# Patient Record
Sex: FEMALE | Race: WHITE | NOT HISPANIC OR LATINO | Employment: OTHER | URBAN - METROPOLITAN AREA
[De-identification: names, ages, dates, MRNs, and addresses within clinical notes are randomized per-mention and may not be internally consistent; named-entity substitution may affect disease eponyms.]

---

## 2017-05-02 ENCOUNTER — ALLSCRIPTS OFFICE VISIT (OUTPATIENT)
Dept: OTHER | Facility: OTHER | Age: 62
End: 2017-05-02

## 2017-05-08 ENCOUNTER — LAB REQUISITION (OUTPATIENT)
Dept: LAB | Facility: HOSPITAL | Age: 62
End: 2017-05-08
Payer: COMMERCIAL

## 2017-05-08 DIAGNOSIS — R53.83 OTHER FATIGUE: ICD-10-CM

## 2017-05-08 PROCEDURE — 88305 TISSUE EXAM BY PATHOLOGIST: CPT | Performed by: PHYSICIAN ASSISTANT

## 2017-06-11 ENCOUNTER — APPOINTMENT (OUTPATIENT)
Dept: LAB | Age: 62
End: 2017-06-11
Payer: COMMERCIAL

## 2017-06-11 ENCOUNTER — TRANSCRIBE ORDERS (OUTPATIENT)
Dept: ADMINISTRATIVE | Age: 62
End: 2017-06-11

## 2017-06-11 DIAGNOSIS — Z00.8 HEALTH EXAMINATION IN POPULATION SURVEY: ICD-10-CM

## 2017-06-11 DIAGNOSIS — Z00.8 HEALTH EXAMINATION IN POPULATION SURVEY: Primary | ICD-10-CM

## 2017-06-11 LAB
CHOLEST SERPL-MCNC: 228 MG/DL (ref 50–200)
EST. AVERAGE GLUCOSE BLD GHB EST-MCNC: 120 MG/DL
HBA1C MFR BLD: 5.8 % (ref 4.2–6.3)
HDLC SERPL-MCNC: 47 MG/DL (ref 40–60)
LDLC SERPL CALC-MCNC: 122 MG/DL (ref 0–100)
TRIGL SERPL-MCNC: 294 MG/DL

## 2017-06-11 PROCEDURE — 83036 HEMOGLOBIN GLYCOSYLATED A1C: CPT

## 2017-06-11 PROCEDURE — 80061 LIPID PANEL: CPT

## 2017-06-11 PROCEDURE — 36415 COLL VENOUS BLD VENIPUNCTURE: CPT

## 2017-06-28 ENCOUNTER — ALLSCRIPTS OFFICE VISIT (OUTPATIENT)
Dept: OTHER | Facility: OTHER | Age: 62
End: 2017-06-28

## 2017-06-28 DIAGNOSIS — R00.2 PALPITATIONS: ICD-10-CM

## 2017-06-28 DIAGNOSIS — E55.9 VITAMIN D DEFICIENCY: ICD-10-CM

## 2017-06-29 ENCOUNTER — TRANSCRIBE ORDERS (OUTPATIENT)
Dept: ADMINISTRATIVE | Facility: HOSPITAL | Age: 62
End: 2017-06-29

## 2017-06-29 DIAGNOSIS — R00.2 PALPITATIONS: Primary | ICD-10-CM

## 2017-07-05 ENCOUNTER — APPOINTMENT (OUTPATIENT)
Dept: LAB | Facility: CLINIC | Age: 62
End: 2017-07-05
Payer: COMMERCIAL

## 2017-07-05 ENCOUNTER — TRANSCRIBE ORDERS (OUTPATIENT)
Dept: LAB | Facility: CLINIC | Age: 62
End: 2017-07-05

## 2017-07-05 DIAGNOSIS — R00.2 PALPITATIONS: ICD-10-CM

## 2017-07-05 DIAGNOSIS — E55.9 VITAMIN D DEFICIENCY: ICD-10-CM

## 2017-07-05 LAB
25(OH)D3 SERPL-MCNC: 35.6 NG/ML (ref 30–100)
ALBUMIN SERPL BCP-MCNC: 4 G/DL (ref 3.5–5)
ALP SERPL-CCNC: 48 U/L (ref 46–116)
ALT SERPL W P-5'-P-CCNC: 26 U/L (ref 12–78)
ANION GAP SERPL CALCULATED.3IONS-SCNC: 6 MMOL/L (ref 4–13)
AST SERPL W P-5'-P-CCNC: 23 U/L (ref 5–45)
BASOPHILS # BLD AUTO: 0.02 THOUSANDS/ΜL (ref 0–0.1)
BASOPHILS NFR BLD AUTO: 0 % (ref 0–1)
BILIRUB SERPL-MCNC: 0.69 MG/DL (ref 0.2–1)
BUN SERPL-MCNC: 14 MG/DL (ref 5–25)
CALCIUM SERPL-MCNC: 9.4 MG/DL (ref 8.3–10.1)
CHLORIDE SERPL-SCNC: 104 MMOL/L (ref 100–108)
CO2 SERPL-SCNC: 27 MMOL/L (ref 21–32)
CREAT SERPL-MCNC: 0.93 MG/DL (ref 0.6–1.3)
EOSINOPHIL # BLD AUTO: 0.27 THOUSAND/ΜL (ref 0–0.61)
EOSINOPHIL NFR BLD AUTO: 4 % (ref 0–6)
ERYTHROCYTE [DISTWIDTH] IN BLOOD BY AUTOMATED COUNT: 13.6 % (ref 11.6–15.1)
GFR SERPL CREATININE-BSD FRML MDRD: >60 ML/MIN/1.73SQ M
GLUCOSE P FAST SERPL-MCNC: 114 MG/DL (ref 65–99)
HCT VFR BLD AUTO: 40.6 % (ref 34.8–46.1)
HGB BLD-MCNC: 13.7 G/DL (ref 11.5–15.4)
LYMPHOCYTES # BLD AUTO: 1.68 THOUSANDS/ΜL (ref 0.6–4.47)
LYMPHOCYTES NFR BLD AUTO: 26 % (ref 14–44)
MCH RBC QN AUTO: 30.4 PG (ref 26.8–34.3)
MCHC RBC AUTO-ENTMCNC: 33.7 G/DL (ref 31.4–37.4)
MCV RBC AUTO: 90 FL (ref 82–98)
MONOCYTES # BLD AUTO: 0.38 THOUSAND/ΜL (ref 0.17–1.22)
MONOCYTES NFR BLD AUTO: 6 % (ref 4–12)
NEUTROPHILS # BLD AUTO: 4.13 THOUSANDS/ΜL (ref 1.85–7.62)
NEUTS SEG NFR BLD AUTO: 64 % (ref 43–75)
NRBC BLD AUTO-RTO: 0 /100 WBCS
PLATELET # BLD AUTO: 134 THOUSANDS/UL (ref 149–390)
PMV BLD AUTO: 11.7 FL (ref 8.9–12.7)
POTASSIUM SERPL-SCNC: 4.4 MMOL/L (ref 3.5–5.3)
PROT SERPL-MCNC: 7.5 G/DL (ref 6.4–8.2)
RBC # BLD AUTO: 4.51 MILLION/UL (ref 3.81–5.12)
SODIUM SERPL-SCNC: 137 MMOL/L (ref 136–145)
T4 FREE SERPL-MCNC: 1.05 NG/DL (ref 0.76–1.46)
TSH SERPL DL<=0.05 MIU/L-ACNC: 1.6 UIU/ML (ref 0.36–3.74)
WBC # BLD AUTO: 6.5 THOUSAND/UL (ref 4.31–10.16)

## 2017-07-05 PROCEDURE — 84439 ASSAY OF FREE THYROXINE: CPT

## 2017-07-05 PROCEDURE — 85025 COMPLETE CBC W/AUTO DIFF WBC: CPT

## 2017-07-05 PROCEDURE — 84443 ASSAY THYROID STIM HORMONE: CPT

## 2017-07-05 PROCEDURE — 36415 COLL VENOUS BLD VENIPUNCTURE: CPT

## 2017-07-05 PROCEDURE — 82306 VITAMIN D 25 HYDROXY: CPT

## 2017-07-05 PROCEDURE — 80053 COMPREHEN METABOLIC PANEL: CPT

## 2017-07-28 ENCOUNTER — HOSPITAL ENCOUNTER (OUTPATIENT)
Dept: NON INVASIVE DIAGNOSTICS | Facility: CLINIC | Age: 62
Discharge: HOME | End: 2017-07-28
Payer: COMMERCIAL

## 2017-07-28 DIAGNOSIS — R00.2 PALPITATIONS: ICD-10-CM

## 2017-07-28 LAB
CHEST PAIN STATEMENT: NORMAL
MAX DIASTOLIC BP: 74 MMHG
MAX HEART RATE: 176 BPM
MAX PREDICTED HEART RATE: 161 BPM
MAX. SYSTOLIC BP: 160 MMHG
PROTOCOL NAME: NORMAL
REASON FOR TERMINATION: NORMAL
TARGET HR FORMULA: NORMAL
TEST INDICATION: NORMAL
TIME IN EXERCISE PHASE: 557 S

## 2017-07-28 PROCEDURE — 93017 CV STRESS TEST TRACING ONLY: CPT

## 2017-07-28 PROCEDURE — 93306 TTE W/DOPPLER COMPLETE: CPT

## 2017-08-01 ENCOUNTER — ALLSCRIPTS OFFICE VISIT (OUTPATIENT)
Dept: OTHER | Facility: OTHER | Age: 62
End: 2017-08-01

## 2017-08-01 ENCOUNTER — LAB REQUISITION (OUTPATIENT)
Dept: LAB | Facility: HOSPITAL | Age: 62
End: 2017-08-01
Payer: COMMERCIAL

## 2017-08-01 DIAGNOSIS — N94.9 UNSPECIFIED CONDITION ASSOCIATED WITH FEMALE GENITAL ORGANS AND MENSTRUAL CYCLE: ICD-10-CM

## 2017-08-01 LAB
BACTERIA UR QL AUTO: NEGATIVE
BILIRUB UR QL STRIP: NORMAL
CLARITY UR: NORMAL
CLUE CELL (HISTORICAL): NORMAL
COLOR UR: YELLOW
GLUCOSE (HISTORICAL): NORMAL
HGB UR QL STRIP.AUTO: NORMAL
HYPHAL YEAST (HISTORICAL): POSITIVE
KETONES UR STRIP-MCNC: NORMAL MG/DL
KOH PREP (HISTORICAL): NORMAL
LEUKOCYTE ESTERASE UR QL STRIP: NORMAL
NITRITE UR QL STRIP: NORMAL
PH UR STRIP.AUTO: 4.5 [PH]
PH UR STRIP.AUTO: 5 [PH]
PROT UR STRIP-MCNC: NORMAL MG/DL
SP GR UR STRIP.AUTO: 1.03
TRICHOMONAS (HISTORICAL): NEGATIVE
UROBILINOGEN UR QL STRIP.AUTO: 0.2
YEAST (HISTORICAL): NEGATIVE

## 2017-08-01 PROCEDURE — 87510 GARDNER VAG DNA DIR PROBE: CPT | Performed by: PHYSICIAN ASSISTANT

## 2017-08-01 PROCEDURE — 87660 TRICHOMONAS VAGIN DIR PROBE: CPT | Performed by: PHYSICIAN ASSISTANT

## 2017-08-01 PROCEDURE — 87086 URINE CULTURE/COLONY COUNT: CPT | Performed by: PHYSICIAN ASSISTANT

## 2017-08-01 PROCEDURE — 87480 CANDIDA DNA DIR PROBE: CPT | Performed by: PHYSICIAN ASSISTANT

## 2017-08-02 LAB
BACTERIA UR CULT: NORMAL
CANDIDA RRNA VAG QL PROBE: POSITIVE
G VAGINALIS RRNA GENITAL QL PROBE: NEGATIVE
T VAGINALIS RRNA GENITAL QL PROBE: NEGATIVE

## 2017-08-03 ENCOUNTER — GENERIC CONVERSION - ENCOUNTER (OUTPATIENT)
Dept: OTHER | Facility: OTHER | Age: 62
End: 2017-08-03

## 2017-08-09 ENCOUNTER — GENERIC CONVERSION - ENCOUNTER (OUTPATIENT)
Dept: OTHER | Facility: OTHER | Age: 62
End: 2017-08-09

## 2017-09-13 ENCOUNTER — HOSPITAL ENCOUNTER (OUTPATIENT)
Dept: NON INVASIVE DIAGNOSTICS | Facility: CLINIC | Age: 62
Discharge: HOME | End: 2017-09-13
Payer: COMMERCIAL

## 2017-09-13 DIAGNOSIS — R00.2 PALPITATIONS: ICD-10-CM

## 2017-09-13 PROCEDURE — 93225 XTRNL ECG REC<48 HRS REC: CPT

## 2017-09-13 PROCEDURE — 93226 XTRNL ECG REC<48 HR SCAN A/R: CPT

## 2017-09-21 ENCOUNTER — GENERIC CONVERSION - ENCOUNTER (OUTPATIENT)
Dept: OTHER | Facility: OTHER | Age: 62
End: 2017-09-21

## 2017-10-03 ENCOUNTER — ALLSCRIPTS OFFICE VISIT (OUTPATIENT)
Dept: OTHER | Facility: OTHER | Age: 62
End: 2017-10-03

## 2017-10-03 DIAGNOSIS — K90.0 CELIAC DISEASE: ICD-10-CM

## 2017-10-03 DIAGNOSIS — K21.9 GASTRO-ESOPHAGEAL REFLUX DISEASE WITHOUT ESOPHAGITIS: ICD-10-CM

## 2017-10-05 ENCOUNTER — GENERIC CONVERSION - ENCOUNTER (OUTPATIENT)
Dept: OTHER | Facility: OTHER | Age: 62
End: 2017-10-05

## 2017-10-11 ENCOUNTER — APPOINTMENT (OUTPATIENT)
Dept: LAB | Facility: CLINIC | Age: 62
End: 2017-10-11
Payer: COMMERCIAL

## 2017-10-11 ENCOUNTER — TRANSCRIBE ORDERS (OUTPATIENT)
Dept: LAB | Facility: CLINIC | Age: 62
End: 2017-10-11

## 2017-10-11 DIAGNOSIS — K21.9 GASTRO-ESOPHAGEAL REFLUX DISEASE WITHOUT ESOPHAGITIS: ICD-10-CM

## 2017-10-11 DIAGNOSIS — K90.0 CELIAC DISEASE: ICD-10-CM

## 2017-10-11 PROCEDURE — 82784 ASSAY IGA/IGD/IGG/IGM EACH: CPT

## 2017-10-11 PROCEDURE — 36415 COLL VENOUS BLD VENIPUNCTURE: CPT

## 2017-10-11 PROCEDURE — 83516 IMMUNOASSAY NONANTIBODY: CPT

## 2017-10-11 PROCEDURE — 86255 FLUORESCENT ANTIBODY SCREEN: CPT

## 2017-10-12 LAB
ENDOMYSIUM IGA SER QL: NEGATIVE
GLIADIN PEPTIDE IGA SER-ACNC: 4 UNITS (ref 0–19)
GLIADIN PEPTIDE IGG SER-ACNC: 3 UNITS (ref 0–19)
IGA SERPL-MCNC: 131 MG/DL (ref 87–352)
TTG IGA SER-ACNC: <2 U/ML (ref 0–3)
TTG IGG SER-ACNC: <2 U/ML (ref 0–5)

## 2017-10-13 ENCOUNTER — GENERIC CONVERSION - ENCOUNTER (OUTPATIENT)
Dept: OTHER | Facility: OTHER | Age: 62
End: 2017-10-13

## 2017-10-27 NOTE — PROGRESS NOTES
Assessment  1  Epigastric discomfort (789 06) (R10 13)   2  Chronic GERD (530 81) (K21 9)   3  Celiac disease (579 0) (K90 0)    Plan  Celiac disease, Chronic GERD    · (1) CELIAC DISEASE AB PROFILE; Status:Active; Requested XGT:[id number];    Perform:[facility]; VSA:[id number]; Ordered; For:Celiac disease, Chronic GERD; Ordered By:Oscar Blunt;  Chronic GERD, Epigastric discomfort    · Pantoprazole Sodium 40 MG Oral Tablet Delayed Release (Protonix); TAKE 1  TABLET DAILY   Rx By: Aníbal Chambers; Dispense: 90 Days ; #:90 Tablet Delayed Release; Refill: 1;For: Chronic GERD, Epigastric discomfort; ALETA = N; Verified Transmission to [address]; Last Updated By: System, [employer]; 10/3/2017 2:48:30 PM   · Follow-up visit in 3 months Evaluation and Treatment  Follow-up  Status: Hold For -  Scheduling  Requested for: 99DFO1069   Ordered; For: Chronic GERD, Epigastric discomfort; Ordered By: Aníbal Chambers Performed:  Due: 76DUN7867   · Do not take anti-inflammatory medicines other than aspirin ; Status:Complete;   Done:  19TUW8380   Ordered; For:Chronic GERD, Epigastric discomfort; Ordered By:Oscar Blunt;    Discussion/Summary  Discussion Summary:     Postprandial burning epigastric discomfort, and this patient with history of NSAID use, she had EGD last year showing some mild gastritis  She has not been compliant with PPI in general over the last year, and has been off it entirely for the last 1-2 months  Suspect patient has some recurrent gastritis or even underlying peptic ulcer disease  History of celiac disease, patient says she was diagnosed in 2010  EGD last year showed no evidence of sprue in the duodenum   She denies any disturbance with her bowel habits, and maintains she has been faithful to gluten-free diet, but rule out any increased activity with her celiac disease contributing to her symptoms    I advised patient to restart Protonix, take this faithfully once every morning for the next 2-3 months  I also advised patient to avoid NSAIDs as much as she can, try to use Tylenol for joint pains if this helps  Will check a celiac antibody panel  Follow-up office visit in 3-4 months  If patient has not had significant symptomatic improvement with PPI therapy, can consider repeating EGD for further evaluation  I advised patient to call in the meantime if she has worsening symptoms or other issues  Counseling Documentation With Imm: The patient was counseled regarding diagnostic results,-- instructions for management,-- risk factor reductions,-- risks and benefits of treatment options,-- importance of compliance with treatment  Medication SE Review and Pt Understands Tx: Possible side effects of new medications were reviewed with the patient/guardian today  Chief Complaint  Chief Complaint Free Text Note Form: up; epigastric pain, GERD, history of celiac disease      History of Present Illness  HPI: female with history of osteopenia presents for follow-up regarding some ongoing issues with epigastric discomfort and reflux  She was last seen in the office by Dr Doretha Acevedo in April 2016; at that time a recent EGD showed a normal-appearing duodenum, some mild gastritis  She had had a normal colonoscopy one to 2 years before that  Patient was advised to minimize NSAID use, continue with PPI and transition to H2 blocker if possible, and follow-up in one year  this time, the patient says that she did stop pantoprazole on her own about a month or 2 ago because she was worried about side effects, she says that she only took it intermittently before that anyway  She does take [employer] and other NSAIDs occasionally for joint pains  At this time, she says that she gets a burning sensation in the epigastric area after she eats or drinks virtually anything  She also has some acid reflux symptoms which are worse at night before she goes to bed  She denies any recent unintended weight loss   Denies any vomiting episodes, denies any change in her bowel habits, denies any blood or mucus in her stools  No melena  She says her last colonoscopy was in April 2014 with Dr Boris Veloz, this saw the removal of one benign polyp and she was recommended for repeat colonoscopy 5 years from that time  History Reviewed: The history was obtained today from the patient and I agree with the documented history  Review of Systems  Complete-Female GI Adult:   Constitutional: No fever, no chills, feels well, no tiredness, no recent weight gain or weight loss  Eyes: No complaints of eye pain, no red eyes, no eyesight problems, no discharge, no dry eyes, no itching of eyes  ENT: no complaints of earache, no loss of hearing, no nose bleeds, no nasal discharge, no sore throat, no hoarseness  Cardiovascular: No complaints of slow heart rate, no fast heart rate, no chest pain, no palpitations, no leg claudication, no lower extremity edema  Respiratory: No complaints of shortness of breath, no wheezing, no cough, no SOB on exertion, no orthopnea, no PND  Gastrointestinal: as noted in HPI  Genitourinary: No complaints of dysuria, no incontinence, no pelvic pain, no dysmenorrhea, no vaginal discharge or bleeding  Musculoskeletal: No complaints of arthralgias, no myalgias, no joint swelling or stiffness, no limb pain or swelling  Integumentary: No complaints of skin rash or lesions, no itching, no skin wounds, no breast pain or lump  Neurological: No complaints of headache, no confusion, no convulsions, no numbness, no dizziness or fainting, no tingling, no limb weakness, no difficulty walking  Psychiatric: Not suicidal, no sleep disturbance, no anxiety or depression, no change in personality, no emotional problems  Endocrine: No complaints of proptosis, no hot flashes, no muscle weakness, no deepening of the voice, no feelings of weakness     Hematologic/Lymphatic: No complaints of swollen glands, no swollen glands in the neck, does not bleed easily, does not bruise easily  Active Problems  1  Back pain (724 5) (M54 9)   2  Heart palpitations (785 1) (R00 2)   3  Insomnia (780 52) (G47 00)   4  Osteopenia (733 90) (M85 80)   5  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)   6  Vitamin D deficiency (268 9) (E55 9)    Past Medical History  1  History of Acute bronchospasm (519 11) (J98 01)   2  History of Acute maxillary sinusitis (461 0) (J01 00)   3  History of Acute pain (338 19) (R52)   4  History of Acute UTI (599 0) (N39 0)   5  History of Allergic conjunctivitis (372 14) (H10 10)   6  History of Arthralgia Of The Left Ulna/Radius/Wrist (719 43)   7  History of Conjunctivitis (372 30) (H10 9)   8  History of Contact dermatitis (692 9) (L25 9)   9  History of Contact dermatitis due to poison ivy (692 6) (L23 7)   10  History of Costochondritis (733 6) (M94 0)   11  History of Encounter for gynecological examination without abnormal finding (V72 31)    (Z01 419)   12  History of Encounter for routine gynecological examination (V72 31) (Z01 419)   13  History of Encounter for screening colonoscopy (V76 51) (Z12 11)   14  History of acne (V13 3) (Z87 2)   15  History of acne (V13 3) (Z87 2)   16  History of acute bronchitis (V12 69) (Z87 09)   17  History of acute sinusitis (V12 69) (Z87 09)   18  History of back pain (V13 59) (Z87 39)   19  History of candidiasis (V12 09) (Z86 19)   20  History of colonic polyps (V12 72) (Z86 010)   21  Denied: History of depression   22  History of gastritis (V12 79) (Z87 19)   23  History of inflammation of sacroiliac joint (V13 4) (Z87 39)   24  History of nausea (V12 79) (Z87 898)   25  History of onychomycosis (V12 09) (Z86 19)   26  History of palpitations (V12 59) (Z87 898)   27  History of restless legs syndrome (V12 49) (Z86 69)   28  History of screening mammography (V15 89) (Z92 89)   29  History of screening mammography (V15 89) (Z92 89)   30   Denied: History of substance abuse   31  History of urinary frequency (V13 09) (Z87 898)   32  History of vaginitis (V13 29) (Z87 42)   33  History of Hot flashes (627 2) (N95 1)   34  History of Insomnia (780 52) (G47 00)   35  History of Laceration (879 8)   36  History of Laceration of upper extremity, right, sequela (906 1) (S41 111S)   37  History of Lateral epicondylitis (tennis elbow) (726 32) (M77 10)   38  History of Malignant lymphoma (202 80) (C85 90)   39  History of Muscle ache (729 1) (M79 1)   40  Need for Tdap vaccination (V06 1) (Z23)   41  History of Postmenopausal disorder (627 9) (N95 9)   42  History of Rash (782 1) (R21)   43  History of Right ankle pain (719 47) (M25 571)   44  History of Screening for HPV (human papillomavirus) (V73 81) (Z11 51)   45  History of Superinfection (136 9) (B99 9)   46  History of 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)   47  History of Urinary Tract Infection (V13 02)   48  History of Vaginal burning (625 8) (N94 9)  Active Problems And Past Medical History Reviewed: The active problems and past medical history were reviewed and updated today  Surgical History  1  History of Nasal Septal Deviation Repair   2  History of Oophorectomy   3  History of Total Abdominal Hysterectomy With Removal Of Both Ovaries  Surgical History Reviewed: The surgical history was reviewed and updated today  Family History  Mother    1  Denied: Family history of Colon cancer   2  Denied: Family history of Crohn's disease without complication, unspecified   gastrointestinal tract location   3  Denied: Family history of depression   4  Denied: Family history of liver disease   5  Denied: Family history of substance abuse   6  Family history of Mother  At Age 77  Father    9  Denied: Family history of Colon cancer   8  Denied: Family history of Crohn's disease without complication, unspecified   gastrointestinal tract location   9  Family history of Diabetes Mellitus (V18 0)   10  Family history of Dyslipidemia   11  Family history of colonic polyps (V18 51) (Z83 71)   12  Denied: Family history of depression   15  Denied: Family history of liver disease   15  Denied: Family history of substance abuse   15  Family history of Hypertension (V17 49)   16  Family history of Laryngeal Cancer (V16 2)  Sister    16  Family history of Thyroid Cancer  Family History Reviewed: The family history was reviewed and updated today  Social History   · Being A Social Drinker   · Caffeine Use   · Exercise: Walking   · Former smoker (B05 38) (K20 606)   ·   Social History Reviewed: The social history was reviewed and updated today  The social history was reviewed and is unchanged  Current Meds   1  Acidophilus Oral Tablet; Therapy: (Recorded:09Apr2015) to Recorded   2  Claritin CAPS; Therapy: (Recorded:09Apr2015) to Recorded   3  Clotrimazole-Betamethasone 1-0 05 % External Cream; APPLY  AND RUB  IN A THIN   FILM TO AFFECTED AREAS TWICE DAILY  (AM AND PM); Therapy: 15Jkl5461 to (Last Rx:01Aug2017)  Requested for: 61Ujk4615 Ordered   4  Erythromycin 2 % External Gel; APPLY  AND RUB  IN A THIN FILM TO AFFECTED AREAS   TWICE DAILY  (AM AND PM); Therapy: 62ELZ8524 to (Last Rx:17Nov2016)  Requested for: 28VPD5028 Ordered   5  Estradiol 0 5 MG Oral Tablet; TAKE 1 TABLET DAILY AS DIRECTED; Therapy: 09Apr2015 to [phone])  Requested for: 28MJS3587; Last   Rx:72Gcf4588 Ordered   6  Fluconazole 150 MG Oral Tablet; TAKE 1 TABLET NOW AND AGAIN IN 3 DAYS; Therapy: 83Iwl1453 to (Last Rx:41Vat8477)  Requested for: 49Byl9953 Ordered   7  Ketorolac Tromethamine 10 MG Oral Tablet; take 1 tablet by mouth every 8 hours as   needed for pain x 5 days; Therapy: 12FCO1921 to (Evaluate:47Zen9064)  Requested for: 10LIT8156; Last   Rx:52Nii4716 Ordered   8  Meloxicam 15 MG Oral Tablet; TAKE 1 TABLET DAILY AS NEEDED;    Therapy: 49OIX8192 to (Evaluate:23Jun2018)  Requested for: 59YIW8326; Last Rx: 84OQK8993 Ordered   9  Metoprolol Succinate ER 50 MG Oral Tablet Extended Release 24 Hour; Take 1 tablet   daily; Therapy: 34PSI9130 to (Last Rx:03Oct2017)  Requested for: 62Iqj2920 Ordered   10  Ondansetron 4 MG Oral Tablet Disintegrating; one tab q 8 hrs prn n/v;    Therapy: 79VTE3137 to (Last Rx:24Jzs6405)  Requested for: 79FJU1445 Ordered   11  Protonix 40 MG Oral Tablet Delayed Release; Therapy: 87WPA2095 to Recorded   12  Sudafed TABS; TAKE 1 TABLET EVERY 6 HOURS AS NEEDED; Therapy: (53 730 54 84) to Recorded   13  Tylenol TABS; Therapy: (53 730 54 84) to Recorded   14  Vitamin B12 100 MCG Oral Tablet; Therapy: (53 730 54 84) to Recorded   15  Vitamin C 100 MG Oral Tablet; Therapy: (53 730 54 84) to Recorded   16  Vitamin D3 1000 UNIT Oral Capsule; Therapy: (53 730 54 84) to Recorded   17  Xopenex HFA 45 MCG/ACT Inhalation Aerosol; INHALE 2 PUFFS EVERY 6 HOURS AS    NEEDED; Therapy: 09JDL1745 to (Last Rx:17Nov2016)  Requested for: 03CWK5622 Ordered   18  Zolpidem Tartrate 5 MG Oral Tablet; TAKE 1 TABLET AT BEDTIME AS NEEDED; Last    Rx:17Nov2016 Ordered   19  Zolpidem Tartrate 5 MG Oral Tablet; TAKE ONE TABLET BY MOUTH AT BEDTIME; Therapy: 35OQD2635 to (Evaluate:21Nov2017); Last Rx:32Isr6928 Ordered  Medication List Reviewed: The medication list was reviewed and updated today  Allergies  1  Morphine Derivatives   2  Sulfa Drugs    Vitals  Vital Signs    Recorded: 51KZS0262 02:10PM   Temperature 96 7 F   Heart Rate 80   Systolic 062   Diastolic 74   Weight 558 lb    BMI Calculated 27 62   BSA Calculated 1 83   O2 Saturation 98     Physical Exam    Constitutional   General appearance: No acute distress, well appearing and well nourished  Eyes   Conjunctiva and lids: No swelling, erythema or discharge  Pupils and irises: Equal, round and reactive to light      Ears, Nose, Mouth, and Throat   External inspection of ears and nose: Normal  Oropharynx: Normal with no erythema, edema, exudate or lesions  Pulmonary   Respiratory effort: No increased work of breathing or signs of respiratory distress  Auscultation of lungs: Clear to auscultation  Cardiovascular   Palpation of heart: Normal PMI, no thrills  Auscultation of heart: Normal rate and rhythm, normal S1 and S2, without murmurs  Examination of extremities for edema and/or varicosities: Normal     Carotid pulses: Normal     Abdomen   Abdomen: Non-tender, no masses  Liver and spleen: No hepatomegaly or splenomegaly  Lymphatic   Palpation of lymph nodes in neck: No lymphadenopathy  Musculoskeletal   Gait and station: Normal     Digits and nails: Normal without clubbing or cyanosis  Inspection/palpation of joints, bones, and muscles: Normal     Skin   Skin and subcutaneous tissue: Normal without rashes or lesions      Psychiatric   Orientation to person, place, and time: Normal     Mood and affect: Normal          Future Appointments    Date/Time Provider Specialty Site   12/29/2017 02:30 PM [facility] [place] [address]   Electronically signed by : Benedetto Halsted, [address]; Oct  3 2017  5:12PM EST                       (Author)    Electronically signed by : RUBINA Carrion ; Oct  3 2017  5:46PM EST                       (Author)

## 2017-11-01 ENCOUNTER — GENERIC CONVERSION - ENCOUNTER (OUTPATIENT)
Dept: OTHER | Facility: OTHER | Age: 62
End: 2017-11-01

## 2017-11-30 ENCOUNTER — ALLSCRIPTS OFFICE VISIT (OUTPATIENT)
Dept: OTHER | Facility: OTHER | Age: 62
End: 2017-11-30

## 2017-12-05 NOTE — CONSULTS
Assessment    1  Premature ventricular contraction (427 69) (I49 3)    Plan  Heart palpitations    · EKG/ECG- POC; Status:Complete;   Done: 12PGR8902   Perform: In Office; [phone]; Last Updated Guillermo Almazna; 11/30/2017 11:40:24 AM;Ordered;  For:Heart palpitations; Ordered By:Celena Putnam; Heart palpitations, Premature ventricular contraction    · Follow-up visit in 1 month Evaluation and Treatment  Follow-up  Status: Hold For -  Scheduling  Requested for: 22SBP3766   Ordered; For: Heart palpitations, Premature ventricular contraction; Ordered By: Modesto Craven Performed:  Due: 09HBG9538  Premature ventricular contraction    · Flecainide Acetate 50 MG Oral Tablet; TAKE 1 TABLET TWICE DAILY   Rx By: Modesto Craven; Dispense: 30 Days ; #:60 Tablet; Refill: 11; For: Premature ventricular contraction; ALETA = N; Sent To: [facility]    Discussion/Summary    63year old woman with no cardiac history presents for evaluation of palpitations  Several months ago developed palpitations - started happening after eating carbohydrates  Occurs every day - not irregular, but pounding  Some improvement with Metoprolol Succinate  Holter monitor demonstrated frequent PVCs, but no sustained dysrhythmias  Echo and stress test were normal     Impression:  1  Premature ventricular contractions - may be etiology of symptoms  Will start Flecainide 50mg 2x/day along with metoprolol  No evidence of structural heart disease or myocardial ischemia  Recommendations:  1  Start Flecainide 50mg 2x/day  2  Continue metoprolol  3  Follow up in one month  Chief Complaint  Pt came in for a consult, pt has strong palpitations, minor light headiness  History of Present Illness  Cardiology HPI Free Text Note Form [facility]: Ms Kel Savage is a 63year old woman with no cardiac history presents for evaluation of palpitations  Several months ago developed palpitations - started happening after eating carbohydrates   Occurs every day - not irregular, but pounding  Some improvement with Metoprolol Succinate  Holter monitor demonstrated frequent PVCs, but no sustained dysrhythmias  Echo and stress test were normal       Review of Systems      Cardiac: palpitations present   Skin: No complaints of nonhealing sores or skin rash  Genitourinary: No complaints of recurrent urinary tract infections, frequent urination at night, difficult urination, blood in urine, kidney stones, loss of bladder control, kidney problems, denies any birth control or hormone replacement, is not post menopausal, not currently pregnant  Psychological: No complaints of feeling depressed, anxiety, panic attacks, or difficulty concentrating  General: No complaints of trouble sleeping, lack of energy, fatigue, appetite changes, weight changes, fever, frequent infections, or night sweats  Respiratory: No complaints of shortness of breath, cough with sputum, or wheezing  HEENT: No complaints of serious problems, hearing problems, nose problems, throat problems, or snoring  Gastrointestinal: No complaints of liver problems, nausea, vomiting, heartburn, constipation, bloody stools, diarrhea, problems swallowing, adbominal pain, or rectal bleeding  Hematologic: No complaints of bleeding disorders, anemia, blood clots, or excessive brusing  Neurological: No complaints of numbness, tingling, dizziness, weakness, seizures, headaches, syncope or fainting, AM fatigue, daytime sleepiness, no witnessed apnea episodes  Musculoskeletal: No complaints of arthritis, back pain, or painfull swelling  Active Problems    1  Back pain (724 5) (M54 9)   2  Celiac disease (579 0) (K90 0)   3  Chronic GERD (530 81) (K21 9)   4  Epigastric discomfort (789 06) (R10 13)   5  Heart palpitations (785 1) (R00 2)   6  Insomnia (780 52) (G47 00)   7  Osteopenia (733 90) (M85 80)   8  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)   9   Vitamin D deficiency (268 9) (E55 9)    Past Medical History    · History of Acute bronchospasm (519 11) (J98 01)   · History of Acute maxillary sinusitis (461 0) (J01 00)   · History of Acute pain (338 19) (R52)   · History of Acute UTI (599 0) (N39 0)   · History of Allergic conjunctivitis (372 14) (H10 10)   · History of Arthralgia Of The Left Ulna/Radius/Wrist (719 43)   · History of Conjunctivitis (372 30) (H10 9)   · History of Contact dermatitis (692 9) (L25 9)   · History of Contact dermatitis due to poison ivy (692 6) (L23 7)   · History of Costochondritis (733 6) (M94 0)   · History of Encounter for gynecological examination without abnormal finding (V72 31)  (Z01 419)   · History of Encounter for routine gynecological examination (V72 31) (Z01 419)   · History of Encounter for screening colonoscopy (V76 51) (Z12 11)   · History of acne (V13 3) (Z87 2)   · History of acne (V13 3) (Z87 2)   · History of acute bronchitis (V12 69) (Z87 09)   · History of acute sinusitis (V12 69) (Z87 09)   · History of back pain (V13 59) (Z87 39)   · History of candidiasis (V12 09) (Z86 19)   · History of colonic polyps (V12 72) (Z86 010)   · Denied: History of depression   · History of gastritis (V12 79) (Z87 19)   · History of inflammation of sacroiliac joint (V13 4) (Z87 39)   · History of nausea (V12 79) (F44 388)   · History of onychomycosis (V12 09) (Z86 19)   · History of palpitations (V12 59) (P14 045)   · History of restless legs syndrome (V12 49) (Z86 69)   · History of screening mammography (V15 89) (Z92 89)   · History of screening mammography (V15 89) (Z92 89)   · Denied: History of substance abuse   · History of urinary frequency (V13 09) (L67 864)   · History of vaginitis (V13 29) (Z87 42)   · History of Hot flashes (627 2) (N95 1)   · History of Insomnia (780 52) (G47 00)   · History of Laceration (879 8)   · History of Laceration of upper extremity, right, sequela (906 1) (S41 111S)   · History of Lateral epicondylitis (tennis elbow) (726 32) (M77 10)   · History of Malignant lymphoma (202 80) (C85 90)   · History of Muscle ache (729 1) (M79 1)   · Need for Tdap vaccination (V06 1) (Z23)   · History of Postmenopausal disorder (627 9) (N95 9)   · History of Rash (782 1) (R21)   · History of Right ankle pain (719 47) (M25 571)   · History of Screening for HPV (human papillomavirus) (V73 81) (Z11 51)   · History of Superinfection (136 9) (B99 9)   · History of 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)   · History of Urinary Tract Infection (V13 02)   · History of Vaginal burning (625 8) (N94 9)    The active problems and past medical history were reviewed and updated today  Surgical History    · History of Nasal Septal Deviation Repair   · History of Oophorectomy   · History of Total Abdominal Hysterectomy With Removal Of Both Ovaries    The surgical history was reviewed and updated today  Family History  Mother    · Denied: Family history of Colon cancer   · Denied: Family history of Crohn's disease without complication, unspecified  gastrointestinal tract location   · Denied: Family history of depression   · Denied: Family history of liver disease   · Denied: Family history of substance abuse   · Family history of Mother  At Age 77  Father    · Denied: Family history of Colon cancer   · Denied: Family history of Crohn's disease without complication, unspecified  gastrointestinal tract location   · Family history of Diabetes Mellitus (V18 0)   · Family history of Dyslipidemia   · Family history of colonic polyps (V18 51) (Z83 71)   · Denied: Family history of depression   · Denied: Family history of liver disease   · Denied: Family history of substance abuse   · Family history of Hypertension (V17 49)   · Family history of Laryngeal Cancer (V16 2)  Sister    · Family history of Thyroid Cancer  Family History Reviewed: The family history was reviewed and updated today         Social History    · Being A Social Drinker   · Caffeine Use   · Exercise: Walking   · Former smoker (H36 25) (B13 007)   · quite 2004, 1/2ppd x 20 yrs   ·   The social history was reviewed and updated today  The social history was reviewed and is unchanged  Current Meds   1  Acidophilus Oral Tablet; Therapy: (Recorded:09Apr2015) to Recorded   2  Claritin CAPS; Therapy: (Recorded:09Apr2015) to Recorded   3  Erythromycin 2 % External Gel; APPLY  AND RUB  IN A THIN FILM TO AFFECTED AREAS   TWICE DAILY  (AM AND PM); Therapy: 86SPK6750 to (Last Rx:17Nov2016)  Requested for: 19VCN3048 Ordered   4  Estradiol 0 5 MG Oral Tablet; TAKE 1 TABLET DAILY AS DIRECTED; Therapy: 09Apr2015 to [phone])  Requested for: 30ANI1166; Last   Rx:84Pex5859 Ordered   5  Fluconazole 150 MG Oral Tablet; TAKE 1 TABLET NOW AND AGAIN IN 3 DAYS; Therapy: 66Dmf1190 to (Last Rx:05Oct2017)  Requested for: 05Oct2017 Ordered   6  Ketorolac Tromethamine 10 MG Oral Tablet; take 1 tablet by mouth every 8 hours as   needed for pain x 5 days; Therapy: 27QZE9510 to (Evaluate:24Sep2017)  Requested for: 00PGF2534; Last   Rx:16Ane9644 Ordered   7  Meloxicam 15 MG Oral Tablet; TAKE 1 TABLET DAILY AS NEEDED; Therapy: 78LGA6674 to (Evaluate:23Jun2018)  Requested for: 93SRS4782; Last   Rx:28Jun2017 Ordered   8  Metoprolol Succinate ER 50 MG Oral Tablet Extended Release 24 Hour; Take 1 tablet   daily; Therapy: 81PJV5741 to (Last Rx:11Oct2017)  Requested for: 11Oct2017 Ordered   9  Ondansetron 4 MG Oral Tablet Disintegrating; one tab q 8 hrs prn n/v;   Therapy: 90YXF1403 to (Last Rx:13Tuz3132)  Requested for: 09AHK6736 Ordered   10  Pantoprazole Sodium 40 MG Oral Tablet Delayed Release; TAKE 1 TABLET DAILY; Therapy: 13YWF9961 to (Evaluate:01Apr2018)  Requested for: 31UBO8503; Last    Rx:03Oct2017 Ordered   11  Sudafed TABS; TAKE 1 TABLET EVERY 6 HOURS AS NEEDED; Therapy: ([phone]) to Recorded   12  Tylenol TABS; Therapy: ([phone]) to Recorded   13   Vitamin B12 100 MCG Oral Tablet; Therapy: [phone]) to Recorded   14  Vitamin C 100 MG Oral Tablet; Therapy: [phone]) to Recorded   15  Vitamin D3 1000 UNIT Oral Capsule; Therapy: (([phone]) to Recorded   16  Xopenex HFA 45 MCG/ACT Inhalation Aerosol; INHALE 2 PUFFS EVERY 6 HOURS AS    NEEDED; Therapy: 33LVQ2466 to (Last Rx:17Nov2016)  Requested for: 90BDU9769 Ordered   17  Zolpidem Tartrate 5 MG Oral Tablet; TAKE 1 TABLET AT BEDTIME AS NEEDED; Last    Rx:17Nov2016 Ordered   18  Zolpidem Tartrate 5 MG Oral Tablet; TAKE ONE TABLET BY MOUTH AT BEDTIME; Therapy: 90XHJ2648 to (Evaluate:21Nov2017); Last Rx:22Oim5895 Ordered    The medication list was reviewed and updated today  Allergies    1  Morphine Derivatives   2  Sulfa Drugs    Vitals  Signs    Heart Rate: 71  Systolic: 683, LUE, Sitting  Diastolic: 80, LUE, Sitting  Height: 5 ft 5 in  Weight: 169 lb   BMI Calculated: 28 12  BSA Calculated: 1 84    Physical Exam    Constitutional   General appearance: No acute distress, well appearing and well nourished  Eyes   Conjunctiva and Sclera examination: Conjunctiva pink, sclera anicteric  Ears, Nose, Mouth, and Throat - External inspection of ears and nose: Normal without deformities or discharge  Neck   Neck and thyroid: Normal, supple, trachea midline, no thyromegaly  Pulmonary   Respiratory effort: No increased work of breathing or signs of respiratory distress  Auscultation of lungs: Clear to auscultation, no rales, no rhonchi, no wheezing, good air movement  Cardiovascular   Auscultation of heart: Normal rate and rhythm, normal S1 and S2, no murmurs  Carotid pulses: Normal, 2+ bilaterally  Examination of extremities for edema and/or varicosities: Normal     Chest - Chest: Normal    Abdomen   Abdomen: Non-tender and no distention  Musculoskeletal Gait and station: Normal gait  Skin - Skin and subcutaneous tissue: Normal without rashes or lesions   Skin is warm and well perfused, normal turgor  Neurologic - Speech: Normal     Psychiatric - Orientation to person, place, and time: Normal  Mood and affect: Normal       Results/Data  A 12 lead ECG was performed and was normal    Rhythm and rate:  ventricular rate is 71 beats per minute  normal sinus rhythm  Future Appointments    Date/Time Provider Specialty Site   12/29/2017 02:30 PM Yuriy Co[facility] Adult [facility] [address] [facility]     End of Encounter Meds    1  Ketorolac Tromethamine 10 MG Oral Tablet; take 1 tablet by mouth every 8 hours as   needed for pain x 5 days; Therapy: 63RNC5792 to (Evaluate:24Sep2017)  Requested for: 39RLO9789; Last   Rx:21Sep2017 Ordered    2  Ondansetron 4 MG Oral Tablet Disintegrating; one tab q 8 hrs prn n/v;   Therapy: 19RGQ2376 to (Last Rx:21Sep2017)  Requested for: 19UFS8794 Ordered    3  Pantoprazole Sodium 40 MG Oral Tablet Delayed Release (Protonix); TAKE 1 TABLET   DAILY; Therapy: 13ZXX4021 to (Evaluate:01Apr2018)  Requested for: 37VAB3942; Last   Rx:03Oct2017 Ordered    4  Zolpidem Tartrate 5 MG Oral Tablet; TAKE ONE TABLET BY MOUTH AT BEDTIME; Therapy: 41MHO6371 to (Evaluate:21Nov2017); Last Rx:22Sep2017 Ordered    5  Erythromycin 2 % External Gel; APPLY  AND RUB  IN A THIN FILM TO AFFECTED AREAS   TWICE DAILY  (AM AND PM); Therapy: 41JLY8642 to (Last Rx:17Nov2016)  Requested for: 56FLP4786 Ordered    6  Xopenex HFA 45 MCG/ACT Inhalation Aerosol; INHALE 2 PUFFS EVERY 6 HOURS AS   NEEDED; Therapy: 93WPY2606 to (Last Rx:17Nov2016)  Requested for: 92TKT2813 Ordered    7  Fluconazole 150 MG Oral Tablet; TAKE 1 TABLET NOW AND AGAIN IN 3 DAYS; Therapy: 00Jnm1615 to (Last Rx:05Oct2017)  Requested for: 05Oct2017 Ordered    8  Metoprolol Succinate ER 50 MG Oral Tablet Extended Release 24 Hour; Take 1 tablet   daily; Therapy: 34ZCV6475 to (Last Rx:11Oct2017)  Requested for: 11Oct2017 Ordered    9   Zolpidem Tartrate 5 MG Oral Tablet; TAKE 1 TABLET AT BEDTIME AS NEEDED; Last   Rx:17Nov2016 Ordered    10  Meloxicam 15 MG Oral Tablet (Mobic); TAKE 1 TABLET DAILY AS NEEDED; Therapy: 20FDZ7442 to (Evaluate:23Jun2018)  Requested for: 39QWH0433; Last    Rx:28Jun2017 Ordered    11  Estradiol 0 5 MG Oral Tablet; TAKE 1 TABLET DAILY AS DIRECTED; Therapy: 09Apr2015 to [phone])  Requested for: 61PZY5951; Last    Rx:42Jeq7202 Ordered    12  Flecainide Acetate 50 MG Oral Tablet; TAKE 1 TABLET TWICE DAILY; Therapy: 69IGM5300 to (Reema Saldivar)  Requested for: 93QEX5073; Last    Rx:30Nov2017; Status: ACTIVE - Transmit to [facility] Verification Ordered    13  Acidophilus Oral Tablet; Therapy: ([phone]) to Recorded   14  Claritin CAPS; Therapy: ([phone]) to Recorded   15  Sudafed TABS; TAKE 1 TABLET EVERY 6 HOURS AS NEEDED; Therapy: ([phone]) to Recorded   16  Tylenol TABS; Therapy: ([phone]) to Recorded   17  Vitamin B12 100 MCG Oral Tablet; Therapy: ([phone]) to Recorded   18  Vitamin C 100 MG Oral Tablet; Therapy: ([phone]) to Recorded   19  Vitamin D3 1000 UNIT Oral Capsule;     Therapy: ([phone]) to Recorded    Signatures   Electronically signed by : RUBINA Muro ; Nov 30 2017 12:01PM EST                       (Author)

## 2017-12-07 ENCOUNTER — HOSPITAL ENCOUNTER (OUTPATIENT)
Dept: RADIOLOGY | Facility: MEDICAL CENTER | Age: 62
Discharge: HOME | End: 2017-12-07
Payer: COMMERCIAL

## 2017-12-07 DIAGNOSIS — Z12.31 ENCOUNTER FOR SCREENING MAMMOGRAM FOR MALIGNANT NEOPLASM OF BREAST: ICD-10-CM

## 2017-12-07 DIAGNOSIS — R05.9 COUGH: ICD-10-CM

## 2017-12-07 PROCEDURE — G0202 SCR MAMMO BI INCL CAD: HCPCS

## 2017-12-21 ENCOUNTER — GENERIC CONVERSION - ENCOUNTER (OUTPATIENT)
Dept: OTHER | Facility: OTHER | Age: 62
End: 2017-12-21

## 2017-12-29 ENCOUNTER — TRANSCRIBE ORDERS (OUTPATIENT)
Dept: RADIOLOGY | Facility: CLINIC | Age: 62
End: 2017-12-29

## 2017-12-29 ENCOUNTER — APPOINTMENT (OUTPATIENT)
Dept: RADIOLOGY | Facility: CLINIC | Age: 62
End: 2017-12-29
Payer: COMMERCIAL

## 2017-12-29 DIAGNOSIS — R05.9 COUGH: ICD-10-CM

## 2017-12-29 PROCEDURE — 71020 HB X-RAY EXAM CHEST 2 VIEWS: CPT

## 2018-01-05 ENCOUNTER — ALLSCRIPTS OFFICE VISIT (OUTPATIENT)
Dept: OTHER | Facility: OTHER | Age: 63
End: 2018-01-05

## 2018-01-05 ENCOUNTER — GENERIC CONVERSION - ENCOUNTER (OUTPATIENT)
Dept: OTHER | Facility: OTHER | Age: 63
End: 2018-01-05

## 2018-01-06 NOTE — PROGRESS NOTES
Assessment   Assessed   1  Premature ventricular contraction (427 69) (I49 3)    Plan   Acute URI, Back pain, Celiac disease, Chronic GERD, Cough, Health Maintenance,    Epigastric discomfort, Heart palpitations, Insomnia, Premature ventricular contraction    · Follow-up visit in 3 months Evaluation and Treatment  Follow-up  Status: Hold For -    Scheduling  Requested for: 01EIP3585  Ordered; For: Acute URI, Back pain, Celiac disease, Chronic GERD, Cough, Health     Maintenance, Epigastric discomfort, Heart palpitations, Insomnia, Premature ventricular     contraction;  Ordered By: Fern Mcneil  Performed:       Due: 64OUU6610  PMH: History of palpitations    · Changed: From  Metoprolol Succinate ER 50 MG Oral Tablet Extended Release 24 Hour    Take 1 tablet daily To Metoprolol Succinate ER 25 MG Oral Tablet Extended Release 24    Hour Take 1 tablet daily  Rx By: Fern Mcneil; Dispense: 90 Days ; #:90 Tablet Extended Release 24 Hour;     Refill: 3;For: PMH: History of palpitations; ALETA = N; Record  Premature ventricular contraction    · Renew: Flecainide Acetate 50 MG Oral Tablet; TAKE 1 TABLET TWICE DAILY  Rx By: Fern Mcneil; Dispense: 90 Days ; #:180 Tablet; Refill: 3;For: Premature     ventricular contraction; ALETA = N; Sent To: Paul Andersen    Discussion/Summary   Cardiology Discussion Summary Free Text Note Form [facility]:    63year old woman with premature contractions who returns for follow up  Palpitations have improved significantly, decreased intensity  Worse in afternoon  Does have blurry vision for one hour after metoprolol  Premature ventricular contractions - Improving, but not completely remitted  Dyslipidemia    Increase flecainide to 100mg 2x/day  Decrease metoprolol succinate to 25mg daily  Start Fish Oil 2000mg daily  Follow up in 3 months  Chief Complaint   Chief Complaint Free Text Note Form: Patient is here for 1 month follow  Patient has no cardiac complaints      Chief Complaint Chronic Condition [facility]: Patient is here today for follow up of chronic conditions described in HPI  History of Present Illness   Cardiology HPI Free Text Note Form [facility]: Ms Kavin Jackson is a 63year old woman with premature contractions who returns for follow up  Palpitations have improved significantly, decreased intensity  Worse in afternoon  Does have blurry vision for one hour after metoprolol  Review of Systems   Cardiology Female ROS:         Cardiac: palpitations present   Skin: No complaints of nonhealing sores or skin rash  Genitourinary: No complaints of recurrent urinary tract infections, frequent urination at night, difficult urination, blood in urine, kidney stones, loss of bladder control, kidney problems, denies any birth control or hormone replacement, is not post menopausal, not currently pregnant  Psychological: No complaints of feeling depressed, anxiety, panic attacks, or difficulty concentrating  General: No complaints of trouble sleeping, lack of energy, fatigue, appetite changes, weight changes, fever, frequent infections, or night sweats  Respiratory: No complaints of shortness of breath, cough with sputum, or wheezing  HEENT: No complaints of serious problems, hearing problems, nose problems, throat problems, or snoring  Gastrointestinal: No complaints of liver problems, nausea, vomiting, heartburn, constipation, bloody stools, diarrhea, problems swallowing, adbominal pain, or rectal bleeding  Hematologic: No complaints of bleeding disorders, anemia, blood clots, or excessive brusing  Neurological: No complaints of numbness, tingling, dizziness, weakness, seizures, headaches, syncope or fainting, AM fatigue, daytime sleepiness, no witnessed apnea episodes  Musculoskeletal: No complaints of arthritis, back pain, or painfull swelling  Active Problems   Problems   1  Acute URI (465 9) (J06 9)  2   Back pain (724 5) (M54 9)  3  Celiac disease (579 0) (K90 0)  4  Chronic GERD (530 81) (K21 9)  5  Cough (786 2) (R05)  6  Epigastric discomfort (789 06) (R10 13)  7  Heart palpitations (785 1) (R00 2)  8  Insomnia (780 52) (G47 00)  9  Osteopenia (733 90) (M85 80)  10  Premature ventricular contraction (427 69) (I49 3)  11  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)  12  Seasonal allergies (477 9) (J30 2)  13  Vitamin D deficiency (268 9) (E55 9)    Past Medical History   Problems   1  History of Acute bronchospasm (519 11) (J98 01)  2  History of Acute maxillary sinusitis (461 0) (J01 00)  3  History of Acute pain (338 19) (R52)  4  History of Acute UTI (599 0) (N39 0)  5  History of Allergic conjunctivitis (372 14) (H10 10)  6  History of Arthralgia Of The Left Ulna/Radius/Wrist (719 43)  7  History of Conjunctivitis (372 30) (H10 9)  8  History of Contact dermatitis (692 9) (L25 9)  9  History of Contact dermatitis due to poison ivy (692 6) (L23 7)  10  History of Costochondritis (733 6) (M94 0)  11  History of Encounter for gynecological examination without abnormal finding (V72 31)      (Z01 419)  12  History of Encounter for routine gynecological examination (V72 31) (Z01 419)  13  History of Encounter for screening colonoscopy (V76 51) (Z12 11)  14  History of acne (V13 3) (Z87 2)  15  History of acne (V13 3) (Z87 2)  16  History of acute bronchitis (V12 69) (Z87 09)  17  History of acute sinusitis (V12 69) (Z87 09)  18  History of back pain (V13 59) (Z87 39)  19  History of candidiasis (V12 09) (Z86 19)  20  History of colonic polyps (V12 72) (Z86 010)  21  Denied: History of depression  22  History of gastritis (V12 79) (Z87 19)  23  History of inflammation of sacroiliac joint (V13 4) (Z87 39)  24  History of nausea (V12 79) (Z87 898)  25  History of onychomycosis (V12 09) (Z86 19)  26  History of palpitations (V12 59) (Z87 898)  27  History of restless legs syndrome (V12 49) (Z86 69)  28   History of screening mammography (V15 89) (Z92 89)  29  History of screening mammography (V15 89) (Z92 89)  30  Denied: History of substance abuse  31  History of urinary frequency (V13 09) (Z87 898)  32  History of vaginitis (V13 29) (Z87 42)  33  History of Hot flashes (627 2) (N95 1)  34  History of Insomnia (780 52) (G47 00)  35  History of Laceration (879 8)  36  History of Laceration of upper extremity, right, sequela (906 1) (S41 111S)  37  History of Lateral epicondylitis (tennis elbow) (726 32) (M77 10)  38  History of Malignant lymphoma (202 80) (C85 90)  39  History of Muscle ache (729 1) (M79 1)  40  Need for Tdap vaccination (V06 1) (Z23)  41  History of Postmenopausal disorder (627 9) (N95 9)  42  History of Rash (782 1) (R21)  43  History of Right ankle pain (719 47) (M25 571)  44  History of Screening for HPV (human papillomavirus) (V73 81) (Z11 51)  45  History of Superinfection (136 9) (B99 9)  46  History of 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)  47  History of Urinary Tract Infection (V13 02)  48  History of Vaginal burning (625 8) (N94 9)  Active Problems And Past Medical History Reviewed: The active problems and past medical history were reviewed and updated today  Surgical History   Problems   1  History of Nasal Septal Deviation Repair  2  History of Oophorectomy  3  History of Total Abdominal Hysterectomy With Removal Of Both Ovaries  Surgical History Reviewed: The surgical history was reviewed and updated today  Family History   Mother   1  Denied: Family history of Colon cancer  2  Denied: Family history of Crohn's disease without complication, unspecified     gastrointestinal tract location  3  Denied: Family history of depression  4  Denied: Family history of liver disease  5  Denied: Family history of substance abuse  6  Family history of Mother  At Age 77  Father   9  Denied: Family history of Colon cancer  8   Denied: Family history of Crohn's disease without complication, unspecified     gastrointestinal tract location  9  Family history of Diabetes Mellitus (V18 0)  10  Family history of Dyslipidemia  11  Family history of colonic polyps (V18 51) (Z83 34)  12  Denied: Family history of depression  15  Denied: Family history of liver disease  15  Denied: Family history of substance abuse  15  Family history of Hypertension (V17 49)  16  Family history of Laryngeal Cancer (V16 2)  Sister   16  Family history of Thyroid Cancer  Family History Reviewed: The family history was reviewed and updated today  Social History   Problems    · Being A Social Drinker   · Caffeine Use   · Exercise: Walking   · Former smoker (J15 27) (R28 798)   ·   Social History Reviewed: The social history was reviewed and updated today  The social history was reviewed and is unchanged  Current Meds   1  Acidophilus Oral Tablet; Therapy: (Recorded:09Apr2015) to Recorded  2  Claritin CAPS; Therapy: (Recorded:09Apr2015) to Recorded  3  Erythromycin 2 % External Gel; APPLY  AND RUB  IN A THIN FILM TO AFFECTED AREAS     TWICE DAILY  (AM AND PM); Therapy: 82ZSC3913 to (Last Rx:17Nov2016)  Requested for: 88TCH5505 Ordered  4  Estradiol 0 5 MG Oral Tablet; TAKE 1 TABLET DAILY AS DIRECTED; Therapy: 09Apr2015 to 452 8137)  Requested for: 89NJF8284; Last     Rx:35Sfd8655 Ordered  5  Flecainide Acetate 50 MG Oral Tablet; TAKE 1 TABLET TWICE DAILY; Therapy: 12BWO1146 to (Becki Matson)  Requested for: 66TZU7279; Last     Rx:30Nov2017 Ordered  6  Fluticasone Propionate 50 MCG/ACT Nasal Suspension; USE 2 SPRAYS IN EACH     NOSTRIL TWICE DAILY; Therapy: 96Fmz8305 to (Evaluate:02Jan2018)  Requested for: 05Nzk4877; Last     Rx:84Ypu8895 Ordered  7  Ketorolac Tromethamine 10 MG Oral Tablet; take 1 tablet by mouth every 8 hours as     needed for pain x 5 days; Therapy: 86JVF3731 to (Evaluate:24Sep2017)  Requested for: 80XZD4418; Last     Rx:21Sep2017 Ordered  8  Meloxicam 15 MG Oral Tablet; TAKE 1 TABLET DAILY AS NEEDED; Therapy: 27DRB1199 to (Evaluate:23Jun2018)  Requested for: 75AFU4172; Last     Rx:28Jun2017 Ordered  9  Metoprolol Succinate ER 50 MG Oral Tablet Extended Release 24 Hour; Take 1 tablet     daily; Therapy: 20BEH8901 to (Last Rx:11Oct2017)  Requested for: 54Evo9249 Ordered  10  Ondansetron 4 MG Oral Tablet Disintegrating; one tab q 8 hrs prn n/v;      Therapy: 15TJZ2187 to (Last Rx:36Lvp8194)  Requested for: 77FIW8837 Ordered  11  Pantoprazole Sodium 40 MG Oral Tablet Delayed Release; TAKE 1 TABLET DAILY; Therapy: 93KLI2351 to (Evaluate:01Apr2018)  Requested for: 99RXR7511; Last      Rx:03Oct2017 Ordered  12  Sudafed TABS; TAKE 1 TABLET EVERY 6 HOURS AS NEEDED; Therapy: (Constancia Nova) to Recorded  13  Tylenol TABS; Therapy: (Constancia Nova) to Recorded  14  Vitamin B12 100 MCG Oral Tablet; Therapy: (Constancia Nova) to Recorded  15  Vitamin C 100 MG Oral Tablet; Therapy: (Constancia Nova) to Recorded  16  Vitamin D3 1000 UNIT Oral Capsule; Therapy: (Constancia Nova) to Recorded  17  Xopenex HFA 45 MCG/ACT Inhalation Aerosol; INHALE 2 PUFFS EVERY 6 HOURS AS      NEEDED; Therapy: 94Brl3698 to (Renew:00Fjl5218)  Requested for: 75Bul4420; Last      Rx:15Mzz4772 Ordered  18  Zolpidem Tartrate 5 MG Oral Tablet; TAKE 1 TABLET AT BEDTIME AS NEEDED; Last      Rx:17Nov2016 Ordered  Medication List Reviewed: The medication list was reviewed and updated today  Allergies   Medication   1  Morphine Derivatives  2  Sulfa Drugs    Vitals   Vital Signs    Recorded: 60ECX5544 10:11AM   Heart Rate 78, L Radial   Systolic 902, LUE, Sitting   Diastolic 76, LUE, Sitting   Height 5 ft 5 in   Weight 168 lb    BMI Calculated 27 96   BSA Calculated 1 84     Physical Exam        Constitutional      General appearance: No acute distress, well appearing and well nourished        Eyes      Conjunctiva and Sclera examination: Conjunctiva pink, sclera anicteric  Ears, Nose, Mouth, and Throat - External inspection of ears and nose: Normal without deformities or discharge  Neck      Neck and thyroid: Normal, supple, trachea midline, no thyromegaly  Pulmonary      Respiratory effort: No increased work of breathing or signs of respiratory distress  Auscultation of lungs: Clear to auscultation, no rales, no rhonchi, no wheezing, good air movement  Cardiovascular      Auscultation of heart: Normal rate and rhythm, normal S1 and S2, no murmurs  Carotid pulses: Normal, 2+ bilaterally  Examination of extremities for edema and/or varicosities: Normal        Chest - Chest: Normal       Abdomen      Abdomen: Non-tender and no distention  Musculoskeletal Gait and station: Normal gait  Skin - Skin and subcutaneous tissue: Normal without rashes or lesions  Skin is warm and well perfused, normal turgor  Neurologic - Speech: Normal        Psychiatric - Orientation to person, place, and time: Normal -- Mood and affect: Normal       Results/Data   ECG Report: A 12 lead ECG was performed and was normal       Rhythm and rate:  ventricular rate is 73 beats per minute  -- normal sinus rhythm        Future Appointments      Date/Time Provider Specialty Site   01/30/2018 03:00 PM Mahin Torres [facility] Gastroenterology Adult [address] [facility]     Signatures    Electronically signed by : RUBINA Fuller ; Jan 5 2018 10:42AM EST                       (Author)     Electronically signed by : Viraj Mauricio DO; Jan 6 2018  9:34AM EST                       (Author)

## 2018-01-10 NOTE — MISCELLANEOUS
Message   Recorded as Task   Date: 10/05/2017 03:08 PM, Created By: Tommy Reyes   Task Name: Med Renewal Request   Assigned To: Yola Hernandez   Regarding Patient: Tessie Wheeler, Status: Active   Mickey Cordoba - 05 Oct 2017 3:08 PM     TASK CREATED  Caller: Self; [phone] [facility] Phone)  pt would like a prescription for diflucan please advise rx Lars Hopping pt @ [phone] - 05 Oct 2017 3:15 PM     TASK EDITED  pt just had this on aug 1, said she has itchiness and mild discharge again, ok to send in rx again? 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)   9  Vitamin D deficiency (268 9) (E55 9)    Current Meds   1  Acidophilus Oral Tablet; Therapy: (Recorded:09Apr2015) to Recorded   2  Claritin CAPS; Therapy: (Recorded:09Apr2015) to Recorded   3  Clotrimazole-Betamethasone 1-0 05 % External Cream; APPLY  AND RUB  IN A THIN   FILM TO AFFECTED AREAS TWICE DAILY  (AM AND PM); Therapy: 23Wtc0697 to (Last Rx:44Cuq5537)  Requested for: 12Ufp2970 Ordered   4  Erythromycin 2 % External Gel; APPLY  AND RUB  IN A THIN FILM TO AFFECTED   AREAS TWICE DAILY  (AM AND PM); Therapy: 38EKN3393 to (Last Rx:17Nov2016)  Requested for: 14EEN3634 Ordered   5  Estradiol 0 5 MG Oral Tablet; TAKE 1 TABLET DAILY AS DIRECTED; Therapy: 09Apr2015 to )  Requested for: 90JLD3602; Last   Rx:62Zpl0813 Ordered   6  Fluconazole 150 MG Oral Tablet; TAKE 1 TABLET NOW AND AGAIN IN 3 DAYS;    Therapy: 96Sli1075 to (Last Rx:47Pci5905)  Requested for: 89Fdg8971 Ordered   7  Ketorolac Tromethamine 10 MG Oral Tablet; take 1 tablet by mouth every 8 hours as   needed for pain x 5 days; Therapy: 02GJQ4635 to (Evaluate:24Sep2017)  Requested for: 22UOO4113; Last   Rx:21Sep2017 Ordered   8  Meloxicam 15 MG Oral Tablet (Mobic); TAKE 1 TABLET DAILY AS NEEDED; Therapy: 58PDW4945 to (Evaluate:23Jun2018)  Requested for: 48CMZ0734; Last   Rx:28Jun2017 Ordered   9  Metoprolol Succinate ER 50 MG Oral Tablet Extended Release 24 Hour; Take 1 tablet   daily; Therapy: 01IAA8243 to (Last Rx:03Oct2017)  Requested for: 03Oct2017 Ordered   10  Ondansetron 4 MG Oral Tablet Disintegrating; one tab q 8 hrs prn n/v;    Therapy: 28FMD3282 to (Last Rx:21Sep2017)  Requested for: 77PXS1495 Ordered   11  Pantoprazole Sodium 40 MG Oral Tablet Delayed Release (Protonix); TAKE 1 TABLET    DAILY; Therapy: 47MYP0971 to (Evaluate:01Apr2018)  Requested for: 65SWM9095; Last    Rx:03Oct2017 Ordered   12  Sudafed TABS; TAKE 1 TABLET EVERY 6 HOURS AS NEEDED; Therapy: (862 616 930) to Recorded   13  Tylenol TABS; Therapy: (362 616 930) to Recorded   14  Vitamin B12 100 MCG Oral Tablet; Therapy: (382 616 930) to Recorded   15  Vitamin C 100 MG Oral Tablet; Therapy: (852 616 930) to Recorded   16  Vitamin D3 1000 UNIT Oral Capsule; Therapy: (350 616 930) to Recorded   17  Xopenex HFA 45 MCG/ACT Inhalation Aerosol; INHALE 2 PUFFS EVERY 6 HOURS AS    NEEDED; Therapy: 70VQX8592 to (Last Rx:17Nov2016)  Requested for: 91ARG6604 Ordered   18  Zolpidem Tartrate 5 MG Oral Tablet; TAKE 1 TABLET AT BEDTIME AS NEEDED; Last    Rx:17Nov2016 Ordered   19  Zolpidem Tartrate 5 MG Oral Tablet; TAKE ONE TABLET BY MOUTH AT BEDTIME; Therapy: 40QAZ1960 to (Evaluate:21Nov2017); Last Rx:66Hqk7578 Ordered    Allergies    1  Morphine Derivatives   2   Sulfa Drugs    Plan  PMH: History of candidiasis    · Fluconazole 150 MG Oral Tablet; TAKE 1 TABLET NOW AND AGAIN IN 3 DAYS    Signatures   Electronically signed by : Margaret Merino, ; Oct  5 2017  3:38PM EST                       (Author)

## 2018-01-10 NOTE — MISCELLANEOUS
Message   Recorded as Task   Date: 10/03/2016 01:29 PM, Created By: Marco Willoughby   Task Name: Med Renewal Request   Assigned To: Yola Hernandez   Regarding Patient: Alexis Mcadams, Status: Active   CommentDaisy Pablo - 03 Oct 2016 1:29 PM     TASK CREATED  Caller: Self; [phone] (Home); [phone] x,,,,, (Work)  refill sent to [facility]        Active Problems    1  Asthma (493 90) (J45 909)   2  Candidiasis (112 9) (B37 9)   3  Celiac sprue (579 0) (K90 0)   4  Encounter for gynecological examination without abnormal finding (V72 31) (Z01 419)   5  Encounter for screening mammogram for malignant neoplasm of breast (V76 12)   (Z12 31)   6  Gastritis (535 50) (K29 70)   7  Laceration of upper extremity, right, sequela (906 1) (S41 111S)   8  Onychomycosis (110 1) (B35 1)   9  Postmenopausal disorder (627 9) (N95 9)   10  Restless legs syndrome (333 94) (G25 81)   11  Screening for HPV (human papillomavirus) (V73 81) (Z11 51)   12  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)   13  Urine frequency (788 41) (R35 0)   14  Visit for screening mammogram (V76 12) (Z12 31)    Current Meds   1  Acidophilus Oral Tablet; Therapy: (Recorded:09Apr2015) to Recorded   2  Claritin CAPS; Therapy: (Recorded:09Apr2015) to Recorded   3  Erythromycin 2 % External Gel; APPLY  AND RUB  IN A THIN FILM TO AFFECTED   AREAS TWICE DAILY  (AM AND PM); Therapy: 17QNW7443 to (Last Rx:10Cgx3678)  Requested for: 81LKQ0721 Ordered   4  Estradiol 0 5 MG Oral Tablet; Take 1 tablet daily as directed; Therapy: 21BQE7466 to (Evaluate:93Itc7600)  Requested for: 53KVS5877; Last   Rx:21Oct2015 Ordered   5  Meloxicam 15 MG Oral Tablet (Mobic); TAKE 1 TABLET DAILY AS NEEDED; Therapy: 25OMG4419 to (Evaluate:15Oct2016)  Requested for: 21Oct2015; Last   Rx:21Oct2015 Ordered   6  Mupirocin 2 % External Ointment; APPLY AND GENTLY MASSAGE INTO AFFECTED   AREA(S) 3 TIMES DAILY;    Therapy: 46OLV9547 to (Evaluate:30Mar2016)  Requested for: 91ACC0460; Last   Rx:80Jmq8309 Ordered   7  Protonix 40 MG Oral Tablet Delayed Release (Pantoprazole Sodium); Therapy: 98GVU1145 to Recorded   8  Sudafed TABS; TAKE 1 TABLET EVERY 6 HOURS AS NEEDED; Therapy: (51 196 19 99) to Recorded   9  Tylenol TABS; Therapy: (51 196 19 99) to Recorded   10  Vitamin B12 100 MCG Oral Tablet; Therapy: (51 196 19 99) to Recorded   11  Vitamin C 100 MG Oral Tablet; Therapy: (51 196 19 99) to Recorded   12  Vitamin D3 1000 UNIT Oral Capsule; Therapy: (51 196 19 99) to Recorded   13  Xopenex HFA 45 MCG/ACT Inhalation Aerosol; INHALE 2 PUFFS EVERY 6 HOURS AS    NEEDED; Therapy: 25YEE3453 to (Last Rx:66Pth1211)  Requested for: 08Sep2015 Ordered   14  Zolpidem Tartrate 5 MG Oral Tablet; TAKE 1 TABLET AT BEDTIME AS NEEDED; Last    Rx:18Mar2016 Ordered    Allergies    1  Morphine Derivatives   2  Sulfa Drugs    Plan  PMH: Hot flashes    · Estradiol 0 5 MG Oral Tablet;  Take 1 tablet daily as directed    Signatures   Electronically signed by : Betzy Charles, ; Oct  3 2016  1:29PM EST                       (Author)

## 2018-01-11 NOTE — RESULT NOTES
Discussion/Summary   Please inform patient that her celiac disease antibody panel was normal; there is no evidence of active celiac disease at this time  Please follow up as planned  Verified Results  (1) CELIAC DISEASE AB PROFILE 54JFH5340 10:26JORGE Benavides Order Number: [id number]     Test Name Result Flag Reference   tTG IGG <2 U/mL  0 - 5   Negative        0 - 5                                Weak Positive   6 - 9                                Positive           >9   tTG IGA <2 U/mL  0 - 3   Negative        0 -  3                                Weak Positive   4 - 10                                Positive           >10   Tissue Transglutaminase (tTG) has been identified   as the endomysial antigen  Studies have demonstr-   ated that endomysial IgA antibodies have over 99%   specificity for gluten sensitive enteropathy     GLIADA 4 units  0 - 19   Negative                   0 - 19                     Weak Positive             20 - 30                     Moderate to Strong Positive   >30   GLIADG 3 units  0 - 19   Negative                   0 - 19                     Weak Positive             20 - 30                     Moderate to Strong Positive   >30   ENDOMYSIAL AB IGA Negative  Negative   Performed at:  [facility]  [id number]  : Marvin Dhillon MD, Phone:  [phone]    mg/dL  81 - 309

## 2018-01-12 VITALS
DIASTOLIC BLOOD PRESSURE: 74 MMHG | BODY MASS INDEX: 27.62 KG/M2 | TEMPERATURE: 96.7 F | OXYGEN SATURATION: 98 % | SYSTOLIC BLOOD PRESSURE: 112 MMHG | WEIGHT: 166 LBS | HEART RATE: 80 BPM

## 2018-01-12 NOTE — RESULT NOTES
Message      Both gastric and duodenal biopsies Follow-up office visit with Dr Rosa Busch in 3-4 weeks     Verified Results  (1) TISSUE EXAM 20Jan2016 04:10PM Scarlett Ch     Test Name Result Flag Reference   LAB AP CASE REPORT (Report)     Surgical Pathology Report             Case: [id number]                   Authorizing Provider: Mame Okeefe MD     Collected:      01/18/2016 [address]        Ordering Location:   [facility]    Received:      01/18/2016 Dileep Rgarez [facility]                               Pathologist:      Gavin Raygoza MD                              Specimens:  A) - Stomach, gastric body cold biopsy r/o h pylori                          B) - Duodenum, cold biopsy duodenitis   LAB AP FINAL DIAGNOSIS (Report)     A  Stomach, body, biopsy:        - Antral mucosa with chronic inactive gastritis  - No Helicobacter pylori organisms are identified on the   routine stain, performed with an appropriate positive control         - No intestinal metaplasia, dysplasia or malignancy is   identified  B  Small bowel, duodenum, biopsy:        - Small bowel mucosa with no significant pathologic alteration         - No villous blunting, intraepithelial lymphocytosis or crypt   hyperplasia is identified to suggest malabsorptive enteropathy         - No acute or peptic duodenitis is identified         - No dysplasia or malignancy is identified  LAB AP NOTE      (part A) An immunohistochemical stain to rule out Helicobacter pylori   organisms will be performed and the results will be reported in an   addendum  LAB AP SURGICAL ADDITIONAL INFORMATION (Report)     These tests were developed and their performance characteristics   determined by [address] [place] [address] [facility]ty Laboratory or [place]  They may not be cleared or approved by the [place]  Food and   Drug Administration  The FDA has determined that such clearance or   approval is not necessary   These tests are used for clinical purposes  They should not be regarded as investigational or for research  This   laboratory has been approved by [facility], designated as a high-complexity   laboratory and is qualified to perform these tests  LAB AP GROSS DESCRIPTION (Report)     A  The specimen is received in formalin, labeled with the patient's name   and hospital number, and is designated Gastric body biopsy rule out H    Pylori  The specimen consists of multiple tan-pink soft tissue fragments   measuring in aggregate 0 5 x 0 5 x 0 1 cm  Entirely submitted  One   cassette  B  The specimen is received in formalin, labeled with the patient's name   and hospital number, and is designated Biopsy duodenitis  The specimen   consists of 2 tan pink soft tissue fragments measuring 0 2 and 0 5 cm  Entirely submitted  One cassette  Note: The estimated total formalin fixation time based upon information   provided by the submitting clinician and the standard processing schedule   is 6 0 hours      Isabel 27       Signatures   Electronically signed by : RUBINA Gaytan ; Jan 25 2016  2:11PM EST                       (Author)

## 2018-01-13 VITALS
HEIGHT: 65 IN | WEIGHT: 165.19 LBS | BODY MASS INDEX: 27.52 KG/M2 | SYSTOLIC BLOOD PRESSURE: 122 MMHG | DIASTOLIC BLOOD PRESSURE: 76 MMHG

## 2018-01-13 VITALS
WEIGHT: 169 LBS | SYSTOLIC BLOOD PRESSURE: 126 MMHG | HEART RATE: 71 BPM | BODY MASS INDEX: 28.16 KG/M2 | HEIGHT: 65 IN | DIASTOLIC BLOOD PRESSURE: 80 MMHG

## 2018-01-14 VITALS
TEMPERATURE: 97.9 F | DIASTOLIC BLOOD PRESSURE: 84 MMHG | SYSTOLIC BLOOD PRESSURE: 122 MMHG | HEART RATE: 83 BPM | WEIGHT: 165.19 LBS | OXYGEN SATURATION: 99 % | RESPIRATION RATE: 16 BRPM | BODY MASS INDEX: 27.52 KG/M2 | HEIGHT: 65 IN

## 2018-01-14 VITALS — WEIGHT: 165 LBS | DIASTOLIC BLOOD PRESSURE: 86 MMHG | BODY MASS INDEX: 27.46 KG/M2 | SYSTOLIC BLOOD PRESSURE: 128 MMHG

## 2018-01-15 NOTE — MISCELLANEOUS
Message  told her echo/stress are okay   await hm  still with palp - try b-blocker for s/s  if s/s persist ? card consult      Plan  Palpitations    · Metoprolol Succinate ER 25 MG Oral Tablet Extended Release 24 Hour; TAKE 1  TABLET ONCE DAILY    Signatures   Electronically signed by : Marcelo Sarkar DO; Aug  9 2017  7:56AM EST                       (Author)

## 2018-01-16 NOTE — MISCELLANEOUS
Message   Recorded as Task   Date: 08/03/2017 12:17 PM, Created By: Darby Gould   Task Name: Follow Up   Assigned To: Marlys Lr   Regarding Patient: Daisy Tilley, Status: In Progress   Comment:    Anna Marie Villagomez - 03 Aug 2017 12:17 PM     TASK CREATED  This is Gabby's pt  Please let her know urine culture is neg  Vag cultures showed only yeast which was treated at her OV  If she is not feeling better she should let us know   Sonia Beltrán - 03 Aug 2017 12:53 PM     TASK IN PROGRESS   Sonia Beltrán - 03 Aug 2017 12:55 PM     TASK EDITED  LMOM to cb for results       ext: Chaitanya Ramirez - 03 Aug 2017 1:22 PM     TASK EDITED  will call again if not feeling better in a wk        Active Problems    1  Acne (706 1) (L70 9)   2  Allergic conjunctivitis (372 14) (H10 10)   3  Asthma (493 90) (J45 909)   4  Back pain (724 5) (M54 9)   5  Candidiasis (112 9) (B37 9)   6  Celiac sprue (579 0) (K90 0)   7  Encounter for gynecological examination without abnormal finding (V72 31) (Z01 419)   8  Encounter for screening colonoscopy (V76 51) (Z12 11)   9  Encounter for screening mammogram for malignant neoplasm of breast (V76 12)   (Z12 31)   10  Gastritis (535 50) (K29 70)   11  Laceration of upper extremity, right, sequela (906 1) (S41 111S)   12  Onychomycosis (110 1) (B35 1)   13  Osteopenia (733 90) (M85 80)   14  Palpitations (785 1) (R00 2)   15  Postmenopausal disorder (627 9) (N95 9)   16  Restless legs syndrome (333 94) (G25 81)   17  Sacroiliac inflammation (720 2) (M46 1)   18  Screening for HPV (human papillomavirus) (V73 81) (Z11 51)   19  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)   20  Urine frequency (788 41) (R35 0)   21  Vaginal burning (625 8) (N94 9)   22  Visit for screening mammogram (V76 12) (Z12 31)   23  Vitamin D deficiency (268 9) (E55 9)    Current Meds   1  Acidophilus Oral Tablet; Therapy: (Recorded:09Apr2015) to Recorded   2  Claritin CAPS; Therapy: (Recorded:09Apr2015) to Recorded   3  Clotrimazole-Betamethasone 1-0 05 % External Cream; APPLY  AND RUB  IN A THIN   FILM TO AFFECTED AREAS TWICE DAILY  (AM AND PM); Therapy: 70Jji4150 to (Last Rx:01Aug2017)  Requested for: 37Gvz0738 Ordered   4  Erythromycin 2 % External Gel; APPLY  AND RUB  IN A THIN FILM TO AFFECTED   AREAS TWICE DAILY  (AM AND PM); Therapy: 73YEC0097 to (Last Rx:17Nov2016)  Requested for: 11FOT5311 Ordered   5  Estradiol 0 5 MG Oral Tablet; TAKE 1 TABLET DAILY AS DIRECTED; Therapy: 56Kad2895 to 452 8137)  Requested for: 42HHF1852; Last   Rx:68Oft7567 Ordered   6  Fluconazole 150 MG Oral Tablet; TAKE 1 TABLET NOW AND AGAIN IN 3 DAYS; Therapy: 01Aug2017 to (Last Rx:01Aug2017)  Requested for: 01Aug2017 Ordered   7  Meloxicam 15 MG Oral Tablet (Mobic); TAKE 1 TABLET DAILY AS NEEDED; Therapy: 94PMA0964 to (Evaluate:23Jun2018)  Requested for: 76PGX2543; Last   Rx:28Jun2017 Ordered   8  Ondansetron 4 MG Oral Tablet Disintegrating; one tab q 8 hrs prn n/v;   Therapy: 58USA3002 to (Last Rx:13Oct2016)  Requested for: 84ZKF2647 Ordered   9  Protonix 40 MG Oral Tablet Delayed Release (Pantoprazole Sodium); Therapy: 39GKY8931 to Recorded   10  Sudafed TABS; TAKE 1 TABLET EVERY 6 HOURS AS NEEDED; Therapy: (109 616 930) to Recorded   11  Tylenol TABS; Therapy: (342 616 930) to Recorded   12  Vitamin B12 100 MCG Oral Tablet; Therapy: (131 616 930) to Recorded   13  Vitamin C 100 MG Oral Tablet; Therapy: (090 616 930) to Recorded   14  Vitamin D3 1000 UNIT Oral Capsule; Therapy: (894 616 930) to Recorded   15  Xopenex HFA 45 MCG/ACT Inhalation Aerosol; INHALE 2 PUFFS EVERY 6 HOURS AS    NEEDED; Therapy: 51VZY0393 to (Last Rx:17Nov2016)  Requested for: 98YMU2790 Ordered   16  Zolpidem Tartrate 5 MG Oral Tablet; TAKE 1 TABLET AT BEDTIME AS NEEDED; Last    Rx:17Nov2016 Ordered    Allergies    1  Morphine Derivatives   2   Sulfa Drugs    Signatures   Electronically signed by : Emir Gresham, ; Aug  3 2017  1:22PM EST                       (Author)

## 2018-01-17 NOTE — CONSULTS
I had the pleasure of evaluating your patient, Heidi Gupta  My full evaluation follows:      Chief Complaint  Pt came in for a consult, pt has strong palpitations, minor light headiness  History of Present Illness  Cardiology HPI Free Text Note Form [facility]: Ms Nury Aviles is a 63year old woman with no cardiac history presents for evaluation of palpitations  Several months ago developed palpitations - started happening after eating carbohydrates  Occurs every day - not irregular, but pounding  Some improvement with Metoprolol Succinate  Holter monitor demonstrated frequent PVCs, but no sustained dysrhythmias  Echo and stress test were normal       Review of Systems      Cardiac: palpitations present   Skin: No complaints of nonhealing sores or skin rash  Genitourinary: No complaints of recurrent urinary tract infections, frequent urination at night, difficult urination, blood in urine, kidney stones, loss of bladder control, kidney problems, denies any birth control or hormone replacement, is not post menopausal, not currently pregnant  Psychological: No complaints of feeling depressed, anxiety, panic attacks, or difficulty concentrating  General: No complaints of trouble sleeping, lack of energy, fatigue, appetite changes, weight changes, fever, frequent infections, or night sweats  Respiratory: No complaints of shortness of breath, cough with sputum, or wheezing  HEENT: No complaints of serious problems, hearing problems, nose problems, throat problems, or snoring  Gastrointestinal: No complaints of liver problems, nausea, vomiting, heartburn, constipation, bloody stools, diarrhea, problems swallowing, adbominal pain, or rectal bleeding  Hematologic: No complaints of bleeding disorders, anemia, blood clots, or excessive brusing     Neurological: No complaints of numbness, tingling, dizziness, weakness, seizures, headaches, syncope or fainting, AM fatigue, daytime sleepiness, no witnessed apnea episodes  Musculoskeletal: No complaints of arthritis, back pain, or painfull swelling  Active Problems    1  Back pain (724 5) (M54 9)   2  Celiac disease (579 0) (K90 0)   3  Chronic GERD (530 81) (K21 9)   4  Epigastric discomfort (789 06) (R10 13)   5  Heart palpitations (785 1) (R00 2)   6  Insomnia (780 52) (G47 00)   7  Osteopenia (733 90) (M85 80)   8  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)   9   Vitamin D deficiency (268 9) (E55 9)    Past Medical History    · History of Acute bronchospasm (519 11) (J98 01)   · History of Acute maxillary sinusitis (461 0) (J01 00)   · History of Acute pain (338 19) (R52)   · History of Acute UTI (599 0) (N39 0)   · History of Allergic conjunctivitis (372 14) (H10 10)   · History of Arthralgia Of The Left Ulna/Radius/Wrist (719 43)   · History of Conjunctivitis (372 30) (H10 9)   · History of Contact dermatitis (692 9) (L25 9)   · History of Contact dermatitis due to poison ivy (692 6) (L23 7)   · History of Costochondritis (733 6) (M94 0)   · History of Encounter for gynecological examination without abnormal finding (V72 31)  (Z01 419)   · History of Encounter for routine gynecological examination (V72 31) (Z01 419)   · History of Encounter for screening colonoscopy (V76 51) (Z12 11)   · History of acne (V13 3) (Z87 2)   · History of acne (V13 3) (Z87 2)   · History of acute bronchitis (V12 69) (Z87 09)   · History of acute sinusitis (V12 69) (Z87 09)   · History of back pain (V13 59) (Z87 39)   · History of candidiasis (V12 09) (Z86 19)   · History of colonic polyps (V12 72) (Z86 010)   · Denied: History of depression   · History of gastritis (V12 79) (Z87 19)   · History of inflammation of sacroiliac joint (V13 4) (Z87 39)   · History of nausea (V12 79) (M15 287)   · History of onychomycosis (V12 09) (Z86 19)   · History of palpitations (V12 59) (B48 380)   · History of restless legs syndrome (V12 49) (Z86 69)   · History of screening mammography (V15 89) (Z92 89)   · History of screening mammography (V15 89) (Z92 89)   · Denied: History of substance abuse   · History of urinary frequency (V13 09) (I27 458)   · History of vaginitis (V13 29) (Z87 42)   · History of Hot flashes (627 2) (N95 1)   · History of Insomnia (780 52) (G47 00)   · History of Laceration (879 8)   · History of Laceration of upper extremity, right, sequela (906 1) (S41 111S)   · History of Lateral epicondylitis (tennis elbow) (726 32) (M77 10)   · History of Malignant lymphoma (202 80) (C85 90)   · History of Muscle ache (729 1) (M79 1)   · Need for Tdap vaccination (V06 1) (Z23)   · History of Postmenopausal disorder (627 9) (N95 9)   · History of Rash (782 1) (R21)   · History of Right ankle pain (719 47) (M25 571)   · History of Screening for HPV (human papillomavirus) (V73 81) (Z11 51)   · History of Superinfection (136 9) (B99 9)   · History of 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)   · History of Urinary Tract Infection (V13 02)   · History of Vaginal burning (625 8) (N94 9)    The active problems and past medical history were reviewed and updated today  Surgical History    · History of Nasal Septal Deviation Repair   · History of Oophorectomy   · History of Total Abdominal Hysterectomy With Removal Of Both Ovaries    The surgical history was reviewed and updated today         Family History    · Denied: Family history of Colon cancer   · Denied: Family history of Crohn's disease without complication, unspecified  gastrointestinal tract location   · Denied: Family history of depression   · Denied: Family history of liver disease   · Denied: Family history of substance abuse   · Family history of Mother  At Age 77    · Denied: Family history of Colon cancer   · Denied: Family history of Crohn's disease without complication, unspecified  gastrointestinal tract location   · Family history of Diabetes Mellitus (V18 0)   · Family history of Dyslipidemia   · Family history of colonic polyps (V18 51) (Z83 71)   · Denied: Family history of depression   · Denied: Family history of liver disease   · Denied: Family history of substance abuse   · Family history of Hypertension (V17 49)   · Family history of Laryngeal Cancer (V16 2)    · Family history of Thyroid Cancer    The family history was reviewed and updated today  Social History    · Being A Social Drinker   · Caffeine Use   · Exercise: Walking   · Former smoker (N11 43) (N83 050)   · quite 2004, 1/2ppd x 20 yrs   ·   The social history was reviewed and updated today  The social history was reviewed and is unchanged  Current Meds   1  Acidophilus Oral Tablet; Therapy: (Recorded:09Apr2015) to Recorded   2  Claritin CAPS; Therapy: (Recorded:09Apr2015) to Recorded   3  Erythromycin 2 % External Gel; APPLY  AND RUB  IN A THIN FILM TO AFFECTED AREAS   TWICE DAILY  (AM AND PM); Therapy: 93HEI3267 to (Last Rx:17Nov2016)  Requested for: 41HAJ4204 Ordered   4  Estradiol 0 5 MG Oral Tablet; TAKE 1 TABLET DAILY AS DIRECTED; Therapy: 09Apr2015 to [phone]  Requested for: 25TVM3926; Last   Rx:56Rgn3008 Ordered   5  Fluconazole 150 MG Oral Tablet; TAKE 1 TABLET NOW AND AGAIN IN 3 DAYS; Therapy: 94Rea4246 to (Last Rx:05Oct2017)  Requested for: 05Oct2017 Ordered   6  Ketorolac Tromethamine 10 MG Oral Tablet; take 1 tablet by mouth every 8 hours as   needed for pain x 5 days; Therapy: 76YJC3426 to (Evaluate:24Sep2017)  Requested for: 16YIH2075; Last   Rx:21Sep2017 Ordered   7  Meloxicam 15 MG Oral Tablet; TAKE 1 TABLET DAILY AS NEEDED; Therapy: 77ATV1554 to (Evaluate:23Jun2018)  Requested for: 15UYS1085; Last   Rx:28Jun2017 Ordered   8  Metoprolol Succinate ER 50 MG Oral Tablet Extended Release 24 Hour; Take 1 tablet   daily; Therapy: 65ARO8923 to (Last Rx:11Oct2017)  Requested for: 11Oct2017 Ordered   9   Ondansetron 4 MG Oral Tablet Disintegrating; one tab q 8 hrs prn n/v;   Therapy: 47NIP3412 to (Last [facility])  Requested for: 21Sep2017 Ordered   10  Pantoprazole Sodium 40 MG Oral Tablet Delayed Release; TAKE 1 TABLET DAILY; Therapy: 58RXU9792 to (Evaluate:01Apr2018)  Requested for: 81GIH2327; Last    Rx:03Oct2017 Ordered   11  Sudafed TABS; TAKE 1 TABLET EVERY 6 HOURS AS NEEDED; Therapy: (972 616 930) to Recorded   12  Tylenol TABS; Therapy: (722 616 930) to Recorded   13  Vitamin B12 100 MCG Oral Tablet; Therapy: (959 616 930) to Recorded   14  Vitamin C 100 MG Oral Tablet; Therapy: (167 616 930) to Recorded   15  Vitamin D3 1000 UNIT Oral Capsule; Therapy: (529 616 930) to Recorded   16  Xopenex HFA 45 MCG/ACT Inhalation Aerosol; INHALE 2 PUFFS EVERY 6 HOURS AS    NEEDED; Therapy: 53MSZ4655 to (Last Rx:17Nov2016)  Requested for: 95TTC6023 Ordered   17  Zolpidem Tartrate 5 MG Oral Tablet; TAKE 1 TABLET AT BEDTIME AS NEEDED; Last    Rx:17Nov2016 Ordered   18  Zolpidem Tartrate 5 MG Oral Tablet; TAKE ONE TABLET BY MOUTH AT BEDTIME; Therapy: 63MCF4789 to (Evaluate:21Nov2017); Last Rx:68Zxv5125 Ordered    The medication list was reviewed and updated today  Allergies    1  Morphine Derivatives   2  Sulfa Drugs    Vitals  Signs    Heart Rate: 71Systolic: 274, LUE, SittingDiastolic: 80, LUE, SittingHeight: 5 ft 5 inWeight: 169 lb BMI Calculated: 28 12BSA Calculated: 1 84    Physical Exam    Constitutional   General appearance: No acute distress, well appearing and well nourished  Eyes   Conjunctiva and Sclera examination: Conjunctiva pink, sclera anicteric  Ears, Nose, Mouth, and Throat - External inspection of ears and nose: Normal without deformities or discharge  Neck   Neck and thyroid: Normal, supple, trachea midline, no thyromegaly  Pulmonary   Respiratory effort: No increased work of breathing or signs of respiratory distress      Auscultation of lungs: Clear to auscultation, no rales, no rhonchi, no wheezing, good air movement  Cardiovascular   Auscultation of heart: Normal rate and rhythm, normal S1 and S2, no murmurs  Carotid pulses: Normal, 2+ bilaterally  Examination of extremities for edema and/or varicosities: Normal     Chest - Chest: Normal    Abdomen   Abdomen: Non-tender and no distention  Musculoskeletal Gait and station: Normal gait  Skin - Skin and subcutaneous tissue: Normal without rashes or lesions  Skin is warm and well perfused, normal turgor  Neurologic - Speech: Normal     Psychiatric - Orientation to person, place, and time: Normal  Mood and affect: Normal       Results/Data  A 12 lead ECG was performed and was normal    Rhythm and rate:  ventricular rate is 71 beats per minute  normal sinus rhythm  Assessment    1  Premature ventricular contraction (427 69) (I49 3)    Plan     Heart palpitations    · EKG/ECG- POC; Status:Complete;   Done: 77TQT2857   Perform: In Office; [phone]; Last Updated Vasu Paula; 11/30/2017 11:40:24 AM;Ordered;  For:Heart palpitations; Ordered By:Harshil Putnam; Heart palpitations, Premature ventricular contraction    · Follow-up visit in 1 month Evaluation and Treatment  Follow-up  Status: Hold For -  Scheduling  Requested for: 71YUO5786   Ordered; For: Heart palpitations, Premature ventricular contraction; Ordered By: Guera Moeller Performed:  Due: 36EKT0511     Premature ventricular contraction    · Flecainide Acetate 50 MG Oral Tablet; TAKE 1 TABLET TWICE DAILY   Rx By: Guera Moeller; Dispense: 30 Days ; #:60 Tablet; Refill: 11; For: Premature ventricular contraction; ALETA = N; Sent To: [facility]    Discussion/Summary    63year old woman with no cardiac history presents for evaluation of palpitations  Several months ago developed palpitations - started happening after eating carbohydrates  Occurs every day - not irregular, but pounding  Some improvement with Metoprolol Succinate   Holter monitor demonstrated frequent PVCs, but no sustained dysrhythmias  Echo and stress test were normal     Impression:  1  Premature ventricular contractions - may be etiology of symptoms  Will start Flecainide 50mg 2x/day along with metoprolol  No evidence of structural heart disease or myocardial ischemia  Recommendations:  1  Start Flecainide 50mg 2x/day  2  Continue metoprolol  3  Follow up in one month  Thank you very much for allowing me to participate in the care of this patient  If you have any questions, please do not hesitate to contact me        Signatures   Electronically signed by : RUBINA Norton ; Nov 30 2017 12:01PM EST                       (Author)

## 2018-01-17 NOTE — CONSULTS
Therapy  Rehabilitation Services Referral:   Patient Status: routine  Diagnosis: right ankle pain  Rehabilitation Services: evaluate and treat patient as needed  Modalities: therapeutic ultrasound, electrical stimulation, iontophoresis, TENS and per therapist discretion  Exercise/Treatment: AROM/PROM, stretching, ankle/foot and strengthening/PRE  Frequency: 1-3 times per week, for 6-8 weeks  Please send progress report  I hereby certify that the services indicated above are medically necessary  Future Appointments    Signatures   Electronically signed by : Eladio Fischer [facility]; Jan 13 2016  1:21PM EST                       (Author)    Electronically signed by :  Eladio Fischer, [facility]; Jan 13 2016  1:28PM EST                       (Author)

## 2018-01-18 ENCOUNTER — HOSPITAL ENCOUNTER (EMERGENCY)
Facility: HOSPITAL | Age: 63
Discharge: HOME | End: 2018-01-18
Attending: EMERGENCY MEDICINE | Admitting: EMERGENCY MEDICINE
Payer: COMMERCIAL

## 2018-01-18 VITALS
RESPIRATION RATE: 17 BRPM | TEMPERATURE: 97.9 F | DIASTOLIC BLOOD PRESSURE: 74 MMHG | HEART RATE: 73 BPM | OXYGEN SATURATION: 98 % | WEIGHT: 169.53 LBS | BODY MASS INDEX: 28.21 KG/M2 | SYSTOLIC BLOOD PRESSURE: 116 MMHG

## 2018-01-18 DIAGNOSIS — J02.9 ACUTE VIRAL PHARYNGITIS: Primary | ICD-10-CM

## 2018-01-18 PROCEDURE — 99283 EMERGENCY DEPT VISIT LOW MDM: CPT

## 2018-01-18 NOTE — ED PROVIDER NOTES
History  Chief Complaint   Patient presents with    URI     post nasal drip & cough X 1 week; sore throat today  Patient reports having nasal congestion, postnasal drip and wet cough for the past 1-2 weeks for which she has been using Flonase and her albuterol inhaler  This morning she awoke and felt like the mucus was closing of her throat and she states she panicked  She did use her inhaler with some relief and feels better now here in the emergency department  She reports a h/o palpitations for which she was started on flecainide and therefore does not want to use decongestants  She has applied eucalyptus oil to her forehead but is not using any other means for alleviating mucus production  She has sinus pressure throbbing frontal headache  She reports intermittent sore throat, anterior neck pain and dysphagia with the cough  No recent travel or known sick contacts  Denies LH/dizziness, CP, SOB, n/v/d  Denies other complaints  History provided by:  Patient and medical records  URI   Presenting symptoms: congestion, cough, facial pain, rhinorrhea and sore throat    Presenting symptoms: no ear pain, no fatigue and no fever    Severity:  Moderate  Onset quality:  Gradual  Duration: 1-2 weeks  Timing:  Constant  Progression:  Waxing and waning  Chronicity:  New  Relieved by:  Nothing  Worsened by:  Eating  Ineffective treatments:  Drinking and inhaler  Associated symptoms: headaches, neck pain, sinus pain, swollen glands and wheezing    Associated symptoms: no arthralgias, no myalgias and no sneezing    Risk factors: being elderly and chronic respiratory disease    Risk factors: no chronic cardiac disease, no chronic kidney disease, no diabetes mellitus, no immunosuppression, no recent illness, no recent travel and no sick contacts        Prior to Admission Medications   Prescriptions Last Dose Informant Patient Reported? Taking?    Cholecalciferol (VITAMIN D-3 PO)   Yes No   Sig: Take by mouth  Lactobacillus (ACIDOPHILUS PO)   Yes No   Sig: Take by mouth  acetaminophen (TYLENOL) 500 mg tablet   Yes No   Sig: Take 500 mg by mouth every 6 (six) hours as needed for mild pain    estradiol (ESTRACE) 0 5 MG tablet   Yes No   Sig: Take 0 5 mg by mouth daily  famotidine (PEPCID) 20 mg tablet   Yes No   Sig: Take 20 mg by mouth 2 (two) times a day  ibuprofen (MOTRIN) 200 mg tablet   Yes No   Sig: Take 200 mg by mouth every 8 (eight) hours as needed for mild pain  mometasone (NASONEX) 50 mcg/act nasal spray   Yes No   Si sprays into each nostril daily  omeprazole (PriLOSEC) 40 MG capsule   Yes No   Sig: Take 40 mg by mouth daily  Facility-Administered Medications: None       Past Medical History:   Diagnosis Date    Celiac disease     Lymphoma, 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, remission       Past Surgical History:   Procedure Laterality Date    HYSTERECTOMY      LYMPH NODE DISSECTION Right     arm    [place] ESOPHAGOGASTRODUODENOSCOPY TRANSORAL DIAGNOSTIC N/A 2016    Procedure: ESOPHAGOGASTRODUODENOSCOPY (EGD); Surgeon: Parvin Peralta MD;  [facility]; Service: Gastroenterology    TOTAL ABDOMINAL HYSTERECTOMY W/ BILATERAL SALPINGOOPHORECTOMY         History reviewed  No pertinent family history  I have reviewed and agree with the history as documented  Social History   Substance Use Topics    Smoking status: Never Smoker    Smokeless tobacco: Never Used    Alcohol use No        Review of Systems   Constitutional: Negative for chills, diaphoresis, fatigue and fever  HENT: Positive for congestion, postnasal drip, rhinorrhea, sinus pain, sinus pressure and sore throat  Negative for ear pain, sneezing and trouble swallowing  Respiratory: Positive for cough, choking and wheezing  Negative for chest tightness and shortness of breath  Cardiovascular: Negative for chest pain, palpitations and leg swelling     Gastrointestinal: Negative for abdominal distention, abdominal pain, constipation, diarrhea, nausea and vomiting  Genitourinary: Negative for difficulty urinating, dysuria, flank pain, frequency, hematuria and urgency  Musculoskeletal: Positive for neck pain  Negative for arthralgias, back pain, myalgias and neck stiffness  Skin: Negative for pallor and rash  Neurological: Positive for headaches  Negative for dizziness, weakness and light-headedness  Hematological: Negative for adenopathy  Psychiatric/Behavioral: Negative for confusion  The patient is nervous/anxious  All other systems reviewed and are negative  Physical Exam  ED Triage Vitals [01/18/18 0657]   Temperature Pulse Respirations Blood Pressure SpO2   97 9 °F (36 6 °C) 66 17 126/66 97 %      Temp Source Heart Rate Source Patient Position - Orthostatic VS BP Location FiO2 (%)   Temporal Monitor -- Left arm --      Pain Score       6           Orthostatic Vital Signs  Vitals:    01/18/18 0657 01/18/18 0715   BP: 126/66 116/74   Pulse: 66 73       Physical Exam   Constitutional: She is oriented to person, place, and time  She appears well-developed and well-nourished  No distress  HENT:   Head: Normocephalic  Mouth/Throat: Oropharynx is clear and moist  No oropharyngeal exudate  Moderate nasal mucous irritation and dryness without congestion or edema  No appreciable postnasal drip  Eyes: Conjunctivae and EOM are normal  Pupils are equal, round, and reactive to light  Neck: Normal range of motion  Neck supple  Cardiovascular: Normal rate and regular rhythm  No murmur heard  Pulmonary/Chest: Effort normal and breath sounds normal  No respiratory distress  She has no wheezes  Abdominal: Soft  Bowel sounds are normal  She exhibits no distension  There is no tenderness  Musculoskeletal: Normal range of motion  She exhibits no edema or tenderness  Lymphadenopathy:     She has cervical adenopathy (Mildly tender, bilateral anterior cervical chains)     Neurological: She is alert and oriented to person, place, and time  She has normal reflexes  No cranial nerve deficit  She exhibits normal muscle tone  Skin: Skin is warm and dry  Capillary refill takes less than 2 seconds  No rash noted  She is not diaphoretic  No erythema  No pallor  Psychiatric: She has a normal mood and affect  Her behavior is normal  Thought content normal    Vitals reviewed  ED Medications  Medications - No data to display    Diagnostic Studies  Results Reviewed     None                 No orders to display              Procedures  Procedures       Phone Contacts  ED Phone Contact    ED Course  ED Course                                MDM  Number of Diagnoses or Management Options  Acute viral pharyngitis:   Diagnosis management comments: DDx:  Sore throat/congestion/cough - viral URI, doubt flu and does not meet [facility] criteria for strep throat  A/P:  Will give cough medicine, recommend continued supportive treatment at home and follow up with PCP  Amount and/or Complexity of Data Reviewed  Review and summarize past medical records: yes      CritCare Time    Disposition  Final diagnoses:   Acute viral pharyngitis     Time reflects when diagnosis was documented in both MDM as applicable and the Disposition within this note     Time User Action Codes Description Comment    1/18/2018  7:21 AM Lexus Patterson [J02 8,  B97 89] Acute viral pharyngitis       ED Disposition     ED Disposition Condition Comment    Discharge  Lisajayy Butch discharge to home/self care      Condition at discharge: Stable        Follow-up Information     Follow up With Specialties Details Why Contact Cristin Escalante DO Internal Medicine, Family Medicine Schedule an appointment as soon as possible for a visit If symptoms worsen [address] [facility] [place] [phone]          Discharge Medication List as of 1/18/2018  7:22 AM      START taking these medications    Details   guaiFENesin (ROBITUSSIN) 100 MG/5ML oral liquid Take 5-10 mL by mouth every 4 (four) hours as needed for cough, Starting u 1/18/2018, Print         CONTINUE these medications which have NOT CHANGED    Details   acetaminophen (TYLENOL) 500 mg tablet Take 500 mg by mouth every 6 (six) hours as needed for mild pain , Until Discontinued, Historical Med      Cholecalciferol (VITAMIN D-3 PO) Take by mouth , Until Discontinued, Historical Med      estradiol (ESTRACE) 0 5 MG tablet Take 0 5 mg by mouth daily  , Until Discontinued, Historical Med      famotidine (PEPCID) 20 mg tablet Take 20 mg by mouth 2 (two) times a day , Until Discontinued, Historical Med      ibuprofen (MOTRIN) 200 mg tablet Take 200 mg by mouth every 8 (eight) hours as needed for mild pain , Until Discontinued, Historical Med      Lactobacillus (ACIDOPHILUS PO) Take by mouth , Until Discontinued, Historical Med      mometasone (NASONEX) 50 mcg/act nasal spray 2 sprays into each nostril daily  , Until Discontinued, Historical Med      omeprazole (PriLOSEC) 40 MG capsule Take 40 mg by mouth daily  , Until Discontinued, Historical Med           No discharge procedures on file      ED Provider  Electronically Signed by           Phu Higgins DO  01/18/18 5346

## 2018-01-18 NOTE — DISCHARGE INSTRUCTIONS

## 2018-01-22 VITALS
HEART RATE: 78 BPM | HEIGHT: 65 IN | BODY MASS INDEX: 27.91 KG/M2 | SYSTOLIC BLOOD PRESSURE: 112 MMHG | WEIGHT: 167.5 LBS | DIASTOLIC BLOOD PRESSURE: 80 MMHG | TEMPERATURE: 97.5 F | RESPIRATION RATE: 16 BRPM | OXYGEN SATURATION: 98 %

## 2018-01-23 VITALS
WEIGHT: 168 LBS | DIASTOLIC BLOOD PRESSURE: 76 MMHG | HEART RATE: 78 BPM | BODY MASS INDEX: 27.99 KG/M2 | HEIGHT: 65 IN | SYSTOLIC BLOOD PRESSURE: 104 MMHG

## 2018-01-23 NOTE — CONSULTS
I had the pleasure of evaluating your patient, Ernestinejc [place]  My full evaluation follows:      Chief Complaint  Patient is here for 1 month follow  Patient has no cardiac complaints  Patient is here today for follow up of chronic conditions described in HPI  History of Present Illness  Ms Jose Manuel Floyd is a 63year old woman with premature contractions who returns for follow up  Palpitations have improved significantly, decreased intensity  Worse in afternoon  Does have blurry vision for one hour after metoprolol  Review of Systems      Cardiac: palpitations present   Skin: No complaints of nonhealing sores or skin rash  Genitourinary: No complaints of recurrent urinary tract infections, frequent urination at night, difficult urination, blood in urine, kidney stones, loss of bladder control, kidney problems, denies any birth control or hormone replacement, is not post menopausal, not currently pregnant  Psychological: No complaints of feeling depressed, anxiety, panic attacks, or difficulty concentrating  General: No complaints of trouble sleeping, lack of energy, fatigue, appetite changes, weight changes, fever, frequent infections, or night sweats  Respiratory: No complaints of shortness of breath, cough with sputum, or wheezing  HEENT: No complaints of serious problems, hearing problems, nose problems, throat problems, or snoring  Gastrointestinal: No complaints of liver problems, nausea, vomiting, heartburn, constipation, bloody stools, diarrhea, problems swallowing, adbominal pain, or rectal bleeding  Hematologic: No complaints of bleeding disorders, anemia, blood clots, or excessive brusing  Neurological: No complaints of numbness, tingling, dizziness, weakness, seizures, headaches, syncope or fainting, AM fatigue, daytime sleepiness, no witnessed apnea episodes  Musculoskeletal: No complaints of arthritis, back pain, or painfull swelling  Active Problems    1   Acute URI (465 9) (J06 9)   2  Back pain (724 5) (M54 9)   3  Celiac disease (579 0) (K90 0)   4  Chronic GERD (530 81) (K21 9)   5  Cough (786 2) (R05)   6  Epigastric discomfort (789 06) (R10 13)   7  Heart palpitations (785 1) (R00 2)   8  Insomnia (780 52) (G47 00)   9  Osteopenia (733 90) (M85 80)   10  Premature ventricular contraction (427 69) (I49 3)   11  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)   12  Seasonal allergies (477 9) (J30 2)   13   Vitamin D deficiency (268 9) (E55 9)    Past Medical History    · History of Acute bronchospasm (519 11) (J98 01)   · History of Acute maxillary sinusitis (461 0) (J01 00)   · History of Acute pain (338 19) (R52)   · History of Acute UTI (599 0) (N39 0)   · History of Allergic conjunctivitis (372 14) (H10 10)   · History of Arthralgia Of The Left Ulna/Radius/Wrist (719 43)   · History of Conjunctivitis (372 30) (H10 9)   · History of Contact dermatitis (692 9) (L25 9)   · History of Contact dermatitis due to poison ivy (692 6) (L23 7)   · History of Costochondritis (733 6) (M94 0)   · History of Encounter for gynecological examination without abnormal finding (V72 31)  (Z01 419)   · History of Encounter for routine gynecological examination (V72 31) (Z01 419)   · History of Encounter for screening colonoscopy (V76 51) (Z12 11)   · History of acne (V13 3) (Z87 2)   · History of acne (V13 3) (Z87 2)   · History of acute bronchitis (V12 69) (Z87 09)   · History of acute sinusitis (V12 69) (Z87 09)   · History of back pain (V13 59) (Z87 39)   · History of candidiasis (V12 09) (Z86 19)   · History of colonic polyps (V12 72) (Z86 010)   · Denied: History of depression   · History of gastritis (V12 79) (Z87 19)   · History of inflammation of sacroiliac joint (V13 4) (Z87 39)   · History of nausea (V12 79) (H20 337)   · History of onychomycosis (V12 09) (Z86 19)   · History of palpitations (V12 59) (U52 101)   · History of restless legs syndrome (V12 49) (V87 31)   · History of screening mammography (V15 89) (Z92 89)   · History of screening mammography (V15 89) (Z92 89)   · Denied: History of substance abuse   · History of urinary frequency (V13 09) (P75 917)   · History of vaginitis (V13 29) (Z87 42)   · History of Hot flashes (627 2) (N95 1)   · History of Insomnia (780 52) (G47 00)   · History of Laceration (879 8)   · History of Laceration of upper extremity, right, sequela (906 1) (S41 111S)   · History of Lateral epicondylitis (tennis elbow) (726 32) (M77 10)   · History of Malignant lymphoma (202 80) (C85 90)   · History of Muscle ache (729 1) (M79 1)   · Need for Tdap vaccination (V06 1) (Z23)   · History of Postmenopausal disorder (627 9) (N95 9)   · History of Rash (782 1) (R21)   · History of Right ankle pain (719 47) (M25 571)   · History of Screening for HPV (human papillomavirus) (V73 81) (Z11 51)   · History of Superinfection (136 9) (B99 9)   · History of 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)   · History of Urinary Tract Infection (V13 02)   · History of Vaginal burning (625 8) (N94 9)    The active problems and past medical history were reviewed and updated today  Surgical History    · History of Nasal Septal Deviation Repair   · History of Oophorectomy   · History of Total Abdominal Hysterectomy With Removal Of Both Ovaries    The surgical history was reviewed and updated today         Family History    · Denied: Family history of Colon cancer   · Denied: Family history of Crohn's disease without complication, unspecified  gastrointestinal tract location   · Denied: Family history of depression   · Denied: Family history of liver disease   · Denied: Family history of substance abuse   · Family history of Mother  At Age 77    · Denied: Family history of Colon cancer   · Denied: Family history of Crohn's disease without complication, unspecified  gastrointestinal tract location   · Family history of Diabetes Mellitus (V18 0)   · Family history of Dyslipidemia   · Family history of colonic polyps (V18 51) (Z83 71)   · Denied: Family history of depression   · Denied: Family history of liver disease   · Denied: Family history of substance abuse   · Family history of Hypertension (V17 49)   · Family history of Laryngeal Cancer (V16 2)    · Family history of Thyroid Cancer    The family history was reviewed and updated today  Social History    · Being A Social Drinker   · Caffeine Use   · Exercise: Walking   · Former smoker (N17 76) (B02 687)   ·   The social history was reviewed and updated today  The social history was reviewed and is unchanged  Current Meds   1  Acidophilus Oral Tablet; Therapy: (Recorded:09Apr2015) to Recorded   2  Claritin CAPS; Therapy: (Recorded:09Apr2015) to Recorded   3  Erythromycin 2 % External Gel; APPLY  AND RUB  IN A THIN FILM TO AFFECTED AREAS   TWICE DAILY  (AM AND PM); Therapy: 64YKK8493 to (Last Rx:17Nov2016)  Requested for: 29OMB1866 Ordered   4  Estradiol 0 5 MG Oral Tablet; TAKE 1 TABLET DAILY AS DIRECTED; Therapy: 09Apr2015 to 66 91 28)  Requested for: 71DAE8640; Last   Rx:02May2017 Ordered   5  Flecainide Acetate 50 MG Oral Tablet; TAKE 1 TABLET TWICE DAILY; Therapy: 71BTQ4437 to (Reg Jacques)  Requested for: 49BFH3848; Last   Rx:30Nov2017 Ordered   6  Fluticasone Propionate 50 MCG/ACT Nasal Suspension; USE 2 SPRAYS IN EACH   NOSTRIL TWICE DAILY; Therapy: 45Zvt6026 to (Evaluate:02Jan2018)  Requested for: 36Vfw9085; Last   Rx:21Dec2017 Ordered   7  Ketorolac Tromethamine 10 MG Oral Tablet; take 1 tablet by mouth every 8 hours as   needed for pain x 5 days; Therapy: 73EXO5485 to (Evaluate:24Sep2017)  Requested for: 55TVK6486; Last   Rx:21Sep2017 Ordered   8  Meloxicam 15 MG Oral Tablet; TAKE 1 TABLET DAILY AS NEEDED; Therapy: 33KCM5814 to (Evaluate:23Jun2018)  Requested for: 75SAI5396; Last   Rx:28Jun2017 Ordered   9   Metoprolol Succinate ER 50 MG Oral Tablet Extended Release 24 Hour; Take 1 tablet   daily; Therapy: 47SDK4752 to (Last Rx:23Ppx7595)  Requested for: 04Cto4560 Ordered   10  Ondansetron 4 MG Oral Tablet Disintegrating; one tab q 8 hrs prn n/v;    Therapy: 71MWP9447 to (Last Rx:39Txu8480)  Requested for: 73FLD8263 Ordered   11  Pantoprazole Sodium 40 MG Oral Tablet Delayed Release; TAKE 1 TABLET DAILY; Therapy: 26CBY9934 to (Evaluate:45Ahq8248)  Requested for: 63UYB7372; Last    Rx:84Grh1946 Ordered   12  Sudafed TABS; TAKE 1 TABLET EVERY 6 HOURS AS NEEDED; Therapy: (Kim Spear) to Recorded   13  Tylenol TABS; Therapy: (Kim Spear) to Recorded   14  Vitamin B12 100 MCG Oral Tablet; Therapy: (Kim Spear) to Recorded   15  Vitamin C 100 MG Oral Tablet; Therapy: (Kim Spear) to Recorded   16  Vitamin D3 1000 UNIT Oral Capsule; Therapy: (Kim Spear) to Recorded   17  Xopenex HFA 45 MCG/ACT Inhalation Aerosol; INHALE 2 PUFFS EVERY 6 HOURS AS    NEEDED; Therapy: 06Qlb8747 to (Renew:31Rbz9066)  Requested for: 33Njq3712; Last    Rx:60Scu5499 Ordered   18  Zolpidem Tartrate 5 MG Oral Tablet; TAKE 1 TABLET AT BEDTIME AS NEEDED; Last    Rx:37Les1427 Ordered    The medication list was reviewed and updated today  Allergies    1  Morphine Derivatives   2  Sulfa Drugs    Vitals   Recorded: 02JGN6061 10:11AM   Heart Rate 78, L Radial   Systolic 275, LUE, Sitting   Diastolic 76, LUE, Sitting   Height 5 ft 5 in   Weight 168 lb    BMI Calculated 27 96   BSA Calculated 1 84     Physical Exam    Constitutional   General appearance: No acute distress, well appearing and well nourished  Eyes   Conjunctiva and Sclera examination: Conjunctiva pink, sclera anicteric  Ears, Nose, Mouth, and Throat - External inspection of ears and nose: Normal without deformities or discharge  Neck   Neck and thyroid: Normal, supple, trachea midline, no thyromegaly      Pulmonary   Respiratory effort: No increased work of breathing or signs of respiratory distress  Auscultation of lungs: Clear to auscultation, no rales, no rhonchi, no wheezing, good air movement  Cardiovascular   Auscultation of heart: Normal rate and rhythm, normal S1 and S2, no murmurs  Carotid pulses: Normal, 2+ bilaterally  Examination of extremities for edema and/or varicosities: Normal     Chest - Chest: Normal    Abdomen   Abdomen: Non-tender and no distention  Musculoskeletal Gait and station: Normal gait  Skin - Skin and subcutaneous tissue: Normal without rashes or lesions  Skin is warm and well perfused, normal turgor  Neurologic - Speech: Normal     Psychiatric - Orientation to person, place, and time: Normal  Mood and affect: Normal       Results/Data  A 12 lead ECG was performed and was normal    Rhythm and rate:  ventricular rate is 73 beats per minute  normal sinus rhythm  Assessment    1  Premature ventricular contraction (427 69) (I49 3)    Plan  Acute URI, Back pain, Celiac disease, Chronic GERD, Cough, Health Maintenance,  Epigastric discomfort, Heart palpitations, Insomnia, Premature ventricular contraction    · Follow-up visit in 3 months Evaluation and Treatment  Follow-up  Status: Hold For -  Scheduling  Requested for: 12ASR3177   Ordered; For: Acute URI, Back pain, Celiac disease, Chronic GERD, Cough, Health Maintenance, Epigastric discomfort, Heart palpitations, Insomnia, Premature ventricular contraction; Ordered By: Estephanie Weston Performed:  Due: 96YOU2008  PMH: History of palpitations    · From  Metoprolol Succinate ER 50 MG Oral Tablet Extended Release 24 Hour  Take 1 tablet daily To Metoprolol Succinate ER 25 MG Oral Tablet Extended Release 24  Hour Take 1 tablet daily   Rx By: Estephanie Weston;  Dispense: 90 Days ; #:90 Tablet Extended Release 24 Hour; Refill: 3; For: PMH: History of palpitations; ALETA = N; Record  Premature ventricular contraction    · Flecainide Acetate 50 MG Oral Tablet; TAKE 1 TABLET TWICE DAILY   Rx By: Fredy Alvarenga; Dispense: 90 Days ; #:180 Tablet; Refill: 3; For: Premature ventricular contraction; ALETA = N; Sent To: Lori Ryder    Discussion/Summary    63year old woman with premature contractions who returns for follow up  Palpitations have improved significantly, decreased intensity  Worse in afternoon  Does have blurry vision for one hour after metoprolol  Impression:  1  Premature ventricular contractions - Improving, but not completely remitted  2  Dyslipidemia     Recommendations:  1  Increase flecainide to 100mg 2x/day  2  Decrease metoprolol succinate to 25mg daily  3  Start Fish Oil 2000mg daily  4  Follow up in 3 months  Thank you very much for allowing me to participate in the care of this patient  If you have any questions, please do not hesitate to contact me        Future Appointments    Signatures   Electronically signed by : RUBINA Simms ; Jan 5 2018 10:42AM EST                       (Author)    Electronically signed by : Hans Morse DO; Jan 6 2018  9:34AM EST                       (Author)

## 2018-01-24 VITALS
BODY MASS INDEX: 28.39 KG/M2 | WEIGHT: 170.4 LBS | HEART RATE: 78 BPM | DIASTOLIC BLOOD PRESSURE: 78 MMHG | OXYGEN SATURATION: 98 % | TEMPERATURE: 97.6 F | HEIGHT: 65 IN | RESPIRATION RATE: 16 BRPM | SYSTOLIC BLOOD PRESSURE: 114 MMHG

## 2018-01-30 ENCOUNTER — TELEPHONE (OUTPATIENT)
Dept: GASTROENTEROLOGY | Facility: CLINIC | Age: 63
End: 2018-01-30

## 2018-02-02 ENCOUNTER — LAB REQUISITION (OUTPATIENT)
Dept: LAB | Facility: HOSPITAL | Age: 63
End: 2018-02-02
Payer: COMMERCIAL

## 2018-02-02 DIAGNOSIS — J32.2 CHRONIC ETHMOIDAL SINUSITIS: ICD-10-CM

## 2018-02-02 PROCEDURE — 87205 SMEAR GRAM STAIN: CPT | Performed by: SPECIALIST

## 2018-02-02 PROCEDURE — 87070 CULTURE OTHR SPECIMN AEROBIC: CPT | Performed by: SPECIALIST

## 2018-02-02 PROCEDURE — 87184 SC STD DISK METHOD PER PLATE: CPT | Performed by: SPECIALIST

## 2018-02-05 LAB
BACTERIA WND AEROBE CULT: ABNORMAL
GRAM STN SPEC: ABNORMAL

## 2018-02-06 ENCOUNTER — DOCUMENTATION (OUTPATIENT)
Dept: OTOLARYNGOLOGY | Facility: CLINIC | Age: 63
End: 2018-02-06

## 2018-02-06 NOTE — PROGRESS NOTES
I reviewed culture results, and my staff contacted Ms Caraballo to prescribe Cefdinir, 300mg, PO, BID x 2 weeks, and she will return for a follow up visit

## 2018-02-13 NOTE — MISCELLANEOUS
Message  GI Reminder Recall [facility]:   Date: 11/01/2017   Dear Radha Knowles:     Review of our records shows you are due for the following: Follow Up Visit  Please call the following office to schedule your appointment:   [address] [facility], [facility] [address], [address] [place] [address]  [phone]  We look forward to hearing from you!      Sincerely,     [facility]      Signatures   Electronically signed by : Lucas Spann, ; Nov 1 2017  2:01PM EST                       (Author)

## 2018-02-14 DIAGNOSIS — I49.3 PVC (PREMATURE VENTRICULAR CONTRACTION): Primary | ICD-10-CM

## 2018-02-14 RX ORDER — FLECAINIDE ACETATE 100 MG/1
100 TABLET ORAL 2 TIMES DAILY
Qty: 180 TABLET | Refills: 3 | Status: SHIPPED | OUTPATIENT
Start: 2018-02-14 | End: 2018-02-16 | Stop reason: SDUPTHER

## 2018-02-14 RX ORDER — FLECAINIDE ACETATE 50 MG/1
1 TABLET ORAL 2 TIMES DAILY
COMMUNITY
Start: 2017-11-30 | End: 2018-02-14 | Stop reason: SDUPTHER

## 2018-02-16 DIAGNOSIS — I49.3 PVC (PREMATURE VENTRICULAR CONTRACTION): ICD-10-CM

## 2018-02-16 RX ORDER — FLECAINIDE ACETATE 100 MG/1
100 TABLET ORAL 2 TIMES DAILY
Qty: 180 TABLET | Refills: 3 | Status: SHIPPED | OUTPATIENT
Start: 2018-02-16 | End: 2019-03-21

## 2018-03-19 ENCOUNTER — TRANSCRIBE ORDERS (OUTPATIENT)
Dept: ADMINISTRATIVE | Facility: HOSPITAL | Age: 63
End: 2018-03-19

## 2018-03-19 DIAGNOSIS — E04.2 MULTIPLE THYROID NODULES: Primary | ICD-10-CM

## 2018-03-23 ENCOUNTER — TRANSCRIBE ORDERS (OUTPATIENT)
Dept: URGENT CARE | Facility: CLINIC | Age: 63
End: 2018-03-23

## 2018-03-23 ENCOUNTER — APPOINTMENT (OUTPATIENT)
Dept: RADIOLOGY | Facility: CLINIC | Age: 63
End: 2018-03-23
Payer: OTHER MISCELLANEOUS

## 2018-03-23 ENCOUNTER — APPOINTMENT (OUTPATIENT)
Dept: URGENT CARE | Facility: CLINIC | Age: 63
End: 2018-03-23
Payer: OTHER MISCELLANEOUS

## 2018-03-23 DIAGNOSIS — S69.92XA INJURY OF LEFT WRIST, INITIAL ENCOUNTER: Primary | ICD-10-CM

## 2018-03-23 DIAGNOSIS — S69.92XA INJURY OF LEFT WRIST, INITIAL ENCOUNTER: ICD-10-CM

## 2018-03-23 PROCEDURE — G0382 LEV 3 HOSP TYPE B ED VISIT: HCPCS | Performed by: PHYSICIAN ASSISTANT

## 2018-03-23 PROCEDURE — 73110 X-RAY EXAM OF WRIST: CPT

## 2018-03-23 PROCEDURE — 99283 EMERGENCY DEPT VISIT LOW MDM: CPT | Performed by: PHYSICIAN ASSISTANT

## 2018-03-27 ENCOUNTER — APPOINTMENT (OUTPATIENT)
Dept: URGENT CARE | Facility: CLINIC | Age: 63
End: 2018-03-27
Payer: OTHER MISCELLANEOUS

## 2018-03-27 ENCOUNTER — TRANSCRIBE ORDERS (OUTPATIENT)
Dept: ADMINISTRATIVE | Facility: HOSPITAL | Age: 63
End: 2018-03-27

## 2018-03-27 DIAGNOSIS — M25.532 LEFT WRIST PAIN: Primary | ICD-10-CM

## 2018-03-27 PROCEDURE — 99214 OFFICE O/P EST MOD 30 MIN: CPT

## 2018-04-04 DIAGNOSIS — I10 HYPERTENSION, UNSPECIFIED TYPE: Primary | ICD-10-CM

## 2018-04-04 RX ORDER — METOPROLOL SUCCINATE 50 MG/1
50 TABLET, EXTENDED RELEASE ORAL DAILY
Qty: 90 TABLET | Refills: 1 | Status: SHIPPED | OUTPATIENT
Start: 2018-04-04 | End: 2019-07-10 | Stop reason: SDUPTHER

## 2018-04-05 ENCOUNTER — HOSPITAL ENCOUNTER (OUTPATIENT)
Dept: RADIOLOGY | Age: 63
Discharge: HOME | End: 2018-04-05
Payer: COMMERCIAL

## 2018-04-05 DIAGNOSIS — E04.2 MULTIPLE THYROID NODULES: ICD-10-CM

## 2018-04-05 DIAGNOSIS — M25.532 LEFT WRIST PAIN: ICD-10-CM

## 2018-04-05 PROCEDURE — 73221 MRI JOINT UPR EXTREM W/O DYE: CPT

## 2018-04-05 PROCEDURE — 76536 US EXAM OF HEAD AND NECK: CPT

## 2018-04-06 ENCOUNTER — APPOINTMENT (OUTPATIENT)
Dept: URGENT CARE | Facility: CLINIC | Age: 63
End: 2018-04-06
Payer: OTHER MISCELLANEOUS

## 2018-04-06 PROCEDURE — 99213 OFFICE O/P EST LOW 20 MIN: CPT

## 2018-04-10 ENCOUNTER — OFFICE VISIT (OUTPATIENT)
Dept: OBGYN CLINIC | Facility: CLINIC | Age: 63
End: 2018-04-10
Payer: OTHER MISCELLANEOUS

## 2018-04-10 VITALS
RESPIRATION RATE: 14 BRPM | HEART RATE: 82 BPM | WEIGHT: 166.8 LBS | SYSTOLIC BLOOD PRESSURE: 124 MMHG | DIASTOLIC BLOOD PRESSURE: 76 MMHG | HEIGHT: 66 IN | BODY MASS INDEX: 26.81 KG/M2

## 2018-04-10 DIAGNOSIS — S63.592A: ICD-10-CM

## 2018-04-10 DIAGNOSIS — M25.532 PAIN IN LEFT WRIST: Primary | ICD-10-CM

## 2018-04-10 DIAGNOSIS — M25.332 VISI (VOLAR INTERCALATED SEGMENT INSTABILITY), LEFT: ICD-10-CM

## 2018-04-10 DIAGNOSIS — T14.8XXA CONTUSION OF BONE: ICD-10-CM

## 2018-04-10 PROCEDURE — 99203 OFFICE O/P NEW LOW 30 MIN: CPT | Performed by: FAMILY MEDICINE

## 2018-04-10 PROCEDURE — 20605 DRAIN/INJ JOINT/BURSA W/O US: CPT | Performed by: FAMILY MEDICINE

## 2018-04-10 RX ORDER — LIDOCAINE HYDROCHLORIDE 10 MG/ML
0.5 INJECTION, SOLUTION INFILTRATION; PERINEURAL
Status: COMPLETED | OUTPATIENT
Start: 2018-04-10 | End: 2018-04-10

## 2018-04-10 RX ORDER — MELOXICAM 15 MG/1
15 TABLET ORAL DAILY
Qty: 30 TABLET | Refills: 0 | Status: SHIPPED | OUTPATIENT
Start: 2018-04-10 | End: 2018-04-19 | Stop reason: SDUPTHER

## 2018-04-10 RX ORDER — TRIAMCINOLONE ACETONIDE 40 MG/ML
40 INJECTION, SUSPENSION INTRA-ARTICULAR; INTRAMUSCULAR
Status: COMPLETED | OUTPATIENT
Start: 2018-04-10 | End: 2018-04-10

## 2018-04-10 RX ORDER — PANTOPRAZOLE SODIUM 20 MG/1
20 TABLET, DELAYED RELEASE ORAL DAILY
COMMUNITY
End: 2018-04-19 | Stop reason: SDUPTHER

## 2018-04-10 RX ORDER — MELOXICAM 15 MG/1
15 TABLET ORAL DAILY
COMMUNITY
Start: 2018-03-30 | End: 2018-06-13

## 2018-04-10 RX ORDER — MAGNESIUM CARB/ALUMINUM HYDROX 105-160MG
296 TABLET,CHEWABLE ORAL ONCE
COMMUNITY
End: 2018-08-01 | Stop reason: ALTCHOICE

## 2018-04-10 RX ORDER — LANOLIN ALCOHOL/MO/W.PET/CERES
1000 CREAM (GRAM) TOPICAL DAILY
COMMUNITY

## 2018-04-10 RX ADMIN — TRIAMCINOLONE ACETONIDE 40 MG: 40 INJECTION, SUSPENSION INTRA-ARTICULAR; INTRAMUSCULAR at 10:46

## 2018-04-10 RX ADMIN — LIDOCAINE HYDROCHLORIDE 0.5 ML: 10 INJECTION, SOLUTION INFILTRATION; PERINEURAL at 10:46
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
## 2018-04-19 ENCOUNTER — OFFICE VISIT (OUTPATIENT)
Dept: GASTROENTEROLOGY | Facility: CLINIC | Age: 63
End: 2018-04-19
Payer: COMMERCIAL

## 2018-04-19 VITALS
WEIGHT: 166 LBS | HEART RATE: 65 BPM | DIASTOLIC BLOOD PRESSURE: 80 MMHG | BODY MASS INDEX: 26.68 KG/M2 | TEMPERATURE: 97.2 F | SYSTOLIC BLOOD PRESSURE: 113 MMHG | HEIGHT: 66 IN

## 2018-04-19 DIAGNOSIS — K21.9 CHRONIC GERD: ICD-10-CM

## 2018-04-19 DIAGNOSIS — M85.80 OSTEOPENIA, UNSPECIFIED LOCATION: Primary | ICD-10-CM

## 2018-04-19 DIAGNOSIS — K90.0 CELIAC DISEASE: ICD-10-CM

## 2018-04-19 PROCEDURE — 99214 OFFICE O/P EST MOD 30 MIN: CPT | Performed by: PHYSICIAN ASSISTANT

## 2018-04-19 RX ORDER — FLUTICASONE PROPIONATE 50 MCG
2 SPRAY, SUSPENSION (ML) NASAL 2 TIMES DAILY
COMMUNITY
Start: 2017-12-21 | End: 2020-02-18 | Stop reason: SDUPTHER

## 2018-04-19 RX ORDER — RIBOFLAVIN (VITAMIN B2) 100 MG
TABLET ORAL
COMMUNITY
End: 2021-06-03 | Stop reason: ALTCHOICE

## 2018-04-19 RX ORDER — FLECAINIDE ACETATE 50 MG/1
TABLET ORAL
COMMUNITY
Start: 2018-02-16 | End: 2018-04-19 | Stop reason: SDUPTHER

## 2018-04-19 RX ORDER — MELATONIN
COMMUNITY
End: 2018-04-23 | Stop reason: DRUGHIGH

## 2018-04-19 RX ORDER — ONDANSETRON 4 MG/1
TABLET, ORALLY DISINTEGRATING ORAL
COMMUNITY
Start: 2016-10-13 | End: 2019-08-28 | Stop reason: SDUPTHER

## 2018-04-19 RX ORDER — ERYTHROMYCIN 20 MG/G
GEL TOPICAL 2 TIMES DAILY
COMMUNITY
Start: 2015-07-11 | End: 2018-04-19 | Stop reason: CLARIF

## 2018-04-19 RX ORDER — UBIDECARENONE 75 MG
CAPSULE ORAL
COMMUNITY
End: 2018-04-19 | Stop reason: SDUPTHER

## 2018-04-19 RX ORDER — LEVALBUTEROL TARTRATE 45 MCG
HFA AEROSOL WITH ADAPTER (GRAM) INHALATION EVERY 6 HOURS PRN
COMMUNITY
Start: 2018-02-08 | End: 2018-12-12 | Stop reason: SDUPTHER

## 2018-04-19 RX ORDER — PSEUDOEPHEDRINE HYDROCHLORIDE 30 MG/1
1 TABLET ORAL EVERY 6 HOURS PRN
COMMUNITY
End: 2020-09-02 | Stop reason: ALTCHOICE

## 2018-04-19 RX ORDER — ZOLPIDEM TARTRATE 5 MG/1
1 TABLET ORAL
COMMUNITY
Start: 2017-09-21 | End: 2018-06-13 | Stop reason: SDUPTHER

## 2018-04-19 RX ORDER — PANTOPRAZOLE SODIUM 20 MG/1
20 TABLET, DELAYED RELEASE ORAL DAILY
Qty: 90 TABLET | Refills: 2 | Status: SHIPPED | OUTPATIENT
Start: 2018-04-19 | End: 2019-05-31

## 2018-04-19 RX ORDER — LORATADINE 10 MG/1
CAPSULE, LIQUID FILLED ORAL
COMMUNITY
End: 2019-01-05 | Stop reason: ALTCHOICE

## 2018-04-19 RX ORDER — PANTOPRAZOLE SODIUM 40 MG/1
TABLET, DELAYED RELEASE ORAL
COMMUNITY
Start: 2018-03-19 | End: 2018-04-19 | Stop reason: DRUGHIGH

## 2018-04-19 RX ORDER — KETOROLAC TROMETHAMINE 10 MG/1
10 TABLET, FILM COATED ORAL EVERY 6 HOURS PRN
COMMUNITY
Start: 2017-09-21 | End: 2018-05-11 | Stop reason: ALTCHOICE

## 2018-04-19 NOTE — PROGRESS NOTES
[facility] - Outpatient Follow-up Note  Alfredo Valles 63 y o  female MRN: [id number]  Encounter: [id number]          ASSESSMENT AND PLAN:      1  Chronic GERD  She has chronic GERD and takes Protonix 40 mg daily  EGD from 2016 showed no evidence of esophagitis or Campbell's esophagus  She is symptomatic without Protonix, however she is concerned about side effects of long-term PPI use  Recommend decreasing dose of Protonix to 20 mg daily  If she is symptomatic on low-dose Protonix, we may need to increase back to 40 mg daily  She may take Pepcid 40 mg daily at bedtime as well  Recommend taking a multivitamin daily to help with absorption  Recommend diet and lifestyle modifications to prevent reflux  - pantoprazole (PROTONIX) 20 mg tablet; Take 1 tablet (20 mg total) by mouth daily for 90 days  Dispense: 90 tablet; Refill: 2    2  Celiac disease  She has history of follicular lymphoma and celiac disease diagnosed in 2010  Recent celiac serologies were negative  Continue strict gluten free diet  3  Osteopenia, unspecified location  She has history of osteopenia, likely in part secondary to her history of celiac disease  Last vitamin D level was low normal at 35  Last DEXA scan was back in 2015  She is requesting repeat vitamin-D levels and new order for DEXA scan  Continue vitamin-D supplement daily and diet high in calcium  - Vitamin D 25 hydroxy; Future  - DXA bone density spine hip and pelvis; Future    ______________________________________________________________________    SUBJECTIVE:  73-year-old female with history of follicular lymphoma, celiac disease, and GERD presenting for office follow-up  She has been eating a strict gluten free diet since her diagnosis back in 2010  She was last seen in the office in October 2017  She complained of post-prandial abdominal pain   Due to concern for gluten exposure, she was re-tested and fortunately her celiac serologies were negative  Today she complains of chronic GERD  She has had these symptoms for years  She is currently taking Protonix 40 mg daily along with Pepcid 20-40 mg at bedtime  If she does not take Protonix, she has severe heartburn and upper abdominal pain  Last EGD was in 2016 which revealed gastritis, biopsies negative for H pylori  She is concerned about side effects of long term Protonix use  She has a history of osteopenia and does take vitamin D supplement daily  Last vitamin D level on record from 2017 was low normal at 35  She had a DEXA scan back in 2015 with T-score -1 4  She eats a diet high in calcium as she cannot tolerate calcium supplements  She also takes various other vitamin and mineral supplements  She denies nausea, vomiting, abdominal pain, diarrhea, constipation, melena, hematochezia  Her weight is stable  REVIEW OF SYSTEMS IS OTHERWISE NEGATIVE  Historical Information   Past Medical History:   Diagnosis Date    Celiac disease     Follicular lymphoma ([address] )     GERD (gastroesophageal reflux disease)     Lymphoma, 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HCC)     non hodgekins, remission     Past Surgical History:   Procedure Laterality Date    COLONOSCOPY      HYSTERECTOMY      LYMPH NODE DISSECTION Right     arm    AL ESOPHAGOGASTRODUODENOSCOPY TRANSORAL DIAGNOSTIC N/A 1/18/2016    Procedure: ESOPHAGOGASTRODUODENOSCOPY (EGD); Surgeon: Shasta Michelle MD;  [facility];   Service: Gastroenterology    TOTAL ABDOMINAL HYSTERECTOMY W/ BILATERAL SALPINGOOPHORECTOMY       Social History   History   Alcohol Use    Yes     Comment: socially     History   Drug Use No     History   Smoking Status    Never Smoker   Smokeless Tobacco    Never Used     Family History   Problem Relation Age of Onset    Lymphoma Mother     Throat cancer Father     Heart failure Father     Atrial fibrillation Father     Thyroid cancer Sister        Meds/Allergies       Current Outpatient Prescriptions:     acetaminophen (TYLENOL) 500 mg tablet    Ascorbic Acid (VITAMIN C) 100 MG tablet    cholecalciferol (VITAMIN D3) 1,000 units tablet    cyanocobalamin (VITAMIN B-12) 1,000 mcg tablet    estradiol (ESTRACE) 0 5 MG tablet    famotidine (PEPCID) 20 mg tablet    flecainide (TAMBOCOR) 100 mg tablet    fluticasone (FLONASE) 50 mcg/act nasal spray    ibuprofen (MOTRIN) 200 mg tablet    ketorolac (TORADOL) 10 mg tablet    Lactobacillus (ACIDOPHILUS PO)    Loratadine (CLARITIN) 10 MG CAPS    magnesium citrate (CITROMA) 1 745 g/30 mL oral solution    meloxicam (MOBIC) 15 mg tablet    metoprolol succinate (TOPROL-XL) 50 mg 24 hr tablet    mometasone (NASONEX) 50 mcg/act nasal spray    omeprazole (PriLOSEC) 40 MG capsule    ondansetron (ZOFRAN-ODT) 4 mg disintegrating tablet    pseudoephedrine (SUDAFED) 30 mg tablet    XOPENEX HFA 45 MCG/ACT inhaler    zolpidem (AMBIEN) 5 mg tablet    pantoprazole (PROTONIX) 20 mg tablet    Allergies   Allergen Reactions    Shellfish-Derived Products Anaphylaxis    Wheat Bran Anaphylaxis    Gluten Meal GI Intolerance     Celiac     Morphine And Related Itching    Nuts Hives     Almonds and other related nuts   Other Itching    Sulfa Antibiotics Rash           Objective     Blood pressure 113/80, pulse 65, temperature (!) 97 2 °F (36 2 °C), temperature source Tympanic, height 5' 6" (1 676 m), weight 75 3 kg (166 lb)  Body mass index is 26 79 kg/m²  PHYSICAL EXAM:      General Appearance:   Alert, cooperative, no distress   HEENT:   Normocephalic, atraumatic, anicteric      Neck:  Supple, symmetrical, trachea midline   Lungs:   Clear to auscultation bilaterally; no rales, rhonchi or wheezing; respirations unlabored    Heart[de-identified]   Regular rate and rhythm; no murmur, rub, or gallop     Abdomen:   Soft, non-tender, non-distended; normal bowel sounds; no masses, no organomegaly    Genitalia:   Deferred    Rectal:   Deferred    Extremities:  No cyanosis, clubbing or edema    Pulses:  2+ and symmetric    Skin:  No jaundice, rashes, or lesions    Lymph nodes:  No palpable cervical lymphadenopathy        Lab Results:   No visits with results within 1 Day(s) from this visit  Latest known visit with results is:   Lab Requisition on 02/02/2018   Component Date Value    Wound Culture 02/02/2018 1+ Growth of Haemophilus influenzae*    Gram Stain Result 02/02/2018 No Polys or Bacteria seen          Radiology Results:   Xr Wrist 3+ Vw Left    Result Date: 3/24/2018  Narrative: LEFT WRIST INDICATION:   S69  92XA: Unspecified injury of left wrist, hand and finger(s), initial encounter  Slip and fall on ice  Pain  History of prior radius fracture  COMPARISON:  Plain radiographs November 14, 2013 VIEWS:  PA, lateral, scaphoid and oblique IMAGES:  4 FINDINGS: Old healed distal radius fracture, stable from the prior study  There is no acute fracture or dislocation  Degenerative changes at the radiocarpal articulation and 1st carpal metacarpal joint  Widening of the scapholunate distance, compatible with chronic scapholunate ligamentous injury  Ventral angulation of the lunate, most compatible with VICI  No lytic or blastic lesions are seen  Soft tissues are unremarkable  Impression: Stable chronic degenerative and chronic posttraumatic changes  No acute osseous abnormality  Workstation performed: [id number]     Us Thyroid    Result Date: 4/6/2018  Narrative: THYROID ULTRASOUND INDICATION:    E04 2: Nontoxic multinodular goiter  COMPARISON:  Thyroid ultrasound of 8/25/2016, ultrasound of 6/15/2012, ultrasound of 4/28/2011 TECHNIQUE:   Ultrasound of the thyroid was performed with a high frequency linear transducer in transverse and sagittal planes including volumetric imaging sweeps as well as traditional still imaging technique  FINDINGS:  Thyroid parenchyma is fairly homogeneous in echotexture with focal nodule(s) as described below  Right lobe:  5 4 x 1 5 x 1 9 cm  Left lobe:  4 9 x 1 3 x 1 8 cm  Isthmus:  0 2 cm  Nodule #1 RIGHT lower pole nodule measuring 0 8 x 0 7 x 0 9 cm  Previously this measured 0 7 x 0 6 x 0 8 cm, not a significant change  COMPOSITION:  2 points, solid or almost completely solid   ECHOGENICITY:  2 points, hypoechoic  SHAPE:  0 points, wider-than-tall  MARGIN: 0 points, smooth  ECHOGENIC FOCI:  0 points, none or large comet-tail artifacts  TI-RADS Classification: TR 4 (4-6 points), Moderately suspicious  FNA if > 1 5 cm  Follow if > 1cm  Nodule #2 RIGHT lower pole nodule measuring 0 9 x 0 5 x 0 9 cm  Previously this measured 0 9 x 0 5 x 0 8 cm, stable  COMPOSITION:  2 points, solid or almost completely solid   ECHOGENICITY:  2 points, hypoechoic  SHAPE:  0 points, wider-than-tall  MARGIN: 0 points, smooth  ECHOGENIC FOCI:  0 points, none or large comet-tail artifacts  TI-RADS Classification: TR 4 (4-6 points), Moderately suspicious  FNA if > 1 5 cm  Follow if > 1cm  Nodule #3 LEFT lower pole nodule measuring 1 0 x 1 0 x 0 9 cm  Previously this measured 1 0 x 1 0 x 1 0 cm  COMPOSITION:  2 points, solid or almost completely solid   ECHOGENICITY:  2 points, hypoechoic  SHAPE:  3 points, taller-than-wide  MARGIN: 0 points, smooth  ECHOGENIC FOCI:  3 points, punctate echogenic foci  TI-RADS Classification: TR 5 (7 or > points), Highly suspicious  FNA if > 1 cm  Follow if > 0 5 cm  Stable from prior ultrasounds dating back to 2011  Nodule #4 LEFT lower pole nodule measuring 0 7 x 0 5 x 0 9 cm  Previously this measured 0 7 x 0 5 x 0 7 cm  This may be minimally larger  COMPOSITION:  2 points, solid or almost completely solid   ECHOGENICITY:  1 point, hyperechoic or isoechoic  SHAPE:  0 points, wider-than-tall  MARGIN: 0 points, smooth  ECHOGENIC FOCI:  0 points, none or large comet-tail artifacts  TI-RADS Classification: TR 3 (3 points), Mildly suspicious  FNA if >2 5 cm  Follow if >1 5 cm  Additional small subcentimeter thyroid nodules noted   In the right mid jugular region, there is a 0 8 x 0 8 x 0 9 cm largely echogenic nodule, not seen on the prior exam, question fat density lesion such as lipoma or lymph node with prominent fatty hilum  There is peripheral vascularity  Small flattened lymph node in the right mid jugular region measuring 0 9 x 0 4 x 1 3 cm with small non shadowing echogenic focus, question fat deposit or related to vascularity  Impression: 1  Small bilateral thyroid nodules, stable in size from most recent thyroid ultrasound  There is a highly suspicious nodule by thyroid guidelines, TI-RADS 5, in the left lower pole measuring up to 1 0 cm  However this is stable from prior ultrasounds dating back to 2011 which would favor a benign lesion  Additional thyroid nodules as noted above  2  In the right mid jugular region, there is a largely echogenic nodule measuring up to 0 9 cm, not seen on the prior exam, question fat density lesion such as lipoma or lymph node with prominent fatty hilum  Given that this is a new finding, this could  be followed up as warranted  Reference: ACR Thyroid Imaging, Reporting and Data System (TI-RADS): White Paper of the [employer]  J AM Bushra Radiol 9583;76:[id number]  (additional recommendations based on American Thyroid Association 2015 guidelines ) Workstation performed: [id number]     Mri Wrist Left Wo Contrast    Result Date: 4/5/2018  Narrative: MRI LEFT WRIST INDICATION:   M25 532: Pain in left wrist   Patient slipped on ice 2 weeks ago and has left wrist clicking, weakness, and pain  Comparison: Left wrist plain films from 3/23/2018  Left wrist MR from 11/22/2013  Left wrist plain films from 11/14/2013  TECHNIQUE:   Multisequence multiplanar MR was obtained of the left wrist  Localizers, Sagittal STIR, axial T1, axial T2 fat sat, coronal T1, coronal T2 fat sat, coronal gradient 3D gradient echo  Gadolinium was not utilized  Scan was performed on a 1 5 [place]issa unit   FINDINGS: SUBCUTANEOUS TISSUES: Normal JOINT FLUID: None  SCAPHOLUNATE LIGAMENT: Intact  LUNOTRIQUETRAL LIGAMENT: Intact  TRIANGULAR FIBROCARTILAGE: There is chronic finding of volar tilt of the lunate, likely due to lunotriquetral ligament tear and resulting volar intercalated segmental  instability (VISI) deformity  (Series 7 image 11, and series 5 image 10 ) FLEXOR/EXTENSOR TENDONS: There is severe extensor carpi ulnaris tenosynovitis without tear (series 3 images 4 through 17 ) CARPAL TUNNEL: Intact  MEDIAN NERVE: Intact  BONES: Normal  ARTICULAR SURFACE:  Normal  MUSCULATURE: Normal      Impression: There is severe extensor carpi ulnaris tenosynovitis without tear (series 3 images 4 through 17 ) There is chronic finding of volar tilt of the lunate, likely due to lunotriquetral ligament tear and resulting volar intercalated segmental  instability (VISI) deformity    (Series 7 image 11, and series 5 image 10 ) Workstation performed: [id number]

## 2018-04-23 ENCOUNTER — TRANSCRIBE ORDERS (OUTPATIENT)
Dept: LAB | Facility: CLINIC | Age: 63
End: 2018-04-23

## 2018-04-23 ENCOUNTER — APPOINTMENT (OUTPATIENT)
Dept: LAB | Facility: CLINIC | Age: 63
End: 2018-04-23
Payer: COMMERCIAL

## 2018-04-23 DIAGNOSIS — M85.80 OSTEOPENIA, UNSPECIFIED LOCATION: ICD-10-CM

## 2018-04-23 DIAGNOSIS — M85.80 OSTEOPENIA, UNSPECIFIED LOCATION: Primary | ICD-10-CM

## 2018-04-23 LAB — 25(OH)D3 SERPL-MCNC: 24.5 NG/ML (ref 30–100)

## 2018-04-23 PROCEDURE — 82306 VITAMIN D 25 HYDROXY: CPT

## 2018-04-23 PROCEDURE — 36415 COLL VENOUS BLD VENIPUNCTURE: CPT

## 2018-04-24 ENCOUNTER — OFFICE VISIT (OUTPATIENT)
Dept: CARDIOLOGY CLINIC | Facility: CLINIC | Age: 63
End: 2018-04-24
Payer: COMMERCIAL

## 2018-04-24 VITALS
WEIGHT: 166.5 LBS | BODY MASS INDEX: 26.76 KG/M2 | SYSTOLIC BLOOD PRESSURE: 100 MMHG | HEIGHT: 66 IN | DIASTOLIC BLOOD PRESSURE: 70 MMHG | OXYGEN SATURATION: 98 % | HEART RATE: 79 BPM

## 2018-04-24 DIAGNOSIS — I49.3 PVC'S (PREMATURE VENTRICULAR CONTRACTIONS): Primary | ICD-10-CM

## 2018-04-24 DIAGNOSIS — E78.5 DYSLIPIDEMIA: ICD-10-CM

## 2018-04-24 PROCEDURE — 99214 OFFICE O/P EST MOD 30 MIN: CPT | Performed by: INTERNAL MEDICINE

## 2018-04-24 NOTE — PROGRESS NOTES
Cardiology   Cat Infante 63 y o  female MRN: [id number]        Impression:  1  Premature ventricular contractions - Almost completely remitted  2  Dyslipidemia      Recommendations:  1  Continue current medications  2  Follow up in 6 months  HPI: Cat Infante is a 63y o  year old female with premature contractions who returns for follow up  Rare palpitations  Otherwise feels well  No chest pain or shortness of breath  Review of Systems   Constitutional: Negative  HENT: Negative  Eyes: Negative  Respiratory: Negative for chest tightness and shortness of breath  Cardiovascular: Positive for palpitations  Negative for chest pain and leg swelling  Gastrointestinal: Negative  Endocrine: Negative  Genitourinary: Negative  Musculoskeletal: Negative  Skin: Negative  Allergic/Immunologic: Negative  Neurological: Negative  Hematological: Negative  Psychiatric/Behavioral: Negative  All other systems reviewed and are negative  Past Medical History:   Diagnosis Date    Celiac disease     Follicular lymphoma ([address] )     GERD (gastroesophageal reflux disease)     Lymphoma, 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HCC)     non hodgekins, remission     Past Surgical History:   Procedure Laterality Date    COLONOSCOPY      HYSTERECTOMY      LYMPH NODE DISSECTION Right     arm    KS ESOPHAGOGASTRODUODENOSCOPY TRANSORAL DIAGNOSTIC N/A 1/18/2016    Procedure: ESOPHAGOGASTRODUODENOSCOPY (EGD); Surgeon: Case Alexander MD;  [facility]: [facility];   Service: Gastroenterology    TOTAL ABDOMINAL HYSTERECTOMY W/ BILATERAL SALPINGOOPHORECTOMY       History   Alcohol Use    Yes     Comment: socially     History   Drug Use No     History   Smoking Status    Never Smoker   Smokeless Tobacco    Never Used     Family History   Problem Relation Age of Onset    Lymphoma Mother     Throat cancer Father     Heart failure Father     Atrial fibrillation Father     Thyroid cancer Sister Allergies: Allergies   Allergen Reactions    Shellfish-Derived Products Anaphylaxis    Wheat Bran Anaphylaxis    Gluten Meal GI Intolerance     Celiac     Morphine And Related Itching    Nuts Hives     Almonds and other related nuts   Other Itching    Sulfa Antibiotics Rash       Medications:     Current Outpatient Prescriptions:     acetaminophen (TYLENOL) 500 mg tablet, Take 500 mg by mouth every 6 (six) hours as needed for mild pain , Disp: , Rfl:     Ascorbic Acid (VITAMIN C) 100 MG tablet, Take by mouth, Disp: , Rfl:     cholecalciferol (VITAMIN D3) 21105 units capsule, Take 5 capsules (50,000 Units total) by mouth daily for 90 days, Disp: 60 capsule, Rfl: 2    cyanocobalamin (VITAMIN B-12) 1,000 mcg tablet, Take 1,000 mcg by mouth daily, Disp: , Rfl:     estradiol (ESTRACE) 0 5 MG tablet, Take 0 5 mg by mouth daily  , Disp: , Rfl:     famotidine (PEPCID) 20 mg tablet, Take 20 mg by mouth 2 (two) times a day , Disp: , Rfl:     flecainide (TAMBOCOR) 100 mg tablet, Take 1 tablet (100 mg total) by mouth 2 (two) times a day, Disp: 180 tablet, Rfl: 3    fluticasone (FLONASE) 50 mcg/act nasal spray, 2 sprays into each nostril 2 (two) times a day, Disp: , Rfl:     ibuprofen (MOTRIN) 200 mg tablet, Take 200 mg by mouth every 8 (eight) hours as needed for mild pain , Disp: , Rfl:     ketorolac (TORADOL) 10 mg tablet, Take 10 mg by mouth every 6 (six) hours as needed  , Disp: , Rfl:     Lactobacillus (ACIDOPHILUS PO), Take by mouth , Disp: , Rfl:     Loratadine (CLARITIN) 10 MG CAPS, Take by mouth, Disp: , Rfl:     magnesium citrate (CITROMA) 1 745 g/30 mL oral solution, Take 296 mL by mouth once, Disp: , Rfl:     meloxicam (MOBIC) 15 mg tablet, Take 15 mg by mouth daily, Disp: , Rfl:     metoprolol succinate (TOPROL-XL) 50 mg 24 hr tablet, Take 1 tablet (50 mg total) by mouth daily, Disp: 90 tablet, Rfl: 1    omeprazole (PriLOSEC) 40 MG capsule, Take 40 mg by mouth daily  , Disp: , Rfl:   ondansetron (ZOFRAN-ODT) 4 mg disintegrating tablet, Take by mouth, Disp: , Rfl:     pantoprazole (PROTONIX) 20 mg tablet, Take 1 tablet (20 mg total) by mouth daily for 90 days, Disp: 90 tablet, Rfl: 2    pseudoephedrine (SUDAFED) 30 mg tablet, Take 1 tablet by mouth every 6 (six) hours as needed, Disp: , Rfl:     XOPENEX HFA 45 MCG/ACT inhaler, every 6 (six) hours as needed  , Disp: , Rfl:     zolpidem (AMBIEN) 5 mg tablet, Take 1 tablet by mouth daily at bedtime as needed  , Disp: , Rfl:       Wt Readings from Last 3 Encounters:   04/24/18 75 5 kg (166 lb 8 oz)   04/19/18 75 3 kg (166 lb)   04/10/18 75 7 kg (166 lb 12 8 oz)     Temp Readings from Last 3 Encounters:   04/19/18 (!) 97 2 °F (36 2 °C) (Tympanic)   01/18/18 97 9 °F (36 6 °C) (Temporal)   12/21/17 97 6 °F (36 4 °C) (Tympanic)     BP Readings from Last 3 Encounters:   04/24/18 100/70   04/19/18 113/80   04/10/18 124/76     Pulse Readings from Last 3 Encounters:   04/24/18 79   04/19/18 65   04/10/18 82         Physical Exam   Constitutional: She is oriented to person, place, and time  She appears well-developed  HENT:   Head: Normocephalic  Eyes: EOM are normal    Neck: Normal range of motion  Cardiovascular: Normal rate, regular rhythm and normal heart sounds  Exam reveals no gallop and no friction rub  No murmur heard  Pulmonary/Chest: Effort normal and breath sounds normal  No respiratory distress  She has no wheezes  She has no rales  Abdominal: Soft  Musculoskeletal: Normal range of motion  Neurological: She is alert and oriented to person, place, and time  Skin: Skin is warm and dry  Psychiatric: She has a normal mood and affect           Laboratory Studies:  CMP:  Lab Results   Component Value Date     07/05/2017    K 4 4 07/05/2017     07/05/2017    CO2 27 07/05/2017    ANIONGAP 6 07/05/2017    BUN 14 07/05/2017    CREATININE 0 93 07/05/2017    GLUCOSE 112 06/22/2015    AST 23 07/05/2017    ALT 26 2017    BILITOT 0 69 2017    EGFR >60 0 2017       Lipid Profile:   Lab Results   Component Value Date    CHOL 228 (H) 2017     Lab Results   Component Value Date    HDL 47 2017     Lab Results   Component Value Date    LDLCALC 122 (H) 2017     Lab Results   Component Value Date    TRIG 294 (H) 2017       Cardiac testing:     Results for orders placed during the hospital encounter of 17   Echo complete with contrast if indicated    Narrative [address]  [address] [place] [address]  [phone]    Transthoracic Echocardiogram  2D, M-mode, Doppler, and Color Doppler    Study date:  2017    Patient: [facility]  MR number: [id number]  Account number: [de-identified]  : 1955  Age: 63 years  Gender: Female  Status: Outpatient  Location: [address] [place] [address]  Height: 65 in  Weight: 161 7 lb  BP: 122/ 84 mmHg    Indications: Palpitations  Diagnoses: R00 2 - Palpitations    Sonographer:  ALFONSO Alicia  Primary Physician:  Mansoor Ruggiero DO  Referring Physician:  Mansoor Ruggiero DO  Group:  [id number]  Interpreting Physician:  Edith Pino DO    SUMMARY    LEFT VENTRICLE:  Systolic function was normal  Ejection fraction was estimated to be 60 %  There were no regional wall motion abnormalities  MITRAL VALVE:  There was trace regurgitation  TRICUSPID VALVE:  There was mild regurgitation  PULMONIC VALVE:  There was trace regurgitation  HISTORY: PRIOR HISTORY: Malignant lymphoma; Former smoker    PROCEDURE: The study was performed in the [address]  This was a routine study  The transthoracic approach was used  The study included complete 2D imaging, M-mode, complete spectral Doppler, and color Doppler  The  heart rate was 88 bpm, at the start of the study   Images were obtained from the parasternal, apical, subcostal, and suprasternal notch acoustic windows  Echocardiographic views were limited due to poor acoustic window availability  Image  quality was adequate  LEFT VENTRICLE: Size was normal  Systolic function was normal  Ejection fraction was estimated to be 60 %  There were no regional wall motion abnormalities  Wall thickness was normal  No evidence of apical thrombus  DOPPLER: Left  ventricular diastolic function parameters were normal     RIGHT VENTRICLE: The size was normal  Systolic function was normal  Wall thickness was normal     LEFT ATRIUM: Size was normal     RIGHT ATRIUM: Size was normal     MITRAL VALVE: Valve structure was normal  There was normal leaflet separation  DOPPLER: The transmitral velocity was within the normal range  There was no evidence for stenosis  There was trace regurgitation  AORTIC VALVE: The valve was trileaflet  Leaflets exhibited normal thickness, normal cuspal separation, and sclerosis  DOPPLER: Transaortic velocity was within the normal range  There was no evidence for stenosis  There was no significant  regurgitation  TRICUSPID VALVE: The valve structure was normal  There was normal leaflet separation  DOPPLER: The transtricuspid velocity was within the normal range  There was no evidence for stenosis  There was mild regurgitation  Estimated peak PA  pressure was 25 mmHg  PULMONIC VALVE: Leaflets exhibited normal thickness, no calcification, and normal cuspal separation  DOPPLER: The transpulmonic velocity was within the normal range  There was trace regurgitation  PERICARDIUM: There was no pericardial effusion  The pericardium was normal in appearance  AORTA: The root exhibited normal size  SYSTEMIC VEINS: IVC: The inferior vena cava was normal in size      SYSTEM MEASUREMENT TABLES    2D  %FS: 27 71 %  Ao Diam: 3 09 cm  EDV(Teich): 84 03 ml  EF(Cube): 62 23 %  EF(Teich): 54 04 %  ESV(Cube): 30 48 ml  ESV(Teich): 38 62 ml  IVSd: 0 67 cm  LA Area: 11 17 cm2  LA Diam: 3 02 cm  LVEDV MOD A4C: 89 51 ml  LVEF MOD A4C: 58 64 %  LVESV MOD A4C: 37 03 ml  LVIDd: 4 32 cm  LVIDs: 3 12 cm  LVLd A4C: 8 34 cm  LVLs A4C: 6 37 cm  LVPWd: 0 77 cm  RA Area: 14 32 cm2  RV Diam : 3 7 cm  SI(Cube): 27 74 ml/m2  SI(Teich): 25 09 ml/m2  SV MOD A4C: 52 49 ml  SV(Cube): 50 21 ml  SV(Teich): 45 41 ml    CW  AV Vmax: 1 m/s  AV maxP 02 mmHg  TR Vmax: 2 16 m/s  TR maxP 69 mmHg    MM  TAPSE: 2 1 cm    PW  E': 0 1 m/s  E/E': 7 91  LVOT Vmax: 1 08 m/s  LVOT maxP 64 mmHg  MV A Jesus: 0 67 m/s  MV Dec Brewster: 3 68 m/s2  MV DecT: 208 98 ms  MV E Jesus: 0 77 m/s  MV E/A Ratio: 1 14    [employer] Accredited Echocardiography Laboratory    Prepared and electronically signed by    Kate Grimaldo DO  Signed 2017 16:03:54

## 2018-04-27 ENCOUNTER — EVALUATION (OUTPATIENT)
Dept: OCCUPATIONAL THERAPY | Facility: CLINIC | Age: 63
End: 2018-04-27
Payer: COMMERCIAL

## 2018-04-27 DIAGNOSIS — M25.532 LEFT WRIST PAIN: Primary | ICD-10-CM

## 2018-04-27 PROCEDURE — 97166 OT EVAL MOD COMPLEX 45 MIN: CPT

## 2018-04-27 PROCEDURE — G8985 CARRY GOAL STATUS: HCPCS

## 2018-04-27 PROCEDURE — G8984 CARRY CURRENT STATUS: HCPCS

## 2018-04-27 PROCEDURE — 97110 THERAPEUTIC EXERCISES: CPT

## 2018-04-27 NOTE — PROGRESS NOTES
OT Evaluation     Today's date: 2018  Patient name: Kareen Lanes  : 1955  MRN: [id number]  Referring provider: Glenroy Blanco DO  Dx:   Encounter Diagnosis     ICD-10-CM    1  Left wrist pain M25 532                   Assessment  Impairments: abnormal or restricted ROM    Assessment details:    Pt presents to OP OT services d/t L wrist pain  Pt reports falling on the ice while walking into work on 3/23/18  Pt reports history of injury to L wrist and reports wrist instability d/t to old injury from 5  Distal radius and ulna were broken w/ pt reporting chronic symptoms over the years such as pain and wrist instability as well as difficulty lifting heavy objects w/ L hand  With most recent injury, pt reports continuing daily activity and work schedule, however, has inconsistently worn prescribed brace by MD d/t discomfort and bulkiness  Pt reports pain w/ overuse w/ pain localized to middle dorsal aspect of the wrist  Pt was seen by ortho MD on 4/10/18 and received corticosteroid injection  Pt has follow up w/ ortho MD on 18 and will be seen by Dr Stephani Duckworth on 18 to discuss potential surgery  Pt reports ability to complete daily activity and has maintained regular work schedule  Pt demonstrates WNL ROM and strength w/ no edema or swelling observed  Pt is interested in set-up of HEP at this time w/ daily modifications to improve symptoms  Pt will follow up with OT after meeting w/ MD on 18 and will return for therapy services if symptoms do not improve w/ HEP and modifications  Understanding of Dx/Px/POC: good   Prognosis: good    Goals  Short Term Goals    Pt will be independent w/ HEP and wearing of brace  Pt will report improvements w/ pain w/ daily activity  Long Term Goals  Pt will report resolve of pain w/ daily activity  Pt will report return to normal daily activity w/ no limitations       Plan  Patient would benefit from: OT eval and skilled OT  Planned modality interventions: fluidotherapy and thermotherapy: hydrocollator packs  Planned therapy interventions: activity modification, home exercise program, manual therapy, therapeutic exercise, therapeutic activities and strengthening  Frequency: 2x week  Duration in visits: 8  Duration in weeks: 4  Treatment plan discussed with: patient  Plan details: Pt presenting to OP OT services w/ a diagnosis of L wrist pain  Pt demonstrates WNL ROM and strength for the L hand, however, reports discomfort w/ wrist UD/RD d/t prior injury  Pt has been inconsistent w/ prescribed brace wearing schedule d/t discomfort and bulkiness affecting pt's ability to complete daily job duties  Pt agreeable to wearing of soft wrist brace to provide support while increasing independence during daily activity  Pt also provided w/ HEP to decrease pain while increasing functional use of L hand  Modifications to daily activity discussed w/ pt to reduce pain  Pt interested in trialing these modifications and returning for therapy if symptoms persist after follow up w/ MD  Thank you for the referral!           Subjective Evaluation    History of Present Illness  Date of onset: 3/23/2018  Mechanism of injury: Pt fell on ice while walking into work, however, pt has h/o of wrist injury from 5    Recurrent probem    Quality of life: good    Pain  Current pain ratin  At best pain ratin  At worst pain ratin  Location: L wrist   Quality: dull ache  Relieving factors: change in position    Social Support    Employment status: working  Hand dominance: right    Patient Goals  Patient goals for therapy: independence with ADLs/IADLs and decreased pain          Objective     Active Range of Motion     Left Wrist   Normal active range of motion    Right Wrist   Normal active range of motion    Additional Active Range of Motion Details  Pt demonstrates WNL ROM for the L wrist w/ minimal pain reported during completion of wrist UD/RD    Strength/Myotome Testing Left Wrist/Hand   Normal wrist strength     (2nd hand position)     Trial 1: 50    Right Wrist/Hand   Normal wrist strength     (2nd hand position)     Trial 1: 40    Swelling     Left Wrist/Hand   Circumference wrist: 17 cm    Additional Swelling Details  No swelling observed for the L hand as compared to the R  Right Wrist/Hand   Circumference wrist: 17 cm            Daily Treatment Diary     Specialty Daily Treatment Diary     Manual                                                     Exercise Diary  04/27/18       Wrist UD/RD isometric 2x10 w/ 3 sec hold       Wrist UD/RD stretch w/ hold 2x10                                                                                                                                                           Modalities                                  Education provided regarding brace wearing schedule w/ pt agreeable to wearing soft wrist brace during daily activity for increased independence w/ daily activity  Education also provided regarding daily modifications to reduce pain w/ pt verbalizing understanding  Pt provided w/ HEP w/ pt demonstrating good understanding of completion of all exercises  Pt will trial HEP and brace wearing and return for therapy if symptoms persist after MD follow-up

## 2018-05-02 ENCOUNTER — TELEPHONE (OUTPATIENT)
Dept: GASTROENTEROLOGY | Facility: CLINIC | Age: 63
End: 2018-05-02

## 2018-05-02 NOTE — TELEPHONE ENCOUNTER
DR Marin Small' [address] [place] [address] pharmacy called to verify pt vitamin d3 dosage  Pt was seen and prescribed by Torie Daniels     [phone]

## 2018-05-08 ENCOUNTER — OFFICE VISIT (OUTPATIENT)
Dept: OBGYN CLINIC | Facility: CLINIC | Age: 63
End: 2018-05-08
Payer: OTHER MISCELLANEOUS

## 2018-05-08 VITALS
RESPIRATION RATE: 16 BRPM | HEIGHT: 66 IN | BODY MASS INDEX: 27.19 KG/M2 | HEART RATE: 68 BPM | SYSTOLIC BLOOD PRESSURE: 110 MMHG | DIASTOLIC BLOOD PRESSURE: 84 MMHG | WEIGHT: 169.2 LBS

## 2018-05-08 DIAGNOSIS — M25.332 VISI (VOLAR INTERCALATED SEGMENT INSTABILITY), LEFT: ICD-10-CM

## 2018-05-08 DIAGNOSIS — M25.532 PAIN IN LEFT WRIST: Primary | ICD-10-CM

## 2018-05-08 DIAGNOSIS — T14.8XXA CONTUSION OF BONE: ICD-10-CM

## 2018-05-08 DIAGNOSIS — S63.592D: ICD-10-CM

## 2018-05-08 PROCEDURE — 99213 OFFICE O/P EST LOW 20 MIN: CPT | Performed by: FAMILY MEDICINE

## 2018-05-08 NOTE — PROGRESS NOTES
Assessment/Plan:  Assessment/Plan   Diagnoses and all orders for this visit:    Pain in left wrist    Contusion of bone    Tear of left lunotriquetral ligament, subsequent encounter    Visi (volar intercalated segment instability), left      53-year-old right-hand-dominant female with left wrist pain and instability  Discussed with patient physical exam, impression, and plan  She has improved in her symptoms since her previous visit, and her physical exam is noted for left tenderness upon palpation ulnar aspect of the wrist  She is to noted to have tenderness upon palpation of the scaphoid which is likely due to contusion as fracture was ruled out on MRI  I the have provided her instructions is on modifying her home exercises to help improved strength of the wrist  She she is to continue with NSAIDs as needed for pain is  Patient states she has already set up appointment for consultation with a hand surgeon  Although her pain has improved, she continues to have instability of the wrist, and given that the instability his chronic in nature her treatment options may be limited so advised that she keep her appointment with Dr Cheri Graham for evaluation and discussion of treatment options  Subjective:   Patient ID: Carmelita Cedillo is a 63 y o  female  Chief Complaint   Patient presents with    Left Wrist - Follow-up, Pain, Clicking       04-[id number]-[id number] right-hand-dominant female following up for left wrist pain and instability  She has history of chronic wrist instability that is worse with lifting objects and applying force of radial deviation  Following fall injury at work the in March 2018 she had significant pain of the wrist and MRI was remarkable for extensor carpi ulnaris tenosynovitis, and findings suggestive of TFCC tear and chronic lunotriquetral injury with VISI deformity    She had received corticosteroid injection at the lunotriquetral interval   Today she reports that after receiving the injection she experienced significant improvement in pain, stating that her wrist felt "10 years younger"  Her pain improved but she still continued to experience instability in the wrist in the form of a clicking  She states that up until about five days ago her wrist was feeling fine until she attempted to lift an object and felt a pull in her wrist which caused mild exacerbation of pain  She has started occupational therapy was provided with printed instructions of home exercises  She denies any numbness/tingling or weakness  Wrist Pain   This is a new problem  The current episode started more than 1 month ago  The problem occurs constantly  The problem has been gradually improving  Associated symptoms include arthralgias  Pertinent negatives include no joint swelling, numbness or weakness  Exacerbated by: Lifting objects  She has tried rest and NSAIDs (Corticosteroid injection) for the symptoms  The treatment provided moderate relief  Review of Systems   Musculoskeletal: Positive for arthralgias  Negative for joint swelling  Neurological: Negative for weakness and numbness  Objective:  Vitals:    05/08/18 1615   BP: 110/84   BP Location: Right arm   Patient Position: Sitting   Cuff Size: Standard   Pulse: 68   Resp: 16   Weight: 76 7 kg (169 lb 3 2 oz)   Height: 5' 6" (1 676 m)     Left Hand Exam     Muscle Strength   The patient has normal left wrist strength  Observations     Left Wrist/Hand   Negative for deformity  Tenderness     Left Wrist/Hand   Tenderness in the scaphoid  No tenderness in the sixth dorsal compartment  TFCC: Mild  Lunate: Mild  Active Range of Motion     Left Wrist   Normal active range of motion    Strength/Myotome Testing     Left Wrist/Hand   Normal wrist strength     (2nd hand position)     Trial 1: 5      Physical Exam   Constitutional: She is oriented to person, place, and time  She appears well-developed  No distress     HENT:   Head: Normocephalic  Eyes: Conjunctivae are normal    Neck: No tracheal deviation present  Cardiovascular: Normal rate  Pulmonary/Chest: Effort normal  No respiratory distress  Abdominal: She exhibits no distension  Musculoskeletal:        Left hand: She exhibits no deformity  Neurological: She is alert and oriented to person, place, and time  Skin: Skin is warm and dry  Psychiatric: She has a normal mood and affect  Her behavior is normal    Vitals reviewed

## 2018-05-11 ENCOUNTER — OFFICE VISIT (OUTPATIENT)
Dept: OBGYN CLINIC | Facility: HOSPITAL | Age: 63
End: 2018-05-11
Payer: OTHER MISCELLANEOUS

## 2018-05-11 VITALS
HEART RATE: 77 BPM | DIASTOLIC BLOOD PRESSURE: 78 MMHG | BODY MASS INDEX: 27.06 KG/M2 | WEIGHT: 168.4 LBS | SYSTOLIC BLOOD PRESSURE: 117 MMHG | HEIGHT: 66 IN

## 2018-05-11 DIAGNOSIS — S63.592A LUNOTRIQUETRAL LIGAMENT TEAR, LEFT, INITIAL ENCOUNTER: Primary | ICD-10-CM

## 2018-05-11 PROCEDURE — 99203 OFFICE O/P NEW LOW 30 MIN: CPT | Performed by: ORTHOPAEDIC SURGERY

## 2018-05-11 NOTE — PATIENT INSTRUCTIONS
Lunotriquetral ligament tear with a VISI deformity  Pt was advised that she could continue to treat this with conservative management of bracing, steroid injections prn, activity modification and NSAIDs prn or a surgical option of a proximal row carpectomy with the understanding that she would lose 30% motion and 20% strength with this surgery  The next surgical option would be a partial fusion if over time her wrist becomes more arthritic
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
## 2018-05-17 ENCOUNTER — HOSPITAL ENCOUNTER (OUTPATIENT)
Dept: RADIOLOGY | Age: 63
Discharge: HOME | End: 2018-05-17
Payer: COMMERCIAL

## 2018-05-17 ENCOUNTER — ANNUAL EXAM (OUTPATIENT)
Dept: OBGYN CLINIC | Facility: MEDICAL CENTER | Age: 63
End: 2018-05-17
Payer: COMMERCIAL

## 2018-05-17 VITALS — BODY MASS INDEX: 26.95 KG/M2 | DIASTOLIC BLOOD PRESSURE: 80 MMHG | WEIGHT: 167 LBS | SYSTOLIC BLOOD PRESSURE: 122 MMHG

## 2018-05-17 DIAGNOSIS — Z01.419 ENCOUNTER FOR GYNECOLOGICAL EXAMINATION WITHOUT ABNORMAL FINDING: Primary | ICD-10-CM

## 2018-05-17 DIAGNOSIS — N95.2 ATROPHIC VAGINITIS: ICD-10-CM

## 2018-05-17 DIAGNOSIS — Z87.448 HISTORY OF HEMATURIA: ICD-10-CM

## 2018-05-17 DIAGNOSIS — Z12.31 SCREENING MAMMOGRAM, ENCOUNTER FOR: ICD-10-CM

## 2018-05-17 DIAGNOSIS — M85.80 OSTEOPENIA, UNSPECIFIED LOCATION: ICD-10-CM

## 2018-05-17 DIAGNOSIS — N39.3 SUI (STRESS URINARY INCONTINENCE, FEMALE): ICD-10-CM

## 2018-05-17 PROBLEM — E55.9 VITAMIN D DEFICIENCY: Status: ACTIVE | Noted: 2017-06-28

## 2018-05-17 PROBLEM — R05.9 COUGH: Status: ACTIVE | Noted: 2017-12-21

## 2018-05-17 PROBLEM — M53.86 SCIATICA ASSOCIATED WITH DISORDER OF LUMBAR SPINE: Status: ACTIVE | Noted: 2017-09-21

## 2018-05-17 PROBLEM — J30.2 SEASONAL ALLERGIES: Status: ACTIVE | Noted: 2017-12-21

## 2018-05-17 PROBLEM — J06.9 ACUTE URI: Status: ACTIVE | Noted: 2017-12-22

## 2018-05-17 PROBLEM — R00.2 HEART PALPITATIONS: Status: ACTIVE | Noted: 2017-09-21

## 2018-05-17 PROBLEM — I49.3 PREMATURE VENTRICULAR CONTRACTION: Status: ACTIVE | Noted: 2017-11-30

## 2018-05-17 PROBLEM — M54.9 BACK PAIN: Status: ACTIVE | Noted: 2017-09-21

## 2018-05-17 PROBLEM — G47.00 INSOMNIA: Status: ACTIVE | Noted: 2017-09-21

## 2018-05-17 PROBLEM — K21.9 CHRONIC GERD: Status: ACTIVE | Noted: 2017-10-03

## 2018-05-17 PROBLEM — R10.13 EPIGASTRIC DISCOMFORT: Status: ACTIVE | Noted: 2017-10-03

## 2018-05-17 PROCEDURE — 77080 DXA BONE DENSITY AXIAL: CPT

## 2018-05-17 PROCEDURE — 99396 PREV VISIT EST AGE 40-64: CPT | Performed by: NURSE PRACTITIONER

## 2018-05-17 PROCEDURE — 81002 URINALYSIS NONAUTO W/O SCOPE: CPT | Performed by: NURSE PRACTITIONER

## 2018-05-17 NOTE — PROGRESS NOTES
[facility] [place]  ANNUAL GYNECOLOGIC EXAM  Kameron Meek 63 y o  SUBJECTIVE    HPI: Kameron Meek is a 63 y o  nulliparous female presenting today for annual GYN exam  Her last gynecologic exam was for vaginal burning in 2017 at our practice  Postmenopausal  Patient denies post-menopausal vaginal bleeding  Infrequently sexually active with 1 Male partner, monogamous  Declines STI testing today  Denies sexual concerns  Has concerns about stress urinary incontinence which was much worse when she had bronchitis and was coughing often, better now but does have an appointment at the [address] [place] [address] in  for this  Also has concerns about vulvar pain with intercourse  Also has concerns about vaginal itching, was previously prescribed clotrimazole/betamethasone topical cream for this but says she doesn't like to use it often  Lastly, has concerns re: past history of trace hematuria on urinalysis, would like to know if she should be concerned  Taking estradiol PO 2x/day for surgical menopause  Had JENNA/BSO for uterine fibroids  Had DEXA scan this AM  Denies history of cervical dysplasia or cancer  Works as an x-ray technician within [employer]  Gynecologic History  History of abnormal pap smears: Denies  Last mammogram: 2017, BI RADS - 1  History of abnormal mammogram: Yes   Contraceptive method: Postmenopausal (JENNA/BSO)  Dyspareunia: see HPI      Obstetric History   0    Health Maintenance  Self-breast exams: yes  Metabolic screening: up-to-date  Calcium & Vitamin D: taking  Tobacco cessation: N/A  Early DEXA bone density scan: completed  Colonoscopy: 3y ago, benign polyps, return Q5y      At todays visit I discussed the importance of self-breast exams, exercise and healthy diet  The recommendation is for annual mammograms  Reviewed ASCCP guidelines for cervical cancer screening   Safe sex practices were strongly encouraged to protect against sexually transmitted infections  Anticipatory guidance for perimenopause and menopause reviewed  I have reviewed the patients risk factors and an early DEXA bone density scan was ordered if applicable  Importance of vitamin D and adequate calcium intake was reviewed  All questions were answered to her apparent satisfaction  The following portions of the patient's history were reviewed and updated as appropriate: allergies, current medications, past family history, past medical history, past social history, past surgical history and problem list     ROS  General ROS: negative for chills or fever  Breast ROS: negative for breast lumps  Respiratory ROS: no cough, shortness of breath, or wheezing  Cardiovascular ROS: no chest pain or dyspnea on exertion  Gastrointestinal ROS: no abdominal pain, change in bowel habits, or black or bloody stools  Genito-Urinary ROS: As Noted in HPI  Neurological ROS: negative    OBJECTIVE  Vitals:    05/17/18 1347   BP: 122/80   BP Location: Left arm   Patient Position: Sitting   Weight: 75 8 kg (167 lb)       Physical Exam   Constitutional: She is oriented to person, place, and time  Vital signs are normal  She appears well-developed and well-nourished  Genitourinary: Vagina normal  Pelvic exam was performed with patient supine  There is no rash, tenderness or lesion on the right labia  There is no rash, tenderness or lesion on the left labia  No erythema in the vagina  No vaginal discharge found  Right adnexum does not display mass  Left adnexum does not display mass  Genitourinary Comments: Cotton q-tip used to probe multiple points on vulva  No tenderness with light or deeper pressure  Vulva is intact, no lichenification or erythema  Minor irritation consistent with vulvovaginal atrophy at posterior introitus, tissue is intact  The cervix is surgically absent, the vaginal vault is pink and appears well supported  The uterus is surgically absent  The adnexa are surgically absent  HENT:   Head: Normocephalic and atraumatic  Pulmonary/Chest: Effort normal  Right breast exhibits no inverted nipple, no mass, no nipple discharge, no skin change and no tenderness  Left breast exhibits no inverted nipple, no mass, no nipple discharge, no skin change and no tenderness  Breasts are symmetrical    Abdominal: Soft  Normal appearance  Musculoskeletal: Normal range of motion  Lymphadenopathy:     She has no axillary adenopathy  Neurological: She is alert and oriented to person, place, and time  Skin: Skin is warm, dry and intact  Psychiatric: She has a normal mood and affect  Her behavior is normal    Vitals reviewed  Lab Results: Annual Exam on 05/17/2018   Component Date Value    LEUKOCYTE ESTERASE,UA 05/18/2018 Neg      NITRITE,UA 05/18/2018 Neg      BLOOD,UA 05/18/2018 Neg      SPECIFIC GRAVITY,UA 05/18/2018 1 015      KETONES,UA 05/18/2018 Neg      BILIRUBIN,UA 05/18/2018 Neg     GLUCOSE, UA 05/18/2018 Neg      COLOR,UA 05/18/2018 Yellow      CLARITY,UA 05/18/2018 Clear             ASSESSMENT  Vulvovaginal atrophy  Atrophic vaginitis  No hematuria  No evidence of vulvodynia  Surgical menopause      PLAN  1  Order for annual mammogram provided to patient at todays visit  2  F/U results of today's DEXA scan  3  Pap smear not indicated (JENNA for benign reasons, denies history of cervical dysplasia or cancer)  4  Keep appointment with [facility] [address] [place] [address] for VERONIKA  5  Utilize topical steroid cream as needed, once control of itching is achieved, can stop  Discussed trying topical estrogen cream instead (or in addition to) to restore vaginal tissue locally  6  We discussed discontinuing PO estradiol therapy HRT in the near future  7  Return annually for routine GYN exams      All questions were answered & Helen Stratton expressed understanding      [facility]

## 2018-05-18 PROBLEM — N39.3 SUI (STRESS URINARY INCONTINENCE, FEMALE): Status: ACTIVE | Noted: 2018-05-18

## 2018-05-18 PROBLEM — R05.9 COUGH: Status: RESOLVED | Noted: 2017-12-21 | Resolved: 2018-05-18

## 2018-05-18 PROBLEM — N95.2 ATROPHIC VAGINITIS: Status: ACTIVE | Noted: 2018-05-18

## 2018-05-18 PROBLEM — J30.2 SEASONAL ALLERGIES: Status: RESOLVED | Noted: 2017-12-21 | Resolved: 2018-05-18

## 2018-05-18 LAB
SL AMB  POCT GLUCOSE, UA: NORMAL
SL AMB LEUKOCYTE ESTERASE,UA: NORMAL
SL AMB POCT BILIRUBIN,UA: NORMAL
SL AMB POCT BLOOD,UA: NORMAL
SL AMB POCT CLARITY,UA: CLEAR
SL AMB POCT COLOR,UA: YELLOW
SL AMB POCT KETONES,UA: NORMAL
SL AMB POCT NITRITE,UA: NORMAL
SL AMB POCT SPECIFIC GRAVITY,UA: 1.01

## 2018-05-25 ENCOUNTER — APPOINTMENT (OUTPATIENT)
Dept: URGENT CARE | Facility: CLINIC | Age: 63
End: 2018-05-25
Payer: OTHER MISCELLANEOUS

## 2018-05-25 PROCEDURE — 99213 OFFICE O/P EST LOW 20 MIN: CPT

## 2018-06-08 NOTE — PROGRESS NOTES
OT DISCHARGE    Patient attended initial evaluation appointment at which time it was discussed that patient would be provided w/ a HEP as well as daily modifications for increased independence  Pt agreeable to completion of HEP and would follow-up w/ therapist if symptoms persisted   Pt has not returned to clinic for further treatment but has followed up w/ ortho MD  Please refer to initial assessment for objective measurements that were obtained at time of eval

## 2018-06-13 ENCOUNTER — APPOINTMENT (OUTPATIENT)
Dept: LAB | Facility: MEDICAL CENTER | Age: 63
End: 2018-06-13

## 2018-06-13 ENCOUNTER — OFFICE VISIT (OUTPATIENT)
Dept: FAMILY MEDICINE CLINIC | Facility: CLINIC | Age: 63
End: 2018-06-13
Payer: COMMERCIAL

## 2018-06-13 ENCOUNTER — TRANSCRIBE ORDERS (OUTPATIENT)
Dept: ADMINISTRATIVE | Facility: HOSPITAL | Age: 63
End: 2018-06-13

## 2018-06-13 ENCOUNTER — APPOINTMENT (OUTPATIENT)
Dept: LAB | Facility: MEDICAL CENTER | Age: 63
End: 2018-06-13
Payer: COMMERCIAL

## 2018-06-13 VITALS
OXYGEN SATURATION: 97 % | HEIGHT: 66 IN | RESPIRATION RATE: 16 BRPM | BODY MASS INDEX: 26.84 KG/M2 | SYSTOLIC BLOOD PRESSURE: 126 MMHG | DIASTOLIC BLOOD PRESSURE: 82 MMHG | HEART RATE: 72 BPM | WEIGHT: 167 LBS | TEMPERATURE: 97 F

## 2018-06-13 DIAGNOSIS — H10.9 CONJUNCTIVITIS OF BOTH EYES, UNSPECIFIED CONJUNCTIVITIS TYPE: ICD-10-CM

## 2018-06-13 DIAGNOSIS — G47.00 INSOMNIA, UNSPECIFIED TYPE: ICD-10-CM

## 2018-06-13 DIAGNOSIS — R53.83 FATIGUE, UNSPECIFIED TYPE: ICD-10-CM

## 2018-06-13 DIAGNOSIS — Z00.8 HEALTH EXAMINATION IN POPULATION SURVEYS: Primary | ICD-10-CM

## 2018-06-13 DIAGNOSIS — M54.50 LOW BACK PAIN WITHOUT SCIATICA, UNSPECIFIED BACK PAIN LATERALITY, UNSPECIFIED CHRONICITY: Primary | ICD-10-CM

## 2018-06-13 LAB
ALBUMIN SERPL BCP-MCNC: 3.9 G/DL (ref 3.5–5)
ALP SERPL-CCNC: 44 U/L (ref 46–116)
ALT SERPL W P-5'-P-CCNC: 30 U/L (ref 12–78)
ANION GAP SERPL CALCULATED.3IONS-SCNC: 6 MMOL/L (ref 4–13)
AST SERPL W P-5'-P-CCNC: 22 U/L (ref 5–45)
BILIRUB SERPL-MCNC: 0.65 MG/DL (ref 0.2–1)
BUN SERPL-MCNC: 18 MG/DL (ref 5–25)
CALCIUM SERPL-MCNC: 8.9 MG/DL (ref 8.3–10.1)
CHLORIDE SERPL-SCNC: 104 MMOL/L (ref 100–108)
CHOLEST SERPL-MCNC: 214 MG/DL (ref 50–200)
CO2 SERPL-SCNC: 28 MMOL/L (ref 21–32)
CREAT SERPL-MCNC: 0.89 MG/DL (ref 0.6–1.3)
ERYTHROCYTE [DISTWIDTH] IN BLOOD BY AUTOMATED COUNT: 13.8 % (ref 11.6–15.1)
EST. AVERAGE GLUCOSE BLD GHB EST-MCNC: 123 MG/DL
GFR SERPL CREATININE-BSD FRML MDRD: 71 ML/MIN/1.73SQ M
GLUCOSE P FAST SERPL-MCNC: 103 MG/DL (ref 65–99)
HBA1C MFR BLD: 5.9 % (ref 4.2–6.3)
HCT VFR BLD AUTO: 42.4 % (ref 34.8–46.1)
HDLC SERPL-MCNC: 47 MG/DL (ref 40–60)
HGB BLD-MCNC: 13.8 G/DL (ref 11.5–15.4)
LDLC SERPL CALC-MCNC: 112 MG/DL (ref 0–100)
MCH RBC QN AUTO: 30.3 PG (ref 26.8–34.3)
MCHC RBC AUTO-ENTMCNC: 32.5 G/DL (ref 31.4–37.4)
MCV RBC AUTO: 93 FL (ref 82–98)
NONHDLC SERPL-MCNC: 167 MG/DL
PLATELET # BLD AUTO: 182 THOUSANDS/UL (ref 149–390)
PMV BLD AUTO: 12.2 FL (ref 8.9–12.7)
POTASSIUM SERPL-SCNC: 4.2 MMOL/L (ref 3.5–5.3)
PROT SERPL-MCNC: 7.6 G/DL (ref 6.4–8.2)
RBC # BLD AUTO: 4.56 MILLION/UL (ref 3.81–5.12)
SODIUM SERPL-SCNC: 138 MMOL/L (ref 136–145)
TRIGL SERPL-MCNC: 273 MG/DL
WBC # BLD AUTO: 6.23 THOUSAND/UL (ref 4.31–10.16)

## 2018-06-13 PROCEDURE — 80061 LIPID PANEL: CPT | Performed by: PREVENTIVE MEDICINE

## 2018-06-13 PROCEDURE — 85027 COMPLETE CBC AUTOMATED: CPT

## 2018-06-13 PROCEDURE — 80053 COMPREHEN METABOLIC PANEL: CPT

## 2018-06-13 PROCEDURE — 83036 HEMOGLOBIN GLYCOSYLATED A1C: CPT | Performed by: PREVENTIVE MEDICINE

## 2018-06-13 PROCEDURE — 99213 OFFICE O/P EST LOW 20 MIN: CPT | Performed by: INTERNAL MEDICINE

## 2018-06-13 PROCEDURE — 36415 COLL VENOUS BLD VENIPUNCTURE: CPT

## 2018-06-13 RX ORDER — ZOLPIDEM TARTRATE 5 MG/1
5 TABLET ORAL
Qty: 90 TABLET | Refills: 0 | Status: SHIPPED | OUTPATIENT
Start: 2018-06-13 | End: 2018-12-12 | Stop reason: SDUPTHER

## 2018-06-13 RX ORDER — KETOROLAC TROMETHAMINE 10 MG/1
10 TABLET, FILM COATED ORAL EVERY 8 HOURS PRN
COMMUNITY
End: 2018-06-13 | Stop reason: SDUPTHER

## 2018-06-13 RX ORDER — MOXIFLOXACIN 5 MG/ML
1 SOLUTION/ DROPS OPHTHALMIC 3 TIMES DAILY
Qty: 3 ML | Refills: 0 | Status: SHIPPED | OUTPATIENT
Start: 2018-06-13 | End: 2018-08-01 | Stop reason: ALTCHOICE

## 2018-06-13 RX ORDER — KETOROLAC TROMETHAMINE 10 MG/1
10 TABLET, FILM COATED ORAL EVERY 8 HOURS PRN
Qty: 90 TABLET | Refills: 1 | Status: SHIPPED | OUTPATIENT
Start: 2018-06-13 | End: 2018-12-04

## 2018-06-13 NOTE — PROGRESS NOTES
Assessment/Plan:         Diagnoses and all orders for this visit:    Low back pain without sciatica, unspecified back pain laterality, unspecified chronicity  -     ketorolac (TORADOL) 10 mg tablet; Take 1 tablet (10 mg total) by mouth every 8 (eight) hours as needed for moderate pain    Insomnia, unspecified type  -     zolpidem (AMBIEN) 5 mg tablet; Take 1 tablet (5 mg total) by mouth daily at bedtime as needed for sleep    Conjunctivitis of both eyes, unspecified conjunctivitis type  -     moxifloxacin (VIGAMOX) 0 5 % ophthalmic solution; Administer 1 drop to both eyes 3 (three) times a day    Fatigue, unspecified type  -     CBC; Future  -     Comprehensive metabolic panel; Future    Other orders  -     Discontinue: ketorolac (TORADOL) 10 mg tablet; Take 10 mg by mouth every 8 (eight) hours as needed for moderate pain          Subjective:      Patient ID: Alfredo Valles is a 63 y o  female  Pt in for refills  Medication Refill         The following portions of the patient's history were reviewed and updated as appropriate:   She  has a past medical history of Celiac disease; Follicular lymphoma ([address] ); GERD (gastroesophageal reflux disease); Heart palpitations; History of colonic polyps; Lymphoma, 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[address] ); Malignant lymphoma ([address] ); Postmenopausal disorder; Restless legs syndrome; and TMJ syndrome    She   Patient Active Problem List    Diagnosis Date Noted    Atrophic vaginitis 05/18/2018    VERONIKA (stress urinary incontinence, female) 05/18/2018    PVC's (premature ventricular contractions) 04/24/2018    Dyslipidemia 04/24/2018    Celiac disease 04/19/2018    Acute URI 12/22/2017    Premature ventricular contraction 11/30/2017    Chronic GERD 10/03/2017    Epigastric discomfort 10/03/2017    Back pain 09/21/2017    Heart palpitations 09/21/2017    Insomnia 09/21/2017    Sciatica associated with disorder of lumbar spine 09/21/2017    Vitamin D deficiency 06/28/2017    Osteopenia 11/17/2016     She  has a past surgical history that includes Hysterectomy; Total abdominal hysterectomy w/ bilateral salpingoophorectomy; Lymph node dissection (Right); pr esophagogastroduodenoscopy transoral diagnostic (N/A, 1/18/2016); Colonoscopy (2015); and Nasal septum surgery  Her family history includes Atrial fibrillation in her father; Colonic polyp in her father; Diabetes in her father; Heart failure in her father; Hypertension in her father; Lymphoma in her mother; Throat cancer in her father; Thyroid cancer in her sister  She  reports that she quit smoking about 14 years ago  Her smoking use included Cigarettes  She quit after 20 00 years of use  She has never used smokeless tobacco  She reports that she drinks alcohol  She reports that she does not use drugs  Current Outpatient Prescriptions   Medication Sig Dispense Refill    Ascorbic Acid (VITAMIN C) 100 MG tablet Take by mouth      cholecalciferol (VITAMIN D3) 70192 units capsule Take 5 capsules (50,000 Units total) by mouth daily for 90 days 60 capsule 2    cyanocobalamin (VITAMIN B-12) 1,000 mcg tablet Take 1,000 mcg by mouth daily      estradiol (ESTRACE) 0 5 MG tablet Take 0 5 mg by mouth daily   famotidine (PEPCID) 20 mg tablet Take 20 mg by mouth 2 (two) times a day   flecainide (TAMBOCOR) 100 mg tablet Take 1 tablet (100 mg total) by mouth 2 (two) times a day 180 tablet 3    fluticasone (FLONASE) 50 mcg/act nasal spray 2 sprays into each nostril 2 (two) times a day      ketorolac (TORADOL) 10 mg tablet Take 1 tablet (10 mg total) by mouth every 8 (eight) hours as needed for moderate pain 90 tablet 1    Lactobacillus (ACIDOPHILUS PO) Take by mouth        Loratadine (CLARITIN) 10 MG CAPS Take by mouth      magnesium citrate (CITROMA) 1 745 g/30 mL oral solution Take 296 mL by mouth once      metoprolol succinate (TOPROL-XL) 50 mg 24 hr tablet Take 1 tablet (50 mg total) by mouth daily (Patient taking differently: Take 25 mg by mouth daily  ) 90 tablet 1    ondansetron (ZOFRAN-ODT) 4 mg disintegrating tablet Take by mouth      pantoprazole (PROTONIX) 20 mg tablet Take 1 tablet (20 mg total) by mouth daily for 90 days 90 tablet 2    pseudoephedrine (SUDAFED) 30 mg tablet Take 1 tablet by mouth every 6 (six) hours as needed      XOPENEX HFA 45 MCG/ACT inhaler every 6 (six) hours as needed        zolpidem (AMBIEN) 5 mg tablet Take 1 tablet (5 mg total) by mouth daily at bedtime as needed for sleep 90 tablet 0    acetaminophen (TYLENOL) 500 mg tablet Take 500 mg by mouth every 6 (six) hours as needed for mild pain   moxifloxacin (VIGAMOX) 0 5 % ophthalmic solution Administer 1 drop to both eyes 3 (three) times a day 3 mL 0     No current facility-administered medications for this visit  Current Outpatient Prescriptions on File Prior to Visit   Medication Sig    Ascorbic Acid (VITAMIN C) 100 MG tablet Take by mouth    cholecalciferol (VITAMIN D3) 02156 units capsule Take 5 capsules (50,000 Units total) by mouth daily for 90 days    cyanocobalamin (VITAMIN B-12) 1,000 mcg tablet Take 1,000 mcg by mouth daily    estradiol (ESTRACE) 0 5 MG tablet Take 0 5 mg by mouth daily   famotidine (PEPCID) 20 mg tablet Take 20 mg by mouth 2 (two) times a day   flecainide (TAMBOCOR) 100 mg tablet Take 1 tablet (100 mg total) by mouth 2 (two) times a day    fluticasone (FLONASE) 50 mcg/act nasal spray 2 sprays into each nostril 2 (two) times a day    Lactobacillus (ACIDOPHILUS PO) Take by mouth      Loratadine (CLARITIN) 10 MG CAPS Take by mouth    magnesium citrate (CITROMA) 1 745 g/30 mL oral solution Take 296 mL by mouth once    metoprolol succinate (TOPROL-XL) 50 mg 24 hr tablet Take 1 tablet (50 mg total) by mouth daily (Patient taking differently: Take 25 mg by mouth daily  )    ondansetron (ZOFRAN-ODT) 4 mg disintegrating tablet Take by mouth    pantoprazole (PROTONIX) 20 mg tablet Take 1 tablet (20 mg total) by mouth daily for 90 days    pseudoephedrine (SUDAFED) 30 mg tablet Take 1 tablet by mouth every 6 (six) hours as needed    XOPENEX HFA 45 MCG/ACT inhaler every 6 (six) hours as needed      acetaminophen (TYLENOL) 500 mg tablet Take 500 mg by mouth every 6 (six) hours as needed for mild pain  No current facility-administered medications on file prior to visit  She is allergic to shellfish-derived products; wheat bran; gluten meal; morphine and related; nuts; other; and sulfa antibiotics       Review of Systems   Constitutional: Negative  HENT: Negative  Respiratory: Negative  Cardiovascular: Negative  Objective:      /82 (BP Location: Left arm, Patient Position: Sitting, Cuff Size: Large)   Pulse 72   Temp (!) 97 °F (36 1 °C) (Tympanic)   Resp 16   Ht 5' 6" (1 676 m)   Wt 75 8 kg (167 lb)   SpO2 97%   BMI 26 95 kg/m²          Physical Exam   Constitutional: She appears well-developed and well-nourished  HENT:   Head: Normocephalic and atraumatic  Neck: Normal range of motion  Neck supple  Cardiovascular: Normal rate and regular rhythm      Pulmonary/Chest: Effort normal and breath sounds normal

## 2018-06-19 ENCOUNTER — TELEPHONE (OUTPATIENT)
Dept: CARDIOLOGY CLINIC | Facility: CLINIC | Age: 63
End: 2018-06-19

## 2018-06-19 NOTE — TELEPHONE ENCOUNTER
Horace Koch called after receiving her blood test results for the [facility]  Her LDL was 112, which she is saying is optimal   She is questioning why/how are her triglycerides so high 273? I called Lane Herron back and advised that I sent a message to Dr Azul Mills    I also suggested this website:  [email or web address]

## 2018-06-25 NOTE — TELEPHONE ENCOUNTER
Triglycerides are separate from cholesterol, and can be increased by genetics/carbohydrates  273 is high, but not dangerous  Can decrease by decreasing carbs, aerobic exercise, and taking fish oil (2 OTC tabs)  Please let patient know  Thanks

## 2018-07-03 ENCOUNTER — TELEPHONE (OUTPATIENT)
Dept: HEMATOLOGY ONCOLOGY | Facility: CLINIC | Age: 63
End: 2018-07-03

## 2018-07-03 NOTE — TELEPHONE ENCOUNTER
Pt called  PCP wants pt to FU with Dr Khang Ramsey but she hasn't seen him in over 4 years  Informed that will make her a new pt and referred to [facility]

## 2018-07-23 ENCOUNTER — APPOINTMENT (OUTPATIENT)
Dept: LAB | Facility: CLINIC | Age: 63
End: 2018-07-23
Payer: COMMERCIAL

## 2018-07-23 DIAGNOSIS — M85.80 OSTEOPENIA, UNSPECIFIED LOCATION: ICD-10-CM

## 2018-07-23 LAB — 25(OH)D3 SERPL-MCNC: 48 NG/ML (ref 30–100)

## 2018-07-23 PROCEDURE — 36415 COLL VENOUS BLD VENIPUNCTURE: CPT

## 2018-07-23 PROCEDURE — 82306 VITAMIN D 25 HYDROXY: CPT

## 2018-07-27 DIAGNOSIS — Z78.0 MENOPAUSE: Primary | ICD-10-CM

## 2018-07-27 RX ORDER — ESTRADIOL 0.5 MG/1
0.5 TABLET ORAL DAILY
Qty: 90 TABLET | Refills: 0 | Status: SHIPPED | OUTPATIENT
Start: 2018-07-27 | End: 2020-02-19 | Stop reason: SDUPTHER

## 2018-08-01 ENCOUNTER — OFFICE VISIT (OUTPATIENT)
Dept: FAMILY MEDICINE CLINIC | Facility: CLINIC | Age: 63
End: 2018-08-01
Payer: COMMERCIAL

## 2018-08-01 VITALS
HEIGHT: 66 IN | WEIGHT: 168.6 LBS | OXYGEN SATURATION: 97 % | BODY MASS INDEX: 27.1 KG/M2 | DIASTOLIC BLOOD PRESSURE: 80 MMHG | SYSTOLIC BLOOD PRESSURE: 130 MMHG | TEMPERATURE: 97.9 F | HEART RATE: 73 BPM
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
## 2018-08-01 DIAGNOSIS — S80.12XA CONTUSION OF LEFT LOWER LEG, INITIAL ENCOUNTER: ICD-10-CM
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
## 2018-08-01 PROBLEM — J06.9 ACUTE URI: Status: RESOLVED | Noted: 2017-12-22 | Resolved: 2018-08-01

## 2018-08-01 PROCEDURE — 99213 OFFICE O/P EST LOW 20 MIN: CPT | Performed by: PHYSICIAN ASSISTANT

## 2018-08-01 RX ORDER — FOLIC ACID 0.8 MG
TABLET ORAL
COMMUNITY

## 2018-08-01 NOTE — PROGRESS NOTES
Assessment/Plan:     Diagnoses and all orders for this visit:    Fall at home, initial encounter  Comments:    Fall at home on patient's deck 1 month ago  No prior treatment    Contusion of left lower leg, initial encounter  Comments:   patient has contusion of left lower leg  Resolving hematoma  Patient to rest elevate,  Apply warm moist heat and take over-the-counter pain meds as needed  Other orders  -     Magnesium 500 MG CAPS; Take by mouth          Subjective:      Patient ID: Raymond Manuel is a 63 y o  female  Patient presents with continued left lower leg pain  Patient states she fell about 4 weeks ago at her home patient states she was in her backyard on the deck  Slipped hitting the back of her left lower leg on the end of the pool rale  Patient states she went for  A brisk walk with her dog 2 days ago and has had increased pain since  No over-the-counter meds at present  Patient is concerned of a DVT  The following portions of the patient's history were reviewed and updated as appropriate:   She  has a past medical history of Celiac disease; Follicular lymphoma ([facility] [address] ); GERD (gastroesophageal reflux disease); Heart palpitations; History of colonic polyps; Lymphoma, 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[address] ); Malignant lymphoma ([address] ); Postmenopausal disorder; Restless legs syndrome; and TMJ syndrome    She   Patient Active Problem List    Diagnosis Date Noted    Contusion of left lower leg 08/01/2018    Fall at home, initial encounter 08/01/2018    Atrophic vaginitis 05/18/2018    VERONIKA (stress urinary incontinence, female) 05/18/2018    PVC's (premature ventricular contractions) 04/24/2018    Dyslipidemia 04/24/2018    Celiac disease 04/19/2018    Premature ventricular contraction 11/30/2017    Chronic GERD 10/03/2017    Epigastric discomfort 10/03/2017    Back pain 09/21/2017    Heart palpitations 09/21/2017    Insomnia 09/21/2017    Sciatica associated with disorder of lumbar spine 09/21/2017    Vitamin D deficiency 06/28/2017    Osteopenia 11/17/2016     Current Outpatient Prescriptions   Medication Sig Dispense Refill    Magnesium 500 MG CAPS Take by mouth      acetaminophen (TYLENOL) 500 mg tablet Take 500 mg by mouth every 6 (six) hours as needed for mild pain   Ascorbic Acid (VITAMIN C) 100 MG tablet Take by mouth      cholecalciferol (VITAMIN D3) 22632 units capsule Take 5 capsules (50,000 Units total) by mouth daily for 90 days 60 capsule 2    cyanocobalamin (VITAMIN B-12) 1,000 mcg tablet Take 1,000 mcg by mouth daily      estradiol (ESTRACE) 0 5 MG tablet Take 0 5 mg by mouth daily   estradiol (ESTRACE) 0 5 MG tablet Take 1 tablet (0 5 mg total) by mouth daily 90 tablet 0    famotidine (PEPCID) 20 mg tablet Take 20 mg by mouth 2 (two) times a day   flecainide (TAMBOCOR) 100 mg tablet Take 1 tablet (100 mg total) by mouth 2 (two) times a day 180 tablet 3    fluticasone (FLONASE) 50 mcg/act nasal spray 2 sprays into each nostril 2 (two) times a day      ketorolac (TORADOL) 10 mg tablet Take 1 tablet (10 mg total) by mouth every 8 (eight) hours as needed for moderate pain 90 tablet 1    Lactobacillus (ACIDOPHILUS PO) Take by mouth   Loratadine (CLARITIN) 10 MG CAPS Take by mouth      metoprolol succinate (TOPROL-XL) 50 mg 24 hr tablet Take 1 tablet (50 mg total) by mouth daily (Patient taking differently: Take 25 mg by mouth daily  ) 90 tablet 1    ondansetron (ZOFRAN-ODT) 4 mg disintegrating tablet Take by mouth      pantoprazole (PROTONIX) 20 mg tablet Take 1 tablet (20 mg total) by mouth daily for 90 days 90 tablet 2    pseudoephedrine (SUDAFED) 30 mg tablet Take 1 tablet by mouth every 6 (six) hours as needed      XOPENEX HFA 45 MCG/ACT inhaler every 6 (six) hours as needed        zolpidem (AMBIEN) 5 mg tablet Take 1 tablet (5 mg total) by mouth daily at bedtime as needed for sleep 90 tablet 0     No current facility-administered medications for this visit  Current Outpatient Prescriptions on File Prior to Visit   Medication Sig    acetaminophen (TYLENOL) 500 mg tablet Take 500 mg by mouth every 6 (six) hours as needed for mild pain   Ascorbic Acid (VITAMIN C) 100 MG tablet Take by mouth    cholecalciferol (VITAMIN D3) 10675 units capsule Take 5 capsules (50,000 Units total) by mouth daily for 90 days    cyanocobalamin (VITAMIN B-12) 1,000 mcg tablet Take 1,000 mcg by mouth daily    estradiol (ESTRACE) 0 5 MG tablet Take 0 5 mg by mouth daily   estradiol (ESTRACE) 0 5 MG tablet Take 1 tablet (0 5 mg total) by mouth daily    famotidine (PEPCID) 20 mg tablet Take 20 mg by mouth 2 (two) times a day   flecainide (TAMBOCOR) 100 mg tablet Take 1 tablet (100 mg total) by mouth 2 (two) times a day    fluticasone (FLONASE) 50 mcg/act nasal spray 2 sprays into each nostril 2 (two) times a day    ketorolac (TORADOL) 10 mg tablet Take 1 tablet (10 mg total) by mouth every 8 (eight) hours as needed for moderate pain    Lactobacillus (ACIDOPHILUS PO) Take by mouth      Loratadine (CLARITIN) 10 MG CAPS Take by mouth    metoprolol succinate (TOPROL-XL) 50 mg 24 hr tablet Take 1 tablet (50 mg total) by mouth daily (Patient taking differently: Take 25 mg by mouth daily  )    ondansetron (ZOFRAN-ODT) 4 mg disintegrating tablet Take by mouth    pantoprazole (PROTONIX) 20 mg tablet Take 1 tablet (20 mg total) by mouth daily for 90 days    pseudoephedrine (SUDAFED) 30 mg tablet Take 1 tablet by mouth every 6 (six) hours as needed    XOPENEX HFA 45 MCG/ACT inhaler every 6 (six) hours as needed      zolpidem (AMBIEN) 5 mg tablet Take 1 tablet (5 mg total) by mouth daily at bedtime as needed for sleep    [DISCONTINUED] magnesium citrate (CITROMA) 1 745 g/30 mL oral solution Take 296 mL by mouth once    [DISCONTINUED] moxifloxacin (VIGAMOX) 0 5 % ophthalmic solution Administer 1 drop to both eyes 3 (three) times a day     No current facility-administered medications on file prior to visit  She is allergic to shellfish-derived products; wheat bran; gluten meal; morphine and related; nuts; other; and sulfa antibiotics       Review of Systems   Musculoskeletal: Positive for arthralgias  Negative for gait problem  Left lower leg pain  Skin: Negative for rash  Objective:        Physical Exam   Constitutional: She appears well-developed and well-nourished  No distress  HENT:   Right Ear: External ear normal    Left Ear: External ear normal    Pulmonary/Chest: Effort normal    Musculoskeletal:   The patient has ecchymosis and swelling in the left posterior lower leg  The contusion involves the upper part of the Achilles tendon  Patient has good range of motion flexion and extension dorsalis pedis pulse left is intact  Some soft tissue swelling  Also a firm tender area consistent with a hematoma  Skin: No rash noted  Nursing note and vitals reviewed

## 2018-08-09 ENCOUNTER — OFFICE VISIT (OUTPATIENT)
Dept: OBGYN CLINIC | Facility: CLINIC | Age: 63
End: 2018-08-09
Payer: COMMERCIAL

## 2018-08-09 DIAGNOSIS — S86.012A STRAIN OF ACHILLES TENDON, LEFT, INITIAL ENCOUNTER: Primary | ICD-10-CM

## 2018-08-09 PROCEDURE — 99213 OFFICE O/P EST LOW 20 MIN: CPT | Performed by: EMERGENCY MEDICINE

## 2018-08-09 NOTE — LETTER
August 9, 2018     Kanchan Prince DO  [address]    Patient: Santiago Vasquez   YOB: 1955   Date of Visit: 8/9/2018       Dear Dr Axel Herrera:    Thank you for referring Santiago Vasquez to me for evaluation  Below are the relevant portions of my assessment and plan of care  If you have questions, please do not hesitate to call me  I look forward to following Helen Stratton along with you           Sincerely,        Fox Boswell MD        CC: No Recipients

## 2018-08-09 NOTE — PROGRESS NOTES
Assessment/Plan:    Diagnoses and all orders for this visit:    Strain of Achilles tendon, left, initial encounter  -     Cancel: MRI ankle/heel left  wo contrast; Future  -     Ambulatory referral to Physical Therapy; Future  -     MRI tibia fibula left wo contrast; Future     I would like to obtain an MRI of the left LE/ tib/fib to evaluated the gastrocnemius and achilles for injury and to r/o partial rupture/tears   The patient has declined a walking boot  At this point activity as tolerated and to use pain as her guide  I have recommended rest   I have ordered physical therapy which I do believe will be helpful for her leading up to her hiking trip in 1 month  She can continue taking Mobic and ice  Return for Follow Up After Imaging Study  Chief Complaint:   Left calf/leg pain referred by PCP    Subjective:   Patient ID: Eber Newton is a 63 y o  female  NP presents  Referred by PCP for left calf pain after slipping on her deck next to her pool  She believes is that her catheterization hit the edge of the deck/pool  She does not believe that she twisted or hyper dorsiflexed her ankle  She was able to walk after the injury but states the area was severely sensitive to touch  She continues with localized pain and discomfort and bruising of the back of the leg  Denies any weakness  She does have pain and discomfort with prolonged walking and standing  She is planning to hiking in 4 weeks in [facility])  Review of Systems   Constitutional: Negative  HENT: Negative  Eyes: Negative  Respiratory: Negative  Cardiovascular: Negative  Gastrointestinal: Negative  Endocrine: Negative  Genitourinary: Negative  Musculoskeletal: Positive for arthralgias and myalgias  Skin: Negative  Neurological: Negative  Psychiatric/Behavioral: Negative  The following portions of the patient's chart were reviewed and updated as appropriate:    Allergy:    Allergies Allergen Reactions    Shellfish-Derived Products Anaphylaxis    Wheat Bran Anaphylaxis    Gluten Meal GI Intolerance     Celiac     Morphine And Related Itching    Nuts Hives     Almonds and other related nuts   Other Itching    Sulfa Antibiotics Rash         Past Medical History:   Diagnosis Date    Celiac disease     Follicular lymphoma ([address] )     GERD (gastroesophageal reflux disease)     Heart palpitations     History of colonic polyps     resolved 07/12/2016    Lymphoma, 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HCC)     non hodgekins, remission    Malignant lymphoma (HCC)     rt arm cutaneous follicular stage ia (rt diatal medical biceps area , s/p resected and s/p radistion treatment    resolved 12/17/2014    Postmenopausal disorder     resolved 09/21/2017    Restless legs syndrome     resolved 09/21/2017    TMJ syndrome     resolved 09/21/2017       Past Surgical History:   Procedure Laterality Date    COLONOSCOPY  2015    HYSTERECTOMY      LYMPH NODE DISSECTION Right     arm    NASAL SEPTUM SURGERY      deviation, stents turned into turbinates     NV ESOPHAGOGASTRODUODENOSCOPY TRANSORAL DIAGNOSTIC N/A 1/18/2016    Procedure: ESOPHAGOGASTRODUODENOSCOPY (EGD); Surgeon: Loan Blum MD;  [facility]: [facility];   Service: Gastroenterology    TOTAL ABDOMINAL HYSTERECTOMY W/ BILATERAL SALPINGOOPHORECTOMY         Social History     Social History    Marital status: /Civil Union     Spouse name: N/A    Number of children: N/A    Years of education: N/A     Occupational History    X-ray technologist      Social History Main Topics    Smoking status: Former Smoker     Years: 20 00     Types: Cigarettes     Quit date: 2004    Smokeless tobacco: Never Used    Alcohol use Yes      Comment: social    Drug use: No    Sexual activity: Yes     Partners: Male     Other Topics Concern    Not on file     Social History Narrative    Caffeine use    exercise walking            Family History   Problem Relation Age of Onset    Lymphoma Mother     Throat cancer Father     Heart failure Father     Atrial fibrillation Father     Diabetes Father     Colonic polyp Father     Hypertension Father     Thyroid cancer Sister        Medications:    Current Outpatient Prescriptions:     acetaminophen (TYLENOL) 500 mg tablet, Take 500 mg by mouth every 6 (six) hours as needed for mild pain , Disp: , Rfl:     Ascorbic Acid (VITAMIN C) 100 MG tablet, Take by mouth, Disp: , Rfl:     cyanocobalamin (VITAMIN B-12) 1,000 mcg tablet, Take 1,000 mcg by mouth daily, Disp: , Rfl:     estradiol (ESTRACE) 0 5 MG tablet, Take 0 5 mg by mouth daily  , Disp: , Rfl:     estradiol (ESTRACE) 0 5 MG tablet, Take 1 tablet (0 5 mg total) by mouth daily, Disp: 90 tablet, Rfl: 0    famotidine (PEPCID) 20 mg tablet, Take 20 mg by mouth 2 (two) times a day , Disp: , Rfl:     flecainide (TAMBOCOR) 100 mg tablet, Take 1 tablet (100 mg total) by mouth 2 (two) times a day, Disp: 180 tablet, Rfl: 3    fluticasone (FLONASE) 50 mcg/act nasal spray, 2 sprays into each nostril 2 (two) times a day, Disp: , Rfl:     ketorolac (TORADOL) 10 mg tablet, Take 1 tablet (10 mg total) by mouth every 8 (eight) hours as needed for moderate pain, Disp: 90 tablet, Rfl: 1    Lactobacillus (ACIDOPHILUS PO), Take by mouth , Disp: , Rfl:     Loratadine (CLARITIN) 10 MG CAPS, Take by mouth, Disp: , Rfl:     Magnesium 500 MG CAPS, Take by mouth, Disp: , Rfl:     metoprolol succinate (TOPROL-XL) 50 mg 24 hr tablet, Take 1 tablet (50 mg total) by mouth daily (Patient taking differently: Take 25 mg by mouth daily  ), Disp: 90 tablet, Rfl: 1    ondansetron (ZOFRAN-ODT) 4 mg disintegrating tablet, Take by mouth, Disp: , Rfl:     pseudoephedrine (SUDAFED) 30 mg tablet, Take 1 tablet by mouth every 6 (six) hours as needed, Disp: , Rfl:     XOPENEX HFA 45 MCG/ACT inhaler, every 6 (six) hours as needed  , Disp: , Rfl:     zolpidem (AMBIEN) 5 mg tablet, Take 1 tablet (5 mg total) by mouth daily at bedtime as needed for sleep, Disp: 90 tablet, Rfl: 0    cholecalciferol (VITAMIN D3) 80102 units capsule, Take 5 capsules (50,000 Units total) by mouth daily for 90 days, Disp: 60 capsule, Rfl: 2    pantoprazole (PROTONIX) 20 mg tablet, Take 1 tablet (20 mg total) by mouth daily for 90 days, Disp: 90 tablet, Rfl: 2    Patient Active Problem List   Diagnosis    Celiac disease    PVC's (premature ventricular contractions)    Dyslipidemia    Back pain    Chronic GERD    Epigastric discomfort    Heart palpitations    Insomnia    Osteopenia    Premature ventricular contraction    Sciatica associated with disorder of lumbar spine    Vitamin D deficiency    Atrophic vaginitis    VERONIKA (stress urinary incontinence, female)    Contusion of left lower leg    Fall at home, initial encounter       Objective:  Right Ankle Exam     Range of Motion   The patient has normal right ankle ROM  Muscle Strength   Plantar flexion:  5/5    Comments:   Negative Castillo's test      Left Ankle Exam     Other   Erythema: absent  Sensation: normal    Comments: There is tenderness of the proximal Achilles tendon to mid Achilles tendon  There is no focal obvious deficit but the area is significantly tender  There is residual ecchymosis  Negative Castillo's test       Ankle with full strength on plantar flexion  Normal gait          Strength/Myotome Testing     Right Ankle/Foot   Plantar flexion: 5      Physical Exam   Constitutional: She is oriented to person, place, and time  She appears well-developed and well-nourished  HENT:   Head: Normocephalic and atraumatic  Eyes: Conjunctivae are normal    Neck: Neck supple  Cardiovascular: Intact distal pulses  Pulmonary/Chest: Effort normal    Neurological: She is alert and oriented to person, place, and time  Skin: Skin is warm and dry  Psychiatric: She has a normal mood and affect   Her behavior is normal    Vitals reviewed  Neurologic Exam     Mental Status   Oriented to person, place, and time  Procedures    There are no pertinent studies obtained with regards to today's office visit

## 2018-08-15 ENCOUNTER — HOSPITAL ENCOUNTER (OUTPATIENT)
Dept: MRI IMAGING | Facility: HOSPITAL | Age: 63
Discharge: HOME | End: 2018-08-15
Attending: EMERGENCY MEDICINE
Payer: COMMERCIAL

## 2018-08-15 DIAGNOSIS — S86.012A STRAIN OF ACHILLES TENDON, LEFT, INITIAL ENCOUNTER: ICD-10-CM

## 2018-08-15 PROCEDURE — 73718 MRI LOWER EXTREMITY W/O DYE: CPT

## 2018-08-24 ENCOUNTER — APPOINTMENT (OUTPATIENT)
Dept: LAB | Facility: CLINIC | Age: 63
End: 2018-08-24
Payer: COMMERCIAL

## 2018-08-24 ENCOUNTER — TRANSCRIBE ORDERS (OUTPATIENT)
Dept: URGENT CARE | Facility: CLINIC | Age: 63
End: 2018-08-24

## 2018-08-24 ENCOUNTER — TRANSCRIBE ORDERS (OUTPATIENT)
Dept: LAB | Facility: CLINIC | Age: 63
End: 2018-08-24

## 2018-08-24 DIAGNOSIS — J30.9 ALLERGIC RHINITIS, UNSPECIFIED SEASONALITY, UNSPECIFIED TRIGGER: Primary | ICD-10-CM

## 2018-08-24 PROCEDURE — 86003 ALLG SPEC IGE CRUDE XTRC EA: CPT

## 2018-08-24 PROCEDURE — 82785 ASSAY OF IGE: CPT

## 2018-08-24 PROCEDURE — 36415 COLL VENOUS BLD VENIPUNCTURE: CPT

## 2018-08-28 LAB
A ALTERNATA IGE QN: <0.1 KUA/I
A FUMIGATUS IGE QN: <0.1 KUA/I
ALLERGEN COMMENT: ABNORMAL
BERMUDA GRASS IGE QN: 0.16 KUA/I
BOXELDER IGE QN: 2.78 KUA/I
C HERBARUM IGE QN: <0.1 KUA/I
CAT DANDER IGE QN: 0.53 KUA/I
CMN PIGWEED IGE QN: <0.1 KUA/I
COMMON RAGWEED IGE QN: 0.36 KUA/I
COTTONWOOD IGE QN: 0.14 KUA/I
D FARINAE IGE QN: <0.1 KUA/I
D PTERONYSS IGE QN: <0.1 KUA/I
DOG DANDER IGE QN: 1.1 KUA/I
LONDON PLANE IGE QN: 0.25 KUA/I
MOUSE URINE PROT IGE QN: <0.1 KUA/I
MT JUNIPER IGE QN: 0.15 KUA/I
MUGWORT IGE QN: 1.11 KUA/I
P NOTATUM IGE QN: <0.1 KUA/I
ROACH IGE QN: <0.1 KUA/I
SHEEP SORREL IGE QN: <0.1 KUA/I
SILVER BIRCH IGE QN: 7.48 KUA/I
TIMOTHY IGE QN: 1.46 KUA/I
TOTAL IGE SMQN RAST: 533 KU/L (ref 0–113)
WALNUT IGE QN: 0.19 KUA/I
WHITE ASH IGE QN: 0.41 KUA/I
WHITE ELM IGE QN: 0.25 KUA/I
WHITE MULBERRY IGE QN: <0.1 KUA/I
WHITE OAK IGE QN: 6.09 KUA/I

## 2018-08-29 ENCOUNTER — OFFICE VISIT (OUTPATIENT)
Dept: HEMATOLOGY ONCOLOGY | Facility: CLINIC | Age: 63
End: 2018-08-29
Payer: COMMERCIAL

## 2018-08-29 VITALS
BODY MASS INDEX: 26.84 KG/M2 | RESPIRATION RATE: 16 BRPM | DIASTOLIC BLOOD PRESSURE: 70 MMHG | SYSTOLIC BLOOD PRESSURE: 108 MMHG | TEMPERATURE: 96.1 F | OXYGEN SATURATION: 97 % | HEART RATE: 70 BPM | WEIGHT: 167 LBS | HEIGHT: 66 IN

## 2018-08-29 DIAGNOSIS — C82.90 FOLLICULAR LYMPHOMA, UNSPECIFIED FOLLICULAR LYMPHOMA TYPE, UNSPECIFIED BODY REGION (HCC): Primary | ICD-10-CM

## 2018-08-29 PROCEDURE — 99244 OFF/OP CNSLTJ NEW/EST MOD 40: CPT | Performed by: INTERNAL MEDICINE

## 2018-08-29 NOTE — PROGRESS NOTES
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, diagnosed in August 2010  She underwent radiation therapy resulting in complete remission  She presents today for follow-up  Clinically, she has no evidence recurrence of follicle lymphoma  I recommended her to have CT scan of chest abdomen pelvis to rule out systemic recurrence  I also would like to obtain MRI of the right cubital fossa to rule out local recurrence  She is in agreement with my recommendation  Once obtain those results, I will contact her  Assuming that she is in complete remission, I will see her again in a year for physical examination  She is in agreement with my recommendation  Subjective:     HPI:  A 53-year-old female who was diagnosed with stage IA follicle lymphoma, grade 1, 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, diagnosed in August 2010  She underwent radiation therapy, resulting in complete remission  Since 2014, she failed to show for follow-up  She presents today for 1st time in a for years for evaluation  She has some discomfort in the right cubital fossa  Otherwise, she feels well  She was diagnosed with celiac disease for which she is on gluten free diet  She has no fever, chills or night sweats  Her weight has been stable  She denied any respiratory symptoms  Her performance status is normal         Interval History:          Objective:     Primary Diagnosis:    Follicular lymphoma, grade 1, stage IA, in the media aspect of right cubital fossa, diagnosed in August 2010  Cancer Staging:  Cancer Staging  No matching staging information was found for the patient  Previous Hematologic/ Oncologic Treatment:     Radiation therapy      Current Hematologic/ Oncologic Treatment:      Observation  Disease Status:         Test Results:    Pathology:        Radiology:        Laboratory:    See below  Physical Exam:      General Appearance:    Alert, oriented        Eyes:    PERRL   Ears:    Normal external ear canals, both ears   Nose:   Nares normal, septum midline   Throat:   Mucosa moist  Pharynx without injection  Neck:   Supple       Lungs:     Clear to auscultation bilaterally   Chest Wall:    No tenderness or deformity    Heart:    Regular rate and rhythm       Abdomen:     Soft, non-tender, bowel sounds +, no organomegaly           Extremities:   Extremities no cyanosis or edema       Skin:   no rash or icterus  Lymph nodes:   Cervical, supraclavicular, and axillary nodes normal   Neurologic:   CNII-XII intact, normal strength, sensation and reflexes     Throughout          Breast exam:   NA         ROS: Review of Systems   Musculoskeletal:        Some discomfort in the right cubital fossa  All other systems reviewed and are negative  Imaging: Mri Tibia Fibula Left Wo Contrast    Result Date: 8/16/2018  Narrative: MRI LEFT LOWER EXTREMITY WITHOUT CONTRAST - left tibia fibula INDICATION:   S86 012A: Strain of left Achilles tendon, initial encounter  COMPARISON:  None  TECHNIQUE:  MR examination of the left tibia fibula was performed  Pulse Sequences: Coronal T1/STIR, axial T2 fat sat/T1, sagittal STIR  (Please note the coronal images also include the contralateral lower extremity ) IV contrast was not given  Scan was performed on a 1 5 [place] Unit  FINDINGS: SUBCUTANEOUS TISSUES: Normal BONES: No fracture, dislocation or destructive osseous lesion  ARTICULAR SURFACES:  Normal  VISUALIZED MUSCULATURE:  Unremarkable  OTHER SOFT TISSUES:  Grossly intact Achilles tendon  Mild edema noted within the fingers fat suggesting hypoxic degeneration  No evidence of tear  Somewhat limited evaluation by tibia-fibula technique  OTHER PERTINENT FINDINGS:  None  PARTIALLY IMAGED CONTRALATERAL LOWER EXTREMITY: There are no abnormalities in the partially imaged contralateral lower extremity  Impression: Grossly intact Achilles tendon insertion  The remainder the examination is unremarkable  Unremarkable appearance to the soleus and gastrocnemius musculature  Workstation performed: [id number]         Labs:   Lab Results   Component Value Date    WBC 6 23 06/13/2018    HGB 13 8 06/13/2018    HCT 42 4 06/13/2018    MCV 93 06/13/2018     06/13/2018     Lab Results   Component Value Date     06/13/2018    K 4 2 06/13/2018     06/13/2018    CO2 28 06/13/2018    ANIONGAP 10 06/22/2015    BUN 18 06/13/2018    CREATININE 0 89 06/13/2018    GLUCOSE 112 06/22/2015    GLUF 103 (H) 06/13/2018    CALCIUM 8 9 06/13/2018    AST 22 06/13/2018    ALT 30 06/13/2018    ALKPHOS 44 (L) 06/13/2018    PROT 7 2 06/22/2015    BILITOT 0 63 06/22/2015    EGFR 71 06/13/2018         Lab Results   Component Value Date     01/22/2014         Vital Sign:    Body surface area is 1 85 meters squared      Wt Readings from Last 3 Encounters:   08/29/18 75 8 kg (167 lb)   08/15/18 74 4 kg (164 lb)   08/01/18 76 5 kg (168 lb 9 6 oz)        Temp Readings from Last 3 Encounters:   08/29/18 (!) 96 1 °F (35 6 °C) (Tympanic)   08/01/18 97 9 °F (36 6 °C)   06/13/18 (!) 97 °F (36 1 °C) (Tympanic)        BP Readings from Last 3 Encounters:   08/29/18 108/70   08/01/18 130/80   06/13/18 126/82         Pulse Readings from Last 3 Encounters:   08/29/18 70   08/01/18 73   06/13/18 72     @LASTSAO2(3)@    Active Problems:   Patient Active Problem List   Diagnosis    Celiac disease    PVC's (premature ventricular contractions)    Dyslipidemia    Back pain    Chronic GERD    Epigastric discomfort    Heart palpitations    Insomnia    Osteopenia    Premature ventricular contraction    Sciatica associated with disorder of lumbar spine    Vitamin D deficiency    Atrophic vaginitis    VERONIKA (stress urinary incontinence, female)    Contusion of left lower leg    Fall at home, initial encounter    Follicular lymphoma [facility])       Past Medical History:   Past Medical History:   Diagnosis Date    Celiac disease     Follicular lymphoma ([address] )     GERD (gastroesophageal reflux disease)     Heart palpitations     History of colonic polyps     resolved 07/12/2016    Lymphoma, 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HCC)     non hodgekins, remission    Malignant lymphoma (HCC)     rt arm cutaneous follicular stage ia (rt diatal medical biceps area , s/p resected and s/p radistion treatment    resolved 12/17/2014    Postmenopausal disorder     resolved 09/21/2017    Restless legs syndrome     resolved 09/21/2017    TMJ syndrome     resolved 09/21/2017       Surgical History:   Past Surgical History:   Procedure Laterality Date    COLONOSCOPY  2015    HYSTERECTOMY      LYMPH NODE DISSECTION Right     arm    NASAL SEPTUM SURGERY      deviation, stents turned into turbinates     CO ESOPHAGOGASTRODUODENOSCOPY TRANSORAL DIAGNOSTIC N/A 1/18/2016    Procedure: ESOPHAGOGASTRODUODENOSCOPY (EGD); Surgeon: Fredy Cornejo MD;  [facility]; Service: Gastroenterology    TOTAL ABDOMINAL HYSTERECTOMY W/ BILATERAL SALPINGOOPHORECTOMY         Family History:    Family History   Problem Relation Age of Onset    Lymphoma Mother     Throat cancer Father     Heart failure Father     Atrial fibrillation Father     Diabetes Father     Colonic polyp Father     Hypertension Father     Thyroid cancer Sister        Cancer-related family history includes Lymphoma in her mother; Thyroid cancer in her sister      Social History:   Social History     Social History    Marital status: /Civil Union     Spouse name: N/A    Number of children: N/A    Years of education: N/A     Occupational History    X-ray technologist      Social History Main Topics    Smoking status: Former Smoker     Years: 20 00     Types: Cigarettes Quit date: 2004    Smokeless tobacco: Never Used    Alcohol use Yes      Comment: social    Drug use: No    Sexual activity: Yes     Partners: Male     Other Topics Concern    Not on file     Social History Narrative    Caffeine use    exercise walking            Current Medications:   Current Outpatient Prescriptions   Medication Sig Dispense Refill    acetaminophen (TYLENOL) 500 mg tablet Take 500 mg by mouth every 6 (six) hours as needed for mild pain   Ascorbic Acid (VITAMIN C) 100 MG tablet Take by mouth      cyanocobalamin (VITAMIN B-12) 1,000 mcg tablet Take 1,000 mcg by mouth daily      estradiol (ESTRACE) 0 5 MG tablet Take 1 tablet (0 5 mg total) by mouth daily 90 tablet 0    famotidine (PEPCID) 20 mg tablet Take 20 mg by mouth 2 (two) times a day   flecainide (TAMBOCOR) 100 mg tablet Take 1 tablet (100 mg total) by mouth 2 (two) times a day 180 tablet 3    fluticasone (FLONASE) 50 mcg/act nasal spray 2 sprays into each nostril 2 (two) times a day      ketorolac (TORADOL) 10 mg tablet Take 1 tablet (10 mg total) by mouth every 8 (eight) hours as needed for moderate pain 90 tablet 1    Lactobacillus (ACIDOPHILUS PO) Take by mouth        Loratadine (CLARITIN) 10 MG CAPS Take by mouth      Magnesium 500 MG CAPS Take by mouth      metoprolol succinate (TOPROL-XL) 50 mg 24 hr tablet Take 1 tablet (50 mg total) by mouth daily (Patient taking differently: Take 25 mg by mouth daily  ) 90 tablet 1    ondansetron (ZOFRAN-ODT) 4 mg disintegrating tablet Take by mouth      pseudoephedrine (SUDAFED) 30 mg tablet Take 1 tablet by mouth every 6 (six) hours as needed      XOPENEX HFA 45 MCG/ACT inhaler every 6 (six) hours as needed        zolpidem (AMBIEN) 5 mg tablet Take 1 tablet (5 mg total) by mouth daily at bedtime as needed for sleep 90 tablet 0    cholecalciferol (VITAMIN D3) 06152 units capsule Take 5 capsules (50,000 Units total) by mouth daily for 90 days 60 capsule 2    pantoprazole (PROTONIX) 20 mg tablet Take 1 tablet (20 mg total) by mouth daily for 90 days 90 tablet 2     No current facility-administered medications for this visit  Allergies: Allergies   Allergen Reactions    Shellfish-Derived Products Anaphylaxis    Wheat Bran Anaphylaxis    Gluten Meal GI Intolerance     Celiac     Morphine And Related Itching    Nuts Hives     Almonds and other related nuts       Other Itching    Sulfa Antibiotics Rash

## 2018-09-06 ENCOUNTER — OFFICE VISIT (OUTPATIENT)
Dept: OBGYN CLINIC | Facility: CLINIC | Age: 63
End: 2018-09-06
Payer: COMMERCIAL

## 2018-09-06 ENCOUNTER — TELEPHONE (OUTPATIENT)
Dept: OBGYN CLINIC | Facility: CLINIC | Age: 63
End: 2018-09-06

## 2018-09-06 VITALS
BODY MASS INDEX: 26.45 KG/M2 | HEART RATE: 77 BPM | WEIGHT: 164.6 LBS | DIASTOLIC BLOOD PRESSURE: 82 MMHG | SYSTOLIC BLOOD PRESSURE: 121 MMHG | HEIGHT: 66 IN

## 2018-09-06 DIAGNOSIS — S86.012D STRAIN OF LEFT ACHILLES TENDON, SUBSEQUENT ENCOUNTER: Primary | ICD-10-CM

## 2018-09-06 PROCEDURE — 99213 OFFICE O/P EST LOW 20 MIN: CPT | Performed by: EMERGENCY MEDICINE

## 2018-09-06 NOTE — TELEPHONE ENCOUNTER
Left message for patient for clarification on the typo in the MRI results  Spoke with Dr Herbie Perez  It should have said that there was edema of the Kager's fat pad, which is just anterior to achilles  There are signs of tendinosis but no signs of obvious tear or rupture

## 2018-09-06 NOTE — PROGRESS NOTES
Assessment/Plan:    Diagnoses and all orders for this visit:    Strain of left Achilles tendon, subsequent encounter    Patient to start PT tomorrow  She wishes to obtain a second opinion on the MRI and images  No Follow-up on file  Chief Complaint:   f/u left leg injury    Subjective:   Patient ID: Rey Hyde is a 63 y o  female  Patient returns To review MRI of the tib-fib for evaluation of the gastrocnemius and Achilles  She states that she does have improvement  She presents in flip-flops, and states that her  1st scheduled PT is tomorrow  She does notice a gap or defect in her posterior leg  Previous note:  NP presents  Referred by PCP for left calf pain after slipping on her deck next to her pool  She believes is that her catheterization hit the edge of the deck/pool  She does not believe that she twisted or hyper dorsiflexed her ankle  She was able to walk after the injury but states the area was severely sensitive to touch  She continues with localized pain and discomfort and bruising of the back of the leg  Denies any weakness  She does have pain and discomfort with prolonged walking and standing  She is planning to hiking in 4 weeks in [facility])  Review of Systems    The following portions of the patient's chart were reviewed and updated as appropriate: Allergy:    Allergies   Allergen Reactions    Shellfish-Derived Products Anaphylaxis    Wheat Bran Anaphylaxis    Gluten Meal GI Intolerance     Celiac     Morphine And Related Itching    Nuts Hives     Almonds and other related nuts       Other Itching    Sulfa Antibiotics Rash         Past Medical History:   Diagnosis Date    Celiac disease     Follicular lymphoma (HCC)     GERD (gastroesophageal reflux disease)     Heart palpitations     History of colonic polyps     resolved 07/12/2016    Lymphoma, 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HCC)     non hodgekins, remission    Malignant lymphoma (HCC)     rt arm cutaneous follicular stage ia (rt diatal medical biceps area , s/p resected and s/p radistion treatment    resolved 12/17/2014    Postmenopausal disorder     resolved 09/21/2017    Restless legs syndrome     resolved 09/21/2017    TMJ syndrome     resolved 09/21/2017       Past Surgical History:   Procedure Laterality Date    COLONOSCOPY  2015    HYSTERECTOMY      LYMPH NODE DISSECTION Right     arm    NASAL SEPTUM SURGERY      deviation, stents turned into turbinates     NC ESOPHAGOGASTRODUODENOSCOPY TRANSORAL DIAGNOSTIC N/A 1/18/2016    Procedure: ESOPHAGOGASTRODUODENOSCOPY (EGD); Surgeon: Milan Borjas MD;  [facility];   Service: Gastroenterology    TOTAL ABDOMINAL HYSTERECTOMY W/ BILATERAL SALPINGOOPHORECTOMY         Social History     Social History    Marital status: /Civil Union     Spouse name: N/A    Number of children: N/A    Years of education: N/A     Occupational History    X-ray technologist      Social History Main Topics    Smoking status: Former Smoker     Years: 20 00     Types: Cigarettes     Quit date: 2004    Smokeless tobacco: Never Used    Alcohol use Yes      Comment: social    Drug use: No    Sexual activity: Yes     Partners: Male     Other Topics Concern    Not on file     Social History Narrative    Caffeine use    exercise walking            Family History   Problem Relation Age of Onset    Lymphoma Mother     Throat cancer Father     Heart failure Father     Atrial fibrillation Father     Diabetes Father     Colonic polyp Father     Hypertension Father     Thyroid cancer Sister        Medications:    Current Outpatient Prescriptions:     acetaminophen (TYLENOL) 500 mg tablet, Take 500 mg by mouth every 6 (six) hours as needed for mild pain , Disp: , Rfl:     Ascorbic Acid (VITAMIN C) 100 MG tablet, Take by mouth, Disp: , Rfl:     cholecalciferol (VITAMIN D3) 66044 units capsule, Take 5 capsules (50,000 Units total) by mouth daily for 90 days, Disp: 60 capsule, Rfl: 2    cyanocobalamin (VITAMIN B-12) 1,000 mcg tablet, Take 1,000 mcg by mouth daily, Disp: , Rfl:     estradiol (ESTRACE) 0 5 MG tablet, Take 1 tablet (0 5 mg total) by mouth daily, Disp: 90 tablet, Rfl: 0    famotidine (PEPCID) 20 mg tablet, Take 20 mg by mouth 2 (two) times a day , Disp: , Rfl:     flecainide (TAMBOCOR) 100 mg tablet, Take 1 tablet (100 mg total) by mouth 2 (two) times a day, Disp: 180 tablet, Rfl: 3    fluticasone (FLONASE) 50 mcg/act nasal spray, 2 sprays into each nostril 2 (two) times a day, Disp: , Rfl:     ketorolac (TORADOL) 10 mg tablet, Take 1 tablet (10 mg total) by mouth every 8 (eight) hours as needed for moderate pain, Disp: 90 tablet, Rfl: 1    Lactobacillus (ACIDOPHILUS PO), Take by mouth , Disp: , Rfl:     Loratadine (CLARITIN) 10 MG CAPS, Take by mouth, Disp: , Rfl:     Magnesium 500 MG CAPS, Take by mouth, Disp: , Rfl:     metoprolol succinate (TOPROL-XL) 50 mg 24 hr tablet, Take 1 tablet (50 mg total) by mouth daily (Patient taking differently: Take 25 mg by mouth daily  ), Disp: 90 tablet, Rfl: 1    ondansetron (ZOFRAN-ODT) 4 mg disintegrating tablet, Take by mouth, Disp: , Rfl:     pseudoephedrine (SUDAFED) 30 mg tablet, Take 1 tablet by mouth every 6 (six) hours as needed, Disp: , Rfl:     XOPENEX HFA 45 MCG/ACT inhaler, every 6 (six) hours as needed  , Disp: , Rfl:     zolpidem (AMBIEN) 5 mg tablet, Take 1 tablet (5 mg total) by mouth daily at bedtime as needed for sleep, Disp: 90 tablet, Rfl: 0    pantoprazole (PROTONIX) 20 mg tablet, Take 1 tablet (20 mg total) by mouth daily for 90 days, Disp: 90 tablet, Rfl: 2    Patient Active Problem List   Diagnosis    Celiac disease    PVC's (premature ventricular contractions)    Dyslipidemia    Back pain    Chronic GERD    Epigastric discomfort    Heart palpitations    Insomnia    Osteopenia    Premature ventricular contraction    Sciatica associated with disorder of lumbar spine    Vitamin D deficiency    Atrophic vaginitis    VERONIKA (stress urinary incontinence, female)    Contusion of left lower leg    Fall at home, initial encounter    Follicular lymphoma ([facility] [address] )       Objective:  Right Ankle Exam     Comments: At the myotendinous junction there is a gap or defect approximately 1 cm noted on palpation  There is no ecchymosis  Patient has a normal gait in flip-flops   previous negative Castillo's test   Achilles tendon with no defects            Physical Exam      Neurologic Exam    Procedures    I have personally reviewed the written report of the pertinent studies  IMPRESSION:  Grossly intact Achilles tendon insertion  The remainder the examination is unremarkable  Unremarkable appearance to the soleus and gastrocnemius musculature

## 2018-09-07 ENCOUNTER — EVALUATION (OUTPATIENT)
Dept: PHYSICAL THERAPY | Facility: CLINIC | Age: 63
End: 2018-09-07
Payer: COMMERCIAL

## 2018-09-07 DIAGNOSIS — M72.2 PLANTAR FASCIITIS: Primary | ICD-10-CM

## 2018-09-07 DIAGNOSIS — S86.012D RUPTURE OF LEFT ACHILLES TENDON, SUBSEQUENT ENCOUNTER: Primary | ICD-10-CM

## 2018-09-07 PROCEDURE — G8978 MOBILITY CURRENT STATUS: HCPCS | Performed by: PHYSICAL THERAPIST

## 2018-09-07 PROCEDURE — 97035 APP MDLTY 1+ULTRASOUND EA 15: CPT | Performed by: PHYSICAL THERAPIST

## 2018-09-07 PROCEDURE — G8979 MOBILITY GOAL STATUS: HCPCS | Performed by: PHYSICAL THERAPIST

## 2018-09-07 PROCEDURE — 97110 THERAPEUTIC EXERCISES: CPT | Performed by: PHYSICAL THERAPIST

## 2018-09-07 PROCEDURE — 97162 PT EVAL MOD COMPLEX 30 MIN: CPT | Performed by: PHYSICAL THERAPIST

## 2018-09-07 NOTE — PROGRESS NOTES
PT Evaluation     Today's date: 2018  Patient name: Manolo Kruse  : 1955  MRN: [id number]  Referring provider: Jan Bloom MD  Dx:   Encounter Diagnosis     ICD-10-CM    1  Rupture of left Achilles tendon, subsequent encounter S86 012D                   Assessment  Impairments: abnormal gait, abnormal movement, activity intolerance, impaired physical strength, lacks appropriate home exercise program, pain with function and weight-bearing intolerance  Patient presents with symptom irritability yes  Assessment details: Manolo Kruse is a 63 y o  female presenting to outpatient physical therapy with diagnosis of Rupture of left Achilles tendon  Also with irritation of plantar fascitis bilaterally  Patient presents with pain, reduced range of motion, reduced strength, reduced postural awareness, and reduced functional activity tolerance  Patient to benefit from skilled outpatient physical therapy to reduce pain, maximize pain free range of motion, increase strength and stability, and improve functional mobility/functional activity in order to maximize return to prior level of function with reduced limitations  Thank you for your referral     Understanding of Dx/Px/POC: good   Prognosis: good  Prognosis details: Pending hiking trip this weekend    Goals  STGs to be achieved in 4 weeks:  1  Pt to demonstrate reduced subjective pain rating "at worst" by at least 2-3 points from Initial Eval in order to allow for reduced pain with ADLs and improved functional activity tolerance  2  Pt to demonstrate increased AROM of bilateral ankle DF by at least 5-10 degrees in order to allow for greater ease and independence with ADLs and functional mobility  3  Pt to demonstrate full PROM of bilateral ankles in order to maximize joint mobility and function and allow for progression of exercise program and achievement of goals     4  Pt to demonstrate increased MMT of bilateral ankles by at least 1/2-1 grade in order to improve safety and stability with ADLs and functional mobility  LTGs to be achieved in 6-8 weeks:  1  Pt will be I with HEP in order to continue to improve quality of life and independence and reduce risk for re-injury  2  Pt to demonstrate return to walking community distances without limitations or restrictions  3  Pt to demonstrate improved function as noted by achieving or exceeding predicted score on FOTO outcomes assessment tool  Plan  Patient would benefit from: skilled physical therapy  Planned modality interventions: ultrasound and TENS  Other planned modality interventions: Modalities prn for symptom management  Planned therapy interventions: manual therapy, neuromuscular re-education, orthotic fitting/training, therapeutic exercise and home exercise program  Other planned therapy interventions: IASTM  Frequency: 2x week  Duration in weeks: 4  Treatment plan discussed with: patient        Subjective Evaluation    History of Present Illness  Date of onset: 2018  Mechanism of injury: Patient slipped and fell in July  Incredibly painful, but has an ongoing indentation from impact  Had MRI and had no significant tears  She does have an ongoing burning in this same location    Feels a lump   "feels like the size of a baseball"  Pain  Current pain rating: 3  At best pain rating: 3  At worst pain ratin  Quality: burning  Aggravating factors: walking, standing, stair climbing and sitting  Progression: worsening    Social Support  Steps to enter house: yes  1  Stairs in house: yes   12  L[facility] in: [facility]  Lives with: spouse    Employment status: working (x-ray tech)  Hand dominance: right  Exercise history: Very active      Diagnostic Tests  MRI studies: normal  Patient Goals  Patient goals for therapy: decreased edema, decreased pain, increased motion, improved balance, increased strength and return to sport/leisure activities          Objective     Observations Left Knee   Positive for edema and spasms  Tenderness     Additional Tenderness Details  TTP left posterior Achilles tendon, distally  Palpable firmness along tendon (mid to distal)  Palpable edema, pitting anterior shin (distal)    Neurological Testing     Additional Neurological Details  Continuous tingling in left posterior calf and Achilles    Active Range of Motion     Additional Active Range of Motion Details  Resting position inverted  Passively able to move to neutral, eversion limited with joint restriction noted  Passive Range of Motion     Additional Passive Range of Motion Details  DF knee extended - 3 degrees  DF with knee flexed - 5 degrees  Eversion 15 degrees    Strength/Myotome Testing     Left Knee   Flexion: 4-  Prone flexion: 4  Extension: 4    Additional Strength Details  Left:  DF 4-/5  PF 4-/5  Eversion 3-/5  Inversion 4-/5      Flowsheet Rows      Most Recent Value   PT/OT G-Codes   Current Score  72   Projected Score  80          Precautions: Falls  Re-eval Date: 10/6/18    Date 9/7/18       Visit Count 1       FOTO See IE       Pain In See IE       Pain Out See IE             Daily Treatment Diary     Manual          prn                       Upcoming:   Ernestine FARRIS  STM/DTM    Exercise Diary         Aerobic        Gastroc stretch 60"       Soleus stretch 60"       Eversion T-band         Rhom EC        Tandem  - R        Tandem  - L        SLS with reach outside KRYSTLE        [place] Foam           Modalities         Ultrasound 10 mins       Thermal                  Ultrasound 100%, 1 0 @ 1 0 w/cm2 x 10 mins to left posterior calf/distal Achilles tendon

## 2018-09-19 ENCOUNTER — OFFICE VISIT (OUTPATIENT)
Dept: PHYSICAL THERAPY | Facility: CLINIC | Age: 63
End: 2018-09-19
Payer: COMMERCIAL

## 2018-09-19 ENCOUNTER — TELEPHONE (OUTPATIENT)
Dept: HEMATOLOGY ONCOLOGY | Facility: CLINIC | Age: 63
End: 2018-09-19

## 2018-09-19 DIAGNOSIS — C82.04 GRADE 1 FOLLICULAR LYMPHOMA OF LYMPH NODES OF UPPER EXTREMITY (HCC): Primary | ICD-10-CM

## 2018-09-19 DIAGNOSIS — S86.012D RUPTURE OF LEFT ACHILLES TENDON, SUBSEQUENT ENCOUNTER: Primary | ICD-10-CM

## 2018-09-19 PROCEDURE — 97140 MANUAL THERAPY 1/> REGIONS: CPT

## 2018-09-19 PROCEDURE — 97535 SELF CARE MNGMENT TRAINING: CPT

## 2018-09-19 PROCEDURE — 97112 NEUROMUSCULAR REEDUCATION: CPT

## 2018-09-19 NOTE — TELEPHONE ENCOUNTER
Vincent French has an upcoming CT and MRI  Radiology department contacted her  She will need a BUN and creatinine prior  Orders entered

## 2018-09-19 NOTE — PROGRESS NOTES
Daily Note     Today's date: 2018  Patient name: Yarely Devlin  : 1955  MRN: [id number]  Referring provider: Meg Haddad MD  Dx:   Encounter Diagnosis     ICD-10-CM    1  Rupture of left Achilles tendon, subsequent encounter S86 012D                   Precautions: Falls  Re-eval Date: 10/6/18    Date 18      Visit Count 1 2      FOTO See IE       Pain In See IE 2/10      Pain Out See IE 1/10          Subjective: I have pain in both PF areas and pain in my left calf from the fall I had in July  Objective: See treatment diary below  Pt issued HEP of self/prolong static stretches to BL LE , pt encourage compliancy with carryover  HR/TR to tabitha  Pt educated ice/CBAN and encourage compression BL LE  Assessment: Tolerated treatment fair with pt indicating increase hypersentivity with gentle GISTM, trialed STM/DTM to BL calf/pf regions  Trial Russo taping to L foot with pt indicating feeling good / supportive with standing/walking  Pt demonstrates bruising L distal calf / proximal achilled 2* to trauma of fall in July  Pt demonstrated Hypersensitivity R medial calf with soft tissue restrictions  Patient would benefit from continued PT to improve overall strength, stability, and flexibility BL LE to maximize overall functional      Plan: Continue per plan of care         Manual          prn 45 min                      Upcoming:   Ernestine # 4/5 to pt tolerance BL calf/PF region - in prone  (Pt signed Ernestine consent form - educated potential bruising, encourage hydration, and potential increase mm soreness  MFR  STM/DTM to pt tolerance BL gastroc/pf region in prone  Trial McConnel taping L PF region    Exercise Diary         Aerobic        TR/HR  Foam 25-30x  To tolerance      Gastroc stretch 60" 2x 60"   BL w/towel  seated      Soleus stretch 60" resume      Eversion T-band   resume      Rhom EC  EO/EC  Foam  2x 30" ea  1x 20-30" with EC/head turns  CS - CGA      Tandem  - R 2x30"   Foam  CS - CGA      Tandem  - L  2x30"   Foam  CS - CGA      SLS with reach outside KRYSTLE        [place] Foam           Modalities         Ultrasound 10 mins Pt def resume NV      Thermal  Pt deferred                 Ultrasound 100%, 1 0 @ 1 0 w/cm2 x 10 mins to left posterior calf/distal Achilles tendon

## 2018-09-27 ENCOUNTER — OFFICE VISIT (OUTPATIENT)
Dept: PHYSICAL THERAPY | Facility: CLINIC | Age: 63
End: 2018-09-27
Payer: COMMERCIAL

## 2018-09-27 DIAGNOSIS — S86.012D RUPTURE OF LEFT ACHILLES TENDON, SUBSEQUENT ENCOUNTER: Primary | ICD-10-CM

## 2018-09-27 PROCEDURE — 97110 THERAPEUTIC EXERCISES: CPT

## 2018-09-27 PROCEDURE — 97140 MANUAL THERAPY 1/> REGIONS: CPT

## 2018-09-27 NOTE — PROGRESS NOTES
Daily Note     Today's date: 2018  Patient name: Cristal Ramirez  : 1955  MRN: [id number]  Referring provider: Evelin Guerrero MD  Dx:   Encounter Diagnosis     ICD-10-CM    1  Rupture of left Achilles tendon, subsequent encounter S86 012D                   Precautions: Falls  Re-eval Date: 10/6/18    Date 18     Visit Count 1 2 3     FOTO See IE       Pain In See IE 2/10 0/10     Pain Out See IE 1/10 1/10       Subjective: Two days ago I pulled a mm in my R LB, 5/10 today  I think the graston got rid of my dent in my calf        Objective: See treatment diary below      Assessment: Tolerated treatment fairly well with pt demonstrating edema LE with adding MLD this date  Resume full TE/Neuro upcoming  Patient would benefit from continued PT      Plan: Continue per plan of care  Manual          prn 45 min 45 min                     Upcoming:   Ernestine # 4/5 to pt tolerance BL calf/PF region - in prone  (Pt signed Ernestine consent form - educated potential bruising, encourage hydration, and potential increase mm soreness  MFR  STM/DTM to pt tolerance BL gastroc/pf region in prone  Trial McConnel taping L PF region - NP  MLD - PT MW L LE     Exercise Diary         Aerobic        TR/HR  Foam 25-30x  To tolerance Foam 25-30x  To tolerance     Gastroc stretch 60" 2x 60"   BL w/towel  seated 2x 60"   BL w/towel  seated     Soleus stretch 60" resume      Eversion T-band   resume      Rhom EC  EO/EC  Foam  2x 30" ea  1x 20-30" with EC/head turns  CS - CGA review     Tandem  - R  2x30"   Foam  CS - CGA review     Tandem  - L  2x30"   Foam  CS - CGA review     SLS with reach outside KRYSTLE        [place] Foam           Modalities         Ultrasound 10 mins Pt def resume NV      Thermal  Pt deferred  CP 10 min  L LE/ankle in prone               Ultrasound 100%, 1 0 @ 1 0 w/cm2 x 10 mins to left posterior calf/distal Achilles tendon

## 2018-09-28 ENCOUNTER — APPOINTMENT (OUTPATIENT)
Dept: LAB | Facility: CLINIC | Age: 63
End: 2018-09-28
Payer: COMMERCIAL

## 2018-09-28 DIAGNOSIS — C82.04 GRADE 1 FOLLICULAR LYMPHOMA OF LYMPH NODES OF UPPER EXTREMITY (HCC): ICD-10-CM

## 2018-09-28 LAB
BUN SERPL-MCNC: 19 MG/DL (ref 5–25)
CREAT SERPL-MCNC: 1.03 MG/DL (ref 0.6–1.3)
GFR SERPL CREATININE-BSD FRML MDRD: 59 ML/MIN/1.73SQ M

## 2018-09-28 PROCEDURE — 84520 ASSAY OF UREA NITROGEN: CPT

## 2018-09-28 PROCEDURE — 36415 COLL VENOUS BLD VENIPUNCTURE: CPT

## 2018-09-28 PROCEDURE — 82565 ASSAY OF CREATININE: CPT

## 2018-10-03 ENCOUNTER — TELEPHONE (OUTPATIENT)
Dept: HEMATOLOGY ONCOLOGY | Facility: CLINIC | Age: 63
End: 2018-10-03

## 2018-10-03 ENCOUNTER — APPOINTMENT (OUTPATIENT)
Dept: PHYSICAL THERAPY | Facility: CLINIC | Age: 63
End: 2018-10-03
Payer: COMMERCIAL

## 2018-10-03 ENCOUNTER — HOSPITAL ENCOUNTER (OUTPATIENT)
Dept: MRI IMAGING | Facility: HOSPITAL | Age: 63
Discharge: HOME | End: 2018-10-03
Attending: INTERNAL MEDICINE
Payer: COMMERCIAL

## 2018-10-03 DIAGNOSIS — C82.90 FOLLICULAR LYMPHOMA, UNSPECIFIED FOLLICULAR LYMPHOMA TYPE, UNSPECIFIED BODY REGION (HCC): ICD-10-CM

## 2018-10-03 PROCEDURE — 73223 MRI JOINT UPR EXTR W/O&W/DYE: CPT

## 2018-10-03 PROCEDURE — A9585 GADOBUTROL INJECTION: HCPCS | Performed by: INTERNAL MEDICINE

## 2018-10-03 RX ADMIN — GADOBUTROL 7 ML: 604.72 INJECTION INTRAVENOUS at 12:22

## 2018-10-03 NOTE — TELEPHONE ENCOUNTER
----- Message from Dorothy Wooten MD sent at 10/3/2018  3:52 PM EDT -----  Call pt   MRI was normal

## 2018-10-04 ENCOUNTER — OFFICE VISIT (OUTPATIENT)
Dept: PHYSICAL THERAPY | Facility: CLINIC | Age: 63
End: 2018-10-04
Payer: COMMERCIAL

## 2018-10-04 DIAGNOSIS — S86.012D RUPTURE OF LEFT ACHILLES TENDON, SUBSEQUENT ENCOUNTER: Primary | ICD-10-CM

## 2018-10-04 PROCEDURE — 97140 MANUAL THERAPY 1/> REGIONS: CPT

## 2018-10-04 PROCEDURE — 97110 THERAPEUTIC EXERCISES: CPT

## 2018-10-04 NOTE — PROGRESS NOTES
Daily Note     Today's date: 10/4/2018  Patient name: Romayne Mccoy  : 1955  MRN: [id number]  Referring provider: Obdulia Tan MD  Dx:   Encounter Diagnosis     ICD-10-CM    1  Rupture of left Achilles tendon, subsequent encounter S86 012D                   Precautions: Falls  Re-eval Date: 10/6/18    Date 9/7/18 9/19 9/27 10/4    Visit Count 1 2 3 4    FOTO See IE       Pain In See IE 2/10 0/10 0/10    Pain Out See IE 1/10 1/10 0      Subjective: Swelling in my legs fluctuate  Still have that divit behind the back of my left lower calf  My  does help me with massage BL feet several times a week      Objective: See treatment diary below      Assessment: Tolerated treatment fairly well with pt demonstrating sensitivity with light GISTM today  Pt responded well with in tolerance with STM/DTM and MFR L gastroc and BL PF  Pt demonstrates improve   Patient would benefit from continued PT to reduce soft tissue impairments BL PF/L gastroc      Plan: Continue per plan of care       Manual          prn 45 min 45 min 45 min                    Upcoming:   Ernestine # 4/5 to pt tolerance BL calf/PF region - in prone  (Pt signed Ernestine consent form - educated potential bruising, encourage hydration, and potential increase mm soreness  MFR  STM/DTM to pt tolerance BL gastroc/pf region in prone  Trial Carmen taping L PF region - NP  MLD - PT MW L LE     Exercise Diary         Aerobic    Nustep  L1 15 min    TR/HR  Foam 25-30x  To tolerance Foam 25-30x  To tolerance     Gastroc stretch 60" 2x 60"   BL w/towel  seated 2x 60"   BL w/towel  seated 2x 2 min ea  standing    Soleus stretch 60" resume      Eversion T-band   resume      Rhom EC  EO/EC  Foam  2x 30" ea  1x 20-30" with EC/head turns  CS - CGA review     Tandem  - R  2x30"   Foam  CS - CGA review     Tandem  - L  2x30"   Foam  CS - CGA review     SLS with reach outside KRYSTLE    SLS firm  30" ea   S    [place] Foam           Modalities         Ultrasound 10 mins Pt def resume NV      Thermal  Pt deferred  CP 10 min  L LE/ankle in prone Pt def              Ultrasound 100%, 1 0 @ 1 0 w/cm2 x 10 mins to left posterior calf/distal Achilles tendon

## 2018-10-11 ENCOUNTER — EVALUATION (OUTPATIENT)
Dept: PHYSICAL THERAPY | Facility: CLINIC | Age: 63
End: 2018-10-11
Payer: COMMERCIAL

## 2018-10-11 DIAGNOSIS — S86.012D RUPTURE OF LEFT ACHILLES TENDON, SUBSEQUENT ENCOUNTER: Primary | ICD-10-CM

## 2018-10-11 PROCEDURE — G8978 MOBILITY CURRENT STATUS: HCPCS | Performed by: PHYSICAL THERAPIST

## 2018-10-11 PROCEDURE — 97140 MANUAL THERAPY 1/> REGIONS: CPT

## 2018-10-11 PROCEDURE — 97164 PT RE-EVAL EST PLAN CARE: CPT | Performed by: PHYSICAL THERAPIST

## 2018-10-11 PROCEDURE — 97110 THERAPEUTIC EXERCISES: CPT

## 2018-10-11 PROCEDURE — G8979 MOBILITY GOAL STATUS: HCPCS | Performed by: PHYSICAL THERAPIST

## 2018-10-11 NOTE — PROGRESS NOTES
Daily Note     Today's date: 10/11/2018  Patient name: Ivy Ragland  : 1955  MRN: [id number]  Referring provider: Kyaw Heredia MD  Dx:   Encounter Diagnosis     ICD-10-CM    1  Rupture of left Achilles tendon, subsequent encounter S86 012D                 Precautions: Falls  Re-eval Date: 10/6/18    Date 9/7/18 9/19 9/27 10/4 10/11   Visit Count 1 2 3 4 5   FOTO See IE       Pain In See IE 2/10 0/10 0/10 1/10   Pain Out See IE 1/10 1/10 0      Subjective: Just a discomfort L distal achilles area    Objective: See treatment diary below      Assessment: Tolerated treatment fair with co's of increase soreness with GISTM  Patient tx session modified with RA completed this date      Plan: Continue per plan of care  Manual          prn 45 min 45 min 45 min 30 min                   Upcoming:   Ernestine # 4/5 to pt tolerance BL calf/PF region - in prone  (Pt signed Ernestine consent form - educated potential bruising, encourage hydration, and potential increase mm soreness  MFR  STM/DTM to pt tolerance BL gastroc/pf region in prone  Trial [place] taping L PF region - NP  MLD - PT MW L LE     Exercise Diary         Aerobic    Nustep  L1 15 min SRC   L3 10 min   TR/HR  Foam 25-30x  To tolerance Foam 25-30x  To tolerance  Foam  2x15   Gastroc stretch 60" 2x 60"   BL w/towel  seated 2x 60"   BL w/towel  seated 2x 2 min ea  standing 2 min  wedge   Soleus stretch 60" resume      Eversion T-band   resume      Rhom EC  EO/EC  Foam  2x 30" ea  1x 20-30" with EC/head turns  CS - CGA review     Tandem  - R  2x30"   Foam  CS - CGA review     Tandem  - L  2x30"   Foam  CS - CGA review     SLS with reach outside KRYSTLE    SLS firm  30" ea   S    [place] Foam           Modalities         Ultrasound 10 mins Pt def resume NV      Thermal  Pt deferred  CP 10 min  L LE/ankle in prone Pt def              Ultrasound 100%, 1 0 @ 1 0 w/cm2 x 10 mins to left posterior calf/distal Achilles tendon

## 2018-10-11 NOTE — PROGRESS NOTES
PT Re-Evaluation     Today's date: 10/11/2018  Patient name: Hetal Cook  : 1955  MRN: [id number]  Referring provider: Angelika Veronica MD  Dx:   Encounter Diagnosis     ICD-10-CM    1  Rupture of left Achilles tendon, subsequent encounter S86 012D        Assessment  Impairments: abnormal gait, abnormal movement, activity intolerance, impaired physical strength, lacks appropriate home exercise program, pain with function and weight-bearing intolerance    Assessment details: Hetal Cook is a 63 y o  female presenting to outpatient physical therapy with diagnosis of Rupture of left Achilles tendon  Also with irritation of plantar fascitis bilaterally  Patient presents with improved pain, ongoing restrictions in Achilles  Discomfort in plantar fascia has improved  Overall edema has improved from [facility]  Patient with softening of tissue, good response to IASTM  She has made progress as noted  She will benefit from 1-2 sessions for maximal benefit  Thank you for the ability to participate in the ongoing care of this patient  Understanding of Dx/Px/POC: good   Prognosis: good    Goals  STGs to be achieved in 4 weeks: All STGs met  1  Pt to demonstrate reduced subjective pain rating "at worst" by at least 2-3 points from Initial Eval in order to allow for reduced pain with ADLs and improved functional activity tolerance  2  Pt to demonstrate increased AROM of bilateral ankle DF by at least 5-10 degrees in order to allow for greater ease and independence with ADLs and functional mobility  3  Pt to demonstrate full PROM of bilateral ankles in order to maximize joint mobility and function and allow for progression of exercise program and achievement of goals  4  Pt to demonstrate increased MMT of bilateral ankles by at least 1/2-1 grade in order to improve safety and stability with ADLs and functional mobility  LTGs to be achieved in 6-8 weeks:  1   Pt will be I with HEP in order to continue to improve quality of life and independence and reduce risk for re-injury  2  Pt to demonstrate return to walking community distances without limitations or restrictions  3  Pt to demonstrate improved function as noted by achieving or exceeding predicted score on FOTO outcomes assessment tool  Plan  Patient would benefit from: skilled physical therapy  Planned modality interventions: ultrasound and TENS  Other planned modality interventions: Modalities prn for symptom management  Planned therapy interventions: manual therapy, neuromuscular re-education, orthotic fitting/training, therapeutic exercise and home exercise program  Other planned therapy interventions: IASTM  Frequency: 2x week  Duration in weeks: 4  Treatment plan discussed with: patient        Subjective Evaluation    History of Present Illness  Date of onset: 2018  Mechanism of injury: Update:  Overall getting better, leg gets sensitive if she walks/climbs briskly  She "knows it's there all the time "  Not as sensitive as July, but getting better  Try another time or two  Patient slipped and fell in July  Incredibly painful, but has an ongoing indentation from impact  Had MRI and had no significant tears  She does have an ongoing burning in this same location    Feels a lump   "feels like the size of a baseball"  Pain  Current pain ratin  At best pain ratin  At worst pain ratin  Quality: burning  Aggravating factors: walking, standing, stair climbing and sitting  Progression: worsening    Social Support  Steps to enter house: yes  1  Stairs in house: yes   12  Lives in: [facility] (Basement)  Lives with: spouse    Employment status: working (x-ray tech)  Hand dominance: right  Exercise history: Very active      Diagnostic Tests  MRI studies: normal  Patient Goals  Patient goals for therapy: decreased edema, decreased pain, increased motion, improved balance, increased strength and return to sport/leisure activities        Objective     Observations   Left Knee   Positive for edema  Tenderness     Additional Tenderness Details  TTP left posterior Achilles tendon - improving  Palpable firmness along tendon (mid to distal)  Palpable edema, pitting anterior shin (distal)    Neurological Testing     Additional Neurological Details  Continuous tingling in left posterior calf and Achilles - improving from [place]  Active Range of Motion     Additional Active Range of Motion Details  Resting position inverted  Passively able to move to neutral, eversion limited with joint restriction noted      Passive Range of Motion     Additional Passive Range of Motion Details  DF knee extended 0 degrees  DF with knee flexed 1 degrees  Eversion 15 degrees    Strength/Myotome Testing     Left Knee   Flexion: 4-  Prone flexion: 4  Extension: 4    Additional Strength Details  Left:  DF 4/5  PF 4/5  Eversion 3+/5  Inversion 4/5      LE girth measurements (cm)                 Left   Forefoot              20                          Metatarsals                22  Malleolus                20   +8 cm                                        22  +16 cm                            28  +24 cm                                     30  +32                                                                  31              Knee joint line               30       Flowsheet Rows      Most Recent Value   PT/OT G-Codes   Current Score  79   Projected Score  80

## 2018-10-24 ENCOUNTER — HOSPITAL ENCOUNTER (OUTPATIENT)
Dept: CT IMAGING | Facility: HOSPITAL | Age: 63
Discharge: HOME | End: 2018-10-24
Attending: INTERNAL MEDICINE
Payer: COMMERCIAL

## 2018-10-24 DIAGNOSIS — C82.90 FOLLICULAR LYMPHOMA, UNSPECIFIED FOLLICULAR LYMPHOMA TYPE, UNSPECIFIED BODY REGION (HCC): ICD-10-CM

## 2018-10-24 PROCEDURE — 71260 CT THORAX DX C+: CPT

## 2018-10-24 PROCEDURE — 74177 CT ABD & PELVIS W/CONTRAST: CPT

## 2018-10-24 RX ADMIN — IOHEXOL 100 ML: 350 INJECTION, SOLUTION INTRAVENOUS at 10:19

## 2018-10-25 ENCOUNTER — TELEPHONE (OUTPATIENT)
Dept: HEMATOLOGY ONCOLOGY | Facility: CLINIC | Age: 63
End: 2018-10-25

## 2018-10-25 ENCOUNTER — OFFICE VISIT (OUTPATIENT)
Dept: PHYSICAL THERAPY | Facility: CLINIC | Age: 63
End: 2018-10-25
Payer: COMMERCIAL

## 2018-10-25 DIAGNOSIS — S86.012D RUPTURE OF LEFT ACHILLES TENDON, SUBSEQUENT ENCOUNTER: Primary | ICD-10-CM

## 2018-10-25 PROCEDURE — 97140 MANUAL THERAPY 1/> REGIONS: CPT

## 2018-10-25 PROCEDURE — 97110 THERAPEUTIC EXERCISES: CPT

## 2018-10-25 NOTE — PROGRESS NOTES
Daily Note     Today's date: 10/25/2018  Patient name: Conrad Wiggins  : 1955  MRN: [id number]  Referring provider: Val Porras MD  Dx:   Encounter Diagnosis     ICD-10-CM    1  Rupture of left Achilles tendon, subsequent encounter S86 012D                   Precautions: Falls  Re-eval Date: 11/10/18    Date 10/25       Visit Count 6       FOTO        Pain In 0       Pain Out 0         Subjective: Better overall swelling L calf area        Objective: See treatment diary below      Assessment: Tolerated treatment fairly well with improved tolerance with GISTM/STM/DTM and MLD  Cont's with soft tissue impairments / edema R distal gastroc m with pt educate self massage and review of HEP with pt self DC this date      Plan:  DC to HEP, see PT/MW RA    Manual  30min                               Upcoming:   Ernestine # 4/5 to pt tolerance BL calf/PF region - in prone  (Pt signed Ernestine consent form - educated potential bruising, encourage hydration, and potential increase mm soreness  MFR  STM/DTM to pt tolerance BL gastroc/pf region in prone  Trial Carmen taping L PF region - NP  MLD - PT MW L LE     Exercise Diary         Aerobic SRC   L3 10 min       TR/HR Foam  2x15       Gastroc stretch 2 min  wedge       Soleus stretch 2 x 1 min       Eversion        Rhom EC        Tandem  - R 2x30 sec       Tandem  - L 2x30 sec       SLS with reach outside KRYSTLE 2x to pt tabitha   firm       [place]            Modalities         Ultrasound 10 mins Pt def resume NV      Thermal  Pt deferred  CP 10 min  L LE/ankle in prone Pt def              Ultrasound 100%, 1 0 @ 1 0 w/cm2 x 10 mins to left posterior calf/distal Achilles tendon

## 2018-10-25 NOTE — TELEPHONE ENCOUNTER
----- Message from Jorge Luis Gutierrez MD sent at 10/25/2018  2:30 PM EDT -----  CT was negative for lymphoma

## 2018-10-25 NOTE — TELEPHONE ENCOUNTER
Called patient, informed her that per Dr Shahana Shaffer her CT scan was negative for lymphoma  Pt appreciative of good news and verbalized understanding of results  No further questions at this time

## 2018-11-16 ENCOUNTER — TELEPHONE (OUTPATIENT)
Dept: CARDIOLOGY CLINIC | Facility: CLINIC | Age: 63
End: 2018-11-16

## 2018-11-16 NOTE — TELEPHONE ENCOUNTER
P/C called wants to know if we can recommend what type of fish oil to buy? Suggested that she talk with the pharmacist where she goes to buy it  She stated that was a good idea

## 2018-11-26 NOTE — PROGRESS NOTES
Rachel Boys will be discharged from outpatient physical therapy care due to expiration of plan of care  No objective measures updated, Rachel Darian is not present at time of discharge

## 2018-12-04 ENCOUNTER — OFFICE VISIT (OUTPATIENT)
Dept: HEMATOLOGY ONCOLOGY | Facility: CLINIC | Age: 63
End: 2018-12-04
Payer: COMMERCIAL

## 2018-12-04 VITALS
WEIGHT: 165 LBS | HEART RATE: 78 BPM | DIASTOLIC BLOOD PRESSURE: 70 MMHG | HEIGHT: 66 IN | RESPIRATION RATE: 16 BRPM | BODY MASS INDEX: 26.52 KG/M2 | OXYGEN SATURATION: 98 % | SYSTOLIC BLOOD PRESSURE: 118 MMHG | TEMPERATURE: 96.6 F

## 2018-12-04 DIAGNOSIS — C82.90 FOLLICULAR LYMPHOMA, UNSPECIFIED FOLLICULAR LYMPHOMA TYPE, UNSPECIFIED BODY REGION (HCC): Primary | ICD-10-CM

## 2018-12-04 PROCEDURE — 99214 OFFICE O/P EST MOD 30 MIN: CPT | Performed by: INTERNAL MEDICINE

## 2018-12-04 RX ORDER — ERGOCALCIFEROL (VITAMIN D2) 50 MCG
CAPSULE ORAL
COMMUNITY

## 2018-12-04 NOTE — PROGRESS NOTES
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, diagnosed in 2010  She underwent radiation therapy resulting in complete remission  She has recently noticed small lymph not in the left posterior neck, after the neck swelling due to the bug bite has subsided  Based on physical examination, I did not appreciate abnormal size of lymphadenopathy  This is probably normal lymph node  I recommended her to observe  She is in agreement with my recommendation  She is going to come back and see me for routine follow-up in 2019                                      Subjective:      HPI:  A 53-year-old female who was diagnosed with stage IA follicle lymphoma, grade 1, 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, diagnosed in 2010  She underwent radiation therapy, resulting in complete remission  Since , she failed to show for follow-up  She presents today for 1st time in a for years for evaluation  She has some discomfort in the right cubital fossa  Otherwise, she feels well  She was diagnosed with celiac disease for which she is on gluten free diet  She has no fever, chills or night sweats  Her weight has been stable  She denied any respiratory symptoms  Her performance status is normal            Interval History:   a 42-year-old female with history of stage IA grade 1 follicular lymphoma in the right cubital fossa, diagnosed in   She underwent radiation therapy resulting in remission  Her recent CT scan of chest abdomen pelvis showed no evidence of lymphadenopathy  In late 2018, she had some bug bite in the posterior neck  Subsequently, she has swelling of the neck as well as supraclavicular fossae     She was given steroid which subsided swelling  She has noticed small left posterior neck lymph gland  Therefore, she presents today for further evaluation  She denied fever, chills or night sweats  Her weight is stable  She has no complaint of pain or respiratory symptoms  Her performance status is normal            Objective:      Primary Diagnosis:     Follicular lymphoma, grade 1, stage IA, in the media aspect of right cubital fossa, diagnosed in August 2010      Cancer Staging:  Cancer Staging  No matching staging information was found for the patient         Previous Hematologic/ Oncologic Treatment:      Radiation therapy      Current Hematologic/ Oncologic Treatment:       Observation      Disease Status:            Test Results:     Pathology:           Radiology:      CT scan of chest abdomen pelvis in October 2018 showed no evidence of lymphadenopathy  MRI of the right elbow showed no evidence of mass in October 2018      Laboratory:     See below      Physical Exam:        General Appearance:    Alert, oriented          Eyes:    PERRL   Ears:    Normal external ear canals, both ears   Nose:   Nares normal, septum midline   Throat:   Mucosa moist  Pharynx without injection  Neck:   Supple         Lungs:     Clear to auscultation bilaterally   Chest Wall:    No tenderness or deformity    Heart:    Regular rate and rhythm         Abdomen:     Soft, non-tender, bowel sounds +, no organomegaly               Extremities:   Extremities no cyanosis or edema         Skin:   no rash or icterus  Lymph nodes:   Cervical, supraclavicular, and axillary nodes normal, flat less than 1 cm left posterior neck lymph gland  Neurologic:   CNII-XII intact, normal strength, sensation and reflexes     Throughout             Breast exam:   NA           ROS: Review of Systems   All other systems reviewed and are negative  Imaging: No results found        Labs:   Lab Results   Component Value Date    WBC 6 23 06/13/2018    HGB 13 8 06/13/2018    HCT 42 4 06/13/2018    MCV 93 06/13/2018     06/13/2018     Lab Results   Component Value Date     06/22/2015    K 4 2 06/13/2018     06/13/2018    CO2 28 06/13/2018    ANIONGAP 10 06/22/2015    BUN 19 09/28/2018    CREATININE 1 03 09/28/2018    GLUCOSE 112 06/22/2015    GLUF 103 (H) 06/13/2018    CALCIUM 8 9 06/13/2018    AST 22 06/13/2018    ALT 30 06/13/2018    ALKPHOS 44 (L) 06/13/2018    PROT 7 2 06/22/2015    BILITOT 0 63 06/22/2015    EGFR 59 09/28/2018         Lab Results   Component Value Date     01/22/2014         Current Medications: Reviewed  Allergies: Reviewed  PMH/FH/SH:  Reviewed      Vital Sign:    Body surface area is 1 84 meters squared      Wt Readings from Last 3 Encounters:   12/04/18 74 8 kg (165 lb)   09/06/18 74 7 kg (164 lb 9 6 oz)   10/03/18 74 4 kg (164 lb)        Temp Readings from Last 3 Encounters:   12/04/18 (!) 96 6 °F (35 9 °C) (Tympanic)   08/29/18 (!) 96 1 °F (35 6 °C) (Tympanic)   08/01/18 97 9 °F (36 6 °C)        BP Readings from Last 3 Encounters:   12/04/18 118/70   09/06/18 121/82   08/29/18 108/70         Pulse Readings from Last 3 Encounters:   12/04/18 78   09/06/18 77   08/29/18 70     @LASTSAO2(3)@

## 2018-12-11 RX ORDER — AZELASTINE 1 MG/ML
SPRAY, METERED NASAL
COMMUNITY
Start: 2018-10-29 | End: 2018-12-19

## 2018-12-11 RX ORDER — PREDNISONE 10 MG/1
TABLET ORAL
COMMUNITY
Start: 2018-11-06 | End: 2018-12-12

## 2018-12-12 ENCOUNTER — HOSPITAL ENCOUNTER (OUTPATIENT)
Dept: RADIOLOGY | Facility: MEDICAL CENTER | Age: 63
Discharge: HOME | End: 2018-12-12
Payer: COMMERCIAL

## 2018-12-12 ENCOUNTER — HOSPITAL ENCOUNTER (OUTPATIENT)
Dept: RADIOLOGY | Facility: HOSPITAL | Age: 63
Discharge: HOME | End: 2018-12-12
Attending: ORTHOPAEDIC SURGERY

## 2018-12-12 ENCOUNTER — APPOINTMENT (OUTPATIENT)
Dept: RADIOLOGY | Facility: MEDICAL CENTER | Age: 63
End: 2018-12-12
Payer: COMMERCIAL

## 2018-12-12 ENCOUNTER — OFFICE VISIT (OUTPATIENT)
Dept: FAMILY MEDICINE CLINIC | Facility: CLINIC | Age: 63
End: 2018-12-12
Payer: COMMERCIAL

## 2018-12-12 ENCOUNTER — OFFICE VISIT (OUTPATIENT)
Dept: OBGYN CLINIC | Facility: MEDICAL CENTER | Age: 63
End: 2018-12-12
Payer: COMMERCIAL

## 2018-12-12 VITALS
HEIGHT: 66 IN | WEIGHT: 166 LBS | HEART RATE: 80 BPM | BODY MASS INDEX: 26.68 KG/M2 | SYSTOLIC BLOOD PRESSURE: 109 MMHG | DIASTOLIC BLOOD PRESSURE: 77 MMHG

## 2018-12-12 VITALS — WEIGHT: 166 LBS | BODY MASS INDEX: 26.68 KG/M2 | HEIGHT: 66 IN

## 2018-12-12 VITALS
DIASTOLIC BLOOD PRESSURE: 77 MMHG | HEART RATE: 66 BPM | SYSTOLIC BLOOD PRESSURE: 110 MMHG | TEMPERATURE: 97.8 F | OXYGEN SATURATION: 99 %

## 2018-12-12 DIAGNOSIS — J45.909 UNCOMPLICATED ASTHMA, UNSPECIFIED ASTHMA SEVERITY, UNSPECIFIED WHETHER PERSISTENT: Primary | ICD-10-CM

## 2018-12-12 DIAGNOSIS — Z12.31 SCREENING MAMMOGRAM, ENCOUNTER FOR: ICD-10-CM

## 2018-12-12 DIAGNOSIS — G47.00 INSOMNIA, UNSPECIFIED TYPE: ICD-10-CM

## 2018-12-12 DIAGNOSIS — G56.21 CUBITAL TUNNEL SYNDROME ON RIGHT: ICD-10-CM

## 2018-12-12 DIAGNOSIS — R00.2 HEART PALPITATIONS: ICD-10-CM

## 2018-12-12 DIAGNOSIS — M77.01 MEDIAL EPICONDYLITIS OF ELBOW, RIGHT: ICD-10-CM

## 2018-12-12 DIAGNOSIS — E04.1 THYROID NODULE: ICD-10-CM

## 2018-12-12 DIAGNOSIS — M25.521 PAIN IN RIGHT ELBOW: Primary | ICD-10-CM

## 2018-12-12 DIAGNOSIS — M77.11 LATERAL EPICONDYLITIS OF RIGHT ELBOW: ICD-10-CM

## 2018-12-12 DIAGNOSIS — C82.90 FOLLICULAR LYMPHOMA, UNSPECIFIED FOLLICULAR LYMPHOMA TYPE, UNSPECIFIED BODY REGION (HCC): ICD-10-CM

## 2018-12-12 DIAGNOSIS — M25.521 PAIN IN RIGHT ELBOW: ICD-10-CM

## 2018-12-12 PROCEDURE — 1036F TOBACCO NON-USER: CPT | Performed by: INTERNAL MEDICINE

## 2018-12-12 PROCEDURE — 99214 OFFICE O/P EST MOD 30 MIN: CPT | Performed by: INTERNAL MEDICINE

## 2018-12-12 PROCEDURE — 77067 SCR MAMMO BI INCL CAD: CPT

## 2018-12-12 PROCEDURE — 73080 X-RAY EXAM OF ELBOW: CPT

## 2018-12-12 PROCEDURE — 99213 OFFICE O/P EST LOW 20 MIN: CPT | Performed by: ORTHOPAEDIC SURGERY

## 2018-12-12 RX ORDER — ZOLPIDEM TARTRATE 5 MG/1
5 TABLET ORAL
Qty: 90 TABLET | Refills: 1 | Status: SHIPPED | OUTPATIENT
Start: 2018-12-12 | End: 2019-08-28 | Stop reason: SDUPTHER

## 2018-12-12 RX ORDER — LEVALBUTEROL TARTRATE 45 MCG
2 HFA AEROSOL WITH ADAPTER (GRAM) INHALATION EVERY 6 HOURS PRN
Qty: 1 INHALER | Refills: 0
Start: 2018-12-12 | End: 2019-10-21 | Stop reason: SDUPTHER

## 2018-12-12 RX ORDER — MONTELUKAST SODIUM 10 MG/1
10 TABLET ORAL
Qty: 90 TABLET | Refills: 1 | Status: SHIPPED | OUTPATIENT
Start: 2018-12-12 | End: 2019-03-21 | Stop reason: SDUPTHER

## 2018-12-12 RX ORDER — DEXAMETHASONE SODIUM PHOSPHATE 4 MG/ML
120 INJECTION, SOLUTION INTRA-ARTICULAR; INTRALESIONAL; INTRAMUSCULAR; INTRAVENOUS; SOFT TISSUE ONCE
Qty: 30 ML | Refills: 0 | Status: SHIPPED | OUTPATIENT
Start: 2018-12-12 | End: 2018-12-12

## 2018-12-12 NOTE — PROGRESS NOTES
Assessment/Plan:         Diagnoses and all orders for this visit:    Uncomplicated asthma, unspecified asthma severity, unspecified whether persistent  Comments:  try singulair  Orders:  -     montelukast (SINGULAIR) 10 mg tablet; Take 1 tablet (10 mg total) by mouth daily at bedtime  -     XOPENEX HFA 45 MCG/ACT inhaler; Inhale 2 puffs every 6 (six) hours as needed for wheezing    Insomnia, unspecified type  Comments:  prn ambien  Orders:  -     zolpidem (AMBIEN) 5 mg tablet; Take 1 tablet (5 mg total) by mouth daily at bedtime as needed for sleep    Heart palpitations  Comments:  on lower dose of metoprolol for allergies  Orders:  -     TSH, 3rd generation; Future  -     T4, free; Future    Thyroid nodule  Comments:  being followed by ent    Follicular lymphoma, unspecified follicular lymphoma type, unspecified body region [facility])  Comments:  sees heme/onc  Orders:  -     CBC; Future  -     Comprehensive metabolic panel; Future    Other orders  -     azelastine (ASTELIN) 0 1 % nasal spray;   -     Discontinue: predniSONE 10 mg tablet;           Subjective:      Patient ID: Kate Favorite is a 66 y o  female  Pt states she is wheezing - especially when she lays down  ? If she has asthma  Also has now allergies to trees  She also asked if singulair would be helpful  She is seeing ent for thyroid nodule        The following portions of the patient's history were reviewed and updated as appropriate:   She  has a past medical history of Celiac disease; Follicular lymphoma ([address] ); GERD (gastroesophageal reflux disease); Heart palpitations; History of colonic polyps; Lymphoma, 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[address] ); Malignant lymphoma ([address] ); Postmenopausal disorder; Restless legs syndrome; and TMJ syndrome    She   Patient Active Problem List    Diagnosis Date Noted    Follicular lymphoma ([address] ) 08/29/2018    Contusion of left lower leg 08/01/2018    Fall at home, initial encounter 08/01/2018    Atrophic vaginitis 05/18/2018    VERONIKA (stress urinary incontinence, female) 05/18/2018    PVC's (premature ventricular contractions) 04/24/2018    Dyslipidemia 04/24/2018    Celiac disease 04/19/2018    Premature ventricular contraction 11/30/2017    Chronic GERD 10/03/2017    Epigastric discomfort 10/03/2017    Back pain 09/21/2017    Heart palpitations 09/21/2017    Insomnia 09/21/2017    Sciatica associated with disorder of lumbar spine 09/21/2017    Vitamin D deficiency 06/28/2017    Osteopenia 11/17/2016     She  has a past surgical history that includes Lymph node dissection (Right); pr esophagogastroduodenoscopy transoral diagnostic (N/A, 1/18/2016); Colonoscopy (2015); Nasal septum surgery; Hysterectomy; and Total abdominal hysterectomy w/ bilateral salpingoophorectomy  Her family history includes Atrial fibrillation in her father; Breast cancer (age of onset: 71) in her paternal aunt and paternal grandmother; Colonic polyp in her father; Diabetes in her father; Heart failure in her father; Hypertension in her father; Lymphoma in her mother; Ovarian cancer (age of onset: 79) in her paternal aunt; Throat cancer in her father; Thyroid cancer in her sister  She  reports that she quit smoking about 14 years ago  Her smoking use included Cigarettes  She quit after 20 00 years of use  She has never used smokeless tobacco  She reports that she drinks alcohol  She reports that she does not use drugs  Current Outpatient Prescriptions   Medication Sig Dispense Refill    acetaminophen (TYLENOL) 500 mg tablet Take 500 mg by mouth every 6 (six) hours as needed for mild pain   Ascorbic Acid (VITAMIN C) 100 MG tablet Take by mouth      azelastine (ASTELIN) 0 1 % nasal spray       cyanocobalamin (VITAMIN B-12) 1,000 mcg tablet Take 1,000 mcg by mouth daily      estradiol (ESTRACE) 0 5 MG tablet Take 1 tablet (0 5 mg total) by mouth daily 90 tablet 0    famotidine (PEPCID) 20 mg tablet Take 20 mg by mouth 2 (two) times a day        flecainide (TAMBOCOR) 100 mg tablet Take 1 tablet (100 mg total) by mouth 2 (two) times a day 180 tablet 3    fluticasone (FLONASE) 50 mcg/act nasal spray 2 sprays into each nostril 2 (two) times a day      Loratadine (CLARITIN) 10 MG CAPS Take by mouth      Magnesium 500 MG CAPS Take by mouth      metoprolol succinate (TOPROL-XL) 50 mg 24 hr tablet Take 1 tablet (50 mg total) by mouth daily (Patient taking differently: Take 25 mg by mouth daily  ) 90 tablet 1    montelukast (SINGULAIR) 10 mg tablet Take 1 tablet (10 mg total) by mouth daily at bedtime 90 tablet 1    ondansetron (ZOFRAN-ODT) 4 mg disintegrating tablet Take by mouth      pantoprazole (PROTONIX) 20 mg tablet Take 1 tablet (20 mg total) by mouth daily for 90 days 90 tablet 2    pseudoephedrine (SUDAFED) 30 mg tablet Take 1 tablet by mouth every 6 (six) hours as needed      Vitamin D, Ergocalciferol, 2000 units CAPS Take by mouth      XOPENEX HFA 45 MCG/ACT inhaler Inhale 2 puffs every 6 (six) hours as needed for wheezing 1 Inhaler 0    zolpidem (AMBIEN) 5 mg tablet Take 1 tablet (5 mg total) by mouth daily at bedtime as needed for sleep 90 tablet 1     No current facility-administered medications for this visit  Current Outpatient Prescriptions on File Prior to Visit   Medication Sig    acetaminophen (TYLENOL) 500 mg tablet Take 500 mg by mouth every 6 (six) hours as needed for mild pain   Ascorbic Acid (VITAMIN C) 100 MG tablet Take by mouth    cyanocobalamin (VITAMIN B-12) 1,000 mcg tablet Take 1,000 mcg by mouth daily    estradiol (ESTRACE) 0 5 MG tablet Take 1 tablet (0 5 mg total) by mouth daily    famotidine (PEPCID) 20 mg tablet Take 20 mg by mouth 2 (two) times a day      flecainide (TAMBOCOR) 100 mg tablet Take 1 tablet (100 mg total) by mouth 2 (two) times a day    fluticasone (FLONASE) 50 mcg/act nasal spray 2 sprays into each nostril 2 (two) times a day    Loratadine (CLARITIN) 10 MG CAPS Take by mouth    Magnesium 500 MG CAPS Take by mouth    metoprolol succinate (TOPROL-XL) 50 mg 24 hr tablet Take 1 tablet (50 mg total) by mouth daily (Patient taking differently: Take 25 mg by mouth daily  )    ondansetron (ZOFRAN-ODT) 4 mg disintegrating tablet Take by mouth    pantoprazole (PROTONIX) 20 mg tablet Take 1 tablet (20 mg total) by mouth daily for 90 days    pseudoephedrine (SUDAFED) 30 mg tablet Take 1 tablet by mouth every 6 (six) hours as needed    Vitamin D, Ergocalciferol, 2000 units CAPS Take by mouth     No current facility-administered medications on file prior to visit  She is allergic to shellfish-derived products; wheat bran; gluten meal; morphine and related; nuts; other; and sulfa antibiotics       Review of Systems   Constitutional: Negative  HENT: Negative  Respiratory: Negative  Cardiovascular: Negative  Endocrine: Negative  Objective:      /77 (BP Location: Left arm, Patient Position: Sitting, Cuff Size: Standard)   Pulse 66   Temp 97 8 °F (36 6 °C)   SpO2 99%          Physical Exam   Constitutional: She appears well-developed and well-nourished  HENT:   Head: Normocephalic and atraumatic  Right Ear: External ear normal    Left Ear: External ear normal    Nose: Nose normal    Mouth/Throat: Oropharynx is clear and moist    Eyes: Pupils are equal, round, and reactive to light  EOM are normal    Neck: Normal range of motion  Neck supple  Cardiovascular: Normal rate, regular rhythm and normal heart sounds  Pulmonary/Chest: Effort normal and breath sounds normal    Abdominal: Soft   Bowel sounds are normal

## 2018-12-12 NOTE — PROGRESS NOTES
63 y o female presents for initial evaluation of right elbow pain that is localized to the medial aspect of the distal humerus over the area of the medial epicondyle  She had a history of follicular lymphoma in the area that has undergone radiation treatment in the past  She previously was treated for tennis elbow with a corticosteroid injection which she states helped her problem significantly  She currently also experiences numbness in her ring and small fingers which occasionally wakes her up at night  She has not had treatment for this problem at this time  She is right handed and has an overuse pattern of her right upper extremity secondary to left wrist instability  Review of Systems  Review of systems negative unless otherwise specified in HPI    Past Medical History  Past Medical History:   Diagnosis Date    Celiac disease     [facility] [address] [address] [facility]     GERD (gastroesophageal reflux disease)     Heart palpitations     History of colonic polyps     resolved 07/12/2016    Lymphoma, 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HCC)     non hodgekins, remission    Malignant lymphoma (HCC)     rt arm cutaneous follicular stage ia (rt diatal medical biceps area , s/p resected and s/p radistion treatment    resolved 12/17/2014    Postmenopausal disorder     resolved 09/21/2017    Restless legs syndrome     resolved 09/21/2017    TMJ syndrome     resolved 09/21/2017       Past Surgical History  Past Surgical History:   Procedure Laterality Date    COLONOSCOPY  2015    HYSTERECTOMY      LYMPH NODE DISSECTION Right     arm    NASAL SEPTUM SURGERY      deviation, stents turned into turbinates     [place] ESOPHAGOGASTRODUODENOSCOPY TRANSORAL DIAGNOSTIC N/A 1/18/2016    Procedure: ESOPHAGOGASTRODUODENOSCOPY (EGD); Surgeon: Maria Luz Fry MD;  [facility];   Service: Gastroenterology    TOTAL ABDOMINAL HYSTERECTOMY W/ BILATERAL SALPINGOOPHORECTOMY         Current Medications  Current Outpatient Prescriptions on File Prior to Visit   Medication Sig Dispense Refill    acetaminophen (TYLENOL) 500 mg tablet Take 500 mg by mouth every 6 (six) hours as needed for mild pain   Ascorbic Acid (VITAMIN C) 100 MG tablet Take by mouth      azelastine (ASTELIN) 0 1 % nasal spray       cyanocobalamin (VITAMIN B-12) 1,000 mcg tablet Take 1,000 mcg by mouth daily      estradiol (ESTRACE) 0 5 MG tablet Take 1 tablet (0 5 mg total) by mouth daily 90 tablet 0    famotidine (PEPCID) 20 mg tablet Take 20 mg by mouth 2 (two) times a day   flecainide (TAMBOCOR) 100 mg tablet Take 1 tablet (100 mg total) by mouth 2 (two) times a day 180 tablet 3    fluticasone (FLONASE) 50 mcg/act nasal spray 2 sprays into each nostril 2 (two) times a day      Loratadine (CLARITIN) 10 MG CAPS Take by mouth      Magnesium 500 MG CAPS Take by mouth      metoprolol succinate (TOPROL-XL) 50 mg 24 hr tablet Take 1 tablet (50 mg total) by mouth daily (Patient taking differently: Take 25 mg by mouth daily  ) 90 tablet 1    ondansetron (ZOFRAN-ODT) 4 mg disintegrating tablet Take by mouth      pseudoephedrine (SUDAFED) 30 mg tablet Take 1 tablet by mouth every 6 (six) hours as needed      Vitamin D, Ergocalciferol, 2000 units CAPS Take by mouth      XOPENEX HFA 45 MCG/ACT inhaler every 6 (six) hours as needed        zolpidem (AMBIEN) 5 mg tablet Take 1 tablet (5 mg total) by mouth daily at bedtime as needed for sleep 90 tablet 0    pantoprazole (PROTONIX) 20 mg tablet Take 1 tablet (20 mg total) by mouth daily for 90 days 90 tablet 2    [DISCONTINUED] cholecalciferol (VITAMIN D3) 35498 units capsule Take 5 capsules (50,000 Units total) by mouth daily for 90 days 60 capsule 2    [DISCONTINUED] predniSONE 10 mg tablet        No current facility-administered medications on file prior to visit          Recent Labs [facility])    0  Lab Value Date/Time   HCT 42 4 06/13/2018 1105   HCT 39 0 06/22/2015 1134   HGB 13 8 06/13/2018 1105   HGB 13 2 06/22/2015 1134   WBC 6 23 06/13/2018 1105   WBC 4 93 06/22/2015 1134   INR 1 04 01/04/2015 0352   GLUCOSE 112 06/22/2015 1134   HGBA1C 5 9 06/13/2018 1105   HGBA1C 5 9 (H) 06/22/2015 1134         Physical exam  General: Awake, Alert, Oriented  HEENT: No scleral injection, no evidence of facial trauma  Heart: Extremities warm and well perfused  Lungs: No audible wheezing  ·   right Elbow  · Tenderness to palpation over medial epicondyle  · Moderate pain elicited over flexor origin with resisted flexion of wrist with elbow in flexed position  · Positive Tinel's test over cubital tunnel producing numbness in ulnar two digits  · Minimal pain with dorsiflexion of hip against resistance  · Tenderness palpation over the lateral epicondylar region  · Extremity warm/well perfused    Procedure  None    Imaging  Plain films of right elbow reveal minimal degenerative changes  MRI of right elbow reveals tendinosis of flexor and extensor tendons without tearing    A/P: 63F with right elbow pain and finger numbness secondary to medial epicondylitis and cubital tunnel syndrome with element of lateral epicondylitis as well  WBAt RUE  Tennis elbow strap offered to patient and she did not like it, did not dispense  Oral NSAIDs prn for pain  Will refer to OT for R elbow therapy for cubital tunnel syndrome and medial epicondylitis including iontophoresis therapy  If no improvement from oral NSAIDs and OT, would be candidate for evaluation by hand surgeon  Follow-up PRN  Discussed treatment plan with patient and she is in agreement treatment plan    Thank you    Didi Campos  12/12/18

## 2018-12-19 ENCOUNTER — OFFICE VISIT (OUTPATIENT)
Dept: CARDIOLOGY CLINIC | Facility: CLINIC | Age: 63
End: 2018-12-19
Payer: COMMERCIAL

## 2018-12-19 VITALS
HEIGHT: 66 IN | BODY MASS INDEX: 26.84 KG/M2 | DIASTOLIC BLOOD PRESSURE: 72 MMHG | HEART RATE: 73 BPM | SYSTOLIC BLOOD PRESSURE: 118 MMHG | WEIGHT: 167 LBS

## 2018-12-19 DIAGNOSIS — I49.3 PREMATURE VENTRICULAR CONTRACTION: ICD-10-CM

## 2018-12-19 DIAGNOSIS — E78.5 DYSLIPIDEMIA: ICD-10-CM

## 2018-12-19 DIAGNOSIS — I49.3 PVC (PREMATURE VENTRICULAR CONTRACTION): Primary | ICD-10-CM

## 2018-12-19 PROCEDURE — 93000 ELECTROCARDIOGRAM COMPLETE: CPT | Performed by: INTERNAL MEDICINE

## 2018-12-19 PROCEDURE — 99214 OFFICE O/P EST MOD 30 MIN: CPT | Performed by: INTERNAL MEDICINE

## 2018-12-19 NOTE — PROGRESS NOTES
Cardiology   Yuki Hopkins 66 y o  female MRN: [id number]      Impression:  1  Premature ventricular contractions - Almost completely remitted  2  Dyslipidemia       Recommendations:  1  Continue current medications  2  Follow up in 6 months  HPI: Yuki Hopkins is a 66y o  year old female with premature contractions who returns for follow up  Rare palpitations - had more palpitations with decreasing medications  Otherwise feels well  No chest pain or shortness of breath  Review of Systems   Constitutional: Negative  HENT: Negative  Eyes: Negative  Respiratory: Negative for chest tightness and shortness of breath  Cardiovascular: Positive for palpitations  Negative for chest pain and leg swelling  Gastrointestinal: Negative  Endocrine: Negative  Genitourinary: Negative  Musculoskeletal: Negative  Skin: Negative  Allergic/Immunologic: Negative  Neurological: Negative  Hematological: Negative  Psychiatric/Behavioral: Negative  All other systems reviewed and are negative          Past Medical History:   Diagnosis Date    Celiac disease     [facility] [address] [address]     GERD (gastroesophageal reflux disease)     Heart palpitations     History of colonic polyps     resolved 07/12/2016    Lymphoma, 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HCC)     non hodgekins, remission    Malignant lymphoma (HCC)     rt arm cutaneous follicular stage ia (rt diatal medical biceps area , s/p resected and s/p radistion treatment    resolved 12/17/2014    Postmenopausal disorder     resolved 09/21/2017    Restless legs syndrome     resolved 09/21/2017    TMJ syndrome     resolved 09/21/2017     Past Surgical History:   Procedure Laterality Date    COLONOSCOPY  2015    HYSTERECTOMY      age 39 complete    LYMPH NODE DISSECTION Right     arm    NASAL SEPTUM SURGERY      deviation, stents turned into turbinates     [place] ESOPHAGOGASTRODUODENOSCOPY TRANSORAL DIAGNOSTIC N/A 1/18/2016    Procedure: ESOPHAGOGASTRODUODENOSCOPY (EGD); Surgeon: Asya Guardado MD;  [facility]; Service: Gastroenterology    TOTAL ABDOMINAL HYSTERECTOMY W/ BILATERAL SALPINGOOPHORECTOMY      age 54     History   Alcohol Use    Yes     Comment: social     History   Drug Use No     History   Smoking Status    Former Smoker    Years: 20 00    Types: Cigarettes    Quit date: 2004   Smokeless Tobacco    Never Used     Family History   Problem Relation Age of Onset    Lymphoma Mother     Throat cancer Father     Heart failure Father     Atrial fibrillation Father     Diabetes Father     Colonic polyp Father     Hypertension Father     Thyroid cancer Sister     Breast cancer Paternal Grandmother 71    Breast cancer Paternal Aunt 71    Ovarian cancer Paternal Aunt 79       Allergies: Allergies   Allergen Reactions    Shellfish-Derived Products Anaphylaxis    Wheat Bran Anaphylaxis    Gluten Meal GI Intolerance     Celiac     Morphine And Related Itching    Nuts Hives     Almonds and other related nuts       Other Itching    Sulfa Antibiotics Rash       Medications:     Current Outpatient Prescriptions:     acetaminophen (TYLENOL) 500 mg tablet, Take 500 mg by mouth every 6 (six) hours as needed for mild pain , Disp: , Rfl:     Ascorbic Acid (VITAMIN C) 100 MG tablet, Take by mouth, Disp: , Rfl:     cyanocobalamin (VITAMIN B-12) 1,000 mcg tablet, Take 1,000 mcg by mouth daily, Disp: , Rfl:     estradiol (ESTRACE) 0 5 MG tablet, Take 1 tablet (0 5 mg total) by mouth daily, Disp: 90 tablet, Rfl: 0    famotidine (PEPCID) 20 mg tablet, Take 20 mg by mouth 2 (two) times a day , Disp: , Rfl:     flecainide (TAMBOCOR) 100 mg tablet, Take 1 tablet (100 mg total) by mouth 2 (two) times a day, Disp: 180 tablet, Rfl: 3    fluticasone (FLONASE) 50 mcg/act nasal spray, 2 sprays into each nostril 2 (two) times a day, Disp: , Rfl:     Loratadine (CLARITIN) 10 MG CAPS, Take by mouth, Disp: , Rfl:     Magnesium 500 MG CAPS, Take by mouth, Disp: , Rfl:     metoprolol succinate (TOPROL-XL) 50 mg 24 hr tablet, Take 1 tablet (50 mg total) by mouth daily (Patient taking differently: Take 25 mg by mouth daily  ), Disp: 90 tablet, Rfl: 1    montelukast (SINGULAIR) 10 mg tablet, Take 1 tablet (10 mg total) by mouth daily at bedtime, Disp: 90 tablet, Rfl: 1    ondansetron (ZOFRAN-ODT) 4 mg disintegrating tablet, Take by mouth, Disp: , Rfl:     pantoprazole (PROTONIX) 20 mg tablet, Take 1 tablet (20 mg total) by mouth daily for 90 days, Disp: 90 tablet, Rfl: 2    pseudoephedrine (SUDAFED) 30 mg tablet, Take 1 tablet by mouth every 6 (six) hours as needed, Disp: , Rfl:     Vitamin D, Ergocalciferol, 2000 units CAPS, Take by mouth, Disp: , Rfl:     XOPENEX HFA 45 MCG/ACT inhaler, Inhale 2 puffs every 6 (six) hours as needed for wheezing, Disp: 1 Inhaler, Rfl: 0    zolpidem (AMBIEN) 5 mg tablet, Take 1 tablet (5 mg total) by mouth daily at bedtime as needed for sleep, Disp: 90 tablet, Rfl: 1      Wt Readings from Last 3 Encounters:   12/19/18 75 8 kg (167 lb)   12/12/18 75 3 kg (166 lb)   12/12/18 75 3 kg (166 lb)     Temp Readings from Last 3 Encounters:   12/12/18 97 8 °F (36 6 °C)   12/04/18 (!) 96 6 °F (35 9 °C) (Tympanic)   08/29/18 (!) 96 1 °F (35 6 °C) (Tympanic)     BP Readings from Last 3 Encounters:   12/19/18 118/72   12/12/18 110/77   12/12/18 109/77     Pulse Readings from Last 3 Encounters:   12/19/18 73   12/12/18 66   12/12/18 80         Physical Exam   Constitutional: She is oriented to person, place, and time  She appears well-developed  HENT:   Head: Atraumatic  Eyes: EOM are normal    Neck: Normal range of motion  Cardiovascular: Normal rate, regular rhythm and normal heart sounds  Exam reveals no gallop and no friction rub  No murmur heard  Pulmonary/Chest: Effort normal and breath sounds normal  No respiratory distress  She has no wheezes  She has no rales  Abdominal: Soft     Musculoskeletal: Normal range of motion  Neurological: She is alert and oriented to person, place, and time  Skin: Skin is warm and dry  Psychiatric: She has a normal mood and affect  Laboratory Studies:  CMP:  Lab Results   Component Value Date     2015    K 4 2 2018     2018    CO2 28 2018    ANIONGAP 10 2015    BUN 19 2018    CREATININE 1 03 2018    GLUCOSE 112 2015    AST 22 2018    ALT 30 2018    BILITOT 0 63 2015    EGFR 59 2018       Lipid Profile:   Lab Results   Component Value Date    CHOL 200 2015     Lab Results   Component Value Date    HDL 47 2018     Lab Results   Component Value Date    LDLCALC 112 (H) 2018     Lab Results   Component Value Date    TRIG 273 (H) 2018       Cardiac testing:   EKG reviewed personally: NSR 68 Low voltage  Results for orders placed during the hospital encounter of 17   Echo complete with contrast if indicated    Narrative [facility] [address] [place], [place] [address] [address]  [phone]    Transthoracic Echocardiogram  2D, M-mode, Doppler, and Color Doppler    Study date:  2017    Patient: [facility]  MR number: [id number]  Account number: [de-identified]  : 1955  Age: 63 years  Gender: Female  Status: Outpatient  [facility] [address] [place] [address] [facility]  Height: 65 in  Weight: 161 7 lb  BP: 122/ 84 mmHg    Indications: Palpitations  Diagnoses: R00 2 - Palpitations    Sonographer:  ALFONSO Olmstead  Primary Physician:  Lon Rivera DO  Referring Physician:  Lon Rivera DO  Group:  [id number]  Interpreting Physician:  Jud Gonzalez DO    SUMMARY    LEFT VENTRICLE:  Systolic function was normal  Ejection fraction was estimated to be 60 %  There were no regional wall motion abnormalities  MITRAL VALVE:  There was trace regurgitation      TRICUSPID VALVE:  There was mild regurgitation  PULMONIC VALVE:  There was trace regurgitation  HISTORY: PRIOR HISTORY: Malignant lymphoma; Former smoker    PROCEDURE: The study was performed in the [address]  This was a routine study  The transthoracic approach was used  The study included complete 2D imaging, M-mode, complete spectral Doppler, and color Doppler  The  heart rate was 88 bpm, at the start of the study  Images were obtained from the parasternal, apical, subcostal, and suprasternal notch acoustic windows  Echocardiographic views were limited due to poor acoustic window availability  Image  quality was adequate  LEFT VENTRICLE: Size was normal  Systolic function was normal  Ejection fraction was estimated to be 60 %  There were no regional wall motion abnormalities  Wall thickness was normal  No evidence of apical thrombus  DOPPLER: Left  ventricular diastolic function parameters were normal     RIGHT VENTRICLE: The size was normal  Systolic function was normal  Wall thickness was normal     LEFT ATRIUM: Size was normal     RIGHT ATRIUM: Size was normal     MITRAL VALVE: Valve structure was normal  There was normal leaflet separation  DOPPLER: The transmitral velocity was within the normal range  There was no evidence for stenosis  There was trace regurgitation  AORTIC VALVE: The valve was trileaflet  Leaflets exhibited normal thickness, normal cuspal separation, and sclerosis  DOPPLER: Transaortic velocity was within the normal range  There was no evidence for stenosis  There was no significant  regurgitation  TRICUSPID VALVE: The valve structure was normal  There was normal leaflet separation  DOPPLER: The transtricuspid velocity was within the normal range  There was no evidence for stenosis  There was mild regurgitation  Estimated peak PA  pressure was 25 mmHg  PULMONIC VALVE: Leaflets exhibited normal thickness, no calcification, and normal cuspal separation   DOPPLER: The transpulmonic velocity was within the normal range  There was trace regurgitation  PERICARDIUM: There was no pericardial effusion  The pericardium was normal in appearance  AORTA: The root exhibited normal size  SYSTEMIC VEINS: IVC: The inferior vena cava was normal in size      SYSTEM MEASUREMENT TABLES    2D  %FS: 27 71 %  Ao Diam: 3 09 cm  EDV(Teich): 84 03 ml  EF(Cube): 62 23 %  EF(Teich): 54 04 %  ESV(Cube): 30 48 ml  ESV(Teich): 38 62 ml  IVSd: 0 67 cm  LA Area: 11 17 cm2  LA Diam: 3 02 cm  LVEDV MOD A4C: 89 51 ml  LVEF MOD A4C: 58 64 %  LVESV MOD A4C: 37 03 ml  LVIDd: 4 32 cm  LVIDs: 3 12 cm  LVLd A4C: 8 34 cm  LVLs A4C: 6 37 cm  LVPWd: 0 77 cm  RA Area: 14 32 cm2  RV Diam : 3 7 cm  SI(Cube): 27 74 ml/m2  SI(Teich): 25 09 ml/m2  SV MOD A4C: 52 49 ml  SV(Cube): 50 21 ml  SV(Teich): 45 41 ml    CW  AV Vmax: 1 m/s  AV maxP 02 mmHg  TR Vmax: 2 16 m/s  TR maxP 69 mmHg    MM  TAPSE: 2 1 cm    PW  E': 0 1 m/s  E/E': 7 91  LVOT Vmax: 1 08 m/s  LVOT maxP 64 mmHg  MV A Jesus: 0 67 m/s  MV Dec [place]: 3 68 m/s2  MV DecT: 208 98 ms  MV E Jesus: 0 77 m/s  MV E/A Ratio: 1 14    [employer] Accredited Echocardiography Laboratory    Prepared and electronically signed by    Antonino Fenton DO  Signed 2017 16:03:54

## 2018-12-28 ENCOUNTER — APPOINTMENT (OUTPATIENT)
Dept: LAB | Facility: CLINIC | Age: 63
End: 2018-12-28
Payer: COMMERCIAL

## 2018-12-28 DIAGNOSIS — C82.90 FOLLICULAR LYMPHOMA, UNSPECIFIED FOLLICULAR LYMPHOMA TYPE, UNSPECIFIED BODY REGION (HCC): ICD-10-CM

## 2018-12-28 DIAGNOSIS — R00.2 HEART PALPITATIONS: ICD-10-CM

## 2018-12-28 LAB
ALBUMIN SERPL BCP-MCNC: 3.6 G/DL (ref 3.5–5)
ALP SERPL-CCNC: 46 U/L (ref 46–116)
ALT SERPL W P-5'-P-CCNC: 33 U/L (ref 12–78)
ANION GAP SERPL CALCULATED.3IONS-SCNC: 7 MMOL/L (ref 4–13)
AST SERPL W P-5'-P-CCNC: 22 U/L (ref 5–45)
BILIRUB SERPL-MCNC: 0.47 MG/DL (ref 0.2–1)
BUN SERPL-MCNC: 16 MG/DL (ref 5–25)
CALCIUM SERPL-MCNC: 8.8 MG/DL (ref 8.3–10.1)
CHLORIDE SERPL-SCNC: 108 MMOL/L (ref 100–108)
CO2 SERPL-SCNC: 25 MMOL/L (ref 21–32)
CREAT SERPL-MCNC: 0.87 MG/DL (ref 0.6–1.3)
ERYTHROCYTE [DISTWIDTH] IN BLOOD BY AUTOMATED COUNT: 13.1 % (ref 11.6–15.1)
GFR SERPL CREATININE-BSD FRML MDRD: 72 ML/MIN/1.73SQ M
GLUCOSE P FAST SERPL-MCNC: 108 MG/DL (ref 65–99)
HCT VFR BLD AUTO: 40.1 % (ref 34.8–46.1)
HGB BLD-MCNC: 13.3 G/DL (ref 11.5–15.4)
MCH RBC QN AUTO: 30.6 PG (ref 26.8–34.3)
MCHC RBC AUTO-ENTMCNC: 33.2 G/DL (ref 31.4–37.4)
MCV RBC AUTO: 92 FL (ref 82–98)
PLATELET # BLD AUTO: 200 THOUSANDS/UL (ref 149–390)
PMV BLD AUTO: 11.8 FL (ref 8.9–12.7)
POTASSIUM SERPL-SCNC: 4.1 MMOL/L (ref 3.5–5.3)
PROT SERPL-MCNC: 6.9 G/DL (ref 6.4–8.2)
RBC # BLD AUTO: 4.35 MILLION/UL (ref 3.81–5.12)
SODIUM SERPL-SCNC: 140 MMOL/L (ref 136–145)
T4 FREE SERPL-MCNC: 0.92 NG/DL (ref 0.76–1.46)
TSH SERPL DL<=0.05 MIU/L-ACNC: 1.73 UIU/ML (ref 0.36–3.74)
WBC # BLD AUTO: 6.45 THOUSAND/UL (ref 4.31–10.16)

## 2018-12-28 PROCEDURE — 36415 COLL VENOUS BLD VENIPUNCTURE: CPT

## 2018-12-28 PROCEDURE — 85027 COMPLETE CBC AUTOMATED: CPT

## 2018-12-28 PROCEDURE — 84439 ASSAY OF FREE THYROXINE: CPT

## 2018-12-28 PROCEDURE — 84443 ASSAY THYROID STIM HORMONE: CPT

## 2018-12-28 PROCEDURE — 80053 COMPREHEN METABOLIC PANEL: CPT

## 2019-01-05 ENCOUNTER — OFFICE VISIT (OUTPATIENT)
Dept: OBGYN CLINIC | Facility: CLINIC | Age: 64
End: 2019-01-05
Payer: COMMERCIAL

## 2019-01-05 VITALS
RESPIRATION RATE: 14 BRPM | SYSTOLIC BLOOD PRESSURE: 123 MMHG | BODY MASS INDEX: 26.87 KG/M2 | WEIGHT: 167.2 LBS | DIASTOLIC BLOOD PRESSURE: 89 MMHG | HEART RATE: 73 BPM | HEIGHT: 66 IN

## 2019-01-05 DIAGNOSIS — M65.832 EXTENSOR TENOSYNOVITIS OF LEFT WRIST: Primary | ICD-10-CM

## 2019-01-05 PROCEDURE — 99214 OFFICE O/P EST MOD 30 MIN: CPT | Performed by: FAMILY MEDICINE

## 2019-01-05 PROCEDURE — 20550 NJX 1 TENDON SHEATH/LIGAMENT: CPT | Performed by: FAMILY MEDICINE

## 2019-01-05 RX ORDER — BUPIVACAINE HYDROCHLORIDE 2.5 MG/ML
0.5 INJECTION, SOLUTION INFILTRATION; PERINEURAL
Status: COMPLETED | OUTPATIENT
Start: 2019-01-05 | End: 2019-01-05

## 2019-01-05 RX ORDER — TRIAMCINOLONE ACETONIDE 40 MG/ML
20 INJECTION, SUSPENSION INTRA-ARTICULAR; INTRAMUSCULAR
Status: COMPLETED | OUTPATIENT
Start: 2019-01-05 | End: 2019-01-05

## 2019-01-05 RX ORDER — BUPIVACAINE HYDROCHLORIDE 2.5 MG/ML
1 INJECTION, SOLUTION INFILTRATION; PERINEURAL
Status: COMPLETED | OUTPATIENT
Start: 2019-01-05 | End: 2019-01-05

## 2019-01-05 RX ADMIN — TRIAMCINOLONE ACETONIDE 20 MG: 40 INJECTION, SUSPENSION INTRA-ARTICULAR; INTRAMUSCULAR at 12:08

## 2019-01-05 RX ADMIN — BUPIVACAINE HYDROCHLORIDE 1 ML: 2.5 INJECTION, SOLUTION INFILTRATION; PERINEURAL at 12:08

## 2019-01-05 RX ADMIN — BUPIVACAINE HYDROCHLORIDE 0.5 ML: 2.5 INJECTION, SOLUTION INFILTRATION; PERINEURAL at 12:08

## 2019-03-21 ENCOUNTER — OFFICE VISIT (OUTPATIENT)
Dept: FAMILY MEDICINE CLINIC | Facility: CLINIC | Age: 64
End: 2019-03-21
Payer: COMMERCIAL

## 2019-03-21 ENCOUNTER — APPOINTMENT (OUTPATIENT)
Dept: RADIOLOGY | Facility: MEDICAL CENTER | Age: 64
End: 2019-03-21
Payer: COMMERCIAL

## 2019-03-21 VITALS
BODY MASS INDEX: 26.84 KG/M2 | WEIGHT: 167 LBS | SYSTOLIC BLOOD PRESSURE: 100 MMHG | HEART RATE: 73 BPM | DIASTOLIC BLOOD PRESSURE: 62 MMHG | RESPIRATION RATE: 16 BRPM | HEIGHT: 66 IN | OXYGEN SATURATION: 98 % | TEMPERATURE: 97.5 F

## 2019-03-21 DIAGNOSIS — Z12.11 SCREEN FOR COLON CANCER: Primary | ICD-10-CM

## 2019-03-21 DIAGNOSIS — J45.909 UNCOMPLICATED ASTHMA, UNSPECIFIED ASTHMA SEVERITY, UNSPECIFIED WHETHER PERSISTENT: ICD-10-CM

## 2019-03-21 DIAGNOSIS — R51.9 NONINTRACTABLE HEADACHE, UNSPECIFIED CHRONICITY PATTERN, UNSPECIFIED HEADACHE TYPE: ICD-10-CM

## 2019-03-21 DIAGNOSIS — I49.3 PVC (PREMATURE VENTRICULAR CONTRACTION): ICD-10-CM

## 2019-03-21 DIAGNOSIS — J06.9 UPPER RESPIRATORY TRACT INFECTION, UNSPECIFIED TYPE: ICD-10-CM

## 2019-03-21 PROCEDURE — 71046 X-RAY EXAM CHEST 2 VIEWS: CPT

## 2019-03-21 PROCEDURE — 1036F TOBACCO NON-USER: CPT | Performed by: INTERNAL MEDICINE

## 2019-03-21 PROCEDURE — 99214 OFFICE O/P EST MOD 30 MIN: CPT | Performed by: INTERNAL MEDICINE

## 2019-03-21 PROCEDURE — 3008F BODY MASS INDEX DOCD: CPT | Performed by: INTERNAL MEDICINE

## 2019-03-21 RX ORDER — FLECAINIDE ACETATE 100 MG/1
100 TABLET ORAL 2 TIMES DAILY
Qty: 180 TABLET | Refills: 3 | Status: SHIPPED | OUTPATIENT
Start: 2019-03-21 | End: 2020-03-10

## 2019-03-21 RX ORDER — MELOXICAM 15 MG/1
15 TABLET ORAL DAILY
Qty: 90 TABLET | Refills: 1 | Status: SHIPPED | OUTPATIENT
Start: 2019-03-21 | End: 2021-07-09

## 2019-03-21 RX ORDER — PREDNISONE 10 MG/1
TABLET ORAL
Qty: 20 TABLET | Refills: 0 | Status: SHIPPED | OUTPATIENT
Start: 2019-03-21 | End: 2019-05-08

## 2019-03-21 RX ORDER — MONTELUKAST SODIUM 10 MG/1
10 TABLET ORAL
Qty: 90 TABLET | Refills: 1 | Status: SHIPPED | OUTPATIENT
Start: 2019-03-21 | End: 2019-12-17 | Stop reason: SDUPTHER

## 2019-03-21 RX ORDER — AMOXICILLIN 500 MG/1
500 CAPSULE ORAL EVERY 8 HOURS SCHEDULED
Qty: 30 CAPSULE | Refills: 0 | Status: SHIPPED | OUTPATIENT
Start: 2019-03-21 | End: 2019-03-31

## 2019-03-21 NOTE — PROGRESS NOTES
Assessment/Plan:         Diagnoses and all orders for this visit:    Screen for colon cancer  -     Ambulatory referral to Colorectal Surgery; Future    Uncomplicated asthma, unspecified asthma severity, unspecified whether persistent  Comments:  try singulair  Orders:  -     XR chest pa & lateral; Future  -     montelukast (SINGULAIR) 10 mg tablet; Take 1 tablet (10 mg total) by mouth daily at bedtime  -     predniSONE 10 mg tablet; 4 tabs once daily x 2 days then 3 tabs once daily x 2 days then2 tabs once daily x 2 days then one tab daily  -     fluticasone-salmeterol (ADVAIR DISKUS) 250-50 mcg/dose inhaler; Inhale 1 puff 2 (two) times a day Rinse mouth after use  Nonintractable headache, unspecified chronicity pattern, unspecified headache type  -     meloxicam (MOBIC) 15 mg tablet; Take 1 tablet (15 mg total) by mouth daily    Upper respiratory tract infection, unspecified type  -     amoxicillin (AMOXIL) 500 mg capsule; Take 1 capsule (500 mg total) by mouth every 8 (eight) hours for 10 days          Subjective:      Patient ID: Anton Nichole is a 66 y o  female  She started on singulair and stated it helped  Now she is wheezing all the time  Coughing thinks she has wheezing and bronchitis x 2 days  Denies f/s/c  Discussed will control wheezing with       The following portions of the patient's history were reviewed and updated as appropriate: She  has a past medical history of Celiac disease, Follicular lymphoma ([address] ), GERD (gastroesophageal reflux disease), Heart palpitations, History of colonic polyps, Lymphoma, 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[address] ), Malignant lymphoma ([address] ), Postmenopausal disorder, Restless legs syndrome, and TMJ syndrome    She   Patient Active Problem List    Diagnosis Date Noted    Follicular lymphoma ([address] ) 08/29/2018    Contusion of left lower leg 08/01/2018    Fall at home, initial encounter 08/01/2018    Atrophic vaginitis 05/18/2018    VERONIKA (stress urinary incontinence, female) 05/18/2018  PVC's (premature ventricular contractions) 04/24/2018    Dyslipidemia 04/24/2018    Celiac disease 04/19/2018    Premature ventricular contraction 11/30/2017    Chronic GERD 10/03/2017    Epigastric discomfort 10/03/2017    Back pain 09/21/2017    Heart palpitations 09/21/2017    Insomnia 09/21/2017    Sciatica associated with disorder of lumbar spine 09/21/2017    Vitamin D deficiency 06/28/2017    Osteopenia 11/17/2016     She  has a past surgical history that includes Lymph node dissection (Right); pr esophagogastroduodenoscopy transoral diagnostic (N/A, 1/18/2016); Colonoscopy (2015); Nasal septum surgery; Hysterectomy; and Total abdominal hysterectomy w/ bilateral salpingoophorectomy  Her family history includes Atrial fibrillation in her father; Breast cancer (age of onset: 71) in her paternal aunt and paternal grandmother; Colonic polyp in her father; Diabetes in her father; Heart failure in her father; Hypertension in her father; Lymphoma in her mother; Ovarian cancer (age of onset: 79) in her paternal aunt; Throat cancer in her father; Thyroid cancer in her sister  She  reports that she quit smoking about 15 years ago  Her smoking use included cigarettes  She quit after 20 00 years of use  She has never used smokeless tobacco  She reports that she drinks alcohol  She reports that she does not use drugs  Current Outpatient Medications   Medication Sig Dispense Refill    acetaminophen (TYLENOL) 500 mg tablet Take 500 mg by mouth every 6 (six) hours as needed for mild pain        amoxicillin (AMOXIL) 500 mg capsule Take 1 capsule (500 mg total) by mouth every 8 (eight) hours for 10 days 30 capsule 0    Ascorbic Acid (VITAMIN C) 100 MG tablet Take by mouth      cyanocobalamin (VITAMIN B-12) 1,000 mcg tablet Take 1,000 mcg by mouth daily      estradiol (ESTRACE) 0 5 MG tablet Take 1 tablet (0 5 mg total) by mouth daily 90 tablet 0    famotidine (PEPCID) 20 mg tablet Take 20 mg by mouth 2 (two) times a day   flecainide (TAMBOCOR) 100 mg tablet Take 1 tablet (100 mg total) by mouth 2 (two) times a day 180 tablet 3    fluticasone (FLONASE) 50 mcg/act nasal spray 2 sprays into each nostril 2 (two) times a day      fluticasone-salmeterol (ADVAIR DISKUS) 250-50 mcg/dose inhaler Inhale 1 puff 2 (two) times a day Rinse mouth after use  1 Inhaler 1    Magnesium 500 MG CAPS Take by mouth      meloxicam (MOBIC) 15 mg tablet Take 1 tablet (15 mg total) by mouth daily 90 tablet 1    metoprolol succinate (TOPROL-XL) 50 mg 24 hr tablet Take 1 tablet (50 mg total) by mouth daily (Patient taking differently: Take 25 mg by mouth daily  ) 90 tablet 1    montelukast (SINGULAIR) 10 mg tablet Take 1 tablet (10 mg total) by mouth daily at bedtime 90 tablet 1    ondansetron (ZOFRAN-ODT) 4 mg disintegrating tablet Take by mouth      pantoprazole (PROTONIX) 20 mg tablet Take 1 tablet (20 mg total) by mouth daily for 90 days 90 tablet 2    predniSONE 10 mg tablet 4 tabs once daily x 2 days then 3 tabs once daily x 2 days then2 tabs once daily x 2 days then one tab daily 20 tablet 0    pseudoephedrine (SUDAFED) 30 mg tablet Take 1 tablet by mouth every 6 (six) hours as needed      Vitamin D, Ergocalciferol, 2000 units CAPS Take by mouth      XOPENEX HFA 45 MCG/ACT inhaler Inhale 2 puffs every 6 (six) hours as needed for wheezing 1 Inhaler 0    zolpidem (AMBIEN) 5 mg tablet Take 1 tablet (5 mg total) by mouth daily at bedtime as needed for sleep 90 tablet 1     No current facility-administered medications for this visit  Current Outpatient Medications on File Prior to Visit   Medication Sig    acetaminophen (TYLENOL) 500 mg tablet Take 500 mg by mouth every 6 (six) hours as needed for mild pain      Ascorbic Acid (VITAMIN C) 100 MG tablet Take by mouth    cyanocobalamin (VITAMIN B-12) 1,000 mcg tablet Take 1,000 mcg by mouth daily    estradiol (ESTRACE) 0 5 MG tablet Take 1 tablet (0 5 mg total) by mouth daily    famotidine (PEPCID) 20 mg tablet Take 20 mg by mouth 2 (two) times a day   fluticasone (FLONASE) 50 mcg/act nasal spray 2 sprays into each nostril 2 (two) times a day    Magnesium 500 MG CAPS Take by mouth    metoprolol succinate (TOPROL-XL) 50 mg 24 hr tablet Take 1 tablet (50 mg total) by mouth daily (Patient taking differently: Take 25 mg by mouth daily  )    ondansetron (ZOFRAN-ODT) 4 mg disintegrating tablet Take by mouth    pantoprazole (PROTONIX) 20 mg tablet Take 1 tablet (20 mg total) by mouth daily for 90 days    pseudoephedrine (SUDAFED) 30 mg tablet Take 1 tablet by mouth every 6 (six) hours as needed    Vitamin D, Ergocalciferol, 2000 units CAPS Take by mouth    XOPENEX HFA 45 MCG/ACT inhaler Inhale 2 puffs every 6 (six) hours as needed for wheezing    zolpidem (AMBIEN) 5 mg tablet Take 1 tablet (5 mg total) by mouth daily at bedtime as needed for sleep     No current facility-administered medications on file prior to visit  She is allergic to shellfish-derived products; wheat bran; gluten meal; morphine and related; nuts; other; and sulfa antibiotics       Review of Systems   Constitutional: Negative  HENT: Negative  Respiratory: Positive for cough and wheezing  Cardiovascular: Negative  Objective:      /62 (BP Location: Left arm, Patient Position: Sitting, Cuff Size: Large)   Pulse 73   Temp 97 5 °F (36 4 °C) (Tympanic)   Resp 16   Ht 5' 6" (1 676 m)   Wt 75 8 kg (167 lb)   SpO2 98%   BMI 26 95 kg/m²          Physical Exam   Constitutional: She appears well-developed and well-nourished  She appears distressed  HENT:   Head: Normocephalic  Right Ear: External ear normal    Left Ear: External ear normal    Nose: Nose normal    Mouth/Throat: Oropharynx is clear and moist  No oropharyngeal exudate  Neck: Normal range of motion  Neck supple  No thyromegaly present  Cardiovascular: Normal rate and regular rhythm   Exam reveals no gallop and no friction rub  No murmur heard  Pulmonary/Chest: Effort normal and breath sounds normal  No respiratory distress  She has no wheezes  She has no rales  Abdominal: Soft  Bowel sounds are normal    Lymphadenopathy:     She has no cervical adenopathy

## 2019-03-27 DIAGNOSIS — B37.9 YEAST INFECTION: Primary | ICD-10-CM

## 2019-03-28 RX ORDER — FLUCONAZOLE 150 MG/1
150 TABLET ORAL ONCE
Qty: 1 TABLET | Refills: 1 | Status: SHIPPED | OUTPATIENT
Start: 2019-03-28 | End: 2019-03-28

## 2019-03-29 PROBLEM — Z86.0100 PERSONAL HISTORY OF COLONIC POLYPS: Status: ACTIVE | Noted: 2019-03-29

## 2019-03-29 PROBLEM — Z86.010 PERSONAL HISTORY OF COLONIC POLYPS: Status: ACTIVE | Noted: 2019-03-29

## 2019-04-12 ENCOUNTER — TELEPHONE (OUTPATIENT)
Dept: FAMILY MEDICINE CLINIC | Facility: CLINIC | Age: 64
End: 2019-04-12

## 2019-04-24 ENCOUNTER — EVALUATION (OUTPATIENT)
Dept: OCCUPATIONAL THERAPY | Facility: CLINIC | Age: 64
End: 2019-04-24
Payer: COMMERCIAL

## 2019-04-24 ENCOUNTER — TELEPHONE (OUTPATIENT)
Dept: OBGYN CLINIC | Facility: CLINIC | Age: 64
End: 2019-04-24

## 2019-04-24 DIAGNOSIS — G56.21 CUBITAL TUNNEL SYNDROME ON RIGHT: Primary | ICD-10-CM

## 2019-04-24 DIAGNOSIS — M77.01 MEDIAL EPICONDYLITIS OF ELBOW, RIGHT: ICD-10-CM

## 2019-04-24 DIAGNOSIS — M25.521 PAIN IN RIGHT ELBOW: Primary | ICD-10-CM

## 2019-04-24 PROCEDURE — 97535 SELF CARE MNGMENT TRAINING: CPT

## 2019-04-24 PROCEDURE — 97035 APP MDLTY 1+ULTRASOUND EA 15: CPT

## 2019-04-24 PROCEDURE — 97166 OT EVAL MOD COMPLEX 45 MIN: CPT

## 2019-04-25 ENCOUNTER — OFFICE VISIT (OUTPATIENT)
Dept: OCCUPATIONAL THERAPY | Facility: CLINIC | Age: 64
End: 2019-04-25
Payer: COMMERCIAL

## 2019-04-25 DIAGNOSIS — G56.21 CUBITAL TUNNEL SYNDROME ON RIGHT: Primary | ICD-10-CM

## 2019-04-25 DIAGNOSIS — M77.01 MEDIAL EPICONDYLITIS OF ELBOW, RIGHT: ICD-10-CM

## 2019-04-25 PROCEDURE — 97535 SELF CARE MNGMENT TRAINING: CPT

## 2019-04-25 PROCEDURE — 97035 APP MDLTY 1+ULTRASOUND EA 15: CPT

## 2019-04-25 PROCEDURE — 97110 THERAPEUTIC EXERCISES: CPT

## 2019-05-08 ENCOUNTER — OFFICE VISIT (OUTPATIENT)
Dept: FAMILY MEDICINE CLINIC | Facility: CLINIC | Age: 64
End: 2019-05-08
Payer: COMMERCIAL

## 2019-05-08 VITALS
SYSTOLIC BLOOD PRESSURE: 104 MMHG | OXYGEN SATURATION: 98 % | DIASTOLIC BLOOD PRESSURE: 62 MMHG | HEART RATE: 71 BPM | WEIGHT: 169 LBS | BODY MASS INDEX: 27.16 KG/M2 | TEMPERATURE: 97.5 F | RESPIRATION RATE: 16 BRPM | HEIGHT: 66 IN

## 2019-05-08 DIAGNOSIS — J45.909 UNCOMPLICATED ASTHMA, UNSPECIFIED ASTHMA SEVERITY, UNSPECIFIED WHETHER PERSISTENT: ICD-10-CM

## 2019-05-08 DIAGNOSIS — C82.90 FOLLICULAR LYMPHOMA, UNSPECIFIED FOLLICULAR LYMPHOMA TYPE, UNSPECIFIED BODY REGION (HCC): ICD-10-CM

## 2019-05-08 DIAGNOSIS — M25.521 RIGHT ELBOW PAIN: Primary | ICD-10-CM

## 2019-05-08 DIAGNOSIS — J06.9 UPPER RESPIRATORY TRACT INFECTION, UNSPECIFIED TYPE: ICD-10-CM

## 2019-05-08 DIAGNOSIS — K90.0 CELIAC DISEASE: ICD-10-CM

## 2019-05-08 DIAGNOSIS — I49.3 PVC'S (PREMATURE VENTRICULAR CONTRACTIONS): ICD-10-CM

## 2019-05-08 PROCEDURE — 99214 OFFICE O/P EST MOD 30 MIN: CPT | Performed by: INTERNAL MEDICINE

## 2019-05-08 PROCEDURE — 3008F BODY MASS INDEX DOCD: CPT | Performed by: INTERNAL MEDICINE

## 2019-05-08 RX ORDER — METHYLPREDNISOLONE 4 MG/1
TABLET ORAL
Qty: 21 EACH | Refills: 0 | Status: SHIPPED | OUTPATIENT
Start: 2019-05-08 | End: 2019-05-31

## 2019-05-08 RX ORDER — AMOXICILLIN 500 MG/1
500 CAPSULE ORAL EVERY 8 HOURS SCHEDULED
Qty: 30 CAPSULE | Refills: 0 | Status: SHIPPED | OUTPATIENT
Start: 2019-05-08 | End: 2019-05-18

## 2019-05-23 ENCOUNTER — ANNUAL EXAM (OUTPATIENT)
Dept: OBGYN CLINIC | Facility: MEDICAL CENTER | Age: 64
End: 2019-05-23
Payer: COMMERCIAL

## 2019-05-23 VITALS — WEIGHT: 168 LBS | DIASTOLIC BLOOD PRESSURE: 72 MMHG | BODY MASS INDEX: 27.12 KG/M2 | SYSTOLIC BLOOD PRESSURE: 118 MMHG

## 2019-05-23 DIAGNOSIS — Z01.419 ENCOUNTER FOR GYNECOLOGICAL EXAMINATION (GENERAL) (ROUTINE) WITHOUT ABNORMAL FINDINGS: Primary | ICD-10-CM

## 2019-05-23 DIAGNOSIS — Z79.890 HORMONE REPLACEMENT THERAPY (HRT): ICD-10-CM

## 2019-05-23 DIAGNOSIS — Z12.39 SCREENING FOR MALIGNANT NEOPLASM OF BREAST: ICD-10-CM

## 2019-05-23 PROBLEM — R10.13 EPIGASTRIC DISCOMFORT: Status: RESOLVED | Noted: 2017-10-03 | Resolved: 2019-05-23

## 2019-05-23 PROBLEM — S80.12XA CONTUSION OF LEFT LOWER LEG: Status: RESOLVED | Noted: 2018-08-01 | Resolved: 2019-05-23
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
## 2019-05-23 PROCEDURE — 99396 PREV VISIT EST AGE 40-64: CPT | Performed by: NURSE PRACTITIONER

## 2019-05-30 ENCOUNTER — ANESTHESIA EVENT (OUTPATIENT)
Dept: GASTROENTEROLOGY | Facility: HOSPITAL | Age: 64
End: 2019-05-30

## 2019-05-30 RX ORDER — LIDOCAINE HYDROCHLORIDE 10 MG/ML
0.5 INJECTION, SOLUTION EPIDURAL; INFILTRATION; INTRACAUDAL; PERINEURAL ONCE AS NEEDED
Status: CANCELLED | OUTPATIENT
Start: 2019-05-30

## 2019-05-30 RX ORDER — SODIUM CHLORIDE 9 MG/ML
50 INJECTION, SOLUTION INTRAVENOUS CONTINUOUS
Status: CANCELLED | OUTPATIENT
Start: 2019-05-30

## 2019-05-31 ENCOUNTER — HOSPITAL ENCOUNTER (OUTPATIENT)
Dept: GASTROENTEROLOGY | Facility: HOSPITAL | Age: 64
Setting detail: OUTPATIENT SURGERY
Discharge: HOME | End: 2019-05-31
Admitting: COLON & RECTAL SURGERY
Payer: COMMERCIAL

## 2019-05-31 ENCOUNTER — ANESTHESIA (OUTPATIENT)
Dept: GASTROENTEROLOGY | Facility: HOSPITAL | Age: 64
End: 2019-05-31

## 2019-05-31 VITALS
OXYGEN SATURATION: 96 % | WEIGHT: 162 LBS | TEMPERATURE: 96.7 F | SYSTOLIC BLOOD PRESSURE: 106 MMHG | BODY MASS INDEX: 26.03 KG/M2 | HEIGHT: 66 IN | RESPIRATION RATE: 18 BRPM | HEART RATE: 67 BPM | DIASTOLIC BLOOD PRESSURE: 69 MMHG

## 2019-05-31 DIAGNOSIS — Z86.010 HISTORY OF COLONIC POLYPS: ICD-10-CM

## 2019-05-31 RX ORDER — SODIUM CHLORIDE 9 MG/ML
INJECTION, SOLUTION INTRAVENOUS CONTINUOUS PRN
Status: DISCONTINUED | OUTPATIENT
Start: 2019-05-31 | End: 2019-05-31 | Stop reason: SURG

## 2019-05-31 RX ORDER — LABETALOL 20 MG/4 ML (5 MG/ML) INTRAVENOUS SYRINGE
AS NEEDED
Status: DISCONTINUED | OUTPATIENT
Start: 2019-05-31 | End: 2019-05-31 | Stop reason: SURG

## 2019-05-31 RX ORDER — PROPOFOL 10 MG/ML
INJECTION, EMULSION INTRAVENOUS AS NEEDED
Status: DISCONTINUED | OUTPATIENT
Start: 2019-05-31 | End: 2019-05-31 | Stop reason: SURG

## 2019-05-31 RX ORDER — LIDOCAINE HYDROCHLORIDE 10 MG/ML
INJECTION, SOLUTION INFILTRATION; PERINEURAL AS NEEDED
Status: DISCONTINUED | OUTPATIENT
Start: 2019-05-31 | End: 2019-05-31 | Stop reason: SURG

## 2019-05-31 RX ADMIN — LABETALOL HYDROCHLORIDE 5 MG: 5 INJECTION, SOLUTION INTRAVENOUS at 08:32

## 2019-05-31 RX ADMIN — PROPOFOL 50 MG: 10 INJECTION, EMULSION INTRAVENOUS at 08:21

## 2019-05-31 RX ADMIN — LIDOCAINE HYDROCHLORIDE 50 MG: 10 INJECTION, SOLUTION INFILTRATION; PERINEURAL at 08:15

## 2019-05-31 RX ADMIN — SODIUM CHLORIDE: 0.9 INJECTION, SOLUTION INTRAVENOUS at 08:13

## 2019-05-31 RX ADMIN — PROPOFOL 40 MG: 10 INJECTION, EMULSION INTRAVENOUS at 08:36

## 2019-05-31 RX ADMIN — PROPOFOL 50 MG: 10 INJECTION, EMULSION INTRAVENOUS at 08:29

## 2019-05-31 RX ADMIN — PROPOFOL 40 MG: 10 INJECTION, EMULSION INTRAVENOUS at 08:23

## 2019-05-31 RX ADMIN — PROPOFOL 40 MG: 10 INJECTION, EMULSION INTRAVENOUS at 08:32

## 2019-05-31 RX ADMIN — PROPOFOL 100 MG: 10 INJECTION, EMULSION INTRAVENOUS at 08:15

## 2019-05-31 RX ADMIN — PROPOFOL 50 MG: 10 INJECTION, EMULSION INTRAVENOUS at 08:18

## 2019-05-31 RX ADMIN — PROPOFOL 50 MG: 10 INJECTION, EMULSION INTRAVENOUS at 08:27

## 2019-07-10 ENCOUNTER — OFFICE VISIT (OUTPATIENT)
Dept: CARDIOLOGY CLINIC | Facility: CLINIC | Age: 64
End: 2019-07-10
Payer: COMMERCIAL

## 2019-07-10 VITALS
HEIGHT: 66 IN | HEART RATE: 68 BPM | SYSTOLIC BLOOD PRESSURE: 110 MMHG | BODY MASS INDEX: 26.5 KG/M2 | DIASTOLIC BLOOD PRESSURE: 68 MMHG | WEIGHT: 164.9 LBS

## 2019-07-10 DIAGNOSIS — I10 HYPERTENSION, UNSPECIFIED TYPE: ICD-10-CM

## 2019-07-10 DIAGNOSIS — E78.5 DYSLIPIDEMIA: ICD-10-CM

## 2019-07-10 DIAGNOSIS — I49.3 PREMATURE VENTRICULAR CONTRACTION: Primary | ICD-10-CM

## 2019-07-10 PROCEDURE — 93000 ELECTROCARDIOGRAM COMPLETE: CPT | Performed by: INTERNAL MEDICINE

## 2019-07-10 PROCEDURE — 3074F SYST BP LT 130 MM HG: CPT | Performed by: INTERNAL MEDICINE

## 2019-07-10 PROCEDURE — 99214 OFFICE O/P EST MOD 30 MIN: CPT | Performed by: INTERNAL MEDICINE

## 2019-07-10 RX ORDER — METOPROLOL SUCCINATE 25 MG/1
25 TABLET, EXTENDED RELEASE ORAL DAILY
Qty: 90 TABLET | Refills: 3 | Status: SHIPPED | OUTPATIENT
Start: 2019-07-10 | End: 2020-08-10

## 2019-07-10 NOTE — PROGRESS NOTES
Cardiology   Eber Newton 66 y o  female MRN: [id number]      Impression:  1  Premature ventricular contractions - Almost completely remitted when taking flecainide  2  Dyslipidemia       Recommendations:  1  Continue current medications  2  Follow up in 6 months  HPI: Eber Newton is a 66y o  year old female with premature contractions who returns for follow up  Feels palpitations only  When forgets flecainide  Otherwise, no palpitations  Review of Systems   Constitutional: Negative  HENT: Negative  Eyes: Negative  Respiratory: Negative for chest tightness and shortness of breath  Cardiovascular: Positive for palpitations  Negative for chest pain and leg swelling  Gastrointestinal: Negative  Endocrine: Negative  Genitourinary: Negative  Musculoskeletal: Negative  Skin: Negative  Allergic/Immunologic: Negative  Neurological: Negative  Hematological: Negative  Psychiatric/Behavioral: Negative  All other systems reviewed and are negative          Past Medical History:   Diagnosis Date    Asthma     Celiac disease     [facility]     GERD (gastroesophageal reflux disease)     Heart palpitations     History of colonic polyps     resolved 07/12/2016    Lymphoma, 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HCC)     non hodgekins, remission    Malignant lymphoma (HCC)     rt arm cutaneous follicular stage ia (rt diatal medical biceps area , s/p resected and s/p radistion treatment    resolved 12/17/2014    Postmenopausal disorder     resolved 09/21/2017    Restless legs syndrome     resolved 09/21/2017    TMJ syndrome     resolved 09/21/2017     Past Surgical History:   Procedure Laterality Date    COLONOSCOPY  2015    HYSTERECTOMY      age 39 complete    LYMPH NODE DISSECTION Right     arm    NASAL SEPTUM SURGERY      deviation, stents turned into turbinates     DC ESOPHAGOGASTRODUODENOSCOPY TRANSORAL DIAGNOSTIC N/A 1/18/2016    Procedure: ESOPHAGOGASTRODUODENOSCOPY (EGD); Surgeon: Greg Espino MD;  [facility]; Service: Gastroenterology    TONSILLECTOMY      TOTAL ABDOMINAL HYSTERECTOMY W/ BILATERAL SALPINGOOPHORECTOMY      age 54     Social History     Substance and Sexual Activity   Alcohol Use Yes    Comment: social     Social History     Substance and Sexual Activity   Drug Use No     Social History     Tobacco Use   Smoking Status Former Smoker    Years: 20 00    Types: Cigarettes    Last attempt to quit: 2004    Years since quitting: 15 5   Smokeless Tobacco Never Used     Family History   Problem Relation Age of Onset    Lymphoma Mother     Throat cancer Father     Heart failure Father     Atrial fibrillation Father     Diabetes Father     Colonic polyp Father     Hypertension Father     Thyroid cancer Sister     Breast cancer Paternal Grandmother 71    Breast cancer Paternal Aunt 71    Ovarian cancer Paternal Aunt 79       Allergies: Allergies   Allergen Reactions    Shellfish-Derived Products Anaphylaxis    Wheat Bran Anaphylaxis    Gluten Meal GI Intolerance     Celiac     Morphine And Related Itching    Nuts Hives     Almonds and other related nuts       Other Itching    Sulfa Antibiotics Rash       Medications:     Current Outpatient Medications:     Ascorbic Acid (VITAMIN C) 100 MG tablet, Take by mouth, Disp: , Rfl:     cyanocobalamin (VITAMIN B-12) 1,000 mcg tablet, Take 1,000 mcg by mouth daily, Disp: , Rfl:     estradiol (ESTRACE) 0 5 MG tablet, Take 1 tablet (0 5 mg total) by mouth daily, Disp: 90 tablet, Rfl: 0    famotidine (PEPCID) 20 mg tablet, Take 20 mg by mouth 2 (two) times a day , Disp: , Rfl:     flecainide (TAMBOCOR) 100 mg tablet, Take 1 tablet (100 mg total) by mouth 2 (two) times a day, Disp: 180 tablet, Rfl: 3    fluticasone (FLONASE) 50 mcg/act nasal spray, 2 sprays into each nostril 2 (two) times a day, Disp: , Rfl:     fluticasone-salmeterol (ADVAIR DISKUS) 250-50 mcg/dose inhaler, Inhale 1 puff 2 (two) times a day Rinse mouth after use , Disp: 1 Inhaler, Rfl: 5    Magnesium 500 MG CAPS, Take by mouth, Disp: , Rfl:     meloxicam (MOBIC) 15 mg tablet, Take 1 tablet (15 mg total) by mouth daily, Disp: 90 tablet, Rfl: 1    metoprolol succinate (TOPROL-XL) 50 mg 24 hr tablet, Take 1 tablet (50 mg total) by mouth daily (Patient taking differently: Take 25 mg by mouth daily  ), Disp: 90 tablet, Rfl: 1    montelukast (SINGULAIR) 10 mg tablet, Take 1 tablet (10 mg total) by mouth daily at bedtime, Disp: 90 tablet, Rfl: 1    Vitamin D, Ergocalciferol, 2000 units CAPS, Take by mouth, Disp: , Rfl:     XOPENEX HFA 45 MCG/ACT inhaler, Inhale 2 puffs every 6 (six) hours as needed for wheezing, Disp: 1 Inhaler, Rfl: 0    acetaminophen (TYLENOL) 500 mg tablet, Take 500 mg by mouth every 6 (six) hours as needed for mild pain , Disp: , Rfl:     ondansetron (ZOFRAN-ODT) 4 mg disintegrating tablet, Take by mouth, Disp: , Rfl:     pseudoephedrine (SUDAFED) 30 mg tablet, Take 1 tablet by mouth every 6 (six) hours as needed, Disp: , Rfl:     zolpidem (AMBIEN) 5 mg tablet, Take 1 tablet (5 mg total) by mouth daily at bedtime as needed for sleep (Patient not taking: Reported on 7/10/2019), Disp: 90 tablet, Rfl: 1      Wt Readings from Last 3 Encounters:   07/10/19 74 8 kg (164 lb 14 4 oz)   05/31/19 73 5 kg (162 lb)   05/23/19 76 2 kg (168 lb)     Temp Readings from Last 3 Encounters:   05/31/19 (!) 96 7 °F (35 9 °C) (Tympanic)   05/08/19 97 5 °F (36 4 °C) (Tympanic)   03/21/19 97 5 °F (36 4 °C) (Tympanic)     BP Readings from Last 3 Encounters:   07/10/19 110/68   05/31/19 106/69   05/23/19 118/72     Pulse Readings from Last 3 Encounters:   07/10/19 68   05/31/19 67   05/08/19 71         Physical Exam   Constitutional: She is oriented to person, place, and time  She appears well-developed  HENT:   Head: Atraumatic  Eyes: EOM are normal    Neck: Normal range of motion     Cardiovascular: Normal rate, regular rhythm and normal heart sounds  Exam reveals no gallop and no friction rub  No murmur heard  Pulmonary/Chest: Effort normal and breath sounds normal  No stridor  No respiratory distress  Abdominal: Soft  Musculoskeletal: Normal range of motion  Neurological: She is alert and oriented to person, place, and time  Skin: Skin is warm and dry  Psychiatric: She has a normal mood and affect  Laboratory Studies:  CMP:  Lab Results   Component Value Date     2015    K 4 1 2018     2018    CO2 25 2018    ANIONGAP 10 2015    BUN 16 2018    CREATININE 0 87 2018    GLUCOSE 112 2015    AST 22 2018    ALT 33 2018    BILITOT 0 63 2015    EGFR 72 2018       Lipid Profile:   Lab Results   Component Value Date    CHOL 200 2015     Lab Results   Component Value Date    HDL 47 2018     Lab Results   Component Value Date    LDLCALC 112 (H) 2018     Lab Results   Component Value Date    TRIG 273 (H) 2018       Cardiac testing:   EKG reviewed personally: NSR 68 Nml  Results for orders placed during the hospital encounter of 17   Echo complete with contrast if indicated    Narrative [facility] [address] [place] [address]  [phone]    Transthoracic Echocardiogram  2D, M-mode, Doppler, and Color Doppler    Study date:  2017    Patient: [facility]  MR number: [id number]  Account number: [de-identified]  : 1955  Age: 63 years  Gender: Female  Status: Outpatient  Location: [address] [place] [address] [facility]  Height: 65 in  Weight: 161 7 lb  BP: 122/ 84 mmHg    Indications: Palpitations      Diagnoses: R00 2 - Palpitations    Sonographer:  ALFONSO Fields  Primary Physician:  Gerson Lane DO  Referring Physician:  Gerson Lane DO  Group:  [facility]  Interpreting Physician:  Denisa Ortega DO    SUMMARY    LEFT VENTRICLE:  Systolic function was normal  Ejection fraction was estimated to be 60 %  There were no regional wall motion abnormalities  MITRAL VALVE:  There was trace regurgitation  TRICUSPID VALVE:  There was mild regurgitation  PULMONIC VALVE:  There was trace regurgitation  HISTORY: PRIOR HISTORY: Malignant lymphoma; Former smoker    PROCEDURE: The study was performed in the [address] [facility]  This was a routine study  The transthoracic approach was used  The study included complete 2D imaging, M-mode, complete spectral Doppler, and color Doppler  The  heart rate was 88 bpm, at the start of the study  Images were obtained from the parasternal, apical, subcostal, and suprasternal notch acoustic windows  Echocardiographic views were limited due to poor acoustic window availability  Image  quality was adequate  LEFT VENTRICLE: Size was normal  Systolic function was normal  Ejection fraction was estimated to be 60 %  There were no regional wall motion abnormalities  Wall thickness was normal  No evidence of apical thrombus  DOPPLER: Left  ventricular diastolic function parameters were normal     RIGHT VENTRICLE: The size was normal  Systolic function was normal  Wall thickness was normal     LEFT ATRIUM: Size was normal     RIGHT ATRIUM: Size was normal     MITRAL VALVE: Valve structure was normal  There was normal leaflet separation  DOPPLER: The transmitral velocity was within the normal range  There was no evidence for stenosis  There was trace regurgitation  AORTIC VALVE: The valve was trileaflet  Leaflets exhibited normal thickness, normal cuspal separation, and sclerosis  DOPPLER: Transaortic velocity was within the normal range  There was no evidence for stenosis  There was no significant  regurgitation  TRICUSPID VALVE: The valve structure was normal  There was normal leaflet separation   DOPPLER: The transtricuspid velocity was within the normal range  There was no evidence for stenosis  There was mild regurgitation  Estimated peak PA  pressure was 25 mmHg  PULMONIC VALVE: Leaflets exhibited normal thickness, no calcification, and normal cuspal separation  DOPPLER: The transpulmonic velocity was within the normal range  There was trace regurgitation  PERICARDIUM: There was no pericardial effusion  The pericardium was normal in appearance  AORTA: The root exhibited normal size  SYSTEMIC VEINS: IVC: The inferior vena cava was normal in size      SYSTEM MEASUREMENT TABLES    2D  %FS: 27 71 %  Ao Diam: 3 09 cm  EDV(Teich): 84 03 ml  EF(Cube): 62 23 %  EF(Teich): 54 04 %  ESV(Cube): 30 48 ml  ESV(Teich): 38 62 ml  IVSd: 0 67 cm  LA Area: 11 17 cm2  LA Diam: 3 02 cm  LVEDV MOD A4C: 89 51 ml  LVEF MOD A4C: 58 64 %  LVESV MOD A4C: 37 03 ml  LVIDd: 4 32 cm  LVIDs: 3 12 cm  LVLd A4C: 8 34 cm  LVLs A4C: 6 37 cm  LVPWd: 0 77 cm  RA Area: 14 32 cm2  RV Diam : 3 7 cm  SI(Cube): 27 74 ml/m2  SI(Teich): 25 09 ml/m2  SV MOD A4C: 52 49 ml  SV(Cube): 50 21 ml  SV(Teich): 45 41 ml    CW  AV Vmax: 1 m/s  AV maxP 02 mmHg  TR Vmax: 2 16 m/s  TR maxP 69 mmHg    MM  TAPSE: 2 1 cm    PW  E': 0 1 m/s  E/E': 7 91  LVOT Vmax: 1 08 m/s  LVOT maxP 64 mmHg  MV A Jesus: 0 67 m/s  MV Dec [place]: 3 68 m/s2  MV DecT: 208 98 ms  MV E Jesus: 0 77 m/s  MV E/A Ratio: 1 14    [facility] Accredited Echocardiography Laboratory    Prepared and electronically signed by    Jzamin Marcum DO  Signed 2017 16:03:54

## 2019-07-29 ENCOUNTER — TRANSCRIBE ORDERS (OUTPATIENT)
Dept: URGENT CARE | Facility: CLINIC | Age: 64
End: 2019-07-29

## 2019-07-29 ENCOUNTER — APPOINTMENT (OUTPATIENT)
Dept: LAB | Facility: CLINIC | Age: 64
End: 2019-07-29

## 2019-07-29 DIAGNOSIS — Z01.84 IMMUNITY STATUS TESTING: Primary | ICD-10-CM

## 2019-07-29 PROCEDURE — 86765 RUBEOLA ANTIBODY: CPT

## 2019-07-29 PROCEDURE — 36415 COLL VENOUS BLD VENIPUNCTURE: CPT

## 2019-07-29 PROCEDURE — 86787 VARICELLA-ZOSTER ANTIBODY: CPT

## 2019-07-30 LAB
MEV IGG SER QL: NORMAL
VZV IGG SER IA-ACNC: NORMAL

## 2019-08-28 ENCOUNTER — OFFICE VISIT (OUTPATIENT)
Dept: FAMILY MEDICINE CLINIC | Facility: CLINIC | Age: 64
End: 2019-08-28
Payer: COMMERCIAL

## 2019-08-28 VITALS
SYSTOLIC BLOOD PRESSURE: 92 MMHG | OXYGEN SATURATION: 97 % | RESPIRATION RATE: 16 BRPM | WEIGHT: 169 LBS | HEIGHT: 66 IN | HEART RATE: 76 BPM | BODY MASS INDEX: 27.16 KG/M2 | TEMPERATURE: 97.1 F | DIASTOLIC BLOOD PRESSURE: 64 MMHG

## 2019-08-28 DIAGNOSIS — E55.9 VITAMIN D DEFICIENCY: ICD-10-CM

## 2019-08-28 DIAGNOSIS — E78.5 DYSLIPIDEMIA: ICD-10-CM

## 2019-08-28 DIAGNOSIS — G47.00 INSOMNIA, UNSPECIFIED TYPE: ICD-10-CM

## 2019-08-28 DIAGNOSIS — R73.9 HYPERGLYCEMIA: ICD-10-CM

## 2019-08-28 DIAGNOSIS — N39.0 URINARY TRACT INFECTION WITHOUT HEMATURIA, SITE UNSPECIFIED: Primary | ICD-10-CM

## 2019-08-28 DIAGNOSIS — R11.0 NAUSEA: ICD-10-CM

## 2019-08-28 LAB
SL AMB  POCT GLUCOSE, UA: NORMAL
SL AMB LEUKOCYTE ESTERASE,UA: NORMAL
SL AMB POCT BILIRUBIN,UA: NORMAL
SL AMB POCT BLOOD,UA: NORMAL
SL AMB POCT CLARITY,UA: NORMAL
SL AMB POCT COLOR,UA: YELLOW
SL AMB POCT KETONES,UA: NORMAL
SL AMB POCT NITRITE,UA: NORMAL
SL AMB POCT PH,UA: NORMAL
SL AMB POCT SPECIFIC GRAVITY,UA: 1
SL AMB POCT URINE PROTEIN: NORMAL
SL AMB POCT UROBILINOGEN: NORMAL

## 2019-08-28 PROCEDURE — 99214 OFFICE O/P EST MOD 30 MIN: CPT | Performed by: INTERNAL MEDICINE

## 2019-08-28 PROCEDURE — 81002 URINALYSIS NONAUTO W/O SCOPE: CPT | Performed by: INTERNAL MEDICINE

## 2019-08-28 PROCEDURE — 87086 URINE CULTURE/COLONY COUNT: CPT | Performed by: INTERNAL MEDICINE

## 2019-08-28 RX ORDER — ZOLPIDEM TARTRATE 5 MG/1
5 TABLET ORAL
Qty: 30 TABLET | Refills: 1 | Status: SHIPPED | OUTPATIENT
Start: 2019-08-28 | End: 2020-02-19 | Stop reason: SDUPTHER

## 2019-08-28 RX ORDER — ONDANSETRON 4 MG/1
4 TABLET, ORALLY DISINTEGRATING ORAL EVERY 6 HOURS PRN
Qty: 30 TABLET | Refills: 0 | Status: SHIPPED | OUTPATIENT
Start: 2019-08-28 | End: 2021-06-03 | Stop reason: ALTCHOICE

## 2019-08-28 RX ORDER — NITROFURANTOIN 25; 75 MG/1; MG/1
100 CAPSULE ORAL 2 TIMES DAILY
Qty: 10 CAPSULE | Refills: 0 | Status: SHIPPED | OUTPATIENT
Start: 2019-08-28 | End: 2020-01-02

## 2019-08-28 NOTE — PROGRESS NOTES
Assessment/Plan:         Diagnoses and all orders for this visit:    Urinary tract infection without hematuria, site unspecified  Comments:  nitrofurantoin  Orders:  -     POCT urine dip  -     Urine culture  -     nitrofurantoin (MACROBID) 100 mg capsule; Take 1 capsule (100 mg total) by mouth 2 (two) times a day  -     Urinalysis with reflex to microscopic  -     CBC; Future    Insomnia, unspecified type  Comments:  prn ambien  Orders:  -     zolpidem (AMBIEN) 5 mg tablet; Take 1 tablet (5 mg total) by mouth daily at bedtime as needed for sleep    Nausea  -     ondansetron (ZOFRAN-ODT) 4 mg disintegrating tablet; Take 1 tablet (4 mg total) by mouth every 6 (six) hours as needed for nausea or vomiting    Dyslipidemia  -     Comprehensive metabolic panel; Future  -     Lipid panel; Future    Vitamin D deficiency  -     Vitamin D 25 hydroxy; Future    Hyperglycemia  -     Hemoglobin A1C; Future          Subjective:      Patient ID: Sahil Salguero is a 66 y o  female     +bld in urine      The following portions of the patient's history were reviewed and updated as appropriate: She  has a past medical history of Asthma, Celiac disease, Follicular lymphoma ([address] ), GERD (gastroesophageal reflux disease), Heart palpitations, History of colonic polyps, Lymphoma, 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[address] ), Malignant lymphoma ([address] ), Postmenopausal disorder, Restless legs syndrome, and TMJ syndrome    She   Patient Active Problem List    Diagnosis Date Noted    Encounter for gynecological examination (general) (routine) without abnormal findings 05/23/2019    Hormone replacement therapy (HRT) 05/23/2019    Personal history of colonic polyps 56/53/4076    Follicular lymphoma ([address] ) 08/29/2018    Atrophic vaginitis 05/18/2018    VERONIKA (stress urinary incontinence, female) 05/18/2018    PVC's (premature ventricular contractions) 04/24/2018    Dyslipidemia 04/24/2018    Celiac disease 04/19/2018    Premature ventricular contraction 11/30/2017    Chronic GERD 10/03/2017    Back pain 09/21/2017    Heart palpitations 09/21/2017    Insomnia 09/21/2017    Sciatica associated with disorder of lumbar spine 09/21/2017    Vitamin D deficiency 06/28/2017    Osteopenia 11/17/2016     She  has a past surgical history that includes Lymph node dissection (Right); pr esophagogastroduodenoscopy transoral diagnostic (N/A, 1/18/2016); Colonoscopy (2015); Nasal septum surgery; Hysterectomy; Total abdominal hysterectomy w/ bilateral salpingoophorectomy; and Tonsillectomy  Her family history includes Atrial fibrillation in her father; Breast cancer (age of onset: 71) in her paternal aunt and paternal grandmother; Colonic polyp in her father; Diabetes in her father; Heart failure in her father; Hypertension in her father; Lymphoma in her mother; Ovarian cancer (age of onset: 79) in her paternal aunt; Throat cancer in her father; Thyroid cancer in her sister  She  reports that she quit smoking about 15 years ago  Her smoking use included cigarettes  She quit after 20 00 years of use  She has never used smokeless tobacco  She reports that she drinks alcohol  She reports that she does not use drugs  Current Outpatient Medications   Medication Sig Dispense Refill    acetaminophen (TYLENOL) 500 mg tablet Take 500 mg by mouth every 6 (six) hours as needed for mild pain   Ascorbic Acid (VITAMIN C) 100 MG tablet Take by mouth      cyanocobalamin (VITAMIN B-12) 1,000 mcg tablet Take 1,000 mcg by mouth daily      estradiol (ESTRACE) 0 5 MG tablet Take 1 tablet (0 5 mg total) by mouth daily 90 tablet 0    famotidine (PEPCID) 20 mg tablet Take 20 mg by mouth 2 (two) times a day        flecainide (TAMBOCOR) 100 mg tablet Take 1 tablet (100 mg total) by mouth 2 (two) times a day 180 tablet 3    fluticasone (FLONASE) 50 mcg/act nasal spray 2 sprays into each nostril 2 (two) times a day      fluticasone-salmeterol (ADVAIR DISKUS) 250-50 mcg/dose inhaler Inhale 1 puff 2 (two) times a day Rinse mouth after use  1 Inhaler 5    Magnesium 500 MG CAPS Take by mouth      meloxicam (MOBIC) 15 mg tablet Take 1 tablet (15 mg total) by mouth daily 90 tablet 1    metoprolol succinate (TOPROL-XL) 25 mg 24 hr tablet Take 1 tablet (25 mg total) by mouth daily 90 tablet 3    montelukast (SINGULAIR) 10 mg tablet Take 1 tablet (10 mg total) by mouth daily at bedtime 90 tablet 1    ondansetron (ZOFRAN-ODT) 4 mg disintegrating tablet Take 1 tablet (4 mg total) by mouth every 6 (six) hours as needed for nausea or vomiting 30 tablet 0    pseudoephedrine (SUDAFED) 30 mg tablet Take 1 tablet by mouth every 6 (six) hours as needed      Vitamin D, Ergocalciferol, 2000 units CAPS Take by mouth      XOPENEX HFA 45 MCG/ACT inhaler Inhale 2 puffs every 6 (six) hours as needed for wheezing 1 Inhaler 0    zolpidem (AMBIEN) 5 mg tablet Take 1 tablet (5 mg total) by mouth daily at bedtime as needed for sleep 30 tablet 1    nitrofurantoin (MACROBID) 100 mg capsule Take 1 capsule (100 mg total) by mouth 2 (two) times a day 10 capsule 0     No current facility-administered medications for this visit  Current Outpatient Medications on File Prior to Visit   Medication Sig    acetaminophen (TYLENOL) 500 mg tablet Take 500 mg by mouth every 6 (six) hours as needed for mild pain   Ascorbic Acid (VITAMIN C) 100 MG tablet Take by mouth    cyanocobalamin (VITAMIN B-12) 1,000 mcg tablet Take 1,000 mcg by mouth daily    estradiol (ESTRACE) 0 5 MG tablet Take 1 tablet (0 5 mg total) by mouth daily    famotidine (PEPCID) 20 mg tablet Take 20 mg by mouth 2 (two) times a day   flecainide (TAMBOCOR) 100 mg tablet Take 1 tablet (100 mg total) by mouth 2 (two) times a day    fluticasone (FLONASE) 50 mcg/act nasal spray 2 sprays into each nostril 2 (two) times a day    fluticasone-salmeterol (ADVAIR DISKUS) 250-50 mcg/dose inhaler Inhale 1 puff 2 (two) times a day Rinse mouth after use      Magnesium 500 MG CAPS Take by mouth    meloxicam (MOBIC) 15 mg tablet Take 1 tablet (15 mg total) by mouth daily    metoprolol succinate (TOPROL-XL) 25 mg 24 hr tablet Take 1 tablet (25 mg total) by mouth daily    montelukast (SINGULAIR) 10 mg tablet Take 1 tablet (10 mg total) by mouth daily at bedtime    pseudoephedrine (SUDAFED) 30 mg tablet Take 1 tablet by mouth every 6 (six) hours as needed    Vitamin D, Ergocalciferol, 2000 units CAPS Take by mouth    XOPENEX HFA 45 MCG/ACT inhaler Inhale 2 puffs every 6 (six) hours as needed for wheezing     No current facility-administered medications on file prior to visit  She is allergic to shellfish-derived products; wheat bran; gluten meal; morphine and related; nuts; other; and sulfa antibiotics       Review of Systems   Constitutional: Negative  HENT: Negative  Respiratory: Negative  Cardiovascular: Negative  Objective:      BP 92/64 (BP Location: Left arm, Patient Position: Sitting, Cuff Size: Large)   Pulse 76   Temp (!) 97 1 °F (36 2 °C) (Tympanic)   Resp 16   Ht 5' 6" (1 676 m)   Wt 76 7 kg (169 lb)   SpO2 97%   BMI 27 28 kg/m²          Physical Exam   Constitutional: She appears well-developed and well-nourished  No distress  HENT:   Head: Normocephalic  Right Ear: External ear normal    Left Ear: External ear normal    Nose: Nose normal    Mouth/Throat: Oropharynx is clear and moist  No oropharyngeal exudate  Neck: Normal range of motion  No thyromegaly present  Cardiovascular: Normal rate, regular rhythm, normal heart sounds and intact distal pulses  Exam reveals no gallop and no friction rub  No murmur heard  Pulmonary/Chest: Effort normal and breath sounds normal  No respiratory distress  She has no wheezes  She has no rales  Skin: She is not diaphoretic

## 2019-08-29 LAB — BACTERIA UR CULT: ABNORMAL

## 2019-09-03 ENCOUNTER — APPOINTMENT (OUTPATIENT)
Dept: LAB | Facility: CLINIC | Age: 64
End: 2019-09-03
Payer: COMMERCIAL

## 2019-09-03 ENCOUNTER — TRANSCRIBE ORDERS (OUTPATIENT)
Dept: LAB | Facility: CLINIC | Age: 64
End: 2019-09-03

## 2019-09-03 DIAGNOSIS — N39.0 URINARY TRACT INFECTION WITHOUT HEMATURIA, SITE UNSPECIFIED: ICD-10-CM

## 2019-09-03 DIAGNOSIS — E78.5 DYSLIPIDEMIA: ICD-10-CM

## 2019-09-03 DIAGNOSIS — N39.0 URINARY TRACT INFECTION WITHOUT HEMATURIA, SITE UNSPECIFIED: Primary | ICD-10-CM

## 2019-09-03 DIAGNOSIS — R73.9 HYPERGLYCEMIA: ICD-10-CM

## 2019-09-03 DIAGNOSIS — E55.9 VITAMIN D DEFICIENCY: ICD-10-CM

## 2019-09-03 LAB
25(OH)D3 SERPL-MCNC: 23.9 NG/ML (ref 30–100)
ALBUMIN SERPL BCP-MCNC: 3.8 G/DL (ref 3.5–5)
ALP SERPL-CCNC: 48 U/L (ref 46–116)
ALT SERPL W P-5'-P-CCNC: 38 U/L (ref 12–78)
ANION GAP SERPL CALCULATED.3IONS-SCNC: 8 MMOL/L (ref 4–13)
AST SERPL W P-5'-P-CCNC: 24 U/L (ref 5–45)
BILIRUB SERPL-MCNC: 0.71 MG/DL (ref 0.2–1)
BUN SERPL-MCNC: 21 MG/DL (ref 5–25)
CALCIUM SERPL-MCNC: 9.1 MG/DL (ref 8.3–10.1)
CHLORIDE SERPL-SCNC: 104 MMOL/L (ref 100–108)
CHOLEST SERPL-MCNC: 214 MG/DL (ref 50–200)
CO2 SERPL-SCNC: 24 MMOL/L (ref 21–32)
CREAT SERPL-MCNC: 0.9 MG/DL (ref 0.6–1.3)
ERYTHROCYTE [DISTWIDTH] IN BLOOD BY AUTOMATED COUNT: 13.4 % (ref 11.6–15.1)
EST. AVERAGE GLUCOSE BLD GHB EST-MCNC: 114 MG/DL
GFR SERPL CREATININE-BSD FRML MDRD: 69 ML/MIN/1.73SQ M
GLUCOSE P FAST SERPL-MCNC: 124 MG/DL (ref 65–99)
HBA1C MFR BLD: 5.6 % (ref 4.2–6.3)
HCT VFR BLD AUTO: 39 % (ref 34.8–46.1)
HDLC SERPL-MCNC: 41 MG/DL (ref 40–60)
HGB BLD-MCNC: 12.6 G/DL (ref 11.5–15.4)
LDLC SERPL CALC-MCNC: 96 MG/DL (ref 0–100)
MCH RBC QN AUTO: 30.2 PG (ref 26.8–34.3)
MCHC RBC AUTO-ENTMCNC: 32.3 G/DL (ref 31.4–37.4)
MCV RBC AUTO: 94 FL (ref 82–98)
NONHDLC SERPL-MCNC: 173 MG/DL
PLATELET # BLD AUTO: 176 THOUSANDS/UL (ref 149–390)
PMV BLD AUTO: 11.9 FL (ref 8.9–12.7)
POTASSIUM SERPL-SCNC: 4.1 MMOL/L (ref 3.5–5.3)
PROT SERPL-MCNC: 7.2 G/DL (ref 6.4–8.2)
RBC # BLD AUTO: 4.17 MILLION/UL (ref 3.81–5.12)
SODIUM SERPL-SCNC: 136 MMOL/L (ref 136–145)
TRIGL SERPL-MCNC: 386 MG/DL
WBC # BLD AUTO: 7.64 THOUSAND/UL (ref 4.31–10.16)

## 2019-09-03 PROCEDURE — 80053 COMPREHEN METABOLIC PANEL: CPT

## 2019-09-03 PROCEDURE — 80061 LIPID PANEL: CPT

## 2019-09-03 PROCEDURE — 83036 HEMOGLOBIN GLYCOSYLATED A1C: CPT

## 2019-09-03 PROCEDURE — 85027 COMPLETE CBC AUTOMATED: CPT

## 2019-09-03 PROCEDURE — 36415 COLL VENOUS BLD VENIPUNCTURE: CPT

## 2019-09-03 PROCEDURE — 82306 VITAMIN D 25 HYDROXY: CPT

## 2019-09-12 ENCOUNTER — TELEPHONE (OUTPATIENT)
Dept: FAMILY MEDICINE CLINIC | Facility: CLINIC | Age: 64
End: 2019-09-12

## 2019-09-12 DIAGNOSIS — L70.9 ACNE, UNSPECIFIED ACNE TYPE: Primary | ICD-10-CM

## 2019-09-12 RX ORDER — ERYTHROMYCIN 20 MG/G
GEL TOPICAL DAILY
Qty: 45 G | Refills: 1 | Status: SHIPPED | OUTPATIENT
Start: 2019-09-12 | End: 2021-03-03 | Stop reason: SDUPTHER

## 2019-09-12 NOTE — TELEPHONE ENCOUNTER
Requesting azythromycin gel patient [place] and loved [place] stuff, she uses it for acne

## 2019-10-21 DIAGNOSIS — J45.909 UNCOMPLICATED ASTHMA, UNSPECIFIED ASTHMA SEVERITY, UNSPECIFIED WHETHER PERSISTENT: ICD-10-CM

## 2019-10-22 RX ORDER — LEVALBUTEROL TARTRATE 45 UG/1
2 AEROSOL, METERED ORAL EVERY 4 HOURS PRN
Qty: 1 INHALER | Refills: 3 | Status: SHIPPED | OUTPATIENT
Start: 2019-10-22 | End: 2020-06-15 | Stop reason: SDUPTHER

## 2019-10-30 PROCEDURE — 88305 TISSUE EXAM BY PATHOLOGIST: CPT | Performed by: PATHOLOGY

## 2019-10-31 ENCOUNTER — OFFICE VISIT (OUTPATIENT)
Dept: GASTROENTEROLOGY | Facility: CLINIC | Age: 64
End: 2019-10-31
Payer: COMMERCIAL

## 2019-10-31 VITALS
HEIGHT: 66 IN | DIASTOLIC BLOOD PRESSURE: 85 MMHG | TEMPERATURE: 98.1 F | WEIGHT: 167.6 LBS | HEART RATE: 76 BPM | SYSTOLIC BLOOD PRESSURE: 126 MMHG | BODY MASS INDEX: 26.93 KG/M2

## 2019-10-31 DIAGNOSIS — K90.0 CELIAC DISEASE: ICD-10-CM

## 2019-10-31 DIAGNOSIS — K21.9 CHRONIC GERD: Primary | ICD-10-CM

## 2019-10-31 PROCEDURE — 99213 OFFICE O/P EST LOW 20 MIN: CPT | Performed by: PHYSICIAN ASSISTANT

## 2019-10-31 RX ORDER — PANTOPRAZOLE SODIUM 20 MG/1
TABLET, DELAYED RELEASE ORAL
Qty: 90 TABLET | Refills: 2 | Status: SHIPPED | OUTPATIENT
Start: 2019-10-31 | End: 2020-01-13

## 2019-10-31 NOTE — PROGRESS NOTES
[facility] - Outpatient Follow-up Note  eNville Harris 66 y o  female MRN: [id number]  Encounter: [id number]          ASSESSMENT AND PLAN:      1  Chronic GERD  Poorly controlled off PPI therapy  Recommend re-starting Protonix 20 mg daily  She may continue Pepcid at bedtime  Due to recent worsening of symptoms, I recommend repeat EGD to assess for esophagitis and peptic ulcer disease  Continue dietary and lifestyle modifications to prevent reflux     - EGD; Future  - pantoprazole (PROTONIX) 20 mg tablet; Take 1 tablet by mouth daily 30 minutes before breakfast   Dispense: 90 tablet; Refill: 2    2  Celiac disease  She has history of follicular lymphoma and celiac disease diagnosed in 2010  She states she follows strict gluten free diet  Recent hemoglobin and liver enzymes normal  Recommend rechecking tissue transglutaminase IgA to ensure there is no gluten contamination  We will also check iron levels, vitamin B12, and folate as well  Continue vitamin D supplement daily     - EGD; Future  - Tissue transglutaminase, IgA; Future  - Vitamin B12; Future  - Folate; Future  - Iron Panel (Includes Ferritin, Iron Sat%, Iron, and TIBC); Future    Follow-up in 3 months  ______________________________________________________________________    SUBJECTIVE:  79-year-old female with history of follicular lymphoma, celiac disease, and GERD presenting for routine office follow-up  She follows a strict gluten free diet since her diagnosis back in 2010  Her main complaint today is postprandial heartburn and epigastric burning pain  She states that all foods gives her heartburn, except for milk and ice cream  She states before, only typical reflux trigger foods would cause heartburn  She was taking pantoprazole 20 mg twice daily with good control over her heartburn, but discontinued this months ago because she was feeling well   She was doing well off PPI therapy for several months afterward but unfortunately her symptoms returned  She is currently taking Pepcid 40 mg in the evening, with only moderate relief  She takes meloxicam once daily with food  She reports a lump sensation in her throat when taking medications  She denies dysphagia for solid and liquid foods  She denies nausea and vomiting  She does report some early satiety  She denies abnormal weight loss  Last EGD was in 2016 which revealed gastritis, biopsies negative for H pylori       Recent blood work from September reveals normal hemoglobin and liver function  Vitamin D slightly low 24  She takes vitamin D 0843-5256 mg daily  Most recent DEXA scan from 2018 revealed normal bone density  REVIEW OF SYSTEMS IS OTHERWISE NEGATIVE  Historical Information   Past Medical History:   Diagnosis Date    Asthma     Celiac disease     Follic[facility] [address] [address] )     GERD (gastroesophageal reflux disease)     Heart palpitations     History of colonic polyps     resolved 07/12/2016    Lymphoma, 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HCC)     non hodgekins, remission    Malignant lymphoma (HCC)     rt arm cutaneous follicular stage ia (rt diatal medical biceps area , s/p resected and s/p radistion treatment    resolved 12/17/2014    Postmenopausal disorder     resolved 09/21/2017    Restless legs syndrome     resolved 09/21/2017    TMJ syndrome     resolved 09/21/2017     Past Surgical History:   Procedure Laterality Date    COLONOSCOPY  04/16/2019    HYSTERECTOMY      age 39 complete    LYMPH NODE DISSECTION Right     arm    NASAL SEPTUM SURGERY      deviation, stents turned into turbinates     OR ESOPHAGOGASTRODUODENOSCOPY TRANSORAL DIAGNOSTIC N/A 1/18/2016    Procedure: ESOPHAGOGASTRODUODENOSCOPY (EGD); Surgeon: Suze Sanz MD;  [facility];   Service: Gastroenterology    TONSILLECTOMY      TOTAL ABDOMINAL HYSTERECTOMY W/ BILATERAL SALPINGOOPHORECTOMY      age 54     Social History   Social History     Substance and Sexual Activity Alcohol Use Yes    Comment: social     Social History     Substance and Sexual Activity   Drug Use No     Social History     Tobacco Use   Smoking Status Former Smoker    Years: 20 00    Types: Cigarettes    Last attempt to quit: 2004    Years since quitting: 15 8   Smokeless Tobacco Never Used     Family History   Problem Relation Age of Onset    Lymphoma Mother     Throat cancer Father     Heart failure Father     Atrial fibrillation Father     Diabetes Father     Colonic polyp Father     Hypertension Father     Thyroid cancer Sister     Breast cancer Paternal Grandmother 71    Breast cancer Paternal Aunt 71    Ovarian cancer Paternal Aunt 79       Meds/Allergies       Current Outpatient Medications:     acetaminophen (TYLENOL) 500 mg tablet    Ascorbic Acid (VITAMIN C) 100 MG tablet    cyanocobalamin (VITAMIN B-12) 1,000 mcg tablet    erythromycin with ethanol (EMGEL) 2 % gel    estradiol (ESTRACE) 0 5 MG tablet    famotidine (PEPCID) 20 mg tablet    flecainide (TAMBOCOR) 100 mg tablet    fluticasone (FLONASE) 50 mcg/act nasal spray    fluticasone-salmeterol (ADVAIR DISKUS) 250-50 mcg/dose inhaler    levalbuterol (XOPENEX HFA) 45 mcg/act inhaler    Magnesium 500 MG CAPS    meloxicam (MOBIC) 15 mg tablet    metoprolol succinate (TOPROL-XL) 25 mg 24 hr tablet    montelukast (SINGULAIR) 10 mg tablet    pseudoephedrine (SUDAFED) 30 mg tablet    Vitamin D, Ergocalciferol, 2000 units CAPS    zolpidem (AMBIEN) 5 mg tablet    nitrofurantoin (MACROBID) 100 mg capsule    ondansetron (ZOFRAN-ODT) 4 mg disintegrating tablet    pantoprazole (PROTONIX) 20 mg tablet    Allergies   Allergen Reactions    Shellfish-Derived Products Anaphylaxis    Wheat Bran Anaphylaxis    Gluten Meal GI Intolerance     Celiac     Morphine And Related Itching    Nuts Hives     Almonds and other related nuts       Other Itching    Sulfa Antibiotics Rash           Objective     Blood pressure 126/85, pulse 76, temperature 98 1 °F (36 7 °C), temperature source Tympanic, height 5' 6" (1 676 m), weight 76 kg (167 lb 9 6 oz), not currently breastfeeding  Body mass index is 27 05 kg/m²  PHYSICAL EXAM:      General Appearance:   Alert, cooperative, no distress   HEENT:   Normocephalic, atraumatic, anicteric      Neck:  Supple, symmetrical, trachea midline   Lungs:   Clear to auscultation bilaterally; no rales, rhonchi or wheezing; respirations unlabored    Heart[de-identified]   Regular rate and rhythm; no murmur, rub, or gallop  Abdomen:   Soft, non-tender, non-distended; normal bowel sounds; no masses, no organomegaly    Genitalia:   Deferred    Rectal:   Deferred    Extremities:  No cyanosis, clubbing or edema    Pulses:  2+ and symmetric    Skin:  No jaundice, rashes, or lesions    Lymph nodes:  No palpable cervical lymphadenopathy        Lab Results:   No visits with results within 1 Day(s) from this visit     Latest known visit with results is:   Appointment on 09/03/2019   Component Date Value    WBC 09/03/2019 7 64     RBC 09/03/2019 4 17     Hemoglobin 09/03/2019 12 6     Hematocrit 09/03/2019 39 0     MCV 09/03/2019 94     MCH 09/03/2019 30 2     MCHC 09/03/2019 32 3     RDW 09/03/2019 13 4     Platelets 48/58/4078 176     MPV 09/03/2019 11 9     Sodium 09/03/2019 136     Potassium 09/03/2019 4 1     Chloride 09/03/2019 104     CO2 09/03/2019 24     ANION GAP 09/03/2019 8     BUN 09/03/2019 21     Creatinine 09/03/2019 0 90     Glucose, Fasting 09/03/2019 124*    Calcium 09/03/2019 9 1     AST 09/03/2019 24     ALT 09/03/2019 38     Alkaline Phosphatase 09/03/2019 48     Total Protein 09/03/2019 7 2     Albumin 09/03/2019 3 8     Total Bilirubin 09/03/2019 0 71     eGFR 09/03/2019 69     Cholesterol 09/03/2019 214*    Triglycerides 09/03/2019 386*    HDL, Direct 09/03/2019 41     LDL Calculated 09/03/2019 96     Non-HDL-Chol (CHOL-HDL) 09/03/2019 173     Vit D, 25-Hydroxy 09/03/2019 23 9*  Hemoglobin A1C 09/03/2019 5 6     EAG 09/03/2019 114          Radiology Results:   No results found

## 2019-10-31 NOTE — LETTER
October 31, 2019     Mona Marisela   [address] [facility] [address] [facility] [address]    Patient: Gissel Pérez   YOB: 1955   Date of Visit: 10/31/2019       Dear Dr Roz Roldan:    Thank you for referring Gissel Pérez to me for evaluation  Below are my notes for this consultation  If you have questions, please do not hesitate to call me  I look forward to following your patient along with you  Sincerely,        Chriss Thompson PA-C        CC: No Recipients  Chriss Thompson PA-C  10/31/2019 12:19 PM  Sign at close encounter  [facility] - Outpatient Follow-up Note  Gissel Pérez 66 y o  female MRN: [id number]  Encounter: [id number]          ASSESSMENT AND PLAN:      1  Chronic GERD  Poorly controlled off PPI therapy  Recommend re-starting Protonix 20 mg daily  She may continue Pepcid at bedtime  Due to recent worsening of symptoms, I recommend repeat EGD to assess for esophagitis and peptic ulcer disease  Continue dietary and lifestyle modifications to prevent reflux     - EGD; Future  - pantoprazole (PROTONIX) 20 mg tablet; Take 1 tablet by mouth daily 30 minutes before breakfast   Dispense: 90 tablet; Refill: 2    2  Celiac disease  She has history of follicular lymphoma and celiac disease diagnosed in 2010  She states she follows strict gluten free diet  Recent hemoglobin and liver enzymes normal  Recommend rechecking tissue transglutaminase IgA to ensure there is no gluten contamination  We will also check iron levels, vitamin B12, and folate as well  Continue vitamin D supplement daily     - EGD; Future  - Tissue transglutaminase, IgA; Future  - Vitamin B12; Future  - Folate; Future  - Iron Panel (Includes Ferritin, Iron Sat%, Iron, and TIBC);  Future    Follow-up in 3 months  ______________________________________________________________________    SUBJECTIVE:  68-year-old female with history of follicular lymphoma, celiac disease, and GERD presenting for routine office follow-up  She follows a strict gluten free diet since her diagnosis back in 2010  Her main complaint today is postprandial heartburn and epigastric burning pain  She states that everything gives her heartburn, except for milk and ice cream  She was taking pantoprazole 20 mg twice daily with good control over her heartburn, but discontinued this months ago because she was feeling well  She was doing well off PPI therapy for several months afterward but unfortunately her symptoms returned  She is currently taking Pepcid 40 mg in the evening, with only moderate relief  She takes meloxicam once daily with food  She reports a lump sensation in her throat when taking medications  She denies dysphagia for solid and liquid foods  She denies nausea and vomiting  She does report some early satiety  She denies abnormal weight loss  Last EGD was in 2016 which revealed gastritis, biopsies negative for H pylori       Recent blood work from September reveals normal hemoglobin and liver function  Vitamin D slightly low 24  She takes vitamin D 8850-5002 mg daily  Most recent DEXA scan from 2018 revealed normal bone density  REVIEW OF SYSTEMS IS OTHERWISE NEGATIVE        Historical Information   Past Medical History:   Diagnosis Date    Asthma     Celiac disease     Follicular lymphoma ([facility] [address] )     GERD (gastroesophageal reflux disease)     Heart palpitations     History of colonic polyps     resolved 07/12/2016    Lymphoma, 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HCC)     non hodgekins, remission    Malignant lymphoma (HCC)     rt arm cutaneous follicular stage ia (rt diatal medical biceps area , s/p resected and s/p radistion treatment    resolved 12/17/2014    Postmenopausal disorder     resolved 09/21/2017    Restless legs syndrome     resolved 09/21/2017    TMJ syndrome     resolved 09/21/2017     Past Surgical History:   Procedure Laterality Date    COLONOSCOPY  04/16/2019    HYSTERECTOMY      age 45 complete    LYMPH NODE DISSECTION Right     arm    NASAL SEPTUM SURGERY      deviation, stents turned into turbinates     MD ESOPHAGOGASTRODUODENOSCOPY TRANSORAL DIAGNOSTIC N/A 1/18/2016    Procedure: ESOPHAGOGASTRODUODENOSCOPY (EGD); Surgeon: Sharon Barnes MD;  [facility];   Service: Gastroenterology    TONSILLECTOMY      TOTAL ABDOMINAL HYSTERECTOMY W/ BILATERAL SALPINGOOPHORECTOMY      age 54     Social History   Social History     Substance and Sexual Activity   Alcohol Use Yes    Comment: social     Social History     Substance and Sexual Activity   Drug Use No     Social History     Tobacco Use   Smoking Status Former Smoker    Years: 20 00    Types: Cigarettes    Last attempt to quit: 2004    Years since quitting: 15 8   Smokeless Tobacco Never Used     Family History   Problem Relation Age of Onset    Lymphoma Mother     Throat cancer Father     Heart failure Father     Atrial fibrillation Father     Diabetes Father     Colonic polyp Father     Hypertension Father     Thyroid cancer Sister     Breast cancer Paternal Grandmother 71    Breast cancer Paternal Aunt 71    Ovarian cancer Paternal Aunt 79       Meds/Allergies       Current Outpatient Medications:     acetaminophen (TYLENOL) 500 mg tablet    Ascorbic Acid (VITAMIN C) 100 MG tablet    cyanocobalamin (VITAMIN B-12) 1,000 mcg tablet    erythromycin with ethanol (EMGEL) 2 % gel    estradiol (ESTRACE) 0 5 MG tablet    famotidine (PEPCID) 20 mg tablet    flecainide (TAMBOCOR) 100 mg tablet    fluticasone (FLONASE) 50 mcg/act nasal spray    fluticasone-salmeterol (ADVAIR DISKUS) 250-50 mcg/dose inhaler    levalbuterol (XOPENEX HFA) 45 mcg/act inhaler    Magnesium 500 MG CAPS    meloxicam (MOBIC) 15 mg tablet    metoprolol succinate (TOPROL-XL) 25 mg 24 hr tablet    montelukast (SINGULAIR) 10 mg tablet    pseudoephedrine (SUDAFED) 30 mg tablet    Vitamin D, Ergocalciferol, 2000 units CAPS    zolpidem (AMBIEN) 5 mg tablet    nitrofurantoin (MACROBID) 100 mg capsule    ondansetron (ZOFRAN-ODT) 4 mg disintegrating tablet    pantoprazole (PROTONIX) 20 mg tablet    Allergies   Allergen Reactions    Shellfish-Derived Products Anaphylaxis    Wheat Bran Anaphylaxis    Gluten Meal GI Intolerance     Celiac     Morphine And Related Itching    Nuts Hives     Almonds and other related nuts   Other Itching    Sulfa Antibiotics Rash           Objective     Blood pressure 126/85, pulse 76, temperature 98 1 °F (36 7 °C), temperature source Tympanic, height 5' 6" (1 676 m), weight 76 kg (167 lb 9 6 oz), not currently breastfeeding  Body mass index is 27 05 kg/m²  PHYSICAL EXAM:      General Appearance:   Alert, cooperative, no distress   HEENT:   Normocephalic, atraumatic, anicteric      Neck:  Supple, symmetrical, trachea midline   Lungs:   Clear to auscultation bilaterally; no rales, rhonchi or wheezing; respirations unlabored    Heart[de-identified]   Regular rate and rhythm; no murmur, rub, or gallop  Abdomen:   Soft, non-tender, non-distended; normal bowel sounds; no masses, no organomegaly    Genitalia:   Deferred    Rectal:   Deferred    Extremities:  No cyanosis, clubbing or edema    Pulses:  2+ and symmetric    Skin:  No jaundice, rashes, or lesions    Lymph nodes:  No palpable cervical lymphadenopathy        Lab Results:   No visits with results within 1 Day(s) from this visit     Latest known visit with results is:   Appointment on 09/03/2019   Component Date Value    WBC 09/03/2019 7 64     RBC 09/03/2019 4 17     Hemoglobin 09/03/2019 12 6     Hematocrit 09/03/2019 39 0     MCV 09/03/2019 94     MCH 09/03/2019 30 2     MCHC 09/03/2019 32 3     RDW 09/03/2019 13 4     Platelets 37/43/3260 176     MPV 09/03/2019 11 9     Sodium 09/03/2019 136     Potassium 09/03/2019 4 1     Chloride 09/03/2019 104     CO2 09/03/2019 24     ANION GAP 09/03/2019 8     BUN 09/03/2019 21     Creatinine 09/03/2019 0 90     Glucose, Fasting 09/03/2019 124*    Calcium 09/03/2019 9 1     AST 09/03/2019 24     ALT 09/03/2019 38     Alkaline Phosphatase 09/03/2019 48     Total Protein 09/03/2019 7 2     Albumin 09/03/2019 3 8     Total Bilirubin 09/03/2019 0 71     eGFR 09/03/2019 69     Cholesterol 09/03/2019 214*    Triglycerides 09/03/2019 386*    HDL, Direct 09/03/2019 41     LDL Calculated 09/03/2019 96     Non-HDL-Chol (CHOL-HDL) 09/03/2019 173     Vit D, 25-Hydroxy 09/03/2019 23 9*    Hemoglobin A1C 09/03/2019 5 6     EAG 09/03/2019 114          Radiology Results:   No results found

## 2019-11-01 ENCOUNTER — LAB REQUISITION (OUTPATIENT)
Dept: LAB | Facility: HOSPITAL | Age: 64
End: 2019-11-01
Payer: COMMERCIAL

## 2019-11-01 ENCOUNTER — TELEPHONE (OUTPATIENT)
Dept: GASTROENTEROLOGY | Facility: CLINIC | Age: 64
End: 2019-11-01

## 2019-11-01 ENCOUNTER — TRANSCRIBE ORDERS (OUTPATIENT)
Dept: LAB | Facility: CLINIC | Age: 64
End: 2019-11-01

## 2019-11-01 ENCOUNTER — APPOINTMENT (OUTPATIENT)
Dept: LAB | Facility: CLINIC | Age: 64
End: 2019-11-01
Payer: COMMERCIAL

## 2019-11-01 DIAGNOSIS — K90.0 CELIAC DISEASE: ICD-10-CM

## 2019-11-01 DIAGNOSIS — D48.5 NEOPLASM OF UNCERTAIN BEHAVIOR OF SKIN: ICD-10-CM

## 2019-11-01 LAB
FERRITIN SERPL-MCNC: 123 NG/ML (ref 8–388)
FOLATE SERPL-MCNC: 13.2 NG/ML (ref 3.1–17.5)
IRON SATN MFR SERPL: 27 %
IRON SERPL-MCNC: 98 UG/DL (ref 50–170)
TIBC SERPL-MCNC: 359 UG/DL (ref 250–450)
VIT B12 SERPL-MCNC: 497 PG/ML (ref 100–900)

## 2019-11-01 PROCEDURE — 82746 ASSAY OF FOLIC ACID SERUM: CPT

## 2019-11-01 PROCEDURE — 83516 IMMUNOASSAY NONANTIBODY: CPT

## 2019-11-01 PROCEDURE — 36415 COLL VENOUS BLD VENIPUNCTURE: CPT

## 2019-11-01 PROCEDURE — 82607 VITAMIN B-12: CPT

## 2019-11-01 PROCEDURE — 83550 IRON BINDING TEST: CPT

## 2019-11-01 PROCEDURE — 82728 ASSAY OF FERRITIN: CPT

## 2019-11-01 PROCEDURE — 83540 ASSAY OF IRON: CPT

## 2019-11-01 NOTE — TELEPHONE ENCOUNTER
Pt called my direct line to schedule EGD with our office  She was a pt of Dr Mirta Verdin, but wants a [facility] provider  EGD scheduled 1/2/20 with Dr Tiffanie Tapia at [facility]  Instructions and forms mailed to pt's home

## 2019-11-02 LAB — TTG IGA SER-ACNC: <2 U/ML (ref 0–3)

## 2019-12-17 DIAGNOSIS — J45.909 UNCOMPLICATED ASTHMA, UNSPECIFIED ASTHMA SEVERITY, UNSPECIFIED WHETHER PERSISTENT: ICD-10-CM

## 2019-12-18 ENCOUNTER — HOSPITAL ENCOUNTER (OUTPATIENT)
Dept: RADIOLOGY | Facility: MEDICAL CENTER | Age: 64
Discharge: HOME | End: 2019-12-18
Payer: COMMERCIAL

## 2019-12-18 VITALS — HEIGHT: 66 IN | BODY MASS INDEX: 26.84 KG/M2 | WEIGHT: 167 LBS

## 2019-12-18 DIAGNOSIS — Z12.39 SCREENING FOR MALIGNANT NEOPLASM OF BREAST: ICD-10-CM

## 2019-12-18 PROCEDURE — 77067 SCR MAMMO BI INCL CAD: CPT

## 2019-12-18 PROCEDURE — 77063 BREAST TOMOSYNTHESIS BI: CPT

## 2019-12-18 RX ORDER — MONTELUKAST SODIUM 10 MG/1
TABLET ORAL
Qty: 90 TABLET | Refills: 0 | Status: SHIPPED | OUTPATIENT
Start: 2019-12-18 | End: 2020-06-15

## 2019-12-19 ENCOUNTER — OFFICE VISIT (OUTPATIENT)
Dept: OBGYN CLINIC | Facility: CLINIC | Age: 64
End: 2019-12-19
Payer: COMMERCIAL

## 2019-12-19 ENCOUNTER — APPOINTMENT (OUTPATIENT)
Dept: RADIOLOGY | Facility: AMBULARY SURGERY CENTER | Age: 64
End: 2019-12-19
Attending: SURGERY
Payer: COMMERCIAL

## 2019-12-19 VITALS
BODY MASS INDEX: 26.68 KG/M2 | HEART RATE: 78 BPM | DIASTOLIC BLOOD PRESSURE: 79 MMHG | HEIGHT: 66 IN | WEIGHT: 166 LBS | SYSTOLIC BLOOD PRESSURE: 120 MMHG

## 2019-12-19 DIAGNOSIS — M65.832 EXTENSOR TENOSYNOVITIS OF LEFT WRIST: Primary | ICD-10-CM

## 2019-12-19 DIAGNOSIS — M65.832 EXTENSOR TENOSYNOVITIS OF LEFT WRIST: ICD-10-CM

## 2019-12-19 DIAGNOSIS — M77.01 MEDIAL EPICONDYLITIS OF ELBOW, RIGHT: ICD-10-CM

## 2019-12-19 DIAGNOSIS — R22.32 MASS OF LEFT WRIST: ICD-10-CM

## 2019-12-19 DIAGNOSIS — S63.592A LUNOTRIQUETRAL LIGAMENT TEAR, LEFT, INITIAL ENCOUNTER: ICD-10-CM

## 2019-12-19 DIAGNOSIS — M25.332 VISI (VOLAR INTERCALATED SEGMENT INSTABILITY), LEFT: ICD-10-CM

## 2019-12-19 PROCEDURE — 99214 OFFICE O/P EST MOD 30 MIN: CPT | Performed by: SURGERY

## 2019-12-19 PROCEDURE — 20551 NJX 1 TENDON ORIGIN/INSJ: CPT | Performed by: SURGERY

## 2019-12-19 PROCEDURE — 73110 X-RAY EXAM OF WRIST: CPT

## 2019-12-19 RX ORDER — LIDOCAINE HYDROCHLORIDE 10 MG/ML
1 INJECTION, SOLUTION INFILTRATION; PERINEURAL
Status: COMPLETED | OUTPATIENT
Start: 2019-12-19 | End: 2019-12-19

## 2019-12-19 RX ORDER — DEXAMETHASONE SODIUM PHOSPHATE 100 MG/10ML
40 INJECTION INTRAMUSCULAR; INTRAVENOUS
Status: COMPLETED | OUTPATIENT
Start: 2019-12-19 | End: 2019-12-19

## 2019-12-19 RX ADMIN — DEXAMETHASONE SODIUM PHOSPHATE 40 MG: 100 INJECTION INTRAMUSCULAR; INTRAVENOUS at 11:26

## 2019-12-19 RX ADMIN — LIDOCAINE HYDROCHLORIDE 1 ML: 10 INJECTION, SOLUTION INFILTRATION; PERINEURAL at 11:26
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
## 2020-01-01 ENCOUNTER — ANESTHESIA EVENT (OUTPATIENT)
Dept: GASTROENTEROLOGY | Facility: HOSPITAL | Age: 65
End: 2020-01-01

## 2020-01-02 ENCOUNTER — ANESTHESIA (OUTPATIENT)
Dept: GASTROENTEROLOGY | Facility: HOSPITAL | Age: 65
End: 2020-01-02

## 2020-01-02 ENCOUNTER — HOSPITAL ENCOUNTER (OUTPATIENT)
Dept: GASTROENTEROLOGY | Facility: HOSPITAL | Age: 65
Setting detail: OUTPATIENT SURGERY
Discharge: HOME | End: 2020-01-02
Attending: INTERNAL MEDICINE | Admitting: INTERNAL MEDICINE
Payer: COMMERCIAL

## 2020-01-02 VITALS
TEMPERATURE: 97.4 F | DIASTOLIC BLOOD PRESSURE: 65 MMHG | HEIGHT: 66 IN | RESPIRATION RATE: 18 BRPM | BODY MASS INDEX: 26.03 KG/M2 | OXYGEN SATURATION: 98 % | SYSTOLIC BLOOD PRESSURE: 104 MMHG | WEIGHT: 162 LBS | HEART RATE: 64 BPM

## 2020-01-02 DIAGNOSIS — K90.0 CELIAC DISEASE: ICD-10-CM

## 2020-01-02 DIAGNOSIS — K21.9 CHRONIC GERD: ICD-10-CM

## 2020-01-02 PROCEDURE — 88305 TISSUE EXAM BY PATHOLOGIST: CPT | Performed by: PATHOLOGY

## 2020-01-02 PROCEDURE — 43239 EGD BIOPSY SINGLE/MULTIPLE: CPT | Performed by: INTERNAL MEDICINE

## 2020-01-02 RX ORDER — PROPOFOL 10 MG/ML
INJECTION, EMULSION INTRAVENOUS CONTINUOUS PRN
Status: DISCONTINUED | OUTPATIENT
Start: 2020-01-02 | End: 2020-01-02 | Stop reason: SURG

## 2020-01-02 RX ORDER — LIDOCAINE HYDROCHLORIDE 10 MG/ML
INJECTION, SOLUTION EPIDURAL; INFILTRATION; INTRACAUDAL; PERINEURAL AS NEEDED
Status: DISCONTINUED | OUTPATIENT
Start: 2020-01-02 | End: 2020-01-02 | Stop reason: SURG

## 2020-01-02 RX ORDER — SODIUM CHLORIDE 9 MG/ML
INJECTION, SOLUTION INTRAVENOUS CONTINUOUS PRN
Status: DISCONTINUED | OUTPATIENT
Start: 2020-01-02 | End: 2020-01-02 | Stop reason: SURG

## 2020-01-02 RX ADMIN — LIDOCAINE HYDROCHLORIDE 50 MG: 10 INJECTION, SOLUTION EPIDURAL; INFILTRATION; INTRACAUDAL; PERINEURAL at 13:02

## 2020-01-02 RX ADMIN — SODIUM CHLORIDE: 0.9 INJECTION, SOLUTION INTRAVENOUS at 12:57

## 2020-01-02 RX ADMIN — PROPOFOL 100 MCG/KG/MIN: 10 INJECTION, EMULSION INTRAVENOUS at 13:04

## 2020-01-02 NOTE — H&P
History and Physical -  [facility]  Marimar Mcgee 60 y o  female MRN: [id number]                  HPI: Marimar Mcgee is a 60y o  year old female who presents for EGD due to history of GERD and celiac disease  A recent TT G was normal       REVIEW OF SYSTEMS: Per the HPI, and otherwise unremarkable  Historical Information   Past Medical History:   Diagnosis Date    Asthma     Celiac disease     Follicular lymphoma ([facility] )     GERD (gastroesophageal reflux disease)     Heart palpitations     History of colonic polyps     resolved 2016    History of radiation therapy     Irregular heart beat     Lymphoma, 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HCC)     non hodgekins, remission    Malignant lymphoma ([facility] )     rt arm cutaneous follicular stage ia (rt diatal medical biceps area , s/p resected and s/p radistion treatment    resolved 2014    Postmenopausal disorder     resolved 2017    Restless legs syndrome     resolved 2017    TMJ syndrome     resolved 2017     Past Surgical History:   Procedure Laterality Date    COLONOSCOPY  2019    FUNCTIONAL ENDOSCOPIC SINUS SURGERY Bilateral     Dr Tom Castro      age 39 complete    LYMPH NODE DISSECTION Right     arm    NASAL SEPTUM SURGERY      with turbinate reduction - Dr Reina Wheeler ESOPHAGOGASTRODUODENOSCOPY TRANSORAL DIAGNOSTIC N/A 2016    Procedure: ESOPHAGOGASTRODUODENOSCOPY (EGD); Surgeon: Bhavik Espinoza MD;  Location: [facility];   Service: Gastroenterology    TONSILLECTOMY      TOTAL ABDOMINAL HYSTERECTOMY W/ BILATERAL SALPINGOOPHORECTOMY      age 54     Social History   Social History     Substance and Sexual Activity   Alcohol Use Yes    Comment: social     Social History     Substance and Sexual Activity   Drug Use No     Social History     Tobacco Use   Smoking Status Former Smoker    Years: 20 00    Types: Cigarettes    Last attempt to quit:     Years since quittin 0 Smokeless Tobacco Never Used     Family History   Problem Relation Age of Onset    Lymphoma Mother     Throat cancer Father     Heart failure Father     Atrial fibrillation Father     Diabetes Father     Colonic polyp Father     Hypertension Father     Thyroid cancer Sister     Breast cancer Paternal Grandmother 71    Breast cancer Paternal Aunt 71    Ovarian cancer Paternal Aunt 79    No Known Problems Maternal Grandmother     No Known Problems Maternal Grandfather     Cancer Paternal Grandfather     Lung cancer Paternal Grandfather     BRCA1 Positive Cousin     Skin cancer Paternal Aunt     Throat cancer Paternal Uncle     No Known Problems Maternal Aunt        Meds/Allergies       (Not in a hospital admission)    Allergies   Allergen Reactions    Shellfish-Derived Products Anaphylaxis    Wheat Bran Anaphylaxis    Gluten Meal GI Intolerance     Celiac     Morphine And Related Itching    Nuts Hives     Almonds and other related nuts   Other Itching    Sulfa Antibiotics Rash       Objective     /66   Pulse 62   Temp (!) 97 4 °F (36 3 °C) (Oral)   Resp 16   Ht 5' 6" (1 676 m)   Wt 73 5 kg (162 lb)   SpO2 99%   BMI 26 15 kg/m²       PHYSICAL EXAM    Gen: NAD  CV: RRR  CHEST: Clear  ABD: soft, NT/ND  EXT: no edema      ASSESSMENT/PLAN:  This is a 60y o  year old female here for EGD, and she is stable and optimized for her procedure

## 2020-01-02 NOTE — ANESTHESIA PREPROCEDURE EVALUATION
Review of Systems/Medical History  Patient summary reviewed  Chart reviewed  No history of anesthetic complications     Cardiovascular  Exercise tolerance (METS): >4,  Dysrhythmias (Takes flecanide- did not take today ) , history of PSVT,    Pulmonary  Asthma , well controlled/ stable Asthma type of rescue: chronic medication usage,        GI/Hepatic    GERD ,   Comment: Celiac Disease        Negative  ROS        Endo/Other  Negative endo/other ROS      GYN  Negative gynecology ROS          Hematology    Lymphoma (R arm, cutaneous)   Musculoskeletal  Negative musculoskeletal ROS        Neurology  Negative neurology ROS      Psychology   Negative psychology ROS              Physical Exam    Airway    Mallampati score: I  TM Distance: >3 FB  Neck ROM: full     Dental   No notable dental hx     Cardiovascular      Pulmonary      Other Findings        Anesthesia Plan  ASA Score- 2     Anesthesia Type- IV sedation with anesthesia with ASA Monitors  Additional Monitors:   Airway Plan:         Plan Factors- Patient instructed to abstain from smoking on day of procedure  Patient did not smoke on day of surgery  Induction-     Postoperative Plan-     Informed Consent- Anesthetic plan and risks discussed with patient  I personally reviewed this patient with the CRNA  Discussed and agreed on the Anesthesia Plan with the CRNA  Alok Parks

## 2020-01-02 NOTE — ANESTHESIA POSTPROCEDURE EVALUATION
Post-Op Assessment Note    CV Status:  Stable  Pain Score: 0    Pain management: adequate     Mental Status:  Awake   Hydration Status:  Stable   PONV Controlled:  None   Airway Patency:  Patent   Post Op Vitals Reviewed: Yes      Staff: Anesthesiologist, CRNA           BP      Temp     Pulse     Resp      SpO2

## 2020-01-13 ENCOUNTER — TELEPHONE (OUTPATIENT)
Dept: GASTROENTEROLOGY | Facility: CLINIC | Age: 65
End: 2020-01-13

## 2020-01-13 DIAGNOSIS — K20.90 ESOPHAGITIS: Primary | ICD-10-CM

## 2020-01-13 RX ORDER — PANTOPRAZOLE SODIUM 40 MG/1
40 TABLET, DELAYED RELEASE ORAL 2 TIMES DAILY
Qty: 180 TABLET | Refills: 1 | Status: SHIPPED | OUTPATIENT
Start: 2020-01-13 | End: 2020-12-07 | Stop reason: SDUPTHER

## 2020-01-13 NOTE — TELEPHONE ENCOUNTER
Magdaleno, I did not enter script because I am unsure of the dose  Previous order was 20 mg   10/31/19 EGD notes PPI twice daily for 12 weeks

## 2020-01-13 NOTE — TELEPHONE ENCOUNTER
Patients GI provider:  Dr Yaakov Lynne     Number to return call: (n/a    Reason for call: Pt calling in to advise that her script for Pantoprazole 40mg 2x daily was not sent  This was recommended after her EGD on 1/2   Please send to [facility] mail [facility]     Scheduled procedure/appointment date if applicable: Apt/procedure - n/a

## 2020-01-16 ENCOUNTER — HOSPITAL ENCOUNTER (OUTPATIENT)
Dept: RADIOLOGY | Facility: HOSPITAL | Age: 65
Discharge: HOME | End: 2020-01-16
Attending: SURGERY | Admitting: SURGERY
Payer: COMMERCIAL

## 2020-01-16 ENCOUNTER — TRANSCRIBE ORDERS (OUTPATIENT)
Dept: RADIOLOGY | Facility: HOSPITAL | Age: 65
End: 2020-01-16

## 2020-01-16 DIAGNOSIS — S63.592A LUNOTRIQUETRAL LIGAMENT TEAR, LEFT, INITIAL ENCOUNTER: ICD-10-CM

## 2020-01-16 DIAGNOSIS — M65.832 EXTENSOR TENOSYNOVITIS OF LEFT WRIST: ICD-10-CM

## 2020-01-16 DIAGNOSIS — R22.32 MASS OF LEFT WRIST: ICD-10-CM

## 2020-01-16 DIAGNOSIS — M25.332 VISI (VOLAR INTERCALATED SEGMENT INSTABILITY), LEFT: ICD-10-CM

## 2020-01-16 PROCEDURE — 20606 DRAIN/INJ JOINT/BURSA W/US: CPT

## 2020-01-16 RX ORDER — BUPIVACAINE HYDROCHLORIDE 2.5 MG/ML
10 INJECTION, SOLUTION EPIDURAL; INFILTRATION; INTRACAUDAL ONCE
Status: COMPLETED | OUTPATIENT
Start: 2020-01-16 | End: 2020-01-16

## 2020-01-16 RX ORDER — LIDOCAINE HYDROCHLORIDE 10 MG/ML
5 INJECTION, SOLUTION EPIDURAL; INFILTRATION; INTRACAUDAL; PERINEURAL ONCE
Status: COMPLETED | OUTPATIENT
Start: 2020-01-16 | End: 2020-01-16

## 2020-01-16 RX ORDER — METHYLPREDNISOLONE ACETATE 80 MG/ML
80 INJECTION, SUSPENSION INTRA-ARTICULAR; INTRALESIONAL; INTRAMUSCULAR; SOFT TISSUE ONCE
Status: COMPLETED | OUTPATIENT
Start: 2020-01-16 | End: 2020-01-16

## 2020-01-16 RX ADMIN — METHYLPREDNISOLONE ACETATE 80 MG: 80 INJECTION, SUSPENSION INTRA-ARTICULAR; INTRALESIONAL; INTRAMUSCULAR; SOFT TISSUE at 12:00

## 2020-01-16 RX ADMIN — LIDOCAINE HYDROCHLORIDE 2 ML: 10 INJECTION, SOLUTION EPIDURAL; INFILTRATION; INTRACAUDAL; PERINEURAL at 12:00

## 2020-01-16 RX ADMIN — BUPIVACAINE HYDROCHLORIDE 1 ML: 2.5 INJECTION, SOLUTION EPIDURAL; INFILTRATION; INTRACAUDAL at 12:00

## 2020-01-16 RX ADMIN — LIDOCAINE HYDROCHLORIDE 1 ML: 10 INJECTION, SOLUTION EPIDURAL; INFILTRATION; INTRACAUDAL; PERINEURAL at 12:00

## 2020-01-20 NOTE — PROGRESS NOTES
OT Evaluation     Today's date: 2020  Patient name: Hallie Lemus  : 1955  MRN: [id number]  Referring provider: Sita Uriostegui MD  Dx:   Encounter Diagnosis     ICD-10-CM    1  Extensor tenosynovitis of left wrist M65 832    2  Lunotriquetral ligament tear, left, initial encounter S63 592A    3  Medial epicondylitis of right elbow M77 01                   Assessment  Assessment details: Hallie Lemus is a 60 y o  female with a diagnosis of right medial epicondylitis, tenosynovitis of left wrist, lunotriquetral tear of left wrist  A moderate complexity evaluation was completed today  The patient is having difficulty with opening jar lids, lifting and carrying heavy items, doing heavy household activities   Findings show an impairment with ROM, strength, activity tolerance, and pain which limits completion of ADL's/IADL's  Patient will benefit from OT services to reach goals  Impairments: abnormal or restricted ROM, activity intolerance, impaired physical strength, lacks appropriate home exercise program and pain with function    Symptom irritability: lowUnderstanding of Dx/Px/POC: good   Prognosis: good    Goals  STG's In 2 weeks:  1  Patient will report a decreased pain level of 3/10 in left wrist at its worst   2  Patient will improve AROM of left wrist UD to 30 for completing household chores  3   Patient will increase strength of left  to 22#, lateral pinch to 14#, sup/pronation to 4-/5, wrist flex to 3+/5 for lifting and carrying items  4  Patient will demonstrate good carryover and independence with HEP     LTG's In 4 weeks:  1  Patient will report a decreased pain level of 1/10 in left wrist at its worst with lifting activities  2   Patient will improve AROM of left wrist UD to 40 for self-care tasks  3  Patient will increase strength of left  to 30#-35#, lateral pinch to 15#, wrist flex/ext to 4/5, sup/pronation to 4/5 for lifting and carrying items        Plan  Patient would benefit from: OT eval  Planned therapy interventions: home exercise program, therapeutic exercise, patient education, strengthening, stretching, joint mobilization, manual therapy and functional ROM exercises  Frequency: 2x week  Duration in visits: 4  Duration in weeks: 4  Plan of Care beginning date: 2020  Plan of Care expiration date: 2020  Treatment plan discussed with: patient        Subjective Evaluation    History of Present Illness  Mechanism of injury: On 19 patient consulted orthopedic surgeon  Patient is a 60 y o  female with left ECU tendinitis, left lunotriquetral ligament tear, VISI instability, left ulnar sided ganglion cyst/synovitis and right medial epicondylitis  Treatment options were discussed today  With regards to her right medial epicondylitis a CSI and therapy was discussed today  Ileana Arrington elected to proceed  A right medial epicondyle CSI was performed in the office today without complication  OT was ordered for medial epicondyle exercises  With regards to her left wrist  It was discussed today that a PRC may be beneficial for her, but she would likely loose aprox  50 percent of her current motion  At this time, she elected to proceed with an ultrasound guided ganglion cyst/synovitis aspiration as well as an ultrasound guided ECU CSI  OT was ordered for ECU exercises as well as strengthening exercises  I will see her back in 2 months time   On 20 patient had ganglion cyst aspirated and received a CSI in left wrist   Quality of life: good    Pain  Current pain ratin  At best pain ratin  At worst pain ratin  Location: left wrist  Quality: dull ache  Relieving factors: ice and medications  Aggravating factors: lifting    Social Support  Lives with: spouse    Employment status: working  Hand dominance: right    Treatments  Current treatment: occupational therapy  Patient Goals  Patient goals for therapy: increased strength, decreased pain and increased motion          Objective     Active Range of Motion     Left Elbow   Normal active range of motion    Right Elbow   Normal active range of motion    Left Wrist   Wrist flexion: 74 degrees   Wrist extension: 70 degrees   Radial deviation: 15 degrees   Ulnar deviation: 24 degrees     Right Wrist   Wrist flexion: 70 degrees   Wrist extension: 60 degrees   Radial deviation: 15 degrees   Ulnar deviation: 42 degrees     Additional Active Range of Motion Details  Wrist ext 75 with elbows extended    Strength/Myotome Testing     Left Elbow   Flexion: 5  Extension: 5  Forearm supination: 3+  Forearm pronation: 3+    Right Elbow   Flexion: 5  Extension: 5  Forearm supination: 5  Forearm pronation: 5    Left Wrist/Hand   Wrist extension: 3+  Wrist flexion: 3  Radial deviation: 4-  Ulnar deviation: 4-     (2nd hand position)     Trial 1: 15    Trial 2: 20    Trial 3: 20    Thumb Strength  Key/Lateral Pinch     Trail 1: 13  Tip/Two-Point Pinch     Trial 1: 7    Right Wrist/Hand   Normal wrist strength     (2nd hand position)     Trial 1: 45    Thumb Strength   Key/Lateral Pinch     Trial 1: 15  Tip/Two-Point Pinch     Trial 1: 10                  Precautions N/A       Manual  1/22/20                                                   Exercise Diary  1/22/20       AROM w/ stretch  Wrist flex/ext  UD/RD Hold 10 sec  7/7  5/5       Prayer stretch Hold 10 sec   6x       Dumbells  Wrist flex/ext  UD/RD  Sup/pronation 1#  10 x 2    10 x 2       Flex bar  Flex/ext  Derik twist  Flex/ext Yellow  10/10    5/5       T-putty    Pinch   2 pt/lateral Red   10    10/5                                                                                                                                   Modalities

## 2020-01-21 ENCOUNTER — TELEPHONE (OUTPATIENT)
Dept: GASTROENTEROLOGY | Facility: CLINIC | Age: 65
End: 2020-01-21

## 2020-01-21 NOTE — TELEPHONE ENCOUNTER
I returned patients call, patient would like to go over result information on her my chart,has a few follow up question, please call patient       Thank You

## 2020-01-21 NOTE — TELEPHONE ENCOUNTER
Reviewed biopsy results with patient- stomach biopsy showed inflammation, small bowel biopsy- normal and no Barretts reported  Treatment with PPI for 12 weeks and schedule 3 month follow up visit as recommended at 10/31/19 office visit  **clerical please contact patient to schedule follow up visit- can schedule with Nohelia in March if available    Thank you

## 2020-01-22 ENCOUNTER — EVALUATION (OUTPATIENT)
Dept: OCCUPATIONAL THERAPY | Facility: CLINIC | Age: 65
End: 2020-01-22
Payer: COMMERCIAL

## 2020-01-22 DIAGNOSIS — S63.592A LUNOTRIQUETRAL LIGAMENT TEAR, LEFT, INITIAL ENCOUNTER: ICD-10-CM

## 2020-01-22 DIAGNOSIS — M65.832 EXTENSOR TENOSYNOVITIS OF LEFT WRIST: Primary | ICD-10-CM

## 2020-01-22 DIAGNOSIS — M77.01 MEDIAL EPICONDYLITIS OF RIGHT ELBOW: ICD-10-CM

## 2020-01-22 PROCEDURE — 97110 THERAPEUTIC EXERCISES: CPT

## 2020-01-22 PROCEDURE — 97166 OT EVAL MOD COMPLEX 45 MIN: CPT

## 2020-02-05 ENCOUNTER — APPOINTMENT (OUTPATIENT)
Dept: OCCUPATIONAL THERAPY | Facility: CLINIC | Age: 65
End: 2020-02-05
Payer: COMMERCIAL

## 2020-02-06 ENCOUNTER — EVALUATION (OUTPATIENT)
Dept: OCCUPATIONAL THERAPY | Facility: CLINIC | Age: 65
End: 2020-02-06
Payer: COMMERCIAL

## 2020-02-06 DIAGNOSIS — S63.592A LUNOTRIQUETRAL LIGAMENT TEAR, LEFT, INITIAL ENCOUNTER: ICD-10-CM

## 2020-02-06 DIAGNOSIS — M77.01 MEDIAL EPICONDYLITIS OF RIGHT ELBOW: Primary | ICD-10-CM

## 2020-02-06 DIAGNOSIS — M65.832 EXTENSOR TENOSYNOVITIS OF WRIST, LEFT: ICD-10-CM

## 2020-02-06 PROCEDURE — 97110 THERAPEUTIC EXERCISES: CPT

## 2020-02-06 NOTE — PROGRESS NOTES
OT DISCHARGE    Today's date: 2020  Patient name: Ermias Perdomo  : 1955  MRN: [id number]  Referring provider: Nilsa Noriega MD  Dx:   Encounter Diagnosis     ICD-10-CM    1  Medial epicondylitis of right elbow M77 01    2  Extensor tenosynovitis of wrist, left M65 832    3  Lunotriquetral ligament tear, left, initial encounter X63 382T                   Assessment  Assessment details: Ermias Perdomo is a 60 y o  female with a diagnosis of right medial epicondylitis, tenosynovitis of left wrist, lunotriquetral tear of left wrist  A moderate complexity evaluation was completed today  The patient is having difficulty with opening jar lids, lifting and carrying heavy items, doing heavy household activities   Findings show an impairment with ROM, strength, activity tolerance, and pain which limits completion of ADL's/IADL's  Patient will benefit from OT services to reach goals  REASSESSMENT: Patient was seen for 2 treatment sessions  Improvement noted with picking up pots and pans, pouring drinks out of containers, opening jar lids, lifting and carrying laundry basket  Pain has subsided to 1-2/10 in left wrist  Improvement noted with wrist extension, UD/RD,  strength, wrist and forearm strength  Patient is independent with HEP  She is pleased with progress and would like to be discharged from OT services  Impairments: abnormal or restricted ROM, activity intolerance, impaired physical strength, lacks appropriate home exercise program and pain with function    Symptom irritability: lowUnderstanding of Dx/Px/POC: good   Prognosis: good    Goals  STG's In 2 weeks:  1  Patient will report a decreased pain level of 3/10 in left wrist at its worst  MET  2  Patient will improve AROM of left wrist UD to 30 for completing household chores  MET  3  Patient will increase strength of left  to 22#, lateral pinch to 14#, sup/pronation to 4-/5, wrist flex to 3+/5 for lifting and carrying items  MET  4  Patient will demonstrate good carryover and independence with HEP  MET    LTG's In 4 weeks:  1  Patient will report a decreased pain level of 1/10 in left wrist at its worst with lifting activities  NOT MET  2  Patient will improve AROM of left wrist UD to 40 for self-care tasksNOT MET  3  Patient will increase strength of left  to 30#-35#, lateral pinch to 15#, wrist flex/ext to 4/5, sup/pronation to 4/5 for lifting and carrying items  PARTIALLY MET      Plan  Patient would benefit from: OT eval  Planned therapy interventions: home exercise program, therapeutic exercise, patient education, strengthening, stretching, joint mobilization, manual therapy and functional ROM exercises  Frequency: 2x week  Duration in visits: 4  Duration in weeks: 4  Plan of Care beginning date: 1/22/2020  Plan of Care expiration date: 2/22/2020  Treatment plan discussed with: patient        Subjective Evaluation    History of Present Illness  Mechanism of injury: On 12/19/19 patient consulted orthopedic surgeon  Patient is a 60 y o  female with left ECU tendinitis, left lunotriquetral ligament tear, VISI instability, left ulnar sided ganglion cyst/synovitis and right medial epicondylitis  Treatment options were discussed today  With regards to her right medial epicondylitis a CSI and therapy was discussed today  Katia Torres elected to proceed  A right medial epicondyle CSI was performed in the office today without complication  OT was ordered for medial epicondyle exercises  With regards to her left wrist  It was discussed today that a PRC may be beneficial for her, but she would likely loose aprox  50 percent of her current motion  At this time, she elected to proceed with an ultrasound guided ganglion cyst/synovitis aspiration as well as an ultrasound guided ECU CSI  OT was ordered for ECU exercises as well as strengthening exercises  I will see her back in 2 months time   On 1/16/20 patient had ganglion cyst aspirated and received a CSI in left wrist   Quality of life: good    Pain  Current pain ratin  At best pain ratin  At worst pain ratin  Location: left wrist  Quality: dull ache  Relieving factors: ice and medications  Aggravating factors: lifting    Social Support  Lives with: spouse    Employment status: working  Hand dominance: right    Treatments  Current treatment: occupational therapy  Patient Goals  Patient goals for therapy: increased strength, decreased pain and increased motion          Objective     Active Range of Motion     Left Elbow   Normal active range of motion    Right Elbow   Normal active range of motion    Left Wrist   Wrist flexion: 74 degrees   Wrist extension: 70 degrees   Radial deviation: 21 degrees   Ulnar deviation: 24 degrees     Right Wrist   Wrist flexion: 70 degrees   Wrist extension: 60 degrees   Radial deviation: 21 degrees   Ulnar deviation: 42 degrees     Additional Active Range of Motion Details  Wrist ext 75 with elbows extended    Strength/Myotome Testing     Left Elbow   Flexion: 5  Extension: 5  Forearm supination: 3+  Forearm pronation: 3+    Right Elbow   Flexion: 5  Extension: 5  Forearm supination: 5  Forearm pronation: 5    Left Wrist/Hand   Wrist extension: 4  Wrist flexion: 3+  Radial deviation: 4  Ulnar deviation: 4-     (2nd hand position)     Trial 1: 35    Trial 2: 32    Thumb Strength  Key/Lateral Pinch     Trail 1: 13  Tip/Two-Point Pinch     Trial 1: 8    Right Wrist/Hand   Normal wrist strength     (2nd hand position)     Trial 1: 45    Thumb Strength   Key/Lateral Pinch     Trial 1: 15  Tip/Two-Point Pinch     Trial 1: 10                  Precautions N/A       Manual  20                                                   Exercise Diary  20      AROM w/ stretch  Wrist flex/ext  UD/RD Hold 10 sec  7/7  5/5       Prayer stretch Hold 10 sec   6x       Dumbells  Wrist flex/ext  UD/RD  Sup/pronation 1#  10 x 2    10 x 2       Flex bar  Flex/ext  Malorie Savin twist  Flex/ext Yellow  10/10    5/5       T-putty    Pinch   2 pt/lateral Red   10    10/5               T-band  Wrist flex/ext  UD/RD  Sup/pronation  Red   10/10  10/10  10/10                                                                                                      reassessment  done          Modalities

## 2020-02-06 NOTE — PROGRESS NOTES
OT Evaluation     Today's date: 2020  Patient name: Steve Egan  : 1955  MRN: [id number]  Referring provider: Frank Ortiz MD  Dx: No diagnosis found  Assessment  Assessment details: Steve Egan is a 60 y o  female with a diagnosis of ***  A ***complexity evaluation was completed today  The FOTO score is ***% as patient is having difficulty with ***  Findings show an impairment with ROM, strength, activity tolerance, and pain which limits completion of ADL's/IADL's  Patient will benefit from OT services to reach goals  Understanding of Dx/Px/POC: good   Prognosis: good    Goals  STG's In *** weeks:  1  Patient will report a decreased pain level of ***/10   2  Patient will improve AROM of ***  3   Patient will increase strength of *** for lifting and carrying items  4  Patient will demonstrate good carryover and independence with HEP  5  Patient will improve sensitivity of numbness in *** by detecting *** cm of monofilament  LTG's In *** weeks:  1  Patient will report a decreased pain level of ***/10   2   Patient will improve AROM of *** for self-care tasks  3  Patient will increase strength of *** for lifting and carrying items  4  Patient will decrease edema by ***cm      Plan  Patient would benefit from: OT eval  Planned therapy interventions: home exercise program  Treatment plan discussed with: patient        Subjective    Objective                Precautions ***       Manual                                                     Exercise Diary                                                                                                                                                                             Modalities

## 2020-02-19 ENCOUNTER — OFFICE VISIT (OUTPATIENT)
Dept: CARDIOLOGY CLINIC | Facility: CLINIC | Age: 65
End: 2020-02-19
Payer: COMMERCIAL

## 2020-02-19 ENCOUNTER — OFFICE VISIT (OUTPATIENT)
Dept: FAMILY MEDICINE CLINIC | Facility: CLINIC | Age: 65
End: 2020-02-19
Payer: COMMERCIAL

## 2020-02-19 VITALS
TEMPERATURE: 97.4 F | HEART RATE: 75 BPM | WEIGHT: 166 LBS | DIASTOLIC BLOOD PRESSURE: 64 MMHG | HEIGHT: 66 IN | RESPIRATION RATE: 16 BRPM | SYSTOLIC BLOOD PRESSURE: 100 MMHG | BODY MASS INDEX: 26.68 KG/M2 | OXYGEN SATURATION: 98 %

## 2020-02-19 VITALS
SYSTOLIC BLOOD PRESSURE: 126 MMHG | HEART RATE: 84 BPM | BODY MASS INDEX: 26.68 KG/M2 | WEIGHT: 166 LBS | HEIGHT: 66 IN | DIASTOLIC BLOOD PRESSURE: 82 MMHG

## 2020-02-19 DIAGNOSIS — I49.3 PVC'S (PREMATURE VENTRICULAR CONTRACTIONS): Primary | ICD-10-CM

## 2020-02-19 DIAGNOSIS — R53.83 FATIGUE, UNSPECIFIED TYPE: ICD-10-CM

## 2020-02-19 DIAGNOSIS — E78.5 DYSLIPIDEMIA: ICD-10-CM

## 2020-02-19 DIAGNOSIS — E78.1 HYPERTRIGLYCERIDEMIA: Primary | ICD-10-CM

## 2020-02-19 DIAGNOSIS — S03.40XS SPRAIN OF TEMPOROMANDIBULAR JOINT, SEQUELA: ICD-10-CM

## 2020-02-19 DIAGNOSIS — Z78.0 MENOPAUSE: ICD-10-CM

## 2020-02-19 DIAGNOSIS — I49.3 PREMATURE VENTRICULAR CONTRACTION: ICD-10-CM

## 2020-02-19 DIAGNOSIS — G47.00 INSOMNIA, UNSPECIFIED TYPE: ICD-10-CM

## 2020-02-19 PROCEDURE — 99214 OFFICE O/P EST MOD 30 MIN: CPT | Performed by: INTERNAL MEDICINE

## 2020-02-19 PROCEDURE — 3074F SYST BP LT 130 MM HG: CPT | Performed by: INTERNAL MEDICINE

## 2020-02-19 PROCEDURE — 3078F DIAST BP <80 MM HG: CPT | Performed by: INTERNAL MEDICINE

## 2020-02-19 PROCEDURE — 3079F DIAST BP 80-89 MM HG: CPT | Performed by: INTERNAL MEDICINE

## 2020-02-19 PROCEDURE — 3008F BODY MASS INDEX DOCD: CPT | Performed by: INTERNAL MEDICINE

## 2020-02-19 PROCEDURE — 1036F TOBACCO NON-USER: CPT | Performed by: INTERNAL MEDICINE

## 2020-02-19 RX ORDER — ESTRADIOL 0.5 MG/1
TABLET ORAL
Qty: 90 TABLET | Refills: 0
Start: 2020-02-19 | End: 2020-05-07 | Stop reason: SDUPTHER

## 2020-02-19 RX ORDER — OMEGA-3-ACID ETHYL ESTERS 1 G/1
2 CAPSULE, LIQUID FILLED ORAL 2 TIMES DAILY
Qty: 120 CAPSULE | Refills: 1 | Status: SHIPPED | OUTPATIENT
Start: 2020-02-19 | End: 2020-08-19 | Stop reason: SDUPTHER

## 2020-02-19 RX ORDER — ZOLPIDEM TARTRATE 5 MG/1
5 TABLET ORAL
Qty: 30 TABLET | Refills: 1 | Status: SHIPPED | OUTPATIENT
Start: 2020-02-19 | End: 2020-08-19 | Stop reason: SDUPTHER

## 2020-02-19 NOTE — PROGRESS NOTES
Cardiology   Rayo  60 y o  female MRN: [id number]        Impression:  1  Premature ventricular contractions - Almost completely remitted when taking flecainide  2  Dyslipidemia - elevated triglycerides  Starting O3FA      Recommendations:  1  Continue current medications  2  Follow up in 6 months  HPI: Rayo  is a 60y o  year old female with premature contractions who returns for follow up  Feels palpitations only  When forgets flecainide  Otherwise, no palpitations  Review of Systems   Constitutional: Negative  HENT: Negative  Eyes: Negative  Respiratory: Negative for chest tightness and shortness of breath  Cardiovascular: Positive for palpitations  Negative for chest pain and leg swelling  Gastrointestinal: Negative  Endocrine: Negative  Genitourinary: Negative  Musculoskeletal: Negative  Skin: Negative  Allergic/Immunologic: Negative  Neurological: Negative  Hematological: Negative  Psychiatric/Behavioral: Negative  All other systems reviewed and are negative          Past Medical History:   Diagnosis Date    Asthma     Celiac disease     Follicular [facility] ([facility] [address] )     GERD (gastroesophageal reflux disease)     Heart palpitations     History of colonic polyps     resolved 07/12/2016    History of radiation therapy 2010    Irregular heart beat     Lymphoma, 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HCC)     non hodgekins, remission    Malignant lymphoma ([facility] [address] ) 2010    rt arm cutaneous follicular stage ia (rt diatal medical biceps area , s/p resected and s/p radistion treatment    resolved 12/17/2014    Postmenopausal disorder     resolved 09/21/2017    Restless legs syndrome     resolved 09/21/2017    TMJ syndrome     resolved 09/21/2017     Past Surgical History:   Procedure Laterality Date    COLONOSCOPY  04/16/2019    FUNCTIONAL ENDOSCOPIC SINUS SURGERY Bilateral 2013    Dr Nelson Kidney      age 39 complete    LYMPH NODE DISSECTION Right arm    NASAL SEPTUM SURGERY  2013    with turbinate reduction - Dr Azalea Edge ESOPHAGOGASTRODUODENOSCOPY TRANSORAL DIAGNOSTIC N/A 2016    Procedure: ESOPHAGOGASTRODUODENOSCOPY (EGD); Surgeon: Rudy Guillory MD;  [facility]; Service: Gastroenterology    TONSILLECTOMY      TOTAL ABDOMINAL HYSTERECTOMY W/ BILATERAL SALPINGOOPHORECTOMY      age 54   [address] [facility] PROCEDURE  2020     Social History     Substance and Sexual Activity   Alcohol Use Yes    Comment: social     Social History     Substance and Sexual Activity   Drug Use No     Social History     Tobacco Use   Smoking Status Former Smoker    Years: 20 00    Types: Cigarettes    Last attempt to quit:     Years since quittin 1   Smokeless Tobacco Never Used     Family History   Problem Relation Age of Onset    Lymphoma Mother     Throat cancer Father     Heart failure Father     Atrial fibrillation Father     Diabetes Father     Colonic polyp Father     Hypertension Father     Thyroid cancer Sister     Breast cancer Paternal Grandmother 71    Breast cancer Paternal Aunt 71    Ovarian cancer Paternal Aunt 79    No Known Problems Maternal Grandmother     No Known Problems Maternal Grandfather     Cancer Paternal Grandfather     Lung cancer Paternal Grandfather     BRCA1 Positive Cousin     Skin cancer Paternal Aunt     Throat cancer Paternal Uncle     No Known Problems Maternal Aunt        Allergies: Allergies   Allergen Reactions    Shellfish-Derived Products Anaphylaxis    Wheat Bran Anaphylaxis    Gluten Meal GI Intolerance     Celiac     Morphine And Related Itching    Nuts Hives     Almonds and other related nuts       Other Itching    Sulfa Antibiotics Rash       Medications:     Current Outpatient Medications:     acetaminophen (TYLENOL) 500 mg tablet, Take 500 mg by mouth every 6 (six) hours as needed for mild pain , Disp: , Rfl:     Ascorbic Acid (VITAMIN C) 100 MG tablet, Take by mouth, Disp: , Rfl:     cyanocobalamin (VITAMIN B-12) 1,000 mcg tablet, Take 1,000 mcg by mouth daily, Disp: , Rfl:     erythromycin with ethanol (EMGEL) 2 % gel, Apply topically daily, Disp: 45 g, Rfl: 1    estradiol (ESTRACE) 0 5 MG tablet, She takes it once weekly, Disp: 90 tablet, Rfl: 0    famotidine (PEPCID) 20 mg tablet, Take 20 mg by mouth 2 (two) times a day , Disp: , Rfl:     flecainide (TAMBOCOR) 100 mg tablet, Take 1 tablet (100 mg total) by mouth 2 (two) times a day, Disp: 180 tablet, Rfl: 3    fluticasone (FLONASE) 50 mcg/act nasal spray, 2 sprays into each nostril 2 (two) times a day, Disp: 1 Bottle, Rfl: 11    fluticasone-salmeterol (ADVAIR DISKUS) 250-50 mcg/dose inhaler, Inhale 1 puff 2 (two) times a day Rinse mouth after use , Disp: 1 Inhaler, Rfl: 5    levalbuterol (XOPENEX HFA) 45 mcg/act inhaler, Inhale 2 puffs every 4 (four) hours as needed for wheezing, Disp: 1 Inhaler, Rfl: 3    Magnesium 500 MG CAPS, Take by mouth, Disp: , Rfl:     meloxicam (MOBIC) 15 mg tablet, Take 1 tablet (15 mg total) by mouth daily, Disp: 90 tablet, Rfl: 1    metoprolol succinate (TOPROL-XL) 25 mg 24 hr tablet, Take 1 tablet (25 mg total) by mouth daily, Disp: 90 tablet, Rfl: 3    montelukast (SINGULAIR) 10 mg tablet, TAKE ONE TABLET BY MOUTH EVERY DAY AT BEDTIME, Disp: 90 tablet, Rfl: 0    omega-3-acid ethyl esters (LOVAZA) 1 g capsule, Take 2 capsules (2 g total) by mouth 2 (two) times a day, Disp: 120 capsule, Rfl: 1    ondansetron (ZOFRAN-ODT) 4 mg disintegrating tablet, Take 1 tablet (4 mg total) by mouth every 6 (six) hours as needed for nausea or vomiting, Disp: 30 tablet, Rfl: 0    pantoprazole (PROTONIX) 40 mg tablet, Take 1 tablet (40 mg total) by mouth 2 (two) times a day, Disp: 180 tablet, Rfl: 1    pseudoephedrine (SUDAFED) 30 mg tablet, Take 1 tablet by mouth every 6 (six) hours as needed, Disp: , Rfl:     Vitamin D, Ergocalciferol, 2000 units CAPS, Take by mouth, Disp: , Rfl:     zolpidem (AMBIEN) 5 mg tablet, Take 1 tablet (5 mg total) by mouth daily at bedtime as needed for sleep, Disp: 30 tablet, Rfl: 1      Wt Readings from Last 3 Encounters:   02/19/20 75 3 kg (166 lb)   02/19/20 75 3 kg (166 lb)   01/02/20 73 5 kg (162 lb)     Temp Readings from Last 3 Encounters:   02/19/20 (!) 97 4 °F (36 3 °C) (Temporal)   01/02/20 (!) 97 4 °F (36 3 °C) (Oral)   10/31/19 98 1 °F (36 7 °C) (Tympanic)     BP Readings from Last 3 Encounters:   02/19/20 126/82   02/19/20 100/64   01/02/20 104/65     Pulse Readings from Last 3 Encounters:   02/19/20 84   02/19/20 75   01/02/20 64         Physical Exam   Constitutional: She is oriented to person, place, and time  She appears well-developed  HENT:   Head: Atraumatic  Eyes: EOM are normal    Neck: Normal range of motion  Cardiovascular: Normal rate, regular rhythm and normal heart sounds  Exam reveals no gallop  No murmur heard  Pulmonary/Chest: Effort normal and breath sounds normal    Abdominal: Soft  Musculoskeletal: Normal range of motion  Neurological: She is alert and oriented to person, place, and time  Skin: Skin is warm and dry  Psychiatric: She has a normal mood and affect           Laboratory Studies:  CMP:  Lab Results   Component Value Date     06/22/2015    K 4 1 09/03/2019     09/03/2019    CO2 24 09/03/2019    ANIONGAP 10 06/22/2015    BUN 21 09/03/2019    CREATININE 0 90 09/03/2019    GLUCOSE 112 06/22/2015    AST 24 09/03/2019    ALT 38 09/03/2019    BILITOT 0 63 06/22/2015    EGFR 69 09/03/2019       Lipid Profile:   Lab Results   Component Value Date    CHOL 200 06/22/2015     Lab Results   Component Value Date    HDL 41 09/03/2019     Lab Results   Component Value Date    LDLCALC 96 09/03/2019     Lab Results   Component Value Date    TRIG 386 (H) 09/03/2019       Cardiac testing:     Results for orders placed during the hospital encounter of 07/28/17   Echo complete with contrast if indicated    Narrative [facility], [address] [place] [address]  [phone]    Transthoracic Echocardiogram  2D, M-mode, Doppler, and Color Doppler    Study date:  2017    Patient: [facility]  MR number: [id number]  Account number: [de-identified]  : 1955  Age: 63 years  Gender: Female  Status: Outpatient  Location: [address] [place] [address] [facility] [place] [facility]  Height: 65 in  Weight: 161 7 lb  BP: 122/ 84 mmHg    Indications: Palpitations  Diagnoses: R00 2 - Palpitations    Sonographer:  ALFONSO Solitario  Primary Physician:  Cleopatra Valenzuela DO  Referring Physician:  Cleopatra Valenzuela DO  Group:  [id number]  Interpreting Physician:  Kathleen Chambers DO    SUMMARY    LEFT VENTRICLE:  Systolic function was normal  Ejection fraction was estimated to be 60 %  There were no regional wall motion abnormalities  MITRAL VALVE:  There was trace regurgitation  TRICUSPID VALVE:  There was mild regurgitation  PULMONIC VALVE:  There was trace regurgitation  HISTORY: PRIOR HISTORY: Malignant lymphoma; Former smoker    PROCEDURE: The study was performed in the [address]  This was a routine study  The transthoracic approach was used  The study included complete 2D imaging, M-mode, complete spectral Doppler, and color Doppler  The  heart rate was 88 bpm, at the start of the study  Images were obtained from the parasternal, apical, subcostal, and suprasternal notch acoustic windows  Echocardiographic views were limited due to poor acoustic window availability  Image  quality was adequate  LEFT VENTRICLE: Size was normal  Systolic function was normal  Ejection fraction was estimated to be 60 %  There were no regional wall motion abnormalities  Wall thickness was normal  No evidence of apical thrombus   DOPPLER: Left  ventricular diastolic function parameters were normal     RIGHT VENTRICLE: The size was normal  Systolic function was normal  Wall thickness was normal     LEFT ATRIUM: Size was normal     RIGHT ATRIUM: Size was normal     MITRAL VALVE: Valve structure was normal  There was normal leaflet separation  DOPPLER: The transmitral velocity was within the normal range  There was no evidence for stenosis  There was trace regurgitation  AORTIC VALVE: The valve was trileaflet  Leaflets exhibited normal thickness, normal cuspal separation, and sclerosis  DOPPLER: Transaortic velocity was within the normal range  There was no evidence for stenosis  There was no significant  regurgitation  TRICUSPID VALVE: The valve structure was normal  There was normal leaflet separation  DOPPLER: The transtricuspid velocity was within the normal range  There was no evidence for stenosis  There was mild regurgitation  Estimated peak PA  pressure was 25 mmHg  PULMONIC VALVE: Leaflets exhibited normal thickness, no calcification, and normal cuspal separation  DOPPLER: The transpulmonic velocity was within the normal range  There was trace regurgitation  PERICARDIUM: There was no pericardial effusion  The pericardium was normal in appearance  AORTA: The root exhibited normal size  SYSTEMIC VEINS: IVC: The inferior vena cava was normal in size      SYSTEM MEASUREMENT TABLES    2D  %FS: 27 71 %  Ao Diam: 3 09 cm  EDV(Teich): 84 03 ml  EF(Cube): 62 23 %  EF(Teich): 54 04 %  ESV(Cube): 30 48 ml  ESV(Teich): 38 62 ml  IVSd: 0 67 cm  LA Area: 11 17 cm2  LA Diam: 3 02 cm  LVEDV MOD A4C: 89 51 ml  LVEF MOD A4C: 58 64 %  LVESV MOD A4C: 37 03 ml  LVIDd: 4 32 cm  LVIDs: 3 12 cm  LVLd A4C: 8 34 cm  LVLs A4C: 6 37 cm  LVPWd: 0 77 cm  RA Area: 14 32 cm2  RV Diam : 3 7 cm  SI(Cube): 27 74 ml/m2  SI(Teich): 25 09 ml/m2  SV MOD A4C: 52 49 ml  SV(Cube): 50 21 ml  SV(Teich): 45 41 ml    CW  AV Vmax: 1 m/s  AV maxP 02 mmHg  TR Vmax: 2 16 m/s  TR maxP 69 mmHg    MM  TAPSE: 2 1 cm    PW  E': 0 1 m/s  E/E': 7 91  LVOT Vmax: 1 08 m/s  LVOT maxP 64 mmHg  MV A Jesus: 0 67 m/s  MV Dec [place]: 3 68 m/s2  MV DecT: 208 98 ms  MV E Jesus: 0 77 m/s  MV E/A Ratio: 1 14    [employer] Accredited Echocardiography Laboratory    Prepared and electronically signed by    Kelly Garcia DO  Signed 28-Jul-2017 16:03:54

## 2020-02-19 NOTE — PROGRESS NOTES
Assessment/Plan:         Diagnoses and all orders for this visit:    Hypertriglyceridemia  Comments:  add lovaza  Orders:  -     omega-3-acid ethyl esters (LOVAZA) 1 g capsule; Take 2 capsules (2 g total) by mouth 2 (two) times a day  -     Comprehensive metabolic panel; Future  -     Lipid panel; Future    Insomnia, unspecified type  Comments:  prn ambien  Orders:  -     zolpidem (AMBIEN) 5 mg tablet; Take 1 tablet (5 mg total) by mouth daily at bedtime as needed for sleep    Menopause  Comments:  discussed we can also try sri for hot flashes  Orders:  -     estradiol (ESTRACE) 0 5 MG tablet; She takes it once weekly  -     DXA bone density spine hip and pelvis; Future    Fatigue, unspecified type  Comments:  due for lab  Orders:  -     CBC; Future          Subjective:      Patient ID: Ant Santacruz is a 60 y o  female  Had egd when off ppi  Her asthma returns if off singulair  +tmj is bothering her and she is seeing oral surgery      The following portions of the patient's history were reviewed and updated as appropriate: She  has a past medical history of Asthma, Celiac disease, Follicular lymphoma ([address] ), GERD (gastroesophageal reflux disease), Heart palpitations, History of colonic polyps, History of radiation therapy (2010), Irregular heart beat, Lymphoma, 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[address] ), Malignant lymphoma ([address] ) (2010), Postmenopausal disorder, Restless legs syndrome, and TMJ syndrome    She   Patient Active Problem List    Diagnosis Date Noted    Encounter for gynecological examination (general) (routine) without abnormal findings 05/23/2019    Hormone replacement therapy (HRT) 05/23/2019    Personal history of colonic polyps 50/08/4611    Follicular lymphoma ([address] ) 08/29/2018    Atrophic vaginitis 05/18/2018    VERONIKA (stress urinary incontinence, female) 05/18/2018    PVC's (premature ventricular contractions) 04/24/2018    Dyslipidemia 04/24/2018    Celiac disease 04/19/2018    Premature ventricular contraction 11/30/2017    Chronic GERD 10/03/2017    Back pain 09/21/2017    Heart palpitations 09/21/2017    Insomnia 09/21/2017    Sciatica associated with disorder of lumbar spine 09/21/2017    Vitamin D deficiency 06/28/2017    Osteopenia 11/17/2016     She  has a past surgical history that includes Lymph node dissection (Right); pr esophagogastroduodenoscopy transoral diagnostic (N/A, 1/18/2016); Nasal septum surgery (2013); Hysterectomy; Total abdominal hysterectomy w/ bilateral salpingoophorectomy; Tonsillectomy; Colonoscopy (04/16/2019); Functional endoscopic sinus surgery (Bilateral, 2013); and US guided msk procedure (1/16/2020)  Her family history includes Atrial fibrillation in her father; BRCA1 Positive in her cousin; Breast cancer (age of onset: 71) in her paternal aunt and paternal grandmother; Cancer in her paternal grandfather; Colonic polyp in her father; Diabetes in her father; Heart failure in her father; Hypertension in her father; Lung cancer in her paternal grandfather; Lymphoma in her mother; No Known Problems in her maternal aunt, maternal grandfather, and maternal grandmother; Ovarian cancer (age of onset: 79) in her paternal aunt; Skin cancer in her paternal aunt; Throat cancer in her father and paternal uncle; Thyroid cancer in her sister  She  reports that she quit smoking about 16 years ago  Her smoking use included cigarettes  She quit after 20 00 years of use  She has never used smokeless tobacco  She reports that she drinks alcohol  She reports that she does not use drugs  Current Outpatient Medications   Medication Sig Dispense Refill    acetaminophen (TYLENOL) 500 mg tablet Take 500 mg by mouth every 6 (six) hours as needed for mild pain        Ascorbic Acid (VITAMIN C) 100 MG tablet Take by mouth      cyanocobalamin (VITAMIN B-12) 1,000 mcg tablet Take 1,000 mcg by mouth daily      erythromycin with ethanol (EMGEL) 2 % gel Apply topically daily 45 g 1    estradiol (ESTRACE) 0 5 MG tablet She takes it once weekly 90 tablet 0    famotidine (PEPCID) 20 mg tablet Take 20 mg by mouth 2 (two) times a day   flecainide (TAMBOCOR) 100 mg tablet Take 1 tablet (100 mg total) by mouth 2 (two) times a day 180 tablet 3    fluticasone (FLONASE) 50 mcg/act nasal spray 2 sprays into each nostril 2 (two) times a day 1 Bottle 11    fluticasone-salmeterol (ADVAIR DISKUS) 250-50 mcg/dose inhaler Inhale 1 puff 2 (two) times a day Rinse mouth after use  1 Inhaler 5    levalbuterol (XOPENEX HFA) 45 mcg/act inhaler Inhale 2 puffs every 4 (four) hours as needed for wheezing 1 Inhaler 3    Magnesium 500 MG CAPS Take by mouth      meloxicam (MOBIC) 15 mg tablet Take 1 tablet (15 mg total) by mouth daily 90 tablet 1    metoprolol succinate (TOPROL-XL) 25 mg 24 hr tablet Take 1 tablet (25 mg total) by mouth daily 90 tablet 3    montelukast (SINGULAIR) 10 mg tablet TAKE ONE TABLET BY MOUTH EVERY DAY AT BEDTIME 90 tablet 0    ondansetron (ZOFRAN-ODT) 4 mg disintegrating tablet Take 1 tablet (4 mg total) by mouth every 6 (six) hours as needed for nausea or vomiting 30 tablet 0    pantoprazole (PROTONIX) 40 mg tablet Take 1 tablet (40 mg total) by mouth 2 (two) times a day 180 tablet 1    pseudoephedrine (SUDAFED) 30 mg tablet Take 1 tablet by mouth every 6 (six) hours as needed      Vitamin D, Ergocalciferol, 2000 units CAPS Take by mouth      zolpidem (AMBIEN) 5 mg tablet Take 1 tablet (5 mg total) by mouth daily at bedtime as needed for sleep 30 tablet 1    omega-3-acid ethyl esters (LOVAZA) 1 g capsule Take 2 capsules (2 g total) by mouth 2 (two) times a day 120 capsule 1     No current facility-administered medications for this visit  Current Outpatient Medications on File Prior to Visit   Medication Sig    acetaminophen (TYLENOL) 500 mg tablet Take 500 mg by mouth every 6 (six) hours as needed for mild pain      Ascorbic Acid (VITAMIN C) 100 MG tablet Take by mouth    cyanocobalamin (VITAMIN B-12) 1,000 mcg tablet Take 1,000 mcg by mouth daily    erythromycin with ethanol (EMGEL) 2 % gel Apply topically daily    famotidine (PEPCID) 20 mg tablet Take 20 mg by mouth 2 (two) times a day   flecainide (TAMBOCOR) 100 mg tablet Take 1 tablet (100 mg total) by mouth 2 (two) times a day    fluticasone (FLONASE) 50 mcg/act nasal spray 2 sprays into each nostril 2 (two) times a day    fluticasone-salmeterol (ADVAIR DISKUS) 250-50 mcg/dose inhaler Inhale 1 puff 2 (two) times a day Rinse mouth after use   levalbuterol (XOPENEX HFA) 45 mcg/act inhaler Inhale 2 puffs every 4 (four) hours as needed for wheezing    Magnesium 500 MG CAPS Take by mouth    meloxicam (MOBIC) 15 mg tablet Take 1 tablet (15 mg total) by mouth daily    metoprolol succinate (TOPROL-XL) 25 mg 24 hr tablet Take 1 tablet (25 mg total) by mouth daily    montelukast (SINGULAIR) 10 mg tablet TAKE ONE TABLET BY MOUTH EVERY DAY AT BEDTIME    ondansetron (ZOFRAN-ODT) 4 mg disintegrating tablet Take 1 tablet (4 mg total) by mouth every 6 (six) hours as needed for nausea or vomiting    pantoprazole (PROTONIX) 40 mg tablet Take 1 tablet (40 mg total) by mouth 2 (two) times a day    pseudoephedrine (SUDAFED) 30 mg tablet Take 1 tablet by mouth every 6 (six) hours as needed    Vitamin D, Ergocalciferol, 2000 units CAPS Take by mouth    [DISCONTINUED] estradiol (ESTRACE) 0 5 MG tablet Take 1 tablet (0 5 mg total) by mouth daily    [DISCONTINUED] zolpidem (AMBIEN) 5 mg tablet Take 1 tablet (5 mg total) by mouth daily at bedtime as needed for sleep     No current facility-administered medications on file prior to visit  She is allergic to shellfish-derived products; wheat bran; gluten meal; morphine and related; nuts; other; and sulfa antibiotics       Review of Systems   Constitutional: Negative  HENT: Negative  Respiratory: Negative  +asthma flair off singulair   Cardiovascular: Negative  Gastrointestinal:        Indigestion off ppi   Neurological: Negative for headaches  Objective:      /64 (BP Location: Left arm, Patient Position: Sitting, Cuff Size: Large)   Pulse 75   Temp (!) 97 4 °F (36 3 °C) (Temporal)   Resp 16   Ht 5' 6" (1 676 m)   Wt 75 3 kg (166 lb)   SpO2 98%   BMI 26 79 kg/m²          Physical Exam   Constitutional: She appears well-developed and well-nourished  No distress  HENT:   Head: Normocephalic and atraumatic  Right Ear: External ear normal    Left Ear: External ear normal    Nose: Nose normal    Mouth/Throat: Oropharynx is clear and moist  No oropharyngeal exudate  Neck: Normal range of motion  Neck supple  No thyromegaly present  Cardiovascular: Normal rate, regular rhythm and normal heart sounds  Exam reveals no gallop and no friction rub  No murmur heard  Pulmonary/Chest: Effort normal  No stridor  No respiratory distress  She has no wheezes  She has no rales  Abdominal: Soft  Bowel sounds are normal  She exhibits no distension  There is no tenderness  There is no guarding  Lymphadenopathy:     She has no cervical adenopathy  Skin: She is not diaphoretic

## 2020-02-26 ENCOUNTER — OFFICE VISIT (OUTPATIENT)
Dept: OBGYN CLINIC | Facility: CLINIC | Age: 65
End: 2020-02-26
Payer: COMMERCIAL

## 2020-02-26 VITALS
HEART RATE: 71 BPM | BODY MASS INDEX: 26.68 KG/M2 | WEIGHT: 166 LBS | DIASTOLIC BLOOD PRESSURE: 84 MMHG | HEIGHT: 66 IN | SYSTOLIC BLOOD PRESSURE: 125 MMHG

## 2020-02-26 DIAGNOSIS — M65.832 EXTENSOR TENOSYNOVITIS OF LEFT WRIST: ICD-10-CM

## 2020-02-26 DIAGNOSIS — R22.32 MASS OF LEFT WRIST: ICD-10-CM

## 2020-02-26 DIAGNOSIS — S63.592A LUNOTRIQUETRAL LIGAMENT TEAR, LEFT, INITIAL ENCOUNTER: ICD-10-CM

## 2020-02-26 DIAGNOSIS — M18.11 PRIMARY OSTEOARTHRITIS OF FIRST CARPOMETACARPAL JOINT OF RIGHT HAND: Primary | ICD-10-CM

## 2020-02-26 DIAGNOSIS — M77.01 MEDIAL EPICONDYLITIS OF ELBOW, RIGHT: ICD-10-CM

## 2020-02-26 PROCEDURE — 3008F BODY MASS INDEX DOCD: CPT | Performed by: SURGERY

## 2020-02-26 PROCEDURE — 20600 DRAIN/INJ JOINT/BURSA W/O US: CPT | Performed by: SURGERY

## 2020-02-26 PROCEDURE — 1036F TOBACCO NON-USER: CPT | Performed by: SURGERY

## 2020-02-26 PROCEDURE — 3074F SYST BP LT 130 MM HG: CPT | Performed by: SURGERY

## 2020-02-26 PROCEDURE — 3079F DIAST BP 80-89 MM HG: CPT | Performed by: SURGERY

## 2020-02-26 PROCEDURE — 99213 OFFICE O/P EST LOW 20 MIN: CPT | Performed by: SURGERY

## 2020-02-26 RX ORDER — TRIAMCINOLONE ACETONIDE 40 MG/ML
20 INJECTION, SUSPENSION INTRA-ARTICULAR; INTRAMUSCULAR
Status: COMPLETED | OUTPATIENT
Start: 2020-02-26 | End: 2020-02-26

## 2020-02-26 RX ORDER — BUPIVACAINE HYDROCHLORIDE 2.5 MG/ML
0.5 INJECTION, SOLUTION INFILTRATION; PERINEURAL
Status: COMPLETED | OUTPATIENT
Start: 2020-02-26 | End: 2020-02-26

## 2020-02-26 RX ADMIN — TRIAMCINOLONE ACETONIDE 20 MG: 40 INJECTION, SUSPENSION INTRA-ARTICULAR; INTRAMUSCULAR at 11:48

## 2020-02-26 RX ADMIN — BUPIVACAINE HYDROCHLORIDE 0.5 ML: 2.5 INJECTION, SOLUTION INFILTRATION; PERINEURAL at 11:48

## 2020-02-26 NOTE — PROGRESS NOTES
ASSESSMENT/PLAN:      59 yo female with right CMC arthritis, also with resolved medial epicondylitis on the right, left ECU tendinitis, and left ganglion cyst  Patient has significant relief with steroid injections into the right medial epicondyle as well as ultrasound-guided aspiration of the ganglion cyst and ECU injection  Continue therapy on her own  Steroid injection given today into the right CMC joint, soft Comfort Cool brace was given today as well  Patient was also sent a script for Voltaren gel to use topically over the joint as needed up to 4 times daily  Advise that she wear a hard brace at night whether this is made by therapy or she has 1 at home  Discussed that she may get joint injections in this area every 3 months for as long as they help her  Follow-up in about 6 weeks for re-evaluation    The patient verbalized understanding of exam findings and treatment plan  We engaged in the shared decision-making process and treatment options were discussed at length with the patient  Surgical and conservative management discussed today along with risks and benefits  Diagnoses and all orders for this visit:    Primary osteoarthritis of first carpometacarpal joint of right hand  -     diclofenac sodium (VOLTAREN) 1 %; Apply 2 g topically 4 (four) times a day    Medial epicondylitis of elbow, right    Lunotriquetral ligament tear, left, initial encounter    Mass of left wrist    Extensor tenosynovitis of left wrist    Other orders  -     Small joint arthrocentesis        Follow Up:  Return in about 6 weeks (around 4/8/2020) for Recheck        To Do Next Visit:  Re-evaluation of current issue    ____________________________________________________________________________________________________________________________________________      CHIEF COMPLAINT:  Chief Complaint   Patient presents with    Left Wrist - Follow-up    Right Elbow - Follow-up       SUBJECTIVE:  Nyruslan Baeza is a 60 y o  year old RHD female who presents for follow-up evaluation of right medial epicondylitis, left ECU tendinitis, and left ganglion cyst   Patient states she had significant relief with the ultrasound-guided aspiration and steroid injection at the left ECU  She has also been doing therapy exercises at home and progressing very well  She denies any pain at the medial epicondyle on the right after her steroid injection and continues to do exercises for that as well  She does note some pain at the base of the right thumb in the area of the [facility] joint  Denies any injury in this area but does have issues with pinching and opening jars  No numbness or tingling, no fevers or chills  I have personally reviewed all the relevant PMH, PSH, SH, FH, Medications and allergies  PAST MEDICAL HISTORY:  Past Medical History:   Diagnosis Date    Asthma     Celiac disease     [facility]lic[facility] ([facility] [address] [facility]     GERD (gastroesophageal reflux disease)     Heart palpitations     History of colonic polyps     resolved 07/12/2016    History of radiation therapy 2010    Irregular heart beat     Lymphoma, 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[facility] [address]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, remission    Malignant lymphoma ([facility] [address] ) 2010    rt arm cutaneous follicular stage ia (rt diatal medical biceps area , s/p resected and s/p radistion treatment    resolved 12/17/2014    Postmenopausal disorder     resolved 09/21/2017    Restless legs syndrome     resolved 09/21/2017    TMJ syndrome     resolved 09/21/2017       PAST SURGICAL HISTORY:  Past Surgical History:   Procedure Laterality Date    COLONOSCOPY  04/16/2019    FUNCTIONAL ENDOSCOPIC SINUS SURGERY Bilateral 2013    Dr Tom Castro      age 39 complete    LYMPH NODE DISSECTION Right     arm    NASAL SEPTUM SURGERY  2013    with turbinate reduction - Dr Reina Wheeler ESOPHAGOGASTRODUODENOSCOPY TRANSORAL DIAGNOSTIC N/A 1/18/2016    Procedure: ESOPHAGOGASTRODUODENOSCOPY (EGD);   Surgeon: Bhavik Espinoza MD;  [facility];  Service: Gastroenterology    TONSILLECTOMY      TOTAL ABDOMINAL HYSTERECTOMY W/ BILATERAL SALPINGOOPHORECTOMY      age 54   [address] PROCEDURE  2020       FAMILY HISTORY:  Family History   Problem Relation Age of Onset    Lymphoma Mother     Throat cancer Father     Heart failure Father     Atrial fibrillation Father     Diabetes Father     Colonic polyp Father     Hypertension Father     Thyroid cancer Sister     Breast cancer Paternal Grandmother 71    Breast cancer Paternal Aunt 71    Ovarian cancer Paternal Aunt 79    No Known Problems Maternal Grandmother     No Known Problems Maternal Grandfather     Cancer Paternal Grandfather     Lung cancer Paternal Grandfather     BRCA1 Positive Cousin     Skin cancer Paternal Aunt     Throat cancer Paternal Uncle     No Known Problems Maternal Aunt        SOCIAL HISTORY:  Social History     Tobacco Use    Smoking status: Former Smoker     Years: 20      Types: Cigarettes     Last attempt to quit:      Years since quittin 1    Smokeless tobacco: Never Used   Substance Use Topics    Alcohol use: Yes     Comment: social    Drug use: No       MEDICATIONS:    Current Outpatient Medications:     acetaminophen (TYLENOL) 500 mg tablet, Take 500 mg by mouth every 6 (six) hours as needed for mild pain , Disp: , Rfl:     Ascorbic Acid (VITAMIN C) 100 MG tablet, Take by mouth, Disp: , Rfl:     cyanocobalamin (VITAMIN B-12) 1,000 mcg tablet, Take 1,000 mcg by mouth daily, Disp: , Rfl:     erythromycin with ethanol (EMGEL) 2 % gel, Apply topically daily, Disp: 45 g, Rfl: 1    estradiol (ESTRACE) 0 5 MG tablet, She takes it once weekly, Disp: 90 tablet, Rfl: 0    famotidine (PEPCID) 20 mg tablet, Take 20 mg by mouth 2 (two) times a day , Disp: , Rfl:     flecainide (TAMBOCOR) 100 mg tablet, Take 1 tablet (100 mg total) by mouth 2 (two) times a day, Disp: 180 tablet, Rfl: 3    fluticasone (FLONASE) 50 mcg/act nasal spray, 2 sprays into each nostril 2 (two) times a day, Disp: 1 Bottle, Rfl: 11    fluticasone-salmeterol (ADVAIR DISKUS) 250-50 mcg/dose inhaler, Inhale 1 puff 2 (two) times a day Rinse mouth after use , Disp: 1 Inhaler, Rfl: 5    levalbuterol (XOPENEX HFA) 45 mcg/act inhaler, Inhale 2 puffs every 4 (four) hours as needed for wheezing, Disp: 1 Inhaler, Rfl: 3    Magnesium 500 MG CAPS, Take by mouth, Disp: , Rfl:     meloxicam (MOBIC) 15 mg tablet, Take 1 tablet (15 mg total) by mouth daily, Disp: 90 tablet, Rfl: 1    metoprolol succinate (TOPROL-XL) 25 mg 24 hr tablet, Take 1 tablet (25 mg total) by mouth daily, Disp: 90 tablet, Rfl: 3    montelukast (SINGULAIR) 10 mg tablet, TAKE ONE TABLET BY MOUTH EVERY DAY AT BEDTIME, Disp: 90 tablet, Rfl: 0    omega-3-acid ethyl esters (LOVAZA) 1 g capsule, Take 2 capsules (2 g total) by mouth 2 (two) times a day, Disp: 120 capsule, Rfl: 1    ondansetron (ZOFRAN-ODT) 4 mg disintegrating tablet, Take 1 tablet (4 mg total) by mouth every 6 (six) hours as needed for nausea or vomiting, Disp: 30 tablet, Rfl: 0    pantoprazole (PROTONIX) 40 mg tablet, Take 1 tablet (40 mg total) by mouth 2 (two) times a day, Disp: 180 tablet, Rfl: 1    pseudoephedrine (SUDAFED) 30 mg tablet, Take 1 tablet by mouth every 6 (six) hours as needed, Disp: , Rfl:     Vitamin D, Ergocalciferol, 2000 units CAPS, Take by mouth, Disp: , Rfl:     zolpidem (AMBIEN) 5 mg tablet, Take 1 tablet (5 mg total) by mouth daily at bedtime as needed for sleep, Disp: 30 tablet, Rfl: 1    diclofenac sodium (VOLTAREN) 1 %, Apply 2 g topically 4 (four) times a day, Disp: 1 Tube, Rfl: 2    ALLERGIES:  Allergies   Allergen Reactions    Shellfish-Derived Products Anaphylaxis    Wheat Bran Anaphylaxis    Gluten Meal GI Intolerance     Celiac     Morphine And Related Itching    Nuts Hives     Almonds and other related nuts       Other Itching    Sulfa Antibiotics Rash       REVIEW OF SYSTEMS:  Review of Systems Constitutional: Negative for chills, diaphoresis, fatigue and fever  HENT: Negative for congestion, facial swelling, hearing loss, sore throat, trouble swallowing and voice change  Respiratory: Negative for apnea, cough, shortness of breath, wheezing and stridor  Cardiovascular: Negative for chest pain, palpitations and leg swelling  Gastrointestinal: Negative for abdominal pain, constipation, nausea and vomiting  Endocrine: Negative for cold intolerance and heat intolerance  Musculoskeletal: Positive for arthralgias  Negative for gait problem, joint swelling and myalgias  Skin: Negative for color change, pallor, rash and wound  Neurological: Negative for tremors, speech difficulty, weakness and numbness  Psychiatric/Behavioral: Negative for agitation, confusion, decreased concentration and hallucinations  The patient is not nervous/anxious  VITALS:  Vitals:    02/26/20 1124   BP: 125/84   Pulse: 71       LABS:  HgA1c:   Lab Results   Component Value Date    HGBA1C 5 6 09/03/2019     BMP:   Lab Results   Component Value Date    GLUCOSE 112 06/22/2015    CALCIUM 9 1 09/03/2019     06/22/2015    K 4 1 09/03/2019    CO2 24 09/03/2019     09/03/2019    BUN 21 09/03/2019    CREATININE 0 90 09/03/2019       _____________________________________________________  PHYSICAL EXAMINATION:  General: well developed and well nourished, alert, oriented times 3 and appears comfortable  Psychiatric: Normal  HEENT: Normocephalic, Atraumatic Trachea Midline, No torticollis  Pulmonary: No audible wheezing or respiratory distress   Cardiovascular: No pitting edema, 2+ radial pulse   Skin: No masses, erythema, lacerations, fluctation, ulcerations  Neurovascular: Sensation Intact to the Median, Ulnar, Radial Nerve, Motor Intact to the Median, Ulnar, Radial Nerve and Pulses Intact  Musculoskeletal: Normal, except as noted in detailed exam and in HPI        MUSCULOSKELETAL EXAMINATION:  right CMC Exam:  No adduction contracture  No hyperextension deformity of MCP joint  Positive localized tenderness over radial and dorsal aspect of thumb (CMC joint)  Grind test is Positive for pain and Negative for crepitus  Metacarpal load shift test positive for pain  No triggering or tenderness over the A1 pulley  No pain with Finkelsteins maneuver     Right Elbow:    No swelling or deformity  Full range of motion with flexion, extension, supination and pronation  Nontender to palpation  No pain with passive flexion of the wrist with elbow fully extended  No pain with resisted wrist flexion with elbow fully extended  No pain with resisted forearm pronation with the elbow fully extended  Negative tinels over the ulnar nerve at the elbow  Left wrist  No significant tenderness over ECU  Range of motion of the left wrist  is 80 degrees flexion, 70 degrees extension, 90 degrees pronation,90 degrees supination  There is no swelling present  There is no ecchymosis noted      Pulses are present and capillary fill is normal      ___________________________________________________  STUDIES REVIEWED:  No new studies to review      PROCEDURES PERFORMED:  Small joint arthrocentesis: R thumb CMC  Date/Time: 2/26/2020 11:48 AM  Consent given by: patient  Site marked: site marked  Timeout: Immediately prior to procedure a time out was called to verify the correct patient, procedure, equipment, support staff and site/side marked as required   Supporting Documentation  Indications: pain   Procedure Details  Location: thumb - R thumb CMC  Needle size: 25 G  Ultrasound guidance: no  Approach: dorsal  Medications administered: 0 5 mL bupivacaine 0 25 %; 20 mg triamcinolone acetonide 40 mg/mL    Patient tolerance: patient tolerated the procedure well with no immediate complications  Dressing:  Sterile dressing applied             _____________________________________________________    SABRINA Charles Cosign: I supervised the physician assistant and discussed the case with them  I personally performed history and physical exam  I agree with the assessment and plan of care as documented by the physician assistant

## 2020-03-10 DIAGNOSIS — I49.3 PVC (PREMATURE VENTRICULAR CONTRACTION): ICD-10-CM

## 2020-03-10 RX ORDER — FLECAINIDE ACETATE 100 MG/1
TABLET ORAL
Qty: 180 TABLET | Refills: 0 | Status: SHIPPED | OUTPATIENT
Start: 2020-03-10 | End: 2020-06-09

## 2020-03-24 ENCOUNTER — TELEMEDICINE (OUTPATIENT)
Dept: GASTROENTEROLOGY | Facility: CLINIC | Age: 65
End: 2020-03-24
Payer: COMMERCIAL

## 2020-03-24 DIAGNOSIS — K20.90 ESOPHAGITIS: Primary | ICD-10-CM

## 2020-03-24 DIAGNOSIS — K90.0 CELIAC DISEASE: ICD-10-CM

## 2020-03-24 PROCEDURE — 99213 OFFICE O/P EST LOW 20 MIN: CPT | Performed by: PHYSICIAN ASSISTANT

## 2020-03-24 RX ORDER — PANTOPRAZOLE SODIUM 20 MG/1
20 TABLET, DELAYED RELEASE ORAL DAILY
Qty: 90 TABLET | Refills: 2 | Status: SHIPPED | OUTPATIENT
Start: 2020-03-24 | End: 2021-08-11 | Stop reason: SDUPTHER

## 2020-03-24 NOTE — PROGRESS NOTES
Virtual Regular Visit    Problem List Items Addressed This Visit     None               Reason for visit is ***    Encounter provider Irina Hernandez PA-C    Provider located at [facility] [address] [address] [phone]      Recent Visits  No visits were found meeting these conditions  Showing recent visits within past 7 days and meeting all other requirements     Future Appointments  No visits were found meeting these conditions  Showing future appointments within next 150 days and meeting all other requirements        After connecting through [employer], the patient was identified by name and date of birth  Fahad Carmen was informed that this is a telemedicine visit and that the visit is being conducted through {AMB CORONAVIRUS VISIT EISXIV:19851} which may not be secure and therefore, might not be HIPAA-compliant  {Telemedicine confidentiality :99143} {Telemedicine participants:51082}  She acknowledged consent and understanding of privacy and security of the video platform  The patient has agreed to participate and understands they can discontinue the visit at any time  Subjective  Fahad Carmen is a 60 y o  female ***        Past Medical History:   Diagnosis Date    Asthma     Celiac disease     [facility]licular [facility] ([facility] [address] [facility]     GERD (gastroesophageal reflux disease)     Heart palpitations     History of colonic polyps     resolved 07/12/2016    History of radiation therapy 2010    Irregular heart beat     Lymphoma, 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[facility])     non hodgekins, remission    Malignant lymphoma ([address] ) 2010    rt arm cutaneous follicular stage ia (rt diatal medical biceps area , s/p resected and s/p radistion treatment    resolved 12/17/2014    Postmenopausal disorder     resolved 09/21/2017    Restless legs syndrome     resolved 09/21/2017    TMJ syndrome     resolved 09/21/2017       Past Surgical History:   Procedure Laterality Date    COLONOSCOPY  04/16/2019    FUNCTIONAL ENDOSCOPIC SINUS SURGERY Bilateral 2013    Dr Kaleigh Brody      age 39 complete    LYMPH NODE DISSECTION Right     arm    NASAL SEPTUM SURGERY  2013    with turbinate reduction - Dr Meghna Lewis ESOPHAGOGASTRODUODENOSCOPY TRANSORAL DIAGNOSTIC N/A 1/18/2016    Procedure: ESOPHAGOGASTRODUODENOSCOPY (EGD); Surgeon: Tara Caba MD;  Location: [facility]; Service: Gastroenterology    TONSILLECTOMY      TOTAL ABDOMINAL HYSTERECTOMY W/ BILATERAL SALPINGOOPHORECTOMY      age 54    US GUIDED MSK PROCEDURE  1/16/2020       Current Outpatient Medications   Medication Sig Dispense Refill    acetaminophen (TYLENOL) 500 mg tablet Take 500 mg by mouth every 6 (six) hours as needed for mild pain   Ascorbic Acid (VITAMIN C) 100 MG tablet Take by mouth      cyanocobalamin (VITAMIN B-12) 1,000 mcg tablet Take 1,000 mcg by mouth daily      diclofenac sodium (VOLTAREN) 1 % Apply 2 g topically 4 (four) times a day 1 Tube 2    erythromycin with ethanol (EMGEL) 2 % gel Apply topically daily 45 g 1    estradiol (ESTRACE) 0 5 MG tablet She takes it once weekly 90 tablet 0    famotidine (PEPCID) 20 mg tablet Take 20 mg by mouth 2 (two) times a day   flecainide (TAMBOCOR) 100 mg tablet TAKE ONE TABLET BY MOUTH 2 TIMES A  tablet 0    fluticasone (FLONASE) 50 mcg/act nasal spray 2 sprays into each nostril 2 (two) times a day 1 Bottle 11    fluticasone-salmeterol (ADVAIR DISKUS) 250-50 mcg/dose inhaler Inhale 1 puff 2 (two) times a day Rinse mouth after use   1 Inhaler 5    levalbuterol (XOPENEX HFA) 45 mcg/act inhaler Inhale 2 puffs every 4 (four) hours as needed for wheezing 1 Inhaler 3    Magnesium 500 MG CAPS Take by mouth      meloxicam (MOBIC) 15 mg tablet Take 1 tablet (15 mg total) by mouth daily 90 tablet 1    metoprolol succinate (TOPROL-XL) 25 mg 24 hr tablet Take 1 tablet (25 mg total) by mouth daily 90 tablet 3    montelukast (SINGULAIR) 10 mg tablet TAKE ONE TABLET BY MOUTH EVERY DAY AT BEDTIME 90 tablet 0    omega-3-acid ethyl esters (LOVAZA) 1 g capsule Take 2 capsules (2 g total) by mouth 2 (two) times a day 120 capsule 1    ondansetron (ZOFRAN-ODT) 4 mg disintegrating tablet Take 1 tablet (4 mg total) by mouth every 6 (six) hours as needed for nausea or vomiting 30 tablet 0    pantoprazole (PROTONIX) 40 mg tablet Take 1 tablet (40 mg total) by mouth 2 (two) times a day 180 tablet 1    pseudoephedrine (SUDAFED) 30 mg tablet Take 1 tablet by mouth every 6 (six) hours as needed      Vitamin D, Ergocalciferol, 2000 units CAPS Take by mouth      zolpidem (AMBIEN) 5 mg tablet Take 1 tablet (5 mg total) by mouth daily at bedtime as needed for sleep 30 tablet 1     No current facility-administered medications for this visit  Allergies   Allergen Reactions    Shellfish-Derived Products Anaphylaxis    Wheat Bran Anaphylaxis    Gluten Meal GI Intolerance     Celiac     Morphine And Related Itching    Nuts Hives     Almonds and other related nuts   Other Itching    Sulfa Antibiotics Rash       Review of Systems    Physical Exam     I spent *** minutes with the patient during this visit

## 2020-03-24 NOTE — LETTER
March 24, 2020     Silvestre Camacho DO  [address] [facility] [address]    Patient: Tiffanie Mendoza   YOB: 1955   Date of Visit: 3/24/2020       Dear Dr Roz Garcia:    Thank you for referring Tiffanie Mendoza to me for evaluation  Below are my notes for this consultation  If you have questions, please do not hesitate to call me  I look forward to following your patient along with you  Sincerely,        Steve Bojorquez PA-C        CC: No Recipients  Steve Bojorquez PA-C  3/24/2020  4:04 PM  Incomplete  Virtual Regular Visit    Problem List Items Addressed This Visit     None      Visit Diagnoses     Esophagitis    -  Primary    Relevant Medications    pantoprazole (PROTONIX) 20 mg tablet               Reason for visit is follow-up of EGD, celiac disease, and GERD    Encounter provider Steve Bojorquez PA-C    Provider located at [address] [place] [id number] [phone]      Recent Visits  No visits were found meeting these conditions  Showing recent visits within past 7 days and meeting all other requirements     Future Appointments  No visits were found meeting these conditions  Showing future appointments within next 150 days and meeting all other requirements        After connecting through [facility], the patient was identified by name and date of birth  Tiffanie Mendoza was informed that this is a telemedicine visit and that the visit is being conducted through [employer] [place] [address] which may not be secure and therefore, might not be HIPAA-compliant  FaceTime was used because patient was having difficulty accessing the [email or web address]  My office door was closed  No one else was in the room  She acknowledged consent and understanding of privacy and security of the video platform   The patient has agreed to participate and understands they can discontinue the visit at any time     Subjective  Ermias Perdomo is a 60 y o  female with history of follicular lymphoma, celiac disease, and GERD presenting for follow-up of EGD  Due to worsening GERD, she underwent EGD in January 2020 which showed class A esophagitis in the distal esophagus, gastritis, and normal duodenum  Biopsies from the stomach showed chronic gastritis, negative for H pylori  Biopsies from the duodenum were unremarkable  She was recommended to take PPI twice daily and repeat EGD in 3 months to confirm healing of esophagitis  She has been feeling much better overall  Her reflux is improved  She took Protonix 40 mg twice daily for at least 2 months  She currently cut down to Protonix 40 mg once daily  She is hesitant to be on Protonix long-term due to risk of side effects including bone weakening  She follows a strict gluten free diet since her diagnosis back in 2010  She has no complaints of nausea, vomiting, abdominal pain, changes in bowel habits, or bleeding      Recent blood work from September and November 2019 reveals normal hemoglobin, liver function, vitamin B12, folate, vitamin D slightly low 24  She takes vitamin D 8537-5205 mg daily  Most recent DEXA scan from 2018 revealed normal bone density           Past Medical History:   Diagnosis Date    Asthma     Celiac disease     [facility]lic[facility]     GERD (gastroesophageal reflux disease)     Heart palpitations     History of colonic polyps     resolved 07/12/2016    History of radiation therapy 2010    Irregular heart beat     Lymphoma, 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HCC)     non hodgekins, remission    Malignant lymphoma ([address] ) 2010    rt arm cutaneous follicular stage ia (rt diatal medical biceps area , s/p resected and s/p radistion treatment    resolved 12/17/2014    Postmenopausal disorder     resolved 09/21/2017    Restless legs syndrome     resolved 09/21/2017    TMJ syndrome     resolved 09/21/2017       Past Surgical History:   Procedure Laterality Date    COLONOSCOPY  04/16/2019    FUNCTIONAL ENDOSCOPIC SINUS SURGERY Bilateral 2013    Dr Sd Gore      age 39 complete    LYMPH NODE DISSECTION Right     arm    NASAL SEPTUM SURGERY  2013    with turbinate reduction - Dr Juju Fisher ESOPHAGOGASTRODUODENOSCOPY TRANSORAL DIAGNOSTIC N/A 1/18/2016    Procedure: ESOPHAGOGASTRODUODENOSCOPY (EGD); Surgeon: Ingrid Guerrero MD;  [facility]; Service: Gastroenterology    TONSILLECTOMY      TOTAL ABDOMINAL HYSTERECTOMY W/ BILATERAL SALPINGOOPHORECTOMY      age 54    US GUIDED MSK PROCEDURE  1/16/2020       Current Outpatient Medications   Medication Sig Dispense Refill    acetaminophen (TYLENOL) 500 mg tablet Take 500 mg by mouth every 6 (six) hours as needed for mild pain   Ascorbic Acid (VITAMIN C) 100 MG tablet Take by mouth      cyanocobalamin (VITAMIN B-12) 1,000 mcg tablet Take 1,000 mcg by mouth daily      diclofenac sodium (VOLTAREN) 1 % Apply 2 g topically 4 (four) times a day 1 Tube 2    erythromycin with ethanol (EMGEL) 2 % gel Apply topically daily 45 g 1    estradiol (ESTRACE) 0 5 MG tablet She takes it once weekly 90 tablet 0    famotidine (PEPCID) 20 mg tablet Take 20 mg by mouth 2 (two) times a day   flecainide (TAMBOCOR) 100 mg tablet TAKE ONE TABLET BY MOUTH 2 TIMES A  tablet 0    fluticasone (FLONASE) 50 mcg/act nasal spray 2 sprays into each nostril 2 (two) times a day 1 Bottle 11    fluticasone-salmeterol (ADVAIR DISKUS) 250-50 mcg/dose inhaler Inhale 1 puff 2 (two) times a day Rinse mouth after use   1 Inhaler 5    levalbuterol (XOPENEX HFA) 45 mcg/act inhaler Inhale 2 puffs every 4 (four) hours as needed for wheezing 1 Inhaler 3    Magnesium 500 MG CAPS Take by mouth      meloxicam (MOBIC) 15 mg tablet Take 1 tablet (15 mg total) by mouth daily 90 tablet 1    metoprolol succinate (TOPROL-XL) 25 mg 24 hr tablet Take 1 tablet (25 mg total) by mouth daily 90 tablet 3    montelukast (SINGULAIR) 10 mg tablet TAKE ONE TABLET BY MOUTH EVERY DAY AT BEDTIME 90 tablet 0    omega-3-acid ethyl esters (LOVAZA) 1 g capsule Take 2 capsules (2 g total) by mouth 2 (two) times a day 120 capsule 1    ondansetron (ZOFRAN-ODT) 4 mg disintegrating tablet Take 1 tablet (4 mg total) by mouth every 6 (six) hours as needed for nausea or vomiting 30 tablet 0    pantoprazole (PROTONIX) 20 mg tablet Take 1 tablet (20 mg total) by mouth daily 90 tablet 2    pantoprazole (PROTONIX) 40 mg tablet Take 1 tablet (40 mg total) by mouth 2 (two) times a day 180 tablet 1    pseudoephedrine (SUDAFED) 30 mg tablet Take 1 tablet by mouth every 6 (six) hours as needed      Vitamin D, Ergocalciferol, 2000 units CAPS Take by mouth      zolpidem (AMBIEN) 5 mg tablet Take 1 tablet (5 mg total) by mouth daily at bedtime as needed for sleep 30 tablet 1     No current facility-administered medications for this visit  Allergies   Allergen Reactions    Shellfish-Derived Products Anaphylaxis    Wheat Bran Anaphylaxis    Gluten Meal GI Intolerance     Celiac     Morphine And Related Itching    Nuts Hives     Almonds and other related nuts   Other Itching    Sulfa Antibiotics Rash       Review of Systems   Constitutional: Negative for appetite change and unexpected weight change  HENT: Negative for rhinorrhea and sore throat  Respiratory: Negative for cough and shortness of breath  Cardiovascular: Negative for chest pain  Gastrointestinal: Negative for abdominal pain, blood in stool, constipation and diarrhea  Musculoskeletal: Negative for arthralgias  Skin:        No jaundice   Neurological: Negative for dizziness  Physical Exam     General: Patient is alert and oriented, appears in no acute distress, appears stated age  HEENT: Normocephalic, atraumatic   Respiratory: No evidence of respiratory distress  She is breathing on room air  Skin: No jaundice    Plan    1   Esophagitis  Recent EGD from January 2020 revealed class A esophagitis  She took Protonix 40 mg twice daily for 2 months  She is currently taking Protonix 40 mg once daily, but is concerned about potential side effects  She may decrease to Protonix 20 mg once daily  Continue dietary and lifestyle modifications to prevent reflux  She needs repeat EGD to confirm healing of esophagitis and rule out Campbell's esophagus  She is aware we are postponing elective procedures for the time being due to File[place]  - pantoprazole (PROTONIX) 20 mg tablet; Take 1 tablet (20 mg total) by mouth daily  Dispense: 90 tablet; Refill: 2    2  Celiac disease  Stable  She follows strict gluten free diet  Recent TTG IgA and duodenal biopsies unremarkable  Her PCP ordered CBC and CMP  Plan to recheck iron panel, vitamin-D, folate, and B12 in the Fall as part of her routine annual blood work  She is up-to-date with DEXA scan and should continue her daily vitamin-D supplement  - Iron Panel (Includes Ferritin, Iron Sat%, Iron, and TIBC); Future  - Vitamin D 25 hydroxy; Future  - Folate; Future  - Vitamin B12; Future    Follow-up in 6-12 months    I spent 20 minutes with the patient during this visit

## 2020-03-24 NOTE — PROGRESS NOTES
Virtual Regular Visit    Problem List Items Addressed This Visit     None      Visit Diagnoses     Esophagitis    -  Primary    Relevant Medications    pantoprazole (PROTONIX) 20 mg tablet               Reason for visit is follow-up of EGD, celiac disease, and GERD    Encounter provider Sancho Dia PA-C    Provider located at [address] [place] [id number] [phone]      Recent Visits  No visits were found meeting these conditions  Showing recent visits within past 7 days and meeting all other requirements     Future Appointments  No visits were found meeting these conditions  Showing future appointments within next 150 days and meeting all other requirements        After connecting through [employer], the patient was identified by name and date of birth  Torie Potter was informed that this is a telemedicine visit and that the visit is being conducted through [employer] [address] [employer] which may not be secure and therefore, might not be HIPAA-compliant  FaceTime was used because patient was having difficulty accessing the [employer]  My office door was closed  No one else was in the room  She acknowledged consent and understanding of privacy and security of the video platform  The patient has agreed to participate and understands they can discontinue the visit at any time  Subjective  Torie Potter is a 60 y o  female with history of follicular lymphoma, celiac disease, and GERD presenting for follow-up of EGD  Due to worsening GERD, she underwent EGD in January 2020 which showed class A esophagitis in the distal esophagus, gastritis, and normal duodenum  Biopsies from the stomach showed chronic gastritis, negative for H pylori  Biopsies from the duodenum were unremarkable  She was recommended to take PPI twice daily and repeat EGD in 3 months to confirm healing of esophagitis  She has been feeling much better overall  Her reflux is improved  She took Protonix 40 mg twice daily for at least 2 months  She currently cut down to Protonix 40 mg once daily  She is hesitant to be on Protonix long-term due to risk of side effects including bone weakening  She follows a strict gluten free diet since her diagnosis back in 2010  She has no complaints of nausea, vomiting, abdominal pain, changes in bowel habits, or bleeding      Recent blood work from September and November 2019 reveals normal hemoglobin, liver function, vitamin B12, folate, vitamin D slightly low 24  She takes vitamin D 1474-8833 mg daily  Most recent DEXA scan from 2018 revealed normal bone density         Past Medical History:   Diagnosis Date    Asthma     Celiac disease     [facility]     GERD (gastroesophageal reflux disease)     Heart palpitations     History of colonic polyps     resolved 07/12/2016    History of radiation therapy 2010    Irregular heart beat     Lymphoma, 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HCC)     non hodgekins, remission    [facility]    rt arm cutaneous follicular stage ia (rt diatal medical biceps area , s/p resected and s/p radistion treatment    resolved 12/17/2014    Postmenopausal disorder     resolved 09/21/2017    Restless legs syndrome     resolved 09/21/2017    TMJ syndrome     resolved 09/21/2017       Past Surgical History:   Procedure Laterality Date    COLONOSCOPY  04/16/2019    FUNCTIONAL ENDOSCOPIC SINUS SURGERY Bilateral 2013    Dr Osmel Palumbo      age 39 complete    LYMPH NODE DISSECTION Right     arm    NASAL SEPTUM SURGERY  2013    with turbinate reduction - Dr Sherida Oppenheim ESOPHAGOGASTRODUODENOSCOPY TRANSORAL DIAGNOSTIC N/A 1/18/2016    Procedure: ESOPHAGOGASTRODUODENOSCOPY (EGD); Surgeon: George Canales MD;  [facility];   Service: Gastroenterology    TONSILLECTOMY      TOTAL ABDOMINAL HYSTERECTOMY W/ BILATERAL SALPINGOOPHORECTOMY      age 54   [address] MSK PROCEDURE 1/16/2020       Current Outpatient Medications   Medication Sig Dispense Refill    acetaminophen (TYLENOL) 500 mg tablet Take 500 mg by mouth every 6 (six) hours as needed for mild pain   Ascorbic Acid (VITAMIN C) 100 MG tablet Take by mouth      cyanocobalamin (VITAMIN B-12) 1,000 mcg tablet Take 1,000 mcg by mouth daily      diclofenac sodium (VOLTAREN) 1 % Apply 2 g topically 4 (four) times a day 1 Tube 2    erythromycin with ethanol (EMGEL) 2 % gel Apply topically daily 45 g 1    estradiol (ESTRACE) 0 5 MG tablet She takes it once weekly 90 tablet 0    famotidine (PEPCID) 20 mg tablet Take 20 mg by mouth 2 (two) times a day   flecainide (TAMBOCOR) 100 mg tablet TAKE ONE TABLET BY MOUTH 2 TIMES A  tablet 0    fluticasone (FLONASE) 50 mcg/act nasal spray 2 sprays into each nostril 2 (two) times a day 1 Bottle 11    fluticasone-salmeterol (ADVAIR DISKUS) 250-50 mcg/dose inhaler Inhale 1 puff 2 (two) times a day Rinse mouth after use   1 Inhaler 5    levalbuterol (XOPENEX HFA) 45 mcg/act inhaler Inhale 2 puffs every 4 (four) hours as needed for wheezing 1 Inhaler 3    Magnesium 500 MG CAPS Take by mouth      meloxicam (MOBIC) 15 mg tablet Take 1 tablet (15 mg total) by mouth daily 90 tablet 1    metoprolol succinate (TOPROL-XL) 25 mg 24 hr tablet Take 1 tablet (25 mg total) by mouth daily 90 tablet 3    montelukast (SINGULAIR) 10 mg tablet TAKE ONE TABLET BY MOUTH EVERY DAY AT BEDTIME 90 tablet 0    omega-3-acid ethyl esters (LOVAZA) 1 g capsule Take 2 capsules (2 g total) by mouth 2 (two) times a day 120 capsule 1    ondansetron (ZOFRAN-ODT) 4 mg disintegrating tablet Take 1 tablet (4 mg total) by mouth every 6 (six) hours as needed for nausea or vomiting 30 tablet 0    pantoprazole (PROTONIX) 20 mg tablet Take 1 tablet (20 mg total) by mouth daily 90 tablet 2    pantoprazole (PROTONIX) 40 mg tablet Take 1 tablet (40 mg total) by mouth 2 (two) times a day 180 tablet 1    pseudoephedrine (SUDAFED) 30 mg tablet Take 1 tablet by mouth every 6 (six) hours as needed      Vitamin D, Ergocalciferol, 2000 units CAPS Take by mouth      zolpidem (AMBIEN) 5 mg tablet Take 1 tablet (5 mg total) by mouth daily at bedtime as needed for sleep 30 tablet 1     No current facility-administered medications for this visit  Allergies   Allergen Reactions    Shellfish-Derived Products Anaphylaxis    Wheat Bran Anaphylaxis    Gluten Meal GI Intolerance     Celiac     Morphine And Related Itching    Nuts Hives     Almonds and other related nuts   Other Itching    Sulfa Antibiotics Rash       Review of Systems   Constitutional: Negative for appetite change and unexpected weight change  HENT: Negative for rhinorrhea and sore throat  Respiratory: Negative for cough and shortness of breath  Cardiovascular: Negative for chest pain  Gastrointestinal: Negative for abdominal pain, blood in stool, constipation and diarrhea  Musculoskeletal: Negative for arthralgias  Skin:        No jaundice   Neurological: Negative for dizziness  Physical Exam     General: Patient is alert and oriented, appears in no acute distress, appears stated age  HEENT: Normocephalic, atraumatic   Respiratory: No evidence of respiratory distress  She is breathing on room air  Skin: No jaundice    Plan    1  Esophagitis  Recent EGD from January 2020 revealed class A esophagitis  She took Protonix 40 mg twice daily for 2 months  She is currently taking Protonix 40 mg once daily, but is concerned about potential side effects  She may decrease to Protonix 20 mg once daily  Continue dietary and lifestyle modifications to prevent reflux  She needs repeat EGD to confirm healing of esophagitis and rule out Campbell's esophagus  She is aware we are postponing elective procedures for the time being due to [place]  - pantoprazole (PROTONIX) 20 mg tablet;  Take 1 tablet (20 mg total) by mouth daily Dispense: 90 tablet; Refill: 2    2  Celiac disease  Stable  She follows strict gluten free diet  Recent TTG IgA and duodenal biopsies unremarkable  Her PCP ordered CBC and CMP  Plan to recheck iron panel, vitamin-D, folate, and B12 in the Fall as part of her routine annual blood work  She is up-to-date with DEXA scan and should continue her daily vitamin-D supplement  - Iron Panel (Includes Ferritin, Iron Sat%, Iron, and TIBC); Future  - Vitamin D 25 hydroxy; Future  - Folate; Future  - Vitamin B12; Future    Follow-up in 6-12 months    I spent 20 minutes with the patient during this visit

## 2020-03-27 ENCOUNTER — TELEPHONE (OUTPATIENT)
Dept: GASTROENTEROLOGY | Facility: CLINIC | Age: 65
End: 2020-03-27

## 2020-03-27 NOTE — TELEPHONE ENCOUNTER
EGD scheduled on 9/18/20 with Nevin Snellen at [facility]  I gave patient verbal instructions/mailed

## 2020-03-27 NOTE — TELEPHONE ENCOUNTER
----- Message from Kush Houser PA-C sent at 3/24/2020  4:10 PM EDT -----  Zita Archibald,    I had virtual visit with Adan Madera today  She needs EGD (level 4 - non-urgent)  She requested [address] with Dr Maureen White       Thanks,  [employer]

## 2020-05-01 ENCOUNTER — TELEPHONE (OUTPATIENT)
Dept: OBGYN CLINIC | Facility: CLINIC | Age: 65
End: 2020-05-01

## 2020-05-07 DIAGNOSIS — Z78.0 MENOPAUSE: ICD-10-CM

## 2020-05-07 RX ORDER — ESTRADIOL 0.5 MG/1
TABLET ORAL
Qty: 12 TABLET | Refills: 0
Start: 2020-05-07 | End: 2020-07-13 | Stop reason: SDUPTHER

## 2020-06-03 ENCOUNTER — HOSPITAL ENCOUNTER (OUTPATIENT)
Dept: RADIOLOGY | Age: 65
Discharge: HOME | End: 2020-06-03
Payer: COMMERCIAL

## 2020-06-03 DIAGNOSIS — Z78.0 MENOPAUSE: ICD-10-CM

## 2020-06-03 PROCEDURE — 77080 DXA BONE DENSITY AXIAL: CPT

## 2020-06-08 ENCOUNTER — APPOINTMENT (OUTPATIENT)
Dept: LAB | Facility: CLINIC | Age: 65
End: 2020-06-08
Payer: COMMERCIAL

## 2020-06-08 DIAGNOSIS — K90.0 CELIAC DISEASE: ICD-10-CM

## 2020-06-08 DIAGNOSIS — I49.3 PVC (PREMATURE VENTRICULAR CONTRACTION): ICD-10-CM

## 2020-06-08 LAB
25(OH)D3 SERPL-MCNC: 45.3 NG/ML (ref 30–100)
FERRITIN SERPL-MCNC: 103 NG/ML (ref 8–388)
FOLATE SERPL-MCNC: 14.7 NG/ML (ref 3.1–17.5)
IRON SATN MFR SERPL: 28 %
IRON SERPL-MCNC: 91 UG/DL (ref 50–170)
TIBC SERPL-MCNC: 325 UG/DL (ref 250–450)
VIT B12 SERPL-MCNC: 485 PG/ML (ref 100–900)

## 2020-06-08 PROCEDURE — 82306 VITAMIN D 25 HYDROXY: CPT

## 2020-06-08 PROCEDURE — 36415 COLL VENOUS BLD VENIPUNCTURE: CPT

## 2020-06-08 PROCEDURE — 82746 ASSAY OF FOLIC ACID SERUM: CPT

## 2020-06-08 PROCEDURE — 82728 ASSAY OF FERRITIN: CPT

## 2020-06-08 PROCEDURE — 83540 ASSAY OF IRON: CPT

## 2020-06-08 PROCEDURE — 82607 VITAMIN B-12: CPT

## 2020-06-08 PROCEDURE — 83550 IRON BINDING TEST: CPT

## 2020-06-09 RX ORDER — FLECAINIDE ACETATE 100 MG/1
TABLET ORAL
Qty: 180 TABLET | Refills: 4 | Status: SHIPPED | OUTPATIENT
Start: 2020-06-09 | End: 2021-01-05 | Stop reason: SDUPTHER

## 2020-06-11 ENCOUNTER — TELEPHONE (OUTPATIENT)
Dept: GASTROENTEROLOGY | Facility: CLINIC | Age: 65
End: 2020-06-11

## 2020-06-15 DIAGNOSIS — J45.909 UNCOMPLICATED ASTHMA, UNSPECIFIED ASTHMA SEVERITY, UNSPECIFIED WHETHER PERSISTENT: ICD-10-CM

## 2020-06-15 RX ORDER — MONTELUKAST SODIUM 10 MG/1
TABLET ORAL
Qty: 90 TABLET | Refills: 0 | Status: SHIPPED | OUTPATIENT
Start: 2020-06-15 | End: 2020-08-19 | Stop reason: SDUPTHER

## 2020-06-15 RX ORDER — LEVALBUTEROL TARTRATE 45 UG/1
2 AEROSOL, METERED ORAL EVERY 4 HOURS PRN
Qty: 1 INHALER | Refills: 3 | Status: SHIPPED | OUTPATIENT
Start: 2020-06-15 | End: 2021-12-13 | Stop reason: SDUPTHER

## 2020-06-30 ENCOUNTER — TRANSCRIBE ORDERS (OUTPATIENT)
Dept: ADMINISTRATIVE | Facility: HOSPITAL | Age: 65
End: 2020-06-30

## 2020-06-30 ENCOUNTER — APPOINTMENT (OUTPATIENT)
Dept: LAB | Facility: CLINIC | Age: 65
End: 2020-06-30
Payer: COMMERCIAL

## 2020-06-30 DIAGNOSIS — R53.83 FATIGUE, UNSPECIFIED TYPE: ICD-10-CM

## 2020-06-30 DIAGNOSIS — Z12.31 SCREENING MAMMOGRAM, ENCOUNTER FOR: Primary | ICD-10-CM

## 2020-06-30 DIAGNOSIS — E78.1 HYPERTRIGLYCERIDEMIA: ICD-10-CM

## 2020-06-30 LAB
ALBUMIN SERPL BCP-MCNC: 3.9 G/DL (ref 3.5–5)
ALP SERPL-CCNC: 49 U/L (ref 46–116)
ALT SERPL W P-5'-P-CCNC: 42 U/L (ref 12–78)
ANION GAP SERPL CALCULATED.3IONS-SCNC: 7 MMOL/L (ref 4–13)
AST SERPL W P-5'-P-CCNC: 26 U/L (ref 5–45)
BILIRUB SERPL-MCNC: 0.51 MG/DL (ref 0.2–1)
BUN SERPL-MCNC: 16 MG/DL (ref 5–25)
CALCIUM SERPL-MCNC: 9.6 MG/DL (ref 8.3–10.1)
CHLORIDE SERPL-SCNC: 107 MMOL/L (ref 100–108)
CHOLEST SERPL-MCNC: 236 MG/DL (ref 50–200)
CO2 SERPL-SCNC: 23 MMOL/L (ref 21–32)
CREAT SERPL-MCNC: 0.93 MG/DL (ref 0.6–1.3)
ERYTHROCYTE [DISTWIDTH] IN BLOOD BY AUTOMATED COUNT: 13.1 % (ref 11.6–15.1)
GFR SERPL CREATININE-BSD FRML MDRD: 66 ML/MIN/1.73SQ M
GLUCOSE P FAST SERPL-MCNC: 121 MG/DL (ref 65–99)
HCT VFR BLD AUTO: 42 % (ref 34.8–46.1)
HDLC SERPL-MCNC: 42 MG/DL
HGB BLD-MCNC: 13.6 G/DL (ref 11.5–15.4)
LDLC SERPL CALC-MCNC: 130 MG/DL (ref 0–100)
MCH RBC QN AUTO: 30.6 PG (ref 26.8–34.3)
MCHC RBC AUTO-ENTMCNC: 32.4 G/DL (ref 31.4–37.4)
MCV RBC AUTO: 95 FL (ref 82–98)
NONHDLC SERPL-MCNC: 194 MG/DL
PLATELET # BLD AUTO: 194 THOUSANDS/UL (ref 149–390)
PMV BLD AUTO: 12 FL (ref 8.9–12.7)
POTASSIUM SERPL-SCNC: 4.4 MMOL/L (ref 3.5–5.3)
PROT SERPL-MCNC: 7.3 G/DL (ref 6.4–8.2)
RBC # BLD AUTO: 4.44 MILLION/UL (ref 3.81–5.12)
SODIUM SERPL-SCNC: 137 MMOL/L (ref 136–145)
TRIGL SERPL-MCNC: 322 MG/DL
WBC # BLD AUTO: 6.99 THOUSAND/UL (ref 4.31–10.16)

## 2020-06-30 PROCEDURE — 80053 COMPREHEN METABOLIC PANEL: CPT

## 2020-06-30 PROCEDURE — 85027 COMPLETE CBC AUTOMATED: CPT

## 2020-06-30 PROCEDURE — 36415 COLL VENOUS BLD VENIPUNCTURE: CPT

## 2020-06-30 PROCEDURE — 80061 LIPID PANEL: CPT

## 2020-07-13 ENCOUNTER — TELEPHONE (OUTPATIENT)
Dept: OBGYN CLINIC | Facility: MEDICAL CENTER | Age: 65
End: 2020-07-13

## 2020-07-13 DIAGNOSIS — Z78.0 MENOPAUSE: ICD-10-CM

## 2020-07-13 RX ORDER — ESTRADIOL 0.5 MG/1
TABLET ORAL
Qty: 12 TABLET | Refills: 0
Start: 2020-07-13 | End: 2020-08-06 | Stop reason: SDUPTHER

## 2020-07-13 NOTE — TELEPHONE ENCOUNTER
Patient has an appointment on 8/6 (r/s from June for [place]) and needs her hormone replacement therapy rx refilled until then

## 2020-07-15 ENCOUNTER — HOSPITAL ENCOUNTER (OUTPATIENT)
Dept: RADIOLOGY | Age: 65
Discharge: HOME | End: 2020-07-15
Payer: COMMERCIAL

## 2020-07-15 DIAGNOSIS — E04.1 THYROID NODULE: ICD-10-CM

## 2020-07-15 DIAGNOSIS — R51.9 NONINTRACTABLE EPISODIC HEADACHE, UNSPECIFIED HEADACHE TYPE: ICD-10-CM

## 2020-07-15 PROCEDURE — 76536 US EXAM OF HEAD AND NECK: CPT

## 2020-07-15 PROCEDURE — 70486 CT MAXILLOFACIAL W/O DYE: CPT

## 2020-07-24 ENCOUNTER — OFFICE VISIT (OUTPATIENT)
Dept: OBGYN CLINIC | Facility: HOSPITAL | Age: 65
End: 2020-07-24
Payer: COMMERCIAL

## 2020-07-24 VITALS
HEART RATE: 71 BPM | WEIGHT: 166 LBS | BODY MASS INDEX: 26.68 KG/M2 | HEIGHT: 66 IN | DIASTOLIC BLOOD PRESSURE: 82 MMHG | SYSTOLIC BLOOD PRESSURE: 126 MMHG

## 2020-07-24 DIAGNOSIS — R22.32 MASS OF LEFT WRIST: Primary | ICD-10-CM

## 2020-07-24 DIAGNOSIS — M18.11 PRIMARY OSTEOARTHRITIS OF FIRST CARPOMETACARPAL JOINT OF RIGHT HAND: ICD-10-CM

## 2020-07-24 DIAGNOSIS — M77.8 LEFT WRIST TENDINITIS: ICD-10-CM

## 2020-07-24 PROCEDURE — 1036F TOBACCO NON-USER: CPT | Performed by: SURGERY

## 2020-07-24 PROCEDURE — 3074F SYST BP LT 130 MM HG: CPT | Performed by: SURGERY

## 2020-07-24 PROCEDURE — 99213 OFFICE O/P EST LOW 20 MIN: CPT | Performed by: SURGERY

## 2020-07-24 PROCEDURE — 3008F BODY MASS INDEX DOCD: CPT | Performed by: SURGERY

## 2020-07-24 PROCEDURE — 3079F DIAST BP 80-89 MM HG: CPT | Performed by: SURGERY

## 2020-07-24 RX ORDER — METHYLPREDNISOLONE 4 MG/1
TABLET ORAL
Qty: 1 EACH | Refills: 0 | Status: SHIPPED | OUTPATIENT
Start: 2020-07-24 | End: 2020-08-19

## 2020-07-24 NOTE — PROGRESS NOTES
ASSESSMENT/PLAN:      60 y o  female with right [facility](asymptimatic) and left wrist ganglion cyst/ECU tendinitis  Zena Ill left dorsal ulnar wrist mass has returned  Repeat MSK procedure was ordred today for ultrasound guided aspiration  It was discussed today that with aspiration there is a change of reoccurrence, even with surgical intervention, the mass can still recur  If the mass returns after this second aspiration, surgical intervention may be discussed  She will start back up in therapy for ECU strengthening once the mass is aspirated  A medrol dosepak was prescribed and sent to her pharmacy electronically, to be taken if needed  I will see her back in the office 6 weeks following the aspiration  The patient verbalized understanding of exam findings and treatment plan  We engaged in the shared decision-making process and treatment options were discussed at length with the patient  Surgical and conservative management discussed today along with risks and benefits  Diagnoses and all orders for this visit:    Mass of left wrist  -     US guided msk procedure; Future    Primary osteoarthritis of first carpometacarpal joint of right hand    Left wrist tendinitis  -     Ambulatory referral to PT/OT hand therapy; Future    Other orders  -     methylPREDNISolone 4 MG tablet therapy pack; Use as directed on package      Follow Up:  Return in about 6 weeks (around 9/4/2020)  To Do Next Visit:  Re-evaluation of current issue    ____________________________________________________________________________________________________________________________________________      CHIEF COMPLAINT:  Chief Complaint   Patient presents with    Left Wrist - Follow-up       SUBJECTIVE:  Seble Avery is a 60y o  year old RHD female who presents to the office today for a follow up regarding left wrist pain  Zena Ill states that aprox  2 months ago the mass to the dorsal ulnar aspect of her left wrist did return   She underwent an ultrasound guided aspiration on 01/16/2020 with resolution of her symptoms  She states that she did stop performing her ECU strengthening HEP  I have personally reviewed all the relevant PMH, PSH, SH, FH, Medications and allergies  PAST MEDICAL HISTORY:  Past Medical History:   Diagnosis Date    Asthma     Celiac disease     [facility]     GERD (gastroesophageal reflux disease)     Heart palpitations     History of colonic polyps     resolved 07/12/2016    History of radiation therapy 2010    Irregular heart beat     Lymphoma, 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, remission    Malignant lymphoma ([facility] ) 2010    rt arm cutaneous follicular stage ia (rt diatal medical biceps area , s/p resected and s/p radistion treatment    resolved 12/17/2014    Postmenopausal disorder     resolved 09/21/2017    Restless legs syndrome     resolved 09/21/2017    TMJ syndrome     resolved 09/21/2017       PAST SURGICAL HISTORY:  Past Surgical History:   Procedure Laterality Date    COLONOSCOPY  04/16/2019    FUNCTIONAL ENDOSCOPIC SINUS SURGERY Bilateral 2013    Dr Avtar Coronado      age 39 complete    LYMPH NODE DISSECTION Right     arm    NASAL SEPTUM SURGERY  2013    with turbinate reduction - Dr Madiha Omer ESOPHAGOGASTRODUODENOSCOPY TRANSORAL DIAGNOSTIC N/A 1/18/2016    Procedure: ESOPHAGOGASTRODUODENOSCOPY (EGD); Surgeon: Sania Quinteros MD;  [facility];   Service: Gastroenterology    TONSILLECTOMY      TOTAL ABDOMINAL HYSTERECTOMY W/ BILATERAL SALPINGOOPHORECTOMY      age 54    [address] GUIDED MSK PROCEDURE  1/16/2020       FAMILY HISTORY:  Family History   Problem Relation Age of Onset    Lymphoma Mother     Throat cancer Father     Heart failure Father     Atrial fibrillation Father     Diabetes Father     Colonic polyp Father     Hypertension Father     Thyroid cancer Sister     Breast cancer Paternal Grandmother 71    Breast cancer Paternal Aunt 71    Ovarian cancer Paternal Aunt 79    No Known Problems Maternal Grandmother     No Known Problems Maternal Grandfather     Cancer Paternal Grandfather     Lung cancer Paternal Grandfather     BRCA1 Positive Cousin     Skin cancer Paternal Aunt     Throat cancer Paternal Uncle     No Known Problems Maternal Aunt        SOCIAL HISTORY:  Social History     Tobacco Use    Smoking status: Former Smoker     Years: 20 00     Types: Cigarettes     Last attempt to quit:      Years since quittin 5    Smokeless tobacco: Never Used   Substance Use Topics    Alcohol use: Yes     Comment: social    Drug use: No       MEDICATIONS:    Current Outpatient Medications:     acetaminophen (TYLENOL) 500 mg tablet, Take 500 mg by mouth every 6 (six) hours as needed for mild pain , Disp: , Rfl:     Ascorbic Acid (VITAMIN C) 100 MG tablet, Take by mouth, Disp: , Rfl:     cyanocobalamin (VITAMIN B-12) 1,000 mcg tablet, Take 1,000 mcg by mouth daily, Disp: , Rfl:     diclofenac sodium (VOLTAREN) 1 %, Apply 2 g topically 4 (four) times a day, Disp: 1 Tube, Rfl: 2    erythromycin with ethanol (EMGEL) 2 % gel, Apply topically daily, Disp: 45 g, Rfl: 1    estradiol (ESTRACE) 0 5 MG tablet, She takes it once weekly, Disp: 12 tablet, Rfl: 0    famotidine (PEPCID) 20 mg tablet, Take 20 mg by mouth 2 (two) times a day , Disp: , Rfl:     flecainide (TAMBOCOR) 100 mg tablet, TAKE ONE TABLET BY MOUTH 2 TIMES A DAY, Disp: 180 tablet, Rfl: 4    fluticasone (FLONASE) 50 mcg/act nasal spray, 2 sprays into each nostril 2 (two) times a day, Disp: 1 Bottle, Rfl: 11    fluticasone-salmeterol (ADVAIR DISKUS) 250-50 mcg/dose inhaler, Inhale 1 puff 2 (two) times a day Rinse mouth after use , Disp: 1 Inhaler, Rfl: 5    levalbuterol (Xopenex HFA) 45 mcg/act inhaler, Inhale 2 puffs every 4 (four) hours as needed for wheezing, Disp: 1 Inhaler, Rfl: 3    Magnesium 500 MG CAPS, Take by mouth, Disp: , Rfl:     meloxicam (MOBIC) 15 mg tablet, Take 1 tablet (15 mg total) by mouth daily, Disp: 90 tablet, Rfl: 1    metoprolol succinate (TOPROL-XL) 25 mg 24 hr tablet, Take 1 tablet (25 mg total) by mouth daily, Disp: 90 tablet, Rfl: 3    montelukast (SINGULAIR) 10 mg tablet, TAKE ONE TABLET BY MOUTH EVERY DAY AT BEDTIME, Disp: 90 tablet, Rfl: 0    omega-3-acid ethyl esters (LOVAZA) 1 g capsule, Take 2 capsules (2 g total) by mouth 2 (two) times a day, Disp: 120 capsule, Rfl: 1    ondansetron (ZOFRAN-ODT) 4 mg disintegrating tablet, Take 1 tablet (4 mg total) by mouth every 6 (six) hours as needed for nausea or vomiting, Disp: 30 tablet, Rfl: 0    pantoprazole (PROTONIX) 20 mg tablet, Take 1 tablet (20 mg total) by mouth daily, Disp: 90 tablet, Rfl: 2    pantoprazole (PROTONIX) 40 mg tablet, Take 1 tablet (40 mg total) by mouth 2 (two) times a day, Disp: 180 tablet, Rfl: 1    pseudoephedrine (SUDAFED) 30 mg tablet, Take 1 tablet by mouth every 6 (six) hours as needed, Disp: , Rfl:     Vitamin D, Ergocalciferol, 2000 units CAPS, Take by mouth, Disp: , Rfl:     zolpidem (AMBIEN) 5 mg tablet, Take 1 tablet (5 mg total) by mouth daily at bedtime as needed for sleep, Disp: 30 tablet, Rfl: 1    ALLERGIES:  Allergies   Allergen Reactions    Shellfish-Derived Products Anaphylaxis    Wheat Bran Anaphylaxis    Gluten Meal GI Intolerance     Celiac     Morphine And Related Itching    Nuts Hives     Almonds and other related nuts   Other Itching    Sulfa Antibiotics Rash       REVIEW OF SYSTEMS:  Review of Systems   Constitutional: Negative for chills, fever and unexpected weight change  HENT: Negative for hearing loss, nosebleeds and sore throat  Eyes: Negative for pain, redness and visual disturbance  Respiratory: Negative for cough, shortness of breath and wheezing  Cardiovascular: Negative for chest pain, palpitations and leg swelling  Gastrointestinal: Negative for abdominal pain, nausea and vomiting     Endocrine: Negative for polydipsia and polyuria  Genitourinary: Negative for difficulty urinating and hematuria  Musculoskeletal: Negative for arthralgias, joint swelling and myalgias  Skin: Negative for rash and wound  Neurological: Negative for dizziness, numbness and headaches  Psychiatric/Behavioral: Negative for decreased concentration, dysphoric mood and suicidal ideas  The patient is not nervous/anxious  VITALS:  Vitals:    07/24/20 1324   BP: 126/82   Pulse: 71       LABS:  HgA1c:   Lab Results   Component Value Date    HGBA1C 5 6 09/03/2019     BMP:   Lab Results   Component Value Date    GLUCOSE 112 06/22/2015    CALCIUM 9 6 06/30/2020     06/22/2015    K 4 4 06/30/2020    CO2 23 06/30/2020     06/30/2020    BUN 16 06/30/2020    CREATININE 0 93 06/30/2020       _____________________________________________________  PHYSICAL EXAMINATION:  General: well developed and well nourished, alert, oriented times 3 and appears comfortable  Psychiatric: Normal  HEENT: Normocephalic, Atraumatic Trachea Midline, No torticollis  Pulmonary: No audible wheezing or respiratory distress   Cardiovascular: No pitting edema, 2+ radial pulse   Skin: No erythema, lacerations, fluctation, ulcerations  Neurovascular: Sensation Intact to the Median, Ulnar, Radial Nerve, Motor Intact to the Median, Ulnar, Radial Nerve and Pulses Intact  Musculoskeletal: Normal, except as noted in detailed exam and in HPI        MUSCULOSKELETAL EXAMINATION:    Left wrist:     No erythema, ecchymosis or edema  Dorsal ulnar ganglion cyst   ECU tender to palpation   Wrist ROM within normal limited   2+ radial pulse   Sensation intact to light touch   Full composite fist     ___________________________________________________  STUDIES REVIEWED:  No new imaging to review            PROCEDURES PERFORMED:  Procedures  No Procedures performed today    _____________________________________________________      Cortney Desouza I,:   Lorie Medicine am acting as a scribe while in the presence of the attending physician :        I,:   Sapna Lozano MD personally performed the services described in this documentation    as scribed in my presence :

## 2020-07-31 ENCOUNTER — TELEPHONE (OUTPATIENT)
Dept: INTERVENTIONAL RADIOLOGY/VASCULAR | Facility: HOSPITAL | Age: 65
End: 2020-07-31

## 2020-08-03 ENCOUNTER — TELEPHONE (OUTPATIENT)
Dept: INTERVENTIONAL RADIOLOGY/VASCULAR | Facility: HOSPITAL | Age: 65
End: 2020-08-03

## 2020-08-03 NOTE — TELEPHONE ENCOUNTER
Pre procedure instructions reviewed, pt reports taking prescription fish oil that "makes me bleed more but I haven't taken it in a while" pt was instructed not to take  Pt was advised it is recommended she have transportation after procedure which she stated "will not happen   I've had this done before and had no problem"

## 2020-08-06 ENCOUNTER — ANNUAL EXAM (OUTPATIENT)
Dept: OBGYN CLINIC | Facility: MEDICAL CENTER | Age: 65
End: 2020-08-06
Payer: COMMERCIAL

## 2020-08-06 VITALS
BODY MASS INDEX: 26.47 KG/M2 | SYSTOLIC BLOOD PRESSURE: 128 MMHG | TEMPERATURE: 97.5 F | WEIGHT: 164 LBS | DIASTOLIC BLOOD PRESSURE: 82 MMHG

## 2020-08-06 DIAGNOSIS — Z78.0 MENOPAUSE: ICD-10-CM

## 2020-08-06 DIAGNOSIS — Z01.419 ENCOUNTER FOR GYNECOLOGICAL EXAMINATION (GENERAL) (ROUTINE) WITHOUT ABNORMAL FINDINGS: ICD-10-CM

## 2020-08-06 DIAGNOSIS — Z12.31 ENCOUNTER FOR SCREENING MAMMOGRAM FOR MALIGNANT NEOPLASM OF BREAST: Primary | ICD-10-CM

## 2020-08-06 DIAGNOSIS — Z79.890 HORMONE REPLACEMENT THERAPY (HRT): ICD-10-CM

## 2020-08-06 PROCEDURE — 99396 PREV VISIT EST AGE 40-64: CPT | Performed by: NURSE PRACTITIONER

## 2020-08-06 RX ORDER — ESTRADIOL 0.5 MG/1
TABLET ORAL
Qty: 12 TABLET | Refills: 2
Start: 2020-08-06 | End: 2021-03-03

## 2020-08-06 NOTE — PROGRESS NOTES
Assessment/Plan:    Hormone replacement therapy (HRT)  David Wall reports she has continued taking one tab weekly  Again reviewed the risks/bnefits of HRT use over age 61 and recommended systematic weaning  She will trial 1/2 tab weekly and then change dosing to q8d, q9d, and so on  F/u PRN     Encounter for gynecological examination (general) (routine) without abnormal findings  Benign findings on routine gyn exam  Recommended monthly SBE, annual CBE and annual screening mammo  ASCCP guidelines reviewed and this low risk patient was advised she meets criteria to d/c pap screening given cervix is surgically absent  DEXA and colonoscopy up to date  The patient denies STI risk factors and declines testing at this time  Reviewed diet/activity recommendations:  Encouraged daily Ca++ and vitamin D intake as well as daily weight bearing exercise for promotion of bone health    Discussed postmenopausal considerations and symptoms to report  RTO in one year for routine annual gyn exam or sooner PRN  Diagnoses and all orders for this visit:    Encounter for screening mammogram for malignant neoplasm of breast  -     Mammo screening bilateral w 3d & cad; Future    Encounter for gynecological examination (general) (routine) without abnormal findings    Hormone replacement therapy (HRT)    Menopause  Comments:  discussed we can also try sri for hot flashes  Orders:  -     estradiol (ESTRACE) 0 5 MG tablet; 1/2 tablet by mouth once weekly          Subjective:      Patient ID: Antonio Lim is a 60 y o  female  This patient presents for routine annual gyn exam   Medically stable  S/p JENNA/BSO for benign indication  She has continued estrace 0 5mg PO once WEEKLY  Tried to stop abruptly when she ran out of refills and felt "very blue"  She denies acute gyn complaints  She denies  bleeding or spotting, VM sx, pelvic pain, breast concerns, abn discharge, bowel/bladder dysfunction, depression/anx   and monog   Denies STI concerns  Works for [employer] at the end of the year       The following portions of the patient's history were reviewed and updated as appropriate: allergies, current medications, past family history, past medical history, past social history, past surgical history and problem list     Review of Systems   Constitutional: Negative  Respiratory: Negative  Cardiovascular: Negative  Gastrointestinal: Negative  Genitourinary: Negative  Musculoskeletal: Negative  Skin: Negative  Neurological: Negative  Psychiatric/Behavioral: Negative  Objective:      /82   Temp 97 5 °F (36 4 °C)   Wt 74 4 kg (164 lb)   BMI 26 47 kg/m²          Physical Exam   Constitutional: She is oriented to person, place, and time  She appears well-developed  HENT:   Head: Normocephalic and atraumatic  Eyes: Pupils are equal, round, and reactive to light  Neck: Normal range of motion  Neck supple  No thyromegaly present  Cardiovascular: Normal rate, regular rhythm and normal heart sounds  Pulmonary/Chest: Effort normal and breath sounds normal  No respiratory distress  She has no wheezes  She has no rales  She exhibits no mass, no tenderness and no deformity  Right breast exhibits no inverted nipple, no mass, no nipple discharge, no skin change and no tenderness  Left breast exhibits no inverted nipple, no mass, no nipple discharge, no skin change and no tenderness  Breasts are symmetrical    Abdominal: Soft  She exhibits no distension and no mass  There is no splenomegaly or hepatomegaly  There is no abdominal tenderness  There is no rebound and no guarding  Genitourinary:    Vagina normal    There is no rash, tenderness, lesion or injury on the right labia  There is no rash, tenderness, lesion or injury on the left labia  No vaginal discharge, erythema, tenderness or bleeding  No erythema, tenderness or bleeding in the vagina  No foreign body in the vagina  Musculoskeletal: Normal range of motion  Lymphadenopathy:     She has no cervical adenopathy  Neurological: She is alert and oriented to person, place, and time  No cranial nerve deficit  Skin: Skin is warm and dry  No rash noted  Nails show no clubbing     Psychiatric: Her speech is normal and behavior is normal  Judgment and thought content normal

## 2020-08-06 NOTE — ASSESSMENT & PLAN NOTE
Benign findings on routine gyn exam  Recommended monthly SBE, annual CBE and annual screening mammo  ASCCP guidelines reviewed and this low risk patient was advised she meets criteria to d/c pap screening given cervix is surgically absent  DEXA and colonoscopy up to date  The patient denies STI risk factors and declines testing at this time  Reviewed diet/activity recommendations:  Encouraged daily Ca++ and vitamin D intake as well as daily weight bearing exercise for promotion of bone health    Discussed postmenopausal considerations and symptoms to report  RTO in one year for routine annual gyn exam or sooner PRN

## 2020-08-06 NOTE — ASSESSMENT & PLAN NOTE
Birdena Epley reports she has continued taking one tab weekly  Again reviewed the risks/bnefits of HRT use over age 63 and recommended systematic weaning  She will trial 1/2 tab weekly and then change dosing to q8d, q9d, and so on   F/u PRN

## 2020-08-07 ENCOUNTER — TRANSCRIBE ORDERS (OUTPATIENT)
Dept: RADIOLOGY | Facility: HOSPITAL | Age: 65
End: 2020-08-07

## 2020-08-07 ENCOUNTER — HOSPITAL ENCOUNTER (OUTPATIENT)
Dept: RADIOLOGY | Facility: HOSPITAL | Age: 65
Discharge: HOME | End: 2020-08-07
Attending: SURGERY | Admitting: RADIOLOGY
Payer: COMMERCIAL

## 2020-08-07 DIAGNOSIS — R22.32 MASS OF LEFT WRIST: ICD-10-CM

## 2020-08-07 PROCEDURE — 20606 DRAIN/INJ JOINT/BURSA W/US: CPT

## 2020-08-07 RX ORDER — LIDOCAINE HYDROCHLORIDE 10 MG/ML
5 INJECTION, SOLUTION EPIDURAL; INFILTRATION; INTRACAUDAL; PERINEURAL ONCE
Status: COMPLETED | OUTPATIENT
Start: 2020-08-07 | End: 2020-08-07

## 2020-08-07 RX ADMIN — LIDOCAINE HYDROCHLORIDE 2 ML: 10 INJECTION, SOLUTION EPIDURAL; INFILTRATION; INTRACAUDAL; PERINEURAL at 09:35

## 2020-08-10 DIAGNOSIS — I10 HYPERTENSION, UNSPECIFIED TYPE: ICD-10-CM

## 2020-08-10 RX ORDER — METOPROLOL SUCCINATE 25 MG/1
TABLET, EXTENDED RELEASE ORAL
Qty: 90 TABLET | Refills: 0 | Status: SHIPPED | OUTPATIENT
Start: 2020-08-10 | End: 2020-11-06 | Stop reason: SDUPTHER

## 2020-08-14 ENCOUNTER — EVALUATION (OUTPATIENT)
Dept: OCCUPATIONAL THERAPY | Facility: CLINIC | Age: 65
End: 2020-08-14
Payer: COMMERCIAL

## 2020-08-14 DIAGNOSIS — M77.8 LEFT WRIST TENDINITIS: Primary | ICD-10-CM

## 2020-08-14 PROCEDURE — 97166 OT EVAL MOD COMPLEX 45 MIN: CPT

## 2020-08-14 PROCEDURE — 97535 SELF CARE MNGMENT TRAINING: CPT
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
## 2020-08-19 ENCOUNTER — OFFICE VISIT (OUTPATIENT)
Dept: FAMILY MEDICINE CLINIC | Facility: CLINIC | Age: 65
End: 2020-08-19
Payer: COMMERCIAL

## 2020-08-19 ENCOUNTER — APPOINTMENT (OUTPATIENT)
Dept: RADIOLOGY | Facility: MEDICAL CENTER | Age: 65
End: 2020-08-19
Payer: COMMERCIAL

## 2020-08-19 VITALS
HEIGHT: 66 IN | HEART RATE: 74 BPM | WEIGHT: 164 LBS | SYSTOLIC BLOOD PRESSURE: 134 MMHG | OXYGEN SATURATION: 97 % | DIASTOLIC BLOOD PRESSURE: 76 MMHG | TEMPERATURE: 97.9 F | BODY MASS INDEX: 26.36 KG/M2

## 2020-08-19 DIAGNOSIS — J45.909 UNCOMPLICATED ASTHMA, UNSPECIFIED ASTHMA SEVERITY, UNSPECIFIED WHETHER PERSISTENT: ICD-10-CM

## 2020-08-19 DIAGNOSIS — G47.00 INSOMNIA, UNSPECIFIED TYPE: ICD-10-CM

## 2020-08-19 DIAGNOSIS — E78.1 HYPERTRIGLYCERIDEMIA: ICD-10-CM

## 2020-08-19 PROCEDURE — 3008F BODY MASS INDEX DOCD: CPT | Performed by: INTERNAL MEDICINE

## 2020-08-19 PROCEDURE — 1036F TOBACCO NON-USER: CPT | Performed by: INTERNAL MEDICINE

## 2020-08-19 PROCEDURE — 71046 X-RAY EXAM CHEST 2 VIEWS: CPT

## 2020-08-19 PROCEDURE — 99214 OFFICE O/P EST MOD 30 MIN: CPT | Performed by: INTERNAL MEDICINE

## 2020-08-19 PROCEDURE — 3078F DIAST BP <80 MM HG: CPT | Performed by: INTERNAL MEDICINE

## 2020-08-19 PROCEDURE — 3075F SYST BP GE 130 - 139MM HG: CPT | Performed by: INTERNAL MEDICINE

## 2020-08-19 RX ORDER — METHYLPREDNISOLONE 4 MG/1
TABLET ORAL
Qty: 21 EACH | Refills: 0 | Status: SHIPPED | OUTPATIENT
Start: 2020-08-19 | End: 2020-09-17

## 2020-08-19 RX ORDER — OMEGA-3-ACID ETHYL ESTERS 1 G/1
2 CAPSULE, LIQUID FILLED ORAL 2 TIMES DAILY
Qty: 360 CAPSULE | Refills: 1 | Status: SHIPPED | OUTPATIENT
Start: 2020-08-19

## 2020-08-19 RX ORDER — ZOLPIDEM TARTRATE 5 MG/1
5 TABLET ORAL
Qty: 30 TABLET | Refills: 2 | Status: SHIPPED | OUTPATIENT
Start: 2020-08-19 | End: 2021-03-03 | Stop reason: SDUPTHER

## 2020-08-19 RX ORDER — MONTELUKAST SODIUM 10 MG/1
10 TABLET ORAL
Qty: 90 TABLET | Refills: 1 | Status: SHIPPED | OUTPATIENT
Start: 2020-08-19 | End: 2021-03-03 | Stop reason: SDUPTHER

## 2020-08-19 NOTE — PROGRESS NOTES
BMI Counseling: Body mass index is 26 47 kg/m²  The BMI is above normal  Nutrition recommendations include decreasing portion sizes and limiting drinks that contain sugar  Exercise recommendations include exercising 3-5 times per week  No pharmacotherapy was ordered  Patient referred to PCP due to patient being overweight  Assessment/Plan:         Diagnoses and all orders for this visit:    Uncomplicated asthma, unspecified asthma severity, unspecified whether persistent  Comments:  pulm consult  she does not feel she is controlled/  add medrol  Orders:  -     montelukast (SINGULAIR) 10 mg tablet; Take 1 tablet (10 mg total) by mouth daily at bedtime  -     Ambulatory referral to Pulmonology; Future  -     XR chest pa & lateral; Future  -     methylPREDNISolone 4 MG tablet therapy pack; Use as directed on package    Hypertriglyceridemia  Comments:  add lovaza  Orders:  -     omega-3-acid ethyl esters (LOVAZA) 1 g capsule; Take 2 capsules (2 g total) by mouth 2 (two) times a day (Patient taking differently: Take 2 g by mouth 3 (three) times a week )    Insomnia, unspecified type  Comments:  prn ambien  Orders:  -     zolpidem (AMBIEN) 5 mg tablet; Take 1 tablet (5 mg total) by mouth daily at bedtime as needed for sleep          Subjective:      Patient ID: Keyla Poole is a 60 y o  female  Pt needs rx  Also feels asthma is not controlled and request pulm  She is getting ready to retire  Said ent told her ? Allergy shots  The following portions of the patient's history were reviewed and updated as appropriate: She  has a past medical history of Asthma, Celiac disease, Follicular lymphoma ([address] ), GERD (gastroesophageal reflux disease), Heart palpitations, History of colonic polyps, History of radiation therapy (2010), Irregular heart beat, Lymphoma, 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[address] ), Malignant lymphoma ([address] ) (2010), Postmenopausal disorder, Restless legs syndrome, and TMJ syndrome    She   Patient Active Problem List    Diagnosis Date Noted    Encounter for gynecological examination (general) (routine) without abnormal findings 05/23/2019    Hormone replacement therapy (HRT) 05/23/2019    Personal history of colonic polyps 42/10/3455    Follicular lymphoma ([facility] [address] ) 08/29/2018    Atrophic vaginitis 05/18/2018    VERONIKA (stress urinary incontinence, female) 05/18/2018    PVC's (premature ventricular contractions) 04/24/2018    Dyslipidemia 04/24/2018    Celiac disease 04/19/2018    Premature ventricular contraction 11/30/2017    Chronic GERD 10/03/2017    Back pain 09/21/2017    Heart palpitations 09/21/2017    Insomnia 09/21/2017    Sciatica associated with disorder of lumbar spine 09/21/2017    Vitamin D deficiency 06/28/2017    Osteopenia 11/17/2016     She  has a past surgical history that includes Lymph node dissection (Right); pr esophagogastroduodenoscopy transoral diagnostic (N/A, 1/18/2016); Nasal septum surgery (2013); Hysterectomy; Total abdominal hysterectomy w/ bilateral salpingoophorectomy; Tonsillectomy; Colonoscopy (04/16/2019); Functional endoscopic sinus surgery (Bilateral, 2013); US guided msk procedure (1/16/2020); and US guided msk procedure (8/7/2020)  Her family history includes Atrial fibrillation in her father; BRCA1 Positive in her cousin; Breast cancer (age of onset: 71) in her paternal aunt and paternal grandmother; Cancer in her paternal grandfather; Colonic polyp in her father; Diabetes in her father; Heart failure in her father; Hypertension in her father; Lung cancer in her paternal grandfather; Lymphoma in her mother; No Known Problems in her maternal aunt, maternal grandfather, and maternal grandmother; Ovarian cancer (age of onset: 79) in her paternal aunt; Skin cancer in her paternal aunt; Throat cancer in her father and paternal uncle; Thyroid cancer in her sister  She  reports that she quit smoking about 16 years ago  Her smoking use included cigarettes   She quit after 20 00 years of use  She has never used smokeless tobacco  She reports current alcohol use  She reports that she does not use drugs  Current Outpatient Medications   Medication Sig Dispense Refill    acetaminophen (TYLENOL) 500 mg tablet Take 500 mg by mouth every 6 (six) hours as needed for mild pain   Ascorbic Acid (VITAMIN C) 100 MG tablet Take by mouth      bimatoprost (LATISSE) 0 03 % ophthalmic solution Take as directed      cyanocobalamin (VITAMIN B-12) 1,000 mcg tablet Take 1,000 mcg by mouth daily      diclofenac sodium (VOLTAREN) 1 % Apply 2 g topically 4 (four) times a day 1 Tube 2    erythromycin with ethanol (EMGEL) 2 % gel Apply topically daily 45 g 1    estradiol (ESTRACE) 0 5 MG tablet 1/2 tablet by mouth once weekly 12 tablet 2    famotidine (PEPCID) 20 mg tablet Take 20 mg by mouth 2 (two) times a day   flecainide (TAMBOCOR) 100 mg tablet TAKE ONE TABLET BY MOUTH 2 TIMES A  tablet 4    fluticasone (FLONASE) 50 mcg/act nasal spray 2 sprays into each nostril 2 (two) times a day 1 Bottle 11    fluticasone-salmeterol (ADVAIR DISKUS) 250-50 mcg/dose inhaler Inhale 1 puff 2 (two) times a day Rinse mouth after use   1 Inhaler 5    levalbuterol (Xopenex HFA) 45 mcg/act inhaler Inhale 2 puffs every 4 (four) hours as needed for wheezing 1 Inhaler 3    lidocaine (LMX) 4 % cream       Magnesium 500 MG CAPS Take by mouth      meloxicam (MOBIC) 15 mg tablet Take 1 tablet (15 mg total) by mouth daily (Patient taking differently: Take 15 mg by mouth as needed ) 90 tablet 1    methylPREDNISolone 4 MG tablet therapy pack Use as directed on package 21 each 0    metoprolol succinate (TOPROL-XL) 25 mg 24 hr tablet TAKE ONE TABLET BY MOUTH DAILY 90 tablet 0    montelukast (SINGULAIR) 10 mg tablet Take 1 tablet (10 mg total) by mouth daily at bedtime 90 tablet 1    olopatadine HCl (PATADAY) 0 2 % opth drops       omega-3-acid ethyl esters (LOVAZA) 1 g capsule Take 2 capsules (2 g total) by mouth 2 (two) times a day (Patient taking differently: Take 2 g by mouth 3 (three) times a week ) 360 capsule 1    ondansetron (ZOFRAN-ODT) 4 mg disintegrating tablet Take 1 tablet (4 mg total) by mouth every 6 (six) hours as needed for nausea or vomiting 30 tablet 0    pantoprazole (PROTONIX) 20 mg tablet Take 1 tablet (20 mg total) by mouth daily 90 tablet 2    pantoprazole (PROTONIX) 40 mg tablet Take 1 tablet (40 mg total) by mouth 2 (two) times a day (Patient not taking: Reported on 8/20/2020) 180 tablet 1    pseudoephedrine (SUDAFED) 30 mg tablet Take 1 tablet by mouth every 6 (six) hours as needed      Vitamin D, Ergocalciferol, 2000 units CAPS Take by mouth      zolpidem (AMBIEN) 5 mg tablet Take 1 tablet (5 mg total) by mouth daily at bedtime as needed for sleep 30 tablet 2     No current facility-administered medications for this visit  Current Outpatient Medications on File Prior to Visit   Medication Sig    acetaminophen (TYLENOL) 500 mg tablet Take 500 mg by mouth every 6 (six) hours as needed for mild pain   Ascorbic Acid (VITAMIN C) 100 MG tablet Take by mouth    cyanocobalamin (VITAMIN B-12) 1,000 mcg tablet Take 1,000 mcg by mouth daily    diclofenac sodium (VOLTAREN) 1 % Apply 2 g topically 4 (four) times a day    erythromycin with ethanol (EMGEL) 2 % gel Apply topically daily    estradiol (ESTRACE) 0 5 MG tablet 1/2 tablet by mouth once weekly    famotidine (PEPCID) 20 mg tablet Take 20 mg by mouth 2 (two) times a day   flecainide (TAMBOCOR) 100 mg tablet TAKE ONE TABLET BY MOUTH 2 TIMES A DAY    fluticasone (FLONASE) 50 mcg/act nasal spray 2 sprays into each nostril 2 (two) times a day    fluticasone-salmeterol (ADVAIR DISKUS) 250-50 mcg/dose inhaler Inhale 1 puff 2 (two) times a day Rinse mouth after use      levalbuterol (Xopenex HFA) 45 mcg/act inhaler Inhale 2 puffs every 4 (four) hours as needed for wheezing    Magnesium 500 MG CAPS Take by mouth    meloxicam (MOBIC) 15 mg tablet Take 1 tablet (15 mg total) by mouth daily (Patient taking differently: Take 15 mg by mouth as needed )    metoprolol succinate (TOPROL-XL) 25 mg 24 hr tablet TAKE ONE TABLET BY MOUTH DAILY    ondansetron (ZOFRAN-ODT) 4 mg disintegrating tablet Take 1 tablet (4 mg total) by mouth every 6 (six) hours as needed for nausea or vomiting    pantoprazole (PROTONIX) 20 mg tablet Take 1 tablet (20 mg total) by mouth daily    pantoprazole (PROTONIX) 40 mg tablet Take 1 tablet (40 mg total) by mouth 2 (two) times a day (Patient not taking: Reported on 8/20/2020)    pseudoephedrine (SUDAFED) 30 mg tablet Take 1 tablet by mouth every 6 (six) hours as needed    Vitamin D, Ergocalciferol, 2000 units CAPS Take by mouth     No current facility-administered medications on file prior to visit  She is allergic to shellfish-derived products; wheat bran; gluten meal; morphine and related; nuts; other; and sulfa antibiotics       Review of Systems   Constitutional: Negative  HENT: Negative  Respiratory: Negative  Cardiovascular: Negative  Objective:      /76 (BP Location: Left arm, Patient Position: Sitting, Cuff Size: Standard)   Pulse 74   Temp 97 9 °F (36 6 °C)   Ht 5' 6" (1 676 m)   Wt 74 4 kg (164 lb)   SpO2 97%   BMI 26 47 kg/m²          Physical Exam  Constitutional:       Appearance: Normal appearance  HENT:      Head: Normocephalic and atraumatic  Right Ear: Tympanic membrane and ear canal normal       Left Ear: Tympanic membrane and ear canal normal       Nose: Nose normal    Neck:      Musculoskeletal: Normal range of motion and neck supple  Cardiovascular:      Rate and Rhythm: Normal rate and regular rhythm  Pulmonary:      Effort: Pulmonary effort is normal  No respiratory distress  Breath sounds: Normal breath sounds  No wheezing  Neurological:      Mental Status: She is alert

## 2020-08-20 ENCOUNTER — OFFICE VISIT (OUTPATIENT)
Dept: CARDIOLOGY CLINIC | Facility: CLINIC | Age: 65
End: 2020-08-20
Payer: COMMERCIAL

## 2020-08-20 VITALS
HEART RATE: 75 BPM | HEIGHT: 66 IN | BODY MASS INDEX: 26.2 KG/M2 | DIASTOLIC BLOOD PRESSURE: 72 MMHG | SYSTOLIC BLOOD PRESSURE: 118 MMHG | WEIGHT: 163 LBS

## 2020-08-20 DIAGNOSIS — Z51.81 ENCOUNTER FOR MONITORING FLECAINIDE THERAPY: ICD-10-CM

## 2020-08-20 DIAGNOSIS — I49.3 PVC (PREMATURE VENTRICULAR CONTRACTION): Primary | ICD-10-CM

## 2020-08-20 DIAGNOSIS — Z79.899 ENCOUNTER FOR MONITORING FLECAINIDE THERAPY: ICD-10-CM

## 2020-08-20 DIAGNOSIS — E78.5 DYSLIPIDEMIA: ICD-10-CM

## 2020-08-20 PROCEDURE — 1036F TOBACCO NON-USER: CPT | Performed by: INTERNAL MEDICINE

## 2020-08-20 PROCEDURE — 99213 OFFICE O/P EST LOW 20 MIN: CPT | Performed by: INTERNAL MEDICINE

## 2020-08-20 PROCEDURE — 93000 ELECTROCARDIOGRAM COMPLETE: CPT | Performed by: INTERNAL MEDICINE

## 2020-08-20 PROCEDURE — 3074F SYST BP LT 130 MM HG: CPT | Performed by: INTERNAL MEDICINE

## 2020-08-20 PROCEDURE — 3078F DIAST BP <80 MM HG: CPT | Performed by: INTERNAL MEDICINE

## 2020-08-20 PROCEDURE — 3008F BODY MASS INDEX DOCD: CPT | Performed by: INTERNAL MEDICINE

## 2020-08-20 RX ORDER — BIMATOPROST 3 UG/ML
SOLUTION TOPICAL
COMMUNITY
Start: 2020-06-09

## 2020-08-20 RX ORDER — LIDOCAINE 40 MG/G
CREAM TOPICAL
COMMUNITY
Start: 2020-07-16 | End: 2021-08-18

## 2020-08-20 RX ORDER — OLOPATADINE HYDROCHLORIDE 2 MG/ML
SOLUTION/ DROPS OPHTHALMIC
COMMUNITY
Start: 2020-08-01 | End: 2021-10-20 | Stop reason: ALTCHOICE

## 2020-08-20 NOTE — PROGRESS NOTES
[facility], [address]    [place] [address], [address]   [phone]                                              Cardiology Office Follow Up  Wilmer Cordoba, 60 y o  female  YOB: 1955  MRN: [id number] Encounter: [id number]      PCP - Lon Rivera, DO    Assessment  1  Frequent PVCs  · Holter monitor - 9/2017 - normal sinus rhythm, 1261 PVCs, palpitations correlated with PVCs  · Exercise stress test 07/2017 - 9 minutes 17 seconds, 109% maximum predicted heart rate, no complaints, stress ECG negative for ischemia  2  Flecainide therapy monitoring  3   Hyperlipidemia    Plan  Frequent PVCs, Flecainide therapy monitoring  · Patient had about 1200 PVCs on Holter monitor in 2017, but due to significant symptomatic distress, she was started on flecainide and metoprolol, with which her symptoms have resolved  · Currently on metoprolol succinate 25 mg, flecainide 100 mg bid  · He still notes that when she misses a dose of the flecainide, she gets more palpitations, and as soon as she takes the flecainide again it resolves soon after  · She wants to be on as few medications as possible, and would like to trial as needed flecainide and see if it bothers her a lot  · Okay with switching to flecainide 100 mg as needed for persistent palpitations  · It has been 3 years since her last stress test, and she needs updated stress testing due to being on flecainide  · Check regular exercise ECG stress test  · She reports fluttering sensation, which reportedly persists for several minutes until she finally takes the flecainide, and this could possibly be related to AFib or SVT, and as a result I have asked her to just call us when she has persistent fluttering symptoms, in order to try and get an ECG when she is having such persistent symptoms    Hyperlipidemia  · Lipid panel June 2020 showed total cholesterol 236, triglycerides 322, HDL 42,   · Reviewed lipid panel from 5503-5449, and her cholesterol has been constant elevated in the 200s with elevated triglycerides  · She has wanted to avoid statins, and has been on fish oil as a result  · She was previously on fish oil 2 g twice a day, but recently noted that she bled quite a bit when she accidentally cut her finger and as a result has been decreased to 3 times a week  · Ten year ASCVD risk score is 6%  · Counseled regarding statins, but she is not entirely convinced, and had questions about possibly doing a coronary says calcium score  · Will check coronary calcium score to further risk stratify    ECG today -  Results for orders placed or performed in visit on 08/20/20   POCT ECG    Impression    Normal sinus rhythm, nonspecific T-wave changes  No ectopy noted        Orders Placed This Encounter   Procedures    CT coronary calcium score    Lipid Panel with Direct LDL reflex    Stress test only, exercise    POCT ECG       Return in about 6 months (around 2/20/2021), or if symptoms worsen or fail to improve  History of Present Illness   26-year-old female, who works as an X-ray tech at [employer], comes in as a new patient for reestablishment of care  She is transferring care from our [place] office with Ant Bhakta to here, as we are much closer to her  In 2017, she had a viral infection after which she developed symptoms of palpitations  These palpitations persisted for an extended period, and she was found to have PVCs on Holter monitor  As a result, she was started on metoprolol and flecainide, with which her symptoms resolved completely  She has remained on flecainide since then, and does not report any major problems with the same  She continues to do well otherwise, and does not report any symptoms of chest pain, shortness of breath, palpitations, fatigue, dizziness or lightheadedness          Historical Information   Past Medical History:   Diagnosis Date    Asthma     Celiac disease  Follicular lymphoma ([facility] [address] )     GERD (gastroesophageal reflux disease)     Heart palpitations     History of colonic polyps     resolved 07/12/2016    History of radiation therapy 2010    Irregular heart beat     Lymphoma, 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HCC)     non hodgekins, remission    [facility]    rt arm cutaneous follicular stage ia (rt diatal medical biceps area , s/p resected and s/p radistion treatment    resolved 12/17/2014    Postmenopausal disorder     resolved 09/21/2017    Restless legs syndrome     resolved 09/21/2017    TMJ syndrome     resolved 09/21/2017     Past Surgical History:   Procedure Laterality Date    COLONOSCOPY  04/16/2019    FUNCTIONAL ENDOSCOPIC SINUS SURGERY Bilateral 2013    Dr Kaden Echevarria      age 39 complete    LYMPH NODE DISSECTION Right     arm    NASAL SEPTUM SURGERY  2013    with turbinate reduction - Dr Marco Antonio Brock ESOPHAGOGASTRODUODENOSCOPY TRANSORAL DIAGNOSTIC N/A 1/18/2016    Procedure: ESOPHAGOGASTRODUODENOSCOPY (EGD); Surgeon: Savannah Valentine MD;  Location: [facility];   Service: Gastroenterology    TONSILLECTOMY      TOTAL ABDOMINAL HYSTERECTOMY W/ BILATERAL SALPINGOOPHORECTOMY      age 54   [address] PROCEDURE  1/16/2020   [address] PROCEDURE  8/7/2020     Family History   Problem Relation Age of Onset    Lymphoma Mother     Throat cancer Father     Heart failure Father     Atrial fibrillation Father     Diabetes Father     Colonic polyp Father     Hypertension Father     Thyroid cancer Sister     Breast cancer Paternal Grandmother 71    Breast cancer Paternal Aunt 71    Ovarian cancer Paternal Aunt 79    No Known Problems Maternal Grandmother     No Known Problems Maternal Grandfather     Cancer Paternal Grandfather     Lung cancer Paternal Grandfather     BRCA1 Positive Cousin     Skin cancer Paternal Aunt     Throat cancer Paternal Uncle     No Known Problems Maternal Aunt      Current Outpatient Medications on File Prior to Visit   Medication Sig Dispense Refill    acetaminophen (TYLENOL) 500 mg tablet Take 500 mg by mouth every 6 (six) hours as needed for mild pain   Ascorbic Acid (VITAMIN C) 100 MG tablet Take by mouth      bimatoprost (LATISSE) 0 03 % ophthalmic solution Take as directed      cyanocobalamin (VITAMIN B-12) 1,000 mcg tablet Take 1,000 mcg by mouth daily      diclofenac sodium (VOLTAREN) 1 % Apply 2 g topically 4 (four) times a day 1 Tube 2    erythromycin with ethanol (EMGEL) 2 % gel Apply topically daily 45 g 1    estradiol (ESTRACE) 0 5 MG tablet 1/2 tablet by mouth once weekly 12 tablet 2    famotidine (PEPCID) 20 mg tablet Take 20 mg by mouth 2 (two) times a day   flecainide (TAMBOCOR) 100 mg tablet TAKE ONE TABLET BY MOUTH 2 TIMES A  tablet 4    fluticasone (FLONASE) 50 mcg/act nasal spray 2 sprays into each nostril 2 (two) times a day 1 Bottle 11    fluticasone-salmeterol (ADVAIR DISKUS) 250-50 mcg/dose inhaler Inhale 1 puff 2 (two) times a day Rinse mouth after use   1 Inhaler 5    levalbuterol (Xopenex HFA) 45 mcg/act inhaler Inhale 2 puffs every 4 (four) hours as needed for wheezing 1 Inhaler 3    lidocaine (LMX) 4 % cream       Magnesium 500 MG CAPS Take by mouth      meloxicam (MOBIC) 15 mg tablet Take 1 tablet (15 mg total) by mouth daily (Patient taking differently: Take 15 mg by mouth as needed ) 90 tablet 1    methylPREDNISolone 4 MG tablet therapy pack Use as directed on package 21 each 0    metoprolol succinate (TOPROL-XL) 25 mg 24 hr tablet TAKE ONE TABLET BY MOUTH DAILY 90 tablet 0    montelukast (SINGULAIR) 10 mg tablet Take 1 tablet (10 mg total) by mouth daily at bedtime 90 tablet 1    olopatadine HCl (PATADAY) 0 2 % opth drops       omega-3-acid ethyl esters (LOVAZA) 1 g capsule Take 2 capsules (2 g total) by mouth 2 (two) times a day (Patient taking differently: Take 2 g by mouth 3 (three) times a week ) 360 capsule 1    ondansetron (ZOFRAN-ODT) 4 mg disintegrating tablet Take 1 tablet (4 mg total) by mouth every 6 (six) hours as needed for nausea or vomiting 30 tablet 0    pantoprazole (PROTONIX) 20 mg tablet Take 1 tablet (20 mg total) by mouth daily 90 tablet 2    pseudoephedrine (SUDAFED) 30 mg tablet Take 1 tablet by mouth every 6 (six) hours as needed      Vitamin D, Ergocalciferol, 2000 units CAPS Take by mouth      zolpidem (AMBIEN) 5 mg tablet Take 1 tablet (5 mg total) by mouth daily at bedtime as needed for sleep 30 tablet 2    pantoprazole (PROTONIX) 40 mg tablet Take 1 tablet (40 mg total) by mouth 2 (two) times a day (Patient not taking: Reported on 2020) 180 tablet 1     No current facility-administered medications on file prior to visit  Allergies   Allergen Reactions    Shellfish-Derived Products Anaphylaxis    Wheat Bran Anaphylaxis    Gluten Meal GI Intolerance     Celiac     Morphine And Related Itching    Nuts Hives     Almonds and other related nuts       Other Itching    Sulfa Antibiotics Rash     Social History     Socioeconomic History    Marital status: /Civil Union     Spouse name: None    Number of children: None    Years of education: None    Highest education level: None   Occupational History    Occupation: X-ray technologist   Social Needs    Financial resource strain: None    Food insecurity     Worry: None     Inability: None    Transportation needs     Medical: None     Non-medical: None   Tobacco Use    Smoking status: Former Smoker     Years:      Types: Cigarettes     Last attempt to quit:      Years since quittin 6    Smokeless tobacco: Never Used   Substance and Sexual Activity    Alcohol use: Yes     Comment: social    Drug use: No    Sexual activity: Yes     Partners: Male   Lifestyle    Physical activity     Days per week: None     Minutes per session: None    Stress: None   Relationships    Social connections     Talks on phone: None     Gets together: None     Attends Anabaptist service: None     Active member of club or organization: None     Attends meetings of clubs or organizations: None     Relationship status: None    Intimate partner violence     Fear of current or ex partner: None     Emotionally abused: None     Physically abused: None     Forced sexual activity: None   Other Topics Concern    None   Social History Narrative    Caffeine use    exercise walking         Review of Systems   All other systems reviewed and are negative  Vitals:  Vitals:    08/20/20 1035   BP: 118/72   Pulse: 75   Weight: 73 9 kg (163 lb)   Height: 5' 6" (1 676 m)     BMI - Body mass index is 26 31 kg/m²  Wt Readings from Last 7 Encounters:   08/20/20 73 9 kg (163 lb)   08/19/20 74 4 kg (164 lb)   08/06/20 74 4 kg (164 lb)   07/24/20 75 3 kg (166 lb)   07/08/20 75 3 kg (166 lb)   02/26/20 75 3 kg (166 lb)   02/19/20 75 3 kg (166 lb)       Physical Exam  Vitals signs and nursing note reviewed  Constitutional:       General: She is not in acute distress  Appearance: Normal appearance  She is well-developed  She is not ill-appearing  HENT:      Head: Normocephalic and atraumatic  Nose: No congestion  Eyes:      General: No scleral icterus  Conjunctiva/sclera: Conjunctivae normal    Neck:      Musculoskeletal: Neck supple  Vascular: No carotid bruit or JVD  Cardiovascular:      Rate and Rhythm: Normal rate and regular rhythm  Pulses: Normal pulses  Heart sounds: Normal heart sounds  No murmur  No friction rub  No gallop  Pulmonary:      Effort: Pulmonary effort is normal  No respiratory distress  Breath sounds: Normal breath sounds  No rales  Abdominal:      General: There is no distension  Palpations: Abdomen is soft  Tenderness: There is no abdominal tenderness  Musculoskeletal:         General: No swelling or tenderness  Right lower leg: No edema  Left lower leg: No edema     Skin: General: Skin is warm  Neurological:      General: No focal deficit present  Mental Status: She is alert and oriented to person, place, and time  Mental status is at baseline  Psychiatric:         Mood and Affect: Mood normal          Behavior: Behavior normal          Thought Content: Thought content normal                Labs:  CBC:   Lab Results   Component Value Date    WBC 6 99 06/30/2020    RBC 4 44 06/30/2020    HGB 13 6 06/30/2020    HCT 42 0 06/30/2020    MCV 95 06/30/2020     06/30/2020    RDW 13 1 06/30/2020       CMP:   Lab Results   Component Value Date     06/22/2015    K 4 4 06/30/2020     06/30/2020    CO2 23 06/30/2020    ANIONGAP 10 06/22/2015    BUN 16 06/30/2020    CREATININE 0 93 06/30/2020    EGFR 66 06/30/2020    GLUCOSE 112 06/22/2015    CALCIUM 9 6 06/30/2020    AST 26 06/30/2020    ALT 42 06/30/2020    ALKPHOS 49 06/30/2020    PROT 7 2 06/22/2015    BILITOT 0 63 06/22/2015       Magnesium:  Lab Results   Component Value Date    MG 1 6 01/04/2015       Lipid Profile:   Lab Results   Component Value Date    CHOL 200 06/22/2015    HDL 42 06/30/2020    TRIG 322 (H) 06/30/2020    LDLCALC 130 (H) 06/30/2020       Thyroid Studies:   Lab Results   Component Value Date    SWZ0AJDOYLNE 1 730 12/28/2018    FREET4 0 92 12/28/2018    N2VCYSR 1 0 08/15/2014       No components found for: [employer] [place]    Lab Results   Component Value Date    INR 1 04 01/04/2015   5    Imaging: Us Guided Msk Procedure    Result Date: 8/7/2020  Narrative: ULTRASOUND GUIDED LEFT DORSAL WRIST GANGLION CYST ASPIRATION INDICATION: R22 32: Localized swelling, mass and lump, left upper limb  Left dorsal wrist ganglion cyst  COMPARISON: Ultrasound-guided left wrist ganglion cyst aspiration dated 1/16/2020 was reviewed  TECHNIQUE:  Patient was brought to the ultrasound suite and placed supine on the stretcher   A brief ultrasound examination was performed to localize the left dorsal ulnar wrist ganglion cyst  This cyst was located superficial to the extensor carpi ulnaris tendon  The procedure was explained to the patient, including risks of hemorrhage, infection, and local injury  Informed consent was freely obtained  The patient verbalized expressed understanding of the above risks and wished to proceed with the procedure  Final standard "time-out" procedure performed  An area on the skin was prepped and draped in the usual sterile fashion  Lidocaine was administered to the skin and a 25 gauge 5/8 inch needle was inserted under ultrasound guidance into the ganglion cyst  1 mL of clear yellow-tinged fluid was aspirated  There was no residual fluid  After the procedure, the needle was removed and a sterile band-aid and ACE bandage wrap were applied  The patient tolerated the procedure well and suffered no complications  I asked the patient to call us with any questions, concerns, or acute problems  The patient expressed understanding of the above  Impression: Successful ultrasound-guided left dorsal ganglion cyst aspiration  1 mL of clear yellow-tinged fluid was aspirated  The above findings and procedure were reviewed with Dr Johana Erickson  Procedure was performed by Gerda Tomlinson PA-C and Dr Johana Erickson  Workstation performed: [id number]       Cardiac testing:   Results for orders placed during the hospital encounter of 17   Echo complete with contrast if indicated    N[facility]ative [facility]  [address]  [phone]    Transthoracic Echocardiogram  2D, M-mode, Doppler, and Color Doppler    Study date:  2017    Patient: [facility]  MR number: [id number]  Account number: [de-identified]  : 1955  Age: 63 years  Gender: Female  Status: Outpatient  Location: [address] [place] [address] [facility] [place]  Height: 65 in  Weight: 161 7 lb  BP: 122/ 84 mmHg    Indications: Palpitations      Diagnoses: R00 2 - Palpitations    Sonographer:  Vijaya Mccauley [facility]  Primary Physician:  Jaziel Mendiola DO  Referring Physician:  Jaziel Mendiola DO  Group:  [id number]  Interpreting Physician:  Evonne Kirkpatrick DO    SUMMARY    LEFT VENTRICLE:  Systolic function was normal  Ejection fraction was estimated to be 60 %  There were no regional wall motion abnormalities  MITRAL VALVE:  There was trace regurgitation  TRICUSPID VALVE:  There was mild regurgitation  PULMONIC VALVE:  There was trace regurgitation  HISTORY: PRIOR HISTORY: Malignant lymphoma; Former smoker    PROCEDURE: The study was performed in the [address]  This was a routine study  The transthoracic approach was used  The study included complete 2D imaging, M-mode, complete spectral Doppler, and color Doppler  The  heart rate was 88 bpm, at the start of the study  Images were obtained from the parasternal, apical, subcostal, and suprasternal notch acoustic windows  Echocardiographic views were limited due to poor acoustic window availability  Image  quality was adequate  LEFT VENTRICLE: Size was normal  Systolic function was normal  Ejection fraction was estimated to be 60 %  There were no regional wall motion abnormalities  Wall thickness was normal  No evidence of apical thrombus  DOPPLER: Left  ventricular diastolic function parameters were normal     RIGHT VENTRICLE: The size was normal  Systolic function was normal  Wall thickness was normal     LEFT ATRIUM: Size was normal     RIGHT ATRIUM: Size was normal     MITRAL VALVE: Valve structure was normal  There was normal leaflet separation  DOPPLER: The transmitral velocity was within the normal range  There was no evidence for stenosis  There was trace regurgitation  AORTIC VALVE: The valve was trileaflet  Leaflets exhibited normal thickness, normal cuspal separation, and sclerosis  DOPPLER: Transaortic velocity was within the normal range  There was no evidence for stenosis   There was no significant  regurgitation  TRICUSPID VALVE: The valve structure was normal  There was normal leaflet separation  DOPPLER: The transtricuspid velocity was within the normal range  There was no evidence for stenosis  There was mild regurgitation  Estimated peak PA  pressure was 25 mmHg  PULMONIC VALVE: Leaflets exhibited normal thickness, no calcification, and normal cuspal separation  DOPPLER: The transpulmonic velocity was within the normal range  There was trace regurgitation  PERICARDIUM: There was no pericardial effusion  The pericardium was normal in appearance  AORTA: The root exhibited normal size  SYSTEMIC VEINS: IVC: The inferior vena cava was normal in size  SYSTEM MEASUREMENT TABLES    2D  %FS: 27 71 %  Ao Diam: 3 09 cm  EDV(Teich): 84 03 ml  EF(Cube): 62 23 %  EF(Teich): 54 04 %  ESV(Cube): 30 48 ml  ESV(Teich): 38 62 ml  IVSd: 0 67 cm  LA Area: 11 17 cm2  LA Diam: 3 02 cm  LVEDV MOD A4C: 89 51 ml  LVEF MOD A4C: 58 64 %  LVESV MOD A4C: 37 03 ml  LVIDd: 4 32 cm  LVIDs: 3 12 cm  LVLd A4C: 8 34 cm  LVLs A4C: 6 37 cm  LVPWd: 0 77 cm  RA Area: 14 32 cm2  RV Diam : 3 7 cm  SI(Cube): 27 74 ml/m2  SI(Teich): 25 09 ml/m2  SV MOD A4C: 52 49 ml  SV(Cube): 50 21 ml  SV(Teich): 45 41 ml    CW  AV Vmax: 1 m/s  AV maxP 02 mmHg  TR Vmax: 2 16 m/s  TR maxP 69 mmHg    MM  TAPSE: 2 1 cm    PW  E': 0 1 m/s  E/E': 7 91  LVOT Vmax: 1 08 m/s  LVOT maxP 64 mmHg  MV A Jesus: 0 67 m/s  MV Dec Bartow: 3 68 m/s2  MV DecT: 208 98 ms  MV E Jesus: 0 77 m/s  MV E/A Ratio: 1 14    [employer] Accredited Echocardiography Laboratory    Prepared and electronically signed by    Daniela Koehler DO  Signed 2017 16:03:54       No results found for this or any previous visit  No results found for this or any previous visit  No results found for this or any previous visit

## 2020-08-24 ENCOUNTER — TELEPHONE (OUTPATIENT)
Dept: CARDIOLOGY CLINIC | Facility: CLINIC | Age: 65
End: 2020-08-24

## 2020-08-24 NOTE — TELEPHONE ENCOUNTER
Patient called and stated she has been wheezing, making gurgling sounds, "noisy breathing" and having mucus  Has a feeling it may be from flecainide? Was wondering if she can take anything else for her irregular heartbeat instead  Or should she come in for follow up visit to discuss? Thank you!

## 2020-08-26 ENCOUNTER — OFFICE VISIT (OUTPATIENT)
Dept: OBGYN CLINIC | Facility: CLINIC | Age: 65
End: 2020-08-26
Payer: COMMERCIAL

## 2020-08-26 VITALS
BODY MASS INDEX: 25.71 KG/M2 | HEIGHT: 66 IN | DIASTOLIC BLOOD PRESSURE: 77 MMHG | WEIGHT: 160 LBS | HEART RATE: 73 BPM | SYSTOLIC BLOOD PRESSURE: 126 MMHG

## 2020-08-26 DIAGNOSIS — R22.32 MASS OF LEFT WRIST: Primary | ICD-10-CM

## 2020-08-26 PROCEDURE — 1036F TOBACCO NON-USER: CPT | Performed by: SURGERY

## 2020-08-26 PROCEDURE — 3008F BODY MASS INDEX DOCD: CPT | Performed by: SURGERY

## 2020-08-26 PROCEDURE — 3078F DIAST BP <80 MM HG: CPT | Performed by: SURGERY

## 2020-08-26 PROCEDURE — 3074F SYST BP LT 130 MM HG: CPT | Performed by: SURGERY

## 2020-08-26 PROCEDURE — 99213 OFFICE O/P EST LOW 20 MIN: CPT | Performed by: SURGERY

## 2020-08-26 NOTE — PROGRESS NOTES
ASSESSMENT/PLAN:      33-year-old female who presents to the office today for follow up evaluation left wrist dorsal ulnar ganglion cyst  Patient underwent repeat aspiration on 8/7/20 which did not provide her with any relief  Her mass is unchanged  Patient is interested in surgical intervention in the form of excision of mass  An MRI of the left wrist was ordered today for possible surgical planning  She will follow up once the MRI is complete to discuss the results  The patient verbalized understanding of exam findings and treatment plan  We engaged in the shared decision-making process and treatment options were discussed at length with the patient  Surgical and conservative management discussed today along with risks and benefits  Diagnoses and all orders for this visit:    Mass of left wrist  -     MRI wrist left wo contrast; Future        Follow Up:  Return for After MRI  To Do Next Visit:  Re-evaluation of current issue    ____________________________________________________________________________________________________________________________________________      CHIEF COMPLAINT:  Chief Complaint   Patient presents with    Left Wrist - Follow-up       SUBJECTIVE:  Hetal Cook is a 60y o  year old RHD female who presents to the office today for follow up evaluation left wrist dorsal ulnar ganglion cyst  Patient underwent repeat ultra-sound guided aspiration on 8/7/20 which she states did not provide her with any relief  Patient states the mass is unchanged  She now notes pain that will radiate to her small finger  I have personally reviewed all the relevant PMH, PSH, SH, FH, Medications and allergies       PAST MEDICAL HISTORY:  Past Medical History:   Diagnosis Date    Asthma     Celiac disease     Follicular lymphoma ([address] )     GERD (gastroesophageal reflux disease)     Heart palpitations     History of colonic polyps     resolved 07/12/2016    History of radiation therapy 2010    Irregular heart beat     Lymphoma, 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HCC)     non hodgekins, remission    Malignant [address] [address] 2010    rt arm cutaneous follicular stage ia (rt diatal medical biceps area , s/p resected and s/p radistion treatment    resolved 12/17/2014    Postmenopausal disorder     resolved 09/21/2017    Restless legs syndrome     resolved 09/21/2017    TMJ syndrome     resolved 09/21/2017       PAST SURGICAL HISTORY:  Past Surgical History:   Procedure Laterality Date    COLONOSCOPY  04/16/2019    FUNCTIONAL ENDOSCOPIC SINUS SURGERY Bilateral 2013    Dr Karoline Loving      age 39 complete    LYMPH NODE DISSECTION Right     arm    NASAL SEPTUM SURGERY  2013    with turbinate reduction - Dr Tamy Singh ESOPHAGOGASTRODUODENOSCOPY TRANSORAL DIAGNOSTIC N/A 1/18/2016    Procedure: ESOPHAGOGASTRODUODENOSCOPY (EGD); Surgeon: Koby Corley MD;  [facility];   Service: Gastroenterology    TONSILLECTOMY      TOTAL ABDOMINAL HYSTERECTOMY W/ BILATERAL SALPINGOOPHORECTOMY      age 54   [address] [facility] PROCEDURE  1/16/2020   [facility] G[facility]ED [facility]K PROCEDURE  8/7/2020       FAMILY HISTORY:  Family History   Problem Relation Age of Onset    Lymphoma Mother     Throat cancer Father     Heart failure Father     Atrial fibrillation Father     Diabetes Father     Colonic polyp Father     Hypertension Father     Thyroid cancer Sister     Breast cancer Paternal Grandmother 71    Breast cancer Paternal Aunt 71    Ovarian cancer Paternal Aunt 79    No Known Problems Maternal Grandmother     No Known Problems Maternal Grandfather     Cancer Paternal Grandfather     Lung cancer Paternal Grandfather     BRCA1 Positive Cousin     Skin cancer Paternal Aunt     Throat cancer Paternal Uncle     No Known Problems Maternal Aunt        SOCIAL HISTORY:  Social History     Tobacco Use    Smoking status: Former Smoker     Years: 20 00     Types: Cigarettes     Last attempt to quit: 2004     Years since quittin 6    Smokeless tobacco: Never Used   Substance Use Topics    Alcohol use: Yes     Comment: social    Drug use: No       MEDICATIONS:    Current Outpatient Medications:     acetaminophen (TYLENOL) 500 mg tablet, Take 500 mg by mouth every 6 (six) hours as needed for mild pain , Disp: , Rfl:     Ascorbic Acid (VITAMIN C) 100 MG tablet, Take by mouth, Disp: , Rfl:     bimatoprost (LATISSE) 0 03 % ophthalmic solution, Take as directed, Disp: , Rfl:     cyanocobalamin (VITAMIN B-12) 1,000 mcg tablet, Take 1,000 mcg by mouth daily, Disp: , Rfl:     diclofenac sodium (VOLTAREN) 1 %, Apply 2 g topically 4 (four) times a day, Disp: 1 Tube, Rfl: 2    erythromycin with ethanol (EMGEL) 2 % gel, Apply topically daily, Disp: 45 g, Rfl: 1    estradiol (ESTRACE) 0 5 MG tablet, 1/2 tablet by mouth once weekly, Disp: 12 tablet, Rfl: 2    famotidine (PEPCID) 20 mg tablet, Take 20 mg by mouth 2 (two) times a day , Disp: , Rfl:     flecainide (TAMBOCOR) 100 mg tablet, TAKE ONE TABLET BY MOUTH 2 TIMES A DAY, Disp: 180 tablet, Rfl: 4    fluticasone (FLONASE) 50 mcg/act nasal spray, 2 sprays into each nostril 2 (two) times a day, Disp: 1 Bottle, Rfl: 11    fluticasone-salmeterol (ADVAIR DISKUS) 250-50 mcg/dose inhaler, Inhale 1 puff 2 (two) times a day Rinse mouth after use , Disp: 1 Inhaler, Rfl: 5    levalbuterol (Xopenex HFA) 45 mcg/act inhaler, Inhale 2 puffs every 4 (four) hours as needed for wheezing, Disp: 1 Inhaler, Rfl: 3    lidocaine (LMX) 4 % cream, , Disp: , Rfl:     Magnesium 500 MG CAPS, Take by mouth, Disp: , Rfl:     meloxicam (MOBIC) 15 mg tablet, Take 1 tablet (15 mg total) by mouth daily (Patient taking differently: Take 15 mg by mouth as needed ), Disp: 90 tablet, Rfl: 1    methylPREDNISolone 4 MG tablet therapy pack, Use as directed on package, Disp: 21 each, Rfl: 0    metoprolol succinate (TOPROL-XL) 25 mg 24 hr tablet, TAKE ONE TABLET BY MOUTH DAILY, Disp: 90 tablet, Rfl: 0   montelukast (SINGULAIR) 10 mg tablet, Take 1 tablet (10 mg total) by mouth daily at bedtime, Disp: 90 tablet, Rfl: 1    olopatadine HCl (PATADAY) 0 2 % opth drops, , Disp: , Rfl:     omega-3-acid ethyl esters (LOVAZA) 1 g capsule, Take 2 capsules (2 g total) by mouth 2 (two) times a day (Patient taking differently: Take 2 g by mouth 3 (three) times a week ), Disp: 360 capsule, Rfl: 1    ondansetron (ZOFRAN-ODT) 4 mg disintegrating tablet, Take 1 tablet (4 mg total) by mouth every 6 (six) hours as needed for nausea or vomiting, Disp: 30 tablet, Rfl: 0    pantoprazole (PROTONIX) 40 mg tablet, Take 1 tablet (40 mg total) by mouth 2 (two) times a day, Disp: 180 tablet, Rfl: 1    pseudoephedrine (SUDAFED) 30 mg tablet, Take 1 tablet by mouth every 6 (six) hours as needed, Disp: , Rfl:     Vitamin D, Ergocalciferol, 2000 units CAPS, Take by mouth, Disp: , Rfl:     zolpidem (AMBIEN) 5 mg tablet, Take 1 tablet (5 mg total) by mouth daily at bedtime as needed for sleep, Disp: 30 tablet, Rfl: 2    pantoprazole (PROTONIX) 20 mg tablet, Take 1 tablet (20 mg total) by mouth daily, Disp: 90 tablet, Rfl: 2    ALLERGIES:  Allergies   Allergen Reactions    Shellfish-Derived Products Anaphylaxis    Wheat Bran Anaphylaxis    Gluten Meal GI Intolerance     Celiac     Morphine And Related Itching    Nuts Hives     Almonds and other related nuts   Other Itching    Sulfa Antibiotics Rash       REVIEW OF SYSTEMS:  Review of Systems   Constitutional: Negative for chills and fever  HENT: Negative for drooling and sneezing  Eyes: Negative for redness  Respiratory: Negative for cough and wheezing  Gastrointestinal: Negative for nausea and vomiting  Musculoskeletal: Positive for arthralgias  Negative for joint swelling and myalgias  Neurological: Negative for weakness and numbness  Psychiatric/Behavioral: Negative for behavioral problems  The patient is not nervous/anxious          VITALS:  Vitals:    08/26/20 0957   BP: 126/77   Pulse: 73       LABS:  HgA1c:   Lab Results   Component Value Date    HGBA1C 5 6 09/03/2019     BMP:   Lab Results   Component Value Date    GLUCOSE 112 06/22/2015    CALCIUM 9 6 06/30/2020     06/22/2015    K 4 4 06/30/2020    CO2 23 06/30/2020     06/30/2020    BUN 16 06/30/2020    CREATININE 0 93 06/30/2020       _____________________________________________________  PHYSICAL EXAMINATION:  General: well developed and well nourished, alert, oriented times 3 and appears comfortable  Psychiatric: Normal  HEENT: Normocephalic, Atraumatic Trachea Midline, No torticollis  Pulmonary: No audible wheezing or respiratory distress   Cardiovascular: No pitting edema, 2+ radial pulse   Skin: No Lacerations, No Fluctuation, No Ulcerations  Neurovascular: Sensation Intact to the Median, Ulnar, Radial Nerve, Motor Intact to the Median, Ulnar, Radial Nerve and Pulses Intact  Musculoskeletal: Normal, except as noted in detailed exam and in HPI        MUSCULOSKELETAL EXAMINATION:  Left wrist     Dorsal ulnar ganglion cyst   Normal wrist ROM  Full composite fist  Compartments soft  Brisk capillary refill  S/m intact median, radial, and ulnar nerve   Mildly positive Tinel's over the mass  Near full extension of the wrist as compared to the other side    ___________________________________________________  STUDIES REVIEWED:  I have personally reviewed records from her ultrasound aspiration which demonstrates again a lobulated ganglion cyst with retrieval approximately 1 cc of fluid from aspiration        PROCEDURES PERFORMED:  Procedures  No Procedures performed today    _____________________________________________________      Scribe Attestation    I,:   Leslie Edward MA am acting as a scribe while in the presence of the attending physician :        I,:   Mary Merino MD personally performed the services described in this documentation    as scribed in my presence :

## 2020-09-02 ENCOUNTER — TRANSCRIBE ORDERS (OUTPATIENT)
Dept: LAB | Facility: CLINIC | Age: 65
End: 2020-09-02

## 2020-09-02 ENCOUNTER — APPOINTMENT (OUTPATIENT)
Dept: LAB | Facility: CLINIC | Age: 65
End: 2020-09-02
Payer: COMMERCIAL

## 2020-09-02 ENCOUNTER — CONSULT (OUTPATIENT)
Dept: PULMONOLOGY | Facility: CLINIC | Age: 65
End: 2020-09-02
Payer: COMMERCIAL

## 2020-09-02 VITALS
OXYGEN SATURATION: 97 % | BODY MASS INDEX: 26.84 KG/M2 | HEIGHT: 66 IN | WEIGHT: 167 LBS | SYSTOLIC BLOOD PRESSURE: 100 MMHG | HEART RATE: 79 BPM | DIASTOLIC BLOOD PRESSURE: 62 MMHG

## 2020-09-02 DIAGNOSIS — J30.89 NON-SEASONAL ALLERGIC RHINITIS, UNSPECIFIED TRIGGER: ICD-10-CM

## 2020-09-02 DIAGNOSIS — J45.50 SEVERE PERSISTENT ASTHMA WITHOUT COMPLICATION: ICD-10-CM

## 2020-09-02 DIAGNOSIS — J45.50 SEVERE PERSISTENT ASTHMA WITHOUT COMPLICATION: Primary | ICD-10-CM

## 2020-09-02 DIAGNOSIS — K21.9 CHRONIC GERD: ICD-10-CM

## 2020-09-02 PROBLEM — N95.2 ATROPHIC VAGINITIS: Status: RESOLVED | Noted: 2018-05-18 | Resolved: 2020-09-02

## 2020-09-02 LAB
BASOPHILS # BLD AUTO: 0.05 THOUSANDS/ΜL (ref 0–0.1)
BASOPHILS NFR BLD AUTO: 1 % (ref 0–1)
EOSINOPHIL # BLD AUTO: 0.66 THOUSAND/ΜL (ref 0–0.61)
EOSINOPHIL NFR BLD AUTO: 9 % (ref 0–6)
ERYTHROCYTE [DISTWIDTH] IN BLOOD BY AUTOMATED COUNT: 13.1 % (ref 11.6–15.1)
HCT VFR BLD AUTO: 39.5 % (ref 34.8–46.1)
HGB BLD-MCNC: 13 G/DL (ref 11.5–15.4)
IMM GRANULOCYTES # BLD AUTO: 0.06 THOUSAND/UL (ref 0–0.2)
IMM GRANULOCYTES NFR BLD AUTO: 1 % (ref 0–2)
LYMPHOCYTES # BLD AUTO: 1.57 THOUSANDS/ΜL (ref 0.6–4.47)
LYMPHOCYTES NFR BLD AUTO: 20 % (ref 14–44)
MCH RBC QN AUTO: 31.6 PG (ref 26.8–34.3)
MCHC RBC AUTO-ENTMCNC: 32.9 G/DL (ref 31.4–37.4)
MCV RBC AUTO: 96 FL (ref 82–98)
MONOCYTES # BLD AUTO: 0.64 THOUSAND/ΜL (ref 0.17–1.22)
MONOCYTES NFR BLD AUTO: 8 % (ref 4–12)
NEUTROPHILS # BLD AUTO: 4.76 THOUSANDS/ΜL (ref 1.85–7.62)
NEUTS SEG NFR BLD AUTO: 61 % (ref 43–75)
NRBC BLD AUTO-RTO: 0 /100 WBCS
PLATELET # BLD AUTO: 177 THOUSANDS/UL (ref 149–390)
PMV BLD AUTO: 12 FL (ref 8.9–12.7)
RBC # BLD AUTO: 4.12 MILLION/UL (ref 3.81–5.12)
WBC # BLD AUTO: 7.74 THOUSAND/UL (ref 4.31–10.16)

## 2020-09-02 PROCEDURE — 82785 ASSAY OF IGE: CPT

## 2020-09-02 PROCEDURE — 85025 COMPLETE CBC W/AUTO DIFF WBC: CPT

## 2020-09-02 PROCEDURE — 86003 ALLG SPEC IGE CRUDE XTRC EA: CPT

## 2020-09-02 PROCEDURE — 99245 OFF/OP CONSLTJ NEW/EST HI 55: CPT | Performed by: INTERNAL MEDICINE

## 2020-09-02 PROCEDURE — 36415 COLL VENOUS BLD VENIPUNCTURE: CPT

## 2020-09-02 NOTE — PROGRESS NOTES
Consultation - Pulmonary Medicine   Aliyah Nice 60 y o  female MRN: [id number]        Physician Requesting Consult:  Dr Elizabeth Marcano  Reason for Consult:  Asthma    Severe persistent asthma without complication  · Based on symptoms, patient has severe persistent asthma  · She is not consistent about her inhaler use and we reviewed the proper utilization of her medications  · Advair should be twice a day consistently  Rinse after use  · Xopenex HFA inhaler should be as needed  Albuterol should be avoided given her symptomatic palpitations and flecainide therapy  · Potentially could be a candidate for Xolair or new call up  Last IgE level was 533, 2 years ago  · Will repeat [facility] allergen panel  Check CBC with differential to assess for eosinophilia  She has not had a recent differential with her CBC  · Potentially a candidate for Xolair or Leodan Hilding  Will await response to making current inhaler regimen more consistent  · Also will need complete pulmonary function tests and above blood work    Non-seasonal allergic rhinitis  · Continue Singulair  · Continue fluticasone nasal spray  · Continue nonsedating antihistamine  She has been on Claritin for years  Suggested transition to either Allegra or Zyrtec  · Check [facility] allergen panel  Last check was over 2 years ago  Will need repeat IgE level as well as part of the allergen panel  · Discussed allergen exposure  She does have a pet dog  [place] of [place] and has multiple tree allergen sensitivities based on her last [facility] allergen panel  · She should not use Sudafed or pseudoephedrine containing antihistamines  This is both for cardiac reasons and due to concern for rebound rhinorrhea    Chronic GERD  · She has an upcoming scheduled EGD  I did encourage her to proceed with this  I do not think her respiratory symptoms currently would warrant cancelling it    · Her GERD may be contributing to poor asthma control  · She is on Protonix and also takes Pepcid  · Discussed pharmacologic interventions to reduce reflux symptoms  Biggest concern for her is late night eating prior to going to bed    I will be in contact with her with the results of the testing and determine the next appropriate follow up interval  ______________________________________________________________________    HPI:    Carline Navas is a 60 y o  female who presents for evaluation of asthma  She has persistent symptoms  She notices that her asthma has been bothersome since she moved up to this area  [place]  She has identified triggers with trees, and certain pets  She does have a dog  She also has some seasonal symptoms with regard to her allergy symptoms  She has recently seen ENT  There has been some consideration for possibly transitioning to allergy shots  She has not had allergy shots in the past   She has had sinus surgery in the past but recently has had a sinus CT scan that does show sinus thickening again  She has chronic nasal congestion  She also has GERD symptoms  She takes Protonix and Pepcid  She has an upcoming EGD scheduled  Overall, she does admit to being inconsistent with the use of her inhalers and does not understand went to properly take them  She also seems to utilize some for other medications and consistently as well  She has not had fever, chills, or infection symptoms  No chronic cough or mucus production although she does feel congested in the chest at times  She does feel tight and wheezes  No leg swelling or signs or symptoms of congestive heart failure  No headache or visual changes  Review of Systems:  Review of Systems   HENT: Positive for congestion, postnasal drip and sinus pressure  Negative for sore throat and voice change  Respiratory: Positive for cough, chest tightness, shortness of breath and wheezing      Cardiovascular: Positive for palpitations (Followed by Cardiology and is on flecainide therapy)  Gastrointestinal: Negative for blood in stool  Genitourinary: Negative  Musculoskeletal: Negative  Skin: Negative for rash  Allergic/Immunologic: Positive for environmental allergies and food allergies  Neurological: Negative for weakness and headaches  Psychiatric/Behavioral: Negative for sleep disturbance  The patient is not nervous/anxious  All other systems reviewed and are negative        Current Medications:    Current Outpatient Medications:     acetaminophen (TYLENOL) 500 mg tablet, Take 500 mg by mouth every 6 (six) hours as needed for mild pain , Disp: , Rfl:     Ascorbic Acid (VITAMIN C) 100 MG tablet, Take by mouth, Disp: , Rfl:     bimatoprost (LATISSE) 0 03 % ophthalmic solution, Take as directed, Disp: , Rfl:     cyanocobalamin (VITAMIN B-12) 1,000 mcg tablet, Take 1,000 mcg by mouth daily, Disp: , Rfl:     diclofenac sodium (VOLTAREN) 1 %, Apply 2 g topically 4 (four) times a day, Disp: 1 Tube, Rfl: 2    erythromycin with ethanol (EMGEL) 2 % gel, Apply topically daily, Disp: 45 g, Rfl: 1    estradiol (ESTRACE) 0 5 MG tablet, 1/2 tablet by mouth once weekly, Disp: 12 tablet, Rfl: 2    famotidine (PEPCID) 20 mg tablet, Take 20 mg by mouth 2 (two) times a day , Disp: , Rfl:     flecainide (TAMBOCOR) 100 mg tablet, TAKE ONE TABLET BY MOUTH 2 TIMES A DAY, Disp: 180 tablet, Rfl: 4    fluticasone (FLONASE) 50 mcg/act nasal spray, 2 sprays into each nostril 2 (two) times a day, Disp: 1 Bottle, Rfl: 11    fluticasone-salmeterol (ADVAIR DISKUS) 250-50 mcg/dose inhaler, Inhale 1 puff 2 (two) times a day Rinse mouth after use , Disp: 1 Inhaler, Rfl: 5    levalbuterol (Xopenex HFA) 45 mcg/act inhaler, Inhale 2 puffs every 4 (four) hours as needed for wheezing, Disp: 1 Inhaler, Rfl: 3    lidocaine (LMX) 4 % cream, , Disp: , Rfl:     Magnesium 500 MG CAPS, Take by mouth, Disp: , Rfl:     meloxicam (MOBIC) 15 mg tablet, Take 1 tablet (15 mg total) by mouth daily (Patient taking differently: Take 15 mg by mouth as needed ), Disp: 90 tablet, Rfl: 1    methylPREDNISolone 4 MG tablet therapy pack, Use as directed on package, Disp: 21 each, Rfl: 0    metoprolol succinate (TOPROL-XL) 25 mg 24 hr tablet, TAKE ONE TABLET BY MOUTH DAILY, Disp: 90 tablet, Rfl: 0    montelukast (SINGULAIR) 10 mg tablet, Take 1 tablet (10 mg total) by mouth daily at bedtime, Disp: 90 tablet, Rfl: 1    olopatadine HCl (PATADAY) 0 2 % opth drops, , Disp: , Rfl:     omega-3-acid ethyl esters (LOVAZA) 1 g capsule, Take 2 capsules (2 g total) by mouth 2 (two) times a day (Patient taking differently: Take 2 g by mouth 3 (three) times a week ), Disp: 360 capsule, Rfl: 1    ondansetron (ZOFRAN-ODT) 4 mg disintegrating tablet, Take 1 tablet (4 mg total) by mouth every 6 (six) hours as needed for nausea or vomiting, Disp: 30 tablet, Rfl: 0    pantoprazole (PROTONIX) 40 mg tablet, Take 1 tablet (40 mg total) by mouth 2 (two) times a day, Disp: 180 tablet, Rfl: 1    Vitamin D, Ergocalciferol, 2000 units CAPS, Take by mouth, Disp: , Rfl:     zolpidem (AMBIEN) 5 mg tablet, Take 1 tablet (5 mg total) by mouth daily at bedtime as needed for sleep, Disp: 30 tablet, Rfl: 2    pantoprazole (PROTONIX) 20 mg tablet, Take 1 tablet (20 mg total) by mouth daily, Disp: 90 tablet, Rfl: 2    Historical Information   Past Medical History:   Diagnosis Date    Asthma     Celiac disease     Follicular [address] ([address]     GERD (gastroesophageal reflux disease)     Heart palpitations     History of colonic polyps     resolved 07/12/2016    History of radiation therapy 2010    Irregular heart beat     Lymphoma, 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HCC)     non hodgekins, remission    Malignant lymphoma ([address] 2010    rt arm cutaneous follicular stage ia (rt diatal medical biceps area , s/p resected and s/p radistion treatment    resolved 12/17/2014    Postmenopausal disorder     resolved 2017    Restless legs syndrome     resolved 2017    TMJ syndrome     resolved 2017     Past Surgical History:   Procedure Laterality Date    COLONOSCOPY  2019    FUNCTIONAL ENDOSCOPIC SINUS SURGERY Bilateral 2013    Dr Poncho Saab      age 39 complete    LYMPH NODE DISSECTION Right     arm    NASAL SEPTUM SURGERY  2013    with turbinate reduction - Dr Allison Linda ESOPHAGOGASTRODUODENOSCOPY TRANSORAL DIAGNOSTIC N/A 2016    Procedure: ESOPHAGOGASTRODUODENOSCOPY (EGD); Surgeon: Parvin Peralta MD;  [facility];   Service: Gastroenterology    TONSILLECTOMY      TOTAL ABDOMINAL HYSTERECTOMY W/ BILATERAL SALPINGOOPHORECTOMY      age 54   [address] [facility] PROCEDURE  2020   [address] [facility] PROCEDURE  2020     Social History   Social History     Tobacco Use   Smoking Status Former Smoker    Years:     Types: Cigarettes    Last attempt to quit:     Years since quittin 6   Smokeless Tobacco Never Used       Occupational history:  Works as a respiratory technician for [employer]    Family History:   Family History   Problem Relation Age of Onset    Lymphoma Mother     Throat cancer Father     Heart failure Father     Atrial fibrillation Father     Diabetes Father     Colonic polyp Father     Hypertension Father     Thyroid cancer Sister     Breast cancer Paternal Grandmother 71    Breast cancer Paternal Aunt 71    Ovarian cancer Paternal Aunt 79    No Known Problems Maternal Grandmother     No Known Problems Maternal Grandfather     Cancer Paternal Grandfather     Lung cancer Paternal Grandfather     BRCA1 Positive Cousin     Skin cancer Paternal Aunt     Throat cancer Paternal Uncle     No Known Problems Maternal Aunt          PhysicalExamination:  Vitals:   /62 (BP Location: Left arm, Patient Position: Sitting)   Pulse 79   Ht 5' 6" (1 676 m)   Wt 75 8 kg (167 lb)   SpO2 97%   BMI 26 95 kg/m²   Gen:  Comfortable on room air and without respiratory distress  Sounds nasally congested  HEENT: PERRL  Oropharynx is clear without any erythema or exudate  No sinus tenderness  Neck: Supple  There is no JVD, lymphadenopathy or thyromegaly appreciated  Trachea is midline  Chest: Symmetric chest wall excursion  Lung fields with minor wheeze at the bases  No rhonchi or rales    Cardiac: Regular rate and rhythm  There are no murmurs  Abdomen: Soft and nontender  Benign  Extremities: No clubbing, cyanosis or edema  Neurologic: No focal deficits  Skin: No appreciable rashes  Diagnostic Data:  Labs: I personally reviewed the most recent laboratory data pertinent to today's visit    Lab Results   Component Value Date    WBC 6 99 06/30/2020    HGB 13 6 06/30/2020    HCT 42 0 06/30/2020    MCV 95 06/30/2020     06/30/2020     Lab Results   Component Value Date    GLUCOSE 112 06/22/2015    CALCIUM 9 6 06/30/2020     06/22/2015    K 4 4 06/30/2020    CO2 23 06/30/2020     06/30/2020    BUN 16 06/30/2020    CREATININE 0 93 06/30/2020     Lab Results   Component Value Date     (H) 08/24/2018     Lab Results   Component Value Date    ALT 42 06/30/2020    AST 26 06/30/2020    ALKPHOS 49 06/30/2020    BILITOT 0 63 06/22/2015       PFT results: The most recent pulmonary function tests were reviewed  Patient has no pulmonary function tests on file    No spirometry    Imaging:  I personally reviewed the images on the [facility] system pertinent to today's visit  August 19, 2020 chest x-ray shows clear lung fields    CT scan of the sinus does show some persistent sinus disease but overall improved after prior sinus surgery      Kamari Moreno MD

## 2020-09-02 NOTE — ASSESSMENT & PLAN NOTE
· She has an upcoming scheduled EGD  I did encourage her to proceed with this  I do not think her respiratory symptoms currently would warrant cancelling it  · Her GERD may be contributing to poor asthma control  · She is on Protonix and also takes Pepcid  · Discussed pharmacologic interventions to reduce reflux symptoms    Biggest concern for her is late night eating prior to going to bed

## 2020-09-02 NOTE — ASSESSMENT & PLAN NOTE
· Based on symptoms, patient has severe persistent asthma  · She is not consistent about her inhaler use and we reviewed the proper utilization of her medications  · Advair should be twice a day consistently  Rinse after use  · Xopenex HFA inhaler should be as needed  Albuterol should be avoided given her symptomatic palpitations and flecainide therapy  · Potentially could be a candidate for Xolair or new call up  Last IgE level was 533, 2 years ago  · Will repeat [facility] allergen panel  Check CBC with differential to assess for eosinophilia  She has not had a recent differential with her CBC  · Potentially a candidate for Xolair or [place]  Will await response to making current inhaler regimen more consistent      · Also will need complete pulmonary function tests and above blood work

## 2020-09-02 NOTE — ASSESSMENT & PLAN NOTE
· Continue Singulair  · Continue fluticasone nasal spray  · Continue nonsedating antihistamine  She has been on Claritin for years  Suggested transition to either Allegra or Zyrtec  · Check [facility] allergen panel  Last check was over 2 years ago  Will need repeat IgE level as well as part of the allergen panel  · Discussed allergen exposure  She does have a pet dog  She has [place] a [place] and has multiple tree allergen sensitivities based on her last [facility] allergen panel  · She should not use Sudafed or pseudoephedrine containing antihistamines    This is both for cardiac reasons and due to concern for rebound rhinorrhea

## 2020-09-02 NOTE — LETTER
September 2, 2020     Lon Rivera, DO  [address]    Patient: Wilmer Cordoba   YOB: 1955   Date of Visit: 9/2/2020       Dear Dr Caprice Pedroza:    Thank you for referring Wilmer Cordoba to me for evaluation  Below are my notes for this consultation  If you have questions, please do not hesitate to call me  I look forward to following your patient along with you  Sincerely,        Cherrie Hagan MD        CC: Geoffrey Anaya, Vicente Garay MD  9/2/2020 10:01 AM  Sign when Signing Visit    Consultation - Pulmonary Medicine   Wilmer Cordoba 60 y o  female MRN: [id number]        Physician Requesting Consult:  Dr Caprice Pedroza  Reason for Consult:  Asthma    Severe persistent asthma without complication  · Based on symptoms, patient has severe persistent asthma  · She is not consistent about her inhaler use and we reviewed the proper utilization of her medications  · Advair should be twice a day consistently  Rinse after use  · Xopenex HFA inhaler should be as needed  Albuterol should be avoided given her symptomatic palpitations and flecainide therapy  · Potentially could be a candidate for Xolair or new call up  Last IgE level was 533, 2 years ago  · Will repeat [facility] allergen panel  Check CBC with differential to assess for eosinophilia  She has not had a recent differential with her CBC  · Potentially a candidate for Xolair or Jaquita Erie  Will await response to making current inhaler regimen more consistent  · Also will need complete pulmonary function tests and above blood work    Non-seasonal allergic rhinitis  · Continue Singulair  · Continue fluticasone nasal spray  · Continue nonsedating antihistamine  She has been on Claritin for years  Suggested transition to either Allegra or Zyrtec  · Check [facility] allergen panel  Last check was over 2 years ago    Will need repeat IgE level as well as part of the allergen panel  · Discussed allergen exposure  She does have a pet dog  [place] and has multiple tree allergen sensitivities based on her last [facility] allergen panel  · She should not use Sudafed or pseudoephedrine containing antihistamines  This is both for cardiac reasons and due to concern for rebound rhinorrhea    Chronic GERD  · She has an upcoming scheduled EGD  I did encourage her to proceed with this  I do not think her respiratory symptoms currently would warrant cancelling it  · Her GERD may be contributing to poor asthma control  · She is on Protonix and also takes Pepcid  · Discussed pharmacologic interventions to reduce reflux symptoms  Biggest concern for her is late night eating prior to going to bed    I will be in contact with her with the results of the testing and determine the next appropriate follow up interval  ______________________________________________________________________    HPI:    Ivy Ragland is a 60 y o  female who presents for evaluation of asthma  She has persistent symptoms  She notices that her asthma has been bothersome since she moved up to this area  [place]  She has identified triggers with trees, and certain pets  She does have a dog  She also has some seasonal symptoms with regard to her allergy symptoms  She has recently seen ENT  There has been some consideration for possibly transitioning to allergy shots  She has not had allergy shots in the past   She has had sinus surgery in the past but recently has had a sinus CT scan that does show sinus thickening again  She has chronic nasal congestion  She also has GERD symptoms  She takes Protonix and Pepcid  She has an upcoming EGD scheduled  Overall, she does admit to being inconsistent with the use of her inhalers and does not understand went to properly take them    She also seems to utilize some for other medications and consistently as well  She has not had fever, chills, or infection symptoms  No chronic cough or mucus production although she does feel congested in the chest at times  She does feel tight and wheezes  No leg swelling or signs or symptoms of congestive heart failure  No headache or visual changes  Review of Systems:  Review of Systems   HENT: Positive for congestion, postnasal drip and sinus pressure  Negative for sore throat and voice change  Respiratory: Positive for cough, chest tightness, shortness of breath and wheezing  Cardiovascular: Positive for palpitations (Followed by Cardiology and is on flecainide therapy)  Gastrointestinal: Negative for blood in stool  Genitourinary: Negative  Musculoskeletal: Negative  Skin: Negative for rash  Allergic/Immunologic: Positive for environmental allergies and food allergies  Neurological: Negative for weakness and headaches  Psychiatric/Behavioral: Negative for sleep disturbance  The patient is not nervous/anxious  All other systems reviewed and are negative        Current Medications:    Current Outpatient Medications:     acetaminophen (TYLENOL) 500 mg tablet, Take 500 mg by mouth every 6 (six) hours as needed for mild pain , Disp: , Rfl:     Ascorbic Acid (VITAMIN C) 100 MG tablet, Take by mouth, Disp: , Rfl:     bimatoprost (LATISSE) 0 03 % ophthalmic solution, Take as directed, Disp: , Rfl:     cyanocobalamin (VITAMIN B-12) 1,000 mcg tablet, Take 1,000 mcg by mouth daily, Disp: , Rfl:     diclofenac sodium (VOLTAREN) 1 %, Apply 2 g topically 4 (four) times a day, Disp: 1 Tube, Rfl: 2    erythromycin with ethanol (EMGEL) 2 % gel, Apply topically daily, Disp: 45 g, Rfl: 1    estradiol (ESTRACE) 0 5 MG tablet, 1/2 tablet by mouth once weekly, Disp: 12 tablet, Rfl: 2    famotidine (PEPCID) 20 mg tablet, Take 20 mg by mouth 2 (two) times a day , Disp: , Rfl:     flecainide (TAMBOCOR) 100 mg tablet, TAKE ONE TABLET BY MOUTH 2 TIMES A DAY, Disp: 180 tablet, Rfl: 4    fluticasone (FLONASE) 50 mcg/act nasal spray, 2 sprays into each nostril 2 (two) times a day, Disp: 1 Bottle, Rfl: 11    fluticasone-salmeterol (ADVAIR DISKUS) 250-50 mcg/dose inhaler, Inhale 1 puff 2 (two) times a day Rinse mouth after use , Disp: 1 Inhaler, Rfl: 5    levalbuterol (Xopenex HFA) 45 mcg/act inhaler, Inhale 2 puffs every 4 (four) hours as needed for wheezing, Disp: 1 Inhaler, Rfl: 3    lidocaine (LMX) 4 % cream, , Disp: , Rfl:     Magnesium 500 MG CAPS, Take by mouth, Disp: , Rfl:     meloxicam (MOBIC) 15 mg tablet, Take 1 tablet (15 mg total) by mouth daily (Patient taking differently: Take 15 mg by mouth as needed ), Disp: 90 tablet, Rfl: 1    methylPREDNISolone 4 MG tablet therapy pack, Use as directed on package, Disp: 21 each, Rfl: 0    metoprolol succinate (TOPROL-XL) 25 mg 24 hr tablet, TAKE ONE TABLET BY MOUTH DAILY, Disp: 90 tablet, Rfl: 0    montelukast (SINGULAIR) 10 mg tablet, Take 1 tablet (10 mg total) by mouth daily at bedtime, Disp: 90 tablet, Rfl: 1    olopatadine HCl (PATADAY) 0 2 % opth drops, , Disp: , Rfl:     omega-3-acid ethyl esters (LOVAZA) 1 g capsule, Take 2 capsules (2 g total) by mouth 2 (two) times a day (Patient taking differently: Take 2 g by mouth 3 (three) times a week ), Disp: 360 capsule, Rfl: 1    ondansetron (ZOFRAN-ODT) 4 mg disintegrating tablet, Take 1 tablet (4 mg total) by mouth every 6 (six) hours as needed for nausea or vomiting, Disp: 30 tablet, Rfl: 0    pantoprazole (PROTONIX) 40 mg tablet, Take 1 tablet (40 mg total) by mouth 2 (two) times a day, Disp: 180 tablet, Rfl: 1    Vitamin D, Ergocalciferol, 2000 units CAPS, Take by mouth, Disp: , Rfl:     zolpidem (AMBIEN) 5 mg tablet, Take 1 tablet (5 mg total) by mouth daily at bedtime as needed for sleep, Disp: 30 tablet, Rfl: 2    pantoprazole (PROTONIX) 20 mg tablet, Take 1 tablet (20 mg total) by mouth daily, Disp: 90 tablet, Rfl: 2    Historical Information   Past Medical History:   Diagnosis Date    Asthma     Celiac disease     Follicular lymphoma ([facility] [address] )     GERD (gastroesophageal reflux disease)     Heart palpitations     History of colonic polyps     resolved 2016    History of radiation therapy 2010    Irregular heart beat     Lymphoma, 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HCC)     non hodgekins, remission    Malignant lymphoma ([facility] [address] ) 2010    rt arm cutaneous follicular stage ia (rt diatal medical biceps area , s/p resected and s/p radistion treatment    resolved 2014    Postmenopausal disorder     resolved 2017    Restless legs syndrome     resolved 2017    TMJ syndrome     resolved 2017     Past Surgical History:   Procedure Laterality Date    COLONOSCOPY  2019    FUNCTIONAL ENDOSCOPIC SINUS SURGERY Bilateral     Dr Josselyn Ley      age 39 complete    LYMPH NODE DISSECTION Right     arm    NASAL SEPTUM SURGERY      with turbinate reduction - Dr Sandi Arriola ESOPHAGOGASTRODUODENOSCOPY TRANSORAL DIAGNOSTIC N/A 2016    Procedure: ESOPHAGOGASTRODUODENOSCOPY (EGD); Surgeon: Braydon Ramirez MD;  [facility];   Service: Gastroenterology    TONSILLECTOMY      TOTAL ABDOMINAL HYSTERECTOMY W/ BILATERAL SALPINGOOPHORECTOMY      age 54   [address] PROCEDURE  2020   [address] PROCEDURE  2020     Social History   Social History     Tobacco Use   Smoking Status Former Smoker    Years: 20 00    Types: Cigarettes    Last attempt to quit:     Years since quittin 6   Smokeless Tobacco Never Used       Occupational history:  Works as a respiratory technician for [employer]    Family History:   Family History   Problem Relation Age of Onset    Lymphoma Mother     Throat cancer Father     Heart failure Father     Atrial fibrillation Father     Diabetes Father     Colonic polyp Father     Hypertension Father     Thyroid cancer Sister     Breast cancer Paternal Grandmother 71    Breast cancer Paternal Aunt 71    Ovarian cancer Paternal Aunt 79    No Known Problems Maternal Grandmother     No Known Problems Maternal Grandfather     Cancer Paternal Grandfather     Lung cancer Paternal Grandfather     BRCA1 Positive Cousin     Skin cancer Paternal Aunt     Throat cancer Paternal Uncle     No Known Problems Maternal Aunt          PhysicalExamination:  Vitals:   /62 (BP Location: Left arm, Patient Position: Sitting)   Pulse 79   Ht 5' 6" (1 676 m)   Wt 75 8 kg (167 lb)   SpO2 97%   BMI 26 95 kg/m²   Gen:  Comfortable on room air and without respiratory distress  Sounds nasally congested  HEENT: PERRL  Oropharynx is clear without any erythema or exudate  No sinus tenderness  Neck: Supple  There is no JVD, lymphadenopathy or thyromegaly appreciated  Trachea is midline  Chest: Symmetric chest wall excursion  Lung fields with minor wheeze at the bases  No rhonchi or rales    Cardiac: Regular rate and rhythm  There are no murmurs  Abdomen: Soft and nontender  Benign  Extremities: No clubbing, cyanosis or edema  Neurologic: No focal deficits  Skin: No appreciable rashes  Diagnostic Data:  Labs: I personally reviewed the most recent laboratory data pertinent to today's visit    Lab Results   Component Value Date    WBC 6 99 06/30/2020    HGB 13 6 06/30/2020    HCT 42 0 06/30/2020    MCV 95 06/30/2020     06/30/2020     Lab Results   Component Value Date    GLUCOSE 112 06/22/2015    CALCIUM 9 6 06/30/2020     06/22/2015    K 4 4 06/30/2020    CO2 23 06/30/2020     06/30/2020    BUN 16 06/30/2020    CREATININE 0 93 06/30/2020     Lab Results   Component Value Date     (H) 08/24/2018     Lab Results   Component Value Date    ALT 42 06/30/2020    AST 26 06/30/2020    ALKPHOS 49 06/30/2020    BILITOT 0 63 06/22/2015       PFT results: The most recent pulmonary function tests were reviewed    Patient has no pulmonary function tests on file   No spirometry    Imaging:  I personally reviewed the images on the [facility] system pertinent to today's visit  August 19, 2020 chest x-ray shows clear lung fields    CT scan of the sinus does show some persistent sinus disease but overall improved after prior sinus surgery      Cori Cuello MD

## 2020-09-03 LAB
A ALTERNATA IGE QN: <0.1 KUA/I
A FUMIGATUS IGE QN: <0.1 KUA/I
ALLERGEN COMMENT: ABNORMAL
BERMUDA GRASS IGE QN: 0.23 KUA/I
BOXELDER IGE QN: 2.48 KUA/I
C HERBARUM IGE QN: <0.1 KUA/I
CAT DANDER IGE QN: 1.59 KUA/I
CMN PIGWEED IGE QN: 0.14 KUA/I
COMMON RAGWEED IGE QN: 0.54 KUA/I
COTTONWOOD IGE QN: 0.2 KUA/I
D FARINAE IGE QN: 0.1 KUA/I
D PTERONYSS IGE QN: 0.13 KUA/I
DOG DANDER IGE QN: 1.23 KUA/I
LONDON PLANE IGE QN: 0.28 KUA/I
MOUSE URINE PROT IGE QN: <0.1 KUA/I
MT JUNIPER IGE QN: 0.22 KUA/I
MUGWORT IGE QN: 0.68 KUA/I
P NOTATUM IGE QN: <0.1 KUA/I
ROACH IGE QN: <0.1 KUA/I
SHEEP SORREL IGE QN: 0.12 KUA/I
SILVER BIRCH IGE QN: 8.29 KUA/I
TIMOTHY IGE QN: 1.21 KUA/I
TOTAL IGE SMQN RAST: 761 KU/L (ref 0–113)
WALNUT IGE QN: 0.26 KUA/I
WHITE ASH IGE QN: 0.5 KUA/I
WHITE ELM IGE QN: 0.36 KUA/I
WHITE MULBERRY IGE QN: 0.1 KUA/I
WHITE OAK IGE QN: 5.18 KUA/I

## 2020-09-04 ENCOUNTER — HOSPITAL ENCOUNTER (OUTPATIENT)
Dept: NON INVASIVE DIAGNOSTICS | Facility: CLINIC | Age: 65
Discharge: HOME | End: 2020-09-04
Payer: COMMERCIAL

## 2020-09-04 DIAGNOSIS — I49.3 PVC (PREMATURE VENTRICULAR CONTRACTION): ICD-10-CM

## 2020-09-04 DIAGNOSIS — Z51.81 ENCOUNTER FOR MONITORING FLECAINIDE THERAPY: ICD-10-CM

## 2020-09-04 DIAGNOSIS — Z79.899 ENCOUNTER FOR MONITORING FLECAINIDE THERAPY: ICD-10-CM

## 2020-09-04 PROCEDURE — 93016 CV STRESS TEST SUPVJ ONLY: CPT | Performed by: INTERNAL MEDICINE

## 2020-09-04 PROCEDURE — 93226 XTRNL ECG REC<48 HR SCAN A/R: CPT

## 2020-09-04 PROCEDURE — 93018 CV STRESS TEST I&R ONLY: CPT | Performed by: INTERNAL MEDICINE

## 2020-09-04 PROCEDURE — 93225 XTRNL ECG REC<48 HRS REC: CPT

## 2020-09-04 PROCEDURE — 93017 CV STRESS TEST TRACING ONLY: CPT

## 2020-09-10 ENCOUNTER — HOSPITAL ENCOUNTER (OUTPATIENT)
Dept: PULMONOLOGY | Facility: HOSPITAL | Age: 65
Discharge: HOME | End: 2020-09-10
Attending: INTERNAL MEDICINE
Payer: COMMERCIAL

## 2020-09-10 ENCOUNTER — HOSPITAL ENCOUNTER (OUTPATIENT)
Dept: RADIOLOGY | Facility: HOSPITAL | Age: 65
Discharge: HOME | End: 2020-09-10
Attending: INTERNAL MEDICINE
Payer: COMMERCIAL

## 2020-09-10 ENCOUNTER — HOSPITAL ENCOUNTER (OUTPATIENT)
Dept: RADIOLOGY | Facility: HOSPITAL | Age: 65
Discharge: HOME | End: 2020-09-10
Attending: SURGERY
Payer: COMMERCIAL

## 2020-09-10 DIAGNOSIS — R22.32 MASS OF LEFT WRIST: ICD-10-CM

## 2020-09-10 DIAGNOSIS — I49.3 PVC (PREMATURE VENTRICULAR CONTRACTION): ICD-10-CM

## 2020-09-10 DIAGNOSIS — E78.5 DYSLIPIDEMIA: ICD-10-CM

## 2020-09-10 DIAGNOSIS — J45.50 SEVERE PERSISTENT ASTHMA WITHOUT COMPLICATION: ICD-10-CM

## 2020-09-10 LAB
CHEST PAIN STATEMENT: NORMAL
MAX DIASTOLIC BP: 90 MMHG
MAX HEART RATE: 134 BPM
MAX PREDICTED HEART RATE: 158 BPM
MAX. SYSTOLIC BP: 160 MMHG
PROTOCOL NAME: NORMAL
TARGET HR FORMULA: NORMAL
TEST INDICATION: NORMAL
TIME IN EXERCISE PHASE: NORMAL

## 2020-09-10 PROCEDURE — 73221 MRI JOINT UPR EXTREM W/O DYE: CPT

## 2020-09-10 PROCEDURE — 94060 EVALUATION OF WHEEZING: CPT | Performed by: INTERNAL MEDICINE

## 2020-09-10 PROCEDURE — 94760 N-INVAS EAR/PLS OXIMETRY 1: CPT

## 2020-09-10 PROCEDURE — 94726 PLETHYSMOGRAPHY LUNG VOLUMES: CPT | Performed by: INTERNAL MEDICINE

## 2020-09-10 PROCEDURE — 94060 EVALUATION OF WHEEZING: CPT

## 2020-09-10 PROCEDURE — 94729 DIFFUSING CAPACITY: CPT | Performed by: INTERNAL MEDICINE

## 2020-09-10 PROCEDURE — 94729 DIFFUSING CAPACITY: CPT

## 2020-09-10 PROCEDURE — G1004 CDSM NDSC: HCPCS

## 2020-09-10 PROCEDURE — 94726 PLETHYSMOGRAPHY LUNG VOLUMES: CPT

## 2020-09-11 ENCOUNTER — TELEPHONE (OUTPATIENT)
Dept: GASTROENTEROLOGY | Facility: CLINIC | Age: 65
End: 2020-09-11

## 2020-09-14 ENCOUNTER — TELEPHONE (OUTPATIENT)
Dept: PULMONOLOGY | Facility: CLINIC | Age: 65
End: 2020-09-14

## 2020-09-14 NOTE — TELEPHONE ENCOUNTER
Called pt informed her that she should wait to see what Guadalupe Chilel would like to do at her appointment that she has coming up on 9/17

## 2020-09-15 ENCOUNTER — TELEPHONE (OUTPATIENT)
Dept: CARDIOLOGY CLINIC | Facility: CLINIC | Age: 65
End: 2020-09-15

## 2020-09-15 NOTE — TELEPHONE ENCOUNTER
Edd Tam received a call from [facility] regarding her normal calcium score results  But she asked me to rely a message to you  She said she feels super uncomfortable when she does not take flecainide  She has like hard palpitations  Wanted you aware so you know when you read her holter  She said if she needs to see you again, she will be happy to do so

## 2020-09-16 ENCOUNTER — DOCUMENTATION (OUTPATIENT)
Dept: PULMONOLOGY | Facility: CLINIC | Age: 65
End: 2020-09-16

## 2020-09-16 NOTE — PROGRESS NOTES

## 2020-09-17 ENCOUNTER — TELEPHONE (OUTPATIENT)
Dept: GASTROENTEROLOGY | Facility: CLINIC | Age: 65
End: 2020-09-17

## 2020-09-17 ENCOUNTER — OFFICE VISIT (OUTPATIENT)
Dept: PULMONOLOGY | Facility: CLINIC | Age: 65
End: 2020-09-17
Payer: COMMERCIAL

## 2020-09-17 VITALS
WEIGHT: 167 LBS | BODY MASS INDEX: 26.84 KG/M2 | OXYGEN SATURATION: 98 % | HEART RATE: 77 BPM | HEIGHT: 66 IN | TEMPERATURE: 97.5 F | DIASTOLIC BLOOD PRESSURE: 78 MMHG | SYSTOLIC BLOOD PRESSURE: 130 MMHG

## 2020-09-17 DIAGNOSIS — R93.89 ABNORMAL CT SCAN, CHEST: ICD-10-CM

## 2020-09-17 DIAGNOSIS — J45.50 SEVERE PERSISTENT ASTHMA WITHOUT COMPLICATION: Primary | ICD-10-CM

## 2020-09-17 DIAGNOSIS — J30.89 NON-SEASONAL ALLERGIC RHINITIS, UNSPECIFIED TRIGGER: ICD-10-CM

## 2020-09-17 DIAGNOSIS — D72.10 EOSINOPHILIA: ICD-10-CM

## 2020-09-17 PROCEDURE — 93227 XTRNL ECG REC<48 HR R&I: CPT | Performed by: INTERNAL MEDICINE

## 2020-09-17 PROCEDURE — 99215 OFFICE O/P EST HI 40 MIN: CPT | Performed by: INTERNAL MEDICINE

## 2020-09-17 RX ORDER — FLUTICASONE FUROATE AND VILANTEROL 200; 25 UG/1; UG/1
1 POWDER RESPIRATORY (INHALATION) DAILY
Qty: 3 INHALER | Refills: 3 | Status: SHIPPED | OUTPATIENT
Start: 2020-09-17 | End: 2021-03-03

## 2020-09-17 RX ORDER — EPINEPHRINE 0.3 MG/.3ML
0.3 INJECTION SUBCUTANEOUS ONCE
Qty: 0.6 ML | Refills: 0 | Status: SHIPPED | OUTPATIENT
Start: 2020-09-17 | End: 2022-04-13 | Stop reason: SDUPTHER

## 2020-09-17 NOTE — PROGRESS NOTES
Progress note - Pulmonary Medicine   Jered Mann 60 y o  female MRN: [id number]       Impression & Plan:     Severe persistent asthma without complication  · No significant obstruction on PFT  Bronchodilator testing not performed due to COVID  She does have hyperinflation which is suggestive of air trapping and likely a component of asthma  · Symptoms however are severe persistent with continued wheezing despite regular use of Advair, Xopenex inhaler, Singulair and Flonase  · At this point I do think she would benefit from injection therapy particularly in light of her [place] allergen panel, IgE level, and eosinophilia  · I have recommended Fasenra injections  Will submit for authorization  Non-seasonal allergic rhinitis  · Continue Singulair, Flonase, and antihistamine  · Still has some chronic sinus disease  · Followed by ENT as well    Eosinophilia  · Significant eosinophilia with multiple allergens identified on [address] [place] [address] allergen panel  · Based on persistent symptoms, allergen panel, and eosinophilia with IgE elevation, has clear allergic phenotype  Would benefit from [place]    Abnormal CT scan, chest  · Abnormality identified on coronary calcium CT scan  This does not completely show the entire lung fields  There are changes in the medial right middle lobe as well as the left lingula in the subpleural regions consistent with atelectasis with possible infiltrate  · Will recommend dedicated CT scan in short interval follow-up  Will order at subsequent visit    Will await authorization  Initiate therapy and then follow up in the office  ______________________________________________________________________    HPI:    Jered Mann presents today for follow-up of severe persistent asthma  She has been more consistent with use of her inhalers and using them appropriately    She still feels like she wheezes frequently and has tightness in the chest   She occasionally has cough but no significant phlegm production  She does have chronic sinus symptoms with nasal drip and sinus congestion  She is followed by ENT as well  She denies any chest pain  No palpitations or cardiac complaints  No lightheadedness, dizziness, or other neurologic symptoms  She denies any GERD symptoms currently  She is controlled on Protonix  She has been taking Advair 250/50 twice daily, Xopenex inhaler as needed, Singulair, and antihistamine, and Flonase  Despite these treatments she remains symptomatic      Current Medications:    Current Outpatient Medications:     acetaminophen (TYLENOL) 500 mg tablet, Take 500 mg by mouth every 6 (six) hours as needed for mild pain , Disp: , Rfl:     Ascorbic Acid (VITAMIN C) 100 MG tablet, Take by mouth, Disp: , Rfl:     bimatoprost (LATISSE) 0 03 % ophthalmic solution, Take as directed, Disp: , Rfl:     cyanocobalamin (VITAMIN B-12) 1,000 mcg tablet, Take 1,000 mcg by mouth daily, Disp: , Rfl:     diclofenac sodium (VOLTAREN) 1 %, Apply 2 g topically 4 (four) times a day, Disp: 1 Tube, Rfl: 2    erythromycin with ethanol (EMGEL) 2 % gel, Apply topically daily, Disp: 45 g, Rfl: 1    estradiol (ESTRACE) 0 5 MG tablet, 1/2 tablet by mouth once weekly, Disp: 12 tablet, Rfl: 2    famotidine (PEPCID) 20 mg tablet, Take 20 mg by mouth 2 (two) times a day , Disp: , Rfl:     flecainide (TAMBOCOR) 100 mg tablet, TAKE ONE TABLET BY MOUTH 2 TIMES A DAY, Disp: 180 tablet, Rfl: 4    fluticasone (FLONASE) 50 mcg/act nasal spray, 2 sprays into each nostril 2 (two) times a day, Disp: 1 Bottle, Rfl: 11    levalbuterol (Xopenex HFA) 45 mcg/act inhaler, Inhale 2 puffs every 4 (four) hours as needed for wheezing, Disp: 1 Inhaler, Rfl: 3    lidocaine (LMX) 4 % cream, , Disp: , Rfl:     Magnesium 500 MG CAPS, Take by mouth, Disp: , Rfl:     meloxicam (MOBIC) 15 mg tablet, Take 1 tablet (15 mg total) by mouth daily (Patient taking differently: Take 15 mg by mouth as needed ), Disp: 90 tablet, Rfl: 1    metoprolol succinate (TOPROL-XL) 25 mg 24 hr tablet, TAKE ONE TABLET BY MOUTH DAILY, Disp: 90 tablet, Rfl: 0    montelukast (SINGULAIR) 10 mg tablet, Take 1 tablet (10 mg total) by mouth daily at bedtime, Disp: 90 tablet, Rfl: 1    olopatadine HCl (PATADAY) 0 2 % opth drops, , Disp: , Rfl:     omega-3-acid ethyl esters (LOVAZA) 1 g capsule, Take 2 capsules (2 g total) by mouth 2 (two) times a day (Patient taking differently: Take 2 g by mouth 3 (three) times a week ), Disp: 360 capsule, Rfl: 1    ondansetron (ZOFRAN-ODT) 4 mg disintegrating tablet, Take 1 tablet (4 mg total) by mouth every 6 (six) hours as needed for nausea or vomiting, Disp: 30 tablet, Rfl: 0    pantoprazole (PROTONIX) 20 mg tablet, Take 1 tablet (20 mg total) by mouth daily, Disp: 90 tablet, Rfl: 2    Vitamin D, Ergocalciferol, 2000 units CAPS, Take by mouth, Disp: , Rfl:     zolpidem (AMBIEN) 5 mg tablet, Take 1 tablet (5 mg total) by mouth daily at bedtime as needed for sleep, Disp: 30 tablet, Rfl: 2    benralizumab (FASENRA) subcutaneous injection, Inject 1 mL (30 mg total) under the skin every 28 days for 3 doses, Disp: 1 Syringe, Rfl: 3    benralizumab (FASENRA) subcutaneous injection, Inject 1 mL (30 mg total) under the skin every 56 days for 6 doses, Disp: 1 Syringe, Rfl: 6    EPINEPHrine (EPIPEN) 0 3 mg/0 3 mL SOAJ, Inject 0 3 mL (0 3 mg total) into a muscle once for 1 dose, Disp: 0 6 mL, Rfl: 0    fluticasone-vilanterol (BREO ELLIPTA) 200-25 MCG/INH inhaler, Inhale 1 puff daily Rinse mouth after use , Disp: 3 Inhaler, Rfl: 3    pantoprazole (PROTONIX) 40 mg tablet, Take 1 tablet (40 mg total) by mouth 2 (two) times a day (Patient not taking: Reported on 9/17/2020), Disp: 180 tablet, Rfl: 1    Review of Systems:  Aside from what is mentioned in the HPI, her review of systems is otherwise negative    Past medical history, surgical history, and family history were reviewed and updated as appropriate    Social history updates:  Social History     Tobacco Use   Smoking Status Former Smoker    Packs/day: 0 75    Years: 20 00    Pack years: 15 00    Types: Cigarettes    Last attempt to quit: 2004    Years since quittin 7   Smokeless Tobacco Never Used       PhysicalExamination:  Vitals:   /78   Pulse 77   Temp 97 5 °F (36 4 °C)   Ht 5' 6" (1 676 m)   Wt 75 8 kg (167 lb)   SpO2 98%   BMI 26 95 kg/m²   Gen: Comfortable  Non-labored  HEENT: PERRL  O/P: clear  moist  Neck: Trachea is midline  No JVD  No adenopathy  Chest:  Minor right basilar wheeze on today's exam  Cardiac:  Regular  no murmur  Abdomen: Soft and nontender  Bowel sounds are present  Extremities: No edema  Neuro:  Nonfocal  Psych:  Mildly anxious about her lung disease  Skin:  No rash or lesions on arms, hands, face or neck  Diagnostic Data:  Labs: I personally reviewed the most recent laboratory data pertinent to today's visit    Lab Results   Component Value Date    WBC 7 74 2020    HGB 13 0 2020    HCT 39 5 2020    MCV 96 2020     2020     Lab Results   Component Value Date    SODIUM 137 2020    K 4 4 2020    CO2 23 2020     2020    BUN 16 2020    CREATININE 0 93 2020    CALCIUM 9 6 2020       PFT results: The most recent pulmonary function tests were reviewed  Complete pulmonary function test from  shows normal spirometry without significant change after bronchodilator  There over distended lung volumes with air trapping  DLCO is very mildly reduced at 73% predicted      Imaging:  I personally reviewed the images on the [facility] system pertinent to today's visit  Coronary calcium score CT scan was reviewed  This does show very mild anterior subpleural atelectasis/infiltrate  This is in the lingula and medial right middle lobe      Lily Agustin MD

## 2020-09-17 NOTE — ASSESSMENT & PLAN NOTE
· No significant obstruction on PFT  Bronchodilator testing not performed due to COVID  She does have hyperinflation which is suggestive of air trapping and likely a component of asthma  · Symptoms however are severe persistent with continued wheezing despite regular use of Advair, Xopenex inhaler, Singulair and Flonase  · At this point I do think she would benefit from injection therapy particularly in light of her [place] allergen panel, IgE level, and eosinophilia  · I have recommended Fasenra injections  Will submit for authorization

## 2020-09-17 NOTE — ASSESSMENT & PLAN NOTE
· Continue Singulair, Flonase, and antihistamine  · Still has some chronic sinus disease  · Followed by ENT as well

## 2020-09-17 NOTE — ASSESSMENT & PLAN NOTE
· Significant eosinophilia with multiple allergens identified on [place] allergen panel  · Based on persistent symptoms, allergen panel, and eosinophilia with IgE elevation, has clear allergic phenotype    Would benefit from [facility]

## 2020-09-17 NOTE — TELEPHONE ENCOUNTER
Patient called office stating she prefers to cancel  Procedure 9/18/20  She is not feeling comfortable about getting procedure done  She would like a phone call back to discuss

## 2020-09-17 NOTE — ASSESSMENT & PLAN NOTE
· Abnormality identified on coronary calcium CT scan  This does not completely show the entire lung fields  There are changes in the medial right middle lobe as well as the left lingula in the subpleural regions consistent with atelectasis with possible infiltrate  · Will recommend dedicated CT scan in short interval follow-up    Will order at subsequent visit

## 2020-09-17 NOTE — TELEPHONE ENCOUNTER
My Open Encounters     MD Анна Beebe MA 1 hour ago (9:18 AM)       Holter showed only occasional PACs & PVCs during monitoring period  Only some of the patient reported episodes correlated with the single PACs/PVCs  No evidence of atrial fibrillation        If she feels that she is very symptomatic while off the flecainide, then regardless of this, she can stay on the flecainide at prior dose       Message text

## 2020-09-18 ENCOUNTER — DOCUMENTATION (OUTPATIENT)
Dept: PULMONOLOGY | Facility: MEDICAL CENTER | Age: 65
End: 2020-09-18

## 2020-09-18 NOTE — PROGRESS NOTES
Pending via [address]  I spoke to Doreen Li  Given UMR#:[id number] Faxed clinicals to [phone]

## 2020-09-21 NOTE — TELEPHONE ENCOUNTER
Reached vm, left message if has questions about procedure, please do not hesitate to call back if needed

## 2020-09-22 NOTE — PROGRESS NOTES
Pérez Burns from [facility] calling stating the Florencia Dad will need a pre auth and that he will be faxing paperwork to the [facility]

## 2020-09-24 ENCOUNTER — OFFICE VISIT (OUTPATIENT)
Dept: OBGYN CLINIC | Facility: CLINIC | Age: 65
End: 2020-09-24
Payer: COMMERCIAL

## 2020-09-24 VITALS
TEMPERATURE: 97.5 F | SYSTOLIC BLOOD PRESSURE: 111 MMHG | HEIGHT: 66 IN | BODY MASS INDEX: 26.68 KG/M2 | WEIGHT: 166 LBS | HEART RATE: 75 BPM | DIASTOLIC BLOOD PRESSURE: 74 MMHG

## 2020-09-24 DIAGNOSIS — M67.432 GANGLION CYST OF WRIST, LEFT: Primary | ICD-10-CM

## 2020-09-24 PROCEDURE — 99214 OFFICE O/P EST MOD 30 MIN: CPT | Performed by: SURGERY

## 2020-09-24 NOTE — PROGRESS NOTES
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  Left dorsal wrist ganglion cyst excision was discussed at length today, including risks and benefits  Risks of surgery consist of but not limited too bleeding, infection, numbness, reoccurrence of the cyst, injury to surrounding structures and surgical misadventure  There is an aprox  15 percent change that the mass will reoccur and she may get a numb patch following surgery as nerves have to be moved out of the way  Left wrist ganglion cyst mass excision surgical consent was signed in the office today  It was discussed that Jero Schroeder will likely be the one to do the closure, but no one else will be performing the surgery  I may have a resident with me who would help with retraction etc  She will likely require some OT once the mass is removed to regain wrist ROM, unable to determine at this time how long she will need to attend therapy for  Wrist extension and ulnar deviation may improve following surgery but  strength likely will not  I will see her back in the office 10-14 days following surgical intervention for suture removal   A BMP will be obtained prior to surgical intervention  I personally obtained a written consent after risks benefits alternatives were discussed in detail with the patient  The patient verbalized understanding of exam findings and treatment plan  We engaged in the shared decision-making process and treatment options were discussed at length with the patient  Surgical and conservative management discussed today along with risks and benefits  Diagnoses and all orders for this visit:    Ganglion cyst of wrist, left  -     Case request operating room: EXCISION GANGLION CYST; Standing  -     Basic metabolic panel;  Future  -     Case request operating room: EXCISION GANGLION CYST    Other orders  -     Void on call to OR; Standing  -     Insert peripheral IV; Standing  -     Diet NPO; Sips with meds; Standing  -     ceFAZolin (ANCEF) 2,000 mg in dextrose 5 % 100 mL IVPB      Ganglion Cyst Excision: The anatomy and physiology of the ganglion was discussed with the patient today in the office  Fluid leaking out of the joint surface typically creates a small sac, which can enlarge and cause pain or limitation of motion  Treatment options include observation, aspiration, or surgical incision were discussed with the patient today  Observation typically lead to resolution and approximately 10% of patients, aspiration least resolution approximately 50% of patients, and surgical excision lead to resolution in approximately 97% of patients  After discussion with the patient today, the patient voiced understanding of all treatment options  The patient has elected excision of the ganglion  The risks and benefits of the procedure were explained to the patient, which include, but are not limited to: Bleeding, infection, recurrence, pain, scar, damage to tendons, damage to nerves, and damage to blood vessels, failure to give desired results and complications related to anesthesia  These risks, along with alternative conservative treatment options, and postoperative protocols were voiced back and understood by the patient  All questions were answered to the patient's satisfaction  The patient agrees to comply with a standard postoperative protocol, and is willing to proceed  Education was provided via written and auditory forms  There were no barriers to learning  Written handouts regarding wound care, incision and scar care, and general preoperative information was provided to the patient  Prior to surgery, the patient may be requested to stop all anti-inflammatory medications  Prophylactic aspirin, Plavix, and Coumadin may be allowed to be continued    Medications including vitamin E , ginkgo, and fish oil are requested to be stopped approximately one week prior to surgery  Hypertensive medications and beta blockers, if taken, should be continued  Follow Up:  Return 10-14 days after sx  To Do Next Visit:  Re-evaluation of current issue and Sutures out    ____________________________________________________________________________________________________________________________________________      CHIEF COMPLAINT:  Chief Complaint   Patient presents with    Left Wrist - Follow-up       SUBJECTIVE:  Anton Nichole is a 60y o  year old RHD female who presents to the office today for a follow up regarding a left dorsal wrist mass  Edd Tam has undergone 2 ultrasound guided cyst aspirations  The mass has returned following the aspirations  She is here today to review MRI results and discuss mass excision  She notes that she has began to experience pain with regards to the mass  I have personally reviewed all the relevant PMH, PSH, SH, FH, Medications and allergies       PAST MEDICAL HISTORY:  Past Medical History:   Diagnosis Date    Asthma     Celiac disease     Follicular lymphoma ([facility] [address] )     GERD (gastroesophageal reflux disease)     Heart palpitations     History of colonic polyps     resolved 07/12/2016    History of radiation therapy 2010    Irregular heart beat     Lymphoma, 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HCC)     non hodgekins, remission    Malignant lymphoma ([address] ) 2010    rt arm cutaneous follicular stage ia (rt diatal medical biceps area , s/p resected and s/p radistion treatment    resolved 12/17/2014    Postmenopausal disorder     resolved 09/21/2017    Restless legs syndrome     resolved 09/21/2017    TMJ syndrome     resolved 09/21/2017       PAST SURGICAL HISTORY:  Past Surgical History:   Procedure Laterality Date    COLONOSCOPY  04/16/2019    FUNCTIONAL ENDOSCOPIC SINUS SURGERY Bilateral 2013    Dr Poncho Saab      age 39 complete    LYMPH NODE DISSECTION Right arm    NASAL SEPTUM SURGERY  2013    with turbinate reduction - Dr Norberto Merida ESOPHAGOGASTRODUODENOSCOPY TRANSORAL DIAGNOSTIC N/A 2016    Procedure: ESOPHAGOGASTRODUODENOSCOPY (EGD); Surgeon: Luigi Azevedo MD;  Location: [facility];   Service: Gastroenterology    TONSILLECTOMY      TOTAL ABDOMINAL HYSTERECTOMY W/ BILATERAL SALPINGOOPHORECTOMY      age 54   [address] MSK PROCEDURE  2020   [facility]aye Pee GUIDED MSK PROCEDURE  2020       FAMILY HISTORY:  Family History   Problem Relation Age of Onset    Lymphoma Mother     Throat cancer Father     Heart failure Father     Atrial fibrillation Father     Diabetes Father     Colonic polyp Father     Hypertension Father     Thyroid cancer Sister     Breast cancer Paternal Grandmother 71    Breast cancer Paternal Aunt 71    Ovarian cancer Paternal Aunt 79    No Known Problems Maternal Grandmother     No Known Problems Maternal Grandfather     Cancer Paternal Grandfather     Lung cancer Paternal Grandfather     BRCA1 Positive Cousin     Skin cancer Paternal Aunt     Throat cancer Paternal Uncle     No Known Problems Maternal Aunt        SOCIAL HISTORY:  Social History     Tobacco Use    Smoking status: Former Smoker     Packs/day: 0 75     Years: 20 00     Pack years: 15 00     Types: Cigarettes     Last attempt to quit: 2004     Years since quittin 7    Smokeless tobacco: Never Used   Substance Use Topics    Alcohol use: Yes     Frequency: 2-4 times a month     Drinks per session: 1 or 2     Comment: social    Drug use: No       MEDICATIONS:    Current Outpatient Medications:     acetaminophen (TYLENOL) 500 mg tablet, Take 500 mg by mouth every 6 (six) hours as needed for mild pain , Disp: , Rfl:     Ascorbic Acid (VITAMIN C) 100 MG tablet, Take by mouth, Disp: , Rfl:     benralizumab (FASENRA) subcutaneous injection, Inject 1 mL (30 mg total) under the skin every 28 days for 3 doses, Disp: 1 Syringe, Rfl: 3    benralizumab (FASENRA) subcutaneous injection, Inject 1 mL (30 mg total) under the skin every 56 days for 6 doses, Disp: 1 Syringe, Rfl: 6    bimatoprost (LATISSE) 0 03 % ophthalmic solution, Take as directed, Disp: , Rfl:     cyanocobalamin (VITAMIN B-12) 1,000 mcg tablet, Take 1,000 mcg by mouth daily, Disp: , Rfl:     diclofenac sodium (VOLTAREN) 1 %, Apply 2 g topically 4 (four) times a day, Disp: 1 Tube, Rfl: 2    erythromycin with ethanol (EMGEL) 2 % gel, Apply topically daily, Disp: 45 g, Rfl: 1    estradiol (ESTRACE) 0 5 MG tablet, 1/2 tablet by mouth once weekly, Disp: 12 tablet, Rfl: 2    famotidine (PEPCID) 20 mg tablet, Take 20 mg by mouth 2 (two) times a day , Disp: , Rfl:     flecainide (TAMBOCOR) 100 mg tablet, TAKE ONE TABLET BY MOUTH 2 TIMES A DAY, Disp: 180 tablet, Rfl: 4    fluticasone (FLONASE) 50 mcg/act nasal spray, 2 sprays into each nostril 2 (two) times a day, Disp: 1 Bottle, Rfl: 11    fluticasone-vilanterol (BREO ELLIPTA) 200-25 MCG/INH inhaler, Inhale 1 puff daily Rinse mouth after use , Disp: 3 Inhaler, Rfl: 3    levalbuterol (Xopenex HFA) 45 mcg/act inhaler, Inhale 2 puffs every 4 (four) hours as needed for wheezing, Disp: 1 Inhaler, Rfl: 3    lidocaine (LMX) 4 % cream, , Disp: , Rfl:     Magnesium 500 MG CAPS, Take by mouth, Disp: , Rfl:     meloxicam (MOBIC) 15 mg tablet, Take 1 tablet (15 mg total) by mouth daily (Patient taking differently: Take 15 mg by mouth as needed ), Disp: 90 tablet, Rfl: 1    metoprolol succinate (TOPROL-XL) 25 mg 24 hr tablet, TAKE ONE TABLET BY MOUTH DAILY, Disp: 90 tablet, Rfl: 0    montelukast (SINGULAIR) 10 mg tablet, Take 1 tablet (10 mg total) by mouth daily at bedtime, Disp: 90 tablet, Rfl: 1    olopatadine HCl (PATADAY) 0 2 % opth drops, , Disp: , Rfl:     omega-3-acid ethyl esters (LOVAZA) 1 g capsule, Take 2 capsules (2 g total) by mouth 2 (two) times a day (Patient taking differently: Take 2 g by mouth 3 (three) times a week ), Disp: 360 capsule, Rfl: 1    ondansetron (ZOFRAN-ODT) 4 mg disintegrating tablet, Take 1 tablet (4 mg total) by mouth every 6 (six) hours as needed for nausea or vomiting, Disp: 30 tablet, Rfl: 0    pantoprazole (PROTONIX) 40 mg tablet, Take 1 tablet (40 mg total) by mouth 2 (two) times a day, Disp: 180 tablet, Rfl: 1    Vitamin D, Ergocalciferol, 2000 units CAPS, Take by mouth, Disp: , Rfl:     zolpidem (AMBIEN) 5 mg tablet, Take 1 tablet (5 mg total) by mouth daily at bedtime as needed for sleep, Disp: 30 tablet, Rfl: 2    EPINEPHrine (EPIPEN) 0 3 mg/0 3 mL SOAJ, Inject 0 3 mL (0 3 mg total) into a muscle once for 1 dose, Disp: 0 6 mL, Rfl: 0    pantoprazole (PROTONIX) 20 mg tablet, Take 1 tablet (20 mg total) by mouth daily, Disp: 90 tablet, Rfl: 2    ALLERGIES:  Allergies   Allergen Reactions    Shellfish-Derived Products Anaphylaxis    Wheat Bran Anaphylaxis    Gluten Meal GI Intolerance     Celiac     Morphine And Related Itching    Nuts Hives     Almonds and other related nuts   Other Itching    Sulfa Antibiotics Rash       REVIEW OF SYSTEMS:  Review of Systems   Constitutional: Negative for chills, fever and unexpected weight change  HENT: Negative for hearing loss, nosebleeds and sore throat  Eyes: Negative for pain, redness and visual disturbance  Respiratory: Negative for cough, shortness of breath and wheezing  Cardiovascular: Negative for chest pain, palpitations and leg swelling  Gastrointestinal: Negative for abdominal pain, nausea and vomiting  Endocrine: Negative for polydipsia and polyuria  Genitourinary: Negative for difficulty urinating and hematuria  Musculoskeletal: Negative for arthralgias, joint swelling and myalgias  Skin: Negative for rash and wound  Neurological: Negative for dizziness, numbness and headaches  Psychiatric/Behavioral: Negative for decreased concentration, dysphoric mood and suicidal ideas  The patient is not nervous/anxious  VITALS:  Vitals:    09/24/20 1000   BP: 111/74   Pulse: 75   Temp: 97 5 °F (36 4 °C)       LABS:  HgA1c:   Lab Results   Component Value Date    HGBA1C 5 6 09/03/2019     BMP:   Lab Results   Component Value Date    GLUCOSE 112 06/22/2015    CALCIUM 9 6 06/30/2020     06/22/2015    K 4 4 06/30/2020    CO2 23 06/30/2020     06/30/2020    BUN 16 06/30/2020    CREATININE 0 93 06/30/2020       _____________________________________________________  PHYSICAL EXAMINATION:  General: well developed and well nourished, alert, oriented times 3 and appears comfortable  Psychiatric: Normal  HEENT: Normocephalic, Atraumatic Trachea Midline, No torticollis  Pulmonary: No audible wheezing or respiratory distress   Cardiovascular: No pitting edema, 2+ radial pulse   Skin: No Erythema, No Lacerations, No Fluctuation, No Ulcerations  Neurovascular: Sensation Intact to the Median, Ulnar, Radial Nerve, Motor Intact to the Median, Ulnar, Radial Nerve and Pulses Intact  Musculoskeletal: Normal, except as noted in detailed exam and in HPI        MUSCULOSKELETAL EXAMINATION:    Left wrist:     No erythema, ecchymosis or edema   Dorsal 3x2 CM ganglion cyst   Multi lobulated cyst   Full composite fist   Pain with ulnar deviation    ___________________________________________________  STUDIES REVIEWED:  I have personally reviewed MRI :   Axial sagittal and coronal series which demonstrates multi lobulated ganglion cyst around the ECU tendon also has BC pattern of intercalated instability consistent with a prior lunate triquetral ligament tear          PROCEDURES PERFORMED:  Procedures  No Procedures performed today    _____________________________________________________      Horacio Moncada    I,:   Abimael Edward am acting as a scribe while in the presence of the attending physician :        I,:   Brian Worthington MD personally performed the services described in this documentation    as scribed in my presence :

## 2020-09-24 NOTE — H&P
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  Left dorsal wrist ganglion cyst excision was discussed at length today, including risks and benefits  Risks of surgery consist of but not limited too bleeding, infection, numbness, reoccurrence of the cyst, injury to surrounding structures and surgical misadventure  There is an aprox  15 percent change that the mass will reoccur and she may get a numb patch following surgery as nerves have to be moved out of the way  Left wrist ganglion cyst mass excision surgical consent was signed in the office today  It was discussed that Sandy Lou will likely be the one to do the closure, but no one else will be performing the surgery  I may have a resident with me who would help with retraction etc  She will likely require some OT once the mass is removed to regain wrist ROM, unable to determine at this time how long she will need to attend therapy for  Wrist extension and ulnar deviation may improve following surgery but  strength likely will not  I will see her back in the office 10-14 days following surgical intervention for suture removal   A BMP will be obtained prior to surgical intervention  I personally obtained a written consent after risks benefits alternatives were discussed in detail with the patient  The patient verbalized understanding of exam findings and treatment plan  We engaged in the shared decision-making process and treatment options were discussed at length with the patient  Surgical and conservative management discussed today along with risks and benefits  Diagnoses and all orders for this visit:    Ganglion cyst of wrist, left  -     Case request operating room: EXCISION GANGLION CYST; Standing  -     Basic metabolic panel;  Future  -     Case request operating room: EXCISION GANGLION CYST    Other orders  -     Void on call to OR; Standing  -     Insert peripheral IV; Standing  -     Diet NPO; Sips with meds; Standing  -     ceFAZolin (ANCEF) 2,000 mg in dextrose 5 % 100 mL IVPB      Ganglion Cyst Excision: The anatomy and physiology of the ganglion was discussed with the patient today in the office  Fluid leaking out of the joint surface typically creates a small sac, which can enlarge and cause pain or limitation of motion  Treatment options include observation, aspiration, or surgical incision were discussed with the patient today  Observation typically lead to resolution and approximately 10% of patients, aspiration least resolution approximately 50% of patients, and surgical excision lead to resolution in approximately 97% of patients  After discussion with the patient today, the patient voiced understanding of all treatment options  The patient has elected excision of the ganglion  The risks and benefits of the procedure were explained to the patient, which include, but are not limited to: Bleeding, infection, recurrence, pain, scar, damage to tendons, damage to nerves, and damage to blood vessels, failure to give desired results and complications related to anesthesia  These risks, along with alternative conservative treatment options, and postoperative protocols were voiced back and understood by the patient  All questions were answered to the patient's satisfaction  The patient agrees to comply with a standard postoperative protocol, and is willing to proceed  Education was provided via written and auditory forms  There were no barriers to learning  Written handouts regarding wound care, incision and scar care, and general preoperative information was provided to the patient  Prior to surgery, the patient may be requested to stop all anti-inflammatory medications  Prophylactic aspirin, Plavix, and Coumadin may be allowed to be continued    Medications including vitamin E , ginkgo, and fish oil are requested to be stopped approximately one week prior to surgery  Hypertensive medications and beta blockers, if taken, should be continued  Follow Up:  Return 10-14 days after sx  To Do Next Visit:  Re-evaluation of current issue and Sutures out    ____________________________________________________________________________________________________________________________________________      CHIEF COMPLAINT:  Chief Complaint   Patient presents with    Left Wrist - Follow-up       SUBJECTIVE:  Manette Later is a 60y o  year old RHD female who presents to the office today for a follow up regarding a left dorsal wrist mass  Zayra May has undergone 2 ultrasound guided cyst aspirations  The mass has returned following the aspirations  She is here today to review MRI results and discuss mass excision  She notes that she has began to experience pain with regards to the mass  I have personally reviewed all the relevant PMH, PSH, SH, FH, Medications and allergies       PAST MEDICAL HISTORY:  Past Medical History:   Diagnosis Date    Asthma     Celiac disease     Follicular lymphoma ([facility] [address] )     GERD (gastroesophageal reflux disease)     Heart palpitations     History of colonic polyps     resolved 07/12/2016    History of radiation therapy 2010    Irregular heart beat     Lymphoma, 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HCC)     non hodgekins, remission    Malignant lymphoma ([address] ) 2010    rt arm cutaneous follicular stage ia (rt diatal medical biceps area , s/p resected and s/p radistion treatment    resolved 12/17/2014    Postmenopausal disorder     resolved 09/21/2017    Restless legs syndrome     resolved 09/21/2017    TMJ syndrome     resolved 09/21/2017       PAST SURGICAL HISTORY:  Past Surgical History:   Procedure Laterality Date    COLONOSCOPY  04/16/2019    FUNCTIONAL ENDOSCOPIC SINUS SURGERY Bilateral 2013    Dr Maranda Covarrubias      age 39 complete    LYMPH NODE DISSECTION Right arm    NASAL SEPTUM SURGERY  2013    with turbinate reduction - Dr Yahir Pimentel ESOPHAGOGASTRODUODENOSCOPY TRANSORAL DIAGNOSTIC N/A 2016    Procedure: ESOPHAGOGASTRODUODENOSCOPY (EGD); Surgeon: Emory Echavarria MD;  [facility];   Service: Gastroenterology    TONSILLECTOMY      TOTAL ABDOMINAL HYSTERECTOMY W/ BILATERAL SALPINGOOPHORECTOMY      age 54   [address] MSK PROCEDURE  2020   Darcus Pepper GUIDED MSK PROCEDURE  2020       FAMILY HISTORY:  Family History   Problem Relation Age of Onset    Lymphoma Mother     Throat cancer Father     Heart failure Father     Atrial fibrillation Father     Diabetes Father     Colonic polyp Father     Hypertension Father     Thyroid cancer Sister     Breast cancer Paternal Grandmother 71    Breast cancer Paternal Aunt 71    Ovarian cancer Paternal Aunt 79    No Known Problems Maternal Grandmother     No Known Problems Maternal Grandfather     Cancer Paternal Grandfather     Lung cancer Paternal Grandfather     BRCA1 Positive Cousin     Skin cancer Paternal Aunt     Throat cancer Paternal Uncle     No Known Problems Maternal Aunt        SOCIAL HISTORY:  Social History     Tobacco Use    Smoking status: Former Smoker     Packs/day: 0 75     Years: 20 00     Pack years: 15 00     Types: Cigarettes     Last attempt to quit: 2004     Years since quittin 7    Smokeless tobacco: Never Used   Substance Use Topics    Alcohol use: Yes     Frequency: 2-4 times a month     Drinks per session: 1 or 2     Comment: social    Drug use: No       MEDICATIONS:    Current Outpatient Medications:     acetaminophen (TYLENOL) 500 mg tablet, Take 500 mg by mouth every 6 (six) hours as needed for mild pain , Disp: , Rfl:     Ascorbic Acid (VITAMIN C) 100 MG tablet, Take by mouth, Disp: , Rfl:     benralizumab (FASENRA) subcutaneous injection, Inject 1 mL (30 mg total) under the skin every 28 days for 3 doses, Disp: 1 Syringe, Rfl: 3    benralizumab (FASENRA) subcutaneous injection, Inject 1 mL (30 mg total) under the skin every 56 days for 6 doses, Disp: 1 Syringe, Rfl: 6    bimatoprost (LATISSE) 0 03 % ophthalmic solution, Take as directed, Disp: , Rfl:     cyanocobalamin (VITAMIN B-12) 1,000 mcg tablet, Take 1,000 mcg by mouth daily, Disp: , Rfl:     diclofenac sodium (VOLTAREN) 1 %, Apply 2 g topically 4 (four) times a day, Disp: 1 Tube, Rfl: 2    erythromycin with ethanol (EMGEL) 2 % gel, Apply topically daily, Disp: 45 g, Rfl: 1    estradiol (ESTRACE) 0 5 MG tablet, 1/2 tablet by mouth once weekly, Disp: 12 tablet, Rfl: 2    famotidine (PEPCID) 20 mg tablet, Take 20 mg by mouth 2 (two) times a day , Disp: , Rfl:     flecainide (TAMBOCOR) 100 mg tablet, TAKE ONE TABLET BY MOUTH 2 TIMES A DAY, Disp: 180 tablet, Rfl: 4    fluticasone (FLONASE) 50 mcg/act nasal spray, 2 sprays into each nostril 2 (two) times a day, Disp: 1 Bottle, Rfl: 11    fluticasone-vilanterol (BREO ELLIPTA) 200-25 MCG/INH inhaler, Inhale 1 puff daily Rinse mouth after use , Disp: 3 Inhaler, Rfl: 3    levalbuterol (Xopenex HFA) 45 mcg/act inhaler, Inhale 2 puffs every 4 (four) hours as needed for wheezing, Disp: 1 Inhaler, Rfl: 3    lidocaine (LMX) 4 % cream, , Disp: , Rfl:     Magnesium 500 MG CAPS, Take by mouth, Disp: , Rfl:     meloxicam (MOBIC) 15 mg tablet, Take 1 tablet (15 mg total) by mouth daily (Patient taking differently: Take 15 mg by mouth as needed ), Disp: 90 tablet, Rfl: 1    metoprolol succinate (TOPROL-XL) 25 mg 24 hr tablet, TAKE ONE TABLET BY MOUTH DAILY, Disp: 90 tablet, Rfl: 0    montelukast (SINGULAIR) 10 mg tablet, Take 1 tablet (10 mg total) by mouth daily at bedtime, Disp: 90 tablet, Rfl: 1    olopatadine HCl (PATADAY) 0 2 % opth drops, , Disp: , Rfl:     omega-3-acid ethyl esters (LOVAZA) 1 g capsule, Take 2 capsules (2 g total) by mouth 2 (two) times a day (Patient taking differently: Take 2 g by mouth 3 (three) times a week ), Disp: 360 capsule, Rfl: 1    ondansetron (ZOFRAN-ODT) 4 mg disintegrating tablet, Take 1 tablet (4 mg total) by mouth every 6 (six) hours as needed for nausea or vomiting, Disp: 30 tablet, Rfl: 0    pantoprazole (PROTONIX) 40 mg tablet, Take 1 tablet (40 mg total) by mouth 2 (two) times a day, Disp: 180 tablet, Rfl: 1    Vitamin D, Ergocalciferol, 2000 units CAPS, Take by mouth, Disp: , Rfl:     zolpidem (AMBIEN) 5 mg tablet, Take 1 tablet (5 mg total) by mouth daily at bedtime as needed for sleep, Disp: 30 tablet, Rfl: 2    EPINEPHrine (EPIPEN) 0 3 mg/0 3 mL SOAJ, Inject 0 3 mL (0 3 mg total) into a muscle once for 1 dose, Disp: 0 6 mL, Rfl: 0    pantoprazole (PROTONIX) 20 mg tablet, Take 1 tablet (20 mg total) by mouth daily, Disp: 90 tablet, Rfl: 2    ALLERGIES:  Allergies   Allergen Reactions    Shellfish-Derived Products Anaphylaxis    Wheat Bran Anaphylaxis    Gluten Meal GI Intolerance     Celiac     Morphine And Related Itching    Nuts Hives     Almonds and other related nuts   Other Itching    Sulfa Antibiotics Rash       REVIEW OF SYSTEMS:  Review of Systems   Constitutional: Negative for chills, fever and unexpected weight change  HENT: Negative for hearing loss, nosebleeds and sore throat  Eyes: Negative for pain, redness and visual disturbance  Respiratory: Negative for cough, shortness of breath and wheezing  Cardiovascular: Negative for chest pain, palpitations and leg swelling  Gastrointestinal: Negative for abdominal pain, nausea and vomiting  Endocrine: Negative for polydipsia and polyuria  Genitourinary: Negative for difficulty urinating and hematuria  Musculoskeletal: Negative for arthralgias, joint swelling and myalgias  Skin: Negative for rash and wound  Neurological: Negative for dizziness, numbness and headaches  Psychiatric/Behavioral: Negative for decreased concentration, dysphoric mood and suicidal ideas  The patient is not nervous/anxious  VITALS:  Vitals:    09/24/20 1000   BP: 111/74   Pulse: 75   Temp: 97 5 °F (36 4 °C)       LABS:  HgA1c:   Lab Results   Component Value Date    HGBA1C 5 6 09/03/2019     BMP:   Lab Results   Component Value Date    GLUCOSE 112 06/22/2015    CALCIUM 9 6 06/30/2020     06/22/2015    K 4 4 06/30/2020    CO2 23 06/30/2020     06/30/2020    BUN 16 06/30/2020    CREATININE 0 93 06/30/2020       _____________________________________________________  PHYSICAL EXAMINATION:  General: well developed and well nourished, alert, oriented times 3 and appears comfortable  Psychiatric: Normal  HEENT: Normocephalic, Atraumatic Trachea Midline, No torticollis  Pulmonary: No audible wheezing or respiratory distress   Cardiovascular: No pitting edema, 2+ radial pulse   Skin: No Erythema, No Lacerations, No Fluctuation, No Ulcerations  Neurovascular: Sensation Intact to the Median, Ulnar, Radial Nerve, Motor Intact to the Median, Ulnar, Radial Nerve and Pulses Intact  Musculoskeletal: Normal, except as noted in detailed exam and in HPI        MUSCULOSKELETAL EXAMINATION:    Left wrist:     No erythema, ecchymosis or edema   Dorsal 3x2 CM ganglion cyst   Multi lobulated cyst   Full composite fist   Pain with ulnar deviation    ___________________________________________________  STUDIES REVIEWED:  I have personally reviewed MRI :   Axial sagittal and coronal series which demonstrates multi lobulated ganglion cyst around the ECU tendon also has BC pattern of intercalated instability consistent with a prior lunate triquetral ligament tear          PROCEDURES PERFORMED:  Procedures  No Procedures performed today    _____________________________________________________      Torrie Coleman    I,:   Jazzmine Edward am acting as a scribe while in the presence of the attending physician :        I,:   Ezekiel Montes MD personally performed the services described in this documentation    as scribed in my presence :

## 2020-09-28 ENCOUNTER — TELEPHONE (OUTPATIENT)
Dept: HEMATOLOGY ONCOLOGY | Facility: MEDICAL CENTER | Age: 65
End: 2020-09-28

## 2020-09-28 ENCOUNTER — TELEPHONE (OUTPATIENT)
Dept: PULMONOLOGY | Facility: CLINIC | Age: 65
End: 2020-09-28

## 2020-09-28 NOTE — TELEPHONE ENCOUNTER
Patient called and asked to be seen by Dr Mercy Parada and not by Dr Rose Perry I advised that I will have to contact both Doctors before scheduling a good call back number [phone]

## 2020-09-28 NOTE — TELEPHONE ENCOUNTER
Can you put patient on Bi's schedule wheletha hes has an available slot-- no rush  Please call and inform pt of appointment  Per Amandeep's last note patient was to be seen back in 2019

## 2020-09-29 ENCOUNTER — TELEPHONE (OUTPATIENT)
Dept: HEMATOLOGY ONCOLOGY | Facility: CLINIC | Age: 65
End: 2020-09-29

## 2020-09-30 NOTE — PROGRESS NOTES
Spoke with Zeny Al  At [address] and was told that some clinicals were missing from initial fax  I will refax clinicals to [phone]

## 2020-10-01 DIAGNOSIS — C82.90 FOLLICULAR LYMPHOMA, UNSPECIFIED FOLLICULAR LYMPHOMA TYPE, UNSPECIFIED BODY REGION (HCC): Primary | ICD-10-CM

## 2020-10-02 NOTE — PROGRESS NOTES
Approval via [facility] for [address] [place] [address]  Spoke to Rao  Approval # is [id number] valid from 09/30/2020 - 12/30/2020   Call Ref# [id number]

## 2020-10-05 RX ORDER — EPINEPHRINE 1 MG/ML
0.3 INJECTION, SOLUTION, CONCENTRATE INTRAVENOUS ONCE
Status: CANCELLED | OUTPATIENT
Start: 2020-10-14

## 2020-10-05 NOTE — PROGRESS NOTES
I called and left vm in regards to pts first infusion visit which will be done at [facility] on Oct  16th at 2:30  I also advised pt to bring her epipen to all visits

## 2020-10-09 NOTE — PROGRESS NOTES
Received Denial letter from pt secondary ins  [employer]  Ref# [id number]  I spoke with  at [facility]) and was informed that pt will be covered by  Primary insurance at 100% and that pt is also enrolled in copay asst  I informed Jessica Reid at pre encounters and will f/u with her to see if pt needs to reschedule 1st infusion treatment

## 2020-10-14 NOTE — PROGRESS NOTES
I called [facility] and spoke with Zackery Abraham Call Ref#: [id number]  She stated no auth would be required since this is pts secondary insurance  I sent message to China Amanda at pre encounters in regards to this info

## 2020-10-16 ENCOUNTER — HOSPITAL ENCOUNTER (OUTPATIENT)
Dept: INFUSION CENTER | Facility: HOSPITAL | Age: 65
Discharge: HOME | End: 2020-10-16
Attending: INTERNAL MEDICINE
Payer: COMMERCIAL

## 2020-10-16 VITALS
DIASTOLIC BLOOD PRESSURE: 70 MMHG | OXYGEN SATURATION: 100 % | RESPIRATION RATE: 18 BRPM | HEART RATE: 72 BPM | SYSTOLIC BLOOD PRESSURE: 120 MMHG | TEMPERATURE: 98 F

## 2020-10-16 DIAGNOSIS — J45.50 SEVERE PERSISTENT ASTHMA WITHOUT COMPLICATION: Primary | ICD-10-CM

## 2020-10-16 DIAGNOSIS — D72.119 HYPEREOSINOPHILIC SYNDROME, UNSPECIFIED TYPE: ICD-10-CM

## 2020-10-16 PROCEDURE — 96372 THER/PROPH/DIAG INJ SC/IM: CPT

## 2020-10-16 RX ORDER — EPINEPHRINE 1 MG/ML
0.3 INJECTION, SOLUTION, CONCENTRATE INTRAVENOUS ONCE
Status: CANCELLED | OUTPATIENT
Start: 2020-11-13

## 2020-10-16 RX ORDER — EPINEPHRINE 1 MG/ML
0.3 INJECTION, SOLUTION, CONCENTRATE INTRAVENOUS ONCE AS NEEDED
Status: DISCONTINUED | OUTPATIENT
Start: 2020-10-16 | End: 2020-10-19 | Stop reason: HOSPADM

## 2020-10-16 RX ADMIN — BENRALIZUMAB 30 MG: 30 INJECTION, SOLUTION SUBCUTANEOUS at 15:02

## 2020-10-16 NOTE — PROGRESS NOTES
Crystal from [place] stating a prior Gene  is needed for the [place] and a network exception needs to be completed    [phone]

## 2020-10-19 NOTE — PROGRESS NOTES
Returned call  Request was cx since pharmacy is not through [facility]

## 2020-11-06 DIAGNOSIS — I10 HYPERTENSION, UNSPECIFIED TYPE: ICD-10-CM

## 2020-11-06 RX ORDER — METOPROLOL SUCCINATE 25 MG/1
TABLET, EXTENDED RELEASE ORAL
Qty: 90 TABLET | Refills: 0 | Status: SHIPPED | OUTPATIENT
Start: 2020-11-06 | End: 2020-12-07 | Stop reason: SDUPTHER

## 2020-11-06 RX ORDER — METOPROLOL SUCCINATE 25 MG/1
25 TABLET, EXTENDED RELEASE ORAL DAILY
Qty: 90 TABLET | Refills: 3 | Status: SHIPPED | OUTPATIENT
Start: 2020-11-06 | End: 2021-02-04 | Stop reason: SDUPTHER

## 2020-11-13 ENCOUNTER — HOSPITAL ENCOUNTER (OUTPATIENT)
Dept: INFUSION CENTER | Facility: HOSPITAL | Age: 65
Discharge: HOME | End: 2020-11-13
Attending: INTERNAL MEDICINE
Payer: COMMERCIAL

## 2020-11-13 VITALS
HEART RATE: 66 BPM | TEMPERATURE: 96.5 F | RESPIRATION RATE: 18 BRPM | SYSTOLIC BLOOD PRESSURE: 119 MMHG | DIASTOLIC BLOOD PRESSURE: 58 MMHG | OXYGEN SATURATION: 97 %

## 2020-11-13 DIAGNOSIS — D72.119 HYPEREOSINOPHILIC SYNDROME, UNSPECIFIED TYPE: ICD-10-CM

## 2020-11-13 DIAGNOSIS — J45.50 SEVERE PERSISTENT ASTHMA WITHOUT COMPLICATION: Primary | ICD-10-CM

## 2020-11-13 PROCEDURE — 96372 THER/PROPH/DIAG INJ SC/IM: CPT

## 2020-11-13 RX ORDER — EPINEPHRINE 1 MG/ML
0.3 INJECTION, SOLUTION, CONCENTRATE INTRAVENOUS ONCE
Status: DISCONTINUED | OUTPATIENT
Start: 2020-11-13 | End: 2020-11-16 | Stop reason: HOSPADM

## 2020-11-13 RX ORDER — EPINEPHRINE 1 MG/ML
0.3 INJECTION, SOLUTION, CONCENTRATE INTRAVENOUS ONCE
Status: CANCELLED | OUTPATIENT
Start: 2020-12-15

## 2020-11-13 RX ORDER — EPINEPHRINE 1 MG/ML
0.3 INJECTION, SOLUTION, CONCENTRATE INTRAVENOUS ONCE
Status: CANCELLED | OUTPATIENT
Start: 2020-12-11

## 2020-11-13 RX ADMIN — BENRALIZUMAB 30 MG: 30 INJECTION, SOLUTION SUBCUTANEOUS at 10:24

## 2020-11-24 ENCOUNTER — ANESTHESIA EVENT (OUTPATIENT)
Dept: PERIOP | Facility: AMBULARY SURGERY CENTER | Age: 65
End: 2020-11-24
Payer: COMMERCIAL

## 2020-12-04 ENCOUNTER — LAB (OUTPATIENT)
Dept: LAB | Facility: CLINIC | Age: 65
End: 2020-12-04
Payer: COMMERCIAL

## 2020-12-04 DIAGNOSIS — E78.5 DYSLIPIDEMIA: ICD-10-CM

## 2020-12-04 DIAGNOSIS — C82.90 FOLLICULAR LYMPHOMA, UNSPECIFIED FOLLICULAR LYMPHOMA TYPE, UNSPECIFIED BODY REGION (HCC): ICD-10-CM

## 2020-12-04 DIAGNOSIS — M67.432 GANGLION CYST OF WRIST, LEFT: ICD-10-CM

## 2020-12-04 LAB
ALBUMIN SERPL BCP-MCNC: 3.8 G/DL (ref 3.5–5)
ALP SERPL-CCNC: 49 U/L (ref 46–116)
ALT SERPL W P-5'-P-CCNC: 44 U/L (ref 12–78)
ANION GAP SERPL CALCULATED.3IONS-SCNC: 6 MMOL/L (ref 4–13)
AST SERPL W P-5'-P-CCNC: 28 U/L (ref 5–45)
BASOPHILS # BLD AUTO: 0.02 THOUSANDS/ΜL (ref 0–0.1)
BASOPHILS NFR BLD AUTO: 0 % (ref 0–1)
BILIRUB SERPL-MCNC: 0.8 MG/DL (ref 0.2–1)
BUN SERPL-MCNC: 14 MG/DL (ref 5–25)
CALCIUM SERPL-MCNC: 9.4 MG/DL (ref 8.3–10.1)
CHLORIDE SERPL-SCNC: 105 MMOL/L (ref 100–108)
CHOLEST SERPL-MCNC: 231 MG/DL (ref 50–200)
CO2 SERPL-SCNC: 27 MMOL/L (ref 21–32)
CREAT SERPL-MCNC: 0.95 MG/DL (ref 0.6–1.3)
EOSINOPHIL # BLD AUTO: 0 THOUSAND/ΜL (ref 0–0.61)
EOSINOPHIL NFR BLD AUTO: 0 % (ref 0–6)
ERYTHROCYTE [DISTWIDTH] IN BLOOD BY AUTOMATED COUNT: 13.1 % (ref 11.6–15.1)
GFR SERPL CREATININE-BSD FRML MDRD: 64 ML/MIN/1.73SQ M
GLUCOSE P FAST SERPL-MCNC: 117 MG/DL (ref 65–99)
HCT VFR BLD AUTO: 42 % (ref 34.8–46.1)
HDLC SERPL-MCNC: 43 MG/DL
HGB BLD-MCNC: 13.8 G/DL (ref 11.5–15.4)
IMM GRANULOCYTES # BLD AUTO: 0.02 THOUSAND/UL (ref 0–0.2)
IMM GRANULOCYTES NFR BLD AUTO: 0 % (ref 0–2)
LDLC SERPL CALC-MCNC: 128 MG/DL (ref 0–100)
LYMPHOCYTES # BLD AUTO: 1.64 THOUSANDS/ΜL (ref 0.6–4.47)
LYMPHOCYTES NFR BLD AUTO: 30 % (ref 14–44)
MCH RBC QN AUTO: 30.9 PG (ref 26.8–34.3)
MCHC RBC AUTO-ENTMCNC: 32.9 G/DL (ref 31.4–37.4)
MCV RBC AUTO: 94 FL (ref 82–98)
MONOCYTES # BLD AUTO: 0.5 THOUSAND/ΜL (ref 0.17–1.22)
MONOCYTES NFR BLD AUTO: 9 % (ref 4–12)
NEUTROPHILS # BLD AUTO: 3.28 THOUSANDS/ΜL (ref 1.85–7.62)
NEUTS SEG NFR BLD AUTO: 61 % (ref 43–75)
NRBC BLD AUTO-RTO: 0 /100 WBCS
PLATELET # BLD AUTO: 198 THOUSANDS/UL (ref 149–390)
PMV BLD AUTO: 11.5 FL (ref 8.9–12.7)
POTASSIUM SERPL-SCNC: 4.2 MMOL/L (ref 3.5–5.3)
PROT SERPL-MCNC: 7.1 G/DL (ref 6.4–8.2)
RBC # BLD AUTO: 4.47 MILLION/UL (ref 3.81–5.12)
SODIUM SERPL-SCNC: 138 MMOL/L (ref 136–145)
TRIGL SERPL-MCNC: 300 MG/DL
WBC # BLD AUTO: 5.46 THOUSAND/UL (ref 4.31–10.16)

## 2020-12-04 PROCEDURE — 36415 COLL VENOUS BLD VENIPUNCTURE: CPT

## 2020-12-04 PROCEDURE — 80053 COMPREHEN METABOLIC PANEL: CPT

## 2020-12-04 PROCEDURE — 80061 LIPID PANEL: CPT

## 2020-12-04 PROCEDURE — 85025 COMPLETE CBC W/AUTO DIFF WBC: CPT

## 2020-12-07 RX ORDER — DOXYCYCLINE HYCLATE 100 MG
1 TABLET ORAL AS NEEDED
COMMUNITY
Start: 2020-09-25 | End: 2021-06-03 | Stop reason: ALTCHOICE

## 2020-12-07 RX ORDER — LIDOCAINE 40 MG/G
CREAM TOPICAL
COMMUNITY
Start: 2020-09-25 | End: 2021-08-18

## 2020-12-08 ENCOUNTER — ANESTHESIA (OUTPATIENT)
Dept: PERIOP | Facility: AMBULARY SURGERY CENTER | Age: 65
End: 2020-12-08
Payer: COMMERCIAL

## 2020-12-08 ENCOUNTER — HOSPITAL ENCOUNTER (OUTPATIENT)
Facility: AMBULARY SURGERY CENTER | Age: 65
Setting detail: OUTPATIENT SURGERY
Discharge: HOME | End: 2020-12-08
Attending: SURGERY | Admitting: SURGERY
Payer: COMMERCIAL

## 2020-12-08 VITALS
HEIGHT: 66 IN | BODY MASS INDEX: 26.52 KG/M2 | OXYGEN SATURATION: 97 % | DIASTOLIC BLOOD PRESSURE: 67 MMHG | HEART RATE: 67 BPM | TEMPERATURE: 97.5 F | SYSTOLIC BLOOD PRESSURE: 109 MMHG | WEIGHT: 165 LBS | RESPIRATION RATE: 18 BRPM

## 2020-12-08 VITALS — HEART RATE: 75 BPM

## 2020-12-08 DIAGNOSIS — Z47.89 AFTERCARE FOLLOWING SURGERY OF THE MUSCULOSKELETAL SYSTEM: ICD-10-CM

## 2020-12-08 DIAGNOSIS — M67.432 GANGLION CYST OF WRIST, LEFT: Primary | ICD-10-CM

## 2020-12-08 PROCEDURE — NC001 PR NO CHARGE: Performed by: SURGERY

## 2020-12-08 PROCEDURE — 88304 TISSUE EXAM BY PATHOLOGIST: CPT | Performed by: PATHOLOGY

## 2020-12-08 PROCEDURE — 25111 REMOVE WRIST TENDON LESION: CPT | Performed by: SURGERY

## 2020-12-08 PROCEDURE — 25111 REMOVE WRIST TENDON LESION: CPT | Performed by: PHYSICIAN ASSISTANT

## 2020-12-08 RX ORDER — PROPOFOL 10 MG/ML
INJECTION, EMULSION INTRAVENOUS AS NEEDED
Status: DISCONTINUED | OUTPATIENT
Start: 2020-12-08 | End: 2020-12-08

## 2020-12-08 RX ORDER — FENTANYL CITRATE/PF 50 MCG/ML
25 SYRINGE (ML) INJECTION
Status: DISCONTINUED | OUTPATIENT
Start: 2020-12-08 | End: 2020-12-08 | Stop reason: HOSPADM

## 2020-12-08 RX ORDER — MAGNESIUM HYDROXIDE 1200 MG/15ML
LIQUID ORAL AS NEEDED
Status: DISCONTINUED | OUTPATIENT
Start: 2020-12-08 | End: 2020-12-08 | Stop reason: HOSPADM

## 2020-12-08 RX ORDER — FENTANYL CITRATE 50 UG/ML
INJECTION, SOLUTION INTRAMUSCULAR; INTRAVENOUS AS NEEDED
Status: DISCONTINUED | OUTPATIENT
Start: 2020-12-08 | End: 2020-12-08

## 2020-12-08 RX ORDER — HYDROCODONE BITARTRATE AND ACETAMINOPHEN 5; 325 MG/1; MG/1
1 TABLET ORAL EVERY 6 HOURS PRN
Qty: 12 TABLET | Refills: 0 | Status: SHIPPED | OUTPATIENT
Start: 2020-12-08 | End: 2020-12-21

## 2020-12-08 RX ORDER — PROPOFOL 10 MG/ML
INJECTION, EMULSION INTRAVENOUS CONTINUOUS PRN
Status: DISCONTINUED | OUTPATIENT
Start: 2020-12-08 | End: 2020-12-08

## 2020-12-08 RX ORDER — MIDAZOLAM HYDROCHLORIDE 2 MG/2ML
INJECTION, SOLUTION INTRAMUSCULAR; INTRAVENOUS AS NEEDED
Status: DISCONTINUED | OUTPATIENT
Start: 2020-12-08 | End: 2020-12-08

## 2020-12-08 RX ORDER — CEFAZOLIN SODIUM 2 G/50ML
2000 SOLUTION INTRAVENOUS ONCE
Status: COMPLETED | OUTPATIENT
Start: 2020-12-08 | End: 2020-12-08

## 2020-12-08 RX ORDER — SODIUM CHLORIDE, SODIUM LACTATE, POTASSIUM CHLORIDE, CALCIUM CHLORIDE 600; 310; 30; 20 MG/100ML; MG/100ML; MG/100ML; MG/100ML
INJECTION, SOLUTION INTRAVENOUS CONTINUOUS PRN
Status: DISCONTINUED | OUTPATIENT
Start: 2020-12-08 | End: 2020-12-08

## 2020-12-08 RX ORDER — ONDANSETRON 2 MG/ML
INJECTION INTRAMUSCULAR; INTRAVENOUS AS NEEDED
Status: DISCONTINUED | OUTPATIENT
Start: 2020-12-08 | End: 2020-12-08

## 2020-12-08 RX ORDER — LIDOCAINE HYDROCHLORIDE 10 MG/ML
INJECTION, SOLUTION EPIDURAL; INFILTRATION; INTRACAUDAL; PERINEURAL AS NEEDED
Status: DISCONTINUED | OUTPATIENT
Start: 2020-12-08 | End: 2020-12-08

## 2020-12-08 RX ORDER — ONDANSETRON 2 MG/ML
4 INJECTION INTRAMUSCULAR; INTRAVENOUS ONCE AS NEEDED
Status: DISCONTINUED | OUTPATIENT
Start: 2020-12-08 | End: 2020-12-08 | Stop reason: HOSPADM

## 2020-12-08 RX ORDER — HYDROMORPHONE HCL/PF 1 MG/ML
0.2 SYRINGE (ML) INJECTION
Status: DISCONTINUED | OUTPATIENT
Start: 2020-12-08 | End: 2020-12-08 | Stop reason: HOSPADM

## 2020-12-08 RX ADMIN — PROPOFOL 80 MCG/KG/MIN: 10 INJECTION, EMULSION INTRAVENOUS at 07:38

## 2020-12-08 RX ADMIN — LIDOCAINE HYDROCHLORIDE 40 MG: 10 INJECTION, SOLUTION EPIDURAL; INFILTRATION; INTRACAUDAL; PERINEURAL at 07:38

## 2020-12-08 RX ADMIN — PROPOFOL 30 MG: 10 INJECTION, EMULSION INTRAVENOUS at 08:22

## 2020-12-08 RX ADMIN — FENTANYL CITRATE 25 MCG: 50 INJECTION INTRAMUSCULAR; INTRAVENOUS at 08:51

## 2020-12-08 RX ADMIN — FENTANYL CITRATE 50 MCG: 50 INJECTION, SOLUTION INTRAMUSCULAR; INTRAVENOUS at 07:38

## 2020-12-08 RX ADMIN — PROPOFOL 10 MG: 10 INJECTION, EMULSION INTRAVENOUS at 07:51

## 2020-12-08 RX ADMIN — FENTANYL CITRATE 25 MCG: 50 INJECTION INTRAMUSCULAR; INTRAVENOUS at 08:56

## 2020-12-08 RX ADMIN — FENTANYL CITRATE 25 MCG: 50 INJECTION, SOLUTION INTRAMUSCULAR; INTRAVENOUS at 08:00

## 2020-12-08 RX ADMIN — SODIUM CHLORIDE, SODIUM LACTATE, POTASSIUM CHLORIDE, AND CALCIUM CHLORIDE: .6; .31; .03; .02 INJECTION, SOLUTION INTRAVENOUS at 07:21

## 2020-12-08 RX ADMIN — ONDANSETRON 4 MG: 2 INJECTION INTRAMUSCULAR; INTRAVENOUS at 07:31

## 2020-12-08 RX ADMIN — FENTANYL CITRATE 25 MCG: 50 INJECTION, SOLUTION INTRAMUSCULAR; INTRAVENOUS at 07:51

## 2020-12-08 RX ADMIN — PROPOFOL 15 MG: 10 INJECTION, EMULSION INTRAVENOUS at 07:58

## 2020-12-08 RX ADMIN — PROPOFOL 20 MG: 10 INJECTION, EMULSION INTRAVENOUS at 08:24

## 2020-12-08 RX ADMIN — MIDAZOLAM HYDROCHLORIDE 2 MG: 1 INJECTION, SOLUTION INTRAMUSCULAR; INTRAVENOUS at 07:31

## 2020-12-08 RX ADMIN — PROPOFOL 35 MG: 10 INJECTION, EMULSION INTRAVENOUS at 07:38

## 2020-12-08 RX ADMIN — CEFAZOLIN SODIUM 2000 MG: 2 SOLUTION INTRAVENOUS at 07:31

## 2020-12-14 ENCOUNTER — TELEPHONE (OUTPATIENT)
Dept: HEMATOLOGY ONCOLOGY | Facility: CLINIC | Age: 65
End: 2020-12-14

## 2020-12-15 ENCOUNTER — OFFICE VISIT (OUTPATIENT)
Dept: HEMATOLOGY ONCOLOGY | Facility: CLINIC | Age: 65
End: 2020-12-15
Payer: COMMERCIAL

## 2020-12-15 ENCOUNTER — TELEPHONE (OUTPATIENT)
Dept: OBGYN CLINIC | Facility: HOSPITAL | Age: 65
End: 2020-12-15

## 2020-12-15 ENCOUNTER — HOSPITAL ENCOUNTER (OUTPATIENT)
Dept: INFUSION CENTER | Facility: HOSPITAL | Age: 65
Discharge: HOME | End: 2020-12-15
Attending: INTERNAL MEDICINE
Payer: COMMERCIAL

## 2020-12-15 VITALS
OXYGEN SATURATION: 99 % | SYSTOLIC BLOOD PRESSURE: 128 MMHG | DIASTOLIC BLOOD PRESSURE: 73 MMHG | HEART RATE: 68 BPM | RESPIRATION RATE: 18 BRPM | TEMPERATURE: 97.3 F

## 2020-12-15 VITALS
TEMPERATURE: 97 F | WEIGHT: 167.6 LBS | OXYGEN SATURATION: 97 % | DIASTOLIC BLOOD PRESSURE: 77 MMHG | BODY MASS INDEX: 26.93 KG/M2 | HEIGHT: 66 IN | SYSTOLIC BLOOD PRESSURE: 117 MMHG | HEART RATE: 73 BPM

## 2020-12-15 DIAGNOSIS — C82.90 FOLLICULAR LYMPHOMA, UNSPECIFIED FOLLICULAR LYMPHOMA TYPE, UNSPECIFIED BODY REGION (HCC): Primary | ICD-10-CM

## 2020-12-15 DIAGNOSIS — D72.119 HYPEREOSINOPHILIC SYNDROME, UNSPECIFIED TYPE: ICD-10-CM

## 2020-12-15 DIAGNOSIS — J45.50 SEVERE PERSISTENT ASTHMA WITHOUT COMPLICATION: Primary | ICD-10-CM

## 2020-12-15 PROCEDURE — 99214 OFFICE O/P EST MOD 30 MIN: CPT | Performed by: INTERNAL MEDICINE

## 2020-12-15 PROCEDURE — 96372 THER/PROPH/DIAG INJ SC/IM: CPT

## 2020-12-15 RX ORDER — EPINEPHRINE 1 MG/ML
0.3 INJECTION, SOLUTION, CONCENTRATE INTRAVENOUS ONCE
Status: CANCELLED | OUTPATIENT
Start: 2021-01-08

## 2020-12-15 RX ORDER — EPINEPHRINE 1 MG/ML
0.3 INJECTION, SOLUTION, CONCENTRATE INTRAVENOUS ONCE
Status: DISCONTINUED | OUTPATIENT
Start: 2020-12-15 | End: 2020-12-18 | Stop reason: HOSPADM

## 2020-12-15 RX ADMIN — BENRALIZUMAB 30 MG: 30 INJECTION, SOLUTION SUBCUTANEOUS at 11:54

## 2020-12-21 ENCOUNTER — APPOINTMENT (OUTPATIENT)
Dept: RADIOLOGY | Facility: MEDICAL CENTER | Age: 65
End: 2020-12-21
Payer: COMMERCIAL

## 2020-12-21 ENCOUNTER — OFFICE VISIT (OUTPATIENT)
Dept: OBGYN CLINIC | Facility: CLINIC | Age: 65
End: 2020-12-21

## 2020-12-21 ENCOUNTER — OFFICE VISIT (OUTPATIENT)
Dept: FAMILY MEDICINE CLINIC | Facility: CLINIC | Age: 65
End: 2020-12-21
Payer: COMMERCIAL

## 2020-12-21 ENCOUNTER — HOSPITAL ENCOUNTER (OUTPATIENT)
Dept: RADIOLOGY | Facility: MEDICAL CENTER | Age: 65
Discharge: HOME | End: 2020-12-21
Payer: COMMERCIAL

## 2020-12-21 VITALS
DIASTOLIC BLOOD PRESSURE: 64 MMHG | SYSTOLIC BLOOD PRESSURE: 102 MMHG | HEIGHT: 66 IN | RESPIRATION RATE: 16 BRPM | OXYGEN SATURATION: 98 % | TEMPERATURE: 97.2 F | WEIGHT: 165 LBS | BODY MASS INDEX: 26.52 KG/M2 | HEART RATE: 72 BPM

## 2020-12-21 VITALS
SYSTOLIC BLOOD PRESSURE: 130 MMHG | WEIGHT: 165 LBS | DIASTOLIC BLOOD PRESSURE: 80 MMHG | HEIGHT: 66 IN | BODY MASS INDEX: 26.52 KG/M2 | HEART RATE: 77 BPM

## 2020-12-21 VITALS — BODY MASS INDEX: 26.52 KG/M2 | WEIGHT: 165 LBS | HEIGHT: 66 IN

## 2020-12-21 DIAGNOSIS — K90.0 CELIAC DISEASE: ICD-10-CM

## 2020-12-21 DIAGNOSIS — C82.90 FOLLICULAR LYMPHOMA, UNSPECIFIED FOLLICULAR LYMPHOMA TYPE, UNSPECIFIED BODY REGION (HCC): ICD-10-CM

## 2020-12-21 DIAGNOSIS — J45.50 SEVERE PERSISTENT ASTHMA WITHOUT COMPLICATION: ICD-10-CM

## 2020-12-21 DIAGNOSIS — M25.511 RIGHT SHOULDER PAIN, UNSPECIFIED CHRONICITY: Primary | ICD-10-CM

## 2020-12-21 DIAGNOSIS — M67.432 GANGLION CYST OF WRIST, LEFT: Primary | ICD-10-CM

## 2020-12-21 DIAGNOSIS — Z12.31 SCREENING MAMMOGRAM, ENCOUNTER FOR: ICD-10-CM

## 2020-12-21 DIAGNOSIS — M25.511 RIGHT SHOULDER PAIN, UNSPECIFIED CHRONICITY: ICD-10-CM

## 2020-12-21 PROCEDURE — 99213 OFFICE O/P EST LOW 20 MIN: CPT | Performed by: INTERNAL MEDICINE

## 2020-12-21 PROCEDURE — 73030 X-RAY EXAM OF SHOULDER: CPT

## 2020-12-21 PROCEDURE — 99024 POSTOP FOLLOW-UP VISIT: CPT | Performed by: PHYSICIAN ASSISTANT

## 2020-12-21 PROCEDURE — 77067 SCR MAMMO BI INCL CAD: CPT

## 2020-12-21 PROCEDURE — 77063 BREAST TOMOSYNTHESIS BI: CPT

## 2020-12-30 ENCOUNTER — OFFICE VISIT (OUTPATIENT)
Dept: PULMONOLOGY | Facility: CLINIC | Age: 65
End: 2020-12-30
Payer: COMMERCIAL

## 2020-12-30 VITALS
HEART RATE: 74 BPM | TEMPERATURE: 96.4 F | RESPIRATION RATE: 16 BRPM | SYSTOLIC BLOOD PRESSURE: 120 MMHG | WEIGHT: 165 LBS | DIASTOLIC BLOOD PRESSURE: 80 MMHG | BODY MASS INDEX: 26.63 KG/M2 | OXYGEN SATURATION: 98 %

## 2020-12-30 DIAGNOSIS — J30.89 NON-SEASONAL ALLERGIC RHINITIS, UNSPECIFIED TRIGGER: ICD-10-CM

## 2020-12-30 DIAGNOSIS — R93.89 ABNORMAL CT SCAN, CHEST: ICD-10-CM

## 2020-12-30 DIAGNOSIS — J45.50 SEVERE PERSISTENT ASTHMA WITHOUT COMPLICATION: Primary | ICD-10-CM

## 2020-12-30 PROCEDURE — 99214 OFFICE O/P EST MOD 30 MIN: CPT | Performed by: INTERNAL MEDICINE

## 2021-01-04 NOTE — PROGRESS NOTES
Patient calling stated her insurance has changed and will need to  prior  auth to be done before her next Fasenra injection which is scheduled for 2/9   Patient no longer has [employer] she only has [employer]  as her primary  Patient would like to tranfers to the [address] [place] [address] if possible    [phone]

## 2021-01-05 DIAGNOSIS — I49.3 PVC (PREMATURE VENTRICULAR CONTRACTION): ICD-10-CM

## 2021-01-05 RX ORDER — FLECAINIDE ACETATE 100 MG/1
100 TABLET ORAL 2 TIMES DAILY
Qty: 60 TABLET | Refills: 5 | Status: SHIPPED | OUTPATIENT
Start: 2021-01-05 | End: 2021-09-29 | Stop reason: SDUPTHER

## 2021-01-08 ENCOUNTER — TELEPHONE (OUTPATIENT)
Dept: PULMONOLOGY | Facility: CLINIC | Age: 66
End: 2021-01-08

## 2021-01-08 NOTE — TELEPHONE ENCOUNTER
Patient calling saying she would like a call back from Jabier Leventhal  She states she has some questions regarding the the paperwork for Tj Pal   [phone]

## 2021-01-08 NOTE — PROGRESS NOTES
Patient will sign new documents needed to restart auth for fasenra  With new insurance  Once signed by her and Dr Vikki Rangel will send to start process

## 2021-01-13 DIAGNOSIS — D72.10 EOSINOPHILIA: ICD-10-CM

## 2021-01-13 DIAGNOSIS — J45.50 SEVERE PERSISTENT ASTHMA WITHOUT COMPLICATION: ICD-10-CM

## 2021-01-13 NOTE — TELEPHONE ENCOUNTER
Re-enrollment for [facility] sent to [employer] on 1/13/21 [phone]  Assistance program was sent over as well to [facility] [phone]

## 2021-01-15 NOTE — TELEPHONE ENCOUNTER
Fidencio Urbina prior Clifton Lozano submitted through [employer] with [employer]  Clinicals were faxed to [phone] pending case # [id number]    Patient enrolled in assistance program ACCESS 360 phone number [phone]  VIRTUAL DEBT CARD # [id number]    EXP 12/31/2023

## 2021-01-18 NOTE — TELEPHONE ENCOUNTER
Patient is calling she knows that her fasenra has been approved she wants to know when this will start and she has some other additional questions bilateral upper extremity ROM was WFL (within functional limits)/bilateral lower extremity ROM was WFL (within functional limits)

## 2021-01-19 DIAGNOSIS — D72.119 HYPEREOSINOPHILIC SYNDROME, UNSPECIFIED TYPE: Primary | ICD-10-CM

## 2021-01-19 DIAGNOSIS — J45.50 SEVERE PERSISTENT ASTHMA WITHOUT COMPLICATION: ICD-10-CM

## 2021-01-19 RX ORDER — EPINEPHRINE 1 MG/ML
0.3 INJECTION, SOLUTION, CONCENTRATE INTRAVENOUS ONCE
Status: CANCELLED | OUTPATIENT
Start: 2021-02-09

## 2021-02-04 DIAGNOSIS — I10 HYPERTENSION, UNSPECIFIED TYPE: ICD-10-CM

## 2021-02-04 RX ORDER — METOPROLOL SUCCINATE 25 MG/1
25 TABLET, EXTENDED RELEASE ORAL DAILY
Qty: 90 TABLET | Refills: 3 | Status: SHIPPED | OUTPATIENT
Start: 2021-02-04 | End: 2022-01-17 | Stop reason: SDUPTHER

## 2021-02-04 NOTE — TELEPHONE ENCOUNTER
Rene   [facility]             Needs metoprolol 25 refilled but wants a new mail order service     90 days     [employer] mail order   But through [employer] [employer] mail order   [phone]

## 2021-02-09 ENCOUNTER — HOSPITAL ENCOUNTER (OUTPATIENT)
Dept: INFUSION CENTER | Facility: HOSPITAL | Age: 66
Discharge: HOME | End: 2021-02-09
Attending: INTERNAL MEDICINE
Payer: COMMERCIAL

## 2021-02-09 VITALS
SYSTOLIC BLOOD PRESSURE: 118 MMHG | DIASTOLIC BLOOD PRESSURE: 56 MMHG | HEART RATE: 68 BPM | RESPIRATION RATE: 16 BRPM | TEMPERATURE: 97 F

## 2021-02-09 DIAGNOSIS — J45.50 SEVERE PERSISTENT ASTHMA WITHOUT COMPLICATION: Primary | ICD-10-CM

## 2021-02-09 DIAGNOSIS — D72.119 HYPEREOSINOPHILIC SYNDROME, UNSPECIFIED TYPE: ICD-10-CM

## 2021-02-09 PROCEDURE — 96372 THER/PROPH/DIAG INJ SC/IM: CPT

## 2021-02-09 RX ORDER — EPINEPHRINE 1 MG/ML
0.3 INJECTION, SOLUTION, CONCENTRATE INTRAVENOUS ONCE
Status: DISCONTINUED | OUTPATIENT
Start: 2021-02-09 | End: 2021-02-13 | Stop reason: HOSPADM

## 2021-02-09 RX ORDER — EPINEPHRINE 1 MG/ML
0.3 INJECTION, SOLUTION, CONCENTRATE INTRAVENOUS ONCE
Status: CANCELLED | OUTPATIENT
Start: 2021-04-06

## 2021-02-09 RX ADMIN — BENRALIZUMAB 30 MG: 30 INJECTION, SOLUTION SUBCUTANEOUS at 11:09

## 2021-02-19 ENCOUNTER — OFFICE VISIT (OUTPATIENT)
Dept: CARDIOLOGY CLINIC | Facility: CLINIC | Age: 66
End: 2021-02-19
Payer: COMMERCIAL

## 2021-02-19 VITALS
DIASTOLIC BLOOD PRESSURE: 76 MMHG | HEIGHT: 66 IN | SYSTOLIC BLOOD PRESSURE: 118 MMHG | WEIGHT: 166.45 LBS | HEART RATE: 70 BPM | BODY MASS INDEX: 26.75 KG/M2

## 2021-02-19 DIAGNOSIS — E78.2 MIXED HYPERLIPIDEMIA: ICD-10-CM

## 2021-02-19 DIAGNOSIS — Z79.899 ENCOUNTER FOR MONITORING FLECAINIDE THERAPY: Primary | ICD-10-CM

## 2021-02-19 DIAGNOSIS — I49.3 PVC'S (PREMATURE VENTRICULAR CONTRACTIONS): ICD-10-CM

## 2021-02-19 DIAGNOSIS — Z51.81 ENCOUNTER FOR MONITORING FLECAINIDE THERAPY: Primary | ICD-10-CM

## 2021-02-19 PROCEDURE — 93000 ELECTROCARDIOGRAM COMPLETE: CPT | Performed by: INTERNAL MEDICINE

## 2021-02-19 PROCEDURE — 3008F BODY MASS INDEX DOCD: CPT | Performed by: INTERNAL MEDICINE

## 2021-02-19 PROCEDURE — 99213 OFFICE O/P EST LOW 20 MIN: CPT | Performed by: INTERNAL MEDICINE

## 2021-02-19 NOTE — PROGRESS NOTES
[facility] HospitalNAZARIO Zarate, [address]    Sonia Horn, [address] [facility]   [phone]                                              Cardiology Office Follow Up  Ashkan Hair, 63 y o  female  YOB: 1955  MRN: [id number] Encounter: [id number]      PCP - Gunner Colon,     Assessment  1  Frequent PVCs  · Holter monitor - 9/2017 - normal sinus rhythm, 1261 PVCs, palpitations correlated with PVCs  · Exercise stress test 07/2017 - 9 minutes 17 seconds, 109% maximum predicted heart rate, no complaints, stress ECG negative for ischemia  2  Flecainide therapy monitoring  3  Hyperlipidemia    Plan  Frequent PVCs, Flecainide therapy monitoring  · Patient had about 1200 PVCs on Holter monitor in 2017, but due to significant symptomatic distress, she was started on flecainide and metoprolol, with which her symptoms have resolved  · Currently on metoprolol succinate 25 mg, flecainide 100 mg bid  · Attempted decreasing flecainide dosing while on monitor resulted in recurrence of symptoms, although holter correlation showed PVCs on only some occasions  But considering significant symptoms, she was restarted on prior flecainide dosing  · Regular exercise ECG stress test - 9/4/2020 was without any significant ischemia  · ECG today - NSR, normal QTc    · Continue flecainide 100 bid, metoprolol succinate 25 mg daily    Hyperlipidemia  · Lipid panel from [id number], show cholesterol has been constant elevated in the 200s with elevated triglycerides in 200-400s  · She has wanted to avoid statins, and has been on fish oil as a result  · She was previously on Lovaza 2 g twice a day, but recently noted that she bled quite a bit when she accidentally cut her finger and as a result has been decreased to 3 times a week  · Ten year ASCVD risk score is 6%  · Coronary calcium score came back as zero, and as a result statins were held off  · Lipid panel - 12/4/2020 - , , , HDL 43,   · Counseled again regarding dietary modifications in the meantime      ECG today -  Results for orders placed or performed in visit on 02/19/21   POCT ECG    Impression    Normal sinus rhythm  QRS 98 ms  Normal QTc  Low voltage QRS  But otherwise within normal limits          Orders Placed This Encounter   Procedures    POCT ECG       Return in about 1 year (around 2/19/2022), or if symptoms worsen or fail to improve  History of Present Illness   72-year-old female, who works as an X-ray tech at [employer], comes in as a new patient for reestablishment of care  She is transferring care from our [place] office with Kalli Avina to here, as we are much closer to her  In 2017, she had a viral infection after which she developed symptoms of palpitations  These palpitations persisted for an extended period, and she was found to have PVCs on Holter monitor  As a result, she was started on metoprolol and flecainide, with which her symptoms resolved completely  She has remained on flecainide since then, and does not report any major problems with the same  She continues to do well otherwise, and does not report any symptoms of chest pain, shortness of breath, palpitations, fatigue, dizziness or lightheadedness  Interval history - 2/19/2021  She can you 2 months  He has been significant palpitations while on flecainide  She has been staying active  No complaints of chest pain, shortness of breath  No h/o syncope          Historical Information   Past Medical History:   Diagnosis Date    Asthma     Celiac disease     [facility]     GERD (gastroesophageal reflux disease)     Heart palpitations     History of colonic polyps     resolved 07/12/2016    History of radiation therapy 2010    Insomnia     Irregular heart beat     Lymphoma, 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HCC)     non hodgekins, remission    Malignant lymphoma ([facility] [address] ) 2010    rt arm cutaneous follicular stage ia (rt diatal medical biceps area , s/p resected and s/p radistion treatment    resolved 12/17/2014    Postmenopausal disorder     resolved 09/21/2017    Restless legs syndrome     resolved 09/21/2017    TMJ syndrome     resolved 09/21/2017     Past Surgical History:   Procedure Laterality Date    COLONOSCOPY  04/16/2019    FUNCTIONAL ENDOSCOPIC SINUS SURGERY Bilateral 2013    Dr Nelson Kidney      age 39 complete    LYMPH NODE DISSECTION Right     arm    NASAL SEPTUM SURGERY  2013    with turbinate reduction - Dr Gardenia Tracy ESOPHAGOGASTRODUODENOSCOPY TRANSORAL DIAGNOSTIC N/A 1/18/2016    Procedure: ESOPHAGOGASTRODUODENOSCOPY (EGD); Surgeon: Michell Caraballo MD;  [facility];   Service: Gastroenterology    PA EXCIS RECURRENT GANGLION WRIST Left 12/8/2020    Procedure: WRIST GANGLION CYST EXCISION;  Surgeon: Arun Feng MD;  [facility];  Service: Orthopedics    SYNOVECTOMY N/A 12/8/2020    Procedure: ECU TENOSYNOVECTOMY;  Surgeon: Arun Feng MD;  [facility];  Service: Orthopedics    TONSILLECTOMY      TOTAL ABDOMINAL HYSTERECTOMY W/ BILATERAL SALPINGOOPHORECTOMY      age 54   [address] MSK PROCEDURE  1/16/2020   Gaviota Flatter GUIDED MSK PROCEDURE  8/7/2020     Family History   Problem Relation Age of Onset    Lymphoma Mother     Throat cancer Father     Heart failure Father     Atrial fibrillation Father     Diabetes Father     Colonic polyp Father     Hypertension Father     Thyroid cancer Sister     Breast cancer Paternal Grandmother 71    Breast cancer Paternal Aunt 71    Ovarian cancer Paternal Aunt 79    No Known Problems Maternal Grandmother     No Known Problems Maternal Grandfather     Cancer Paternal Grandfather     Lung cancer Paternal Grandfather     BRCA1 Positive Cousin     Skin cancer Paternal Aunt     Throat cancer Paternal Uncle     No Known Problems Maternal Aunt      Current Outpatient Medications on File Prior to Visit   Medication Sig Dispense Refill    acetaminophen (TYLENOL) 500 mg tablet Take 500 mg by mouth every 6 (six) hours as needed for mild pain   Ascorbic Acid (VITAMIN C) 100 MG tablet Take by mouth      benralizumab (FASENRA) subcutaneous injection Inject 1 mL (30 mg total) under the skin every 56 days for 6 doses 1 Syringe 6    bimatoprost (LATISSE) 0 03 % ophthalmic solution Take as directed      cyanocobalamin (VITAMIN B-12) 1,000 mcg tablet Take 1,000 mcg by mouth daily      diclofenac sodium (VOLTAREN) 1 % Apply 2 g topically 4 (four) times a day 1 Tube 2    doxycycline hyclate (VIBRA-TABS) 100 mg tablet Take 1 tablet by mouth as needed      erythromycin with ethanol (EMGEL) 2 % gel Apply topically daily 45 g 1    estradiol (ESTRACE) 0 5 MG tablet 1/2 tablet by mouth once weekly 12 tablet 2    famotidine (PEPCID) 20 mg tablet Take 20 mg by mouth 2 (two) times a day   flecainide (TAMBOCOR) 100 mg tablet Take 1 tablet (100 mg total) by mouth 2 (two) times a day 60 tablet 5    fluticasone (FLONASE) 50 mcg/act nasal spray 2 sprays into each nostril 2 (two) times a day 1 Bottle 11    fluticasone-vilanterol (BREO ELLIPTA) 200-25 MCG/INH inhaler Inhale 1 puff daily Rinse mouth after use   (Patient taking differently: Inhale 1 puff daily as needed Rinse mouth after use ) 3 Inhaler 3    levalbuterol (Xopenex HFA) 45 mcg/act inhaler Inhale 2 puffs every 4 (four) hours as needed for wheezing 1 Inhaler 3    lidocaine (LMX) 4 % cream       Magnesium 500 MG CAPS Take by mouth      meloxicam (MOBIC) 15 mg tablet Take 1 tablet (15 mg total) by mouth daily (Patient taking differently: Take 15 mg by mouth as needed ) 90 tablet 1    metoprolol succinate (TOPROL-XL) 25 mg 24 hr tablet Take 1 tablet (25 mg total) by mouth daily 90 tablet 3    montelukast (SINGULAIR) 10 mg tablet Take 1 tablet (10 mg total) by mouth daily at bedtime 90 tablet 1    olopatadine HCl (PATADAY) 0 2 % opth drops       omega-3-acid ethyl esters (LOVAZA) 1 g capsule Take 2 capsules (2 g total) by mouth 2 (two) times a day (Patient taking differently: Take 2 g by mouth 3 (three) times a week ) 360 capsule 1    ondansetron (ZOFRAN-ODT) 4 mg disintegrating tablet Take 1 tablet (4 mg total) by mouth every 6 (six) hours as needed for nausea or vomiting 30 tablet 0    Pain Relieving 4 % CREA       Vitamin D, Ergocalciferol, 2000 units CAPS Take by mouth      zolpidem (AMBIEN) 5 mg tablet Take 1 tablet (5 mg total) by mouth daily at bedtime as needed for sleep 30 tablet 2    EPINEPHrine (EPIPEN) 0 3 mg/0 3 mL SOAJ Inject 0 3 mL (0 3 mg total) into a muscle once for 1 dose 0 6 mL 0    pantoprazole (PROTONIX) 20 mg tablet Take 1 tablet (20 mg total) by mouth daily 90 tablet 2     No current facility-administered medications on file prior to visit  Allergies   Allergen Reactions    Shellfish-Derived Products Anaphylaxis    Wheat Bran Anaphylaxis    Gluten Meal GI Intolerance     Celiac     Morphine And Related Itching    Nuts Hives     Almonds,walnuts, hazelnuts and other related nuts   Sulfa Antibiotics Rash     Social History     Socioeconomic History    Marital status: /Civil Union     Spouse name: None    Number of children: None    Years of education: None    Highest education level: None   Occupational History    Occupation: X-ray technologist   Social Needs    Financial resource strain: None    Food insecurity     Worry: None     Inability: None    Transportation needs     Medical: None     Non-medical: None   Tobacco Use    Smoking status: Former Smoker     Packs/day: 0 75     Years: 20 00     Pack years: 15 00     Types: Cigarettes     Quit date:      Years since quittin 1    Smokeless tobacco: Never Used   Substance and Sexual Activity    Alcohol use:  Yes     Alcohol/week: 3 0 standard drinks     Types: 3 Glasses of wine per week     Frequency: 2-3 times a week     Drinks per session: 1 or 2     Binge frequency: Never    Drug use: No    Sexual activity: Yes     Partners: Male   Lifestyle    Physical activity     Days per week: None     Minutes per session: None    Stress: None   Relationships    Social connections     Talks on phone: None     Gets together: None     Attends Anabaptist service: None     Active member of club or organization: None     Attends meetings of clubs or organizations: None     Relationship status: None    Intimate partner violence     Fear of current or ex partner: None     Emotionally abused: None     Physically abused: None     Forced sexual activity: None   Other Topics Concern    None   Social History Narrative    Caffeine use    exercise walking         Review of Systems   All other systems reviewed and are negative  Vitals:  Vitals:    02/19/21 1259   BP: 118/76   Pulse: 70   Weight: 75 5 kg (166 lb 7 2 oz)   Height: 5' 6" (1 676 m)     BMI - Body mass index is 26 87 kg/m²  Wt Readings from Last 7 Encounters:   02/19/21 75 5 kg (166 lb 7 2 oz)   12/30/20 74 8 kg (165 lb)   12/21/20 74 8 kg (165 lb)   12/21/20 74 8 kg (165 lb)   12/21/20 74 8 kg (165 lb)   12/15/20 76 kg (167 lb 9 6 oz)   12/08/20 74 8 kg (165 lb)       Physical Exam  Vitals signs and nursing note reviewed  Constitutional:       General: She is not in acute distress  Appearance: Normal appearance  She is well-developed  She is not ill-appearing  HENT:      Head: Normocephalic and atraumatic  Nose: No congestion  Eyes:      General: No scleral icterus  Conjunctiva/sclera: Conjunctivae normal    Neck:      Musculoskeletal: Neck supple  Vascular: No carotid bruit or JVD  Cardiovascular:      Rate and Rhythm: Normal rate and regular rhythm  Pulses: Normal pulses  Heart sounds: Normal heart sounds  No murmur  No friction rub  No gallop  Pulmonary:      Effort: Pulmonary effort is normal  No respiratory distress  Breath sounds: Normal breath sounds  No rales  Abdominal:      General: There is no distension  Palpations: Abdomen is soft  Tenderness: There is no abdominal tenderness  Musculoskeletal:         General: No swelling or tenderness  Right lower leg: No edema  Left lower leg: No edema  Skin:     General: Skin is warm  Neurological:      General: No focal deficit present  Mental Status: She is alert and oriented to person, place, and time  Mental status is at baseline  Psychiatric:         Mood and Affect: Mood normal          Behavior: Behavior normal          Thought Content: Thought content normal        Labs:  CBC:   Lab Results   Component Value Date    WBC 5 46 12/04/2020    RBC 4 47 12/04/2020    HGB 13 8 12/04/2020    HCT 42 0 12/04/2020    MCV 94 12/04/2020     12/04/2020    RDW 13 1 12/04/2020       CMP:   Lab Results   Component Value Date     06/22/2015    K 4 2 12/04/2020     12/04/2020    CO2 27 12/04/2020    ANIONGAP 10 06/22/2015    BUN 14 12/04/2020    CREATININE 0 95 12/04/2020    EGFR 64 12/04/2020    GLUCOSE 112 06/22/2015    CALCIUM 9 4 12/04/2020    AST 28 12/04/2020    ALT 44 12/04/2020    ALKPHOS 49 12/04/2020    PROT 7 2 06/22/2015    BILITOT 0 63 06/22/2015       Magnesium:  Lab Results   Component Value Date    MG 1 6 01/04/2015       Lipid Profile:   Lab Results   Component Value Date    CHOL 200 06/22/2015    HDL 43 12/04/2020    TRIG 300 (H) 12/04/2020    LDLCALC 128 (H) 12/04/2020       Thyroid Studies:   Lab Results   Component Value Date    OQG8GGJFDSWL 1 730 12/28/2018    FREET4 0 92 12/28/2018    B3TWTQS 1 0 08/15/2014       No components found for: Booster [place]    Lab Results   Component Value Date    INR 1 04 01/04/2015   5    Imaging: Us Guided Msk Procedure    Result Date: 8/7/2020  Narrative: ULTRASOUND GUIDED LEFT DORSAL WRIST GANGLION CYST ASPIRATION INDICATION: R22 32: Localized swelling, mass and lump, left upper limb    Left dorsal wrist ganglion cyst  COMPARISON: Ultrasound-guided left wrist ganglion cyst aspiration dated 1/16/2020 was reviewed  TECHNIQUE:  Patient was brought to the ultrasound suite and placed supine on the stretcher  A brief ultrasound examination was performed to localize the left dorsal ulnar wrist ganglion cyst   This cyst was located superficial to the extensor carpi ulnaris tendon  The procedure was explained to the patient, including risks of hemorrhage, infection, and local injury  Informed consent was freely obtained  The patient verbalized expressed understanding of the above risks and wished to proceed with the procedure  Final standard "time-out" procedure performed  An area on the skin was prepped and draped in the usual sterile fashion  Lidocaine was administered to the skin and a 25 gauge 5/8 inch needle was inserted under ultrasound guidance into the ganglion cyst  1 mL of clear yellow-tinged fluid was aspirated  There was no residual fluid  After the procedure, the needle was removed and a sterile band-aid and ACE bandage wrap were applied  The patient tolerated the procedure well and suffered no complications  I asked the patient to call us with any questions, concerns, or acute problems  The patient expressed understanding of the above  Impression: Successful ultrasound-guided left dorsal ganglion cyst aspiration  1 mL of clear yellow-tinged fluid was aspirated  The above findings and procedure were reviewed with Dr Ledy Matson  Procedure was performed by Mike Tomlinson PA-C and Dr Ledy Matson   Workstation performed: [id number]       Cardiac testing:   Results for orders placed during the hospital encounter of 07/28/17   Echo complete with contrast if indicated    Narrative [address]  [facility], [address] [place] [address]  [phone]    Transthoracic Echocardiogram  2D, M-mode, Doppler, and Color Doppler    Study date:  28-Jul-2017    Patient: [facility]  MR number: [id number]  Account number: [id number]  : 1955  Age: 63 years  Gender: Female  Status: Outpatient  Location: [address] [place] [address] [facility]  Height: 65 in  Weight: 161 7 lb  BP: 122/ 84 mmHg    Indications: Palpitations  Diagnoses: R00 2 - Palpitations    Sonographer:  ALFONSO Solitario  Primary Physician:  Cleopatra Valenzuela DO  Referring Physician:  Cleopatra Valenzuela DO  Group:  [id number]  Interpreting Physician:  Kathleen Chambers DO    SUMMARY    LEFT VENTRICLE:  Systolic function was normal  Ejection fraction was estimated to be 60 %  There were no regional wall motion abnormalities  MITRAL VALVE:  There was trace regurgitation  TRICUSPID VALVE:  There was mild regurgitation  PULMONIC VALVE:  There was trace regurgitation  HISTORY: PRIOR HISTORY: Malignant lymphoma; Former smoker    PROCEDURE: The study was performed in the [address]  This was a routine study  The transthoracic approach was used  The study included complete 2D imaging, M-mode, complete spectral Doppler, and color Doppler  The  heart rate was 88 bpm, at the start of the study  Images were obtained from the parasternal, apical, subcostal, and suprasternal notch acoustic windows  Echocardiographic views were limited due to poor acoustic window availability  Image  quality was adequate  LEFT VENTRICLE: Size was normal  Systolic function was normal  Ejection fraction was estimated to be 60 %  There were no regional wall motion abnormalities  Wall thickness was normal  No evidence of apical thrombus  DOPPLER: Left  ventricular diastolic function parameters were normal     RIGHT VENTRICLE: The size was normal  Systolic function was normal  Wall thickness was normal     LEFT ATRIUM: Size was normal     RIGHT ATRIUM: Size was normal     MITRAL VALVE: Valve structure was normal  There was normal leaflet separation  DOPPLER: The transmitral velocity was within the normal range   There was no evidence for stenosis  There was trace regurgitation  AORTIC VALVE: The valve was trileaflet  Leaflets exhibited normal thickness, normal cuspal separation, and sclerosis  DOPPLER: Transaortic velocity was within the normal range  There was no evidence for stenosis  There was no significant  regurgitation  TRICUSPID VALVE: The valve structure was normal  There was normal leaflet separation  DOPPLER: The transtricuspid velocity was within the normal range  There was no evidence for stenosis  There was mild regurgitation  Estimated peak PA  pressure was 25 mmHg  PULMONIC VALVE: Leaflets exhibited normal thickness, no calcification, and normal cuspal separation  DOPPLER: The transpulmonic velocity was within the normal range  There was trace regurgitation  PERICARDIUM: There was no pericardial effusion  The pericardium was normal in appearance  AORTA: The root exhibited normal size  SYSTEMIC VEINS: IVC: The inferior vena cava was normal in size      SYSTEM MEASUREMENT TABLES    2D  %FS: 27 71 %  Ao Diam: 3 09 cm  EDV(Teich): 84 03 ml  EF(Cube): 62 23 %  EF(Teich): 54 04 %  ESV(Cube): 30 48 ml  ESV(Teich): 38 62 ml  IVSd: 0 67 cm  LA Area: 11 17 cm2  LA Diam: 3 02 cm  LVEDV MOD A4C: 89 51 ml  LVEF MOD A4C: 58 64 %  LVESV MOD A4C: 37 03 ml  LVIDd: 4 32 cm  LVIDs: 3 12 cm  LVLd A4C: 8 34 cm  LVLs A4C: 6 37 cm  LVPWd: 0 77 cm  RA Area: 14 32 cm2  RV Diam : 3 7 cm  SI(Cube): 27 74 ml/m2  SI(Teich): 25 09 ml/m2  SV MOD A4C: 52 49 ml  SV(Cube): 50 21 ml  SV(Teich): 45 41 ml    CW  AV Vmax: 1 m/s  AV maxP 02 mmHg  TR Vmax: 2 16 m/s  TR maxP 69 mmHg    MM  TAPSE: 2 1 cm    PW  E': 0 1 m/s  E/E': 7 91  LVOT Vmax: 1 08 m/s  LVOT maxP 64 mmHg  MV A Jesus: 0 67 m/s  MV Dec [place]: 3 68 m/s2  MV DecT: 208 98 ms  MV E Jesus: 0 77 m/s  MV E/A Ratio: 1 14    [facility] Accredited Echocardiography Laboratory    Prepared and electronically signed by    Allie Spears DO  Signed 2017 16:03:54 No results found for this or any previous visit  No results found for this or any previous visit  No results found for this or any previous visit

## 2021-02-19 NOTE — PATIENT INSTRUCTIONS
Hyperlipidemia   AMBULATORY CARE:   Hyperlipidemia  is a high level of lipids (fats) in your blood  These lipids include cholesterol or triglycerides  Lipids are made by your body  They also come from the foods you eat  Your body needs lipids to work properly, but high levels increase your risk for heart disease, heart attack, and stroke  Call 911 for any of the following:   · You have any of the following signs of a heart attack:      ? Squeezing, pressure, or pain in your chest    ? You may  also have any of the following:     ? Discomfort or pain in your back, neck, jaw, stomach, or arm    ? Shortness of breath    ? Nausea or vomiting    ? Lightheadedness or a sudden cold sweat    · You have any of the following signs of a stroke:      ? Numbness or drooping on one side of your face     ? Weakness in an arm or leg    ? Confusion or difficulty speaking    ? Dizziness, a severe headache, or vision loss    Contact your healthcare provider if:   · You have questions or concerns about your condition or care  Treatment of hyperlipidemia  may first include lifestyle changes to help decrease your lipid levels  You may also need to take medicine to lower your lipid levels  Some of the lifestyle changes you may need to make include the following:  · Maintain a healthy weight  Ask your healthcare provider how much you should weigh  Ask him or her to help you create a weight loss plan if you are overweight  Weight loss can decrease your cholesterol and triglyceride levels  · Exercise as directed  Exercise lowers your cholesterol levels and helps you maintain a healthy weight  Get 30 minutes or more of aerobic exercise 4 to 6 days each week  You can split your exercise into four 10-minute workouts instead of 30 minutes at one time  Examples of aerobic exercises include walking briskly, swimming, or riding a bike  Work with your healthcare provider to plan the best exercise program for you  · Do not smoke  Nicotine and other chemicals in cigarettes and cigars can increase your risk for a heart attack and stroke  Ask your healthcare provider for information if you currently smoke and need help to quit  E-cigarettes or smokeless tobacco still contain nicotine  Talk to your healthcare provider before you use these products  · Eat heart-healthy foods  Talk to your dietitian about a heart-healthy diet  The following will help you manage hyperlipidemia:     ? Decrease the total amount of fat you eat  Choose lean meats, fat-free or 1% fat milk, and low-fat dairy products, such as yogurt and cheese  Limit or do not eat red meat  Red meats are high in fat and cholesterol  ? Replace unhealthy fats with healthy fats  Unhealthy fats include saturated fat, trans fat, and cholesterol  Choose soft margarines that are low in saturated fat and have little or no trans fat  Monounsaturated fats are healthy fats  These are found in olive oil, canola oil, avocado, and nuts  Polyunsaturated fats are also healthy  These are found in fish, flaxseed, walnuts, and soybeans  ? Eat 5 or more servings of fruits and vegetables every day  They are low in calories and fat and a good source of essential vitamins  Include dark green, red, and orange vegetables  Examples include spinach, kale, broccoli, and carrots  ? Eat foods high in fiber  Fiber can help lower your cholesterol levels  Choose whole grain, high-fiber foods  Good choices include whole-wheat breads or cereals, beans, peas, fruits, and vegetables  · Ask your healthcare provider if it is safe for you to drink alcohol  Alcohol can increase your cholesterol and triglyceride levels  A drink of alcohol is 12 ounces of beer, 5 ounces of wine, or 1½ ounces of liquor  Follow up with your healthcare provider as directed: You may need to return for more tests  Your healthcare provider may refer you to a dietitian   Write down your questions so you remember to ask them during your visits  © Copyright [address] Information is for End User's use only and may not be sold, redistributed or otherwise used for commercial purposes  All illustrations and images included in CareNotes® are the copyrighted property of A D A M , Inc  or Jacinto De La Cruz  The above information is an  only  It is not intended as medical advice for individual conditions or treatments  Talk to your doctor, nurse or pharmacist before following any medical regimen to see if it is safe and effective for you

## 2021-03-01 ENCOUNTER — TELEPHONE (OUTPATIENT)
Dept: PULMONOLOGY | Facility: CLINIC | Age: 66
End: 2021-03-01

## 2021-03-01 NOTE — TELEPHONE ENCOUNTER
Patient calling stating she has an CT SCAN schedule for next week and wants to make sure the precert  has been completed

## 2021-03-03 ENCOUNTER — OFFICE VISIT (OUTPATIENT)
Dept: FAMILY MEDICINE CLINIC | Facility: CLINIC | Age: 66
End: 2021-03-03
Payer: COMMERCIAL

## 2021-03-03 ENCOUNTER — APPOINTMENT (OUTPATIENT)
Dept: LAB | Facility: MEDICAL CENTER | Age: 66
End: 2021-03-03
Payer: COMMERCIAL

## 2021-03-03 VITALS
WEIGHT: 168 LBS | TEMPERATURE: 96.8 F | RESPIRATION RATE: 16 BRPM | OXYGEN SATURATION: 98 % | BODY MASS INDEX: 27.12 KG/M2 | SYSTOLIC BLOOD PRESSURE: 126 MMHG | DIASTOLIC BLOOD PRESSURE: 83 MMHG | HEART RATE: 66 BPM

## 2021-03-03 DIAGNOSIS — L70.9 ACNE, UNSPECIFIED ACNE TYPE: ICD-10-CM

## 2021-03-03 DIAGNOSIS — K59.00 CONSTIPATION, UNSPECIFIED CONSTIPATION TYPE: ICD-10-CM

## 2021-03-03 DIAGNOSIS — H60.92 OTITIS EXTERNA OF LEFT EAR, UNSPECIFIED CHRONICITY, UNSPECIFIED TYPE: ICD-10-CM

## 2021-03-03 DIAGNOSIS — G47.00 INSOMNIA, UNSPECIFIED TYPE: ICD-10-CM

## 2021-03-03 DIAGNOSIS — Z11.1 SCREENING FOR TUBERCULOSIS: ICD-10-CM

## 2021-03-03 DIAGNOSIS — J45.909 UNCOMPLICATED ASTHMA, UNSPECIFIED ASTHMA SEVERITY, UNSPECIFIED WHETHER PERSISTENT: ICD-10-CM

## 2021-03-03 DIAGNOSIS — Z11.1 SCREENING FOR TUBERCULOSIS: Primary | ICD-10-CM

## 2021-03-03 PROCEDURE — 86480 TB TEST CELL IMMUN MEASURE: CPT

## 2021-03-03 PROCEDURE — 3725F SCREEN DEPRESSION PERFORMED: CPT | Performed by: INTERNAL MEDICINE

## 2021-03-03 PROCEDURE — 1036F TOBACCO NON-USER: CPT | Performed by: INTERNAL MEDICINE

## 2021-03-03 PROCEDURE — 99213 OFFICE O/P EST LOW 20 MIN: CPT | Performed by: INTERNAL MEDICINE

## 2021-03-03 PROCEDURE — 36415 COLL VENOUS BLD VENIPUNCTURE: CPT

## 2021-03-03 RX ORDER — MONTELUKAST SODIUM 10 MG/1
10 TABLET ORAL
Qty: 90 TABLET | Refills: 1 | Status: SHIPPED | OUTPATIENT
Start: 2021-03-03 | End: 2021-08-18 | Stop reason: SDUPTHER

## 2021-03-03 RX ORDER — ERYTHROMYCIN 20 MG/G
GEL TOPICAL DAILY
Qty: 60 G | Refills: 1 | Status: SHIPPED | OUTPATIENT
Start: 2021-03-03 | End: 2021-06-03 | Stop reason: ALTCHOICE

## 2021-03-03 RX ORDER — CIPROFLOXACIN AND DEXAMETHASONE 3; 1 MG/ML; MG/ML
4 SUSPENSION/ DROPS AURICULAR (OTIC) 2 TIMES DAILY
Qty: 7.5 ML | Refills: 0 | Status: SHIPPED | OUTPATIENT
Start: 2021-03-03 | End: 2021-06-03 | Stop reason: ALTCHOICE

## 2021-03-03 RX ORDER — LINACLOTIDE 72 UG/1
72 CAPSULE, GELATIN COATED ORAL DAILY
Qty: 30 CAPSULE | Refills: 1 | Status: SHIPPED | OUTPATIENT
Start: 2021-03-03 | End: 2021-06-03 | Stop reason: ALTCHOICE

## 2021-03-03 RX ORDER — ZOLPIDEM TARTRATE 5 MG/1
5 TABLET ORAL
Qty: 30 TABLET | Refills: 2 | Status: SHIPPED | OUTPATIENT
Start: 2021-03-03 | End: 2021-08-18 | Stop reason: SDUPTHER

## 2021-03-03 RX ORDER — ERYTHROMYCIN 20 MG/G
GEL TOPICAL DAILY
Qty: 45 G | Refills: 1 | Status: SHIPPED | OUTPATIENT
Start: 2021-03-03 | End: 2021-03-03

## 2021-03-03 NOTE — PROGRESS NOTES
BMI Counseling: Body mass index is 27 12 kg/m²  The BMI is above normal  Nutrition recommendations include decreasing portion sizes and limiting drinks that contain sugar  Exercise recommendations include exercising 3-5 times per week  Patient referred to PCP due to patient being overweight  Assessment/Plan:         Diagnoses and all orders for this visit:    Screening for tuberculosis  Comments:  required to work with oncology pt  Orders:  -     Quantiferon TB Gold Plus; Future    Uncomplicated asthma, unspecified asthma severity, unspecified whether persistent  Comments:  needs refill singulair  Orders:  -     montelukast (SINGULAIR) 10 mg tablet; Take 1 tablet (10 mg total) by mouth daily at bedtime    Acne, unspecified acne type  Comments:  e-mycin  Orders:  -     Discontinue: erythromycin with ethanol (EMGEL) 2 % gel; Apply topically daily    Insomnia, unspecified type  Comments:  prn ambien  Orders:  -     zolpidem (AMBIEN) 5 mg tablet; Take 1 tablet (5 mg total) by mouth daily at bedtime as needed for sleep    Otitis externa of left ear, unspecified chronicity, unspecified type  Comments:  ciprodex  Orders:  -     ciprofloxacin-dexamethasone (CIPRODEX) otic suspension; Administer 4 drops into both ears 2 (two) times a day    Constipation, unspecified constipation type  Comments:  linzess  Orders:  -     linaCLOtide (Linzess) 72 MCG CAPS; Take 72 mcg by mouth daily          Subjective:      Patient ID: Ermias Perdomo is a 63 y o  female  Left ear ache  Sinus stuffi   +sore throat  Denies fever          The following portions of the patient's history were reviewed and updated as appropriate: She  has a past medical history of Asthma, Celiac disease, Follicular lymphoma ([address] ), GERD (gastroesophageal reflux disease), Heart palpitations, History of colonic polyps, History of radiation therapy (2010), Insomnia, Irregular heart beat, Lymphoma, 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[address] ), Malignant lymphoma ([address] ) (2010), Postmenopausal disorder, Restless legs syndrome, and TMJ syndrome  She   Patient Active Problem List    Diagnosis Date Noted    Ganglion cyst of wrist, left 12/08/2020    Eosinophilia 09/17/2020    Abnormal CT scan, chest 09/17/2020    Severe persistent asthma without complication 59/85/3255    Non-seasonal allergic rhinitis 09/02/2020    Encounter for gynecological examination (general) (routine) without abnormal findings 05/23/2019    Hormone replacement therapy (HRT) 05/23/2019    Personal history of colonic polyps 51/73/2197    Follicular lymphoma ([facility] [address] ) 08/29/2018    VERONIKA (stress urinary incontinence, female) 05/18/2018    PVC's (premature ventricular contractions) 04/24/2018    Dyslipidemia 04/24/2018    Celiac disease 04/19/2018    Premature ventricular contraction 11/30/2017    Chronic GERD 10/03/2017    Back pain 09/21/2017    Heart palpitations 09/21/2017    Insomnia 09/21/2017    Sciatica associated with disorder of lumbar spine 09/21/2017    Vitamin D deficiency 06/28/2017    Osteopenia 11/17/2016     She  has a past surgical history that includes Lymph node dissection (Right); pr esophagogastroduodenoscopy transoral diagnostic (N/A, 1/18/2016); Nasal septum surgery (2013); Hysterectomy; Total abdominal hysterectomy w/ bilateral salpingoophorectomy; Tonsillectomy; Colonoscopy (04/16/2019); Functional endoscopic sinus surgery (Bilateral, 2013); US guided msk procedure (1/16/2020); US guided msk procedure (8/7/2020); pr excis recurrent ganglion wrist (Left, 12/8/2020); and Synovectomy (N/A, 12/8/2020)  Her family history includes Atrial fibrillation in her father; BRCA1 Positive in her cousin; Breast cancer (age of onset: 71) in her paternal aunt and paternal grandmother; Cancer in her paternal grandfather; Colonic polyp in her father; Diabetes in her father; Heart failure in her father; Hypertension in her father; Lung cancer in her paternal grandfather; Lymphoma in her mother;  No Known Problems in her maternal aunt, maternal grandfather, and maternal grandmother; Ovarian cancer (age of onset: 79) in her paternal aunt; Skin cancer in her paternal aunt; Throat cancer in her father and paternal uncle; Thyroid cancer in her sister  She  reports that she quit smoking about 17 years ago  Her smoking use included cigarettes  She has a 15 00 pack-year smoking history  She has never used smokeless tobacco  She reports current alcohol use of about 3 0 standard drinks of alcohol per week  She reports that she does not use drugs  Current Outpatient Medications   Medication Sig Dispense Refill    acetaminophen (TYLENOL) 500 mg tablet Take 500 mg by mouth every 6 (six) hours as needed for mild pain   Ascorbic Acid (VITAMIN C) 100 MG tablet Take by mouth      benralizumab (FASENRA) subcutaneous injection Inject 1 mL (30 mg total) under the skin every 56 days for 6 doses 1 Syringe 6    bimatoprost (LATISSE) 0 03 % ophthalmic solution Take as directed      cyanocobalamin (VITAMIN B-12) 1,000 mcg tablet Take 1,000 mcg by mouth daily      diclofenac sodium (VOLTAREN) 1 % Apply 2 g topically 4 (four) times a day 1 Tube 2    doxycycline hyclate (VIBRA-TABS) 100 mg tablet Take 1 tablet by mouth as needed      EPINEPHrine (EPIPEN) 0 3 mg/0 3 mL SOAJ Inject 0 3 mL (0 3 mg total) into a muscle once for 1 dose 0 6 mL 0    famotidine (PEPCID) 20 mg tablet Take 20 mg by mouth 2 (two) times a day        flecainide (TAMBOCOR) 100 mg tablet Take 1 tablet (100 mg total) by mouth 2 (two) times a day 60 tablet 5    fluticasone (FLONASE) 50 mcg/act nasal spray 2 sprays into each nostril 2 (two) times a day 1 Bottle 11    levalbuterol (Xopenex HFA) 45 mcg/act inhaler Inhale 2 puffs every 4 (four) hours as needed for wheezing 1 Inhaler 3    lidocaine (LMX) 4 % cream       Magnesium 500 MG CAPS Take by mouth      meloxicam (MOBIC) 15 mg tablet Take 1 tablet (15 mg total) by mouth daily (Patient taking differently: Take 15 mg by mouth as needed ) 90 tablet 1    metoprolol succinate (TOPROL-XL) 25 mg 24 hr tablet Take 1 tablet (25 mg total) by mouth daily 90 tablet 3    montelukast (SINGULAIR) 10 mg tablet Take 1 tablet (10 mg total) by mouth daily at bedtime 90 tablet 1    olopatadine HCl (PATADAY) 0 2 % opth drops       omega-3-acid ethyl esters (LOVAZA) 1 g capsule Take 2 capsules (2 g total) by mouth 2 (two) times a day (Patient taking differently: Take 2 g by mouth 3 (three) times a week ) 360 capsule 1    ondansetron (ZOFRAN-ODT) 4 mg disintegrating tablet Take 1 tablet (4 mg total) by mouth every 6 (six) hours as needed for nausea or vomiting 30 tablet 0    Pain Relieving 4 % CREA       Vitamin D, Ergocalciferol, 2000 units CAPS Take by mouth      zolpidem (AMBIEN) 5 mg tablet Take 1 tablet (5 mg total) by mouth daily at bedtime as needed for sleep 30 tablet 2    ciprofloxacin-dexamethasone (CIPRODEX) otic suspension Administer 4 drops into both ears 2 (two) times a day 7 5 mL 0    erythromycin with ethanol (EMGEL) 2 % gel Apply topically daily 60 g 1    linaCLOtide (Linzess) 72 MCG CAPS Take 72 mcg by mouth daily 30 capsule 1    pantoprazole (PROTONIX) 20 mg tablet Take 1 tablet (20 mg total) by mouth daily 90 tablet 2     No current facility-administered medications for this visit  Current Outpatient Medications on File Prior to Visit   Medication Sig    acetaminophen (TYLENOL) 500 mg tablet Take 500 mg by mouth every 6 (six) hours as needed for mild pain      Ascorbic Acid (VITAMIN C) 100 MG tablet Take by mouth    benralizumab (FASENRA) subcutaneous injection Inject 1 mL (30 mg total) under the skin every 56 days for 6 doses    bimatoprost (LATISSE) 0 03 % ophthalmic solution Take as directed    cyanocobalamin (VITAMIN B-12) 1,000 mcg tablet Take 1,000 mcg by mouth daily    diclofenac sodium (VOLTAREN) 1 % Apply 2 g topically 4 (four) times a day    doxycycline hyclate (VIBRA-TABS) 100 mg tablet Take 1 tablet by mouth as needed    EPINEPHrine (EPIPEN) 0 3 mg/0 3 mL SOAJ Inject 0 3 mL (0 3 mg total) into a muscle once for 1 dose    famotidine (PEPCID) 20 mg tablet Take 20 mg by mouth 2 (two) times a day   flecainide (TAMBOCOR) 100 mg tablet Take 1 tablet (100 mg total) by mouth 2 (two) times a day    fluticasone (FLONASE) 50 mcg/act nasal spray 2 sprays into each nostril 2 (two) times a day    levalbuterol (Xopenex HFA) 45 mcg/act inhaler Inhale 2 puffs every 4 (four) hours as needed for wheezing    lidocaine (LMX) 4 % cream     Magnesium 500 MG CAPS Take by mouth    meloxicam (MOBIC) 15 mg tablet Take 1 tablet (15 mg total) by mouth daily (Patient taking differently: Take 15 mg by mouth as needed )    metoprolol succinate (TOPROL-XL) 25 mg 24 hr tablet Take 1 tablet (25 mg total) by mouth daily    olopatadine HCl (PATADAY) 0 2 % opth drops     omega-3-acid ethyl esters (LOVAZA) 1 g capsule Take 2 capsules (2 g total) by mouth 2 (two) times a day (Patient taking differently: Take 2 g by mouth 3 (three) times a week )    ondansetron (ZOFRAN-ODT) 4 mg disintegrating tablet Take 1 tablet (4 mg total) by mouth every 6 (six) hours as needed for nausea or vomiting    Pain Relieving 4 % CREA     Vitamin D, Ergocalciferol, 2000 units CAPS Take by mouth    pantoprazole (PROTONIX) 20 mg tablet Take 1 tablet (20 mg total) by mouth daily     No current facility-administered medications on file prior to visit  She is allergic to shellfish-derived products; wheat bran; gluten meal; morphine and related; nuts; and sulfa antibiotics       Review of Systems   Constitutional: Negative for fever  HENT: Positive for ear pain and sore throat  Respiratory: Negative  Cardiovascular: Negative            Objective:      /83 (BP Location: Left arm, Patient Position: Sitting, Cuff Size: Standard)   Pulse 66   Temp (!) 96 8 °F (36 °C) (Temporal)   Resp 16   Wt 76 2 kg (168 lb)   SpO2 98%   BMI 27 12 kg/m²          Physical Exam  HENT:      Head: Normocephalic and atraumatic  Right Ear: Tympanic membrane and ear canal normal       Left Ear: Tympanic membrane normal       Ears:      Comments: Swollen left ear canal  Neck:      Musculoskeletal: Neck supple  Cardiovascular:      Rate and Rhythm: Normal rate and regular rhythm  Pulmonary:      Effort: Pulmonary effort is normal       Breath sounds: Normal breath sounds  Neurological:      Mental Status: She is alert

## 2021-03-05 LAB
GAMMA INTERFERON BACKGROUND BLD IA-ACNC: 0.04 IU/ML
M TB IFN-G BLD-IMP: NEGATIVE
M TB IFN-G CD4+ BCKGRND COR BLD-ACNC: 0 IU/ML
M TB IFN-G CD4+ BCKGRND COR BLD-ACNC: 0 IU/ML
MITOGEN IGNF BCKGRD COR BLD-ACNC: >10 IU/ML

## 2021-03-10 ENCOUNTER — HOSPITAL ENCOUNTER (OUTPATIENT)
Dept: CT IMAGING | Facility: HOSPITAL | Age: 66
Discharge: HOME | End: 2021-03-10
Attending: INTERNAL MEDICINE
Payer: COMMERCIAL

## 2021-03-10 DIAGNOSIS — Z23 ENCOUNTER FOR IMMUNIZATION: ICD-10-CM

## 2021-03-10 DIAGNOSIS — R93.89 ABNORMAL CT SCAN, CHEST: ICD-10-CM

## 2021-03-10 PROCEDURE — G1004 CDSM NDSC: HCPCS

## 2021-03-10 PROCEDURE — 71250 CT THORAX DX C-: CPT

## 2021-03-22 ENCOUNTER — IMMUNIZATIONS (OUTPATIENT)
Dept: FAMILY MEDICINE CLINIC | Facility: HOSPITAL | Age: 66
End: 2021-03-22

## 2021-03-22 DIAGNOSIS — Z23 ENCOUNTER FOR IMMUNIZATION: Primary | ICD-10-CM

## 2021-03-22 PROCEDURE — 91300 SARS-COV-2 / COVID-19 MRNA VACCINE (PFIZER-BIONTECH) 30 MCG: CPT | Performed by: FAMILY MEDICINE

## 2021-03-22 PROCEDURE — 0001A SARS-COV-2 / COVID-19 MRNA VACCINE (PFIZER-BIONTECH) 30 MCG: CPT | Performed by: FAMILY MEDICINE

## 2021-04-06 ENCOUNTER — HOSPITAL ENCOUNTER (OUTPATIENT)
Dept: INFUSION CENTER | Facility: HOSPITAL | Age: 66
Discharge: HOME | End: 2021-04-06
Attending: INTERNAL MEDICINE
Payer: COMMERCIAL

## 2021-04-06 DIAGNOSIS — J45.50 SEVERE PERSISTENT ASTHMA WITHOUT COMPLICATION: Primary | ICD-10-CM

## 2021-04-06 DIAGNOSIS — D72.119 HYPEREOSINOPHILIC SYNDROME, UNSPECIFIED TYPE: ICD-10-CM

## 2021-04-06 PROCEDURE — 96372 THER/PROPH/DIAG INJ SC/IM: CPT

## 2021-04-06 RX ORDER — EPINEPHRINE 1 MG/ML
0.3 INJECTION, SOLUTION, CONCENTRATE INTRAVENOUS ONCE
Status: CANCELLED | OUTPATIENT
Start: 2021-06-01

## 2021-04-06 RX ADMIN — BENRALIZUMAB 30 MG: 30 INJECTION, SOLUTION SUBCUTANEOUS at 10:19

## 2021-04-14 ENCOUNTER — OFFICE VISIT (OUTPATIENT)
Dept: OBGYN CLINIC | Facility: CLINIC | Age: 66
End: 2021-04-14
Payer: COMMERCIAL

## 2021-04-14 ENCOUNTER — IMMUNIZATIONS (OUTPATIENT)
Dept: FAMILY MEDICINE CLINIC | Facility: HOSPITAL | Age: 66
End: 2021-04-14

## 2021-04-14 VITALS
DIASTOLIC BLOOD PRESSURE: 78 MMHG | HEART RATE: 71 BPM | BODY MASS INDEX: 27 KG/M2 | HEIGHT: 66 IN | WEIGHT: 168 LBS | SYSTOLIC BLOOD PRESSURE: 120 MMHG

## 2021-04-14 DIAGNOSIS — M77.8 LEFT WRIST TENDINITIS: ICD-10-CM

## 2021-04-14 DIAGNOSIS — Z23 ENCOUNTER FOR IMMUNIZATION: Primary | ICD-10-CM

## 2021-04-14 DIAGNOSIS — M65.332 TRIGGER MIDDLE FINGER OF LEFT HAND: Primary | ICD-10-CM

## 2021-04-14 PROCEDURE — 99214 OFFICE O/P EST MOD 30 MIN: CPT | Performed by: SURGERY

## 2021-04-14 PROCEDURE — 0002A SARS-COV-2 / COVID-19 MRNA VACCINE (PFIZER-BIONTECH) 30 MCG: CPT

## 2021-04-14 PROCEDURE — 91300 SARS-COV-2 / COVID-19 MRNA VACCINE (PFIZER-BIONTECH) 30 MCG: CPT

## 2021-04-14 NOTE — PROGRESS NOTES
ASSESSMENT/PLAN:      63 y o  female with a left long finger trigger finger and ulnar sided wrist pain, ECU tendinitis  The etiology of trigger finger and ECU tendinitis was discussed today along with treatment options  She was provided with cp-ban to be wrapped around the PIP joint of the long finger at night to avoid locking from occurring  With regards to the ECU tendinitis, a CSI may be performed in 1 week as she is undergoing her COVID vaccine today  She may benefit from wearing a cock up wrist brace at night  Follow up in 1 weeks time for ECU CSI  The patient verbalized understanding of exam findings and treatment plan  We engaged in the shared decision-making process and treatment options were discussed at length with the patient  Surgical and conservative management discussed today along with risks and benefits  Diagnoses and all orders for this visit:    Trigger middle finger of left hand    Left wrist ECU tendinitis      Follow Up:  Return in about 1 week (around 4/21/2021)  To Do Next Visit:  Re-evaluation of current issue    ____________________________________________________________________________________________________________________________________________      CHIEF COMPLAINT:  Chief Complaint   Patient presents with    Left Hand - Pain    Left Wrist - Pain       SUBJECTIVE:  Jered Mann is a 63y o  year old RHD female who presents to the office today for left ulnar sided wrist pain and left long finger clicking, catching and locking  Antelmo Holland underwent a left ganglion cyst excision in December  She states that the mass is gone but she continues to have pain to the ulnar aspect of her wrist  She also notes over the last 5 weeks her left long finger has began locking in the mornings  She does use her other hand to unlock the finger when this occurs  She believes her wrist pain and long finger locking is related  Victor Hugo Bray does have her COVID vaccine scheduled for today          I have personally reviewed all the relevant PMH, PSH, SH, FH, Medications and allergies  PAST MEDICAL HISTORY:  Past Medical History:   Diagnosis Date    Asthma     Celiac disease     Follicular [facility] ([facility] [address] [facility]     GERD (gastroesophageal reflux disease)     Heart palpitations     History of colonic polyps     resolved 07/12/2016    History of radiation therapy 2010    Insomnia     Irregular heart beat     Lymphoma, 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HCC)     non hodgekins, remission    Malignant lymphoma ([facility] [address] ) 2010    rt arm cutaneous follicular stage ia (rt diatal medical biceps area , s/p resected and s/p radistion treatment    resolved 12/17/2014    Postmenopausal disorder     resolved 09/21/2017    Restless legs syndrome     resolved 09/21/2017    TMJ syndrome     resolved 09/21/2017       PAST SURGICAL HISTORY:  Past Surgical History:   Procedure Laterality Date    COLONOSCOPY  04/16/2019    FUNCTIONAL ENDOSCOPIC SINUS SURGERY Bilateral 2013    Dr Hoda Villar      age 39 complete    LYMPH NODE DISSECTION Right     arm    NASAL SEPTUM SURGERY  2013    with turbinate reduction - Dr Mikaela Simon ESOPHAGOGASTRODUODENOSCOPY TRANSORAL DIAGNOSTIC N/A 1/18/2016    Procedure: ESOPHAGOGASTRODUODENOSCOPY (EGD); Surgeon: Asya Guardado MD;  [facility];   Service: Gastroenterology    IL EXCIS RECURRENT GANGLION WRIST Left 12/8/2020    Procedure: WRIST GANGLION CYST EXCISION;  Surgeon: Esperanza Richards MD;  [facility];  Service: Orthopedics    SYNOVECTOMY N/A 12/8/2020    Procedure: ECU TENOSYNOVECTOMY;  Surgeon: Esperanza Richards MD;  [facility];  Service: Orthopedics    TONSILLECTOMY      TOTAL ABDOMINAL HYSTERECTOMY W/ BILATERAL SALPINGOOPHORECTOMY      age 54   [address] [facility] PROCEDURE  1/16/2020   Tomie Breeding GUIDED MSK PROCEDURE  8/7/2020       FAMILY HISTORY:  Family History   Problem Relation Age of Onset    Lymphoma Mother     Throat cancer Father     Heart failure Father    Juan Boyer Atrial fibrillation Father     Diabetes Father     Colonic polyp Father     Hypertension Father     Thyroid cancer Sister     Breast cancer Paternal Grandmother 71    Breast cancer Paternal Aunt 71    Ovarian cancer Paternal Aunt 79    No Known Problems Maternal Grandmother     No Known Problems Maternal Grandfather     Cancer Paternal Grandfather     Lung cancer Paternal Grandfather     BRCA1 Positive Cousin     Skin cancer Paternal Aunt     Throat cancer Paternal Uncle     No Known Problems Maternal Aunt        SOCIAL HISTORY:  Social History     Tobacco Use    Smoking status: Former Smoker     Packs/day: 0 75     Years: 20 00     Pack years: 15 00     Types: Cigarettes     Quit date:      Years since quittin 2    Smokeless tobacco: Never Used   Substance Use Topics    Alcohol use:  Yes     Alcohol/week: 3 0 standard drinks     Types: 3 Glasses of wine per week     Frequency: 2-3 times a week     Drinks per session: 1 or 2     Binge frequency: Never    Drug use: No       MEDICATIONS:    Current Outpatient Medications:     acetaminophen (TYLENOL) 500 mg tablet, Take 500 mg by mouth every 6 (six) hours as needed for mild pain , Disp: , Rfl:     Ascorbic Acid (VITAMIN C) 100 MG tablet, Take by mouth, Disp: , Rfl:     benralizumab (FASENRA) subcutaneous injection, Inject 1 mL (30 mg total) under the skin every 56 days for 6 doses, Disp: 1 Syringe, Rfl: 6    bimatoprost (LATISSE) 0 03 % ophthalmic solution, Take as directed, Disp: , Rfl:     ciprofloxacin-dexamethasone (CIPRODEX) otic suspension, Administer 4 drops into both ears 2 (two) times a day, Disp: 7 5 mL, Rfl: 0    cyanocobalamin (VITAMIN B-12) 1,000 mcg tablet, Take 1,000 mcg by mouth daily, Disp: , Rfl:     diclofenac sodium (VOLTAREN) 1 %, Apply 2 g topically 4 (four) times a day, Disp: 1 Tube, Rfl: 2    doxycycline hyclate (VIBRA-TABS) 100 mg tablet, Take 1 tablet by mouth as needed, Disp: , Rfl:     EPINEPHrine (EPIPEN) 0 3 mg/0 3 mL SOAJ, Inject 0 3 mL (0 3 mg total) into a muscle once for 1 dose, Disp: 0 6 mL, Rfl: 0    erythromycin with ethanol (EMGEL) 2 % gel, Apply topically daily, Disp: 60 g, Rfl: 1    famotidine (PEPCID) 20 mg tablet, Take 20 mg by mouth 2 (two) times a day , Disp: , Rfl:     flecainide (TAMBOCOR) 100 mg tablet, Take 1 tablet (100 mg total) by mouth 2 (two) times a day, Disp: 60 tablet, Rfl: 5    fluticasone (FLONASE) 50 mcg/act nasal spray, 2 sprays into each nostril 2 (two) times a day, Disp: 1 Bottle, Rfl: 11    levalbuterol (Xopenex HFA) 45 mcg/act inhaler, Inhale 2 puffs every 4 (four) hours as needed for wheezing, Disp: 1 Inhaler, Rfl: 3    lidocaine (LMX) 4 % cream, , Disp: , Rfl:     linaCLOtide (Linzess) 72 MCG CAPS, Take 72 mcg by mouth daily, Disp: 30 capsule, Rfl: 1    Magnesium 500 MG CAPS, Take by mouth, Disp: , Rfl:     meloxicam (MOBIC) 15 mg tablet, Take 1 tablet (15 mg total) by mouth daily (Patient taking differently: Take 15 mg by mouth as needed ), Disp: 90 tablet, Rfl: 1    metoprolol succinate (TOPROL-XL) 25 mg 24 hr tablet, Take 1 tablet (25 mg total) by mouth daily, Disp: 90 tablet, Rfl: 3    montelukast (SINGULAIR) 10 mg tablet, Take 1 tablet (10 mg total) by mouth daily at bedtime, Disp: 90 tablet, Rfl: 1    olopatadine HCl (PATADAY) 0 2 % opth drops, , Disp: , Rfl:     omega-3-acid ethyl esters (LOVAZA) 1 g capsule, Take 2 capsules (2 g total) by mouth 2 (two) times a day (Patient taking differently: Take 2 g by mouth 3 (three) times a week ), Disp: 360 capsule, Rfl: 1    ondansetron (ZOFRAN-ODT) 4 mg disintegrating tablet, Take 1 tablet (4 mg total) by mouth every 6 (six) hours as needed for nausea or vomiting, Disp: 30 tablet, Rfl: 0    Pain Relieving 4 % CREA, , Disp: , Rfl:     pantoprazole (PROTONIX) 20 mg tablet, Take 1 tablet (20 mg total) by mouth daily, Disp: 90 tablet, Rfl: 2    Vitamin D, Ergocalciferol, 2000 units CAPS, Take by mouth, Disp: , Rfl:    zolpidem (AMBIEN) 5 mg tablet, Take 1 tablet (5 mg total) by mouth daily at bedtime as needed for sleep, Disp: 30 tablet, Rfl: 2    ALLERGIES:  Allergies   Allergen Reactions    Shellfish-Derived Products - Food Allergy Anaphylaxis    Wheat Bran - Food Allergy Anaphylaxis    Gluten Meal - Food Allergy GI Intolerance     Celiac     Morphine And Related Itching    Nuts - Food Allergy Hives     Almonds,walnuts, hazelnuts and other related nuts   Sulfa Antibiotics Rash       REVIEW OF SYSTEMS:  Review of Systems   Constitutional: Negative for chills, fever and unexpected weight change  HENT: Negative for hearing loss, nosebleeds and sore throat  Eyes: Negative for pain, redness and visual disturbance  Respiratory: Negative for cough, shortness of breath and wheezing  Cardiovascular: Negative for chest pain, palpitations and leg swelling  Gastrointestinal: Negative for abdominal pain, nausea and vomiting  Endocrine: Negative for polydipsia and polyuria  Genitourinary: Negative for difficulty urinating and hematuria  Musculoskeletal: Negative for arthralgias, joint swelling and myalgias  Skin: Negative for rash and wound  Neurological: Negative for dizziness, numbness and headaches  Psychiatric/Behavioral: Negative for decreased concentration, dysphoric mood and suicidal ideas  The patient is not nervous/anxious          VITALS:  Vitals:    04/14/21 1145   BP: 120/78   Pulse: 71       LABS:  HgA1c:   Lab Results   Component Value Date    HGBA1C 5 6 09/03/2019     BMP:   Lab Results   Component Value Date    GLUCOSE 112 06/22/2015    CALCIUM 9 4 12/04/2020     06/22/2015    K 4 2 12/04/2020    CO2 27 12/04/2020     12/04/2020    BUN 14 12/04/2020    CREATININE 0 95 12/04/2020       _____________________________________________________  PHYSICAL EXAMINATION:  General: well developed and well nourished, alert, oriented times 3 and appears comfortable  Psychiatric: Normal  HEENT: Normocephalic, Atraumatic Trachea Midline, No torticollis  Pulmonary: No audible wheezing or respiratory distress   Cardiovascular: No pitting edema, 2+ radial pulse   Skin: No masses, erythema, lacerations, fluctation, ulcerations  Neurovascular: Sensation Intact to the Median, Ulnar, Radial Nerve, Motor Intact to the Median, Ulnar, Radial Nerve and Pulses Intact  Musculoskeletal: Normal, except as noted in detailed exam and in HPI  MUSCULOSKELETAL EXAMINATION:    left long finger:  Negative palpable nodule over the A1 pulley  Negative tenderness to palpation over A1 pulley  Positive catching  Positive clicking  Extensor tendons are centrally localized       ECU tender to palpation   Well healed surgical incision s/p ganglion cyst excision     Tenderness to flexor tendonitis, possible tenosynovitis   Full wrist ROM     ___________________________________________________  STUDIES REVIEWED:  No new imaging to review           PROCEDURES PERFORMED:  Procedures  No Procedures performed today    _____________________________________________________      Antoine Herron    I,:  Herve Edward am acting as a scribe while in the presence of the attending physician :       I,:  Hoang Kuhn MD personally performed the services described in this documentation    as scribed in my presence :

## 2021-04-21 ENCOUNTER — OFFICE VISIT (OUTPATIENT)
Dept: OBGYN CLINIC | Facility: CLINIC | Age: 66
End: 2021-04-21
Payer: COMMERCIAL

## 2021-04-21 VITALS
WEIGHT: 162 LBS | DIASTOLIC BLOOD PRESSURE: 76 MMHG | HEART RATE: 77 BPM | SYSTOLIC BLOOD PRESSURE: 112 MMHG | BODY MASS INDEX: 26.03 KG/M2 | HEIGHT: 66 IN

## 2021-04-21 DIAGNOSIS — M65.332 TRIGGER MIDDLE FINGER OF LEFT HAND: ICD-10-CM

## 2021-04-21 DIAGNOSIS — M77.8 LEFT WRIST TENDINITIS: Primary | ICD-10-CM

## 2021-04-21 PROCEDURE — 1036F TOBACCO NON-USER: CPT | Performed by: SURGERY

## 2021-04-21 PROCEDURE — 3008F BODY MASS INDEX DOCD: CPT | Performed by: SURGERY

## 2021-04-21 PROCEDURE — 20550 NJX 1 TENDON SHEATH/LIGAMENT: CPT | Performed by: SURGERY

## 2021-04-21 PROCEDURE — 99214 OFFICE O/P EST MOD 30 MIN: CPT | Performed by: SURGERY

## 2021-04-21 RX ORDER — LIDOCAINE HYDROCHLORIDE 10 MG/ML
1 INJECTION, SOLUTION INFILTRATION; PERINEURAL
Status: COMPLETED | OUTPATIENT
Start: 2021-04-21 | End: 2021-04-21

## 2021-04-21 RX ORDER — DEXAMETHASONE SODIUM PHOSPHATE 100 MG/10ML
40 INJECTION INTRAMUSCULAR; INTRAVENOUS
Status: COMPLETED | OUTPATIENT
Start: 2021-04-21 | End: 2021-04-21

## 2021-04-21 RX ADMIN — DEXAMETHASONE SODIUM PHOSPHATE 40 MG: 100 INJECTION INTRAMUSCULAR; INTRAVENOUS at 11:05

## 2021-04-21 RX ADMIN — LIDOCAINE HYDROCHLORIDE 1 ML: 10 INJECTION, SOLUTION INFILTRATION; PERINEURAL at 11:05

## 2021-04-21 NOTE — PROGRESS NOTES
ASSESSMENT/PLAN:      63 y o  female with a left long finger trigger finger and ulnar sided wrist pain, ECU tendinitis  Left ECU CSI was performed in the office without complication  Post injection protocol was discussed  Continue nighttime bracing  May continue co-ban wrapping of the long finger PIP joint at night  A left long finger trigger finger CSI may be performed in the future if symptoms worsen or fail to improve  Follow up in the office as needed if symptoms worsen or fail to improve  The patient verbalized understanding of exam findings and treatment plan  We engaged in the shared decision-making process and treatment options were discussed at length with the patient  Surgical and conservative management discussed today along with risks and benefits  Diagnoses and all orders for this visit:    Left wrist ECU tendinitis  -     Hand/upper extremity injection    Trigger middle finger of left hand      Follow Up:  Return if symptoms worsen or fail to improve  To Do Next Visit:  Re-evaluation of current issue    ____________________________________________________________________________________________________________________________________________      CHIEF COMPLAINT:  No chief complaint on file  SUBJECTIVE:  Joseluis Contreras is a 63y o  year old RHD female who presents to the office today for a left wrist ECU CSI  Vinnie Apley notes continued pain to the ulnar aspect of her left wrist  She has been using co-ban around the left long finger PIP joint at night to keep the finger from being locked in the AM's  She is wearing a wrist brace at night  I have personally reviewed all the relevant PMH, PSH, SH, FH, Medications and allergies       PAST MEDICAL HISTORY:  Past Medical History:   Diagnosis Date    Asthma     Celiac disease     Follicular lymphoma ([address] )     GERD (gastroesophageal reflux disease)     Heart palpitations     History of colonic polyps     resolved 07/12/2016    History of radiation therapy 2010    Insomnia     Irregular heart beat     Lymphoma, 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HCC)     non hodgekins, remission    Malignant [address] [facility] [address] ) 2010    rt arm cutaneous follicular stage ia (rt diatal medical biceps area , s/p resected and s/p radistion treatment    resolved 12/17/2014    Postmenopausal disorder     resolved 09/21/2017    Restless legs syndrome     resolved 09/21/2017    TMJ syndrome     resolved 09/21/2017       PAST SURGICAL HISTORY:  Past Surgical History:   Procedure Laterality Date    COLONOSCOPY  04/16/2019    FUNCTIONAL ENDOSCOPIC SINUS SURGERY Bilateral 2013    Dr Karoline Loving      age 39 complete    LYMPH NODE DISSECTION Right     arm    NASAL SEPTUM SURGERY  2013    with turbinate reduction - Dr Tamy Singh ESOPHAGOGASTRODUODENOSCOPY TRANSORAL DIAGNOSTIC N/A 1/18/2016    Procedure: ESOPHAGOGASTRODUODENOSCOPY (EGD); Surgeon: Koby Corley MD;  [facility];   Service: Gastroenterology    NV EXCIS RECURRENT GANGLION WRIST Left 12/8/2020    Procedure: WRIST GANGLION CYST EXCISION;  Surgeon: Sapna Lozano MD;  [facility];  Service: Orthopedics    SYNOVECTOMY N/A 12/8/2020    Procedure: ECU TENOSYNOVECTOMY;  Surgeon: Sapna Lozano MD;  [facility];  Service: Orthopedics    TONSILLECTOMY      TOTAL ABDOMINAL HYSTERECTOMY W/ BILATERAL SALPINGOOPHORECTOMY      age 54   [address] MSK PROCEDURE  1/16/2020   Darling Morning GUIDED MSK PROCEDURE  8/7/2020       FAMILY HISTORY:  Family History   Problem Relation Age of Onset    Lymphoma Mother     Throat cancer Father     Heart failure Father     Atrial fibrillation Father     Diabetes Father     Colonic polyp Father     Hypertension Father     Thyroid cancer Sister     Breast cancer Paternal Grandmother 71    Breast cancer Paternal Aunt 71    Ovarian cancer Paternal Aunt 79    No Known Problems Maternal Grandmother     No Known Problems Maternal Grandfather     Cancer Paternal Grandfather     Lung cancer Paternal Grandfather     BRCA1 Positive Cousin     Skin cancer Paternal Aunt     Throat cancer Paternal Uncle     No Known Problems Maternal Aunt        SOCIAL HISTORY:  Social History     Tobacco Use    Smoking status: Former Smoker     Packs/day: 0 75     Years: 20 00     Pack years: 15 00     Types: Cigarettes     Quit date:      Years since quittin 3    Smokeless tobacco: Never Used   Substance Use Topics    Alcohol use:  Yes     Alcohol/week: 3 0 standard drinks     Types: 3 Glasses of wine per week     Frequency: 2-3 times a week     Drinks per session: 1 or 2     Binge frequency: Never    Drug use: No       MEDICATIONS:    Current Outpatient Medications:     acetaminophen (TYLENOL) 500 mg tablet, Take 500 mg by mouth every 6 (six) hours as needed for mild pain , Disp: , Rfl:     Ascorbic Acid (VITAMIN C) 100 MG tablet, Take by mouth, Disp: , Rfl:     benralizumab (FASENRA) subcutaneous injection, Inject 1 mL (30 mg total) under the skin every 56 days for 6 doses, Disp: 1 Syringe, Rfl: 6    bimatoprost (LATISSE) 0 03 % ophthalmic solution, Take as directed, Disp: , Rfl:     ciprofloxacin-dexamethasone (CIPRODEX) otic suspension, Administer 4 drops into both ears 2 (two) times a day, Disp: 7 5 mL, Rfl: 0    cyanocobalamin (VITAMIN B-12) 1,000 mcg tablet, Take 1,000 mcg by mouth daily, Disp: , Rfl:     diclofenac sodium (VOLTAREN) 1 %, Apply 2 g topically 4 (four) times a day, Disp: 1 Tube, Rfl: 2    doxycycline hyclate (VIBRA-TABS) 100 mg tablet, Take 1 tablet by mouth as needed, Disp: , Rfl:     erythromycin with ethanol (EMGEL) 2 % gel, Apply topically daily, Disp: 60 g, Rfl: 1    famotidine (PEPCID) 20 mg tablet, Take 20 mg by mouth 2 (two) times a day , Disp: , Rfl:     flecainide (TAMBOCOR) 100 mg tablet, Take 1 tablet (100 mg total) by mouth 2 (two) times a day, Disp: 60 tablet, Rfl: 5    fluticasone (FLONASE) 50 mcg/act nasal spray, 2 sprays into each nostril 2 (two) times a day, Disp: 1 Bottle, Rfl: 11    levalbuterol (Xopenex HFA) 45 mcg/act inhaler, Inhale 2 puffs every 4 (four) hours as needed for wheezing, Disp: 1 Inhaler, Rfl: 3    lidocaine (LMX) 4 % cream, , Disp: , Rfl:     linaCLOtide (Linzess) 72 MCG CAPS, Take 72 mcg by mouth daily, Disp: 30 capsule, Rfl: 1    Magnesium 500 MG CAPS, Take by mouth, Disp: , Rfl:     meloxicam (MOBIC) 15 mg tablet, Take 1 tablet (15 mg total) by mouth daily (Patient taking differently: Take 15 mg by mouth as needed ), Disp: 90 tablet, Rfl: 1    metoprolol succinate (TOPROL-XL) 25 mg 24 hr tablet, Take 1 tablet (25 mg total) by mouth daily, Disp: 90 tablet, Rfl: 3    montelukast (SINGULAIR) 10 mg tablet, Take 1 tablet (10 mg total) by mouth daily at bedtime, Disp: 90 tablet, Rfl: 1    olopatadine HCl (PATADAY) 0 2 % opth drops, , Disp: , Rfl:     omega-3-acid ethyl esters (LOVAZA) 1 g capsule, Take 2 capsules (2 g total) by mouth 2 (two) times a day (Patient taking differently: Take 2 g by mouth 3 (three) times a week ), Disp: 360 capsule, Rfl: 1    ondansetron (ZOFRAN-ODT) 4 mg disintegrating tablet, Take 1 tablet (4 mg total) by mouth every 6 (six) hours as needed for nausea or vomiting, Disp: 30 tablet, Rfl: 0    Pain Relieving 4 % CREA, , Disp: , Rfl:     Vitamin D, Ergocalciferol, 2000 units CAPS, Take by mouth, Disp: , Rfl:     zolpidem (AMBIEN) 5 mg tablet, Take 1 tablet (5 mg total) by mouth daily at bedtime as needed for sleep, Disp: 30 tablet, Rfl: 2    EPINEPHrine (EPIPEN) 0 3 mg/0 3 mL SOAJ, Inject 0 3 mL (0 3 mg total) into a muscle once for 1 dose, Disp: 0 6 mL, Rfl: 0    pantoprazole (PROTONIX) 20 mg tablet, Take 1 tablet (20 mg total) by mouth daily, Disp: 90 tablet, Rfl: 2    ALLERGIES:  Allergies   Allergen Reactions    Shellfish-Derived Products - Food Allergy Anaphylaxis    Wheat Bran - Food Allergy Anaphylaxis    Gluten Meal - Food Allergy GI Intolerance     Celiac  Morphine And Related Itching    Nuts - Food Allergy Hives     Almonds,walnuts, hazelnuts and other related nuts   Sulfa Antibiotics Rash       REVIEW OF SYSTEMS:  Review of Systems   Constitutional: Negative for chills, fever and unexpected weight change  HENT: Negative for hearing loss, nosebleeds and sore throat  Eyes: Negative for pain, redness and visual disturbance  Respiratory: Negative for cough, shortness of breath and wheezing  Cardiovascular: Negative for chest pain, palpitations and leg swelling  Gastrointestinal: Negative for abdominal pain, nausea and vomiting  Endocrine: Negative for polydipsia and polyuria  Genitourinary: Negative for difficulty urinating and hematuria  Musculoskeletal: Negative for arthralgias, joint swelling and myalgias  Skin: Negative for rash and wound  Neurological: Negative for dizziness, numbness and headaches  Psychiatric/Behavioral: Negative for decreased concentration, dysphoric mood and suicidal ideas  The patient is not nervous/anxious          VITALS:  Vitals:    04/21/21 1052   BP: 112/76   Pulse: 77       LABS:  HgA1c:   Lab Results   Component Value Date    HGBA1C 5 6 09/03/2019     BMP:   Lab Results   Component Value Date    GLUCOSE 112 06/22/2015    CALCIUM 9 4 12/04/2020     06/22/2015    K 4 2 12/04/2020    CO2 27 12/04/2020     12/04/2020    BUN 14 12/04/2020    CREATININE 0 95 12/04/2020       _____________________________________________________  PHYSICAL EXAMINATION:  General: well developed and well nourished, alert, oriented times 3 and appears comfortable  Psychiatric: Normal  HEENT: Normocephalic, Atraumatic Trachea Midline, No torticollis  Pulmonary: No audible wheezing or respiratory distress   Cardiovascular: No pitting edema, 2+ radial pulse   Skin: No masses, erythema, lacerations, fluctation, ulcerations  Neurovascular: Sensation Intact to the Median, Ulnar, Radial Nerve, Motor Intact to the Median, Ulnar, Radial Nerve and Pulses Intact  Musculoskeletal: Normal, except as noted in detailed exam and in HPI  MUSCULOSKELETAL EXAMINATION:    Left wrist:     No erythema, ecchymosis or edema  Full wrist ROM   ECU tender to palpation   Well healed surgical incision s/p ganglion cyst excision   2+ radial pulse   Full composite fist    ___________________________________________________  STUDIES REVIEWED:  No imaging to review           PROCEDURES PERFORMED:  Hand/upper extremity injection  Universal Protocol:  Consent: Verbal consent obtained  Written consent not obtained  Risks and benefits: risks, benefits and alternatives were discussed  Consent given by: patient  Time out: Immediately prior to procedure a "time out" was called to verify the correct patient, procedure, equipment, support staff and site/side marked as required  Patient identity confirmed: verbally with patient    Supporting Documentation  Indications: pain   Procedure Details  Condition:tendonitis Wrist location: left ECU     Preparation: Patient was prepped and draped in the usual sterile fashion  Needle size: 25 G  Ultrasound guidance: no  Medications administered: 1 mL lidocaine 1 %; 40 mg dexamethasone 100 mg/10 mL    Patient tolerance: patient tolerated the procedure well with no immediate complications  Dressing:  Sterile dressing applied            _____________________________________________________      Scribe Attestation    I,:  Lakeisha Edward am acting as a scribe while in the presence of the attending physician :       I,:  Sarita Hoover MD personally performed the services described in this documentation    as scribed in my presence :

## 2021-04-30 ENCOUNTER — OFFICE VISIT (OUTPATIENT)
Dept: URGENT CARE | Facility: CLINIC | Age: 66
End: 2021-04-30
Payer: COMMERCIAL

## 2021-04-30 VITALS
TEMPERATURE: 98 F | RESPIRATION RATE: 18 BRPM | WEIGHT: 162 LBS | HEART RATE: 88 BPM | DIASTOLIC BLOOD PRESSURE: 71 MMHG | OXYGEN SATURATION: 99 % | BODY MASS INDEX: 26.03 KG/M2 | HEIGHT: 66 IN | SYSTOLIC BLOOD PRESSURE: 127 MMHG

## 2021-04-30 DIAGNOSIS — L03.116 CELLULITIS OF LEFT LEG: Primary | ICD-10-CM

## 2021-04-30 PROCEDURE — G0382 LEV 3 HOSP TYPE B ED VISIT: HCPCS | Performed by: NURSE PRACTITIONER

## 2021-04-30 RX ORDER — CEPHALEXIN 500 MG/1
500 CAPSULE ORAL EVERY 12 HOURS SCHEDULED
Qty: 14 CAPSULE | Refills: 0 | Status: SHIPPED | OUTPATIENT
Start: 2021-04-30 | End: 2021-05-07

## 2021-04-30 NOTE — PROGRESS NOTES
[facility]        NAME: Cherelle Small is a 63 y o  female  : 1955    MRN: [id number]  DATE: 2021  TIME: 12:18 PM    Assessment and Plan   Cellulitis of left leg [L03 116]  1  Cellulitis of left leg  cephalexin (KEFLEX) 500 mg capsule         Patient Instructions       Follow up with PCP in 3-5 days  Proceed to  ER if symptoms worsen  You have been prescribe cephalexin for cellulitis  Take as prescribed  You are to try hydrocortisone cream and use benadryl if hydrocortisone not working  Follow up with your PCP  Go to the ED if symptoms worsen  You should change your razors often and do not shave every day        Chief Complaint     Chief Complaint   Patient presents with    Rash     itchy rash on left lower leg for 3 days         History of Present Illness       This is a 63year old female who comes to care  Now with c/o left lower leg redness and itching  She states that a few days ago the entire lower leg was red and itching  She states that the redness is decreasing she states she had used calimine/benadryl cream on her leg and states it helped the itching but the redness is worse  She states she shaves her legs every day  She states that she uses the same razor for a few weeks and is concerned that she could be causing infection  Rash        Review of Systems   Review of Systems   Skin: Positive for rash  All other systems reviewed and are negative          Current Medications       Current Outpatient Medications:     Ascorbic Acid (VITAMIN C) 100 MG tablet, Take by mouth, Disp: , Rfl:     benralizumab (FASENRA) subcutaneous injection, Inject 1 mL (30 mg total) under the skin every 56 days for 6 doses, Disp: 1 Syringe, Rfl: 6    bimatoprost (LATISSE) 0 03 % ophthalmic solution, Take as directed, Disp: , Rfl:     cyanocobalamin (VITAMIN B-12) 1,000 mcg tablet, Take 1,000 mcg by mouth daily, Disp: , Rfl:     diclofenac sodium (VOLTAREN) 1 %, Apply 2 g topically 4 (four) times a day, Disp: 1 Tube, Rfl: 2    famotidine (PEPCID) 20 mg tablet, Take 20 mg by mouth 2 (two) times a day , Disp: , Rfl:     flecainide (TAMBOCOR) 100 mg tablet, Take 1 tablet (100 mg total) by mouth 2 (two) times a day, Disp: 60 tablet, Rfl: 5    fluticasone (FLONASE) 50 mcg/act nasal spray, 2 sprays into each nostril 2 (two) times a day, Disp: 1 Bottle, Rfl: 11    levalbuterol (Xopenex HFA) 45 mcg/act inhaler, Inhale 2 puffs every 4 (four) hours as needed for wheezing, Disp: 1 Inhaler, Rfl: 3    lidocaine (LMX) 4 % cream, , Disp: , Rfl:     Magnesium 500 MG CAPS, Take by mouth, Disp: , Rfl:     meloxicam (MOBIC) 15 mg tablet, Take 1 tablet (15 mg total) by mouth daily (Patient taking differently: Take 15 mg by mouth as needed ), Disp: 90 tablet, Rfl: 1    metoprolol succinate (TOPROL-XL) 25 mg 24 hr tablet, Take 1 tablet (25 mg total) by mouth daily, Disp: 90 tablet, Rfl: 3    montelukast (SINGULAIR) 10 mg tablet, Take 1 tablet (10 mg total) by mouth daily at bedtime, Disp: 90 tablet, Rfl: 1    olopatadine HCl (PATADAY) 0 2 % opth drops, , Disp: , Rfl:     omega-3-acid ethyl esters (LOVAZA) 1 g capsule, Take 2 capsules (2 g total) by mouth 2 (two) times a day (Patient taking differently: Take 2 g by mouth 3 (three) times a week ), Disp: 360 capsule, Rfl: 1    Vitamin D, Ergocalciferol, 2000 units CAPS, Take by mouth, Disp: , Rfl:     zolpidem (AMBIEN) 5 mg tablet, Take 1 tablet (5 mg total) by mouth daily at bedtime as needed for sleep, Disp: 30 tablet, Rfl: 2    acetaminophen (TYLENOL) 500 mg tablet, Take 500 mg by mouth every 6 (six) hours as needed for mild pain , Disp: , Rfl:     cephalexin (KEFLEX) 500 mg capsule, Take 1 capsule (500 mg total) by mouth every 12 (twelve) hours for 7 days, Disp: 14 capsule, Rfl: 0    ciprofloxacin-dexamethasone (CIPRODEX) otic suspension, Administer 4 drops into both ears 2 (two) times a day (Patient not taking: Reported on 4/30/2021), Disp: 7 5 mL, Rfl: 0    doxycycline hyclate (VIBRA-TABS) 100 mg tablet, Take 1 tablet by mouth as needed, Disp: , Rfl:     EPINEPHrine (EPIPEN) 0 3 mg/0 3 mL SOAJ, Inject 0 3 mL (0 3 mg total) into a muscle once for 1 dose, Disp: 0 6 mL, Rfl: 0    erythromycin with ethanol (EMGEL) 2 % gel, Apply topically daily (Patient not taking: Reported on 4/30/2021), Disp: 60 g, Rfl: 1    linaCLOtide (Linzess) 72 MCG CAPS, Take 72 mcg by mouth daily (Patient not taking: Reported on 4/30/2021), Disp: 30 capsule, Rfl: 1    ondansetron (ZOFRAN-ODT) 4 mg disintegrating tablet, Take 1 tablet (4 mg total) by mouth every 6 (six) hours as needed for nausea or vomiting (Patient not taking: Reported on 4/30/2021), Disp: 30 tablet, Rfl: 0    Pain Relieving 4 % CREA, , Disp: , Rfl:     pantoprazole (PROTONIX) 20 mg tablet, Take 1 tablet (20 mg total) by mouth daily, Disp: 90 tablet, Rfl: 2    Current Allergies     Allergies as of 04/30/2021 - Reviewed 04/30/2021   Allergen Reaction Noted    Shellfish-derived products - food allergy Anaphylaxis 01/18/2016    Wheat bran - food allergy Anaphylaxis 01/15/2016    Gluten meal - food allergy GI Intolerance 01/18/2016    Morphine and related Itching 01/15/2016    Nuts - food allergy Hives 01/18/2016    Sulfa antibiotics Rash 04/09/2015            The following portions of the patient's history were reviewed and updated as appropriate: allergies, current medications, past family history, past medical history, past social history, past surgical history and problem list      Past Medical History:   Diagnosis Date    Asthma     Celiac disease     Follicular lymphoma ([address] )     GERD (gastroesophageal reflux disease)     Heart palpitations     History of colonic polyps     resolved 07/12/2016    History of radiation therapy 2010    Insomnia     Irregular heart beat     Lymphoma, 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, remission    Malignant lymphoma ([address] ) 2010    rt arm cutaneous follicular stage ia (rt diatal medical biceps area , s/p resected and s/p radistion treatment    resolved 12/17/2014    Postmenopausal disorder     resolved 09/21/2017    Restless legs syndrome     resolved 09/21/2017    TMJ syndrome     resolved 09/21/2017       Past Surgical History:   Procedure Laterality Date    COLONOSCOPY  04/16/2019    FUNCTIONAL ENDOSCOPIC SINUS SURGERY Bilateral 2013    Dr Eliot Jj      age 39 complete    LYMPH NODE DISSECTION Right     arm    NASAL SEPTUM SURGERY  2013    with turbinate reduction - Dr Karen Valerio ESOPHAGOGASTRODUODENOSCOPY TRANSORAL DIAGNOSTIC N/A 1/18/2016    Procedure: ESOPHAGOGASTRODUODENOSCOPY (EGD); Surgeon: Radha Barbosa MD;  [facility];   Service: Gastroenterology    OH EXCIS RECURRENT GANGLION WRIST Left 12/8/2020    Procedure: WRIST GANGLION CYST EXCISION;  Surgeon: Alejandro Bella MD;  [facility];  Service: Orthopedics    SYNOVECTOMY N/A 12/8/2020    Procedure: ECU TENOSYNOVECTOMY;  Surgeon: Alejandro Bella MD;  [facility];  Service: Orthopedics    TONSILLECTOMY      TOTAL ABDOMINAL HYSTERECTOMY W/ BILATERAL SALPINGOOPHORECTOMY      age 54   [address] MSK PROCEDURE  1/16/2020   Swetha Bhakta GUIDED MSK PROCEDURE  8/7/2020       Family History   Problem Relation Age of Onset    Lymphoma Mother     Throat cancer Father     Heart failure Father     Atrial fibrillation Father     Diabetes Father     Colonic polyp Father     Hypertension Father     Thyroid cancer Sister     Breast cancer Paternal Grandmother 71    Breast cancer Paternal Aunt 71    Ovarian cancer Paternal Aunt 79    No Known Problems Maternal Grandmother     No Known Problems Maternal Grandfather     Cancer Paternal Grandfather     Lung cancer Paternal Grandfather     BRCA1 Positive Cousin     Skin cancer Paternal Aunt     Throat cancer Paternal Uncle     No Known Problems Maternal Aunt          Medications have been verified  Objective   /71   Pulse 88   Temp 98 °F (36 7 °C) (Temporal)   Resp 18   Ht 5' 6" (1 676 m)   Wt 73 5 kg (162 lb)   SpO2 99%   BMI 26 15 kg/m²   No LMP recorded  Patient has had a hysterectomy  Physical Exam     Physical Exam  Vitals signs and nursing note reviewed  Constitutional:       General: She is not in acute distress  Appearance: Normal appearance  She is normal weight  She is not ill-appearing, toxic-appearing or diaphoretic  HENT:      Head: Normocephalic and atraumatic  Eyes:      Extraocular Movements: Extraocular movements intact  Neck:      Musculoskeletal: Normal range of motion  Cardiovascular:      Rate and Rhythm: Normal rate  Pulmonary:      Effort: Pulmonary effort is normal    Musculoskeletal: Normal range of motion  Skin:     General: Skin is warm  Findings: Erythema present  Neurological:      General: No focal deficit present  Mental Status: She is alert and oriented to person, place, and time  Psychiatric:         Mood and Affect: Mood normal          Behavior: Behavior normal          Thought Content:  Thought content normal          Judgment: Judgment normal

## 2021-04-30 NOTE — PATIENT INSTRUCTIONS
You have been prescribe cephalexin for cellulitis  Take as prescribed  You are to try hydrocortisone cream and use benadryl if hydrocortisone not working  Follow up with your PCP  Go to the ED if symptoms worsen  You should change your razors often and do not shave every day    Cellulitis   WHAT YOU NEED TO KNOW:   Cellulitis is a skin infection caused by bacteria  Cellulitis may go away on its own or you may need treatment  Your healthcare provider may draw a [place] around the outside edges of your cellulitis  If your cellulitis spreads, your healthcare provider will see it outside of the [place]  DISCHARGE INSTRUCTIONS:   Call 911 if:   · You have sudden trouble breathing or chest pain  Return to the emergency department if:   · Your wound gets larger and more painful  · You feel a crackling under your skin when you touch it  · You have purple dots or bumps on your skin, or you see bleeding under your skin  · You have new swelling and pain in your legs  · The red, warm, swollen area gets larger  · You see red streaks coming from the infected area  Contact your healthcare provider if:   · You have a fever  · Your fever or pain does not go away or gets worse  · The area does not get smaller after 2 days of antibiotics  · Your skin is flaking or peeling off  · You have questions or concerns about your condition or care  Medicines:   · Antibiotics  help treat the bacterial infection  · NSAIDs , such as ibuprofen, help decrease swelling, pain, and fever  NSAIDs can cause stomach bleeding or kidney problems in certain people  If you take blood thinner medicine, always ask if NSAIDs are safe for you  Always read the medicine label and follow directions  Do not give these medicines to children under 10months of age without direction from your child's healthcare provider  · Acetaminophen  decreases pain and fever  It is available without a doctor's order   Ask how much to take and how often to take it  Follow directions  Read the labels of all other medicines you are using to see if they also contain acetaminophen, or ask your doctor or pharmacist  Acetaminophen can cause liver damage if not taken correctly  Do not use more than 4 grams (4,000 milligrams) total of acetaminophen in one day  · Take your medicine as directed  Contact your healthcare provider if you think your medicine is not helping or if you have side effects  Tell him or her if you are allergic to any medicine  Keep a list of the medicines, vitamins, and herbs you take  Include the amounts, and when and why you take them  Bring the list or the pill bottles to follow-up visits  Carry your medicine list with you in case of an emergency  Self-care:   · Elevate the area above the level of your heart  as often as you can  This will help decrease swelling and pain  Prop the area on pillows or blankets to keep it elevated comfortably  · Clean the area daily until the wound scabs over  Gently wash the area with soap and water  Pat dry  Use dressings as directed  · Place cool or warm, wet cloths on the area as directed  Use clean cloths and clean water  Leave it on the area until the cloth is room temperature  Pat the area dry with a clean, dry cloth  The cloths may help decrease pain  Prevent cellulitis:   · Do not scratch bug bites or areas of injury  You increase your risk for cellulitis by scratching these areas  · Do not share personal items, such as towels, clothing, and razors  · Clean exercise equipment  with germ-killing  before and after you use it  · Wash your hands often  Use soap and water  Wash your hands after you use the bathroom, change a child's diapers, or sneeze  Wash your hands before you prepare or eat food  Use lotion to prevent dry, cracked skin  · Wear pressure stockings as directed  You may be told to wear the stockings if you have peripheral edema  The stockings improve blood flow and decrease swelling  · Treat athlete's foot  This can help prevent the spread of a bacterial skin infection  Follow up with your healthcare provider within 3 days, or as directed: Your healthcare provider will check if your cellulitis is getting better  You may need different medicine  Write down your questions so you remember to ask them during your visits  © Copyright [address] Information is for End User's use only and may not be sold, redistributed or otherwise used for commercial purposes  All illustrations and images included in CareNotes® are the copyrighted property of [facility]  or [address] [place], [place] [address]praful kevin   The above information is an  only  It is not intended as medical advice for individual conditions or treatments  Talk to your doctor, nurse or pharmacist before following any medical regimen to see if it is safe and effective for you

## 2021-06-01 ENCOUNTER — HOSPITAL ENCOUNTER (OUTPATIENT)
Dept: INFUSION CENTER | Facility: HOSPITAL | Age: 66
Discharge: HOME | End: 2021-06-01
Attending: INTERNAL MEDICINE
Payer: COMMERCIAL

## 2021-06-01 DIAGNOSIS — D72.119 HYPEREOSINOPHILIC SYNDROME, UNSPECIFIED TYPE: ICD-10-CM

## 2021-06-01 DIAGNOSIS — J45.50 SEVERE PERSISTENT ASTHMA WITHOUT COMPLICATION: Primary | ICD-10-CM

## 2021-06-01 PROCEDURE — 96372 THER/PROPH/DIAG INJ SC/IM: CPT

## 2021-06-01 RX ORDER — EPINEPHRINE 1 MG/ML
0.3 INJECTION, SOLUTION, CONCENTRATE INTRAVENOUS ONCE
Status: CANCELLED | OUTPATIENT
Start: 2021-07-27

## 2021-06-01 RX ADMIN — BENRALIZUMAB 30 MG: 30 INJECTION, SOLUTION SUBCUTANEOUS at 10:59

## 2021-06-01 NOTE — PROGRESS NOTES
Tereza Esqueda given in Left arm without incident  No S/S of adverse reaction post 30 minutes observation   AVS Declined

## 2021-06-03 ENCOUNTER — OFFICE VISIT (OUTPATIENT)
Dept: PULMONOLOGY | Facility: CLINIC | Age: 66
End: 2021-06-03
Payer: COMMERCIAL

## 2021-06-03 VITALS
BODY MASS INDEX: 26.52 KG/M2 | HEART RATE: 77 BPM | TEMPERATURE: 98.5 F | RESPIRATION RATE: 18 BRPM | OXYGEN SATURATION: 97 % | DIASTOLIC BLOOD PRESSURE: 60 MMHG | SYSTOLIC BLOOD PRESSURE: 108 MMHG | WEIGHT: 165 LBS | HEIGHT: 66 IN

## 2021-06-03 DIAGNOSIS — J30.89 NON-SEASONAL ALLERGIC RHINITIS, UNSPECIFIED TRIGGER: ICD-10-CM

## 2021-06-03 DIAGNOSIS — R91.1 PULMONARY NODULE: ICD-10-CM

## 2021-06-03 DIAGNOSIS — J45.50 SEVERE PERSISTENT ASTHMA WITHOUT COMPLICATION: Primary | ICD-10-CM

## 2021-06-03 DIAGNOSIS — R09.81 NASAL CONGESTION: ICD-10-CM

## 2021-06-03 PROBLEM — R93.89 ABNORMAL CT SCAN, CHEST: Status: RESOLVED | Noted: 2020-09-17 | Resolved: 2021-06-03

## 2021-06-03 PROBLEM — Z79.890 HORMONE REPLACEMENT THERAPY (HRT): Status: RESOLVED | Noted: 2019-05-23 | Resolved: 2021-06-03

## 2021-06-03 PROCEDURE — 3008F BODY MASS INDEX DOCD: CPT | Performed by: INTERNAL MEDICINE

## 2021-06-03 PROCEDURE — 1036F TOBACCO NON-USER: CPT | Performed by: INTERNAL MEDICINE

## 2021-06-03 PROCEDURE — 99214 OFFICE O/P EST MOD 30 MIN: CPT | Performed by: INTERNAL MEDICINE

## 2021-06-03 RX ORDER — FLUTICASONE PROPIONATE 50 MCG
2 SPRAY, SUSPENSION (ML) NASAL 2 TIMES DAILY
Qty: 16 G | Refills: 4 | Status: SHIPPED | OUTPATIENT
Start: 2021-06-03 | End: 2021-08-18 | Stop reason: SDUPTHER

## 2021-06-03 RX ORDER — FLUTICASONE FUROATE AND VILANTEROL 100; 25 UG/1; UG/1
1 POWDER RESPIRATORY (INHALATION) DAILY
Qty: 1 EACH | Refills: 6 | Status: SHIPPED | OUTPATIENT
Start: 2021-06-03

## 2021-06-03 RX ORDER — EPINEPHRINE 0.3 MG/.3ML
0.3 INJECTION SUBCUTANEOUS ONCE
Qty: 0.6 ML | Refills: 0 | Status: SHIPPED | OUTPATIENT
Start: 2021-06-03 | End: 2021-08-18

## 2021-06-03 NOTE — ASSESSMENT & PLAN NOTE
· 3 mm pulmonary nodule, in hindsight by the radiologist was present on 2018 study   · Very low risk factors  · Benign nodule, requires no further follow-up

## 2021-06-03 NOTE — PROGRESS NOTES
Progress note - Pulmonary Medicine   Sahil Smith 63 y o  female MRN: [id number]       Impression & Plan:     Severe persistent asthma without complication  ·  Doing very well with Fasenra therapy  She has noticed a substantial improvement in her respiratory symptoms, particularly during allergy season  · EpiPen prescription renewed   · Advair will be changed to [facility] [id number] since Advair is no longer formulary agent and Glynn Adams has replaced it   ·  she is on Singulair, will try discontinuation but will return to this if symptoms worsen    Non-seasonal allergic rhinitis  ·  Continue Flonase, prescription renewed   · On Singulair but will try discontinuation but return if worsening symptoms       Pulmonary nodule  · 3 mm pulmonary nodule, in hindsight by the radiologist was present on 2018 study   · Very low risk factors  · Benign nodule, requires no further follow-up       follow-up in October to re-evaluate symptoms  ______________________________________________________________________    HPI:    Sahil Smith presents today for follow-up of  Severe persistent asthma  Last year at this time was very symptomatic with chest tightness, shortness of breath, and excessive use of her inhalers  She is feeling  substantially better since initiation of Fasenra  She has had much less need for her inhalers  She has been able to reduce the steroid inhaler and as a result has had improvement in her voice  Denies any cough or phlegm currently  Allergy symptoms are substantially better as well  She continues to have nasal drip  She is on Flonase and Singulair  She is questioning whether Singulair could be discontinued since her symptoms are under much better control on Fasenra  She did have a follow-up CT scan for a tiny pulmonary nodule  In hindsight this has been stable  She has no new findings  She is extremely low risk for pulmonary malignancy  No fever, chills, or infection symptoms  She has been vaccinated for COVID-19 with the [place] Peter vaccine  No weight or appetite changes      Current Medications:    Current Outpatient Medications:     acetaminophen (TYLENOL) 500 mg tablet, Take 500 mg by mouth every 6 (six) hours as needed for mild pain , Disp: , Rfl:     benralizumab (FASENRA) subcutaneous injection, Inject 1 mL (30 mg total) under the skin every 56 days for 6 doses, Disp: 1 Syringe, Rfl: 6    bimatoprost (LATISSE) 0 03 % ophthalmic solution, Take as directed, Disp: , Rfl:     cyanocobalamin (VITAMIN B-12) 1,000 mcg tablet, Take 1,000 mcg by mouth daily, Disp: , Rfl:     diclofenac sodium (VOLTAREN) 1 %, Apply 2 g topically 4 (four) times a day, Disp: 1 Tube, Rfl: 2    famotidine (PEPCID) 20 mg tablet, Take 20 mg by mouth 2 (two) times a day , Disp: , Rfl:     flecainide (TAMBOCOR) 100 mg tablet, Take 1 tablet (100 mg total) by mouth 2 (two) times a day, Disp: 60 tablet, Rfl: 5    fluticasone (FLONASE) 50 mcg/act nasal spray, 2 sprays into each nostril 2 (two) times a day, Disp: 16 g, Rfl: 4    levalbuterol (Xopenex HFA) 45 mcg/act inhaler, Inhale 2 puffs every 4 (four) hours as needed for wheezing, Disp: 1 Inhaler, Rfl: 3    lidocaine (LMX) 4 % cream, , Disp: , Rfl:     Magnesium 500 MG CAPS, Take by mouth, Disp: , Rfl:     meloxicam (MOBIC) 15 mg tablet, Take 1 tablet (15 mg total) by mouth daily (Patient taking differently: Take 15 mg by mouth as needed ), Disp: 90 tablet, Rfl: 1    metoprolol succinate (TOPROL-XL) 25 mg 24 hr tablet, Take 1 tablet (25 mg total) by mouth daily, Disp: 90 tablet, Rfl: 3    montelukast (SINGULAIR) 10 mg tablet, Take 1 tablet (10 mg total) by mouth daily at bedtime, Disp: 90 tablet, Rfl: 1    olopatadine HCl (PATADAY) 0 2 % opth drops, , Disp: , Rfl:     omega-3-acid ethyl esters (LOVAZA) 1 g capsule, Take 2 capsules (2 g total) by mouth 2 (two) times a day (Patient taking differently: Take 2 g by mouth 3 (three) times a week ), Disp: 360 capsule, Rfl: 1    Pain Relieving 4 % CREA, , Disp: , Rfl:     Vitamin D, Ergocalciferol, 2000 units CAPS, Take by mouth, Disp: , Rfl:     zolpidem (AMBIEN) 5 mg tablet, Take 1 tablet (5 mg total) by mouth daily at bedtime as needed for sleep, Disp: 30 tablet, Rfl: 2    EPINEPHrine (EPIPEN) 0 3 mg/0 3 mL SOAJ, Inject 0 3 mL (0 3 mg total) into a muscle once for 1 dose, Disp: 0 6 mL, Rfl: 0    EPINEPHrine (EPIPEN) 0 3 mg/0 3 mL SOAJ, Inject 0 3 mL (0 3 mg total) into a muscle once for 1 dose, Disp: 0 6 mL, Rfl: 0    fluticasone-vilanterol (BREO ELLIPTA) 100-25 mcg/inh inhaler, Inhale 1 puff daily Rinse mouth after use , Disp: 1 each, Rfl: 6    pantoprazole (PROTONIX) 20 mg tablet, Take 1 tablet (20 mg total) by mouth daily, Disp: 90 tablet, Rfl: 2    Review of Systems:  Review of Systems   Constitutional: Negative for appetite change and fever  HENT: Positive for postnasal drip, rhinorrhea and trouble swallowing  Negative for ear pain, sneezing and sore throat  Cardiovascular: Negative for chest pain  Gastrointestinal: Negative  Musculoskeletal: Negative for myalgias  Allergic/Immunologic: Positive for environmental allergies  Neurological: Negative for headaches  Psychiatric/Behavioral: Negative for sleep disturbance     Answers for HPI/ROS submitted by the patient on 6/3/2021   Primary symptoms  Do you have shortness of breath that occurs with effort or exertion?: No  Do you have ear congestion?: No  Do you have heartburn?: No  Do you have fatigue?: No  Do you have nasal congestion?: Yes  Do you have shortness of breath when lying flat?: No  Do you have shortness of breath when you wake up?: No  Do you have sweats?: No  Have you experienced weight loss?: No  Which of the following makes your symptoms worse?: change in weather, exposure to fumes, exposure to smoke, pollen, URI  Which of the following makes your symptoms better?: oral steroids, OTC cough suppressant, steroid inhaler    Aside from what is mentioned in the HPI, the review of systems is otherwise negative    Past medical history, surgical history, and family history were reviewed and updated as appropriate    Social history updates:  Social History     Tobacco Use   Smoking Status Former Smoker    Packs/day: 0 75    Years: 20 00    Pack years: 15 00    Types: Cigarettes    Quit date:     Years since quittin 4   Smokeless Tobacco Never Used       PhysicalExamination:  Vitals:   /60   Pulse 77   Temp 98 5 °F (36 9 °C)   Resp 18   Ht 5' 6" (1 676 m)   Wt 74 8 kg (165 lb)   SpO2 97%   BMI 26 63 kg/m²    Gen: Comfortable  Non-labored  HEENT: PERRL  O/P: clear  moist  Neck: Trachea is midline  No JVD  No adenopathy  Chest:  Symmetric chest wall excursion with clear breath sounds  Cardiac:  regular  no murmur  Abdomen: Soft and nontender  Bowel sounds are present  Extremities: No edema    Diagnostic Data:  Labs: I personally reviewed the most recent laboratory data pertinent to today's visit    Lab Results   Component Value Date    WBC 5 46 2020    HGB 13 8 2020    HCT 42 0 2020    MCV 94 2020     2020     Lab Results   Component Value Date    SODIUM 138 2020    K 4 2 2020    CO2 27 2020     2020    BUN 14 2020    CREATININE 0 95 2020    CALCIUM 9 4 2020         Imaging:  I personally reviewed the images on the [facility] system pertinent to today's visit   CT scan of the chest from March shows 3 mm right lower lobe pulmonary nodule  In hindsight stable in comparison with prior study from 2018  Initially read as clear lungs however    Patient is low risk and does not require further follow-up for this 3 mm nodule    Lilo Mattson MD

## 2021-06-03 NOTE — ASSESSMENT & PLAN NOTE
·  Continue Flonase, prescription renewed   · On Singulair but will try discontinuation but return if worsening symptoms

## 2021-06-03 NOTE — ASSESSMENT & PLAN NOTE
·  Doing very well with Fasenra therapy    She has noticed a substantial improvement in her respiratory symptoms, particularly during allergy season  · EpiPen prescription renewed   · Advair will be changed to [facility] [id number] since Advair is no longer formulary agent and Dickie Bence has replaced it   ·  she is on Singulair, will try discontinuation but will return to this if symptoms worsen

## 2021-06-08 ENCOUNTER — TELEPHONE (OUTPATIENT)
Dept: PULMONOLOGY | Facility: CLINIC | Age: 66
End: 2021-06-08

## 2021-06-08 NOTE — TELEPHONE ENCOUNTER
New authorization faxed with clinicals to [address]  [phone]    Patient is still enrolled with [employer] for assistance spoke with Merna Ellsworth [phone] to confirm ref# [id number]    VIRTUAL CARD # [id number]    EXP 12/31/2023

## 2021-06-16 ENCOUNTER — TELEPHONE (OUTPATIENT)
Dept: GASTROENTEROLOGY | Facility: CLINIC | Age: 66
End: 2021-06-16

## 2021-06-16 ENCOUNTER — TELEPHONE (OUTPATIENT)
Dept: FAMILY MEDICINE CLINIC | Facility: CLINIC | Age: 66
End: 2021-06-16

## 2021-06-16 NOTE — TELEPHONE ENCOUNTER
Pt called to confirm if she paid her copay on DOS 12/21/20 as she received a bill from [facility]  Went back into schedule for DOS & pt did not pay copay

## 2021-06-16 NOTE — TELEPHONE ENCOUNTER
Patient contacted office to reschedule EGD from 2020  Can patient be reschedule for EGD or is follow up appointment required prior  Please advise

## 2021-06-17 NOTE — TELEPHONE ENCOUNTER
She should likely have appt first since she was supposed to have repeat EGD months ago and never followed up, but I will defer to provider

## 2021-07-09 ENCOUNTER — OFFICE VISIT (OUTPATIENT)
Dept: OBGYN CLINIC | Facility: CLINIC | Age: 66
End: 2021-07-09
Payer: COMMERCIAL

## 2021-07-09 VITALS
BODY MASS INDEX: 26.03 KG/M2 | DIASTOLIC BLOOD PRESSURE: 86 MMHG | WEIGHT: 162 LBS | HEART RATE: 80 BPM | SYSTOLIC BLOOD PRESSURE: 135 MMHG | HEIGHT: 66 IN

## 2021-07-09 DIAGNOSIS — M67.921 DISORDER OF TENDON OF RIGHT BICEPS: Primary | ICD-10-CM

## 2021-07-09 DIAGNOSIS — M75.101 ROTATOR CUFF SYNDROME OF RIGHT SHOULDER: ICD-10-CM

## 2021-07-09 PROCEDURE — 99213 OFFICE O/P EST LOW 20 MIN: CPT | Performed by: ORTHOPAEDIC SURGERY

## 2021-07-09 PROCEDURE — 1036F TOBACCO NON-USER: CPT | Performed by: ORTHOPAEDIC SURGERY

## 2021-07-09 PROCEDURE — 3008F BODY MASS INDEX DOCD: CPT | Performed by: ORTHOPAEDIC SURGERY

## 2021-07-09 NOTE — PROGRESS NOTES
Patient Name:  Kareen Lanes  MRN:  [id number]    Assessment & Plan     Right shoulder biceps tendinopathy and rotator cuff syndrome  1  Recommended biceps tendon sheath steroid injection under US guidance with Radiology  2  Aleve or Advil and ice or heat as needed  3  Call if no relief by 2 weeks after injection to arrange MRI right shoulder  Chief Complaint     Right shoulder pain    History of the Present Illness     Kareen Lanes is a 63 y o  female with right shoulder pain for the past 8 months  Symptoms started after performing a shoulder press  The pain localizes to the anterior aspect and radiates across her chest at times  She also reports mild numbness and tingling in the ulnar 3 digits  She has tried Aleve, Advil, and Tylenol without relief  Physical Exam     /86   Pulse 80   Ht 5' 6" (1 676 m)   Wt 73 5 kg (162 lb)   BMI 26 15 kg/m²     Right shoulder:  Tenderness:  Proximal biceps tendon  Range of motion (R/L):   FF:  130/170 limited by pain   ER-abduction:  80/100 limited by pain   IR-abduction:  10/40 limited by pain  Strength:  FF 4/5 limited by pain  Impingement signs:  Positive  Empty can test:  Positive (mild)  Speed's test:  Positive    Eyes:  Anicteric sclerae  ENT:  Trachea midline  Lungs:  Normal respiratory effort  Cardiovascular:  Capillary refill is less than 2 seconds  Lymphatic:  No palpable lymphadenopathy  Skin:  Intact without erythema  Neurologic:  Sensation grossly intact to light touch  Psychiatric:  Mood and affect are appropriate  Data Review     I have personally reviewed pertinent films in PACS, and my interpretation follows:    XR right shoulder 12/21/2020:  Mild AC joint degenerative changes  No acute osseous abnormalities        Past Medical History:   Diagnosis Date    Asthma     Celiac disease     Follicular lymphoma ([facility] [address] )     GERD (gastroesophageal reflux disease)     Heart palpitations     History of colonic polyps     resolved 07/12/2016    History of radiation therapy 2010    Insomnia     Irregular heart beat     Lymphoma, 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HCC)     non hodgekins, remission    Malignant lymphoma ([facility] [address] ) 2010    rt arm cutaneous follicular stage ia (rt diatal medical biceps area , s/p resected and s/p radistion treatment    resolved 12/17/2014    Postmenopausal disorder     resolved 09/21/2017    Pulmonary nodule     BENIGN, STABLE [id number]    Restless legs syndrome     resolved 09/21/2017    TMJ syndrome     resolved 09/21/2017       Past Surgical History:   Procedure Laterality Date    COLONOSCOPY  04/16/2019    FUNCTIONAL ENDOSCOPIC SINUS SURGERY Bilateral 2013    Dr Kathy Villanueva      age 39 complete    LYMPH NODE DISSECTION Right     arm    NASAL SEPTUM SURGERY  2013    with turbinate reduction - Dr Mahin Funes ESOPHAGOGASTRODUODENOSCOPY TRANSORAL DIAGNOSTIC N/A 1/18/2016    Procedure: ESOPHAGOGASTRODUODENOSCOPY (EGD); Surgeon: Kathleen Shore MD;  [facility]; Service: Gastroenterology    IL EXCIS RECURRENT GANGLION WRIST Left 12/8/2020    Procedure: WRIST GANGLION CYST EXCISION;  Surgeon: Amee Razo MD;  [facility];  Service: Orthopedics    SYNOVECTOMY N/A 12/8/2020    Procedure: ECU TENOSYNOVECTOMY;  Surgeon: Amee Razo MD;  [facility];  Service: Orthopedics    TONSILLECTOMY      TOTAL ABDOMINAL HYSTERECTOMY W/ BILATERAL SALPINGOOPHORECTOMY      age 54    [address] MSK PROCEDURE  1/16/2020    US GUIDED MSK PROCEDURE  8/7/2020       Allergies   Allergen Reactions    Shellfish-Derived Products - Food Allergy Anaphylaxis    Wheat Bran - Food Allergy Anaphylaxis    Gluten Meal - Food Allergy GI Intolerance     Celiac     Morphine And Related Itching    Nuts - Food Allergy Hives     Almonds,walnuts, hazelnuts and other related nuts       Sulfa Antibiotics Rash       Current Outpatient Medications on File Prior to Visit   Medication Sig Dispense Refill    acetaminophen (TYLENOL) 500 mg tablet Take 500 mg by mouth every 6 (six) hours as needed for mild pain   benralizumab (FASENRA) subcutaneous injection Inject 1 mL (30 mg total) under the skin every 56 days for 6 doses 1 Syringe 6    bimatoprost (LATISSE) 0 03 % ophthalmic solution Take as directed      cyanocobalamin (VITAMIN B-12) 1,000 mcg tablet Take 1,000 mcg by mouth daily      diclofenac sodium (VOLTAREN) 1 % Apply 2 g topically 4 (four) times a day 1 Tube 2    EPINEPHrine (EPIPEN) 0 3 mg/0 3 mL SOAJ Inject 0 3 mL (0 3 mg total) into a muscle once for 1 dose 0 6 mL 0    EPINEPHrine (EPIPEN) 0 3 mg/0 3 mL SOAJ Inject 0 3 mL (0 3 mg total) into a muscle once for 1 dose 0 6 mL 0    famotidine (PEPCID) 20 mg tablet Take 20 mg by mouth 2 (two) times a day   flecainide (TAMBOCOR) 100 mg tablet Take 1 tablet (100 mg total) by mouth 2 (two) times a day 60 tablet 5    fluticasone (FLONASE) 50 mcg/act nasal spray 2 sprays into each nostril 2 (two) times a day 16 g 4    fluticasone-vilanterol (BREO ELLIPTA) 100-25 mcg/inh inhaler Inhale 1 puff daily Rinse mouth after use   1 each 6    levalbuterol (Xopenex HFA) 45 mcg/act inhaler Inhale 2 puffs every 4 (four) hours as needed for wheezing 1 Inhaler 3    lidocaine (LMX) 4 % cream       Magnesium 500 MG CAPS Take by mouth      metoprolol succinate (TOPROL-XL) 25 mg 24 hr tablet Take 1 tablet (25 mg total) by mouth daily 90 tablet 3    montelukast (SINGULAIR) 10 mg tablet Take 1 tablet (10 mg total) by mouth daily at bedtime 90 tablet 1    olopatadine HCl (PATADAY) 0 2 % opth drops       omega-3-acid ethyl esters (LOVAZA) 1 g capsule Take 2 capsules (2 g total) by mouth 2 (two) times a day (Patient taking differently: Take 2 g by mouth 3 (three) times a week ) 360 capsule 1    Pain Relieving 4 % CREA       pantoprazole (PROTONIX) 20 mg tablet Take 1 tablet (20 mg total) by mouth daily 90 tablet 2    Vitamin D, Ergocalciferol, 2000 units CAPS Take by mouth      zolpidem (AMBIEN) 5 mg tablet Take 1 tablet (5 mg total) by mouth daily at bedtime as needed for sleep 30 tablet 2    [DISCONTINUED] meloxicam (MOBIC) 15 mg tablet Take 1 tablet (15 mg total) by mouth daily (Patient taking differently: Take 15 mg by mouth as needed ) 90 tablet 1     No current facility-administered medications on file prior to visit  Social History     Tobacco Use    Smoking status: Former Smoker     Packs/day: 0 75     Years: 20 00     Pack years: 15 00     Types: Cigarettes     Quit date:      Years since quittin 5    Smokeless tobacco: Never Used   Vaping Use    Vaping Use: Never used   Substance Use Topics    Alcohol use: Yes     Alcohol/week: 3 0 standard drinks     Types: 3 Glasses of wine per week    Drug use: No       Family History   Problem Relation Age of Onset    Lymphoma Mother     Throat cancer Father     Heart failure Father     Atrial fibrillation Father     Diabetes Father     Colonic polyp Father     Hypertension Father     Thyroid cancer Sister     Breast cancer Paternal Grandmother 71    Breast cancer Paternal Aunt 71    Ovarian cancer Paternal Aunt 79    No Known Problems Maternal Grandmother     No Known Problems Maternal Grandfather     Cancer Paternal Grandfather     Lung cancer Paternal Grandfather     BRCA1 Positive Cousin     Skin cancer Paternal Aunt     Throat cancer Paternal Uncle     No Known Problems Maternal Aunt        Review of Systems     As stated in the HPI  All other systems were reviewed and are negative        Procedures    Scribe Attestation    I,:  Padmini Willis am acting as a scribe while in the presence of the attending physician :       I,:  Chester Hernandez MD personally performed the services described in this documentation    as scribed in my presence :

## 2021-07-26 NOTE — NURSING NOTE
Call placed to patient to discuss upcoming appointment at [place] [address] radiology department and complete consultation with patient  Patient is having right shoulder bicep tendon sheath CSI injection with US guidance  Reviewed patient's allergies, no current anticoagulant medication present, also discussed the pre and post procedure expectations  Reminded patient of location and time expected for procedure, Patient expressed understanding by verbalizing and repeating instructions

## 2021-07-27 ENCOUNTER — HOSPITAL ENCOUNTER (OUTPATIENT)
Dept: INFUSION CENTER | Facility: HOSPITAL | Age: 66
Discharge: HOME | End: 2021-07-27
Attending: INTERNAL MEDICINE
Payer: COMMERCIAL

## 2021-07-27 VITALS — TEMPERATURE: 97.3 F | SYSTOLIC BLOOD PRESSURE: 100 MMHG | HEART RATE: 73 BPM | DIASTOLIC BLOOD PRESSURE: 60 MMHG

## 2021-07-27 DIAGNOSIS — J45.50 SEVERE PERSISTENT ASTHMA WITHOUT COMPLICATION: Primary | ICD-10-CM

## 2021-07-27 DIAGNOSIS — D72.119 HYPEREOSINOPHILIC SYNDROME, UNSPECIFIED TYPE: ICD-10-CM

## 2021-07-27 PROCEDURE — 96372 THER/PROPH/DIAG INJ SC/IM: CPT

## 2021-07-27 RX ORDER — EPINEPHRINE 1 MG/ML
0.3 INJECTION, SOLUTION, CONCENTRATE INTRAVENOUS ONCE
Status: CANCELLED | OUTPATIENT
Start: 2021-09-21 | End: 2021-09-21

## 2021-07-27 RX ORDER — EPINEPHRINE 1 MG/ML
0.3 INJECTION, SOLUTION, CONCENTRATE INTRAVENOUS ONCE
Status: DISCONTINUED | OUTPATIENT
Start: 2021-07-27 | End: 2021-07-31 | Stop reason: HOSPADM

## 2021-07-27 RX ADMIN — BENRALIZUMAB 30 MG: 30 INJECTION, SOLUTION SUBCUTANEOUS at 10:30

## 2021-07-27 NOTE — PROGRESS NOTES
Fasenra injection given in left arm without issue  Patient brought epi pen to appointment, expiration date 5/2022  Patient observed for 30 minutes post injection with no s/s of adverse reaction noted  Appointment made for next injection   Discharged in stable condition, declined AVS

## 2021-08-02 ENCOUNTER — HOSPITAL ENCOUNTER (OUTPATIENT)
Dept: RADIOLOGY | Facility: HOSPITAL | Age: 66
Discharge: HOME | End: 2021-08-02
Attending: ORTHOPAEDIC SURGERY
Payer: COMMERCIAL

## 2021-08-02 DIAGNOSIS — M67.921 DISORDER OF TENDON OF RIGHT BICEPS: ICD-10-CM

## 2021-08-02 PROCEDURE — 20611 DRAIN/INJ JOINT/BURSA W/US: CPT

## 2021-08-02 RX ORDER — METHYLPREDNISOLONE ACETATE 80 MG/ML
80 INJECTION, SUSPENSION INTRA-ARTICULAR; INTRALESIONAL; INTRAMUSCULAR; SOFT TISSUE ONCE
Status: COMPLETED | OUTPATIENT
Start: 2021-08-02 | End: 2021-08-02

## 2021-08-02 RX ORDER — BUPIVACAINE HYDROCHLORIDE 2.5 MG/ML
5 INJECTION, SOLUTION EPIDURAL; INFILTRATION; INTRACAUDAL ONCE
Status: COMPLETED | OUTPATIENT
Start: 2021-08-02 | End: 2021-08-02

## 2021-08-02 RX ORDER — LIDOCAINE HYDROCHLORIDE 10 MG/ML
5 INJECTION, SOLUTION EPIDURAL; INFILTRATION; INTRACAUDAL; PERINEURAL ONCE
Status: COMPLETED | OUTPATIENT
Start: 2021-08-02 | End: 2021-08-02

## 2021-08-02 RX ADMIN — METHYLPREDNISOLONE ACETATE 80 MG: 80 INJECTION, SUSPENSION INTRA-ARTICULAR; INTRALESIONAL; INTRAMUSCULAR; SOFT TISSUE at 11:45

## 2021-08-02 RX ADMIN — BUPIVACAINE HYDROCHLORIDE 5 ML: 2.5 INJECTION, SOLUTION EPIDURAL; INFILTRATION; INTRACAUDAL; PERINEURAL at 11:45

## 2021-08-02 RX ADMIN — LIDOCAINE HYDROCHLORIDE 8 ML: 10 INJECTION, SOLUTION EPIDURAL; INFILTRATION; INTRACAUDAL; PERINEURAL at 11:45

## 2021-08-10 ENCOUNTER — TELEPHONE (OUTPATIENT)
Dept: CARDIOLOGY CLINIC | Facility: CLINIC | Age: 66
End: 2021-08-10

## 2021-08-10 NOTE — TELEPHONE ENCOUNTER
110/70 BP on left arm sitting  60 HR apical    She was at the infusion center for asthma and they got 90/57-60 (checked multiple times)    She get lightheaded/dizzy when bending then getting up  Gets sparkles in her eyes  Takes metoprolol 5-7pm at night  And flecainide BID

## 2021-08-10 NOTE — TELEPHONE ENCOUNTER
Per dr Poornima Casper, cut metoprolol in half  Patient requested an appt next month  No openings with dr Poornima Casper so she would like to see dr Lilo Esqueda in between her regular f/u with dr Poornima Casper

## 2021-08-11 ENCOUNTER — OFFICE VISIT (OUTPATIENT)
Dept: GASTROENTEROLOGY | Facility: MEDICAL CENTER | Age: 66
End: 2021-08-11
Payer: COMMERCIAL

## 2021-08-11 ENCOUNTER — TELEPHONE (OUTPATIENT)
Dept: GASTROENTEROLOGY | Facility: MEDICAL CENTER | Age: 66
End: 2021-08-11

## 2021-08-11 VITALS
SYSTOLIC BLOOD PRESSURE: 112 MMHG | DIASTOLIC BLOOD PRESSURE: 78 MMHG | WEIGHT: 158.8 LBS | BODY MASS INDEX: 25.52 KG/M2 | TEMPERATURE: 97.8 F | HEART RATE: 66 BPM | HEIGHT: 66 IN

## 2021-08-11 DIAGNOSIS — K20.90 ESOPHAGITIS: ICD-10-CM

## 2021-08-11 DIAGNOSIS — K90.0 CELIAC DISEASE: Primary | ICD-10-CM

## 2021-08-11 DIAGNOSIS — K21.9 CHRONIC GERD: ICD-10-CM

## 2021-08-11 PROCEDURE — 99214 OFFICE O/P EST MOD 30 MIN: CPT | Performed by: INTERNAL MEDICINE

## 2021-08-11 RX ORDER — KETOROLAC TROMETHAMINE 5 MG/ML
SOLUTION OPHTHALMIC
COMMUNITY
Start: 2021-08-06 | End: 2022-05-18

## 2021-08-11 RX ORDER — PANTOPRAZOLE SODIUM 20 MG/1
20 TABLET, DELAYED RELEASE ORAL DAILY
Qty: 90 TABLET | Refills: 2 | Status: SHIPPED | OUTPATIENT
Start: 2021-08-11 | End: 2021-11-16

## 2021-08-11 NOTE — TELEPHONE ENCOUNTER
DO Shawn Arrington 38 minutes ago (1:37 PM)     Yes you may stop the fish oil  thanks tanya corley do    Message text    You routed conversation to Stacy Holt DO 1 hour ago (1:12 PM)     Notified patient ok to hold Lovaza for 5 days prior to procedure  Patient verbalized understanding

## 2021-08-11 NOTE — PROGRESS NOTES
[facility] - Outpatient Follow-up Note  Keya Leslie 63 y o  female MRN: [id number]  Encounter: [id number]          ASSESSMENT AND PLAN:   63year old female with history of follicular lymphoma, celiac disease and GERD presents for evaluation  1  Celiac disease  She has history of celiac disease diagnosed   She has been strict gluten free diet  Serology in  were negative  She is due for yearly blood work for serologies and vitamin/iron levels  Duodenal biopsies will be obtained at the time of her endoscopy for up-to-date status of her celiac disease  - Tissue transglutaminase, IgA; Future  - IgA; Future  - Vitamin D 25 hydroxy; Future  - Vitamin B12; Future  - Iron Panel (Includes Ferritin, Iron Sat%, Iron, and TIBC); Future  - EGD; Future    2  Chronic GERD  3  Esophagitis  She has history of chronic GERD underwent endoscopy in  showing LA grade a esophagitis  She was recommended repeat exam in 3 months after taking PPI however had to cancel due to asthma exacerbation  Her symptoms are moderately controlled on nightly Pepcid as needed PPI  I recommend that she resume Protonix 20 mg daily prior to her endoscopy to ensure resolution of her esophagitis and for screening for Campbell's esophagus  If her endoscopy is normal she can consider decreasing back to H2 blocker nightly therapy and PPI as needed  She will continue dietary/lifestyle anti-reflux measures  - EGD; Future  - pantoprazole (PROTONIX) 20 mg tablet; Take 1 tablet (20 mg total) by mouth daily  Dispense: 90 tablet; Refill: 2      Colon cancer screenin colonoscopy is normal and she is due in   ______________________________________________________________________    SUBJECTIVE:  63year old female with history of follicular lymphoma, celiac disease and GERD presents for evaluation  2020 she underwent EGD for acid reflux showing grade a esophagitis    Gastric and duodenal biopsies were unremarkable  She was recommended to start twice daily PPI and repeat EGD in 3 months She was plan for repeat EGD but other than asthma attack and had to cancel the exam     She reports her asthma is better control since change in her medication by her pulmonologist   For her acid reflux she is taking Pepcid 40 mg at night and Protonix 1-2 pills of the 20 mg as needed symptoms  She tries to avoid certain foods that cause her symptoms to be worse  She has stopped her late night eating  Her appetite is good  She has intentionally lost 10 lb recently  She reports regular, formed bowel movements without melena or hematochezia  She reports intermittent globus sensation  She reports diagnosis of celiac disease in 2010  She has been strict with gluten free diet since then  2019 colonoscopy normal and she was recommended to repeat in 5 years    She has history of celiac disease and tTG IgA in 2019 was negative with normal iron studies  REVIEW OF SYSTEMS IS OTHERWISE NEGATIVE    Ten point review of systems negative other than stated as per HPI    Historical Information   Past Medical History:   Diagnosis Date    Asthma     Celiac disease     Follicular [address] ([facility] [address] )     GERD (gastroesophageal reflux disease)     Heart palpitations     History of colonic polyps     resolved 07/12/2016    History of radiation therapy 2010    Insomnia     Irregular heart beat     Lymphoma, 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, remission    Malignant lymphoma ([address] ) 2010    rt arm cutaneous follicular stage ia (rt diatal medical biceps area , s/p resected and s/p radistion treatment    resolved 12/17/2014    Postmenopausal disorder     resolved 09/21/2017    Pulmonary nodule     BENIGN, STABLE 2498-0344    Restless legs syndrome     resolved 09/21/2017    TMJ syndrome     resolved 09/21/2017     Past Surgical History:   Procedure Laterality Date    COLONOSCOPY  04/16/2019    FUNCTIONAL ENDOSCOPIC SINUS SURGERY Bilateral 2013    Dr Brian Lucas      age 39 complete    LYMPH NODE DISSECTION Right     arm    NASAL SEPTUM SURGERY  2013    with turbinate reduction - Dr Tiara Anderson ESOPHAGOGASTRODUODENOSCOPY TRANSORAL DIAGNOSTIC N/A 2016    Procedure: ESOPHAGOGASTRODUODENOSCOPY (EGD); Surgeon: Candace Ross MD;  [facility];   Service: Gastroenterology    UT EXCIS RECURRENT GANGLION WRIST Left 2020    Procedure: WRIST GANGLION CYST EXCISION;  Surgeon: Miri Becker MD;  [facility];  Service: Orthopedics    SYNOVECTOMY N/A 2020    Procedure: ECU TENOSYNOVECTOMY;  Surgeon: Miri Becker MD;  [facility];  Service: Orthopedics    TONSILLECTOMY      TOTAL ABDOMINAL HYSTERECTOMY W/ BILATERAL SALPINGOOPHORECTOMY      age 54   [address] MSK PROCEDURE  2020   [place] GUIDED MSK PROCEDURE  2020    US GUIDED MSK PROCEDURE  2021     Social History   Social History     Substance and Sexual Activity   Alcohol Use Yes    Alcohol/week: 3 0 standard drinks    Types: 3 Glasses of wine per week     Social History     Substance and Sexual Activity   Drug Use No     Social History     Tobacco Use   Smoking Status Former Smoker    Packs/day: 0 75    Years: 20 00    Pack years: 15 00    Types: Cigarettes    Quit date:     Years since quittin 6   Smokeless Tobacco Never Used     Family History   Problem Relation Age of Onset    Lymphoma Mother     Throat cancer Father     Heart failure Father     Atrial fibrillation Father     Diabetes Father     Colonic polyp Father     Hypertension Father     Thyroid cancer Sister     Breast cancer Paternal Grandmother 71    Breast cancer Paternal Aunt 71    Ovarian cancer Paternal Aunt 79    No Known Problems Maternal Grandmother     No Known Problems Maternal Grandfather     Cancer Paternal Grandfather     Lung cancer Paternal Grandfather     BRCA1 Positive Cousin     Skin cancer Paternal Aunt     Throat cancer Paternal Uncle     No Known Problems Maternal Aunt        Meds/Allergies       Current Outpatient Medications:     acetaminophen (TYLENOL) 500 mg tablet    benralizumab (FASENRA) subcutaneous injection    bimatoprost (LATISSE) 0 03 % ophthalmic solution    cyanocobalamin (VITAMIN B-12) 1,000 mcg tablet    diclofenac sodium (VOLTAREN) 1 %    EPINEPHrine (EPIPEN) 0 3 mg/0 3 mL SOAJ    EPINEPHrine (EPIPEN) 0 3 mg/0 3 mL SOAJ    famotidine (PEPCID) 20 mg tablet    flecainide (TAMBOCOR) 100 mg tablet    fluticasone (FLONASE) 50 mcg/act nasal spray    fluticasone-vilanterol (BREO ELLIPTA) 100-25 mcg/inh inhaler    levalbuterol (Xopenex HFA) 45 mcg/act inhaler    lidocaine (LMX) 4 % cream    Magnesium 500 MG CAPS    metoprolol succinate (TOPROL-XL) 25 mg 24 hr tablet    montelukast (SINGULAIR) 10 mg tablet    olopatadine HCl (PATADAY) 0 2 % opth drops    omega-3-acid ethyl esters (LOVAZA) 1 g capsule    Pain Relieving 4 % CREA    pantoprazole (PROTONIX) 20 mg tablet    Vitamin D, Ergocalciferol, 2000 units CAPS    zolpidem (AMBIEN) 5 mg tablet    Allergies   Allergen Reactions    Shellfish-Derived Products - Food Allergy Anaphylaxis    Wheat Bran - Food Allergy Anaphylaxis    Gluten Meal - Food Allergy GI Intolerance     Celiac     Morphine And Related Itching    Nuts - Food Allergy Hives     Almonds,walnuts, hazelnuts and other related nuts   Sulfa Antibiotics Rash           Objective     Blood pressure 112/78, pulse 66, temperature 97 8 °F (36 6 °C), temperature source Tympanic, height 5' 6" (1 676 m), weight 72 kg (158 lb 12 8 oz), not currently breastfeeding  Body mass index is 25 63 kg/m²  PHYSICAL EXAM:      General Appearance:   Alert, cooperative, no distress   HEENT:   Normocephalic, atraumatic, anicteric       Neck:  Supple, symmetrical, trachea midline   Lungs:   Clear to auscultation bilaterally; no rales, rhonchi or wheezing; respirations unlabored    Heart[de-identified]   Regular rate and rhythm; no murmur, rub, or gallop  Abdomen:   Soft, non-tender, non-distended; normal bowel sounds; no masses, no organomegaly    Genitalia:   Deferred    Rectal:   Deferred    Extremities:  No cyanosis, clubbing or edema    Pulses:  2+ and symmetric    Skin:  No jaundice, rashes, or lesions    Lymph nodes:  No palpable cervical lymphadenopathy        Lab Results:   No visits with results within 1 Day(s) from this visit  Latest known visit with results is:   Appointment on 03/03/2021   Component Date Value    QFT Nil 03/03/2021 0 04     QFT TB1-NIL 03/03/2021 0 00     QFT TB2-NIL 03/03/2021 0 00     QFT Mitogen-NIL 03/03/2021 >10 00     QFT Final Interpretation 03/03/2021 Negative          Radiology Results:   US guided msk procedure    Result Date: 8/2/2021  Narrative: Ultrasound guided right long head of biceps tendon sheath injection  INDICATION:   [id number]: Unspecified disorder of synovium and tendon, right upper arm  Right anterior shoulder pain  COMPARISON:  None  TECHNIQUE: Patient was brought to the  ultrasound suite and placed supine on the stretcher  A brief ultrasound examination was performed to localize the right long head of the biceps tendon, which was markedly thickened consistent with severe tendinosis  Risks and benefits of the procedure were explained to the patient, and written informed consent was obtained  The patient verbalized expressed understanding of the above risks and wished to proceed with the procedure  Final standard "time-out" procedure performed  An area on the skin was prepped, and draped in the usual sterile fashion  Lidocaine was administered to the skin and a 20 gauge 1 1/2 needle was inserted under ultrasound guidance into the long head of biceps tendon sheath  0 5 mL 80 mg/mL Depomedrol, 1 mL of 0 25% Sensorcaine and 1 mL 1% Xylocaine was injected and distended the tendon sheath   After the procedure, the  needle was removed and bandage applied  The patient tolerated the procedure well and suffered no complications  I asked the patient to call us with any questions, concerns, or acute problems  The patient expressed understanding of the above  Prior to the procedure patient had 4/10 right shoulder pain  Immediately after the procedure this decreased to 1/10  Impression: Successful ultrasound-guided right long head of biceps tendon sheath anesthetic and steroid injection  Severe tendinosis was noted  Immediately after injection, patient described near complete relief of her typical right shoulder pain   Workstation performed: [id number]

## 2021-08-11 NOTE — TELEPHONE ENCOUNTER
Our mutual patient is scheduled for procedure: EGD     On: 11/9/21     With: Dr José Miguel Rios    She is taking the following medication: Lovaza                                       Can this be stopped 5 days prior to the procedure?        Physician Approving clearance: ________________________

## 2021-08-11 NOTE — PATIENT INSTRUCTIONS
The patient is scheduled at [facility] [place] for an EGD with Dr Renae Harry on 11/9/2021  Prep instructions have been gone over in the office, with the patient, by the MA  The patient is aware that they will receive a call with the arrival time the day prior to procedure and that they will need a  the day of the procedure  I have asked the patient to call with any questions that they might have prior to procedure

## 2021-08-13 ENCOUNTER — TELEPHONE (OUTPATIENT)
Dept: OBGYN CLINIC | Facility: CLINIC | Age: 66
End: 2021-08-13

## 2021-08-13 DIAGNOSIS — M24.811 INTERNAL DERANGEMENT OF RIGHT SHOULDER: Primary | ICD-10-CM

## 2021-08-13 NOTE — TELEPHONE ENCOUNTER
Patient called and stated that Dr Oleksandr De Jesus told her if her injection didn't work to call and let him know and he would order an MRI  Patient is asking for and MRI to be ordered

## 2021-08-13 NOTE — TELEPHONE ENCOUNTER
Called patient & told her she can schedule her MRI, explained to schedule out at least a week & generally insurance would like PT prior, she understood

## 2021-08-18 ENCOUNTER — ANNUAL EXAM (OUTPATIENT)
Dept: OBGYN CLINIC | Facility: MEDICAL CENTER | Age: 66
End: 2021-08-18
Payer: COMMERCIAL

## 2021-08-18 ENCOUNTER — OFFICE VISIT (OUTPATIENT)
Dept: FAMILY MEDICINE CLINIC | Facility: CLINIC | Age: 66
End: 2021-08-18
Payer: COMMERCIAL

## 2021-08-18 VITALS — SYSTOLIC BLOOD PRESSURE: 124 MMHG | BODY MASS INDEX: 25.82 KG/M2 | DIASTOLIC BLOOD PRESSURE: 62 MMHG | WEIGHT: 160 LBS

## 2021-08-18 VITALS
HEART RATE: 76 BPM | RESPIRATION RATE: 16 BRPM | BODY MASS INDEX: 25.55 KG/M2 | HEIGHT: 66 IN | TEMPERATURE: 97.2 F | DIASTOLIC BLOOD PRESSURE: 60 MMHG | WEIGHT: 159 LBS | OXYGEN SATURATION: 98 % | SYSTOLIC BLOOD PRESSURE: 94 MMHG

## 2021-08-18 DIAGNOSIS — R09.81 NASAL CONGESTION: ICD-10-CM

## 2021-08-18 DIAGNOSIS — G47.00 INSOMNIA, UNSPECIFIED TYPE: ICD-10-CM

## 2021-08-18 DIAGNOSIS — J45.909 UNCOMPLICATED ASTHMA, UNSPECIFIED ASTHMA SEVERITY, UNSPECIFIED WHETHER PERSISTENT: ICD-10-CM

## 2021-08-18 DIAGNOSIS — M54.50 LOW BACK PAIN WITHOUT SCIATICA, UNSPECIFIED BACK PAIN LATERALITY, UNSPECIFIED CHRONICITY: Primary | ICD-10-CM

## 2021-08-18 DIAGNOSIS — Z01.419 ENCOUNTER FOR GYNECOLOGICAL EXAMINATION (GENERAL) (ROUTINE) WITHOUT ABNORMAL FINDINGS: Primary | ICD-10-CM

## 2021-08-18 DIAGNOSIS — Z12.31 ENCOUNTER FOR SCREENING MAMMOGRAM FOR MALIGNANT NEOPLASM OF BREAST: ICD-10-CM

## 2021-08-18 DIAGNOSIS — E66.3 OVERWEIGHT: ICD-10-CM

## 2021-08-18 DIAGNOSIS — R53.83 FATIGUE, UNSPECIFIED TYPE: ICD-10-CM

## 2021-08-18 PROCEDURE — 3725F SCREEN DEPRESSION PERFORMED: CPT | Performed by: INTERNAL MEDICINE

## 2021-08-18 PROCEDURE — 3008F BODY MASS INDEX DOCD: CPT | Performed by: INTERNAL MEDICINE

## 2021-08-18 PROCEDURE — 99396 PREV VISIT EST AGE 40-64: CPT | Performed by: NURSE PRACTITIONER

## 2021-08-18 PROCEDURE — 99214 OFFICE O/P EST MOD 30 MIN: CPT | Performed by: INTERNAL MEDICINE

## 2021-08-18 PROCEDURE — 1036F TOBACCO NON-USER: CPT | Performed by: INTERNAL MEDICINE

## 2021-08-18 RX ORDER — FLUTICASONE PROPIONATE 50 MCG
2 SPRAY, SUSPENSION (ML) NASAL 2 TIMES DAILY
Qty: 16 G | Refills: 4 | Status: SHIPPED | OUTPATIENT
Start: 2021-08-18 | End: 2021-08-31 | Stop reason: SDUPTHER

## 2021-08-18 RX ORDER — MONTELUKAST SODIUM 10 MG/1
10 TABLET ORAL
Qty: 90 TABLET | Refills: 1 | Status: SHIPPED | OUTPATIENT
Start: 2021-08-18 | End: 2022-03-21 | Stop reason: SDUPTHER

## 2021-08-18 RX ORDER — ZOLPIDEM TARTRATE 5 MG/1
5 TABLET ORAL
Qty: 30 TABLET | Refills: 2 | Status: SHIPPED | OUTPATIENT
Start: 2021-08-18 | End: 2021-12-13 | Stop reason: SDUPTHER

## 2021-08-18 RX ORDER — GABAPENTIN 100 MG/1
CAPSULE ORAL
Qty: 180 CAPSULE | Refills: 1 | Status: SHIPPED | OUTPATIENT
Start: 2021-08-18 | End: 2021-08-31 | Stop reason: SDUPTHER

## 2021-08-18 RX ORDER — MELOXICAM 15 MG/1
15 TABLET ORAL DAILY
Qty: 90 TABLET | Refills: 1 | Status: SHIPPED | OUTPATIENT
Start: 2021-08-18 | End: 2021-11-24

## 2021-08-18 NOTE — PROGRESS NOTES
Assessment/Plan:         Diagnoses and all orders for this visit:    Low back pain without sciatica, unspecified back pain laterality, unspecified chronicity  Comments:  nsaid  Orders:  -     meloxicam (MOBIC) 15 mg tablet; Take 1 tablet (15 mg total) by mouth daily  -     gabapentin (NEURONTIN) 100 mg capsule; One pill tid x 3 weeks then 2 pills tid x 3 weeks  Insomnia, unspecified type  Comments:  prn ambien  Orders:  -     zolpidem (AMBIEN) 5 mg tablet; Take 1 tablet (5 mg total) by mouth daily at bedtime as needed for sleep    Uncomplicated asthma, unspecified asthma severity, unspecified whether persistent  Comments:  needs refill singulair  Orders:  -     montelukast (SINGULAIR) 10 mg tablet; Take 1 tablet (10 mg total) by mouth daily at bedtime    Nasal congestion  Comments:  flonase  Orders:  -     fluticasone (FLONASE) 50 mcg/act nasal spray; 2 sprays into each nostril 2 (two) times a day    Fatigue, unspecified type  -     CBC; Future    Overweight  -     Comprehensive metabolic panel; Future  -     Lipid panel; Future          Subjective:      Patient ID: Hugo Johnson is a 63 y o  female  Pt asks about shocks in toes  2nd - 3rd and 4th toe  Asks about gabapentine  Will try      The following portions of the patient's history were reviewed and updated as appropriate: She  has a past medical history of Asthma, Celiac disease, Follicular lymphoma ([address] ), GERD (gastroesophageal reflux disease), Heart palpitations, History of colonic polyps, History of radiation therapy (2010), Insomnia, Irregular heart beat, Lymphoma, 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[address] ), Malignant lymphoma ([address] ) (2010), Postmenopausal disorder, Pulmonary nodule, Restless legs syndrome, and TMJ syndrome    She   Patient Active Problem List    Diagnosis Date Noted    Disorder of tendon of right biceps 07/09/2021    Rotator cuff syndrome of right shoulder 07/09/2021    Pulmonary nodule     Ganglion cyst of wrist, left 12/08/2020    Eosinophilia 09/17/2020    Severe persistent asthma without complication 42/57/2482    Non-seasonal allergic rhinitis 09/02/2020    Encounter for gynecological examination (general) (routine) without abnormal findings 05/23/2019    Personal history of colonic polyps 87/50/0260    Follicular lymphoma ([facility] [address] ) 08/29/2018    VERONIKA (stress urinary incontinence, female) 05/18/2018    PVC's (premature ventricular contractions) 04/24/2018    Dyslipidemia 04/24/2018    Celiac disease 04/19/2018    Premature ventricular contraction 11/30/2017    Chronic GERD 10/03/2017    Back pain 09/21/2017    Heart palpitations 09/21/2017    Insomnia 09/21/2017    Sciatica associated with disorder of lumbar spine 09/21/2017    Vitamin D deficiency 06/28/2017    Osteopenia 11/17/2016     She  has a past surgical history that includes Lymph node dissection (Right); pr esophagogastroduodenoscopy transoral diagnostic (N/A, 1/18/2016); Nasal septum surgery (2013); Hysterectomy; Total abdominal hysterectomy w/ bilateral salpingoophorectomy; Tonsillectomy; Colonoscopy (04/16/2019); Functional endoscopic sinus surgery (Bilateral, 2013); US guided msk procedure (1/16/2020); US guided msk procedure (8/7/2020); pr excis recurrent ganglion wrist (Left, 12/8/2020); Synovectomy (N/A, 12/8/2020); US guided msk procedure (8/2/2021); and Upper gastrointestinal endoscopy  Her family history includes Atrial fibrillation in her father; BRCA1 Positive in her cousin; Breast cancer (age of onset: 71) in her paternal aunt and paternal grandmother; Cancer in her paternal grandfather; Colonic polyp in her father; Diabetes in her father; Heart failure in her father; Hypertension in her father; Lung cancer in her paternal grandfather; Lymphoma in her mother; No Known Problems in her maternal aunt, maternal grandfather, and maternal grandmother; Ovarian cancer (age of onset: 79) in her paternal aunt; Skin cancer in her paternal aunt;  Throat cancer in her father and paternal uncle; Thyroid cancer in her sister  She  reports that she quit smoking about 17 years ago  Her smoking use included cigarettes  She has a 15 00 pack-year smoking history  She has never used smokeless tobacco  She reports current alcohol use of about 3 0 standard drinks of alcohol per week  She reports that she does not use drugs  Current Outpatient Medications   Medication Sig Dispense Refill    acetaminophen (TYLENOL) 500 mg tablet Take 500 mg by mouth every 6 (six) hours as needed for mild pain   benralizumab (FASENRA) subcutaneous injection Inject 1 mL (30 mg total) under the skin every 56 days for 6 doses 1 Syringe 6    bimatoprost (LATISSE) 0 03 % ophthalmic solution Take as directed      cyanocobalamin (VITAMIN B-12) 1,000 mcg tablet Take 1,000 mcg by mouth daily      diclofenac sodium (VOLTAREN) 1 % Apply 2 g topically 4 (four) times a day 1 Tube 2    EPINEPHrine (EPIPEN) 0 3 mg/0 3 mL SOAJ Inject 0 3 mL (0 3 mg total) into a muscle once for 1 dose 0 6 mL 0    famotidine (PEPCID) 20 mg tablet Take 20 mg by mouth 2 (two) times a day   flecainide (TAMBOCOR) 100 mg tablet Take 1 tablet (100 mg total) by mouth 2 (two) times a day 60 tablet 5    fluticasone (FLONASE) 50 mcg/act nasal spray 2 sprays into each nostril 2 (two) times a day 16 g 4    fluticasone-vilanterol (BREO ELLIPTA) 100-25 mcg/inh inhaler Inhale 1 puff daily Rinse mouth after use   1 each 6    ketorolac (ACULAR) 0 5 % ophthalmic solution       levalbuterol (Xopenex HFA) 45 mcg/act inhaler Inhale 2 puffs every 4 (four) hours as needed for wheezing 1 Inhaler 3    Magnesium 500 MG CAPS Take by mouth      metoprolol succinate (TOPROL-XL) 25 mg 24 hr tablet Take 1 tablet (25 mg total) by mouth daily (Patient taking differently: Take 25 mg by mouth daily Taking 1/2 pill daily) 90 tablet 3    montelukast (SINGULAIR) 10 mg tablet Take 1 tablet (10 mg total) by mouth daily at bedtime 90 tablet 1    omega-3-acid ethyl esters (LOVAZA) 1 g capsule Take 2 capsules (2 g total) by mouth 2 (two) times a day (Patient taking differently: Take 2 g by mouth 3 (three) times a week ) 360 capsule 1    pantoprazole (PROTONIX) 20 mg tablet Take 1 tablet (20 mg total) by mouth daily 90 tablet 2    Vitamin D, Ergocalciferol, 2000 units CAPS Take by mouth      zolpidem (AMBIEN) 5 mg tablet Take 1 tablet (5 mg total) by mouth daily at bedtime as needed for sleep 30 tablet 2    gabapentin (NEURONTIN) 100 mg capsule One pill tid x 3 weeks then 2 pills tid x 3 weeks  180 capsule 1    meloxicam (MOBIC) 15 mg tablet Take 1 tablet (15 mg total) by mouth daily 90 tablet 1    olopatadine HCl (PATADAY) 0 2 % opth drops  (Patient not taking: Reported on 8/18/2021)       No current facility-administered medications for this visit  Current Outpatient Medications on File Prior to Visit   Medication Sig    acetaminophen (TYLENOL) 500 mg tablet Take 500 mg by mouth every 6 (six) hours as needed for mild pain   benralizumab (FASENRA) subcutaneous injection Inject 1 mL (30 mg total) under the skin every 56 days for 6 doses    bimatoprost (LATISSE) 0 03 % ophthalmic solution Take as directed    cyanocobalamin (VITAMIN B-12) 1,000 mcg tablet Take 1,000 mcg by mouth daily    diclofenac sodium (VOLTAREN) 1 % Apply 2 g topically 4 (four) times a day    EPINEPHrine (EPIPEN) 0 3 mg/0 3 mL SOAJ Inject 0 3 mL (0 3 mg total) into a muscle once for 1 dose    famotidine (PEPCID) 20 mg tablet Take 20 mg by mouth 2 (two) times a day   flecainide (TAMBOCOR) 100 mg tablet Take 1 tablet (100 mg total) by mouth 2 (two) times a day    fluticasone-vilanterol (BREO ELLIPTA) 100-25 mcg/inh inhaler Inhale 1 puff daily Rinse mouth after use      ketorolac (ACULAR) 0 5 % ophthalmic solution     levalbuterol (Xopenex HFA) 45 mcg/act inhaler Inhale 2 puffs every 4 (four) hours as needed for wheezing    Magnesium 500 MG CAPS Take by mouth    metoprolol succinate (TOPROL-XL) 25 mg 24 hr tablet Take 1 tablet (25 mg total) by mouth daily (Patient taking differently: Take 25 mg by mouth daily Taking 1/2 pill daily)    omega-3-acid ethyl esters (LOVAZA) 1 g capsule Take 2 capsules (2 g total) by mouth 2 (two) times a day (Patient taking differently: Take 2 g by mouth 3 (three) times a week )    pantoprazole (PROTONIX) 20 mg tablet Take 1 tablet (20 mg total) by mouth daily    Vitamin D, Ergocalciferol, 2000 units CAPS Take by mouth    olopatadine HCl (PATADAY) 0 2 % opth drops  (Patient not taking: Reported on 8/18/2021)     No current facility-administered medications on file prior to visit  She is allergic to shellfish-derived products - food allergy, wheat bran - food allergy, gluten meal - food allergy, morphine and related, nuts - food allergy, and sulfa antibiotics       Review of Systems   Constitutional: Negative  HENT: Negative  Respiratory: Negative  Cardiovascular: Negative  Objective:      BP 94/60 (BP Location: Left arm, Patient Position: Sitting, Cuff Size: Large)   Pulse 76   Temp (!) 97 2 °F (36 2 °C) (Temporal)   Resp 16   Ht 5' 6" (1 676 m)   Wt 72 1 kg (159 lb)   SpO2 98%   BMI 25 66 kg/m²          Physical Exam  HENT:      Head: Normocephalic and atraumatic  Right Ear: Tympanic membrane and ear canal normal       Left Ear: Tympanic membrane and ear canal normal    Cardiovascular:      Rate and Rhythm: Normal rate and regular rhythm  Pulmonary:      Effort: Pulmonary effort is normal       Breath sounds: Normal breath sounds  Musculoskeletal:      Cervical back: Neck supple  Lymphadenopathy:      Cervical: No cervical adenopathy

## 2021-08-19 DIAGNOSIS — Z13.1 ENCOUNTER FOR SCREENING EXAMINATION FOR INTERMEDIATE HYPERGLYCEMIA AND DIABETES MELLITUS: Primary | ICD-10-CM

## 2021-08-20 NOTE — ASSESSMENT & PLAN NOTE
Benign findings on routine gyn exam  Recommended monthly SBE, annual CBE and annual screening mammo  ASCCP guidelines reviewed and this low risk patient was advised she meets criteria to d/c pap screening give ncervix is surgically absent  DEXA and colonoscopy noted to be up to date  The patient denies STI risk factors and declines testing at this time  Reviewed diet/activity recommendations  Discussed postmenopausal considerations and symptoms to report  RTO in one year for routine annual gyn exam or sooner PRN

## 2021-08-20 NOTE — PROGRESS NOTES
Assessment/Plan:    Encounter for gynecological examination (general) (routine) without abnormal findings  Benign findings on routine gyn exam  Recommended monthly SBE, annual CBE and annual screening mammo  ASCCP guidelines reviewed and this low risk patient was advised she meets criteria to d/c pap screening give ncervix is surgically absent  DEXA and colonoscopy noted to be up to date  The patient denies STI risk factors and declines testing at this time  Reviewed diet/activity recommendations  Discussed postmenopausal considerations and symptoms to report  RTO in one year for routine annual gyn exam or sooner PRN  Diagnoses and all orders for this visit:    Encounter for gynecological examination (general) (routine) without abnormal findings    Encounter for screening mammogram for malignant neoplasm of breast  -     Mammo screening bilateral w 3d & cad; Future          Subjective:      Patient ID: Eber Newton is a 63 y o  female  This patient presents for routine annual gyn exam   Medically stable  S/p JENNA/BSO for benign indication  Self d/c'd HRT since last visit and doing well  VM sx are tolerable  She denies acute gyn complaints  She denies  bleeding or spotting, VM sx, pelvic pain, breast concerns, abn discharge, bowel/bladder dysfunction, depression/anx   and monog  Denies STI concerns  The following portions of the patient's history were reviewed and updated as appropriate: allergies, current medications, past family history, past medical history, past social history, past surgical history and problem list     Review of Systems   Constitutional: Negative  Respiratory: Negative  Cardiovascular: Negative  Gastrointestinal: Negative  Genitourinary: Negative  Musculoskeletal: Negative  Skin: Negative  Neurological: Negative  Psychiatric/Behavioral: Negative            Objective:      /62   Wt 72 6 kg (160 lb)   BMI 25 82 kg/m² Physical Exam  Constitutional:       Appearance: She is well-developed  HENT:      Head: Normocephalic and atraumatic  Eyes:      Pupils: Pupils are equal, round, and reactive to light  Neck:      Thyroid: No thyromegaly  Cardiovascular:      Rate and Rhythm: Normal rate and regular rhythm  Heart sounds: Normal heart sounds  Pulmonary:      Effort: Pulmonary effort is normal  No respiratory distress  Breath sounds: Normal breath sounds  No wheezing or rales  Chest:      Chest wall: No mass, deformity or tenderness  Breasts: Breasts are symmetrical          Right: No inverted nipple, mass, nipple discharge, skin change or tenderness  Left: No inverted nipple, mass, nipple discharge, skin change or tenderness  Abdominal:      General: There is no distension  Palpations: Abdomen is soft  There is no hepatomegaly, splenomegaly or mass  Tenderness: There is no abdominal tenderness  There is no guarding or rebound  Genitourinary:     Labia:         Right: No rash, tenderness, lesion or injury  Left: No rash, tenderness, lesion or injury  Vagina: Normal  No foreign body  No vaginal discharge, erythema, tenderness or bleeding  Uterus: Absent  Rectum: Normal          Musculoskeletal:         General: Normal range of motion  Cervical back: Normal range of motion and neck supple  Lymphadenopathy:      Cervical: No cervical adenopathy  Skin:     General: Skin is warm and dry  Findings: No rash  Nails: There is no clubbing  Neurological:      Mental Status: She is alert and oriented to person, place, and time  Cranial Nerves: No cranial nerve deficit  Psychiatric:         Speech: Speech normal          Behavior: Behavior normal          Thought Content:  Thought content normal          Judgment: Judgment normal

## 2021-08-26 ENCOUNTER — HOSPITAL ENCOUNTER (OUTPATIENT)
Dept: MRI IMAGING | Facility: HOSPITAL | Age: 66
Discharge: HOME | End: 2021-08-26
Payer: COMMERCIAL

## 2021-08-26 DIAGNOSIS — M24.811 INTERNAL DERANGEMENT OF RIGHT SHOULDER: ICD-10-CM

## 2021-08-26 PROCEDURE — 73221 MRI JOINT UPR EXTREM W/O DYE: CPT

## 2021-08-26 PROCEDURE — G1004 CDSM NDSC: HCPCS

## 2021-08-27 ENCOUNTER — TELEPHONE (OUTPATIENT)
Dept: HEMATOLOGY ONCOLOGY | Facility: CLINIC | Age: 66
End: 2021-08-27

## 2021-08-27 NOTE — TELEPHONE ENCOUNTER
Received a phone call Dr Diana Figueroa in the radiology reading room regarding MRI that was done for shoulder pain  There is a lesion in the humerus that is suspicious for malignancy  Patient has a history of low-grade follicular lymphoma  We will make arrangements to see her sooner than her previously scheduled appointment in October

## 2021-08-30 ENCOUNTER — TELEPHONE (OUTPATIENT)
Dept: HEMATOLOGY ONCOLOGY | Facility: CLINIC | Age: 66
End: 2021-08-30

## 2021-08-30 ENCOUNTER — TELEPHONE (OUTPATIENT)
Dept: OBGYN CLINIC | Facility: HOSPITAL | Age: 66
End: 2021-08-30

## 2021-08-30 DIAGNOSIS — M89.9 LESION OF BONE OF RIGHT SHOULDER: ICD-10-CM

## 2021-08-30 DIAGNOSIS — M85.80 OSTEOPENIA, UNSPECIFIED LOCATION: ICD-10-CM

## 2021-08-30 DIAGNOSIS — C82.90 FOLLICULAR LYMPHOMA, UNSPECIFIED FOLLICULAR LYMPHOMA TYPE, UNSPECIFIED BODY REGION (HCC): Primary | ICD-10-CM

## 2021-08-30 NOTE — TELEPHONE ENCOUNTER
Patient called to advise she had a shoulder Mri and has a complete tear  Patient would like to know if Dr Ro Barker could speak with her oncologist (Dr Tania Kuhn)  She would like to know if you can both discuss this report  She can be reached at [phone]

## 2021-08-30 NOTE — TELEPHONE ENCOUNTER
Returned patient's call regarding MRI results  We will obtain a PET-CT and updated CBC CMP  Plan to see patient in the next few weeks for follow-up  Patient verbalized understanding and is in agreement with the plan

## 2021-08-30 NOTE — TELEPHONE ENCOUNTER
Patient is calling to discuss 8/26/21 MRI results reporting : There is a 1 5 x 1 3 x 2 8 cm  intramedullary focal bone lesion in the proximal humeral diaphysis, which is suspicious for malignancy particularly given patient's prior history of lymphoma (series 8001 images 14 - 16 )       Patient would like a call back ASAP to discuss with LIZZY Goodson (self) [phone] Melissa Carlin)

## 2021-08-31 ENCOUNTER — APPOINTMENT (OUTPATIENT)
Dept: LAB | Facility: CLINIC | Age: 66
End: 2021-08-31
Payer: COMMERCIAL

## 2021-08-31 DIAGNOSIS — M54.50 LOW BACK PAIN WITHOUT SCIATICA, UNSPECIFIED BACK PAIN LATERALITY, UNSPECIFIED CHRONICITY: ICD-10-CM

## 2021-08-31 DIAGNOSIS — R53.83 FATIGUE, UNSPECIFIED TYPE: ICD-10-CM

## 2021-08-31 DIAGNOSIS — E66.3 OVERWEIGHT: ICD-10-CM

## 2021-08-31 DIAGNOSIS — Z13.1 ENCOUNTER FOR SCREENING EXAMINATION FOR INTERMEDIATE HYPERGLYCEMIA AND DIABETES MELLITUS: ICD-10-CM

## 2021-08-31 DIAGNOSIS — R09.81 NASAL CONGESTION: ICD-10-CM

## 2021-08-31 LAB
ALBUMIN SERPL BCP-MCNC: 3.7 G/DL (ref 3.5–5)
ALP SERPL-CCNC: 58 U/L (ref 46–116)
ALT SERPL W P-5'-P-CCNC: 42 U/L (ref 12–78)
ANION GAP SERPL CALCULATED.3IONS-SCNC: 8 MMOL/L (ref 4–13)
AST SERPL W P-5'-P-CCNC: 28 U/L (ref 5–45)
BILIRUB SERPL-MCNC: 0.73 MG/DL (ref 0.2–1)
BUN SERPL-MCNC: 19 MG/DL (ref 5–25)
CALCIUM SERPL-MCNC: 9 MG/DL (ref 8.3–10.1)
CHLORIDE SERPL-SCNC: 107 MMOL/L (ref 100–108)
CHOLEST SERPL-MCNC: 239 MG/DL (ref 50–200)
CO2 SERPL-SCNC: 23 MMOL/L (ref 21–32)
CREAT SERPL-MCNC: 0.85 MG/DL (ref 0.6–1.3)
ERYTHROCYTE [DISTWIDTH] IN BLOOD BY AUTOMATED COUNT: 12.8 % (ref 11.6–15.1)
EST. AVERAGE GLUCOSE BLD GHB EST-MCNC: 114 MG/DL
GFR SERPL CREATININE-BSD FRML MDRD: 73 ML/MIN/1.73SQ M
GLUCOSE P FAST SERPL-MCNC: 104 MG/DL (ref 65–99)
HBA1C MFR BLD: 5.6 %
HCT VFR BLD AUTO: 41.3 % (ref 34.8–46.1)
HDLC SERPL-MCNC: 41 MG/DL
HGB BLD-MCNC: 13.8 G/DL (ref 11.5–15.4)
LDLC SERPL CALC-MCNC: 138 MG/DL (ref 0–100)
MCH RBC QN AUTO: 30.7 PG (ref 26.8–34.3)
MCHC RBC AUTO-ENTMCNC: 33.4 G/DL (ref 31.4–37.4)
MCV RBC AUTO: 92 FL (ref 82–98)
NONHDLC SERPL-MCNC: 198 MG/DL
PLATELET # BLD AUTO: 186 THOUSANDS/UL (ref 149–390)
PMV BLD AUTO: 11.3 FL (ref 8.9–12.7)
POTASSIUM SERPL-SCNC: 4 MMOL/L (ref 3.5–5.3)
PROT SERPL-MCNC: 7.1 G/DL (ref 6.4–8.2)
RBC # BLD AUTO: 4.5 MILLION/UL (ref 3.81–5.12)
SODIUM SERPL-SCNC: 138 MMOL/L (ref 136–145)
TRIGL SERPL-MCNC: 299 MG/DL
WBC # BLD AUTO: 5.06 THOUSAND/UL (ref 4.31–10.16)

## 2021-08-31 PROCEDURE — 36415 COLL VENOUS BLD VENIPUNCTURE: CPT

## 2021-08-31 PROCEDURE — 80053 COMPREHEN METABOLIC PANEL: CPT

## 2021-08-31 PROCEDURE — 85027 COMPLETE CBC AUTOMATED: CPT

## 2021-08-31 PROCEDURE — 80061 LIPID PANEL: CPT

## 2021-08-31 PROCEDURE — 83036 HEMOGLOBIN GLYCOSYLATED A1C: CPT

## 2021-08-31 RX ORDER — GABAPENTIN 100 MG/1
CAPSULE ORAL
Qty: 180 CAPSULE | Refills: 1 | Status: SHIPPED | OUTPATIENT
Start: 2021-08-31

## 2021-08-31 RX ORDER — FLUTICASONE PROPIONATE 50 MCG
2 SPRAY, SUSPENSION (ML) NASAL 2 TIMES DAILY
Qty: 16 G | Refills: 4 | Status: SHIPPED | OUTPATIENT
Start: 2021-08-31 | End: 2021-09-20 | Stop reason: SDUPTHER

## 2021-08-31 NOTE — TELEPHONE ENCOUNTER
We saw the results of the MRI and saw the the oncology team ordered a PET scan which is a good idea  We will plan to reach out to Dr Bojorquez Shallow  Thanks!

## 2021-09-15 ENCOUNTER — TELEPHONE (OUTPATIENT)
Dept: HEMATOLOGY ONCOLOGY | Facility: CLINIC | Age: 66
End: 2021-09-15

## 2021-09-15 ENCOUNTER — HOSPITAL ENCOUNTER (OUTPATIENT)
Dept: RADIOLOGY | Age: 66
Discharge: HOME | End: 2021-09-15
Payer: COMMERCIAL

## 2021-09-15 DIAGNOSIS — M89.9 LESION OF BONE OF RIGHT SHOULDER: ICD-10-CM

## 2021-09-15 DIAGNOSIS — M85.80 OSTEOPENIA, UNSPECIFIED LOCATION: ICD-10-CM

## 2021-09-15 DIAGNOSIS — C82.90 FOLLICULAR LYMPHOMA, UNSPECIFIED FOLLICULAR LYMPHOMA TYPE, UNSPECIFIED BODY REGION (HCC): ICD-10-CM

## 2021-09-15 LAB — GLUCOSE SERPL-MCNC: 112 MG/DL (ref 65–140)

## 2021-09-15 PROCEDURE — 82948 REAGENT STRIP/BLOOD GLUCOSE: CPT

## 2021-09-15 PROCEDURE — A9552 F18 FDG: HCPCS

## 2021-09-15 PROCEDURE — G1004 CDSM NDSC: HCPCS

## 2021-09-15 PROCEDURE — 78815 PET IMAGE W/CT SKULL-THIGH: CPT

## 2021-09-15 NOTE — TELEPHONE ENCOUNTER
Significant Findings    Call Received From [facility]    Radiology Department Location [facility] PET CT skull base to mid thigh     Date of Study 9/15   Relevant Information

## 2021-09-20 DIAGNOSIS — R09.81 NASAL CONGESTION: ICD-10-CM

## 2021-09-20 RX ORDER — FLUTICASONE PROPIONATE 50 MCG
2 SPRAY, SUSPENSION (ML) NASAL 2 TIMES DAILY
Qty: 16 G | Refills: 4 | Status: SHIPPED | OUTPATIENT
Start: 2021-09-20 | End: 2021-09-21 | Stop reason: SDUPTHER

## 2021-09-21 ENCOUNTER — HOSPITAL ENCOUNTER (OUTPATIENT)
Dept: INFUSION CENTER | Facility: HOSPITAL | Age: 66
Discharge: HOME | End: 2021-09-21
Attending: INTERNAL MEDICINE
Payer: COMMERCIAL

## 2021-09-21 VITALS — TEMPERATURE: 97.4 F

## 2021-09-21 DIAGNOSIS — D72.119 HYPEREOSINOPHILIC SYNDROME, UNSPECIFIED TYPE: Primary | ICD-10-CM

## 2021-09-21 DIAGNOSIS — J45.50 SEVERE PERSISTENT ASTHMA WITHOUT COMPLICATION: ICD-10-CM

## 2021-09-21 DIAGNOSIS — R09.81 NASAL CONGESTION: ICD-10-CM

## 2021-09-21 PROCEDURE — 96372 THER/PROPH/DIAG INJ SC/IM: CPT

## 2021-09-21 RX ORDER — EPINEPHRINE 1 MG/ML
0.3 INJECTION, SOLUTION, CONCENTRATE INTRAVENOUS ONCE
Status: DISCONTINUED | OUTPATIENT
Start: 2021-09-21 | End: 2021-09-25 | Stop reason: HOSPADM

## 2021-09-21 RX ORDER — FLUTICASONE PROPIONATE 50 MCG
2 SPRAY, SUSPENSION (ML) NASAL 2 TIMES DAILY
Qty: 16 G | Refills: 4 | Status: SHIPPED | OUTPATIENT
Start: 2021-09-21

## 2021-09-21 RX ORDER — EPINEPHRINE 1 MG/ML
0.3 INJECTION, SOLUTION, CONCENTRATE INTRAVENOUS ONCE
Status: CANCELLED | OUTPATIENT
Start: 2021-11-16 | End: 2021-11-16

## 2021-09-21 RX ADMIN — BENRALIZUMAB 30 MG: 30 INJECTION, SOLUTION SUBCUTANEOUS at 11:05

## 2021-09-21 NOTE — PROGRESS NOTES
Pt tolerated todays fasenra well to left arm,  No complaints offered  Scheduled next appt for nov 17th   Discharged ambulatory

## 2021-09-22 ENCOUNTER — OFFICE VISIT (OUTPATIENT)
Dept: HEMATOLOGY ONCOLOGY | Facility: CLINIC | Age: 66
End: 2021-09-22
Payer: COMMERCIAL

## 2021-09-22 VITALS
DIASTOLIC BLOOD PRESSURE: 68 MMHG | HEIGHT: 66 IN | WEIGHT: 160.8 LBS | RESPIRATION RATE: 18 BRPM | OXYGEN SATURATION: 98 % | BODY MASS INDEX: 25.84 KG/M2 | HEART RATE: 68 BPM | SYSTOLIC BLOOD PRESSURE: 114 MMHG

## 2021-09-22 DIAGNOSIS — M89.9 LESION OF BONE OF RIGHT SHOULDER: Primary | ICD-10-CM

## 2021-09-22 PROCEDURE — 99215 OFFICE O/P EST HI 40 MIN: CPT | Performed by: INTERNAL MEDICINE

## 2021-09-22 PROCEDURE — 3008F BODY MASS INDEX DOCD: CPT | Performed by: INTERNAL MEDICINE

## 2021-09-22 NOTE — PROGRESS NOTES
[facility]  [address] [place] [id number] [phone]  Cassius Herndon [id number]  09/22/21    Discussion:   In summary, this is a 45-year-old female history of lymphoma as outlined  She had gradually progressive right shoulder pain leading to an MRI  This showed significant rotator cuff damage/tear  Additionally, there was a focus of fat suppression in the proximal humeral diaphysis  MRI showed SUV of 2 8 in this area without CT correlate  No hypermetabolism was noted elsewhere  Recent CBC and CMP are normal   Patient is essentially asymptomatic other than shoulder pain  I reviewed the case with Dr Kathleen Fairbanks and Dr Daniela Fair  It appears that deferral of rotator cuff surgery should not lead to decreased surgical outcomes at least in the short/intermediate term  I reviewed this at length with the patient  She is agreeable to short-term follow-up with extension if stability is established  As such, we made arrangements for repeat MRI just prior to her next visit in 2 months  I discussed the above with the patient  The patient  voiced understanding and agreement   ______________________________________________________________________    No chief complaint on file  HPI:  Oncology History   Follicular lymphoma ([facility] [address] )   8/18/2010 Initial Diagnosis    stage IA follicle lymphoma, grade 1, 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, diagnosed in August 2010  She underwent radiation therapy, resulting in complete remission  Interval History:  Clinically stable  Right shoulder pain  ECOG-  1 - Symptomatic but completely ambulatory    Review of Systems   Constitutional: Negative for appetite change, diaphoresis, fatigue and fever  HENT: Negative for sinus pain  Eyes: Negative for discharge  Respiratory: Negative for cough and shortness of breath  Cardiovascular: Negative for chest pain     Gastrointestinal: Negative for abdominal pain, constipation and diarrhea  Endocrine: Negative for cold intolerance  Genitourinary: Negative for difficulty urinating and hematuria  Musculoskeletal: Negative for joint swelling  Skin: Negative for rash  Allergic/Immunologic: Negative for environmental allergies  Neurological: Negative for dizziness and headaches  Hematological: Negative for adenopathy  Psychiatric/Behavioral: Negative for agitation         Past Medical History:   Diagnosis Date    Asthma     Celiac disease     [facility]     GERD (gastroesophageal reflux disease)     Heart palpitations     History of colonic polyps     resolved 07/12/2016    History of radiation therapy 2010    Insomnia     Irregular heart beat     Lymphoma, 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HCC)     non hodgekins, remission    [facility] 2010    rt arm cutaneous follicular stage ia (rt diatal medical biceps area , s/p resected and s/p radistion treatment    resolved 12/17/2014    Postmenopausal disorder     resolved 09/21/2017    Pulmonary nodule     BENIGN, STABLE 5196-0308    Restless legs syndrome     resolved 09/21/2017    TMJ syndrome     resolved 09/21/2017     Patient Active Problem List   Diagnosis    Celiac disease    PVC's (premature ventricular contractions)    Dyslipidemia    Back pain    Chronic GERD    Heart palpitations    Insomnia    Osteopenia    Premature ventricular contraction    Sciatica associated with disorder of lumbar spine    Vitamin D deficiency    VERONIKA (stress urinary incontinence, female)    [facility]    Personal history of colonic polyps    Encounter for gynecological examination (general) (routine) without abnormal findings    Severe persistent asthma without complication    Non-seasonal allergic rhinitis    Eosinophilia    Ganglion cyst of wrist, left    Pulmonary nodule    Disorder of tendon of right biceps    Rotator cuff syndrome of right shoulder       Current Outpatient Medications:     acetaminophen (TYLENOL) 500 mg tablet, Take 500 mg by mouth every 6 (six) hours as needed for mild pain , Disp: , Rfl:     benralizumab (FASENRA) subcutaneous injection, Inject 1 mL (30 mg total) under the skin every 56 days for 6 doses, Disp: 1 Syringe, Rfl: 6    bimatoprost (LATISSE) 0 03 % ophthalmic solution, Take as directed, Disp: , Rfl:     cyanocobalamin (VITAMIN B-12) 1,000 mcg tablet, Take 1,000 mcg by mouth daily, Disp: , Rfl:     diclofenac sodium (VOLTAREN) 1 %, Apply 2 g topically 4 (four) times a day, Disp: 1 Tube, Rfl: 2    EPINEPHrine (EPIPEN) 0 3 mg/0 3 mL SOAJ, Inject 0 3 mL (0 3 mg total) into a muscle once for 1 dose, Disp: 0 6 mL, Rfl: 0    famotidine (PEPCID) 20 mg tablet, Take 20 mg by mouth 2 (two) times a day , Disp: , Rfl:     flecainide (TAMBOCOR) 100 mg tablet, Take 1 tablet (100 mg total) by mouth 2 (two) times a day, Disp: 60 tablet, Rfl: 5    fluticasone (FLONASE) 50 mcg/act nasal spray, 2 sprays into each nostril 2 (two) times a day, Disp: 16 g, Rfl: 4    fluticasone-vilanterol (BREO ELLIPTA) 100-25 mcg/inh inhaler, Inhale 1 puff daily Rinse mouth after use , Disp: 1 each, Rfl: 6    gabapentin (NEURONTIN) 100 mg capsule, One pill tid x 3 weeks then 2 pills tid x 3 weeks  , Disp: 180 capsule, Rfl: 1    ketorolac (ACULAR) 0 5 % ophthalmic solution, , Disp: , Rfl:     levalbuterol (Xopenex HFA) 45 mcg/act inhaler, Inhale 2 puffs every 4 (four) hours as needed for wheezing, Disp: 1 Inhaler, Rfl: 3    Magnesium 500 MG CAPS, Take by mouth, Disp: , Rfl:     meloxicam (MOBIC) 15 mg tablet, Take 1 tablet (15 mg total) by mouth daily, Disp: 90 tablet, Rfl: 1    metoprolol succinate (TOPROL-XL) 25 mg 24 hr tablet, Take 1 tablet (25 mg total) by mouth daily (Patient taking differently: Take 25 mg by mouth daily Taking 1/2 pill daily), Disp: 90 tablet, Rfl: 3    montelukast (SINGULAIR) 10 mg tablet, Take 1 tablet (10 mg total) by mouth daily at bedtime, Disp: 90 tablet, Rfl: 1    olopatadine HCl (PATADAY) 0 2 % opth drops, , Disp: , Rfl:     omega-3-acid ethyl esters (LOVAZA) 1 g capsule, Take 2 capsules (2 g total) by mouth 2 (two) times a day (Patient taking differently: Take 2 g by mouth 3 (three) times a week ), Disp: 360 capsule, Rfl: 1    pantoprazole (PROTONIX) 20 mg tablet, Take 1 tablet (20 mg total) by mouth daily, Disp: 90 tablet, Rfl: 2    Vitamin D, Ergocalciferol, 2000 units CAPS, Take by mouth, Disp: , Rfl:     zolpidem (AMBIEN) 5 mg tablet, Take 1 tablet (5 mg total) by mouth daily at bedtime as needed for sleep, Disp: 30 tablet, Rfl: 2  No current facility-administered medications for this visit  Facility-Administered Medications Ordered in Other Visits:     EPINEPHrine PF (ADRENALIN) 1 mg/mL injection 0 3 mg, 0 3 mg, Intramuscular, Once, Jeniffer Arenas MD  Allergies   Allergen Reactions    Shellfish-Derived Products - Food Allergy Anaphylaxis    Wheat Bran - Food Allergy Anaphylaxis    Gluten Meal - Food Allergy GI Intolerance     Celiac     Morphine And Related Itching    Nuts - Food Allergy Hives     Almonds,walnuts, hazelnuts and other related nuts   Sulfa Antibiotics Rash     Past Surgical History:   Procedure Laterality Date    COLONOSCOPY  04/16/2019    FUNCTIONAL ENDOSCOPIC SINUS SURGERY Bilateral 2013    Dr Heide Swenson      age 39 complete    LYMPH NODE DISSECTION Right     arm    NASAL SEPTUM SURGERY  2013    with turbinate reduction - Dr Dajuan Lockett ESOPHAGOGASTRODUODENOSCOPY TRANSORAL DIAGNOSTIC N/A 1/18/2016    Procedure: ESOPHAGOGASTRODUODENOSCOPY (EGD); Surgeon: Edgar Aponte MD;  [facility]: [facility];   Service: Gastroenterology    TX EXCIS RECURRENT GANGLION WRIST Left 12/8/2020    Procedure: WRIST GANGLION CYST EXCISION;  Surgeon: Zarina Medina MD;  [facility];  Service: Orthopedics    SYNOVECTOMY N/A 12/8/2020    Procedure: ECU TENOSYNOVECTOMY; Surgeon: Tuyet Raygoza MD;  [facility];  Service: Orthopedics    TONSILLECTOMY      TOTAL ABDOMINAL HYSTERECTOMY W/ BILATERAL SALPINGOOPHORECTOMY      age 54   [address] MSK PROCEDURE  1/16/2020   [facility] [place] GUIDED MSK PROCEDURE  8/7/2020    US GUIDED MSK PROCEDURE  8/2/2021     Social History     Objective: There were no vitals filed for this visit  Physical Exam  Constitutional:       Appearance: She is well-developed  HENT:      Head: Normocephalic and atraumatic  Eyes:      Pupils: Pupils are equal, round, and reactive to light  Cardiovascular:      Rate and Rhythm: Normal rate  Heart sounds: No murmur heard  Pulmonary:      Effort: No respiratory distress  Breath sounds: No wheezing or rales  Abdominal:      General: There is no distension  Palpations: Abdomen is soft  Tenderness: There is no abdominal tenderness  There is no rebound  Musculoskeletal:         General: No tenderness  Cervical back: Neck supple  Lymphadenopathy:      Cervical: No cervical adenopathy  Skin:     General: Skin is warm  Findings: No rash  Neurological:      Mental Status: She is alert and oriented to person, place, and time  Deep Tendon Reflexes: Reflexes normal    Psychiatric:         Thought Content: Thought content normal            Labs: I personally reviewed the labs and imaging pertinent to this patient care

## 2021-09-29 DIAGNOSIS — I49.3 PVC (PREMATURE VENTRICULAR CONTRACTION): ICD-10-CM

## 2021-09-29 RX ORDER — FLECAINIDE ACETATE 100 MG/1
100 TABLET ORAL 2 TIMES DAILY
Qty: 60 TABLET | Refills: 5 | Status: SHIPPED | OUTPATIENT
Start: 2021-09-29 | End: 2022-04-12 | Stop reason: SDUPTHER

## 2021-10-12 ENCOUNTER — TELEPHONE (OUTPATIENT)
Dept: GASTROENTEROLOGY | Facility: CLINIC | Age: 66
End: 2021-10-12

## 2021-10-20 ENCOUNTER — OFFICE VISIT (OUTPATIENT)
Dept: PULMONOLOGY | Facility: CLINIC | Age: 66
End: 2021-10-20
Payer: COMMERCIAL

## 2021-10-20 VITALS
RESPIRATION RATE: 18 BRPM | DIASTOLIC BLOOD PRESSURE: 78 MMHG | WEIGHT: 159 LBS | BODY MASS INDEX: 25.55 KG/M2 | TEMPERATURE: 97 F | OXYGEN SATURATION: 98 % | HEIGHT: 66 IN | SYSTOLIC BLOOD PRESSURE: 122 MMHG | HEART RATE: 70 BPM

## 2021-10-20 DIAGNOSIS — J45.50 SEVERE PERSISTENT ASTHMA WITHOUT COMPLICATION: Primary | ICD-10-CM

## 2021-10-20 DIAGNOSIS — J30.89 NON-SEASONAL ALLERGIC RHINITIS, UNSPECIFIED TRIGGER: ICD-10-CM

## 2021-10-20 PROCEDURE — 1036F TOBACCO NON-USER: CPT | Performed by: INTERNAL MEDICINE

## 2021-10-20 PROCEDURE — 3008F BODY MASS INDEX DOCD: CPT | Performed by: INTERNAL MEDICINE

## 2021-10-20 PROCEDURE — 99213 OFFICE O/P EST LOW 20 MIN: CPT | Performed by: INTERNAL MEDICINE

## 2021-11-02 ENCOUNTER — LAB (OUTPATIENT)
Dept: LAB | Facility: CLINIC | Age: 66
End: 2021-11-02
Payer: COMMERCIAL

## 2021-11-02 DIAGNOSIS — M89.9 LESION OF BONE OF RIGHT SHOULDER: ICD-10-CM

## 2021-11-02 DIAGNOSIS — K90.0 CELIAC DISEASE: ICD-10-CM

## 2021-11-02 LAB
25(OH)D3 SERPL-MCNC: 42 NG/ML (ref 30–100)
ALBUMIN SERPL BCP-MCNC: 3.9 G/DL (ref 3.5–5)
ALP SERPL-CCNC: 61 U/L (ref 46–116)
ALT SERPL W P-5'-P-CCNC: 38 U/L (ref 12–78)
ANION GAP SERPL CALCULATED.3IONS-SCNC: 3 MMOL/L (ref 4–13)
AST SERPL W P-5'-P-CCNC: 22 U/L (ref 5–45)
BASOPHILS # BLD AUTO: 0.01 THOUSANDS/ΜL (ref 0–0.1)
BASOPHILS NFR BLD AUTO: 0 % (ref 0–1)
BILIRUB SERPL-MCNC: 0.62 MG/DL (ref 0.2–1)
BUN SERPL-MCNC: 15 MG/DL (ref 5–25)
CALCIUM SERPL-MCNC: 9.2 MG/DL (ref 8.3–10.1)
CHLORIDE SERPL-SCNC: 105 MMOL/L (ref 100–108)
CO2 SERPL-SCNC: 28 MMOL/L (ref 21–32)
CREAT SERPL-MCNC: 0.91 MG/DL (ref 0.6–1.3)
EOSINOPHIL # BLD AUTO: 0 THOUSAND/ΜL (ref 0–0.61)
EOSINOPHIL NFR BLD AUTO: 0 % (ref 0–6)
ERYTHROCYTE [DISTWIDTH] IN BLOOD BY AUTOMATED COUNT: 12.8 % (ref 11.6–15.1)
FERRITIN SERPL-MCNC: 74 NG/ML (ref 8–388)
GFR SERPL CREATININE-BSD FRML MDRD: 67 ML/MIN/1.73SQ M
GLUCOSE P FAST SERPL-MCNC: 124 MG/DL (ref 65–99)
HCT VFR BLD AUTO: 41.1 % (ref 34.8–46.1)
HGB BLD-MCNC: 13.7 G/DL (ref 11.5–15.4)
IGA SERPL-MCNC: 148 MG/DL (ref 70–400)
IMM GRANULOCYTES # BLD AUTO: 0.03 THOUSAND/UL (ref 0–0.2)
IMM GRANULOCYTES NFR BLD AUTO: 1 % (ref 0–2)
IRON SATN MFR SERPL: 23 % (ref 15–50)
IRON SERPL-MCNC: 81 UG/DL (ref 50–170)
LYMPHOCYTES # BLD AUTO: 1.6 THOUSANDS/ΜL (ref 0.6–4.47)
LYMPHOCYTES NFR BLD AUTO: 28 % (ref 14–44)
MCH RBC QN AUTO: 30.6 PG (ref 26.8–34.3)
MCHC RBC AUTO-ENTMCNC: 33.3 G/DL (ref 31.4–37.4)
MCV RBC AUTO: 92 FL (ref 82–98)
MONOCYTES # BLD AUTO: 0.47 THOUSAND/ΜL (ref 0.17–1.22)
MONOCYTES NFR BLD AUTO: 8 % (ref 4–12)
NEUTROPHILS # BLD AUTO: 3.61 THOUSANDS/ΜL (ref 1.85–7.62)
NEUTS SEG NFR BLD AUTO: 63 % (ref 43–75)
NRBC BLD AUTO-RTO: 0 /100 WBCS
PLATELET # BLD AUTO: 195 THOUSANDS/UL (ref 149–390)
PMV BLD AUTO: 11.3 FL (ref 8.9–12.7)
POTASSIUM SERPL-SCNC: 4.3 MMOL/L (ref 3.5–5.3)
PROT SERPL-MCNC: 7 G/DL (ref 6.4–8.2)
RBC # BLD AUTO: 4.47 MILLION/UL (ref 3.81–5.12)
SODIUM SERPL-SCNC: 136 MMOL/L (ref 136–145)
TIBC SERPL-MCNC: 346 UG/DL (ref 250–450)
VIT B12 SERPL-MCNC: 395 PG/ML (ref 100–900)
WBC # BLD AUTO: 5.72 THOUSAND/UL (ref 4.31–10.16)

## 2021-11-02 PROCEDURE — 82607 VITAMIN B-12: CPT

## 2021-11-02 PROCEDURE — 80053 COMPREHEN METABOLIC PANEL: CPT

## 2021-11-02 PROCEDURE — 85025 COMPLETE CBC W/AUTO DIFF WBC: CPT

## 2021-11-02 PROCEDURE — 83516 IMMUNOASSAY NONANTIBODY: CPT

## 2021-11-02 PROCEDURE — 82728 ASSAY OF FERRITIN: CPT

## 2021-11-02 PROCEDURE — 82306 VITAMIN D 25 HYDROXY: CPT

## 2021-11-02 PROCEDURE — 83540 ASSAY OF IRON: CPT

## 2021-11-02 PROCEDURE — 82784 ASSAY IGA/IGD/IGG/IGM EACH: CPT

## 2021-11-02 PROCEDURE — 83550 IRON BINDING TEST: CPT

## 2021-11-02 PROCEDURE — 36415 COLL VENOUS BLD VENIPUNCTURE: CPT

## 2021-11-03 LAB — TTG IGA SER-ACNC: <2 U/ML (ref 0–3)

## 2021-11-08 ENCOUNTER — TELEPHONE (OUTPATIENT)
Dept: SURGERY | Facility: HOSPITAL | Age: 66
End: 2021-11-08

## 2021-11-08 DIAGNOSIS — C82.90 FOLLICULAR LYMPHOMA, UNSPECIFIED FOLLICULAR LYMPHOMA TYPE, UNSPECIFIED BODY REGION (HCC): Primary | ICD-10-CM

## 2021-11-09 ENCOUNTER — TELEPHONE (OUTPATIENT)
Dept: SURGERY | Facility: HOSPITAL | Age: 66
End: 2021-11-09

## 2021-11-09 ENCOUNTER — ANESTHESIA (OUTPATIENT)
Dept: PERIOP | Facility: HOSPITAL | Age: 66
End: 2021-11-09

## 2021-11-09 ENCOUNTER — ANESTHESIA EVENT (OUTPATIENT)
Dept: PERIOP | Facility: HOSPITAL | Age: 66
End: 2021-11-09

## 2021-11-09 ENCOUNTER — HOSPITAL ENCOUNTER (OUTPATIENT)
Dept: PERIOP | Facility: HOSPITAL | Age: 66
Setting detail: OUTPATIENT SURGERY
Discharge: HOME | End: 2021-11-09
Attending: INTERNAL MEDICINE
Payer: COMMERCIAL

## 2021-11-09 VITALS
WEIGHT: 159 LBS | DIASTOLIC BLOOD PRESSURE: 78 MMHG | OXYGEN SATURATION: 98 % | TEMPERATURE: 97.4 F | SYSTOLIC BLOOD PRESSURE: 110 MMHG | RESPIRATION RATE: 20 BRPM | BODY MASS INDEX: 25.55 KG/M2 | HEART RATE: 60 BPM | HEIGHT: 66 IN

## 2021-11-09 DIAGNOSIS — K90.0 CELIAC DISEASE: ICD-10-CM

## 2021-11-09 DIAGNOSIS — K21.9 CHRONIC GERD: ICD-10-CM

## 2021-11-09 PROCEDURE — 88342 IMHCHEM/IMCYTCHM 1ST ANTB: CPT | Performed by: PATHOLOGY

## 2021-11-09 PROCEDURE — 88305 TISSUE EXAM BY PATHOLOGIST: CPT | Performed by: PATHOLOGY

## 2021-11-09 PROCEDURE — 43239 EGD BIOPSY SINGLE/MULTIPLE: CPT | Performed by: INTERNAL MEDICINE

## 2021-11-09 RX ORDER — ONDANSETRON 2 MG/ML
4 INJECTION INTRAMUSCULAR; INTRAVENOUS ONCE AS NEEDED
Status: DISCONTINUED | OUTPATIENT
Start: 2021-11-09 | End: 2021-11-13 | Stop reason: HOSPADM

## 2021-11-09 RX ORDER — PROPOFOL 10 MG/ML
INJECTION, EMULSION INTRAVENOUS AS NEEDED
Status: DISCONTINUED | OUTPATIENT
Start: 2021-11-09 | End: 2021-11-09

## 2021-11-09 RX ORDER — SODIUM CHLORIDE, SODIUM LACTATE, POTASSIUM CHLORIDE, CALCIUM CHLORIDE 600; 310; 30; 20 MG/100ML; MG/100ML; MG/100ML; MG/100ML
INJECTION, SOLUTION INTRAVENOUS CONTINUOUS PRN
Status: DISCONTINUED | OUTPATIENT
Start: 2021-11-09 | End: 2021-11-09

## 2021-11-09 RX ORDER — LIDOCAINE HYDROCHLORIDE 10 MG/ML
INJECTION, SOLUTION EPIDURAL; INFILTRATION; INTRACAUDAL; PERINEURAL AS NEEDED
Status: DISCONTINUED | OUTPATIENT
Start: 2021-11-09 | End: 2021-11-09

## 2021-11-09 RX ORDER — SODIUM CHLORIDE, SODIUM LACTATE, POTASSIUM CHLORIDE, CALCIUM CHLORIDE 600; 310; 30; 20 MG/100ML; MG/100ML; MG/100ML; MG/100ML
125 INJECTION, SOLUTION INTRAVENOUS CONTINUOUS
Status: CANCELLED | OUTPATIENT
Start: 2021-11-09

## 2021-11-09 RX ADMIN — LIDOCAINE HYDROCHLORIDE 50 MG: 10 INJECTION, SOLUTION EPIDURAL; INFILTRATION; INTRACAUDAL; PERINEURAL at 10:03

## 2021-11-09 RX ADMIN — PROPOFOL 50 MG: 10 INJECTION, EMULSION INTRAVENOUS at 10:07

## 2021-11-09 RX ADMIN — SODIUM CHLORIDE, SODIUM LACTATE, POTASSIUM CHLORIDE, AND CALCIUM CHLORIDE: .6; .31; .03; .02 INJECTION, SOLUTION INTRAVENOUS at 09:55

## 2021-11-09 RX ADMIN — PROPOFOL 90 MG: 10 INJECTION, EMULSION INTRAVENOUS at 10:03

## 2021-11-09 RX ADMIN — PROPOFOL 50 MG: 10 INJECTION, EMULSION INTRAVENOUS at 10:05

## 2021-11-09 RX ADMIN — PROPOFOL 50 MG: 10 INJECTION, EMULSION INTRAVENOUS at 10:09

## 2021-11-10 ENCOUNTER — APPOINTMENT (OUTPATIENT)
Dept: LAB | Facility: CLINIC | Age: 66
End: 2021-11-10
Payer: COMMERCIAL

## 2021-11-10 DIAGNOSIS — C82.90 FOLLICULAR LYMPHOMA, UNSPECIFIED FOLLICULAR LYMPHOMA TYPE, UNSPECIFIED BODY REGION (HCC): ICD-10-CM

## 2021-11-10 LAB
ANION GAP SERPL CALCULATED.3IONS-SCNC: 9 MMOL/L (ref 4–13)
BUN SERPL-MCNC: 13 MG/DL (ref 5–25)
CALCIUM SERPL-MCNC: 9.5 MG/DL (ref 8.3–10.1)
CHLORIDE SERPL-SCNC: 107 MMOL/L (ref 100–108)
CO2 SERPL-SCNC: 25 MMOL/L (ref 21–32)
CREAT SERPL-MCNC: 0.84 MG/DL (ref 0.6–1.3)
GFR SERPL CREATININE-BSD FRML MDRD: 74 ML/MIN/1.73SQ M
GLUCOSE SERPL-MCNC: 92 MG/DL (ref 65–140)
POTASSIUM SERPL-SCNC: 4 MMOL/L (ref 3.5–5.3)
SODIUM SERPL-SCNC: 141 MMOL/L (ref 136–145)

## 2021-11-10 PROCEDURE — 84165 PROTEIN E-PHORESIS SERUM: CPT

## 2021-11-10 PROCEDURE — 36415 COLL VENOUS BLD VENIPUNCTURE: CPT

## 2021-11-10 PROCEDURE — 80048 BASIC METABOLIC PNL TOTAL CA: CPT

## 2021-11-10 PROCEDURE — 84165 PROTEIN E-PHORESIS SERUM: CPT | Performed by: SPECIALIST

## 2021-11-11 LAB
ALBUMIN SERPL ELPH-MCNC: 4.47 G/DL (ref 3.5–5)
ALBUMIN SERPL ELPH-MCNC: 63.9 % (ref 52–65)
ALPHA1 GLOB SERPL ELPH-MCNC: 0.31 G/DL (ref 0.1–0.4)
ALPHA1 GLOB SERPL ELPH-MCNC: 4.4 % (ref 2.5–5)
ALPHA2 GLOB SERPL ELPH-MCNC: 0.6 G/DL (ref 0.4–1.2)
ALPHA2 GLOB SERPL ELPH-MCNC: 8.5 % (ref 7–13)
BETA GLOB ABNORMAL SERPL ELPH-MCNC: 0.44 G/DL (ref 0.4–0.8)
BETA1 GLOB SERPL ELPH-MCNC: 6.3 % (ref 5–13)
BETA2 GLOB SERPL ELPH-MCNC: 4.8 % (ref 2–8)
BETA2+GAMMA GLOB SERPL ELPH-MCNC: 0.34 G/DL (ref 0.2–0.5)
GAMMA GLOB ABNORMAL SERPL ELPH-MCNC: 0.85 G/DL (ref 0.5–1.6)
GAMMA GLOB SERPL ELPH-MCNC: 12.1 % (ref 12–22)
IGG/ALB SER: 1.77 {RATIO} (ref 1.1–1.8)
PROT PATTERN SERPL ELPH-IMP: NORMAL
PROT SERPL-MCNC: 7 G/DL (ref 6.4–8.2)

## 2021-11-16 ENCOUNTER — TELEPHONE (OUTPATIENT)
Dept: GASTROENTEROLOGY | Facility: CLINIC | Age: 66
End: 2021-11-16

## 2021-11-17 ENCOUNTER — HOSPITAL ENCOUNTER (OUTPATIENT)
Dept: INFUSION CENTER | Facility: HOSPITAL | Age: 66
Discharge: HOME | End: 2021-11-17
Attending: INTERNAL MEDICINE
Payer: COMMERCIAL

## 2021-11-17 VITALS
SYSTOLIC BLOOD PRESSURE: 114 MMHG | DIASTOLIC BLOOD PRESSURE: 73 MMHG | HEART RATE: 84 BPM | TEMPERATURE: 97.1 F | RESPIRATION RATE: 18 BRPM

## 2021-11-17 DIAGNOSIS — D72.119 HYPEREOSINOPHILIC SYNDROME, UNSPECIFIED TYPE: ICD-10-CM

## 2021-11-17 DIAGNOSIS — J45.50 SEVERE PERSISTENT ASTHMA WITHOUT COMPLICATION: Primary | ICD-10-CM

## 2021-11-17 PROCEDURE — 96372 THER/PROPH/DIAG INJ SC/IM: CPT

## 2021-11-17 RX ORDER — EPINEPHRINE 1 MG/ML
0.3 INJECTION, SOLUTION, CONCENTRATE INTRAVENOUS ONCE
Status: CANCELLED | OUTPATIENT
Start: 2022-01-12 | End: 2022-01-11

## 2021-11-17 RX ORDER — EPINEPHRINE 1 MG/ML
0.3 INJECTION, SOLUTION, CONCENTRATE INTRAVENOUS ONCE
Status: DISCONTINUED | OUTPATIENT
Start: 2021-11-17 | End: 2021-11-21 | Stop reason: HOSPADM

## 2021-11-17 RX ADMIN — BENRALIZUMAB 30 MG: 30 INJECTION, SOLUTION SUBCUTANEOUS at 11:37

## 2021-11-18 ENCOUNTER — RA CDI HCC (OUTPATIENT)
Dept: OTHER | Facility: HOSPITAL | Age: 66
End: 2021-11-18

## 2021-11-19 ENCOUNTER — HOSPITAL ENCOUNTER (OUTPATIENT)
Dept: MRI IMAGING | Facility: HOSPITAL | Age: 66
Discharge: HOME | End: 2021-11-19
Attending: INTERNAL MEDICINE
Payer: COMMERCIAL

## 2021-11-19 DIAGNOSIS — M89.9 LESION OF BONE OF RIGHT SHOULDER: ICD-10-CM

## 2021-11-19 PROCEDURE — A9585 GADOBUTROL INJECTION: HCPCS | Performed by: INTERNAL MEDICINE

## 2021-11-19 PROCEDURE — 73223 MRI JOINT UPR EXTR W/O&W/DYE: CPT

## 2021-11-19 PROCEDURE — G1004 CDSM NDSC: HCPCS

## 2021-11-19 RX ADMIN — GADOBUTROL 7 ML: 604.72 INJECTION INTRAVENOUS at 11:34

## 2021-11-22 ENCOUNTER — OFFICE VISIT (OUTPATIENT)
Dept: GASTROENTEROLOGY | Facility: CLINIC | Age: 66
End: 2021-11-22
Payer: COMMERCIAL

## 2021-11-22 VITALS
WEIGHT: 160.6 LBS | SYSTOLIC BLOOD PRESSURE: 105 MMHG | HEIGHT: 66 IN | DIASTOLIC BLOOD PRESSURE: 72 MMHG | HEART RATE: 81 BPM | BODY MASS INDEX: 25.81 KG/M2 | TEMPERATURE: 97.1 F

## 2021-11-22 DIAGNOSIS — R11.0 NAUSEA: ICD-10-CM

## 2021-11-22 DIAGNOSIS — K21.9 GASTROESOPHAGEAL REFLUX DISEASE WITHOUT ESOPHAGITIS: Primary | ICD-10-CM

## 2021-11-22 DIAGNOSIS — K90.0 CELIAC DISEASE: ICD-10-CM

## 2021-11-22 PROCEDURE — 99214 OFFICE O/P EST MOD 30 MIN: CPT | Performed by: NURSE PRACTITIONER

## 2021-11-22 RX ORDER — SUCRALFATE ORAL 1 G/10ML
1 SUSPENSION ORAL 4 TIMES DAILY
Qty: 420 ML | Refills: 5 | Status: SHIPPED | OUTPATIENT
Start: 2021-11-22 | End: 2021-11-29

## 2021-11-22 RX ORDER — ONDANSETRON 4 MG/1
4 TABLET, FILM COATED ORAL EVERY 8 HOURS PRN
Qty: 20 TABLET | Refills: 0 | Status: SHIPPED | OUTPATIENT
Start: 2021-11-22

## 2021-11-24 ENCOUNTER — OFFICE VISIT (OUTPATIENT)
Dept: FAMILY MEDICINE CLINIC | Facility: CLINIC | Age: 66
End: 2021-11-24
Payer: COMMERCIAL

## 2021-11-24 VITALS
SYSTOLIC BLOOD PRESSURE: 100 MMHG | WEIGHT: 160 LBS | DIASTOLIC BLOOD PRESSURE: 58 MMHG | HEIGHT: 66 IN | BODY MASS INDEX: 25.71 KG/M2 | TEMPERATURE: 97.3 F | RESPIRATION RATE: 16 BRPM | OXYGEN SATURATION: 98 % | HEART RATE: 79 BPM

## 2021-11-24 DIAGNOSIS — G89.29 CHRONIC RIGHT SHOULDER PAIN: Primary | ICD-10-CM

## 2021-11-24 DIAGNOSIS — K27.9 PUD (PEPTIC ULCER DISEASE): ICD-10-CM

## 2021-11-24 DIAGNOSIS — M25.511 CHRONIC RIGHT SHOULDER PAIN: Primary | ICD-10-CM

## 2021-11-24 DIAGNOSIS — M89.9 SYMPTOMATIC BONE LESION WITH ABNORMAL X-RAY: ICD-10-CM

## 2021-11-24 DIAGNOSIS — M25.519 SHOULDER PAIN, UNSPECIFIED CHRONICITY, UNSPECIFIED LATERALITY: ICD-10-CM

## 2021-11-24 PROCEDURE — 1036F TOBACCO NON-USER: CPT | Performed by: INTERNAL MEDICINE

## 2021-11-24 PROCEDURE — 99214 OFFICE O/P EST MOD 30 MIN: CPT | Performed by: INTERNAL MEDICINE

## 2021-11-24 RX ORDER — TRAMADOL HYDROCHLORIDE 50 MG/1
50 TABLET ORAL EVERY 6 HOURS PRN
Qty: 30 TABLET | Refills: 1 | Status: SHIPPED | OUTPATIENT
Start: 2021-11-24 | End: 2022-05-18

## 2021-11-29 DIAGNOSIS — K20.90 ESOPHAGITIS: Primary | ICD-10-CM

## 2021-11-29 DIAGNOSIS — K21.9 CHRONIC GERD: ICD-10-CM

## 2021-11-29 RX ORDER — SUCRALFATE 1 G/1
1 TABLET ORAL 4 TIMES DAILY
Qty: 120 TABLET | Refills: 5 | Status: SHIPPED | OUTPATIENT
Start: 2021-11-29

## 2021-12-13 DIAGNOSIS — G47.00 INSOMNIA, UNSPECIFIED TYPE: ICD-10-CM

## 2021-12-13 DIAGNOSIS — J45.909 UNCOMPLICATED ASTHMA, UNSPECIFIED ASTHMA SEVERITY, UNSPECIFIED WHETHER PERSISTENT: ICD-10-CM

## 2021-12-13 RX ORDER — LEVALBUTEROL TARTRATE 45 UG/1
2 AEROSOL, METERED ORAL EVERY 4 HOURS PRN
Qty: 15 G | Refills: 3 | Status: SHIPPED | OUTPATIENT
Start: 2021-12-13 | End: 2022-04-26 | Stop reason: SDUPTHER

## 2021-12-14 RX ORDER — ZOLPIDEM TARTRATE 5 MG/1
5 TABLET ORAL
Qty: 30 TABLET | Refills: 2 | Status: SHIPPED | OUTPATIENT
Start: 2021-12-14 | End: 2022-05-18 | Stop reason: SDUPTHER

## 2021-12-15 ENCOUNTER — TELEPHONE (OUTPATIENT)
Dept: INTERNAL MEDICINE CLINIC | Facility: CLINIC | Age: 66
End: 2021-12-15

## 2021-12-16 ENCOUNTER — OFFICE VISIT (OUTPATIENT)
Dept: OBGYN CLINIC | Facility: OTHER | Age: 66
End: 2021-12-16
Payer: COMMERCIAL

## 2021-12-16 VITALS
BODY MASS INDEX: 25.68 KG/M2 | DIASTOLIC BLOOD PRESSURE: 88 MMHG | HEIGHT: 66 IN | HEART RATE: 84 BPM | WEIGHT: 159.8 LBS | SYSTOLIC BLOOD PRESSURE: 126 MMHG

## 2021-12-16 DIAGNOSIS — M75.101 TEAR OF RIGHT SUPRASPINATUS TENDON: Primary | ICD-10-CM

## 2021-12-16 PROCEDURE — 99214 OFFICE O/P EST MOD 30 MIN: CPT | Performed by: ORTHOPAEDIC SURGERY

## 2021-12-16 PROCEDURE — 1036F TOBACCO NON-USER: CPT | Performed by: ORTHOPAEDIC SURGERY

## 2021-12-16 PROCEDURE — 20610 DRAIN/INJ JOINT/BURSA W/O US: CPT | Performed by: ORTHOPAEDIC SURGERY

## 2021-12-16 PROCEDURE — 3008F BODY MASS INDEX DOCD: CPT | Performed by: ORTHOPAEDIC SURGERY

## 2021-12-16 RX ADMIN — BETAMETHASONE SODIUM PHOSPHATE AND BETAMETHASONE ACETATE 6 MG: 3; 3 INJECTION, SUSPENSION INTRA-ARTICULAR; INTRALESIONAL; INTRAMUSCULAR; SOFT TISSUE at 15:56

## 2021-12-16 RX ADMIN — BUPIVACAINE HYDROCHLORIDE 2 ML: 2.5 INJECTION, SOLUTION INFILTRATION; PERINEURAL at 15:56

## 2021-12-20 RX ORDER — BUPIVACAINE HYDROCHLORIDE 2.5 MG/ML
2 INJECTION, SOLUTION INFILTRATION; PERINEURAL
Status: COMPLETED | OUTPATIENT
Start: 2021-12-16 | End: 2021-12-16

## 2021-12-20 RX ORDER — BETAMETHASONE SODIUM PHOSPHATE AND BETAMETHASONE ACETATE 3; 3 MG/ML; MG/ML
6 INJECTION, SUSPENSION INTRA-ARTICULAR; INTRALESIONAL; INTRAMUSCULAR; SOFT TISSUE
Status: COMPLETED | OUTPATIENT
Start: 2021-12-16 | End: 2021-12-16

## 2021-12-22 ENCOUNTER — HOSPITAL ENCOUNTER (OUTPATIENT)
Dept: MAMMOGRAPHY | Facility: HOSPITAL | Age: 66
Discharge: HOME | End: 2021-12-22
Payer: COMMERCIAL

## 2021-12-22 VITALS — BODY MASS INDEX: 25.55 KG/M2 | WEIGHT: 159 LBS | HEIGHT: 66 IN

## 2021-12-22 DIAGNOSIS — Z12.31 ENCOUNTER FOR SCREENING MAMMOGRAM FOR MALIGNANT NEOPLASM OF BREAST: ICD-10-CM

## 2021-12-22 PROCEDURE — 77063 BREAST TOMOSYNTHESIS BI: CPT

## 2021-12-22 PROCEDURE — 77067 SCR MAMMO BI INCL CAD: CPT

## 2022-01-11 ENCOUNTER — EVALUATION (OUTPATIENT)
Dept: PHYSICAL THERAPY | Facility: CLINIC | Age: 67
End: 2022-01-11
Payer: COMMERCIAL

## 2022-01-11 DIAGNOSIS — M75.101 TEAR OF RIGHT SUPRASPINATUS TENDON: Primary | ICD-10-CM

## 2022-01-11 PROCEDURE — 97161 PT EVAL LOW COMPLEX 20 MIN: CPT | Performed by: PHYSICAL MEDICINE & REHABILITATION

## 2022-01-11 PROCEDURE — 97110 THERAPEUTIC EXERCISES: CPT | Performed by: PHYSICAL MEDICINE & REHABILITATION

## 2022-01-11 NOTE — PROGRESS NOTES
PT Evaluation     Today's date: 2022  Patient name: Harsh Fernandez  : 1955  MRN: [id number]  Referring provider: Lenin Coats  Dx:   Encounter Diagnosis     ICD-10-CM    1  Tear of right supraspinatus tendon  M75 101 Ambulatory referral to Physical Therapy                  Assessment  Assessment details: Harsh Fernandez is a 61 y o  female presenting to outpatient physical therapy with noted impairments including R shoulder pain, impaired soft tissue mobility, reduced range of motion, reduced strength, reduced joint positional awareness and dynamic stability, and reduced activity tolerance  Signs and symptoms at present are consistent with referring diagnosis of R RTC and biceps tear  Due to noted impairments, the patient's present functional limitations include difficulty with ADLs with increased need for assistance, reliance on medication and/or modalities for pain relief, reduced tolerance for functional mobility and activity, and difficulty completing home responsibilities  Patient to benefit from skilled outpatient physical therapy 1-2x/week for 4-6 weeks in order to reduce pain, maximize pain free range of motion, increase strength and stability, and improve functional mobility/functional activity in order to maximize return to prior level of function with reduced limitations  Home exercise program was provided and all questions answered to patient's level of satisfaction  Thank you for your referral       Impairments: abnormal coordination, abnormal muscle firing, abnormal muscle tone, abnormal or restricted ROM, abnormal movement, activity intolerance, impaired physical strength, lacks appropriate home exercise program and pain with function    Symptom irritability: moderateUnderstanding of Dx/Px/POC: good   Prognosis: fair    Goals  STGs to be achieved in 4-6 weeks:    1   Pt will report reduced R shoulder pain levels "at worst" by at least 2 points (0-10 scale) in order to allow improved tolerance for ADLs and reduced reliance on medication or modalities for pain relief  2  Pt will demonstrate improved AROM of R shoulder by at least 10-15* in order to increase pt independence with ADLs such as reaching into a cabinet or donning a shirt  3  Pt will demonstrate improved strength of R shoulder grossly by at least 1/2-1 MMT in order to improve joint stability and allow for restoration of normal joint mechanics  LTGs to be achieved in 8-12 weeks:    1  Pt will be independent with HEP, demonstrating proper technique with exercises and understanding of self progression of program without need for cueing or assistance  2  Pt will report minimized pain levels with at least 75% reduction in pain since [facility]  3  Pt will demonstrate WFL AROM and strength of R shoulder without increase in sx  4  Pt will report return to ADLs/IADLs without limitations  5  FOTO will reflect score at discharge that is greater than or equal to predicted level  Plan  Patient would benefit from: skilled physical therapy  Referral necessary: No  Planned modality interventions: cryotherapy and thermotherapy: hydrocollator packs  Planned therapy interventions: manual therapy, motor coordination training, neuromuscular re-education, patient education, self care, strengthening, stretching, therapeutic exercise, therapeutic activities and home exercise program  Frequency: 2x week  Duration in visits: 12  Duration in weeks: 6  Plan of Care beginning date: 1/11/2022  Plan of Care expiration date: 2/22/2022  Treatment plan discussed with: patient        Subjective Evaluation    History of Present Illness  Mechanism of injury: Taken from ortho consult note In Dec: "Keena Gonzalez presents to the office with chief complaint of right shoulder pain  Pain started about 1 year ago while performing a shoulder press  Pain is worse with overhead motion and lifting  Pain improves with rest and NSAIDs    Due to NSAID use she developed ulcers, so she had to switch to Tylenol and Gabapentin  Pain interferes with ADLs and sleep  Rita Funes had a steroid injection with some temporary relief in August   She denies formal physical therapy  She had a MRI in August which showed an anterior leading edge supraspinatus tendon tear and long head bicep tendon rupture  She also had an incidental finding of a bone lesion  Due to her lymphoma history, PET scan and MRI w&w/o contrast was obtained  Currently Bone lesion appears benign  Treatment options were discussed in detail  Rita Funes has a supraspinatus tendon tear  She has an exam consistent with rotator cuff pathology  She has yet to try formal physical therapy so that will be offered today  She will be provided a steroid injection for pain relief  We will check her progress in a few months  Her oncologist plans to repeat her MRI in 6 months to evaluate the lesion        1  Subacromial steroid injection for pain relief  2  Referral to physical therapy  3  Activities to tolerance  4  HEP per therapist  5  Tylenol and ice PRN  6  Follow up in 2-3 months"    1/11: Pt notes pain in her R shoulder overall remains the same  Notes pain is constant and located 1* R anterior proximal shoulder; can radiate to R clavicle and towards R breast  Has some minor soreness L shoulder as well  Pain is worse with overhead activity, lifting at or above 90*, and with rotary motion overhead such as swimming  Weakness R shoulder with reaching/lifting away from body or lowering R arm from overhead; uses L UE to assist  Pain and limited ROM R shoulder with reaching away and behind her back  Painful shoulder abduction  R shoulder feels "weak and stiff" per pt  Limited use of R UE with ADLs/functional activities  Unable to lay on R side  No neck pain  No n/t  No new sx/complaints  RTD upcoming   Unable to take NSAIDs 2* stomach ulcers; was taking Gabapentin however stopped, did feel it was helping; takes tylenol 1-2x/day for pain  Notes her goal is to avoid surgical intervention  Recurrent probem    Quality of life: good    Pain  Current pain ratin  At best pain ratin  At worst pain ratin  Location: R shoulder/upper arm   Quality: sharp, knife-like and dull ache  Progression: no change    Social Support  Steps to enter house: no  Stairs in house: no   Lives in: [facility]  Lives with: spouse    Employment status: not working (retired)  Hand dominance: right  Exercise history: Enjoys swimming, walks her 2 dogs      Diagnostic Tests  MRI studies: abnormal  Treatments  Previous treatment: medication and injection treatment  Current treatment: medication and physical therapy  Patient Goals  Patient goals for therapy: decreased pain, increased motion and increased strength          Objective     Postural Observations  Seated posture: fair  Standing posture: fair    Additional Postural Observation Details  Forward head/rounded shoulders  Increased thoracic kyphosis   B scapular depression and protraction with mild winging       Tenderness     Right Shoulder  Tenderness in the biceps tendon (proximal) and bicipital groove  Neurological Testing     Sensation     Shoulder   Left Shoulder   Intact: light touch    Right Shoulder   Intact: light touch    Active Range of Motion   Left Shoulder   Normal active range of motion    Right Shoulder   Flexion: 150 degrees with pain  Extension: 63 degrees   Abduction: 155 degrees with pain  External rotation BTH: T1   Internal rotation BTB: L2 with pain    Additional Active Range of Motion Details  Pain R shoulder with all AROM, increased pain approaching end range  Most pain with end range IR and abduction     Soreness L shoulder as well with end range AROM      R elbow AROM WNL all planes without pain/sx       Passive Range of Motion     Additional Passive Range of Motion Details  TBA    Strength/Myotome Testing     Left Shoulder     Planes of Motion   Flexion: 4   Abduction: 4   External rotation at 0°: 4   Internal rotation at 0°: 4+     Isolated Muscles   Biceps: 4+   Lower trapezius: 4-   Middle trapezius: 4-   Triceps: 4+   Upper trapezius: 4+     Right Shoulder     Planes of Motion   Flexion: 3 (Pain R GHJ)   Abduction: 3- (Pain R GHJ)   External rotation at 0°: 4- (Pain R GHJ)   Internal rotation at 0°: 4- (Pain R GHJ> ER )     Isolated Muscles   Biceps: 4-   Lower trapezius: 4-   Middle trapezius: 4-   Triceps: 4   Upper trapezius: 4+     Tests     Right Shoulder   Positive belly press, drop arm, empty can and external rotation lag sign  Negative full can  Precautions: Hx Lymphoma, GERD, Celiacs     Re-eval Date: 2/22    Date 1/11       Visit Count 1       FOTO 1/11       Pain In See IE       Pain Out See IE           Manuals 1/11       PROM R shoulder with gentle end range stretch all planes to tolerance        R GHJ mobs PRN                         Neuro Re-Ed        Rhythmic stabilization R shoulder         Perturbation training                                                 Ther Ex        Pullies flexion/scaption       Table slides flexion/scaption              10x10"        Cane AAROM Shoulder ER      Sleeper stretch as tolerated         Pec stretch      Shoulder isometrics IR/ER        2x10/5"       Scapular depression/retractions 10x/5" cues/feedback        MTP/LTPs      Tband IR/ER        Prone shoulder 3 way      Prone row         SL ER      SL shoulder flexion         Wall push ups      Serratus punch         Ther Activity                        Gait Training                        Modalities                              1/11 - HEP was issued and reviewed this date for above noted exercises  Pt demonstrated understanding without incident and without questions/concerns  Will continue to update upcoming

## 2022-01-12 ENCOUNTER — HOSPITAL ENCOUNTER (OUTPATIENT)
Dept: INFUSION CENTER | Facility: HOSPITAL | Age: 67
Discharge: HOME | End: 2022-01-12
Attending: INTERNAL MEDICINE
Payer: COMMERCIAL

## 2022-01-12 VITALS
RESPIRATION RATE: 16 BRPM | SYSTOLIC BLOOD PRESSURE: 110 MMHG | HEART RATE: 75 BPM | OXYGEN SATURATION: 99 % | DIASTOLIC BLOOD PRESSURE: 74 MMHG | TEMPERATURE: 97.2 F

## 2022-01-12 DIAGNOSIS — D72.119 HYPEREOSINOPHILIC SYNDROME, UNSPECIFIED TYPE: ICD-10-CM

## 2022-01-12 DIAGNOSIS — J45.50 SEVERE PERSISTENT ASTHMA WITHOUT COMPLICATION: Primary | ICD-10-CM

## 2022-01-12 PROCEDURE — 96372 THER/PROPH/DIAG INJ SC/IM: CPT

## 2022-01-12 RX ORDER — EPINEPHRINE 1 MG/ML
0.3 INJECTION, SOLUTION, CONCENTRATE INTRAVENOUS ONCE
Status: CANCELLED | OUTPATIENT
Start: 2022-03-09 | End: 2022-03-09

## 2022-01-12 RX ADMIN — BENRALIZUMAB 30 MG: 30 INJECTION, SOLUTION SUBCUTANEOUS at 11:27

## 2022-01-12 NOTE — PROGRESS NOTES
Patient presented for fasenra injection, brought epi pen to appointment  Expiration date 5/2022  Tolerated injection to right arm without issue  Patient observed for 30 minutes post injection with no s/s of adverse reaction noted   Appointment made for next injection, discharged in stable condition, declined AVS

## 2022-01-17 ENCOUNTER — APPOINTMENT (OUTPATIENT)
Dept: PHYSICAL THERAPY | Facility: CLINIC | Age: 67
End: 2022-01-17
Payer: COMMERCIAL

## 2022-01-17 DIAGNOSIS — I10 HYPERTENSION, UNSPECIFIED TYPE: ICD-10-CM

## 2022-01-17 RX ORDER — METOPROLOL SUCCINATE 25 MG/1
25 TABLET, EXTENDED RELEASE ORAL DAILY
Qty: 30 TABLET | Refills: 5 | Status: SHIPPED | OUTPATIENT
Start: 2022-01-17

## 2022-01-19 ENCOUNTER — OFFICE VISIT (OUTPATIENT)
Dept: PHYSICAL THERAPY | Facility: CLINIC | Age: 67
End: 2022-01-19
Payer: COMMERCIAL

## 2022-01-19 DIAGNOSIS — M75.101 TEAR OF RIGHT SUPRASPINATUS TENDON: Primary | ICD-10-CM

## 2022-01-19 PROCEDURE — 97140 MANUAL THERAPY 1/> REGIONS: CPT

## 2022-01-19 PROCEDURE — 97110 THERAPEUTIC EXERCISES: CPT

## 2022-01-19 NOTE — PROGRESS NOTES
Daily Note     Today's date: 2022  Patient name: Keyla Poole  : 1955  MRN: [id number]  Referring provider: Meka Lynch PA-C  Dx:   Encounter Diagnosis     ICD-10-CM    1  Tear of right supraspinatus tendon  M75 101        Start Time:   Stop Time: 5432  Total time in clinic (min): 45 minutes    Subjective: "My shoulder is about the same "      Objective: See treatment diary below      Assessment: Tolerated treatment well  Pt able to complete program with minimal increase in symptoms  Pt apprehensive to move shoulder  Anterior discomfort noted during tband strengthening  Empty end feel noted with muscle guarding during PROM  Rotations most painful movement  Will continue to progress as able to address deficits  Issued tband for HEP  Patient demonstrated fatigue post treatment and would benefit from continued PT      Plan: Continue per plan of care  Progress treatment as tolerated         Precautions: Hx Lymphoma, GERD, Celiacs     Re-eval Date:     Date       Visit Count 1 2      FOTO        Pain In See IE 7/10      Pain Out See IE           Manuals       PROM R shoulder with gentle end range stretch all planes to tolerance  PROM R shoulder with gentle end range stretch all planes to tolerance 10'      R GHJ mobs PRN                         Neuro Re-Ed        Rhythmic stabilization R shoulder         Perturbation training                                                 Ther Ex        Pullies flexion/scaption       Table slides flexion/scaption              10x10"  Flex/scap  3' ea      Cane AAROM Shoulder ER      Sleeper stretch as tolerated         Pec stretch      Shoulder isometrics IR/ER        2x10/5"       Flex 10x10"  IR/ER 2x10/3-5"      Scapular depression/retractions 10x/5" cues/feedback  2x10/5"      MTP/LTPs      Tband IR/ER  grn 2x10/3-5"      Prone shoulder 3 way      Prone row         SL ER      SL shoulder flexion         Wall push ups      Serratus punch         Ther Activity                        Gait Training                        Modalities                              1/11 - HEP was issued and reviewed this date for above noted exercises  Pt demonstrated understanding without incident and without questions/concerns  Will continue to update upcoming

## 2022-01-20 ENCOUNTER — OFFICE VISIT (OUTPATIENT)
Dept: PHYSICAL THERAPY | Facility: CLINIC | Age: 67
End: 2022-01-20
Payer: COMMERCIAL

## 2022-01-20 DIAGNOSIS — M75.101 TEAR OF RIGHT SUPRASPINATUS TENDON: Primary | ICD-10-CM

## 2022-01-20 PROCEDURE — 97110 THERAPEUTIC EXERCISES: CPT

## 2022-01-20 PROCEDURE — 97140 MANUAL THERAPY 1/> REGIONS: CPT

## 2022-01-20 PROCEDURE — 97112 NEUROMUSCULAR REEDUCATION: CPT

## 2022-01-20 NOTE — PROGRESS NOTES
Daily Note     Today's date: 2022  Patient name: Cherelle Small  : 1955  MRN: [id number]  Referring provider: Indu Steele PA-C  Dx:   Encounter Diagnosis     ICD-10-CM    1  Tear of right supraspinatus tendon  M75 101        Start Time: 5775  Stop Time: 3887  Total time in clinic (min): 45 minutes    Subjective: "My shoulder is about the same; maybe a little better than yesterday "      Objective: See treatment diary below      Assessment: Tolerated treatment well  Added various strengthening exercises with appropriate challenge  Pt unable to complete ER with tband 2* increase pain  Pt able to complete  SL ER and flex with good control and form  Challenged with prone arm raises, especially horizontal ABD  Appropriate fatigue noted at end of session  Would benefit from continues skilled therapy to address deficits to return to PLOF  Patient demonstrated fatigue post treatment and would benefit from continued PT      Plan: Continue per plan of care  Progress treatment as tolerated         Precautions: Hx Lymphoma, GERD, Celiacs     Re-eval Date:     Date      Visit Count 1 2 3     FOTO        Pain In See IE 7/10 6/10     Pain Out See IE  4-5/10         Manuals      PROM R shoulder with gentle end range stretch all planes to tolerance  PROM R shoulder with gentle end range stretch all planes to tolerance 10' PROM R shoulder with gentle end range stretch all planes to tolerance 10'     R GHJ mobs PRN                         Neuro Re-Ed        Rhythmic stabilization R shoulder         Perturbation training                                                 Ther Ex        Pullies flexion/scaption       Table slides flexion/scaption              10x10"  Flex/scap  3' ea Flex/scap  3' ea     Cane AAROM Shoulder ER      Sleeper stretch as tolerated         Pec stretch      Shoulder isometrics IR/ER        2x10/5"       Flex 10x10"  IR/ER 2x10/3-5"       Flex 10x10"  IR/ER 2x10/3-5"     Scapular depression/retractions 10x/5" cues/feedback  2x10/5" 2x10/5"     MTP/LTPs      Tband IR/ER  grn 2x10/3-5" grn 3x10/3-5"      IR grn 3x10/3-5"     Prone shoulder 3 way      Prone row    2x10/3-5"ea     SL ER      SL shoulder flexion    2x10      2x10     Wall push ups      Serratus punch         Ther Activity                        Gait Training                        Modalities                              1/11 - HEP was issued and reviewed this date for above noted exercises  Pt demonstrated understanding without incident and without questions/concerns  Will continue to update upcoming

## 2022-01-24 ENCOUNTER — OFFICE VISIT (OUTPATIENT)
Dept: PHYSICAL THERAPY | Facility: CLINIC | Age: 67
End: 2022-01-24
Payer: COMMERCIAL

## 2022-01-24 DIAGNOSIS — M75.101 TEAR OF RIGHT SUPRASPINATUS TENDON: Primary | ICD-10-CM

## 2022-01-24 PROCEDURE — 97112 NEUROMUSCULAR REEDUCATION: CPT

## 2022-01-24 PROCEDURE — 97140 MANUAL THERAPY 1/> REGIONS: CPT

## 2022-01-24 PROCEDURE — 97110 THERAPEUTIC EXERCISES: CPT

## 2022-01-24 NOTE — PROGRESS NOTES
Daily Note     Today's date: 2022  Patient name: Conrad Wiggins  : 1955  MRN: [id number]  Referring provider: Tristen Peter PA-C  Dx:   Encounter Diagnosis     ICD-10-CM    1  Tear of right supraspinatus tendon  M75 101        Start Time: 1130  Stop Time: 1220  Total time in clinic (min): 50 minutes    Subjective: "I am stretching in the shower with the hot water, which helps "      Objective: See treatment diary below      Assessment: Tolerated treatment well  Pt able to complete band exercises with good tolerance  Instructed in modified bent position vs prone with good execution  Limited AROM in all directions with occasional end range pain and tightness  Pt apprehensive at end range and muscle guards  Will continue to progress able to addres deficits  Patient demonstrated fatigue post treatment and would benefit from continued PT      Plan: Continue per plan of care  Progress treatment as tolerated         Precautions: Hx Lymphoma, GERD, Celiacs     Re-eval Date:     Date     Visit Count 1 2 3 4    FOTO        Pain In See IE 7/10 6/10     Pain Out See IE  4-5/10         Manuals     PROM R shoulder with gentle end range stretch all planes to tolerance  PROM R shoulder with gentle end range stretch all planes to tolerance 10' PROM R shoulder with gentle end range stretch all planes to tolerance 10'     R GHJ mobs PRN                         Neuro Re-Ed        Rhythmic stabilization R shoulder         Perturbation training                                                 Ther Ex        Pullies flexion/scaption       Table slides flexion/scaption              10x10"  Flex/scap  3' ea Flex/scap  3' ea Flex/scap  3' ea    Cane AAROM Shoulder ER      Sleeper stretch as tolerated         Pec stretch      Shoulder isometrics IR/ER        2x10/5"       Flex 10x10"  IR/ER 2x10/3-5"       Flex 10x10"  IR/ER 2x10/3-5"     Scapular depression/retractions 10x/5" cues/feedback  2x10/5" 2x10/5"     MTP/LTPs      Tband IR/ER  grn 2x10/3-5" grn 3x10/3-5"      IR grn 3x10/3-5" grn 3x10/3-5"      IR grn 3x10/3-5"    Prone shoulder 3 way      Prone row    2x10/3-5"ea Bent over   2x10/3-5"    SL ER      SL shoulder flexion    2x10      2x10 3x10      3x10      Wall push ups      Serratus punch         Ther Activity                        Gait Training                        Modalities                              1/11 - HEP was issued and reviewed this date for above noted exercises  Pt demonstrated understanding without incident and without questions/concerns  Will continue to update upcoming

## 2022-01-27 ENCOUNTER — APPOINTMENT (OUTPATIENT)
Dept: PHYSICAL THERAPY | Facility: CLINIC | Age: 67
End: 2022-01-27
Payer: COMMERCIAL

## 2022-01-31 ENCOUNTER — OFFICE VISIT (OUTPATIENT)
Dept: PHYSICAL THERAPY | Facility: CLINIC | Age: 67
End: 2022-01-31
Payer: COMMERCIAL

## 2022-01-31 DIAGNOSIS — M75.101 TEAR OF RIGHT SUPRASPINATUS TENDON: Primary | ICD-10-CM

## 2022-01-31 PROCEDURE — 97140 MANUAL THERAPY 1/> REGIONS: CPT

## 2022-01-31 PROCEDURE — 97112 NEUROMUSCULAR REEDUCATION: CPT

## 2022-01-31 PROCEDURE — 97110 THERAPEUTIC EXERCISES: CPT

## 2022-01-31 NOTE — PROGRESS NOTES
Daily Note     Today's date: 2022  Patient name: Joseluis Contreras  : 1955  MRN: [id number]  Referring provider: Rosales Abdul PA-C  Dx:   Encounter Diagnosis     ICD-10-CM    1  Tear of right supraspinatus tendon  M75 101        Start Time: 1130  Stop Time: 0  Total time in clinic (min): 50 minutes    Subjective: "I don't think I will ever be painfree  I think I can do all these exercises at home "      Objective: See treatment diary below      Assessment: Tolerated treatment well  Pt able to complete program without incident  Reviewed and issued HO for HEP with new exercises as list in flow sheet to incorporate tband use at home for strengthening  Appropraite fatigue noted  Good PROM noted in all directions with occasional end range pain  Will continue to progress as able to address deficits  Patient demonstrated fatigue post treatment and would benefit from continued PT      Plan: Continue per plan of care  Progress treatment as tolerated         Precautions: Hx Lymphoma, GERD, Celiacs     Re-eval Date:     Date    Visit Count 1 2 3 4 5   FOTO        Pain In See IE 7/10 6/10 4-5/10 4-5/10   Pain Out See IE  4-5/10  4-5/10       Manuals    PROM R shoulder with gentle end range stretch all planes to tolerance  PROM R shoulder with gentle end range stretch all planes to tolerance 10' PROM R shoulder with gentle end range stretch all planes to tolerance 10'  PROM R shoulder with gentle end range stretch all planes to tolerance 10'   R GHJ mobs PRN                         Neuro Re-Ed        Rhythmic stabilization R shoulder         Perturbation training                                                 Ther Ex        Pullies flexion/scaption       Table slides flexion/scaption              10x10"  Flex/scap  3' ea Flex/scap  3' ea Flex/scap  3' ea Flex/scap  3' ea   Cane AAROM Shoulder ER      Sleeper stretch as tolerated      Shoulder ADD with tband  grn  2x10    B ER  grn   2x10/3-5"   Pec stretch      Shoulder isometrics IR/ER        2x10/5"       Flex 10x10"  IR/ER 2x10/3-5"       Flex 10x10"  IR/ER 2x10/3-5"     Scapular depression/retractions 10x/5" cues/feedback  2x10/5" 2x10/5"     MTP/LTPs      Tband IR/ER  grn 2x10/3-5" grn 3x10/3-5"      IR grn 3x10/3-5" grn 3x10/3-5"      IR grn 3x10/3-5" grn 3x10/3-5"      IR/ER grn 3x10/3-5"   Prone shoulder 3 way      Prone row    2x10/3-5"ea Bent over   2x10/3-5" Standing horiz ABD  grn 2x5/3-5"   SL ER      SL shoulder flexion    2x10      2x10 3x10      3x10      Wall push ups      Serratus punch         Ther Activity                        Gait Training                        Modalities                              1/11 - HEP was issued and reviewed this date for above noted exercises  Pt demonstrated understanding without incident and without questions/concerns  Will continue to update upcoming

## 2022-01-31 NOTE — PATIENT INSTRUCTIONS
Access Code: [email or web address]  URL: [email or web address]  Date: 01/31/2022  Prepared by: Ritchie Simmons    Exercises  · Shoulder extension with resistance - Neutral - 1 x daily - 7 x weekly - 3 sets - 10 reps  · Scapular Retraction with Resistance - 1 x daily - 7 x weekly - 3 sets - 10 reps  · Shoulder External Rotation with Anchored Resistance - 1 x daily - 7 x weekly - 3 sets - 10 reps  · Shoulder Internal Rotation with Resistance - 1 x daily - 7 x weekly - 3 sets - 10 reps  · Shoulder External Rotation and Scapular Retraction with Resistance - 1 x daily - 7 x weekly - 3 sets - 10 reps  · Shoulder Adduction with Anchored Resistance - 1 x daily - 7 x weekly - 3 sets - 10 reps  · Standing Shoulder Flexion with Resistance - 1 x daily - 7 x weekly - 3 sets - 10 reps  · Standing Shoulder Horizontal Abduction with Resistance - 1 x daily - 7 x weekly - 3 sets - 10 reps

## 2022-02-03 ENCOUNTER — OFFICE VISIT (OUTPATIENT)
Dept: PHYSICAL THERAPY | Facility: CLINIC | Age: 67
End: 2022-02-03
Payer: COMMERCIAL

## 2022-02-03 DIAGNOSIS — M75.101 TEAR OF RIGHT SUPRASPINATUS TENDON: Primary | ICD-10-CM

## 2022-02-03 PROCEDURE — 97140 MANUAL THERAPY 1/> REGIONS: CPT

## 2022-02-03 PROCEDURE — 97110 THERAPEUTIC EXERCISES: CPT

## 2022-02-03 PROCEDURE — 97112 NEUROMUSCULAR REEDUCATION: CPT

## 2022-02-03 NOTE — PROGRESS NOTES
Daily Note     Today's date: 2/3/2022  Patient name: Shi Carrion  : 1955  MRN: [id number]  Referring provider: Nichole Cano PA-C  Dx:   Encounter Diagnosis     ICD-10-CM    1  Tear of right supraspinatus tendon  M75 101        Start Time: 1130  Stop Time: 6770  Total time in clinic (min): 50 minutes    Subjective: "I can't use my arm like I would like to but I am adjusting "      Objective: See treatment diary below      Assessment: Tolerated treatment well  Pt able to complete program without increase in symptoms  Occasional sharp pain in anterior shoulder with rotational movements  Reviewed HEP  Tightness noted at end range with minimal pain with rotation  Will continue to progress as able to address deficits  Patient demonstrated fatigue post treatment and would benefit from continued PT      Plan: Continue per plan of care  Progress treatment as tolerated         Precautions: Hx Lymphoma, GERD, Celiacs     Re-eval Date:     Date 2/3       Visit Count 6       FOTO        Pain In -3/10       Pain Out -3/10           Manuals 2/3       PROM R shoulder with gentle end range stretch all planes to tolerance PROM R shoulder with gentle end range stretch all planes to tolerance 10'       R GHJ mobs PRN                         Neuro Re-Ed        Rhythmic stabilization R shoulder         Perturbation training                                                 Ther Ex        Pullies flexion/scaption       Table slides flexion/scaption  Flex/scap  3' ea          10x10"        Cane AAROM Shoulder ER      Sleeper stretch as tolerated  Shoulder ADD with tband  grn  2x10    B ER  grn   2x10/3-5"       Pec stretch      Shoulder isometrics IR/ER         Scapular depression/retractions        MTP/LTPs      Tband IR/ER grn 3x10/3-5"      IR/ER grn 3x10/3-5"       Prone shoulder 3 way      Prone row  Standing horiz ABD  red 2x5/3-5"       SL ER      SL shoulder flexion         Wall push ups      Serratus punch         Ther Activity                        Gait Training                        Modalities                              1/11 - HEP was issued and reviewed this date for above noted exercises  Pt demonstrated understanding without incident and without questions/concerns  Will continue to update upcoming

## 2022-02-07 ENCOUNTER — OFFICE VISIT (OUTPATIENT)
Dept: PHYSICAL THERAPY | Facility: CLINIC | Age: 67
End: 2022-02-07
Payer: COMMERCIAL

## 2022-02-07 DIAGNOSIS — M75.101 TEAR OF RIGHT SUPRASPINATUS TENDON: Primary | ICD-10-CM

## 2022-02-07 PROCEDURE — 97140 MANUAL THERAPY 1/> REGIONS: CPT | Performed by: PHYSICAL MEDICINE & REHABILITATION

## 2022-02-07 PROCEDURE — 97110 THERAPEUTIC EXERCISES: CPT | Performed by: PHYSICAL MEDICINE & REHABILITATION

## 2022-02-07 NOTE — PROGRESS NOTES
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 using a small brush with R arm overhead with minimal soreness but without c/o weakness in her R arm/shoulder  RTD early March  No new sx/complaints  Objective: See treatment diary below      Assessment: Tolerated treatment well  Progressing well with program overall  Pain levels remain steady/constant however she has demonstrated improvement in AROM and PROM of R shoulder since [facility]  Full AAROM noted all planes  Notes discomfort with resisted IR  > ER with Tband exercises  Weakness /quick mm fatigue only with ER PREs  Notes fatigue > soreness after tx  Cont with weakness of R RTC  Demonstrates good AROM R shoulder flexion/elevation with abilityto use small brush with intricate movements of R wrist/hand while painting with good shoulder stability reported and only minimal soreness  PROM R shoulder WNL all planes except minor reduction in end range IR with discomfort R shoulder  Continues with pain and weakness of R shoulder that continues to limit her with ADLs such as lifting, reaching, dressing etc  Patient demonstrated fatigue post treatment and would benefit from continued PT      Plan: Continue per plan of care  Progress treatment as tolerated         Precautions: Hx Lymphoma, GERD, Celiacs     Re-eval Date:     Date 2/3 2/7      Visit Count 6       FOTO        Pain In -3/10 410      Pain Out -3/10 5/10          Manuals 2/3 2/7      PROM R shoulder with gentle end range stretch all planes to tolerance PROM R shoulder with gentle end range stretch all planes to tolerance 10' PROM R shoulder with gentle end range stretch all planes to tolerance , focus on IR 10'      R GHJ mobs PRN                         Neuro Re-Ed        Rhythmic stabilization R shoulder         Perturbation training                                                 Ther Ex        Pullies flexion/scaption       Table slides flexion/scaption  Flex/scap  3' ea          10x10"  Flex/scap  3' ea        Cane AAROM Shoulder ER      Sleeper stretch as tolerated  Shoulder ADD with tband  grn  2x10    B ER  grn   2x10/3-5"   Shoulder ADD with tband  grn  3x10    B ER  grn   3x10/3-5"      Pec stretch      Shoulder isometrics IR/ER         Scapular depression/retractions        MTP/LTPs      Tband IR/ER grn 3x10/3-5"      IR/ER grn 3x10/3-5" grn 3x10/3-5"      IR/ER grn 2x10/3-5"      Prone shoulder 3 way      Prone row  Standing horiz ABD  red 2x5/3-5" HEP      SL ER      SL shoulder flexion   1# 3x10 /5"      Wall push ups      Serratus punch         Ther Activity                        Gait Training                        Modalities  deferred                            1/11 - HEP was issued and reviewed this date for above noted exercises  Pt demonstrated understanding without incident and without questions/concerns  Will continue to update upcoming

## 2022-02-14 ENCOUNTER — OFFICE VISIT (OUTPATIENT)
Dept: PHYSICAL THERAPY | Facility: CLINIC | Age: 67
End: 2022-02-14
Payer: COMMERCIAL

## 2022-02-14 DIAGNOSIS — M75.101 TEAR OF RIGHT SUPRASPINATUS TENDON: Primary | ICD-10-CM

## 2022-02-14 PROCEDURE — 97140 MANUAL THERAPY 1/> REGIONS: CPT

## 2022-02-14 PROCEDURE — 97110 THERAPEUTIC EXERCISES: CPT

## 2022-02-14 NOTE — PROGRESS NOTES
Daily Note     Today's date: 2022  Patient name: Conrad Wiggins  : 1955  MRN: [id number]  Referring provider: Tristen Peter PA-C  Dx:   Encounter Diagnosis     ICD-10-CM    1  Tear of right supraspinatus tendon  M75 101        Start Time: 1538          Subjective: Patient feels that the pain will not go away, but she feels she has more motion and function in the arm  She want today be her last day of formal therapy  She will do her exercises at home  Objective: See treatment diary below      Assessment: Tolerated treatment well  Patient would benefit from continued PT for stretching and strengthening  Patient was able to perform her exercises with little discomfort, but improved range as she stretches  Patient continues to be very tender with end range IR/ER exercises  Patient felt ok when she left department  Plan: Patient is self discharging after today's session  Patient talked with PT Pacheco Guerra about her home program continence and what she needs to do        Precautions: Hx Lymphoma, GERD, Celiacs     Re-eval Date:     Date 2/3 2/7 2/14     Visit Count 6       FOTO        Pain In -3/10 410      Pain Out -3/10 5/10          Manuals 2/3 2/7 2/14     PROM R shoulder with gentle end range stretch all planes to tolerance PROM R shoulder with gentle end range stretch all planes to tolerance 10' PROM R shoulder with gentle end range stretch all planes to tolerance , focus on IR 10' PROM R shoulder with gentle end range stretch all planes to tolerance , focus on IR 10'     R GHJ mobs PRN                         Neuro Re-Ed        Rhythmic stabilization R shoulder         Perturbation training                                                 Ther Ex        Pullies flexion/scaption       Table slides flexion/scaption  Flex/scap  3' ea          10x10"  Flex/scap  3' ea   Flex/scap  3' ea     Cane AAROM Shoulder ER      Sleeper stretch as tolerated  Shoulder ADD with tband  grn  2x10    B ER  grn   2x10/3-5"   Shoulder ADD with tband  grn  3x10    B ER  grn   3x10/3-5"     Shoulder ADD with tband  grn  3x10          B ER  grn   3x10/3-5"     Pec stretch      Shoulder isometrics IR/ER         Scapular depression/retractions        MTP/LTPs      Tband IR/ER grn 3x10/3-5"      IR/ER grn 3x10/3-5" grn 3x10/3-5"      IR/ER grn 2x10/3-5" grn 3x10/3-5"      IR/ER grn 2x10/3-5"     Prone shoulder 3 way      Prone row  Standing horiz ABD  red 2x5/3-5" HEP      SL ER      SL shoulder flexion   1# 3x10 /5" 1# 3x10 /5"     Wall push ups      Serratus punch         Ther Activity                        Gait Training                        Modalities  deferred                            1/11 - HEP was issued and reviewed this date for above noted exercises  Pt demonstrated understanding without incident and without questions/concerns  Will continue to update upcoming

## 2022-02-16 ENCOUNTER — APPOINTMENT (OUTPATIENT)
Dept: PHYSICAL THERAPY | Facility: CLINIC | Age: 67
End: 2022-02-16
Payer: COMMERCIAL

## 2022-02-16 ENCOUNTER — OFFICE VISIT (OUTPATIENT)
Dept: CARDIOLOGY CLINIC | Facility: CLINIC | Age: 67
End: 2022-02-16
Payer: COMMERCIAL

## 2022-02-16 VITALS
HEIGHT: 66 IN | WEIGHT: 161 LBS | HEART RATE: 73 BPM | DIASTOLIC BLOOD PRESSURE: 80 MMHG | BODY MASS INDEX: 25.88 KG/M2 | SYSTOLIC BLOOD PRESSURE: 126 MMHG

## 2022-02-16 DIAGNOSIS — I49.3 PVC'S (PREMATURE VENTRICULAR CONTRACTIONS): ICD-10-CM

## 2022-02-16 DIAGNOSIS — E78.2 MIXED HYPERLIPIDEMIA: ICD-10-CM

## 2022-02-16 DIAGNOSIS — R00.2 PALPITATIONS: ICD-10-CM

## 2022-02-16 DIAGNOSIS — Z79.899 ENCOUNTER FOR MONITORING FLECAINIDE THERAPY: Primary | ICD-10-CM

## 2022-02-16 DIAGNOSIS — Z51.81 ENCOUNTER FOR MONITORING FLECAINIDE THERAPY: Primary | ICD-10-CM

## 2022-02-16 PROCEDURE — 99214 OFFICE O/P EST MOD 30 MIN: CPT | Performed by: INTERNAL MEDICINE

## 2022-02-16 PROCEDURE — 93000 ELECTROCARDIOGRAM COMPLETE: CPT | Performed by: INTERNAL MEDICINE

## 2022-02-16 RX ORDER — ROSUVASTATIN CALCIUM 5 MG/1
5 TABLET, COATED ORAL DAILY
Qty: 30 TABLET | Refills: 2 | Status: SHIPPED | OUTPATIENT
Start: 2022-02-16 | End: 2022-05-13

## 2022-02-16 NOTE — PROGRESS NOTES
[facility], [address]    [place], [address]   [phone]                                              Cardiology Office Follow Up  Sienna Valencia, 61 y o  female  YOB: 1955  MRN: [id number] Encounter: [id number]      PCP - Belle Li, DO    Assessment  1  Frequent PVCs  · Holter monitor - 9/2017 - normal sinus rhythm, 1261 PVCs, palpitations correlated with PVCs  · Exercise stress test 07/2017 - 9 minutes 17 seconds, 109% maximum predicted heart rate, no complaints, stress ECG negative for ischemia  2  Flecainide therapy monitoring  3  Hyperlipidemia    Plan  Frequent PVCs, Palpitations, Flecainide therapy monitoring  · Patient had about 1200 PVCs on Holter monitor in 2017, but due to significant symptomatic distress, she was started on flecainide and metoprolol, with which her symptoms have resolved  · Attempted decreasing flecainide dosing while on monitor resulted in recurrence of symptoms, although holter correlation showed PVCs on only some occasions   But considering significant symptoms, she was restarted on prior flecainide dosing  · Exercise ECG stress test - 9/4/2020 was without any significant ischemia  · ECG - 2/16/22 - NSR, low voltage, non-specific T wave changes, normal QTC  · BP in 8/2021 was low, and her metoprolol was decreased after this  · Continue flecainide 100 bid, metoprolol succinate 12 5 mg daily    Hyperlipidemia   9/3/2019 07:16 6/30/2020 08:40 12/4/2020 09:40 8/31/2021 10:25   Cholesterol 214 (H) 236 (H) 231 (H) 239 (H)   Triglycerides 386 (H) 322 (H) 300 (H) 299 (H)   HDL 41 42 43 41   Non-HDL Cholesterol 173 194  198   LDL Calculated 96 130 (H) 128 (H) 138 (H)     · Lipid panel from 9036-2282, show cholesterol has been constant elevated in the 200s with elevated triglycerides in 200-400s  · She was previously on Lovaza, but has herself stopped it as well due to concerns about increased bleeding  · CT coronary calcium score - 9/10/20 - calcium score = 0  · 10-year ASCVD risk score is now up to 8 3%  · Again counseled regarding risks of stroke /MI with persistently elevated cholesterol levels  He is now agreeable to starting statin  · Recommend low-dose rosuvastatin 5 mg daily   · She can start off at 5 mg every other day and then try to increase up to daily  · Follow up lipid panel in 3 months      ECG today -  Results for orders placed or performed in visit on 02/16/22   POCT ECG    Impression    NSR  Low voltage  Non-specific T wave changes  Normal QTc        Orders Placed This Encounter   Procedures    Lipid Panel with Direct LDL reflex    POCT ECG       No follow-ups on file  History of Present Illness   61 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, who works as an X-ray tech at [employer], comes in as a new patient for reestablishment of care  She is transferring care from our [place] office with Hever Mojica to here, as we are much closer to her  In 2017, she had a viral infection after which she developed symptoms of palpitations  These palpitations persisted for an extended period, and she was found to have PVCs on Holter monitor  As a result, she was started on metoprolol and flecainide, with which her symptoms resolved completely  She has remained on flecainide since then, and does not report any major problems with the same  She continues to do well otherwise, and does not report any symptoms of chest pain, shortness of breath, palpitations, fatigue, dizziness or lightheadedness  Interval history - 2/19/2021  She comes back for follow up after 2 months  She has been having significant palpitations while off flecainide  She has been staying active  No complaints of chest pain, shortness of breath  No h/o syncope  Interval history - 2/16/2022  She comes back for follow-up after 1 year  She has not had any major symptoms with palpitations over the last year and otherwise continues to do well    She remains active without any major limitations  She had lost about 5 lb, and with it in August 2021 she had low blood pressure  After this her metoprolol was decreased to 12 5 mg daily,  And she has been doing well since then  No current complains of dizziness or lightheadedness        Historical Information   Past Medical History:   Diagnosis Date    Asthma     Celiac disease     [facility]     GERD (gastroesophageal reflux disease)     Heart palpitations     History of colonic polyps     resolved 07/12/2016    History of radiation therapy 2010    Hyperlipidemia     Insomnia     Irregular heart beat     Lymphoma, 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, remission    Malignant lymphoma ([facility] ) 2010    rt arm cutaneous follicular stage ia (rt diatal medical biceps area , s/p resected and s/p radistion treatment    resolved 12/17/2014    Postmenopausal disorder     resolved 09/21/2017    Pulmonary nodule     BENIGN, STABLE 9252-1019    Restless legs syndrome     resolved 09/21/2017    TMJ syndrome     resolved 09/21/2017     Past Surgical History:   Procedure Laterality Date    COLONOSCOPY  04/16/2019    FUNCTIONAL ENDOSCOPIC SINUS SURGERY Bilateral 2013    Dr Bladimir Beltran      age 39 complete    LYMPH NODE DISSECTION Right     arm    NASAL SEPTUM SURGERY  2013    with turbinate reduction - Dr Marcellus Fields ESOPHAGOGASTRODUODENOSCOPY TRANSORAL DIAGNOSTIC N/A 1/18/2016    Procedure: ESOPHAGOGASTRODUODENOSCOPY (EGD); Surgeon: Erich Tellez MD;  [facility];   Service: Gastroenterology    NC EXCIS RECURRENT GANGLION WRIST Left 12/8/2020    Procedure: WRIST GANGLION CYST EXCISION;  Surgeon: Sarita Hoover MD;  [facility];  Service: Orthopedics    SYNOVECTOMY N/A 12/8/2020    Procedure: ECU TENOSYNOVECTOMY;  Surgeon: Sarita Hoover MD;  [facility];  Service: Orthopedics    TONSILLECTOMY      TOTAL ABDOMINAL HYSTERECTOMY W/ BILATERAL SALPINGOOPHORECTOMY      age 54   [address] MSK PROCEDURE  1/16/2020    [address] MSK PROCEDURE  8/7/2020    US GUIDED MSK PROCEDURE  8/2/2021     Family History   Problem Relation Age of Onset    Lymphoma Mother     Throat cancer Father     Heart failure Father     Atrial fibrillation Father     Diabetes Father     Colonic polyp Father     Hypertension Father     Thyroid cancer Sister     Breast cancer Paternal Grandmother 71    Breast cancer Paternal Aunt 71    Ovarian cancer Paternal Aunt 79    No Known Problems Maternal Grandmother     No Known Problems Maternal Grandfather     Cancer Paternal Grandfather     Lung cancer Paternal Grandfather     BRCA1 Positive Cousin     Skin cancer Paternal Aunt     Throat cancer Paternal Uncle     No Known Problems Maternal Aunt      Current Outpatient Medications on File Prior to Visit   Medication Sig Dispense Refill    acetaminophen (TYLENOL) 500 mg tablet Take 500 mg by mouth every 6 (six) hours as needed for mild pain   benralizumab (FASENRA) subcutaneous injection Inject 1 mL (30 mg total) under the skin every 56 days for 6 doses 1 Syringe 6    bimatoprost (LATISSE) 0 03 % ophthalmic solution Take as directed      Cholecalciferol 50 MCG (2000 UT) CAPS Take by mouth daily      cyanocobalamin (VITAMIN B-12) 1,000 mcg tablet Take 1,000 mcg by mouth daily      EPINEPHrine (EPIPEN) 0 3 mg/0 3 mL SOAJ Inject 0 3 mL (0 3 mg total) into a muscle once for 1 dose 0 6 mL 0    famotidine (PEPCID) 20 mg tablet Take 20 mg by mouth 2 (two) times a day   flecainide (TAMBOCOR) 100 mg tablet Take 1 tablet (100 mg total) by mouth 2 (two) times a day 60 tablet 5    fluticasone (FLONASE) 50 mcg/act nasal spray 2 sprays into each nostril 2 (two) times a day 16 g 4    fluticasone-vilanterol (BREO ELLIPTA) 100-25 mcg/inh inhaler Inhale 1 puff daily Rinse mouth after use   1 each 6    gabapentin (NEURONTIN) 100 mg capsule One pill tid x 3 weeks then 2 pills tid x 3 weeks  180 capsule 1    halobetasol (ULTRAVATE) 0 05 % cream APPLY TWICE A DAY IN 2 WEEK PULSE TO LEGS, AND TAKE 2 WEEKS OFF,THEN REPEAT AS NECESSARY      ketorolac (ACULAR) 0 5 % ophthalmic solution       levalbuterol (Xopenex HFA) 45 mcg/act inhaler Inhale 2 puffs every 4 (four) hours as needed for wheezing 15 g 3    Magnesium 500 MG CAPS Take by mouth      metoprolol succinate (TOPROL-XL) 25 mg 24 hr tablet Take 1 tablet (25 mg total) by mouth daily Taking 1/2 pill daily (Patient taking differently: Take 12 5 mg by mouth daily  ) 30 tablet 5    montelukast (SINGULAIR) 10 mg tablet Take 1 tablet (10 mg total) by mouth daily at bedtime 90 tablet 1    ondansetron (ZOFRAN) 4 mg tablet Take 1 tablet (4 mg total) by mouth every 8 (eight) hours as needed for nausea or vomiting 20 tablet 0    pantoprazole (PROTONIX) 20 mg tablet Take 2 tablets (40 mg total) by mouth 2 (two) times a day 90 tablet 2    sucralfate (CARAFATE) 1 g tablet Take 1 tablet (1 g total) by mouth 4 (four) times a day 120 tablet 5    traMADol (Ultram) 50 mg tablet Take 1 tablet (50 mg total) by mouth every 6 (six) hours as needed for moderate pain 30 tablet 1    Vitamin D, Ergocalciferol, 2000 units CAPS Take by mouth      zolpidem (AMBIEN) 5 mg tablet Take 1 tablet (5 mg total) by mouth daily at bedtime as needed for sleep 30 tablet 2    omega-3-acid ethyl esters (LOVAZA) 1 g capsule Take 2 capsules (2 g total) by mouth 2 (two) times a day (Patient not taking: Reported on 11/24/2021 ) 360 capsule 1     No current facility-administered medications on file prior to visit  Allergies   Allergen Reactions    Shellfish-Derived Products - Food Allergy Anaphylaxis    Wheat Bran - Food Allergy Anaphylaxis    Gluten Meal - Food Allergy GI Intolerance     Celiac     Morphine And Related Itching    Nuts - Food Allergy Hives     Almonds,walnuts, hazelnuts and other related nuts       Sulfa Antibiotics Rash     Social History     Socioeconomic History    Marital status: /Civil Union     Spouse name: Not on file    Number of children: Not on file    Years of education: Not on file    Highest education level: Not on file   Occupational History    Occupation: X-ray technologist   Tobacco Use    Smoking status: Former Smoker     Packs/day: 0 75     Years: 20 00     Pack years: 15 00     Types: Cigarettes     Start date:      Quit date:      Years since quittin 1    Smokeless tobacco: Never Used   Vaping Use    Vaping Use: Never used   Substance and Sexual Activity    Alcohol use: Yes     Alcohol/week: 3 0 standard drinks     Types: 3 Glasses of wine per week     Comment: soc    Drug use: No    Sexual activity: Yes     Partners: Male   Other Topics Concern    Not on file   Social History Narrative    Caffeine use    exercise walking      Social Determinants of Health     Financial Resource Strain: Not on file   Food Insecurity: Not on file   Transportation Needs: Not on file   Physical Activity: Not on file   Stress: Not on file   Social Connections: Not on file   Intimate Partner Violence: Not on file   Housing Stability: Not on file        Review of Systems   All other systems reviewed and are negative  Vitals:  Vitals:    22 1329   BP: 126/80   Pulse: 73   Weight: 73 kg (161 lb)   Height: 5' 6" (1 676 m)     BMI - Body mass index is 25 99 kg/m²  Wt Readings from Last 7 Encounters:   22 73 kg (161 lb)   21 72 1 kg (159 lb)   21 72 5 kg (159 lb 12 8 oz)   21 72 6 kg (160 lb)   21 72 8 kg (160 lb 9 6 oz)   21 72 1 kg (159 lb)   10/20/21 72 1 kg (159 lb)       Physical Exam  Vitals and nursing note reviewed  Constitutional:       General: She is not in acute distress  Appearance: Normal appearance  She is well-developed  She is not ill-appearing  HENT:      Head: Normocephalic and atraumatic  Nose: No congestion  Eyes:      General: No scleral icterus  Conjunctiva/sclera: Conjunctivae normal    Neck:      Vascular: No carotid bruit or JVD  Cardiovascular:      Rate and Rhythm: Normal rate and regular rhythm  Pulses: Normal pulses  Heart sounds: Normal heart sounds  No murmur heard  No friction rub  No gallop  Pulmonary:      Effort: Pulmonary effort is normal  No respiratory distress  Breath sounds: Normal breath sounds  No rales  Abdominal:      General: There is no distension  Palpations: Abdomen is soft  Tenderness: There is no abdominal tenderness  Musculoskeletal:         General: No swelling or tenderness  Cervical back: Neck supple  Right lower leg: No edema  Left lower leg: No edema  Skin:     General: Skin is warm  Neurological:      General: No focal deficit present  Mental Status: She is alert and oriented to person, place, and time  Mental status is at baseline  Psychiatric:         Mood and Affect: Mood normal          Behavior: Behavior normal          Thought Content:  Thought content normal        Labs:  CBC:   Lab Results   Component Value Date    WBC 5 72 11/02/2021    RBC 4 47 11/02/2021    HGB 13 7 11/02/2021    HCT 41 1 11/02/2021    MCV 92 11/02/2021     11/02/2021    RDW 12 8 11/02/2021       CMP:   Lab Results   Component Value Date     06/22/2015    K 4 0 11/10/2021     11/10/2021    CO2 25 11/10/2021    ANIONGAP 10 06/22/2015    BUN 13 11/10/2021    CREATININE 0 84 11/10/2021    EGFR 74 11/10/2021    GLUCOSE 112 06/22/2015    CALCIUM 9 5 11/10/2021    AST 22 11/02/2021    ALT 38 11/02/2021    ALKPHOS 61 11/02/2021    PROT 7 2 06/22/2015    BILITOT 0 63 06/22/2015       Magnesium:  Lab Results   Component Value Date    MG 1 6 01/04/2015       Lipid Profile:   Lab Results   Component Value Date    CHOL 200 06/22/2015    HDL 41 08/31/2021    TRIG 299 (H) 08/31/2021    LDLCALC 138 (H) 08/31/2021       Thyroid Studies: Lab Results   Component Value Date    HCE3QKTTDRKB 1 730 12/28/2018    FREET4 0 92 12/28/2018    F2EJAFC 1 0 08/15/2014       No components found for: [employer] [facility]    Lab Results   Component Value Date    INR 1 04 01/04/2015   5    Imaging: Us Guided Msk Procedure    Result Date: 8/7/2020  Narrative: ULTRASOUND GUIDED LEFT DORSAL WRIST GANGLION CYST ASPIRATION INDICATION: R22 32: Localized swelling, mass and lump, left upper limb  Left dorsal wrist ganglion cyst  COMPARISON: Ultrasound-guided left wrist ganglion cyst aspiration dated 1/16/2020 was reviewed  TECHNIQUE:  Patient was brought to the ultrasound suite and placed supine on the stretcher  A brief ultrasound examination was performed to localize the left dorsal ulnar wrist ganglion cyst   This cyst was located superficial to the extensor carpi ulnaris tendon  The procedure was explained to the patient, including risks of hemorrhage, infection, and local injury  Informed consent was freely obtained  The patient verbalized expressed understanding of the above risks and wished to proceed with the procedure  Final standard "time-out" procedure performed  An area on the skin was prepped and draped in the usual sterile fashion  Lidocaine was administered to the skin and a 25 gauge 5/8 inch needle was inserted under ultrasound guidance into the ganglion cyst  1 mL of clear yellow-tinged fluid was aspirated  There was no residual fluid  After the procedure, the needle was removed and a sterile band-aid and ACE bandage wrap were applied  The patient tolerated the procedure well and suffered no complications  I asked the patient to call us with any questions, concerns, or acute problems  The patient expressed understanding of the above  Impression: Successful ultrasound-guided left dorsal ganglion cyst aspiration  1 mL of clear yellow-tinged fluid was aspirated  The above findings and procedure were reviewed with Dr Heber Burns   Procedure was performed by Miroslava Self  SABRINA Tomlinson and Dr Deanne Larkin  Workstation performed: [id number]       Cardiac testing:   Results for orders placed during the hospital encounter of 17   Echo complete with contrast if indicated    Narrative [address]  [address], [address] [facility]  [phone]    Transthoracic Echocardiogram  2D, M-mode, Doppler, and Color Doppler    Study date:  2017    Patient: [facility]  MR number: [id number]  Account number: [de-identified]  : 1955  Age: 63 years  Gender: Female  Status: Outpatient  Location: [address] [place] [address] [facility] [place]  Height: 65 in  Weight: 161 7 lb  BP: 122/ 84 mmHg    Indications: Palpitations  Diagnoses: R00 2 - Palpitations    Sonographer:  ALFONSO Cervantes  Primary Physician:  Sandra Meeks DO  Referring Physician:  Sandra Meeks DO  Group:  [id number]  Interpreting Physician:  Kalpana Lawrence DO    SUMMARY    LEFT VENTRICLE:  Systolic function was normal  Ejection fraction was estimated to be 60 %  There were no regional wall motion abnormalities  MITRAL VALVE:  There was trace regurgitation  TRICUSPID VALVE:  There was mild regurgitation  PULMONIC VALVE:  There was trace regurgitation  HISTORY: PRIOR HISTORY: Malignant lymphoma; Former smoker    PROCEDURE: The study was performed in the [address]  This was a routine study  The transthoracic approach was used  The study included complete 2D imaging, M-mode, complete spectral Doppler, and color Doppler  The  heart rate was 88 bpm, at the start of the study  Images were obtained from the parasternal, apical, subcostal, and suprasternal notch acoustic windows  Echocardiographic views were limited due to poor acoustic window availability  Image  quality was adequate  LEFT VENTRICLE: Size was normal  Systolic function was normal  Ejection fraction was estimated to be 60 %   There were no regional wall motion abnormalities  Wall thickness was normal  No evidence of apical thrombus  DOPPLER: Left  ventricular diastolic function parameters were normal     RIGHT VENTRICLE: The size was normal  Systolic function was normal  Wall thickness was normal     LEFT ATRIUM: Size was normal     RIGHT ATRIUM: Size was normal     MITRAL VALVE: Valve structure was normal  There was normal leaflet separation  DOPPLER: The transmitral velocity was within the normal range  There was no evidence for stenosis  There was trace regurgitation  AORTIC VALVE: The valve was trileaflet  Leaflets exhibited normal thickness, normal cuspal separation, and sclerosis  DOPPLER: Transaortic velocity was within the normal range  There was no evidence for stenosis  There was no significant  regurgitation  TRICUSPID VALVE: The valve structure was normal  There was normal leaflet separation  DOPPLER: The transtricuspid velocity was within the normal range  There was no evidence for stenosis  There was mild regurgitation  Estimated peak PA  pressure was 25 mmHg  PULMONIC VALVE: Leaflets exhibited normal thickness, no calcification, and normal cuspal separation  DOPPLER: The transpulmonic velocity was within the normal range  There was trace regurgitation  PERICARDIUM: There was no pericardial effusion  The pericardium was normal in appearance  AORTA: The root exhibited normal size  SYSTEMIC VEINS: IVC: The inferior vena cava was normal in size      SYSTEM MEASUREMENT TABLES    2D  %FS: 27 71 %  Ao Diam: 3 09 cm  EDV(Teich): 84 03 ml  EF(Cube): 62 23 %  EF(Teich): 54 04 %  ESV(Cube): 30 48 ml  ESV(Teich): 38 62 ml  IVSd: 0 67 cm  LA Area: 11 17 cm2  LA Diam: 3 02 cm  LVEDV MOD A4C: 89 51 ml  LVEF MOD A4C: 58 64 %  LVESV MOD A4C: 37 03 ml  LVIDd: 4 32 cm  LVIDs: 3 12 cm  LVLd A4C: 8 34 cm  LVLs A4C: 6 37 cm  LVPWd: 0 77 cm  RA Area: 14 32 cm2  RV Diam : 3 7 cm  SI(Cube): 27 74 ml/m2  SI(Teich): 25 09 ml/m2  SV MOD A4C: 52 49 ml  SV(Cube): 50 21 ml  SV(Teich): 45 41 ml    CW  AV Vmax: 1 m/s  AV maxP 02 mmHg  TR Vmax: 2 16 m/s  TR maxP 69 mmHg    MM  TAPSE: 2 1 cm    PW  E': 0 1 m/s  E/E': 7 91  LVOT Vmax: 1 08 m/s  LVOT maxP 64 mmHg  MV A Jesus: 0 67 m/s  MV Dec Hand: 3 68 m/s2  MV DecT: 208 98 ms  MV E Jesus: 0 77 m/s  MV E/A Ratio: 1 14    [employer] Accredited Echocardiography Laboratory    Prepared and electronically signed by    Benedict Chapman DO  Signed 2017 16:03:54       No results found for this or any previous visit  No results found for this or any previous visit  No results found for this or any previous visit

## 2022-02-22 ENCOUNTER — OFFICE VISIT (OUTPATIENT)
Dept: GASTROENTEROLOGY | Facility: CLINIC | Age: 67
End: 2022-02-22
Payer: COMMERCIAL

## 2022-02-22 VITALS
BODY MASS INDEX: 25.88 KG/M2 | DIASTOLIC BLOOD PRESSURE: 88 MMHG | HEIGHT: 66 IN | SYSTOLIC BLOOD PRESSURE: 132 MMHG | HEART RATE: 82 BPM | WEIGHT: 161 LBS

## 2022-02-22 DIAGNOSIS — K21.9 GASTROESOPHAGEAL REFLUX DISEASE WITHOUT ESOPHAGITIS: ICD-10-CM

## 2022-02-22 DIAGNOSIS — K90.0 CELIAC DISEASE: Primary | ICD-10-CM

## 2022-02-22 PROCEDURE — 99214 OFFICE O/P EST MOD 30 MIN: CPT | Performed by: NURSE PRACTITIONER

## 2022-02-22 PROCEDURE — 3008F BODY MASS INDEX DOCD: CPT | Performed by: NURSE PRACTITIONER

## 2022-02-22 PROCEDURE — 1036F TOBACCO NON-USER: CPT | Performed by: NURSE PRACTITIONER

## 2022-02-22 RX ORDER — PANTOPRAZOLE SODIUM 20 MG/1
40 TABLET, DELAYED RELEASE ORAL 2 TIMES DAILY
Qty: 90 TABLET | Refills: 3 | Status: SHIPPED | OUTPATIENT
Start: 2022-02-22 | End: 2022-07-23

## 2022-02-22 NOTE — PROGRESS NOTES
[facility] - Outpatient Follow-up Note  Keyla Poole 61 y o  female MRN: [id number]  Encounter: [id number]          ASSESSMENT AND PLAN:      1  Gastroesophageal reflux disease without esophagitis    Patient underwent EGD last year that revealed gastritis with erosive changes and she was recommended to increase pantoprazole to twice a day and avoid NSAIDs  Patient reports that she now only takes Aleve once a week for her shoulder pain and takes Tylenol the rest of the time  She states that her symptoms have been much improved  She states that the epigastric burning is improved and the nausea has subsided  Would recommend continuing pantoprazole twice a day until next visit  If patient continues to improve, could consider decreasing down to once a day  - pantoprazole (PROTONIX) 20 mg tablet; Take 2 tablets (40 mg total) by mouth 2 (two) times a day  Dispense: 90 tablet; Refill: 3    2  Celiac disease    Patient has a history of celiac diagnosed in 2010  Patient follows a strict gluten free diet  EGD last year revealed biopsies negative  Most recent serologies done late last year were normal   Vitamin-D and B12 levels were also done and were normal   Her last DEXA scan was in June of 2020 that revealed osteopenia  Would recommend repeating DEXA scan this year  Patient is agreeable  - DXA bone density spine hip and pelvis; Future  - continue strict gluten free diet    Colon cancer screening   Last colonoscopy was in 2019 and was normal   She was recommended to repeat in 5 years  - colonoscopy in 2024    Will see patient back in 6 months or sooner if needed  ______________________________________________________________________    SUBJECTIVE:  Keyla Poole is a 32-year-old female who presents today for follow-up of GERD and celiac disease    She had undergone an EGD last year that revealed gastritis with erosive changes she was recommended to increase pantoprazole to twice a day and avoid NSAIDs  She states that she will take Aleve once a week for her shoulder pain but now mostly takes Tylenol  She states that she has had improvement in the burning and resolution of the nausea  She states that she has not needed to take the Zofran  She also reports that she has not been taking the Carafate regularly  Patient has a history of celiac disease that was diagnosed in 2010  Serologies were done prior to last visit INR normal   EGD biopsy was negative as well  Patient reports that she follows a strict gluten free diet  Patient's last DEXA scan was in June of 2020 that revealed osteopenia  Vitamin D and B12 levels were normal prior to last visit  Patient's last colonoscopy was normal in 2019 and she was recommended to repeat in 5 years  REVIEW OF SYSTEMS:  Review of Systems   Constitutional: Negative for fatigue, fever and unexpected weight change  HENT: Negative for trouble swallowing  Respiratory: Negative for shortness of breath  Cardiovascular: Negative for chest pain  Gastrointestinal: Negative for abdominal distention, abdominal pain, anal bleeding, blood in stool, constipation, diarrhea, nausea, rectal pain and vomiting  Neurological: Negative for weakness           Historical Information   Past Medical History:   Diagnosis Date    Asthma     Celiac disease     Follicular lymphoma ([facility] [address] )     GERD (gastroesophageal reflux disease)     Heart palpitations     History of colonic polyps     resolved 07/12/2016    History of radiation therapy 2010    Hyperlipidemia     Insomnia     Irregular heart beat     Lymphoma, 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HCC)     non hodgekins, remission    Malignant lymphoma ([address] ) 2010    rt arm cutaneous follicular stage ia (rt diatal medical biceps area , s/p resected and s/p radistion treatment    resolved 12/17/2014    Postmenopausal disorder     resolved 09/21/2017    Pulmonary nodule     BENIGN, STABLE [id number]    Restless legs syndrome     resolved 2017    TMJ syndrome     resolved 2017     Past Surgical History:   Procedure Laterality Date    COLONOSCOPY  2019    FUNCTIONAL ENDOSCOPIC SINUS SURGERY Bilateral 2013    Dr Magdaleno Sterling      age 39 complete    LYMPH NODE DISSECTION Right     arm    NASAL SEPTUM SURGERY  2013    with turbinate reduction - Dr Luis Felipe Bunn ESOPHAGOGASTRODUODENOSCOPY TRANSORAL DIAGNOSTIC N/A 2016    Procedure: ESOPHAGOGASTRODUODENOSCOPY (EGD); Surgeon: Meg Coleman MD;  [facility];   Service: Gastroenterology    CA EXCIS RECURRENT GANGLION WRIST Left 2020    Procedure: WRIST GANGLION CYST EXCISION;  Surgeon: Richard Pedraza MD;  [facility];  Service: Orthopedics    SYNOVECTOMY N/A 2020    Procedure: ECU TENOSYNOVECTOMY;  Surgeon: Richard Pedraza MD;  [facility];  Service: Orthopedics    TONSILLECTOMY      TOTAL ABDOMINAL HYSTERECTOMY W/ BILATERAL SALPINGOOPHORECTOMY      age 54   [place] UPPER GASTROINTESTINAL ENDOSCOPY     [address] PROCEDURE  2020    US GUIDED MSK PROCEDURE  2020    [address] PROCEDURE  2021     Social History   Social History     Substance and Sexual Activity   Alcohol Use Yes    Alcohol/week: 3 0 standard drinks    Types: 3 Glasses of wine per week    Comment: soc     Social History     Substance and Sexual Activity   Drug Use No     Social History     Tobacco Use   Smoking Status Former Smoker    Packs/day: 0 75    Years: 20 00    Pack years: 15 00    Types: Cigarettes    Start date: 48    Quit date:     Years since quittin 1   Smokeless Tobacco Never Used     Family History   Problem Relation Age of Onset    Lymphoma Mother     Throat cancer Father     Heart failure Father     Atrial fibrillation Father     Diabetes Father     Colonic polyp Father     Hypertension Father     Thyroid cancer Sister     Breast cancer Paternal Grandmother 71    Breast cancer Paternal Aunt 71    Ovarian cancer Paternal Aunt 79    No Known Problems Maternal Grandmother     No Known Problems Maternal Grandfather     Cancer Paternal Grandfather     Lung cancer Paternal Grandfather     BRCA1 Positive Cousin     Skin cancer Paternal Aunt     Throat cancer Paternal Uncle     No Known Problems Maternal Aunt        Meds/Allergies       Current Outpatient Medications:     acetaminophen (TYLENOL) 500 mg tablet    benralizumab (FASENRA) subcutaneous injection    bimatoprost (LATISSE) 0 03 % ophthalmic solution    Cholecalciferol 50 MCG (2000 UT) CAPS    cyanocobalamin (VITAMIN B-12) 1,000 mcg tablet    EPINEPHrine (EPIPEN) 0 3 mg/0 3 mL SOAJ    famotidine (PEPCID) 20 mg tablet    flecainide (TAMBOCOR) 100 mg tablet    fluticasone (FLONASE) 50 mcg/act nasal spray    fluticasone-vilanterol (BREO ELLIPTA) 100-25 mcg/inh inhaler    gabapentin (NEURONTIN) 100 mg capsule    halobetasol (ULTRAVATE) 0 05 % cream    ketorolac (ACULAR) 0 5 % ophthalmic solution    levalbuterol (Xopenex HFA) 45 mcg/act inhaler    Magnesium 500 MG CAPS    metoprolol succinate (TOPROL-XL) 25 mg 24 hr tablet    montelukast (SINGULAIR) 10 mg tablet    omega-3-acid ethyl esters (LOVAZA) 1 g capsule    ondansetron (ZOFRAN) 4 mg tablet    pantoprazole (PROTONIX) 20 mg tablet    rosuvastatin (CRESTOR) 5 mg tablet    sucralfate (CARAFATE) 1 g tablet    traMADol (Ultram) 50 mg tablet    Vitamin D, Ergocalciferol, 2000 units CAPS    zolpidem (AMBIEN) 5 mg tablet    Allergies   Allergen Reactions    Shellfish-Derived Products - Food Allergy Anaphylaxis    Wheat Bran - Food Allergy Anaphylaxis    Gluten Meal - Food Allergy GI Intolerance     Celiac     Morphine And Related Itching    Nuts - Food Allergy Hives     Almonds,walnuts, hazelnuts and other related nuts   Sulfa Antibiotics Rash           Objective     not currently breastfeeding   There is no height or weight on file to calculate BMI       PHYSICAL EXAM:      General Appearance:   Alert, cooperative, no distress   HEENT:   Normocephalic, atraumatic, anicteric  Neck:  Supple, symmetrical, trachea midline   Lungs:   Clear to auscultation bilaterally; no rales, rhonchi or wheezing; respirations unlabored    Heart[de-identified]   Regular rate and rhythm; no murmur, rub, or gallop  Abdomen:   Soft, non-tender, non-distended; normal bowel sounds; no masses, no organomegaly    Genitalia:   Deferred    Rectal:   Deferred    Extremities:  No cyanosis, clubbing or edema    Skin:  No jaundice, rashes, or lesions             Lab Results:   No visits with results within 1 Day(s) from this visit  Latest known visit with results is:   Appointment on 11/10/2021   Component Date Value    Sodium 11/10/2021 141     Potassium 11/10/2021 4 0     Chloride 11/10/2021 107     CO2 11/10/2021 25     ANION GAP 11/10/2021 9     BUN 11/10/2021 13     Creatinine 11/10/2021 0 84     Glucose 11/10/2021 92     Calcium 11/10/2021 9 5     eGFR 11/10/2021 74     A/G Ratio 11/10/2021 1 77     Albumin Electrophoresis 11/10/2021 63 9     Albumin CONC 11/10/2021 4 47     Alpha 1 11/10/2021 4 4     ALPHA 1 CONC 11/10/2021 0 31     Alpha 2 11/10/2021 8 5     ALPHA 2 CONC 11/10/2021 0 60     Beta-1 11/10/2021 6 3     BETA 1 CONC 11/10/2021 0 44     Beta-2 11/10/2021 4 8     BETA 2 CONC 11/10/2021 0 34     Gamma Globulin 11/10/2021 12 1     GAMMA CONC 11/10/2021 0 85     Total Protein 11/10/2021 7 0     SPEP Interpretation 11/10/2021 See Comment          Radiology Results:   No results found

## 2022-03-03 NOTE — PROGRESS NOTES
Queen Janna will be discharged from outpatient physical therapy care due to expiration of plan of care  Last attended tx session was on 2/14 ;did not return to therapy after this date  No objective measures updated, Queen Janna is not present at time of discharge

## 2022-03-09 ENCOUNTER — HOSPITAL ENCOUNTER (OUTPATIENT)
Dept: INFUSION CENTER | Facility: HOSPITAL | Age: 67
Discharge: HOME | End: 2022-03-09
Attending: INTERNAL MEDICINE

## 2022-03-10 ENCOUNTER — HOSPITAL ENCOUNTER (OUTPATIENT)
Dept: INFUSION CENTER | Facility: HOSPITAL | Age: 67
Discharge: HOME | End: 2022-03-10
Attending: INTERNAL MEDICINE
Payer: COMMERCIAL

## 2022-03-10 VITALS — TEMPERATURE: 97.4 F

## 2022-03-10 DIAGNOSIS — J45.50 SEVERE PERSISTENT ASTHMA WITHOUT COMPLICATION: Primary | ICD-10-CM

## 2022-03-10 DIAGNOSIS — D72.119 HYPEREOSINOPHILIC SYNDROME, UNSPECIFIED TYPE: ICD-10-CM

## 2022-03-10 PROCEDURE — 96372 THER/PROPH/DIAG INJ SC/IM: CPT

## 2022-03-10 RX ORDER — EPINEPHRINE 1 MG/ML
0.3 INJECTION, SOLUTION, CONCENTRATE INTRAVENOUS ONCE
Status: CANCELLED | OUTPATIENT
Start: 2022-05-04 | End: 2022-05-04

## 2022-03-10 RX ADMIN — BENRALIZUMAB 30 MG: 30 INJECTION, SOLUTION SUBCUTANEOUS at 13:24

## 2022-03-10 NOTE — PROGRESS NOTES
Epi pen expiration date 5/2022  Patient tolerated Fasenra injection in left arm without reaction or issues  Patient observed for 30 minutes without issues or new complaints noted  AVS given to patient  Patient ambulated off unit without incident  All personal belongings taken with patient

## 2022-03-21 DIAGNOSIS — J45.909 UNCOMPLICATED ASTHMA, UNSPECIFIED ASTHMA SEVERITY, UNSPECIFIED WHETHER PERSISTENT: ICD-10-CM

## 2022-03-22 RX ORDER — MONTELUKAST SODIUM 10 MG/1
10 TABLET ORAL
Qty: 90 TABLET | Refills: 1 | Status: SHIPPED | OUTPATIENT
Start: 2022-03-22

## 2022-03-31 ENCOUNTER — OFFICE VISIT (OUTPATIENT)
Dept: OBGYN CLINIC | Facility: OTHER | Age: 67
End: 2022-03-31
Payer: COMMERCIAL

## 2022-03-31 VITALS
HEIGHT: 66 IN | SYSTOLIC BLOOD PRESSURE: 111 MMHG | HEART RATE: 80 BPM | DIASTOLIC BLOOD PRESSURE: 92 MMHG | WEIGHT: 154 LBS | BODY MASS INDEX: 24.75 KG/M2

## 2022-03-31 DIAGNOSIS — M75.101 TEAR OF RIGHT SUPRASPINATUS TENDON: Primary | ICD-10-CM

## 2022-03-31 PROCEDURE — 20610 DRAIN/INJ JOINT/BURSA W/O US: CPT | Performed by: ORTHOPAEDIC SURGERY

## 2022-03-31 PROCEDURE — 3008F BODY MASS INDEX DOCD: CPT | Performed by: NURSE PRACTITIONER

## 2022-03-31 PROCEDURE — 99213 OFFICE O/P EST LOW 20 MIN: CPT | Performed by: ORTHOPAEDIC SURGERY

## 2022-03-31 RX ORDER — BUPIVACAINE HYDROCHLORIDE 2.5 MG/ML
2 INJECTION, SOLUTION INFILTRATION; PERINEURAL
Status: COMPLETED | OUTPATIENT
Start: 2022-03-31 | End: 2022-03-31

## 2022-03-31 RX ORDER — BETAMETHASONE SODIUM PHOSPHATE AND BETAMETHASONE ACETATE 3; 3 MG/ML; MG/ML
6 INJECTION, SUSPENSION INTRA-ARTICULAR; INTRALESIONAL; INTRAMUSCULAR; SOFT TISSUE
Status: COMPLETED | OUTPATIENT
Start: 2022-03-31 | End: 2022-03-31

## 2022-03-31 RX ADMIN — BUPIVACAINE HYDROCHLORIDE 2 ML: 2.5 INJECTION, SOLUTION INFILTRATION; PERINEURAL at 16:00

## 2022-03-31 RX ADMIN — BETAMETHASONE SODIUM PHOSPHATE AND BETAMETHASONE ACETATE 6 MG: 3; 3 INJECTION, SUSPENSION INTRA-ARTICULAR; INTRALESIONAL; INTRAMUSCULAR; SOFT TISSUE at 16:00

## 2022-03-31 NOTE — PROGRESS NOTES
Assessment  Diagnoses and all orders for this visit:    Tear of right supraspinatus tendon        Discussion and Plan:    The patient has an examination and MRI consistent with supraspinatus tendon tear  I have discussed with the patient the pathophysiology of this diagnosis and reviewed how the examination correlates with this diagnosis  Surgical vs conservative treatment options were discussed at length and after discussing these treatment options, the patient elected for to repeat her CS injection given her significant improvement follow her last injection and PT  I do recommend that Dr Arvind Holman, her oncologist order her repeat MRI to evaluate the bone lesion given her history of lymphoma  This was discussed at the last visit and that was the plan  If this changes and he does not feel comfortable ordering another MRI of the right shoulder with and without contrast 6 months after the previous 1 that he ordered she will contact us and we will go ahead and place that order  Again based on my review of the images this does appear to be a benign enchondroma but given the radiologist's recommendation I think it is prudent to follow those recommendations    Subjective:   Patient ID: Jorge Rae is a 61 y o  female      HPI  Patient presents today for follow up of right shoulder supraspinatus tear  She was provided with CS injection and referral to PT at the last visit  Patient reports about 80% improvement since the last visit  The following portions of the patient's history were reviewed and updated as appropriate: allergies, current medications, past family history, past medical history, past social history, past surgical history and problem list     Review of Systems   Constitutional: Negative for chills and fever  HENT: Negative for drooling and sneezing  Eyes: Negative for redness  Respiratory: Negative for cough and wheezing  Gastrointestinal: Negative for nausea and vomiting  Musculoskeletal:        Please see ortho exam   Psychiatric/Behavioral: Negative for behavioral problems  The patient is not nervous/anxious  Objective:  /92   Pulse 80   Ht 5' 6" (1 676 m)   Wt 69 9 kg (154 lb)   BMI 24 86 kg/m²     Right Shoulder Exam      Tenderness   The patient is experiencing no tenderness      Range of Motion   Passive abduction: 40   External rotation: normal   Forward flexion: 160   Internal rotation 0 degrees: normal      Muscle Strength   External rotation: 5/5   Supraspinatus: 3/5      Tests   Irene test: positive  Impingement: positive  Drop arm: positive     Other   Erythema: absent  Sensation: normal  Pulse: present    Physical Exam  Vitals reviewed  Constitutional:       Appearance: She is well-developed  Eyes:      Pupils: Pupils are equal, round, and reactive to light  Pulmonary:      Effort: Pulmonary effort is normal       Breath sounds: Normal breath sounds  Skin:     General: Skin is warm and dry  Neurological:      Mental Status: She is alert and oriented to person, place, and time  Psychiatric:         Behavior: Behavior normal          Thought Content:  Thought content normal          Judgment: Judgment normal          Large joint arthrocentesis: R subacromial bursa  Universal Protocol:  Consent given by: patient  Patient understanding: patient states understanding of the procedure being performed  Site marked: the operative site was marked  Patient identity confirmed: verbally with patient    Supporting Documentation  Indications: pain   Procedure Details  Location: shoulder - R subacromial bursa  Needle size: 22 G  Ultrasound guidance: no  Approach: lateral  Medications administered: 2 mL bupivacaine 0 25 %; 6 mg betamethasone acetate-betamethasone sodium phosphate 6 (3-3) mg/mL    Patient tolerance: patient tolerated the procedure well with no immediate complications  Dressing:  Sterile dressing applied        I have personally reviewed pertinent films in PACS and my interpretation is as follows    MRI right shoulder: near full thickness articular sided supraspinatus tendon tear without retraction, complete long head bicep tendon rupture    Scribe Attestation    I,:  Delvis Toscano am acting as a scribe while in the presence of the attending physician :       I,:  Patricia Pope MD personally performed the services described in this documentation    as scribed in my presence :

## 2022-03-31 NOTE — PATIENT INSTRUCTIONS

## 2022-04-01 DIAGNOSIS — C82.90 FOLLICULAR LYMPHOMA, UNSPECIFIED FOLLICULAR LYMPHOMA TYPE, UNSPECIFIED BODY REGION (HCC): Primary | ICD-10-CM

## 2022-04-12 DIAGNOSIS — I49.3 PVC (PREMATURE VENTRICULAR CONTRACTION): ICD-10-CM

## 2022-04-12 RX ORDER — FLECAINIDE ACETATE 100 MG/1
100 TABLET ORAL 2 TIMES DAILY
Qty: 60 TABLET | Refills: 5 | Status: SHIPPED | OUTPATIENT
Start: 2022-04-12

## 2022-04-13 DIAGNOSIS — J45.50 SEVERE PERSISTENT ASTHMA WITHOUT COMPLICATION: ICD-10-CM

## 2022-04-13 DIAGNOSIS — D72.10 EOSINOPHILIA: ICD-10-CM

## 2022-04-13 RX ORDER — EPINEPHRINE 0.3 MG/.3ML
0.3 INJECTION SUBCUTANEOUS ONCE
Qty: 0.6 ML | Refills: 0 | Status: SHIPPED | OUTPATIENT
Start: 2022-04-13 | End: 2022-04-13

## 2022-04-26 DIAGNOSIS — J45.909 UNCOMPLICATED ASTHMA, UNSPECIFIED ASTHMA SEVERITY, UNSPECIFIED WHETHER PERSISTENT: ICD-10-CM

## 2022-04-26 RX ORDER — LEVALBUTEROL TARTRATE 45 UG/1
2 AEROSOL, METERED ORAL EVERY 4 HOURS PRN
Qty: 15 G | Refills: 5 | Status: SHIPPED | OUTPATIENT
Start: 2022-04-26 | End: 2022-05-26

## 2022-05-05 ENCOUNTER — HOSPITAL ENCOUNTER (OUTPATIENT)
Dept: INFUSION CENTER | Facility: HOSPITAL | Age: 67
Discharge: HOME | End: 2022-05-05
Attending: INTERNAL MEDICINE
Payer: COMMERCIAL

## 2022-05-05 VITALS
SYSTOLIC BLOOD PRESSURE: 111 MMHG | DIASTOLIC BLOOD PRESSURE: 72 MMHG | HEART RATE: 81 BPM | RESPIRATION RATE: 16 BRPM | TEMPERATURE: 96.7 F

## 2022-05-05 DIAGNOSIS — J45.50 SEVERE PERSISTENT ASTHMA WITHOUT COMPLICATION: Primary | ICD-10-CM

## 2022-05-05 DIAGNOSIS — D72.119 HYPEREOSINOPHILIC SYNDROME, UNSPECIFIED TYPE: ICD-10-CM

## 2022-05-05 PROCEDURE — 96372 THER/PROPH/DIAG INJ SC/IM: CPT

## 2022-05-05 RX ORDER — EPINEPHRINE 1 MG/ML
0.3 INJECTION, SOLUTION, CONCENTRATE INTRAVENOUS ONCE
Status: CANCELLED | OUTPATIENT
Start: 2022-06-30 | End: 2022-06-29

## 2022-05-05 RX ORDER — EPINEPHRINE 1 MG/ML
0.3 INJECTION, SOLUTION, CONCENTRATE INTRAVENOUS ONCE
Status: DISCONTINUED | OUTPATIENT
Start: 2022-05-05 | End: 2022-05-09 | Stop reason: HOSPADM

## 2022-05-05 RX ADMIN — BENRALIZUMAB 30 MG: 30 INJECTION, SOLUTION SUBCUTANEOUS at 11:09

## 2022-05-05 NOTE — PLAN OF CARE
Problem: INFECTION - ADULT  Goal: Absence or prevention of progression during hospitalization  Description: INTERVENTIONS:  - Assess and monitor for signs and symptoms of infection  - Monitor lab/diagnostic results  - Monitor all insertion sites, i e  indwelling lines, tubes, and drains  - Monitor endotracheal if appropriate and nasal secretions for changes in amount and color  - [place] appropriate cooling/warming therapies per order  - Administer medications as ordered  - Instruct and encourage patient and family to use good hand hygiene technique  - Identify and instruct in appropriate isolation precautions for identified infection/condition  Outcome: Progressing     Problem: Knowledge Deficit  Goal: Patient/family/caregiver demonstrates understanding of disease process, treatment plan, medications, and discharge instructions  Description: Complete learning assessment and assess knowledge base    Interventions:  - Provide teaching at level of understanding  - Provide teaching via preferred learning methods  Outcome: Progressing

## 2022-05-11 ENCOUNTER — RA CDI HCC (OUTPATIENT)
Dept: OTHER | Facility: HOSPITAL | Age: 67
End: 2022-05-11

## 2022-05-11 NOTE — PROGRESS NOTES
[facility]  coding opportunities       Chart reviewed, no opportunity found: CHART REVIEWED, NO OPPORTUNITY FOUND        Patients Insurance        Commercial Insurance: [employer]

## 2022-05-16 ENCOUNTER — APPOINTMENT (OUTPATIENT)
Dept: LAB | Facility: CLINIC | Age: 67
End: 2022-05-16
Payer: COMMERCIAL

## 2022-05-16 DIAGNOSIS — C82.90 FOLLICULAR LYMPHOMA, UNSPECIFIED FOLLICULAR LYMPHOMA TYPE, UNSPECIFIED BODY REGION (HCC): ICD-10-CM

## 2022-05-16 LAB
ANION GAP SERPL CALCULATED.3IONS-SCNC: 8 MMOL/L (ref 4–13)
BUN SERPL-MCNC: 17 MG/DL (ref 5–25)
CALCIUM SERPL-MCNC: 9.3 MG/DL (ref 8.3–10.1)
CHLORIDE SERPL-SCNC: 107 MMOL/L (ref 100–108)
CO2 SERPL-SCNC: 24 MMOL/L (ref 21–32)
CREAT SERPL-MCNC: 1.21 MG/DL (ref 0.6–1.3)
GFR SERPL CREATININE-BSD FRML MDRD: 47 ML/MIN/1.73SQ M
GLUCOSE SERPL-MCNC: 157 MG/DL (ref 65–140)
POTASSIUM SERPL-SCNC: 3.8 MMOL/L (ref 3.5–5.3)
SODIUM SERPL-SCNC: 139 MMOL/L (ref 136–145)

## 2022-05-16 PROCEDURE — 80048 BASIC METABOLIC PNL TOTAL CA: CPT

## 2022-05-16 PROCEDURE — 36415 COLL VENOUS BLD VENIPUNCTURE: CPT

## 2022-05-18 ENCOUNTER — OFFICE VISIT (OUTPATIENT)
Dept: FAMILY MEDICINE CLINIC | Facility: CLINIC | Age: 67
End: 2022-05-18
Payer: COMMERCIAL

## 2022-05-18 VITALS
OXYGEN SATURATION: 98 % | BODY MASS INDEX: 25.26 KG/M2 | WEIGHT: 157.2 LBS | TEMPERATURE: 97.8 F | HEIGHT: 66 IN | SYSTOLIC BLOOD PRESSURE: 114 MMHG | HEART RATE: 76 BPM | DIASTOLIC BLOOD PRESSURE: 80 MMHG

## 2022-05-18 DIAGNOSIS — J45.909 UNCOMPLICATED ASTHMA, UNSPECIFIED ASTHMA SEVERITY, UNSPECIFIED WHETHER PERSISTENT: ICD-10-CM

## 2022-05-18 DIAGNOSIS — G47.00 INSOMNIA, UNSPECIFIED TYPE: ICD-10-CM

## 2022-05-18 DIAGNOSIS — L29.9 PRURITUS: ICD-10-CM

## 2022-05-18 DIAGNOSIS — C82.90 FOLLICULAR LYMPHOMA, UNSPECIFIED FOLLICULAR LYMPHOMA TYPE, UNSPECIFIED BODY REGION (HCC): ICD-10-CM

## 2022-05-18 DIAGNOSIS — H10.10 ALLERGIC CONJUNCTIVITIS, UNSPECIFIED LATERALITY: Primary | ICD-10-CM

## 2022-05-18 PROCEDURE — 3725F SCREEN DEPRESSION PERFORMED: CPT | Performed by: INTERNAL MEDICINE

## 2022-05-18 PROCEDURE — 99214 OFFICE O/P EST MOD 30 MIN: CPT | Performed by: INTERNAL MEDICINE

## 2022-05-18 PROCEDURE — 1036F TOBACCO NON-USER: CPT | Performed by: INTERNAL MEDICINE

## 2022-05-18 RX ORDER — CROMOLYN SODIUM 40 MG/ML
1 SOLUTION/ DROPS OPHTHALMIC 4 TIMES DAILY
Qty: 10 ML | Refills: 0 | Status: SHIPPED | OUTPATIENT
Start: 2022-05-18

## 2022-05-18 RX ORDER — ZOLPIDEM TARTRATE 5 MG/1
5 TABLET ORAL
Qty: 30 TABLET | Refills: 2 | Status: SHIPPED | OUTPATIENT
Start: 2022-05-18

## 2022-05-18 RX ORDER — LIDOCAINE 40 MG/G
CREAM TOPICAL AS NEEDED
Qty: 15 G | Refills: 3 | Status: SHIPPED | OUTPATIENT
Start: 2022-05-18

## 2022-05-18 NOTE — PROGRESS NOTES
BMI Counseling: Body mass index is 25 37 kg/m²  The BMI is above normal  Nutrition recommendations include decreasing portion sizes and limiting drinks that contain sugar  Exercise recommendations include moderate physical activity 150 minutes/week and exercising 3-5 times per week  No pharmacotherapy was ordered  Patient referred to PCP  Rationale for BMI follow-up plan is due to patient being overweight or obese  Depression Screening and Follow-up Plan: Patient was screened for depression during today's encounter  They screened negative with a PHQ-2 score of 0  Assessment/Plan:         Diagnoses and all orders for this visit:    Allergic conjunctivitis, unspecified laterality  Comments:  try crolom    Insomnia, unspecified type  Comments:  prn ambien    Pruritus  Comments:  lidocain cream    Follicular lymphoma, unspecified follicular lymphoma type, unspecified body region [facility])  Comments:  seethad caraballo    Uncomplicated asthma, unspecified asthma severity, unspecified whether persistent          Subjective:      Patient ID: Tyler Gipson is a 61 y o  female  Pt needs refills  Her asthma well controlled now on injectable  The following portions of the patient's history were reviewed and updated as appropriate: She  has a past medical history of Asthma, Celiac disease, Follicular lymphoma ([address] ), GERD (gastroesophageal reflux disease), Heart palpitations, History of colonic polyps, History of radiation therapy (2010), Hyperlipidemia, Insomnia, Irregular heart beat, Lymphoma, 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[address] ), Malignant lymphoma ([address] ) (2010), Postmenopausal disorder, Pulmonary nodule, Restless legs syndrome, and TMJ syndrome    She   Patient Active Problem List    Diagnosis Date Noted    Asthma     Disorder of tendon of right biceps 07/09/2021    Tear of right supraspinatus tendon 07/09/2021    Pulmonary nodule     Ganglion cyst of wrist, left 12/08/2020    Eosinophilia 09/17/2020    Severe persistent asthma without complication 44/52/3330    Non-seasonal allergic rhinitis 09/02/2020    Encounter for gynecological examination (general) (routine) without abnormal findings 05/23/2019    Personal history of colonic polyps 97/85/1908    Follicular lymphoma ([facility] ) 08/29/2018    VERONIKA (stress urinary incontinence, female) 05/18/2018    PVC's (premature ventricular contractions) 04/24/2018    Dyslipidemia 04/24/2018    Celiac disease 04/19/2018    Chronic GERD 10/03/2017    Back pain 09/21/2017    Heart palpitations 09/21/2017    Insomnia 09/21/2017    Sciatica associated with disorder of lumbar spine 09/21/2017    Vitamin D deficiency 06/28/2017    Osteopenia 11/17/2016     She  has a past surgical history that includes Lymph node dissection (Right); pr esophagogastroduodenoscopy transoral diagnostic (N/A, 1/18/2016); Nasal septum surgery (2013); Hysterectomy; Total abdominal hysterectomy w/ bilateral salpingoophorectomy; Tonsillectomy; Colonoscopy (04/16/2019); Functional endoscopic sinus surgery (Bilateral, 2013); US guided msk procedure (1/16/2020); US guided msk procedure (8/7/2020); pr excis recurrent ganglion wrist (Left, 12/8/2020); Synovectomy (N/A, 12/8/2020); US guided msk procedure (8/2/2021); and Upper gastrointestinal endoscopy  Her family history includes Atrial fibrillation in her father; BRCA1 Positive in her cousin; Breast cancer (age of onset: 71) in her paternal aunt and paternal grandmother; Cancer in her paternal grandfather; Colonic polyp in her father; Diabetes in her father; Heart failure in her father; Hypertension in her father; Lung cancer in her paternal grandfather; Lymphoma in her mother; No Known Problems in her maternal aunt, maternal grandfather, and maternal grandmother; Ovarian cancer (age of onset: 79) in her paternal aunt; Skin cancer in her paternal aunt; Throat cancer in her father and paternal uncle; Thyroid cancer in her sister    She  reports that she quit smoking about 18 years ago  Her smoking use included cigarettes  She started smoking about 38 years ago  She has a 15 00 pack-year smoking history  She has never used smokeless tobacco  She reports current alcohol use of about 3 0 standard drinks of alcohol per week  She reports that she does not use drugs  Current Outpatient Medications   Medication Sig Dispense Refill    acetaminophen (TYLENOL) 500 mg tablet Take 500 mg by mouth every 6 (six) hours as needed for mild pain   benralizumab (FASENRA) subcutaneous injection Inject 1 mL (30 mg total) under the skin every 56 days for 6 doses 1 Syringe 6    bimatoprost (LATISSE) 0 03 % ophthalmic solution Take as directed      Cholecalciferol 50 MCG (2000 UT) CAPS Take by mouth daily      cyanocobalamin (VITAMIN B-12) 1,000 mcg tablet Take 1,000 mcg by mouth daily      famotidine (PEPCID) 20 mg tablet Take 20 mg by mouth 2 (two) times a day   flecainide (TAMBOCOR) 100 mg tablet Take 1 tablet (100 mg total) by mouth 2 (two) times a day 60 tablet 5    fluticasone (FLONASE) 50 mcg/act nasal spray 2 sprays into each nostril 2 (two) times a day 16 g 4    fluticasone-vilanterol (BREO ELLIPTA) 100-25 mcg/inh inhaler Inhale 1 puff daily Rinse mouth after use   1 each 6    levalbuterol (Xopenex HFA) 45 mcg/act inhaler Inhale 2 puffs every 4 (four) hours as needed for wheezing 15 g 5    Magnesium 500 MG CAPS Take by mouth      metoprolol succinate (TOPROL-XL) 25 mg 24 hr tablet Take 1 tablet (25 mg total) by mouth daily Taking 1/2 pill daily (Patient taking differently: Take 12 5 mg by mouth in the morning ) 30 tablet 5    montelukast (SINGULAIR) 10 mg tablet Take 1 tablet (10 mg total) by mouth daily at bedtime 90 tablet 1    ondansetron (ZOFRAN) 4 mg tablet Take 1 tablet (4 mg total) by mouth every 8 (eight) hours as needed for nausea or vomiting 20 tablet 0    pantoprazole (PROTONIX) 20 mg tablet Take 2 tablets (40 mg total) by mouth 2 (two) times a day 90 tablet 3    rosuvastatin (CRESTOR) 5 mg tablet take 1 tablet by mouth once daily 30 tablet 5    Vitamin D, Ergocalciferol, 2000 units CAPS Take by mouth      zolpidem (AMBIEN) 5 mg tablet Take 1 tablet (5 mg total) by mouth daily at bedtime as needed for sleep 30 tablet 2    EPINEPHrine (EPIPEN) 0 3 mg/0 3 mL SOAJ Inject 0 3 mL (0 3 mg total) into a muscle once for 1 dose 0 6 mL 0    gabapentin (NEURONTIN) 100 mg capsule One pill tid x 3 weeks then 2 pills tid x 3 weeks  (Patient not taking: Reported on 3/31/2022 ) 180 capsule 1    omega-3-acid ethyl esters (LOVAZA) 1 g capsule Take 2 capsules (2 g total) by mouth 2 (two) times a day (Patient not taking: Reported on 11/24/2021 ) 360 capsule 1    sucralfate (CARAFATE) 1 g tablet Take 1 tablet (1 g total) by mouth 4 (four) times a day (Patient not taking: No sig reported) 120 tablet 5     No current facility-administered medications for this visit  Current Outpatient Medications on File Prior to Visit   Medication Sig    acetaminophen (TYLENOL) 500 mg tablet Take 500 mg by mouth every 6 (six) hours as needed for mild pain   benralizumab (FASENRA) subcutaneous injection Inject 1 mL (30 mg total) under the skin every 56 days for 6 doses    bimatoprost (LATISSE) 0 03 % ophthalmic solution Take as directed    Cholecalciferol 50 MCG (2000 UT) CAPS Take by mouth daily    cyanocobalamin (VITAMIN B-12) 1,000 mcg tablet Take 1,000 mcg by mouth daily    famotidine (PEPCID) 20 mg tablet Take 20 mg by mouth 2 (two) times a day   flecainide (TAMBOCOR) 100 mg tablet Take 1 tablet (100 mg total) by mouth 2 (two) times a day    fluticasone (FLONASE) 50 mcg/act nasal spray 2 sprays into each nostril 2 (two) times a day    fluticasone-vilanterol (BREO ELLIPTA) 100-25 mcg/inh inhaler Inhale 1 puff daily Rinse mouth after use      levalbuterol (Xopenex HFA) 45 mcg/act inhaler Inhale 2 puffs every 4 (four) hours as needed for wheezing    Magnesium 500 MG CAPS Take by mouth    metoprolol succinate (TOPROL-XL) 25 mg 24 hr tablet Take 1 tablet (25 mg total) by mouth daily Taking 1/2 pill daily (Patient taking differently: Take 12 5 mg by mouth in the morning )    montelukast (SINGULAIR) 10 mg tablet Take 1 tablet (10 mg total) by mouth daily at bedtime    ondansetron (ZOFRAN) 4 mg tablet Take 1 tablet (4 mg total) by mouth every 8 (eight) hours as needed for nausea or vomiting    pantoprazole (PROTONIX) 20 mg tablet Take 2 tablets (40 mg total) by mouth 2 (two) times a day    rosuvastatin (CRESTOR) 5 mg tablet take 1 tablet by mouth once daily    Vitamin D, Ergocalciferol, 2000 units CAPS Take by mouth    zolpidem (AMBIEN) 5 mg tablet Take 1 tablet (5 mg total) by mouth daily at bedtime as needed for sleep    [DISCONTINUED] ketorolac (ACULAR) 0 5 % ophthalmic solution     EPINEPHrine (EPIPEN) 0 3 mg/0 3 mL SOAJ Inject 0 3 mL (0 3 mg total) into a muscle once for 1 dose    gabapentin (NEURONTIN) 100 mg capsule One pill tid x 3 weeks then 2 pills tid x 3 weeks  (Patient not taking: Reported on 3/31/2022 )    omega-3-acid ethyl esters (LOVAZA) 1 g capsule Take 2 capsules (2 g total) by mouth 2 (two) times a day (Patient not taking: Reported on 11/24/2021 )    sucralfate (CARAFATE) 1 g tablet Take 1 tablet (1 g total) by mouth 4 (four) times a day (Patient not taking: No sig reported)    [DISCONTINUED] halobetasol (ULTRAVATE) 0 05 % cream APPLY TWICE A DAY IN 2 WEEK PULSE TO LEGS, AND TAKE 2 WEEKS OFF,THEN REPEAT AS NECESSARY (Patient not taking: Reported on 5/18/2022)    [DISCONTINUED] traMADol (Ultram) 50 mg tablet Take 1 tablet (50 mg total) by mouth every 6 (six) hours as needed for moderate pain (Patient not taking: No sig reported)     No current facility-administered medications on file prior to visit       She is allergic to shellfish-derived products - food allergy, wheat bran - food allergy, gluten meal - food allergy, morphine and related, nuts - food allergy, and sulfa antibiotics       Review of Systems   Constitutional: Negative  HENT: Negative  Respiratory: Positive for cough  Negative for shortness of breath  Cardiovascular: Negative  Gastrointestinal: Negative  Objective:      /80 (BP Location: Left arm, Patient Position: Sitting, Cuff Size: Standard)   Pulse 76   Temp 97 8 °F (36 6 °C) (Temporal)   Ht 5' 6" (1 676 m)   Wt 71 3 kg (157 lb 3 2 oz)   SpO2 98%   BMI 25 37 kg/m²          Physical Exam  Constitutional:       Appearance: She is obese  HENT:      Head: Normocephalic and atraumatic  Right Ear: Tympanic membrane and ear canal normal       Left Ear: Tympanic membrane and ear canal normal    Cardiovascular:      Rate and Rhythm: Normal rate and regular rhythm  Pulmonary:      Effort: Pulmonary effort is normal       Breath sounds: Normal breath sounds  Musculoskeletal:      Cervical back: Neck supple  Lymphadenopathy:      Cervical: No cervical adenopathy  Neurological:      Mental Status: She is alert

## 2022-05-23 ENCOUNTER — HOSPITAL ENCOUNTER (OUTPATIENT)
Dept: MRI IMAGING | Facility: HOSPITAL | Age: 67
Discharge: HOME | End: 2022-05-23
Attending: INTERNAL MEDICINE
Payer: COMMERCIAL

## 2022-05-23 DIAGNOSIS — C82.90 FOLLICULAR LYMPHOMA, UNSPECIFIED FOLLICULAR LYMPHOMA TYPE, UNSPECIFIED BODY REGION (HCC): ICD-10-CM

## 2022-05-23 PROCEDURE — 73223 MRI JOINT UPR EXTR W/O&W/DYE: CPT

## 2022-05-23 PROCEDURE — A9585 GADOBUTROL INJECTION: HCPCS | Performed by: INTERNAL MEDICINE

## 2022-05-23 PROCEDURE — G1004 CDSM NDSC: HCPCS

## 2022-05-23 RX ADMIN — GADOBUTROL 7 ML: 604.72 INJECTION INTRAVENOUS at 10:41

## 2022-06-06 ENCOUNTER — TELEPHONE (OUTPATIENT)
Dept: HEMATOLOGY ONCOLOGY | Facility: CLINIC | Age: 67
End: 2022-06-06

## 2022-06-06 NOTE — TELEPHONE ENCOUNTER
I called and spoke to patient regarding MRI results  The hematopoietic marrow is stable, not clinically significant an unrelated to her lymphoma  We will continue to monitor  Patient verbalized understanding and is in agreement with the plan     ----- Message from Loren Concepcion RN sent at 6/6/2022  3:47 PM EDT -----  Regarding: FW: MR  Please review  If you want Dr Alex Potter to review, forward to Sivan Oliveira for him :)  thanks!  ----- Message -----  From: Chanelle Garcia MA  Sent: 6/6/2022   3:19 PM EDT  To: Loren Concepcion RN  Subject: MR                                               Please review  ----- Message -----  From: Janina Manuel  Sent: 6/6/2022   3:00 PM EDT  To: [facility]  Subject: MR                                               Could some please review my recent MR results and let me know what they mean exactly? They seem vague  Is the hemopoietic spot malignant or not? Thanks very much   Janina Manuel

## 2022-06-15 ENCOUNTER — TELEPHONE (OUTPATIENT)
Dept: GASTROENTEROLOGY | Facility: CLINIC | Age: 67
End: 2022-06-15

## 2022-06-22 ENCOUNTER — APPOINTMENT (OUTPATIENT)
Dept: LAB | Facility: CLINIC | Age: 67
End: 2022-06-22
Payer: COMMERCIAL

## 2022-06-22 DIAGNOSIS — E78.2 MIXED HYPERLIPIDEMIA: ICD-10-CM

## 2022-06-22 LAB
CHOLEST SERPL-MCNC: 142 MG/DL
HDLC SERPL-MCNC: 44 MG/DL
LDLC SERPL CALC-MCNC: 48 MG/DL (ref 0–100)
TRIGL SERPL-MCNC: 249 MG/DL

## 2022-06-22 PROCEDURE — 80061 LIPID PANEL: CPT

## 2022-06-22 PROCEDURE — 36415 COLL VENOUS BLD VENIPUNCTURE: CPT

## 2022-06-24 ENCOUNTER — TELEPHONE (OUTPATIENT)
Dept: OBGYN CLINIC | Facility: MEDICAL CENTER | Age: 67
End: 2022-06-24

## 2022-06-24 NOTE — TELEPHONE ENCOUNTER
patient had surgery for her left wrist in 12/2020    She would like to set appt for follow on her left wrist   Sent forced appt to [facility]    Patient: Carlos Peter    MRN:  [id number]    [facility]: 1955    Phone: [phone]    Location:  Dr Alissa AYALA

## 2022-06-30 ENCOUNTER — HOSPITAL ENCOUNTER (OUTPATIENT)
Dept: INFUSION CENTER | Facility: HOSPITAL | Age: 67
Discharge: HOME | End: 2022-06-30
Payer: COMMERCIAL

## 2022-06-30 VITALS — TEMPERATURE: 96.9 F

## 2022-06-30 DIAGNOSIS — J45.50 SEVERE PERSISTENT ASTHMA WITHOUT COMPLICATION: ICD-10-CM

## 2022-06-30 DIAGNOSIS — D72.119 HYPEREOSINOPHILIC SYNDROME, UNSPECIFIED TYPE: Primary | ICD-10-CM

## 2022-06-30 PROCEDURE — 96372 THER/PROPH/DIAG INJ SC/IM: CPT

## 2022-06-30 RX ORDER — EPINEPHRINE 1 MG/ML
0.3 INJECTION, SOLUTION, CONCENTRATE INTRAVENOUS ONCE
Status: CANCELLED | OUTPATIENT
Start: 2022-08-25 | End: 2022-08-25

## 2022-06-30 RX ORDER — EPINEPHRINE 1 MG/ML
0.3 INJECTION, SOLUTION, CONCENTRATE INTRAVENOUS ONCE
Status: DISCONTINUED | OUTPATIENT
Start: 2022-06-30 | End: 2022-07-04 | Stop reason: HOSPADM

## 2022-06-30 RX ADMIN — BENRALIZUMAB 30 MG: 30 INJECTION, SOLUTION SUBCUTANEOUS at 11:41

## 2022-06-30 NOTE — PROGRESS NOTES
Pt tolerated fasenra injection to left arm without incident  Observed x 30 minutes without adverse reaction noted  Discharged ambulatory with avs and next appt indicated

## 2022-07-18 ENCOUNTER — OFFICE VISIT (OUTPATIENT)
Dept: OBGYN CLINIC | Facility: OTHER | Age: 67
End: 2022-07-18
Payer: COMMERCIAL

## 2022-07-18 VITALS
WEIGHT: 155.2 LBS | HEART RATE: 77 BPM | DIASTOLIC BLOOD PRESSURE: 76 MMHG | HEIGHT: 66 IN | BODY MASS INDEX: 24.94 KG/M2 | SYSTOLIC BLOOD PRESSURE: 113 MMHG

## 2022-07-18 DIAGNOSIS — M75.101 TEAR OF RIGHT SUPRASPINATUS TENDON: Primary | ICD-10-CM

## 2022-07-18 PROCEDURE — 99214 OFFICE O/P EST MOD 30 MIN: CPT | Performed by: ORTHOPAEDIC SURGERY

## 2022-07-18 PROCEDURE — 20610 DRAIN/INJ JOINT/BURSA W/O US: CPT | Performed by: ORTHOPAEDIC SURGERY

## 2022-07-18 RX ORDER — BUPIVACAINE HYDROCHLORIDE 2.5 MG/ML
2 INJECTION, SOLUTION INFILTRATION; PERINEURAL
Status: COMPLETED | OUTPATIENT
Start: 2022-07-18 | End: 2022-07-18

## 2022-07-18 RX ORDER — BETAMETHASONE SODIUM PHOSPHATE AND BETAMETHASONE ACETATE 3; 3 MG/ML; MG/ML
6 INJECTION, SUSPENSION INTRA-ARTICULAR; INTRALESIONAL; INTRAMUSCULAR; SOFT TISSUE
Status: COMPLETED | OUTPATIENT
Start: 2022-07-18 | End: 2022-07-18

## 2022-07-18 RX ADMIN — BUPIVACAINE HYDROCHLORIDE 2 ML: 2.5 INJECTION, SOLUTION INFILTRATION; PERINEURAL at 11:13

## 2022-07-18 RX ADMIN — BETAMETHASONE SODIUM PHOSPHATE AND BETAMETHASONE ACETATE 6 MG: 3; 3 INJECTION, SUSPENSION INTRA-ARTICULAR; INTRALESIONAL; INTRAMUSCULAR; SOFT TISSUE at 11:13

## 2022-07-18 NOTE — PATIENT INSTRUCTIONS

## 2022-07-18 NOTE — PROGRESS NOTES
Assessment  Diagnoses and all orders for this visit:    Tear of right supraspinatus tendon    Discussion and Plan:    · Explained to the patient that her MRI reveals a full thickness tear of the supraspinatus tendon that has slightly worsened since her last MRI  It is recommended that the patient perform an arthroscopic procedure sooner rather than later if she can not tolerate her symptoms/pain  At this time, she wished to continue with non operative treatments, as she has noted improvements since her last visit  · She was provided with a cortisone injection today in the office at her request  Informed patient that we can not perform a surgical procedure for at least 3 months after the injection  Also, if she goes more than 6 months since her last MRI and wished to perform surgery a new MRI will have to be obtained  She understood and all questions were answered  · Continue with HEP as tolerated with modifications to avoid pain  · Follow up on an as needed basis    Subjective:   Patient ID: Sarah [place] is a 61 y o  female    62 y/o female who presents today for a follow up visit for her right shoulder as well as to discuss MRI results  Patient has a known supraspinatus tendon tear, treating non operatively  She reports continued improvements since her last visit but reports that with certain motions she does have a "dull, ache" about the shoulder  She is requesting a repeat injection today  She also continues with HEP with benefit  No numbness or tingling  No fevers or chills           The following portions of the patient's history were reviewed and updated as appropriate: allergies, current medications, past family history, past medical history, past social history, past surgical history and problem list     Objective:  /76   Pulse 77   Ht 5' 6" (1 676 m)   Wt 70 4 kg (155 lb 3 2 oz)   BMI 25 05 kg/m²       Right Shoulder Exam     Tenderness   The patient is experiencing no tenderness  Range of Motion   The patient has normal right shoulder ROM  Muscle Strength   Abduction: 4/5     Tests   Irene test: negative  Impingement: negative  Drop arm: positive    Other   Erythema: absent  Sensation: normal  Pulse: present              Physical Exam  Constitutional:       Appearance: She is well-developed  Eyes:      Pupils: Pupils are equal, round, and reactive to light  Pulmonary:      Effort: Pulmonary effort is normal       Breath sounds: Normal breath sounds  Skin:     General: Skin is warm and dry  Neurological:      Mental Status: She is alert and oriented to person, place, and time  Psychiatric:         Behavior: Behavior normal          Thought Content: Thought content normal          Judgment: Judgment normal        Large joint arthrocentesis: R subacromial bursa  Universal Protocol:  Consent: Verbal consent obtained  Risks and benefits: risks, benefits and alternatives were discussed  Consent given by: patient  Time out: Immediately prior to procedure a "time out" was called to verify the correct patient, procedure, equipment, support staff and site/side marked as required  Site marked: the operative site was marked  Radiology Images displayed and confirmed  If images not available, report reviewed: imaging studies available    Supporting Documentation  Indications: pain and diagnostic evaluation   Procedure Details  Location: shoulder - R subacromial bursa  Preparation: Patient was prepped and draped in the usual sterile fashion  Needle size: 22 G  Ultrasound guidance: no  Approach: lateral  Medications administered: 2 mL bupivacaine 0 25 %; 6 mg betamethasone acetate-betamethasone sodium phosphate 6 (3-3) mg/mL    Patient tolerance: patient tolerated the procedure well with no immediate complications  Dressing:  Sterile dressing applied        I have personally reviewed pertinent films in PACS and my interpretation is as follows      MRI Right Shoulder 5/23/2022: Full thickness articular sided supraspinatus tendon tear with slight progression since her last study, complete long head biceps tendon rupture       Scribe Attestation    I,:  Inga Angel am acting as a scribe while in the presence of the attending physician :       I,:  Sheeba Weaver MD personally performed the services described in this documentation    as scribed in my presence :

## 2022-07-23 DIAGNOSIS — K21.9 GASTROESOPHAGEAL REFLUX DISEASE WITHOUT ESOPHAGITIS: ICD-10-CM

## 2022-07-23 RX ORDER — PANTOPRAZOLE SODIUM 20 MG/1
TABLET, DELAYED RELEASE ORAL
Qty: 90 TABLET | Refills: 3 | Status: SHIPPED | OUTPATIENT
Start: 2022-07-23

## 2022-08-11 ENCOUNTER — HOSPITAL ENCOUNTER (OUTPATIENT)
Dept: RADIOLOGY | Age: 67
Discharge: HOME | End: 2022-08-11
Payer: COMMERCIAL

## 2022-08-11 DIAGNOSIS — K90.0 CELIAC DISEASE: ICD-10-CM

## 2022-08-11 PROCEDURE — 77080 DXA BONE DENSITY AXIAL: CPT

## 2022-08-17 ENCOUNTER — HOSPITAL ENCOUNTER (OUTPATIENT)
Dept: ULTRASOUND IMAGING | Facility: HOSPITAL | Age: 67
Discharge: HOME | End: 2022-08-17
Payer: COMMERCIAL

## 2022-08-17 DIAGNOSIS — E04.1 THYROID NODULE: ICD-10-CM

## 2022-08-17 PROCEDURE — 76536 US EXAM OF HEAD AND NECK: CPT

## 2022-08-18 ENCOUNTER — TELEPHONE (OUTPATIENT)
Dept: PULMONOLOGY | Facility: CLINIC | Age: 67
End: 2022-08-18

## 2022-08-22 ENCOUNTER — DOCUMENTATION (OUTPATIENT)
Dept: PULMONOLOGY | Facility: CLINIC | Age: 67
End: 2022-08-22

## 2022-08-24 RX ORDER — ERYTHROMYCIN 20 MG/G
GEL TOPICAL
COMMUNITY
Start: 2022-06-30 | End: 2022-10-11 | Stop reason: SDUPTHER

## 2022-08-25 ENCOUNTER — TELEPHONE (OUTPATIENT)
Dept: OBGYN CLINIC | Facility: MEDICAL CENTER | Age: 67
End: 2022-08-25

## 2022-08-25 ENCOUNTER — HOSPITAL ENCOUNTER (OUTPATIENT)
Dept: INFUSION CENTER | Facility: HOSPITAL | Age: 67
End: 2022-08-25
Attending: INTERNAL MEDICINE
Payer: COMMERCIAL

## 2022-08-25 VITALS — TEMPERATURE: 97.5 F

## 2022-08-25 DIAGNOSIS — D72.119 HYPEREOSINOPHILIC SYNDROME, UNSPECIFIED TYPE: ICD-10-CM

## 2022-08-25 DIAGNOSIS — J45.50 SEVERE PERSISTENT ASTHMA WITHOUT COMPLICATION: Primary | ICD-10-CM

## 2022-08-25 PROCEDURE — 96372 THER/PROPH/DIAG INJ SC/IM: CPT

## 2022-08-25 RX ORDER — EPINEPHRINE 1 MG/ML
0.3 INJECTION, SOLUTION, CONCENTRATE INTRAVENOUS ONCE
Status: CANCELLED | OUTPATIENT
Start: 2022-10-20 | End: 2022-10-20

## 2022-08-25 RX ORDER — EPINEPHRINE 1 MG/ML
0.3 INJECTION, SOLUTION, CONCENTRATE INTRAVENOUS ONCE
Status: DISCONTINUED | OUTPATIENT
Start: 2022-08-25 | End: 2022-08-29 | Stop reason: HOSPADM

## 2022-08-25 RX ADMIN — BENRALIZUMAB 30 MG: 30 INJECTION, SOLUTION SUBCUTANEOUS at 11:58

## 2022-08-25 NOTE — TELEPHONE ENCOUNTER
Patient sees Dr Margo Allan  Patient had surgery 2 yrs ago on her left medial wrist, she is in pain and is looking for an injection  Sent to hand pool for appointment      Patient:  Antonio Lim    MRN:  [id number]    Phone:  [phone]

## 2022-08-26 ENCOUNTER — OFFICE VISIT (OUTPATIENT)
Dept: GASTROENTEROLOGY | Facility: CLINIC | Age: 67
End: 2022-08-26
Payer: COMMERCIAL

## 2022-08-26 VITALS
RESPIRATION RATE: 18 BRPM | SYSTOLIC BLOOD PRESSURE: 112 MMHG | HEIGHT: 66 IN | TEMPERATURE: 98.1 F | BODY MASS INDEX: 24.49 KG/M2 | HEART RATE: 73 BPM | DIASTOLIC BLOOD PRESSURE: 72 MMHG | WEIGHT: 152.4 LBS

## 2022-08-26 DIAGNOSIS — K90.0 CELIAC DISEASE: ICD-10-CM

## 2022-08-26 DIAGNOSIS — K21.9 GASTROESOPHAGEAL REFLUX DISEASE WITHOUT ESOPHAGITIS: ICD-10-CM

## 2022-08-26 DIAGNOSIS — R10.13 EPIGASTRIC PAIN: ICD-10-CM

## 2022-08-26 DIAGNOSIS — R13.10 DYSPHAGIA, UNSPECIFIED TYPE: Primary | ICD-10-CM

## 2022-08-26 PROCEDURE — 99214 OFFICE O/P EST MOD 30 MIN: CPT | Performed by: NURSE PRACTITIONER

## 2022-08-26 NOTE — PROGRESS NOTES
[facility] - Outpatient Follow-up Note  Wilner Sal 61 y o  female MRN: [id number]  Encounter: [id number]          ASSESSMENT AND PLAN:      1  Dysphagia, unspecified type  2  Epigastric pain  3  Gastroesophageal reflux disease without esophagitis    Patient's last EGD was in November of 2021 that revealed normal appearing esophagus and duodenum with moderate patchy erythematous mucosa with erosions in the antrum  Biopsies revealed gastritis with erosions and a benign fundic gland polyp  She was recommended to increase her pantoprazole to twice a day and avoid NSAIDs  She states that she will occasionally still take Aleve due to low back pain and other joint pains  She reports recently backing off of pantoprazole twice a day  She does report occasional epigastric pain with certain foods  She does however report issues with feeling like food gets stuck after she swallows over the past year  Given new dysphagia since last EGD, would recommend repeat at this time  Process, risks and benefits discussed with the patient, she is agreeable  - EGD; Future  - continue pantoprazole 40 milligrams twice a day  - GERD diet lifestyle modifications   - avoid NSAIDs     4  Celiac disease    Patient has a history of celiac disease  She currently follows a strict gluten free diet  Previous lab work for vitamin deficiency and celiac panel was drawn in November of last year and was normal   Discussed repeating at this time  Patient is agreeable  Her last EGD was also in November of 2021 that revealed normal duodenal biopsies  Patient also recently underwent repeat DEXA scan that continue to reveal osteopenia  She currently takes Cholecalciferol, vitamin D and vitamin B12  Discussed increasing calcium intake as well  Patient verbalizes understanding       - IgA; Future  - Tissue transglutaminase, IgA; Future  - Vitamin B12; Future  - Vitamin D 25 hydroxy;  Future  - Iron Panel (Includes Ferritin, Iron Sat%, Iron, and TIBC); Future    Will see patient back after procedure     ______________________________________________________________________    SUBJECTIVE:  Conrad Wiggins is a 53-year-old female that presents today for follow-up of GERD and celiac disease  Patient's last EGD was in November of last year that revealed a normal appearing esophagus and duodenum with moderate patchy erythematous mucosa with erosion in the antrum  Biopsies revealed gastritis with erosions and a benign fundal gland polyp  Patient does have a history of celiac disease however biopsies were benign  She reports that she follows a strict gluten free diet  Patient had lab work for iron, vitamin B12, vitamin D and celiac last November that were all normal   She recently underwent repeat DEXA scan that continued to reveal osteopenia  She currently takes  Cholecalciferol, vitamin D and vitamin B12  Patient reports that she does have aches and pains and lower back pain in which she will take aleve occasionally  She states that she has gone some days without the Protonix does not always take it twice a day when she does take it  She does report occasional epigastric burning with certain foods specifically tomatoes, coffee and cinnamon  She does however report over the past year feeling like food gets stuck after she swallows  REVIEW OF SYSTEMS:  Review of Systems   HENT: Positive for trouble swallowing  Gastrointestinal: Positive for abdominal pain (Occasional epigastric pain)  Negative for abdominal distention, anal bleeding, blood in stool, constipation, diarrhea, nausea, rectal pain and vomiting  All other systems reviewed and are negative          Historical Information   Past Medical History:   Diagnosis Date    Asthma     Celiac disease     Follicular lymphoma ([facility] [place] [address] )     GERD (gastroesophageal reflux disease)     Heart palpitations     History of colonic polyps     resolved 07/12/2016  History of radiation therapy 2010    Hyperlipidemia     Insomnia     Irregular heart beat     Lymphoma, 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HCC)     non hodgekins, remission    Malignant lymphoma ([facility] [address] ) 2010    rt arm cutaneous follicular stage ia (rt diatal medical biceps area , s/p resected and s/p radistion treatment    resolved 12/17/2014    Postmenopausal disorder     resolved 09/21/2017    Pulmonary nodule     BENIGN, STABLE 6364-0016    Restless legs syndrome     resolved 09/21/2017    TMJ syndrome     resolved 09/21/2017     Past Surgical History:   Procedure Laterality Date    COLONOSCOPY  04/16/2019    FUNCTIONAL ENDOSCOPIC SINUS SURGERY Bilateral 2013    Dr Poncho Saab      age 39 complete    LYMPH NODE DISSECTION Right     arm    NASAL SEPTUM SURGERY  2013    with turbinate reduction - Dr Allison Linda ESOPHAGOGASTRODUODENOSCOPY TRANSORAL DIAGNOSTIC N/A 1/18/2016    Procedure: ESOPHAGOGASTRODUODENOSCOPY (EGD); Surgeon: Parvin Peralta MD;  [facility];   Service: Gastroenterology    TX EXCIS RECURRENT GANGLION WRIST Left 12/8/2020    Procedure: WRIST GANGLION CYST EXCISION;  Surgeon: Hoang Kuhn MD;  [facility];  Service: Orthopedics    SYNOVECTOMY N/A 12/8/2020    Procedure: ECU TENOSYNOVECTOMY;  Surgeon: Hoang Kuhn MD;  [facility];  Service: Orthopedics    TONSILLECTOMY      TOTAL ABDOMINAL HYSTERECTOMY W/ BILATERAL SALPINGOOPHORECTOMY      age 54   Elizabeth Shreyas UPPER GASTROINTESTINAL ENDOSCOPY     [address] PROCEDURE  1/16/2020    US GUIDED MSK PROCEDURE  8/7/2020    US GUIDED MSK PROCEDURE  8/2/2021     Social History   Social History     Substance and Sexual Activity   Alcohol Use Yes    Alcohol/week: 3 0 standard drinks    Types: 3 Glasses of wine per week    Comment: soc     Social History     Substance and Sexual Activity   Drug Use No     Social History     Tobacco Use   Smoking Status Former Smoker    Packs/day: 0 75    Years: 20 00    Pack years: 15 00    Types: Cigarettes    Start date: 48    Quit date:     Years since quittin 6   Smokeless Tobacco Never Used     Family History   Problem Relation Age of Onset    Lymphoma Mother     Throat cancer Father     Heart failure Father     Atrial fibrillation Father     Diabetes Father     Colonic polyp Father     Hypertension Father     Thyroid cancer Sister     Breast cancer Paternal Grandmother 71    Breast cancer Paternal Aunt 71    Ovarian cancer Paternal Aunt 79    No Known Problems Maternal Grandmother     No Known Problems Maternal Grandfather     Cancer Paternal Grandfather     Lung cancer Paternal Grandfather     BRCA1 Positive Cousin     Skin cancer Paternal Aunt     Throat cancer Paternal Uncle     No Known Problems Maternal Aunt        Meds/Allergies       Current Outpatient Medications:     acetaminophen (TYLENOL) 500 mg tablet    benralizumab (FASENRA) subcutaneous injection    bimatoprost (LATISSE) 0 03 % ophthalmic solution    Cholecalciferol 50 MCG (2000 UT) CAPS    cromolyn (OPTICROM) 4 % ophthalmic solution    cyanocobalamin (VITAMIN B-12) 1,000 mcg tablet    erythromycin with ethanol (EMGEL) 2 % gel    famotidine (PEPCID) 20 mg tablet    flecainide (TAMBOCOR) 100 mg tablet    fluticasone (FLONASE) 50 mcg/act nasal spray    fluticasone-vilanterol (BREO ELLIPTA) 100-25 mcg/inh inhaler    gabapentin (NEURONTIN) 100 mg capsule    lidocaine (LMX) 4 % cream    Magnesium 500 MG CAPS    metoprolol succinate (TOPROL-XL) 25 mg 24 hr tablet    montelukast (SINGULAIR) 10 mg tablet    omega-3-acid ethyl esters (LOVAZA) 1 g capsule    ondansetron (ZOFRAN) 4 mg tablet    pantoprazole (PROTONIX) 20 mg tablet    rosuvastatin (CRESTOR) 5 mg tablet    sucralfate (CARAFATE) 1 g tablet    Vitamin D, Ergocalciferol, 2000 units CAPS    zolpidem (AMBIEN) 5 mg tablet    EPINEPHrine (EPIPEN) 0 3 mg/0 3 mL SOAJ  No current facility-administered medications for this visit  Facility-Administered Medications Ordered in Other Visits:     EPINEPHrine PF (ADRENALIN) 1 mg/mL injection 0 3 mg, 0 3 mg, Intramuscular, Once    Allergies   Allergen Reactions    Shellfish-Derived Products - Food Allergy Anaphylaxis    Wheat Bran - Food Allergy Anaphylaxis    Gluten Meal - Food Allergy GI Intolerance     Celiac     Morphine And Related Itching    Nuts - Food Allergy Hives     Almonds,walnuts, hazelnuts and other related nuts   Sulfa Antibiotics Rash           Objective     Blood pressure 112/72, pulse 73, temperature 98 1 °F (36 7 °C), temperature source Tympanic, resp  rate 18, height 5' 6" (1 676 m), weight 69 1 kg (152 lb 6 4 oz), not currently breastfeeding  Body mass index is 24 6 kg/m²  PHYSICAL EXAM:      General Appearance:   Alert, cooperative, no distress   HEENT:   Normocephalic, atraumatic, anicteric  Neck:  Supple, symmetrical, trachea midline   Lungs:   Clear to auscultation bilaterally; no rales, rhonchi or wheezing; respirations unlabored    Heart[de-identified]   Regular rate and rhythm; no murmur, rub, or gallop  Abdomen:   Soft, non-tender, non-distended; normal bowel sounds; no masses, no organomegaly    Genitalia:   Deferred    Rectal:   Deferred    Extremities:  No cyanosis, clubbing or edema    Skin:  No jaundice, rashes, or lesions             Lab Results:   No visits with results within 1 Day(s) from this visit  Latest known visit with results is:   Appointment on 06/22/2022   Component Date Value    Cholesterol 06/22/2022 142     Triglycerides 06/22/2022 249 (A)    HDL, Direct 06/22/2022 44 (A)    LDL Calculated 06/22/2022 48          Radiology Results:   US thyroid    Result Date: 8/23/2022  Narrative: THYROID ULTRASOUND INDICATION:    E04 1: Nontoxic single thyroid nodule   COMPARISON:  7/15/2020 TECHNIQUE:   Ultrasound of the thyroid was performed with a high frequency linear transducer in transverse and sagittal planes including volumetric imaging sweeps as well as traditional still imaging technique  FINDINGS:  Normal homogeneous smooth echotexture  Right lobe: 5 1 x 1 7 x 1 6 cm  Volume 6 8 mL Left lobe:  5 1 x 1 3 x 1 7 cm  Volume 5 7 mL Isthmus: 0 2  cm  Nodule #1  Image 27  RIGHT midgland nodule measuring 1 x 0 5 x 0 9 cm  Given differences in measuring technique, no significant change from prior  COMPOSITION:  2 points, solid or almost completely solid   ECHOGENICITY:  2 points, hypoechoic  SHAPE:  0 points, wider-than-tall  MARGIN: 0 points, smooth  ECHOGENIC FOCI:  0 points, none or large comet-tail artifacts  TI-RADS Classification: TR 4 (4-6 points), FNA if > 1 5 cm  Follow if > 1cm  Nodule #2  Image 30  RIGHT lower pole nodule measuring 0 9 x 0 6 x 1 1 cm  Given differences in measuring technique, no significant change from prior  COMPOSITION:  2 points, solid or almost completely solid   ECHOGENICITY:  1 point, hyperechoic or isoechoic  SHAPE:  0 points, wider-than-tall  MARGIN: 0 points, smooth  ECHOGENIC FOCI:  0 points, none or large comet-tail artifacts  TI-RADS Classification: TR 3 (3 points), FNA if >2 5 cm  Follow if >1 5 cm  Nodule #3  Image 34  RIGHT lower pole nodule measuring 1 x 0 7 x 1 cm  Given differences in measuring technique, no significant change from prior  COMPOSITION:  2 points, solid or almost completely solid   ECHOGENICITY:  1 point, hyperechoic or isoechoic  SHAPE:  0 points, wider-than-tall  MARGIN: 0 points, smooth  ECHOGENIC FOCI:  0 points, none or large comet-tail artifacts  TI-RADS Classification: TR 3 (3 points), FNA if >2 5 cm  Follow if >1 5 cm  Nodule #4  Image 79  LEFT lower pole nodule measuring 0 8 x 0 7 x 0 8 cm  Given differences in measuring technique, no significant change from prior  COMPOSITION:  2 points, solid or almost completely solid   ECHOGENICITY:  2 points, hypoechoic  SHAPE:  0 points, wider-than-tall  MARGIN: 0 points, smooth    ECHOGENIC FOCI:  0 points, none or large comet-tail artifacts  TI-RADS Classification: TR 4 (4-6 points), FNA if > 1 5 cm  Follow if > 1cm  Nodule #5  Image 83  LEFT lower pole nodule measuring 0 9 x 0 5 x 0 6 cm  Given differences in measuring technique, no significant change from prior  COMPOSITION:  2 points, solid or almost completely solid   ECHOGENICITY:  1 point, hyperechoic or isoechoic  SHAPE:  0 points, wider-than-tall  MARGIN: 0 points, smooth  ECHOGENIC FOCI:  0 points, none or large comet-tail artifacts  TI-RADS Classification: TR 3 (3 points), FNA if >2 5 cm  Follow if >1 5 cm  There are additional nodules of lesser size and/or TI-RADS score  These do not necessitate additional evaluation based on ACR criteria  Impression: No nodule meets current ACR criteria for requiring biopsy but followup ultrasound is recommended in 1 year  Reference: ACR Thyroid Imaging, Reporting and Data System (TI-RADS): White Paper of the [employer]  J AM Bushra Radiol 4903;21:393-568  (additional recommendations based on American Thyroid Association 2015 guidelines ) Workstation performed: [id number]     DXA bone density spine hip and pelvis    Result Date: 8/11/2022  Narrative: CENTRAL  DXA SCAN CLINICAL HISTORY:   61year old post-menopausal  female risk factors include Protonix medication, celiac disease  TECHNIQUE: Bone densitometry was performed using a Horizon A bone densitometer  Regions of interest appear properly placed  There are no obvious fractures or other confounding variables which could limit the study  Degenerative changes in the lumbar spine may spuriously elevate bone mineral density  COMPARISON:  6/3/2020 RESULTS: LUMBAR SPINE:  L1, L3, L4: BMD 0 903 gm/cm2 T-score -1 4 Z-score 0 4 LEFT TOTAL HIP: BMD 0 861 gm/cm2 T-score -0 7 Z-score 0 5 LEFT FEMORAL NECK: BMD 0 709 gm/cm2 T-score -1 3 Z-score 0 2     Impression: 1    Based on the [facility] classification, the T-score of -1 4 at the lumbar spine and -1 3 at the left femoral neck are consistent with low bone mineral density  2   Since the prior study, the lumbar spine BMD has increased 2 1%, a significant change  Left hip BMD has decreased 0 7%, not a significant change  This lumbar spine increase does exceed our own least significant change and therefore is statistically significant within 95% confidence level  3   Any secondary causes of low bone mineral density should be excluded prior to treatment, if clinically indicated  4   A daily intake of at least 1200 mg calcium and 800 to 1000 IU of Vitamin D, as well as weight bearing and muscle strengthening exercise, fall prevention and avoidance of tobacco and excessive alcohol intake as basic preventive measures are suggested  5   Repeat DXA  in 18 - 24 months, on the same machine, as clinically indicated  The 10 year risk of hip fracture is 0 7%, with the 10 year risk of major osteoporotic fracture being 8 3%, as calculated by the [facility] fracture risk assessment tool (FRAX)  The current NOF guidelines recommend treating patients with FRAX 10 year risk score  of >3% for hip fracture and >20% for major osteoporotic fracture   WHO CLASSIFICATION: Normal (a T-score of -1 0 or higher) Low bone mineral density (a T-score of less than -1 0 but higher than -2 5) Osteoporosis (a T-score of -2 5 or less) Severe osteoporosis (a T-score of -2 5 or less with a fragility fracture)   Workstation performed: [id number]

## 2022-08-26 NOTE — PATIENT INSTRUCTIONS
Scheduled date of EGD(as of today): 11/11/2022  Physician performing EGD:Dr Jalen Álvarez  Location of EGD:[facility]  Instructions reviewed with patient by:  Theresa Cordon  Clearances:   N/A  Follow up appointment made

## 2022-09-17 DIAGNOSIS — I49.3 PVC (PREMATURE VENTRICULAR CONTRACTION): ICD-10-CM

## 2022-09-19 DIAGNOSIS — I49.3 PVC (PREMATURE VENTRICULAR CONTRACTION): ICD-10-CM

## 2022-09-19 RX ORDER — FLECAINIDE ACETATE 100 MG/1
100 TABLET ORAL 2 TIMES DAILY
Qty: 180 TABLET | Refills: 3 | Status: SHIPPED | OUTPATIENT
Start: 2022-09-19

## 2022-09-19 RX ORDER — FLECAINIDE ACETATE 100 MG/1
TABLET ORAL
Qty: 60 TABLET | Refills: 5 | Status: SHIPPED | OUTPATIENT
Start: 2022-09-19 | End: 2022-09-19 | Stop reason: SDUPTHER

## 2022-09-19 NOTE — TELEPHONE ENCOUNTER
[facility]  Flecainide     Please supply 3 months at a time

## 2022-09-23 DIAGNOSIS — J45.909 UNCOMPLICATED ASTHMA, UNSPECIFIED ASTHMA SEVERITY, UNSPECIFIED WHETHER PERSISTENT: ICD-10-CM

## 2022-09-23 RX ORDER — MONTELUKAST SODIUM 10 MG/1
TABLET ORAL
Qty: 90 TABLET | Refills: 0 | Status: SHIPPED | OUTPATIENT
Start: 2022-09-23

## 2022-09-28 ENCOUNTER — APPOINTMENT (OUTPATIENT)
Dept: LAB | Facility: CLINIC | Age: 67
End: 2022-09-28
Payer: COMMERCIAL

## 2022-09-28 DIAGNOSIS — K90.0 CELIAC DISEASE: ICD-10-CM

## 2022-09-28 LAB
25(OH)D3 SERPL-MCNC: 44.5 NG/ML (ref 30–100)
FERRITIN SERPL-MCNC: 76 NG/ML (ref 8–388)
IGA SERPL-MCNC: 152 MG/DL (ref 70–400)
IRON SATN MFR SERPL: 23 % (ref 15–50)
IRON SERPL-MCNC: 93 UG/DL (ref 50–170)
TIBC SERPL-MCNC: 398 UG/DL (ref 250–450)
VIT B12 SERPL-MCNC: 437 PG/ML (ref 100–900)

## 2022-09-28 PROCEDURE — 36415 COLL VENOUS BLD VENIPUNCTURE: CPT

## 2022-09-28 PROCEDURE — 82607 VITAMIN B-12: CPT

## 2022-09-28 PROCEDURE — 83540 ASSAY OF IRON: CPT

## 2022-09-28 PROCEDURE — 86364 TISS TRNSGLTMNASE EA IG CLAS: CPT

## 2022-09-28 PROCEDURE — 82784 ASSAY IGA/IGD/IGG/IGM EACH: CPT

## 2022-09-28 PROCEDURE — 83550 IRON BINDING TEST: CPT

## 2022-09-28 PROCEDURE — 82728 ASSAY OF FERRITIN: CPT

## 2022-09-28 PROCEDURE — 82306 VITAMIN D 25 HYDROXY: CPT

## 2022-09-29 LAB — TTG IGA SER-ACNC: <2 U/ML (ref 0–3)

## 2022-10-03 NOTE — PROGRESS NOTES
Assessment   61 y o  Binu Wilkins presenting for annual exam      Plan   Diagnoses and all orders for this visit:    Encounter for gynecological examination (general) (routine) without abnormal findings    Encounter for screening mammogram for malignant neoplasm of breast  -     Mammo screening bilateral w 3d & cad; Future    Vaginal dryness, menopausal        Pap not indicated  Mammo slip given    Colonoscopy up to date   DXA up to date   Vaginal dryness- We reviewed various tx options to include use of lubricant (water and silicone based), coconut oil, hyaluronic acid suppository, vitamin e suppository, and topical estrogen cream  Pt is interested in trying these measures and will schedule f/u if not relieved with this  SBE encouraged, A yearly mammogram is recommended for breast cancer screening starting at age 36  ASCCP guidelines reviewed  Condoms encouraged with all sexual activity to prevent STI's  Calcium/ Vit D dietary requirements discussed  Advised to call with any issues, all concerns & questions addressed  See provided information in your after visit summary     F/U Annually and PRN    Results will be released to [facility], if abnormal will call or message to review and discuss treatment plan      __________________________________________________________________    Subjective     Maude Samuels is a 61 y o  Binu Wilkins presenting for annual exam      S/p JENNA/BSO for benign indication  Reports some increasing dryness with intercourse and at baseline  Reports dryness and irritation is noted at the introitus only  SCREENING  Last Pap: s/p hyster  Last Mammo: 12/22/2021; birads 1  Last Colonoscopy: 05/31/2019; 5 years  Last DEXA: 8/2022; low bone mineral density      GYN  Sexually active: Yes - single partner - male    Hx Abnormal pap: denies  We reviewed ASCCP guidelines for Pap testing today          Complaints: denies  Denies urgency, frequency, hematuria, leakage / change in stream, difficulty urinating  BREAST  Complaints: denies   Denies: breast lump, breast tenderness, nipple discharge, skin color change, and skin lesion(s)  Personal hx: none      Pertinent Family Hx:   Family hx of breast cancer: PGM and Paternal aunt; paternal cousin BRCA +  Family hx of ovarian cancer: paternal aunt   Family hx of colon cancer: no      GENERAL  PMH reviewed/updated and is as below  Patient does follow with a PCP  SOCIAL  Smoking: no  Alcohol:social  Drug: no  Occupation:retired; xray tech      Past Medical History:   Diagnosis Date    Asthma     Celiac disease     Follicular [facility] ([facility] [address] )     GERD (gastroesophageal reflux disease)     Heart palpitations     History of colonic polyps     resolved 07/12/2016    History of radiation therapy 2010    Hyperlipidemia     Insomnia     Irregular heart beat     Lymphoma, 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, remission    Malignant lymphoma ([facility] [address] ) 2010    rt arm cutaneous follicular stage ia (rt diatal medical biceps area , s/p resected and s/p radistion treatment    resolved 12/17/2014    Postmenopausal disorder     resolved 09/21/2017    Pulmonary nodule     BENIGN, STABLE 2519-0846    Restless legs syndrome     resolved 09/21/2017    TMJ syndrome     resolved 09/21/2017       Past Surgical History:   Procedure Laterality Date    COLONOSCOPY  04/16/2019    FUNCTIONAL ENDOSCOPIC SINUS SURGERY Bilateral 2013    Dr Gerhardt Dunnings      age 39 complete    LYMPH NODE DISSECTION Right     arm    NASAL SEPTUM SURGERY  2013    with turbinate reduction - Dr Alf Lloyd ESOPHAGOGASTRODUODENOSCOPY TRANSORAL DIAGNOSTIC N/A 1/18/2016    Procedure: ESOPHAGOGASTRODUODENOSCOPY (EGD); Surgeon: Lady Dixon MD;  [facility]: [facility];   Service: Gastroenterology    AK EXCIS RECURRENT GANGLION WRIST Left 12/8/2020    Procedure: WRIST GANGLION CYST EXCISION;  Surgeon: Adalberto Pérez MD;  [facility];  Service: Orthopedics    SYNOVECTOMY N/A 12/8/2020    Procedure: ECU TENOSYNOVECTOMY;  Surgeon: Adama Damon MD;  [facility];  Service: Orthopedics    TONSILLECTOMY      TOTAL ABDOMINAL HYSTERECTOMY W/ BILATERAL SALPINGOOPHORECTOMY      age 54    UPPER GASTROINTESTINAL ENDOSCOPY     [address] PROCEDURE  1/16/2020    [address] MSK PROCEDURE  8/7/2020    US GUIDED MSK PROCEDURE  8/2/2021         Current Outpatient Medications:     flecainide (TAMBOCOR) 100 mg tablet, Take 1 tablet (100 mg total) by mouth 2 (two) times a day, Disp: 180 tablet, Rfl: 3    acetaminophen (TYLENOL) 500 mg tablet, Take 500 mg by mouth every 6 (six) hours as needed for mild pain , Disp: , Rfl:     benralizumab (FASENRA) subcutaneous injection, Inject 1 mL (30 mg total) under the skin every 56 days for 6 doses, Disp: 1 Syringe, Rfl: 6    bimatoprost (LATISSE) 0 03 % ophthalmic solution, Take as directed, Disp: , Rfl:     Cholecalciferol 50 MCG (2000 UT) CAPS, Take by mouth daily, Disp: , Rfl:     cromolyn (OPTICROM) 4 % ophthalmic solution, Administer 1 drop to both eyes in the morning and 1 drop at noon and 1 drop in the evening and 1 drop before bedtime  , Disp: 10 mL, Rfl: 0    cyanocobalamin (VITAMIN B-12) 1,000 mcg tablet, Take 1,000 mcg by mouth daily, Disp: , Rfl:     EPINEPHrine (EPIPEN) 0 3 mg/0 3 mL SOAJ, Inject 0 3 mL (0 3 mg total) into a muscle once for 1 dose, Disp: 0 6 mL, Rfl: 0    erythromycin with ethanol (EMGEL) 2 % gel, APPLY TWICE A DAY AS NEEDED TO OPEN/EXCORIATED LESIONS AND ACNE LESIONS ON FACE AND BODY, Disp: , Rfl:     famotidine (PEPCID) 20 mg tablet, Take 20 mg by mouth 2 (two) times a day , Disp: , Rfl:     fluticasone (FLONASE) 50 mcg/act nasal spray, 2 sprays into each nostril 2 (two) times a day, Disp: 16 g, Rfl: 4    fluticasone-vilanterol (BREO ELLIPTA) 100-25 mcg/inh inhaler, Inhale 1 puff daily Rinse mouth after use , Disp: 1 each, Rfl: 6    gabapentin (NEURONTIN) 100 mg capsule, One pill tid x 3 weeks then 2 pills tid x 3 weeks  , Disp: 180 capsule, Rfl: 1    ipratropium (ATROVENT) 0 03 % nasal spray, 2 sprays into each nostril 3 (three) times a day as needed for rhinitis, Disp: 30 mL, Rfl: 3    lidocaine (LMX) 4 % cream, Apply topically as needed for mild pain, Disp: 15 g, Rfl: 3    Magnesium 500 MG CAPS, Take by mouth, Disp: , Rfl:     metoprolol succinate (TOPROL-XL) 25 mg 24 hr tablet, Take 1 tablet (25 mg total) by mouth daily Taking 1/2 pill daily (Patient taking differently: Take 12 5 mg by mouth daily), Disp: 30 tablet, Rfl: 5    montelukast (SINGULAIR) 10 mg tablet, take 1 tablet by mouth at bedtime, Disp: 90 tablet, Rfl: 0    omega-3-acid ethyl esters (LOVAZA) 1 g capsule, Take 2 capsules (2 g total) by mouth 2 (two) times a day, Disp: 360 capsule, Rfl: 1    ondansetron (ZOFRAN) 4 mg tablet, Take 1 tablet (4 mg total) by mouth every 8 (eight) hours as needed for nausea or vomiting, Disp: 20 tablet, Rfl: 0    pantoprazole (PROTONIX) 20 mg tablet, take 2 tablets by mouth twice a day, Disp: 90 tablet, Rfl: 3    rosuvastatin (CRESTOR) 5 mg tablet, take 1 tablet by mouth once daily, Disp: 30 tablet, Rfl: 5    sucralfate (CARAFATE) 1 g tablet, Take 1 tablet (1 g total) by mouth 4 (four) times a day, Disp: 120 tablet, Rfl: 5    Vitamin D, Ergocalciferol, 2000 units CAPS, Take by mouth, Disp: , Rfl:     zolpidem (AMBIEN) 5 mg tablet, Take 1 tablet (5 mg total) by mouth daily at bedtime as needed for sleep, Disp: 30 tablet, Rfl: 2    Allergies   Allergen Reactions    Shellfish-Derived Products - Food Allergy Anaphylaxis    Wheat Bran - Food Allergy Anaphylaxis    Gluten Meal - Food Allergy GI Intolerance     Celiac     Morphine And Related Itching    Nuts - Food Allergy Hives     Almonds,walnuts, hazelnuts and other related nuts       Sulfa Antibiotics Rash       Social History     Socioeconomic History    Marital status: /Civil Union     Spouse name: Not on file    Number of children: Not on file    Years of education: Not on file    Highest education level: Not on file   Occupational History    Occupation: X-ray technologist   Tobacco Use    Smoking status: Former Smoker     Packs/day: 0 75     Years: 20 00     Pack years: 15 00     Types: Cigarettes     Start date:      Quit date:      Years since quittin 7    Smokeless tobacco: Never Used   Vaping Use    Vaping Use: Never used   Substance and Sexual Activity    Alcohol use: Yes     Alcohol/week: 3 0 standard drinks     Types: 3 Glasses of wine per week     Comment: soc    Drug use: No    Sexual activity: Yes     Partners: Male   Other Topics Concern    Not on file   Social History Narrative    Caffeine use    exercise walking      Social Determinants of Health     Financial Resource Strain: Not on file   Food Insecurity: Not on file   Transportation Needs: Not on file   Physical Activity: Not on file   Stress: Not on file   Social Connections: Not on file   Intimate Partner Violence: Not on file   Housing Stability: Not on file       Review of Systems     ROS:  Constitutional: Negative for fatigue and unexpected weight change  Respiratory: Negative for cough and shortness of breath  Cardiovascular: Negative for chest pain and palpitations  Gastrointestinal: Negative for abdominal pain and change in bowel habits  Breasts:  Negative, other than as noted above  Genitourinary: Negative, other than as noted above  Psychiatric: Negative for mood difficulties  Objective         Vitals:    10/06/22 1322   BP: 132/78         Physical Examination:    Patient appears well and is not in distress  Neck is supple without masses, no cervical or supraclavicular lymphadenopathy  Cardiovascular: regular rate and rhythm; no murmurs  Lungs: clear to auscultation bilaterally; no wheezes  Breasts are symmetrical without mass, tenderness, nipple discharge, skin changes or adenopathy     Abdomen is soft and nontender without masses  External genitals are normal without lesions or rashes  Urethral meatus and urethra are normal  Bladder is normal to palpation  Vagina is normal without discharge or bleeding     Cervix is surgically absent   Uterus is surgically absent   Adnexa are not palpable

## 2022-10-06 ENCOUNTER — ANNUAL EXAM (OUTPATIENT)
Dept: OBGYN CLINIC | Facility: MEDICAL CENTER | Age: 67
End: 2022-10-06

## 2022-10-06 VITALS
WEIGHT: 154.4 LBS | SYSTOLIC BLOOD PRESSURE: 132 MMHG | HEIGHT: 66 IN | DIASTOLIC BLOOD PRESSURE: 78 MMHG | BODY MASS INDEX: 24.81 KG/M2

## 2022-10-06 DIAGNOSIS — N95.1 VAGINAL DRYNESS, MENOPAUSAL: ICD-10-CM

## 2022-10-06 DIAGNOSIS — Z12.31 ENCOUNTER FOR SCREENING MAMMOGRAM FOR MALIGNANT NEOPLASM OF BREAST: ICD-10-CM

## 2022-10-06 DIAGNOSIS — Z01.419 ENCOUNTER FOR GYNECOLOGICAL EXAMINATION (GENERAL) (ROUTINE) WITHOUT ABNORMAL FINDINGS: Primary | ICD-10-CM

## 2022-10-06 NOTE — PATIENT INSTRUCTIONS
For vaginal dryness:    Coconut oil (organic, pure, unscented) as needed for moisture or lubrication  ( Do not use if allergic)    KeyE suppository  Revaree hyaluronic acid suppository   Replens moisture restore external comfort gel daily ( use as directed on the box)     Replens long lasting vaginal moisturizer  ( use as directed on the box)       For Vaginal Lubrication:     Coconut oil (Do not use if allergic)           Silicone based lubricant:  Uber Lube  AstroGlide  Replens silky smooth lubricant, premium silicone based lubricant for intercourse   ( use as directed, a small amount will provide an enhanced natural feeling)

## 2022-10-07 DIAGNOSIS — J45.50 SEVERE PERSISTENT ASTHMA WITHOUT COMPLICATION: ICD-10-CM

## 2022-10-10 ENCOUNTER — TELEPHONE (OUTPATIENT)
Dept: PULMONOLOGY | Facility: CLINIC | Age: 67
End: 2022-10-10

## 2022-10-10 ENCOUNTER — TELEPHONE (OUTPATIENT)
Dept: OBGYN CLINIC | Facility: MEDICAL CENTER | Age: 67
End: 2022-10-10

## 2022-10-10 NOTE — TELEPHONE ENCOUNTER
Patient called stating that she needs Medications Ordered  which I didn't see in the Medications can a MA please reach out to the patient so they can go over what needs to be order and if they can order for her

## 2022-10-10 NOTE — TELEPHONE ENCOUNTER
Patient sees Dr Dori Grant  She needs to reschedule her appointment on 10/19/2022, she cannot make it  She is asking for November or December      Patient:  Maude Samuels    MRN: [id number]    Phone: [phone]

## 2022-10-11 ENCOUNTER — APPOINTMENT (OUTPATIENT)
Dept: RADIOLOGY | Facility: MEDICAL CENTER | Age: 67
End: 2022-10-11
Payer: COMMERCIAL

## 2022-10-11 ENCOUNTER — OFFICE VISIT (OUTPATIENT)
Dept: FAMILY MEDICINE CLINIC | Facility: CLINIC | Age: 67
End: 2022-10-11
Payer: COMMERCIAL

## 2022-10-11 VITALS
HEART RATE: 68 BPM | RESPIRATION RATE: 16 BRPM | DIASTOLIC BLOOD PRESSURE: 72 MMHG | BODY MASS INDEX: 25.66 KG/M2 | TEMPERATURE: 97.8 F | OXYGEN SATURATION: 98 % | SYSTOLIC BLOOD PRESSURE: 100 MMHG | HEIGHT: 65 IN | WEIGHT: 154 LBS

## 2022-10-11 DIAGNOSIS — R52 PAIN: ICD-10-CM

## 2022-10-11 DIAGNOSIS — D72.119 HYPEREOSINOPHILIC SYNDROME, UNSPECIFIED TYPE: ICD-10-CM

## 2022-10-11 DIAGNOSIS — E78.5 DYSLIPIDEMIA: ICD-10-CM

## 2022-10-11 DIAGNOSIS — L70.9 ACNE, UNSPECIFIED ACNE TYPE: ICD-10-CM

## 2022-10-11 DIAGNOSIS — J45.909 UNCOMPLICATED ASTHMA, UNSPECIFIED ASTHMA SEVERITY, UNSPECIFIED WHETHER PERSISTENT: Primary | ICD-10-CM

## 2022-10-11 PROCEDURE — 99214 OFFICE O/P EST MOD 30 MIN: CPT | Performed by: INTERNAL MEDICINE

## 2022-10-11 PROCEDURE — 73562 X-RAY EXAM OF KNEE 3: CPT

## 2022-10-11 RX ORDER — ERYTHROMYCIN 20 MG/G
GEL TOPICAL 2 TIMES DAILY
Qty: 30 G | Refills: 1 | Status: SHIPPED | OUTPATIENT
Start: 2022-10-11

## 2022-10-11 NOTE — PROGRESS NOTES
Assessment/Plan:         Diagnoses and all orders for this visit:    Uncomplicated asthma, unspecified asthma severity, unspecified whether persistent    Dyslipidemia    Hypereosinophilic syndrome, unspecified type          Subjective:      Patient ID: Harsh Fernandez is a 61 y o  female  Pt states breathing is okay  States sees pulm  The following portions of the patient's history were reviewed and updated as appropriate: She  has a past medical history of Asthma, Celiac disease, Follicular lymphoma ([facility] [address] ), GERD (gastroesophageal reflux disease), Heart palpitations, History of colonic polyps, History of radiation therapy (2010), Hyperlipidemia, Insomnia, Irregular heart beat, Lymphoma, 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[address] ), Malignant lymphoma ([address] ) (2010), Postmenopausal disorder, Pulmonary nodule, Restless legs syndrome, and TMJ syndrome    She   Patient Active Problem List    Diagnosis Date Noted   • Hyperlipidemia    • Asthma    • Disorder of tendon of right biceps 07/09/2021   • Tear of right supraspinatus tendon 07/09/2021   • Pulmonary nodule    • Ganglion cyst of wrist, left 12/08/2020   • Eosinophilia 09/17/2020   • Severe persistent asthma without complication 85/18/4771   • Non-seasonal allergic rhinitis 09/02/2020   • Encounter for gynecological examination (general) (routine) without abnormal findings 05/23/2019   • Personal history of colonic polyps 81/99/7098   • Follicular lymphoma ([facility] [address] ) 08/29/2018   • VERONIKA (stress urinary incontinence, female) 05/18/2018   • PVC's (premature ventricular contractions) 04/24/2018   • Dyslipidemia 04/24/2018   • Celiac disease 04/19/2018   • Chronic GERD 10/03/2017   • Back pain 09/21/2017   • Heart palpitations 09/21/2017   • Insomnia 09/21/2017   • Sciatica associated with disorder of lumbar spine 09/21/2017   • Vitamin D deficiency 06/28/2017   • Osteopenia 11/17/2016     She  has a past surgical history that includes Lymph node dissection (Right); pr esophagogastroduodenoscopy transoral diagnostic (N/A, 1/18/2016); Nasal septum surgery (2013); Hysterectomy; Total abdominal hysterectomy w/ bilateral salpingoophorectomy; Tonsillectomy; Colonoscopy (04/16/2019); Functional endoscopic sinus surgery (Bilateral, 2013); US guided msk procedure (1/16/2020); US guided msk procedure (8/7/2020); pr excis recurrent ganglion wrist (Left, 12/8/2020); Synovectomy (N/A, 12/8/2020); US guided msk procedure (8/2/2021); and Upper gastrointestinal endoscopy  Her family history includes Atrial fibrillation in her father; BRCA1 Positive in her cousin; Breast cancer (age of onset: 71) in her paternal aunt and paternal grandmother; Cancer in her paternal grandfather; Colonic polyp in her father; Diabetes in her father; Heart failure in her father; Hypertension in her father; Lung cancer in her paternal grandfather; Lymphoma in her mother; No Known Problems in her maternal aunt, maternal grandfather, and maternal grandmother; Ovarian cancer (age of onset: 79) in her paternal aunt; Skin cancer in her paternal aunt; Throat cancer in her father and paternal uncle; Thyroid cancer in her sister  She  reports that she quit smoking about 18 years ago  Her smoking use included cigarettes  She started smoking about 38 years ago  She has a 15 00 pack-year smoking history  She has never used smokeless tobacco  She reports current alcohol use of about 3 0 standard drinks of alcohol per week  She reports that she does not use drugs  Current Outpatient Medications   Medication Sig Dispense Refill   • acetaminophen (TYLENOL) 500 mg tablet Take 500 mg by mouth every 6 (six) hours as needed for mild pain       • benralizumab (FASENRA) subcutaneous injection Inject 1 mL (30 mg total) under the skin every 56 days for 6 doses 1 Syringe 6   • bimatoprost (LATISSE) 0 03 % ophthalmic solution Take as directed     • Cholecalciferol 50 MCG (2000 UT) CAPS Take by mouth daily     • cromolyn (OPTICROM) 4 % ophthalmic solution Administer 1 drop to both eyes in the morning and 1 drop at noon and 1 drop in the evening and 1 drop before bedtime  10 mL 0   • cyanocobalamin (VITAMIN B-12) 1,000 mcg tablet Take 1,000 mcg by mouth daily     • EPINEPHrine (EPIPEN) 0 3 mg/0 3 mL SOAJ Inject 0 3 mL (0 3 mg total) into a muscle once for 1 dose 0 6 mL 0   • erythromycin with ethanol (EMGEL) 2 % gel APPLY TWICE A DAY AS NEEDED TO OPEN/EXCORIATED LESIONS AND ACNE LESIONS ON FACE AND BODY     • famotidine (PEPCID) 20 mg tablet Take 20 mg by mouth 2 (two) times a day  • flecainide (TAMBOCOR) 100 mg tablet Take 1 tablet (100 mg total) by mouth 2 (two) times a day 180 tablet 3   • fluticasone (FLONASE) 50 mcg/act nasal spray 2 sprays into each nostril 2 (two) times a day 16 g 4   • fluticasone-vilanterol (BREO ELLIPTA) 100-25 mcg/inh inhaler Inhale 1 puff daily Rinse mouth after use   1 each 6   • ipratropium (ATROVENT) 0 03 % nasal spray 2 sprays into each nostril 3 (three) times a day as needed for rhinitis 30 mL 3   • lidocaine (LMX) 4 % cream Apply topically as needed for mild pain 15 g 3   • Magnesium 500 MG CAPS Take by mouth     • metoprolol succinate (TOPROL-XL) 25 mg 24 hr tablet Take 1 tablet (25 mg total) by mouth daily Taking 1/2 pill daily (Patient taking differently: Take 12 5 mg by mouth daily) 30 tablet 5   • montelukast (SINGULAIR) 10 mg tablet take 1 tablet by mouth at bedtime 90 tablet 0   • ondansetron (ZOFRAN) 4 mg tablet Take 1 tablet (4 mg total) by mouth every 8 (eight) hours as needed for nausea or vomiting 20 tablet 0   • pantoprazole (PROTONIX) 20 mg tablet take 2 tablets by mouth twice a day 90 tablet 3   • rosuvastatin (CRESTOR) 5 mg tablet take 1 tablet by mouth once daily 30 tablet 5   • sucralfate (CARAFATE) 1 g tablet Take 1 tablet (1 g total) by mouth 4 (four) times a day 120 tablet 5   • Vitamin D, Ergocalciferol, 2000 units CAPS Take by mouth     • zolpidem (AMBIEN) 5 mg tablet Take 1 tablet (5 mg total) by mouth daily at bedtime as needed for sleep 30 tablet 2   • omega-3-acid ethyl esters (LOVAZA) 1 g capsule Take 2 capsules (2 g total) by mouth 2 (two) times a day 360 capsule 1     No current facility-administered medications for this visit  Current Outpatient Medications on File Prior to Visit   Medication Sig   • acetaminophen (TYLENOL) 500 mg tablet Take 500 mg by mouth every 6 (six) hours as needed for mild pain  • benralizumab (FASENRA) subcutaneous injection Inject 1 mL (30 mg total) under the skin every 56 days for 6 doses   • bimatoprost (LATISSE) 0 03 % ophthalmic solution Take as directed   • Cholecalciferol 50 MCG (2000 UT) CAPS Take by mouth daily   • cromolyn (OPTICROM) 4 % ophthalmic solution Administer 1 drop to both eyes in the morning and 1 drop at noon and 1 drop in the evening and 1 drop before bedtime  • cyanocobalamin (VITAMIN B-12) 1,000 mcg tablet Take 1,000 mcg by mouth daily   • EPINEPHrine (EPIPEN) 0 3 mg/0 3 mL SOAJ Inject 0 3 mL (0 3 mg total) into a muscle once for 1 dose   • erythromycin with ethanol (EMGEL) 2 % gel APPLY TWICE A DAY AS NEEDED TO OPEN/EXCORIATED LESIONS AND ACNE LESIONS ON FACE AND BODY   • famotidine (PEPCID) 20 mg tablet Take 20 mg by mouth 2 (two) times a day  • flecainide (TAMBOCOR) 100 mg tablet Take 1 tablet (100 mg total) by mouth 2 (two) times a day   • fluticasone (FLONASE) 50 mcg/act nasal spray 2 sprays into each nostril 2 (two) times a day   • fluticasone-vilanterol (BREO ELLIPTA) 100-25 mcg/inh inhaler Inhale 1 puff daily Rinse mouth after use     • ipratropium (ATROVENT) 0 03 % nasal spray 2 sprays into each nostril 3 (three) times a day as needed for rhinitis   • lidocaine (LMX) 4 % cream Apply topically as needed for mild pain   • Magnesium 500 MG CAPS Take by mouth   • metoprolol succinate (TOPROL-XL) 25 mg 24 hr tablet Take 1 tablet (25 mg total) by mouth daily Taking 1/2 pill daily (Patient taking differently: Take 12 5 mg by mouth daily)   • montelukast (SINGULAIR) 10 mg tablet take 1 tablet by mouth at bedtime   • ondansetron (ZOFRAN) 4 mg tablet Take 1 tablet (4 mg total) by mouth every 8 (eight) hours as needed for nausea or vomiting   • pantoprazole (PROTONIX) 20 mg tablet take 2 tablets by mouth twice a day   • rosuvastatin (CRESTOR) 5 mg tablet take 1 tablet by mouth once daily   • sucralfate (CARAFATE) 1 g tablet Take 1 tablet (1 g total) by mouth 4 (four) times a day   • Vitamin D, Ergocalciferol, 2000 units CAPS Take by mouth   • zolpidem (AMBIEN) 5 mg tablet Take 1 tablet (5 mg total) by mouth daily at bedtime as needed for sleep   • omega-3-acid ethyl esters (LOVAZA) 1 g capsule Take 2 capsules (2 g total) by mouth 2 (two) times a day   • [DISCONTINUED] gabapentin (NEURONTIN) 100 mg capsule One pill tid x 3 weeks then 2 pills tid x 3 weeks  (Patient not taking: Reported on 10/11/2022)     No current facility-administered medications on file prior to visit  She is allergic to shellfish-derived products - food allergy, wheat bran - food allergy, gluten meal - food allergy, morphine and related, nuts - food allergy, and sulfa antibiotics       Review of Systems   Constitutional: Negative  HENT: Negative  Respiratory: Negative  Cardiovascular: Negative  Objective:      /72 (BP Location: Right arm, Patient Position: Sitting, Cuff Size: Standard)   Pulse 68   Temp 97 8 °F (36 6 °C) (Temporal)   Resp 16   Ht 5' 4 75" (1 645 m)   Wt 69 9 kg (154 lb)   SpO2 98%   BMI 25 83 kg/m²          Physical Exam  Constitutional:       Appearance: Normal appearance  HENT:      Head: Normocephalic and atraumatic  Right Ear: Tympanic membrane and ear canal normal       Left Ear: Tympanic membrane and ear canal normal    Cardiovascular:      Rate and Rhythm: Normal rate and regular rhythm  Pulmonary:      Effort: Pulmonary effort is normal       Breath sounds: Normal breath sounds     Musculoskeletal: Cervical back: Neck supple  Lymphadenopathy:      Cervical: No cervical adenopathy  Neurological:      Mental Status: She is alert

## 2022-10-12 ENCOUNTER — TELEPHONE (OUTPATIENT)
Dept: OBGYN CLINIC | Facility: CLINIC | Age: 67
End: 2022-10-12

## 2022-10-12 DIAGNOSIS — Z80.3 FAMILY HISTORY OF BREAST CANCER: Primary | ICD-10-CM

## 2022-10-12 NOTE — TELEPHONE ENCOUNTER
PC placed to patient to review my chart message  She is interested in genetic testing given strong fhx of breast cancer on paternal side   Referral placed for genetics oncology referral

## 2022-10-13 DIAGNOSIS — G47.00 INSOMNIA, UNSPECIFIED TYPE: ICD-10-CM

## 2022-10-13 RX ORDER — ZOLPIDEM TARTRATE 5 MG/1
5 TABLET ORAL
Qty: 30 TABLET | Refills: 0 | Status: SHIPPED | OUTPATIENT
Start: 2022-10-13

## 2022-10-20 ENCOUNTER — OFFICE VISIT (OUTPATIENT)
Dept: OBGYN CLINIC | Facility: CLINIC | Age: 67
End: 2022-10-20
Payer: COMMERCIAL

## 2022-10-20 VITALS
HEART RATE: 78 BPM | BODY MASS INDEX: 26.09 KG/M2 | SYSTOLIC BLOOD PRESSURE: 109 MMHG | WEIGHT: 152.8 LBS | DIASTOLIC BLOOD PRESSURE: 75 MMHG | HEIGHT: 64 IN

## 2022-10-20 DIAGNOSIS — M17.11 PRIMARY OSTEOARTHRITIS OF ONE KNEE, RIGHT: Primary | ICD-10-CM

## 2022-10-20 PROCEDURE — 20610 DRAIN/INJ JOINT/BURSA W/O US: CPT | Performed by: FAMILY MEDICINE

## 2022-10-20 PROCEDURE — 99214 OFFICE O/P EST MOD 30 MIN: CPT | Performed by: FAMILY MEDICINE

## 2022-10-20 RX ORDER — TRIAMCINOLONE ACETONIDE 40 MG/ML
40 INJECTION, SUSPENSION INTRA-ARTICULAR; INTRAMUSCULAR
Status: COMPLETED | OUTPATIENT
Start: 2022-10-20 | End: 2022-10-20

## 2022-10-20 RX ORDER — LIDOCAINE HYDROCHLORIDE 10 MG/ML
4 INJECTION, SOLUTION INFILTRATION; PERINEURAL
Status: COMPLETED | OUTPATIENT
Start: 2022-10-20 | End: 2022-10-20

## 2022-10-20 RX ADMIN — TRIAMCINOLONE ACETONIDE 40 MG: 40 INJECTION, SUSPENSION INTRA-ARTICULAR; INTRAMUSCULAR at 10:38

## 2022-10-20 RX ADMIN — LIDOCAINE HYDROCHLORIDE 4 ML: 10 INJECTION, SOLUTION INFILTRATION; PERINEURAL at 10:38

## 2022-10-20 NOTE — PROGRESS NOTES
[facility] [address]  [id number] Shaji Andersen  [address] [address] [phone]      Chief Complaint:  Chief Complaint   Patient presents with   • Right Knee - Pain       Vitals:  /75   Pulse 78   Ht 5' 4" (1 626 m)   Wt 69 3 kg (152 lb 12 8 oz)   BMI 26 23 kg/m²     The following portions of the patient's history were reviewed and updated as appropriate: allergies, current medications, past family history, past medical history, past social history, past surgical history, and problem list       Subjective:   Patient ID: Conrad Wiggins is a 61 y o  female  Here c/o R knee pain  Pain for about 6 wks  She has been swimming  Denies injury  Stabbing knife pain  Cant take NSAIDS  Tylenol PRN  Pain with walking/twist leg  No swelling/locking  almost giving out      Review of Systems   Constitutional: Negative for fatigue and fever  Respiratory: Negative for shortness of breath  Cardiovascular: Negative for chest pain  Gastrointestinal: Negative for abdominal pain and nausea  Genitourinary: Negative for dysuria  Musculoskeletal: Positive for arthralgias  Skin: Negative for rash and wound  Neurological: Negative for weakness and headaches  Objective:  Right Knee Exam     Tenderness   The patient is experiencing tenderness in the medial joint line  Range of Motion   The patient has normal right knee ROM  Tests   Lauryn:  Medial - positive   Varus: negative Valgus: negative    Other   Swelling: none  Effusion: no effusion present          Observations     Right Knee   Negative for effusion  Physical Exam  Vitals and nursing note reviewed  Constitutional:       Appearance: Normal appearance  She is well-developed  HENT:      Head: Normocephalic  Mouth/Throat:      Mouth: Mucous membranes are moist    Eyes:      Extraocular Movements: Extraocular movements intact     Cardiovascular:      Rate and Rhythm: Normal rate and regular rhythm  Heart sounds: Normal heart sounds  Pulmonary:      Effort: Pulmonary effort is normal       Breath sounds: Normal breath sounds  Abdominal:      General: Bowel sounds are normal       Palpations: Abdomen is soft  Musculoskeletal:         General: Tenderness present  Normal range of motion  Cervical back: Normal range of motion  Right knee: No effusion  Instability Tests: Medial Lauryn test positive  Skin:     General: Skin is warm and dry  Neurological:      General: No focal deficit present  Mental Status: She is alert and oriented to person, place, and time  Psychiatric:         Mood and Affect: Mood normal          Behavior: Behavior normal          Thought Content: Thought content normal      Large joint arthrocentesis: R knee  Universal Protocol:  Consent: Verbal consent obtained  Risks and benefits: risks, benefits and alternatives were discussed  Consent given by: patient  Time out: Immediately prior to procedure a "time out" was called to verify the correct patient, procedure, equipment, support staff and site/side marked as required  Timeout called at: 10/20/2022 10:32 AM   Site marked: the operative site was marked  Supporting Documentation  Indications: pain   Procedure Details  Location: knee - R knee  Preparation: Patient was prepped and draped in the usual sterile fashion  Needle size: 25 G  Ultrasound guidance: no  Approach: anterolateral  Medications administered: 4 mL lidocaine 1 %; 40 mg triamcinolone acetonide 40 mg/mL    Patient tolerance: patient tolerated the procedure well with no immediate complications  Dressing:  Sterile dressing applied            I have personally reviewed pertinent films in PACS and my interpretation is XR-  R knee medial joint space narrowing  mild/mod OA        Assessment/Plan:  Assessment/Plan   Diagnoses and all orders for this visit:    Primary osteoarthritis of one knee, right  -     Ambulatory Referral to Physical Therapy; Future    Other orders  -     Large joint arthrocentesis: R knee        Return if symptoms worsen or fail to improve       Rickey Salas

## 2022-10-24 ENCOUNTER — HOSPITAL ENCOUNTER (OUTPATIENT)
Dept: INFUSION CENTER | Facility: HOSPITAL | Age: 67
Discharge: HOME | End: 2022-10-24
Payer: COMMERCIAL

## 2022-10-24 ENCOUNTER — TELEPHONE (OUTPATIENT)
Dept: GENETICS | Facility: CLINIC | Age: 67
End: 2022-10-24

## 2022-10-24 ENCOUNTER — HOSPITAL ENCOUNTER (OUTPATIENT)
Dept: INFUSION CENTER | Facility: HOSPITAL | Age: 67
Discharge: HOME | End: 2022-10-24
Attending: INTERNAL MEDICINE

## 2022-10-24 VITALS
DIASTOLIC BLOOD PRESSURE: 77 MMHG | OXYGEN SATURATION: 100 % | HEART RATE: 72 BPM | TEMPERATURE: 96.2 F | RESPIRATION RATE: 16 BRPM | SYSTOLIC BLOOD PRESSURE: 120 MMHG

## 2022-10-24 DIAGNOSIS — D72.119 HYPEREOSINOPHILIC SYNDROME, UNSPECIFIED TYPE: Primary | ICD-10-CM

## 2022-10-24 DIAGNOSIS — J45.50 SEVERE PERSISTENT ASTHMA WITHOUT COMPLICATION: ICD-10-CM

## 2022-10-24 RX ORDER — EPINEPHRINE 1 MG/ML
0.3 INJECTION, SOLUTION, CONCENTRATE INTRAVENOUS ONCE
OUTPATIENT
Start: 2022-12-15 | End: 2022-12-15

## 2022-10-24 RX ORDER — EPINEPHRINE 1 MG/ML
0.3 INJECTION, SOLUTION, CONCENTRATE INTRAVENOUS ONCE
Status: DISCONTINUED | OUTPATIENT
Start: 2022-10-24 | End: 2022-10-28 | Stop reason: HOSPADM

## 2022-10-24 RX ADMIN — BENRALIZUMAB 30 MG: 30 INJECTION, SOLUTION SUBCUTANEOUS at 11:21

## 2022-10-24 NOTE — PROGRESS NOTES
Pt observed for 30 minutes post-treatment and was discharged in stable condition  Pt aware of next infusion appointment

## 2022-10-24 NOTE — TELEPHONE ENCOUNTER
I called Twan Hutchison to schedule a new patient appointment with the [facility]       Outcome:   I left a voice message encouraging the patient to call the genetics team at [phone] to schedule this appointment  Follow-up:   At this time the referral will be closed and we will wait to hear back from the patient regarding scheduling this appointment

## 2022-10-24 NOTE — PROGRESS NOTES
Patient presented for fasenra injection, brought epi pen to appointment  Expiration date 4/2023  Tolerated injection to right arm without issue

## 2022-10-28 ENCOUNTER — OFFICE VISIT (OUTPATIENT)
Dept: HEMATOLOGY ONCOLOGY | Facility: CLINIC | Age: 67
End: 2022-10-28

## 2022-10-28 VITALS
TEMPERATURE: 97.1 F | HEIGHT: 64 IN | BODY MASS INDEX: 26.22 KG/M2 | DIASTOLIC BLOOD PRESSURE: 78 MMHG | SYSTOLIC BLOOD PRESSURE: 113 MMHG | WEIGHT: 153.6 LBS | HEART RATE: 67 BPM

## 2022-10-28 DIAGNOSIS — C82.90 FOLLICULAR LYMPHOMA, UNSPECIFIED FOLLICULAR LYMPHOMA TYPE, UNSPECIFIED BODY REGION (HCC): Primary | ICD-10-CM

## 2022-10-28 NOTE — PROGRESS NOTES
[facility] HEMATOLOGY ONCOLOGY SPECIALISTS [place]  [address] [facility]  [place] [address] [id number] [phone]  [address], [phone]  10/28/22    Discussion:   In summary, this is a 62-year-old female history of stage IA follicular lymphoma, grade 1, right medial cubital fossa 2010  Status post RT  Clinically she is doing well  She she functions without restriction  She was able to swim through the entire summer and right shoulder discomfort seems better than before  She is up-to-date with colonoscopy, mammography  MRI of the right shoulder in May 2022 showed stable bone marrow signal abnormality  I suspect this is benign in nature  Rotator cuff tear is noted  CBC and chemistry today and again just prior to her next visit a year from now  I discussed the above with the patient  The patient  voiced understanding and agreement   ______________________________________________________________________    No chief complaint on file  HPI:  Oncology History   Follicular lymphoma ([facility] [address] )   8/18/2010 Initial Diagnosis    stage IA follicle lymphoma, grade 1, 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, diagnosed in August 2010  She underwent radiation therapy, resulting in complete remission  Interval History:  Clinically stable  ECOG-  1 - Symptomatic but completely ambulatory    Review of Systems   Constitutional: Negative for appetite change, diaphoresis, fatigue and fever  HENT: Negative for sinus pain  Eyes: Negative for discharge  Respiratory: Negative for cough and shortness of breath  Cardiovascular: Negative for chest pain  Gastrointestinal: Negative for abdominal pain, constipation and diarrhea  Endocrine: Negative for cold intolerance  Genitourinary: Negative for difficulty urinating and hematuria  Musculoskeletal: Negative for joint swelling  Skin: Negative for rash  Allergic/Immunologic: Negative for environmental allergies     Neurological: Negative for dizziness and headaches  Hematological: Negative for adenopathy  Psychiatric/Behavioral: Negative for agitation         Past Medical History:   Diagnosis Date   • Asthma    • Celiac disease    • Follicular lymphoma ([facility] [address] )    • GERD (gastroesophageal reflux disease)    • Heart palpitations    • History of colonic polyps     resolved 07/12/2016   • History of radiation therapy 2010   • Hyperlipidemia    • Insomnia    • Irregular heart beat    • Lymphoma, 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HCC)     non hodgekins, remission   • Malignant lymphoma ([facility] [address] ) 2010    rt arm cutaneous follicular stage ia (rt diatal medical biceps area , s/p resected and s/p radistion treatment    resolved 12/17/2014   • Postmenopausal disorder     resolved 09/21/2017   • Pulmonary nodule     BENIGN, STABLE [id number]   • Restless legs syndrome     resolved 09/21/2017   • TMJ syndrome     resolved 09/21/2017     Patient Active Problem List   Diagnosis   • Celiac disease   • PVC's (premature ventricular contractions)   • Dyslipidemia   • Back pain   • Chronic GERD   • Heart palpitations   • Insomnia   • Osteopenia   • Sciatica associated with disorder of lumbar spine   • Vitamin D deficiency   • VERONIKA (stress urinary incontinence, female)   • Follicular lymphoma (HCC)   • Personal history of colonic polyps   • Encounter for gynecological examination (general) (routine) without abnormal findings   • Severe persistent asthma without complication   • Non-seasonal allergic rhinitis   • Eosinophilia   • Ganglion cyst of wrist, left   • Pulmonary nodule   • Disorder of tendon of right biceps   • Tear of right supraspinatus tendon   • Asthma   • Hyperlipidemia       Current Outpatient Medications:   •  acetaminophen (TYLENOL) 500 mg tablet, Take 500 mg by mouth every 6 (six) hours as needed for mild pain , Disp: , Rfl:   •  benralizumab (FASENRA) subcutaneous injection, Inject 1 mL (30 mg total) under the skin every 56 days for 6 doses, Disp: 1 Syringe, Rfl: 6  • bimatoprost (LATISSE) 0 03 % ophthalmic solution, Take as directed, Disp: , Rfl:   •  Cholecalciferol 50 MCG (2000 UT) CAPS, Take by mouth daily, Disp: , Rfl:   •  cromolyn (OPTICROM) 4 % ophthalmic solution, Administer 1 drop to both eyes in the morning and 1 drop at noon and 1 drop in the evening and 1 drop before bedtime  , Disp: 10 mL, Rfl: 0  •  cyanocobalamin (VITAMIN B-12) 1,000 mcg tablet, Take 1,000 mcg by mouth daily, Disp: , Rfl:   •  erythromycin with ethanol (EMGEL) 2 % gel, Apply topically 2 (two) times a day, Disp: 30 g, Rfl: 1  •  famotidine (PEPCID) 20 mg tablet, Take 20 mg by mouth 2 (two) times a day , Disp: , Rfl:   •  flecainide (TAMBOCOR) 100 mg tablet, Take 1 tablet (100 mg total) by mouth 2 (two) times a day, Disp: 180 tablet, Rfl: 3  •  fluticasone (FLONASE) 50 mcg/act nasal spray, 2 sprays into each nostril 2 (two) times a day, Disp: 16 g, Rfl: 4  •  fluticasone-vilanterol (BREO ELLIPTA) 100-25 mcg/inh inhaler, Inhale 1 puff daily Rinse mouth after use , Disp: 60 blister, Rfl: 0  •  ipratropium (ATROVENT) 0 03 % nasal spray, 2 sprays into each nostril 3 (three) times a day as needed for rhinitis, Disp: 30 mL, Rfl: 3  •  lidocaine (LMX) 4 % cream, Apply topically as needed for mild pain, Disp: 15 g, Rfl: 3  •  Magnesium 500 MG CAPS, Take by mouth, Disp: , Rfl:   •  metoprolol succinate (TOPROL-XL) 25 mg 24 hr tablet, Take 1 tablet (25 mg total) by mouth daily Taking 1/2 pill daily (Patient taking differently: Take 12 5 mg by mouth daily), Disp: 30 tablet, Rfl: 5  •  montelukast (SINGULAIR) 10 mg tablet, take 1 tablet by mouth at bedtime, Disp: 90 tablet, Rfl: 0  •  omega-3-acid ethyl esters (LOVAZA) 1 g capsule, Take 2 capsules (2 g total) by mouth 2 (two) times a day, Disp: 360 capsule, Rfl: 1  •  ondansetron (ZOFRAN) 4 mg tablet, Take 1 tablet (4 mg total) by mouth every 8 (eight) hours as needed for nausea or vomiting, Disp: 20 tablet, Rfl: 0  •  pantoprazole (PROTONIX) 20 mg tablet, take 2 tablets by mouth twice a day, Disp: 90 tablet, Rfl: 3  •  rosuvastatin (CRESTOR) 5 mg tablet, take 1 tablet by mouth once daily, Disp: 30 tablet, Rfl: 5  •  sucralfate (CARAFATE) 1 g tablet, Take 1 tablet (1 g total) by mouth 4 (four) times a day, Disp: 120 tablet, Rfl: 5  •  Vitamin D, Ergocalciferol, 2000 units CAPS, Take by mouth, Disp: , Rfl:   •  zolpidem (AMBIEN) 5 mg tablet, Take 1 tablet (5 mg total) by mouth daily at bedtime as needed for sleep, Disp: 30 tablet, Rfl: 0  •  EPINEPHrine (EPIPEN) 0 3 mg/0 3 mL SOAJ, Inject 0 3 mL (0 3 mg total) into a muscle once for 1 dose (Patient not taking: Reported on 10/28/2022), Disp: 0 6 mL, Rfl: 0  No current facility-administered medications for this visit  Allergies   Allergen Reactions   • Shellfish-Derived Products - Food Allergy Anaphylaxis   • Wheat Bran - Food Allergy Anaphylaxis   • Gluten Meal - Food Allergy GI Intolerance     Celiac    • Morphine And Related Itching   • Nuts - Food Allergy Hives     Almonds,walnuts, hazelnuts and other related nuts  • Sulfa Antibiotics Rash     Past Surgical History:   Procedure Laterality Date   • COLONOSCOPY  04/16/2019   • FUNCTIONAL ENDOSCOPIC SINUS SURGERY Bilateral 2013    Dr Yuko Beatty   • HYSTERECTOMY      age 39 complete   • LYMPH NODE DISSECTION Right     arm   • NASAL SEPTUM SURGERY  2013    with turbinate reduction - Dr Yuko Beatty   • CT ESOPHAGOGASTRODUODENOSCOPY TRANSORAL DIAGNOSTIC N/A 1/18/2016    Procedure: ESOPHAGOGASTRODUODENOSCOPY (EGD); Surgeon: Rosario Najjar, MD;  [facility];   Service: Gastroenterology   • CT EXCIS RECURRENT GANGLION WRIST Left 12/8/2020    Procedure: WRIST GANGLION CYST EXCISION;  Surgeon: Raheem Miles MD;  [facility];  Service: Orthopedics   • SYNOVECTOMY N/A 12/8/2020    Procedure: ECU TENOSYNOVECTOMY;  Surgeon: Raheem Miles MD;  [facility];  Service: Orthopedics   • TONSILLECTOMY     • TOTAL ABDOMINAL HYSTERECTOMY W/ BILATERAL SALPINGOOPHORECTOMY      age 54   • UPPER GASTROINTESTINAL ENDOSCOPY     • US GUIDED MSK PROCEDURE  1/16/2020   • US GUIDED MSK PROCEDURE  8/7/2020   • Felicita Kee MSK PROCEDURE  8/2/2021     Social History     Objective:  Vitals:    10/28/22 1048   BP: 113/78   Pulse: 67   Temp: (!) 97 1 °F (36 2 °C)   Weight: 69 7 kg (153 lb 9 6 oz)   Height: 5' 4" (1 626 m)     Physical Exam  Constitutional:       Appearance: She is well-developed  HENT:      Head: Normocephalic and atraumatic  Eyes:      Pupils: Pupils are equal, round, and reactive to light  Cardiovascular:      Rate and Rhythm: Normal rate  Heart sounds: No murmur heard  Pulmonary:      Effort: No respiratory distress  Breath sounds: No wheezing or rales  Abdominal:      General: There is no distension  Palpations: Abdomen is soft  Tenderness: There is no abdominal tenderness  There is no rebound  Musculoskeletal:         General: No tenderness  Cervical back: Neck supple  Lymphadenopathy:      Cervical: No cervical adenopathy  Skin:     General: Skin is warm  Findings: No rash  Neurological:      Mental Status: She is alert and oriented to person, place, and time  Deep Tendon Reflexes: Reflexes normal    Psychiatric:         Thought Content: Thought content normal            Labs: I personally reviewed the labs and imaging pertinent to this patient care

## 2022-11-02 ENCOUNTER — TELEPHONE (OUTPATIENT)
Dept: OBGYN CLINIC | Facility: CLINIC | Age: 67
End: 2022-11-02

## 2022-11-02 NOTE — TELEPHONE ENCOUNTER
Caller: Court Camarena     Doctor: Harmony Wheeler     Reason for call: Patient needing to reschedule appointment with Harmony Wheeler on 11/16/22   She is looking to reschedule to January     Call back#: [phone]

## 2022-11-11 ENCOUNTER — HOSPITAL ENCOUNTER (OUTPATIENT)
Dept: PERIOP | Facility: HOSPITAL | Age: 67
Setting detail: OUTPATIENT SURGERY
End: 2022-11-11

## 2022-11-11 ENCOUNTER — ANESTHESIA EVENT (OUTPATIENT)
Dept: ANESTHESIOLOGY | Facility: HOSPITAL | Age: 67
End: 2022-11-11

## 2022-11-11 ENCOUNTER — ANESTHESIA (OUTPATIENT)
Dept: PERIOP | Facility: HOSPITAL | Age: 67
End: 2022-11-11

## 2022-11-11 ENCOUNTER — ANESTHESIA EVENT (OUTPATIENT)
Dept: PERIOP | Facility: HOSPITAL | Age: 67
End: 2022-11-11

## 2022-11-11 ENCOUNTER — ANESTHESIA (OUTPATIENT)
Dept: ANESTHESIOLOGY | Facility: HOSPITAL | Age: 67
End: 2022-11-11

## 2022-11-11 VITALS
RESPIRATION RATE: 20 BRPM | DIASTOLIC BLOOD PRESSURE: 59 MMHG | HEART RATE: 61 BPM | TEMPERATURE: 97.4 F | HEIGHT: 64 IN | WEIGHT: 149 LBS | OXYGEN SATURATION: 98 % | SYSTOLIC BLOOD PRESSURE: 121 MMHG | BODY MASS INDEX: 25.44 KG/M2

## 2022-11-11 DIAGNOSIS — R13.10 DYSPHAGIA, UNSPECIFIED TYPE: ICD-10-CM

## 2022-11-11 RX ORDER — SODIUM CHLORIDE, SODIUM LACTATE, POTASSIUM CHLORIDE, CALCIUM CHLORIDE 600; 310; 30; 20 MG/100ML; MG/100ML; MG/100ML; MG/100ML
INJECTION, SOLUTION INTRAVENOUS CONTINUOUS PRN
Status: DISCONTINUED | OUTPATIENT
Start: 2022-11-11 | End: 2022-11-11

## 2022-11-11 RX ORDER — PROPOFOL 10 MG/ML
INJECTION, EMULSION INTRAVENOUS AS NEEDED
Status: DISCONTINUED | OUTPATIENT
Start: 2022-11-11 | End: 2022-11-11

## 2022-11-11 RX ORDER — LIDOCAINE HYDROCHLORIDE 20 MG/ML
INJECTION, SOLUTION EPIDURAL; INFILTRATION; INTRACAUDAL; PERINEURAL AS NEEDED
Status: DISCONTINUED | OUTPATIENT
Start: 2022-11-11 | End: 2022-11-11

## 2022-11-11 RX ADMIN — PROPOFOL 50 MG: 10 INJECTION, EMULSION INTRAVENOUS at 10:35

## 2022-11-11 RX ADMIN — LIDOCAINE HYDROCHLORIDE 100 MG: 20 INJECTION, SOLUTION EPIDURAL; INFILTRATION; INTRACAUDAL at 10:32

## 2022-11-11 RX ADMIN — PROPOFOL 100 MG: 10 INJECTION, EMULSION INTRAVENOUS at 10:32

## 2022-11-11 RX ADMIN — PROPOFOL 50 MG: 10 INJECTION, EMULSION INTRAVENOUS at 10:38

## 2022-11-11 RX ADMIN — PROPOFOL 50 MG: 10 INJECTION, EMULSION INTRAVENOUS at 10:33

## 2022-11-11 RX ADMIN — SODIUM CHLORIDE, SODIUM LACTATE, POTASSIUM CHLORIDE, AND CALCIUM CHLORIDE: .6; .31; .03; .02 INJECTION, SOLUTION INTRAVENOUS at 10:27

## 2022-11-11 NOTE — ANESTHESIA POSTPROCEDURE EVALUATION
Post-Op Assessment Note    CV Status:  Stable  Pain Score: 0    Pain management: adequate     Mental Status:  Alert and awake   Hydration Status:  Euvolemic   PONV Controlled:  Controlled   Airway Patency:  Patent      Post Op Vitals Reviewed: Yes      Staff: CRNA         No complications documented      BP   109/57   Temp     Pulse  75   Resp   18   SpO2   100

## 2022-11-11 NOTE — H&P
History and Physical - [facility]  Tash Mora 61 y o  female MRN: [id number]                  HPI: Tash Mora is a 61y o  year old female who presents for dysphagia  REVIEW OF SYSTEMS: Per the HPI, and otherwise unremarkable  Historical Information   Past Medical History:   Diagnosis Date   • Asthma    • Celiac disease    • Follicular lymphoma ([facility] [address] )    • GERD (gastroesophageal reflux disease)    • Heart palpitations    • History of colonic polyps     resolved 07/12/2016   • History of radiation therapy 2010   • Hyperlipidemia    • Insomnia    • Irregular heart beat    • Lymphoma, 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[facility] [address]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, remission   • Malignant lymphoma ([facility] [address] ) 2010    rt arm cutaneous follicular stage ia (rt diatal medical biceps area , s/p resected and s/p radistion treatment    resolved 12/17/2014   • Postmenopausal disorder     resolved 09/21/2017   • Pulmonary nodule     BENIGN, STABLE [id number]   • Restless legs syndrome     resolved 09/21/2017   • TMJ syndrome     resolved 09/21/2017     Past Surgical History:   Procedure Laterality Date   • COLONOSCOPY  04/16/2019   • FUNCTIONAL ENDOSCOPIC SINUS SURGERY Bilateral 2013    Dr Sean Sierra   • HYSTERECTOMY      age 39 complete   • LYMPH NODE DISSECTION Right     arm   • NASAL SEPTUM SURGERY  2013    with turbinate reduction - Dr Sean Sierra   • NH ESOPHAGOGASTRODUODENOSCOPY TRANSORAL DIAGNOSTIC N/A 1/18/2016    Procedure: ESOPHAGOGASTRODUODENOSCOPY (EGD); Surgeon: Marcos Rogers MD;  [facility];   Service: Gastroenterology   • NH EXCIS RECURRENT GANGLION WRIST Left 12/8/2020    Procedure: WRIST GANGLION CYST EXCISION;  Surgeon: Domo Baker MD;  [facility];  Service: Orthopedics   • SYNOVECTOMY N/A 12/8/2020    Procedure: ECU TENOSYNOVECTOMY;  Surgeon: Domo Baker MD;  [facility];  Service: Orthopedics   • TONSILLECTOMY     • TOTAL ABDOMINAL HYSTERECTOMY W/ BILATERAL SALPINGOOPHORECTOMY      age 54   • UPPER GASTROINTESTINAL ENDOSCOPY     • Felicita 634 MSK PROCEDURE  2020   • Felicita 634 MSK PROCEDURE  2020   • Felicita 634 MSK PROCEDURE  2021     Social History   Social History     Substance and Sexual Activity   Alcohol Use Yes   • Alcohol/week: 3 0 standard drinks   • Types: 3 Glasses of wine per week    Comment: soc     Social History     Substance and Sexual Activity   Drug Use No     Social History     Tobacco Use   Smoking Status Former Smoker   • Packs/day: 0 75   • Years: 20    • Pack years: 15 00   • Types: Cigarettes   • Start date:    • Quit date:    • Years since quittin 8   Smokeless Tobacco Never Used     Family History   Problem Relation Age of Onset   • Lymphoma Mother    • Throat cancer Father    • Heart failure Father    • Atrial fibrillation Father    • Diabetes Father    • Colonic polyp Father    • Hypertension Father    • Thyroid cancer Sister    • Breast cancer Paternal Grandmother 71   • Breast cancer Paternal Aunt 71   • Ovarian cancer Paternal Aunt 72   • No Known Problems Maternal Grandmother    • No Known Problems Maternal Grandfather    • Cancer Paternal Grandfather    • Lung cancer Paternal Grandfather    • BRCA1 Positive Cousin    • Skin cancer Paternal Aunt    • Throat cancer Paternal Uncle    • No Known Problems Maternal Aunt        Meds/Allergies       Current Outpatient Medications:   •  acetaminophen (TYLENOL) 500 mg tablet  •  benralizumab (FASENRA) subcutaneous injection  •  bimatoprost (LATISSE) 0 03 % ophthalmic solution  •  Cholecalciferol 50 MCG (2000 UT) CAPS  •  cromolyn (OPTICROM) 4 % ophthalmic solution  •  cyanocobalamin (VITAMIN B-12) 1,000 mcg tablet  •  erythromycin with ethanol (EMGEL) 2 % gel  •  famotidine (PEPCID) 20 mg tablet  •  flecainide (TAMBOCOR) 100 mg tablet  •  fluticasone (FLONASE) 50 mcg/act nasal spray  •  fluticasone-vilanterol (BREO ELLIPTA) 100-25 mcg/inh inhaler  •  ipratropium (ATROVENT) 0 03 % nasal spray  • lidocaine (LMX) 4 % cream  •  Magnesium 500 MG CAPS  •  metoprolol succinate (TOPROL-XL) 25 mg 24 hr tablet  •  montelukast (SINGULAIR) 10 mg tablet  •  ondansetron (ZOFRAN) 4 mg tablet  •  pantoprazole (PROTONIX) 20 mg tablet  •  rosuvastatin (CRESTOR) 5 mg tablet  •  sucralfate (CARAFATE) 1 g tablet  •  Vitamin D, Ergocalciferol, 2000 units CAPS  •  zolpidem (AMBIEN) 5 mg tablet  •  EPINEPHrine (EPIPEN) 0 3 mg/0 3 mL SOAJ  •  omega-3-acid ethyl esters (LOVAZA) 1 g capsule    Allergies   Allergen Reactions   • Shellfish-Derived Products - Food Allergy Anaphylaxis   • Wheat Bran - Food Allergy Anaphylaxis   • Gluten Meal - Food Allergy GI Intolerance     Celiac    • Morphine And Related Itching   • Nuts - Food Allergy Hives     Almonds,walnuts, hazelnuts and other related nuts  • Sulfa Antibiotics Rash       Objective     /78   Pulse 60   Temp (!) 97 1 °F (36 2 °C) (Tympanic)   Resp 18   Ht 5' 4" (1 626 m)   Wt 67 6 kg (149 lb)   SpO2 98%   BMI 25 58 kg/m²       PHYSICAL EXAM    Gen: NAD  Head: NCAT  CV: RRR  CHEST: Clear  ABD: soft, NT/ND  EXT: no edema      ASSESSMENT/PLAN:  This is a 61y o  year old female here for upper endoscopy, and she is stable and optimized for her procedure

## 2022-11-11 NOTE — ANESTHESIA PREPROCEDURE EVALUATION
Procedure:  PRE-OP ONLY    Relevant Problems   CARDIO   (+) Hyperlipidemia   (+) PVC's (premature ventricular contractions)      GI/HEPATIC   (+) Chronic GERD      HEMATOLOGY   (+) Follicular lymphoma (HCC)      MUSCULOSKELETAL   (+) Back pain   (+) Sciatica associated with disorder of lumbar spine      NEURO/PSYCH   (+) Personal history of colonic polyps      PULMONARY   (+) Asthma   (+) Severe persistent asthma without complication        Physical Exam    Airway    Mallampati score: II  TM Distance: >3 FB  Neck ROM: full     Dental   No notable dental hx     Cardiovascular  Cardiovascular exam normal    Pulmonary  Pulmonary exam normal     Other Findings        Anesthesia Plan  ASA Score- 2     Anesthesia Type- IV sedation with anesthesia with ASA Monitors  Additional Monitors:   Airway Plan:           Plan Factors-Exercise tolerance (METS): >4 METS  Chart reviewed  EKG reviewed  Imaging results reviewed  Existing labs reviewed  Patient summary reviewed  Patient is not a current smoker  Patient not instructed to abstain from smoking on day of procedure  Patient did not smoke on day of surgery  Induction-     Postoperative Plan-     Informed Consent- Anesthetic plan and risks discussed with patient  I personally reviewed this patient with the CRNA  Discussed and agreed on the Anesthesia Plan with the CRNA  Cb Gomez

## 2022-11-11 NOTE — ANESTHESIA PREPROCEDURE EVALUATION
Procedure:  EGD    Relevant Problems   CARDIO   (+) Hyperlipidemia   (+) PVC's (premature ventricular contractions)      GI/HEPATIC   (+) Chronic GERD      HEMATOLOGY   (+) Follicular lymphoma (HCC)      MUSCULOSKELETAL   (+) Back pain   (+) Sciatica associated with disorder of lumbar spine      NEURO/PSYCH   (+) Personal history of colonic polyps      PULMONARY   (+) Asthma   (+) Severe persistent asthma without complication        Physical Exam    Airway    Mallampati score: II  TM Distance: >3 FB  Neck ROM: full     Dental   No notable dental hx     Cardiovascular  Cardiovascular exam normal    Pulmonary  Pulmonary exam normal     Other Findings        Anesthesia Plan  ASA Score- 2     Anesthesia Type- IV sedation with anesthesia with ASA Monitors  Additional Monitors:   Airway Plan:           Plan Factors-Exercise tolerance (METS): >4 METS  Chart reviewed  EKG reviewed  Imaging results reviewed  Existing labs reviewed  Patient summary reviewed  Patient is not a current smoker  Patient not instructed to abstain from smoking on day of procedure  Patient did not smoke on day of surgery  Induction-     Postoperative Plan-     Informed Consent- Anesthetic plan and risks discussed with patient  I personally reviewed this patient with the CRNA  Discussed and agreed on the Anesthesia Plan with the CRNA  Titi Gabriel

## 2022-11-15 ENCOUNTER — OFFICE VISIT (OUTPATIENT)
Dept: PULMONOLOGY | Facility: CLINIC | Age: 67
End: 2022-11-15

## 2022-11-15 ENCOUNTER — APPOINTMENT (OUTPATIENT)
Dept: LAB | Facility: CLINIC | Age: 67
End: 2022-11-15

## 2022-11-15 VITALS
WEIGHT: 150 LBS | TEMPERATURE: 97.1 F | SYSTOLIC BLOOD PRESSURE: 104 MMHG | OXYGEN SATURATION: 98 % | HEIGHT: 64 IN | HEART RATE: 71 BPM | RESPIRATION RATE: 18 BRPM | BODY MASS INDEX: 25.61 KG/M2 | DIASTOLIC BLOOD PRESSURE: 72 MMHG

## 2022-11-15 DIAGNOSIS — J30.89 NON-SEASONAL ALLERGIC RHINITIS, UNSPECIFIED TRIGGER: ICD-10-CM

## 2022-11-15 DIAGNOSIS — K21.9 CHRONIC GERD: ICD-10-CM

## 2022-11-15 DIAGNOSIS — C82.90 FOLLICULAR LYMPHOMA, UNSPECIFIED FOLLICULAR LYMPHOMA TYPE, UNSPECIFIED BODY REGION (HCC): ICD-10-CM

## 2022-11-15 DIAGNOSIS — R09.81 NASAL CONGESTION: ICD-10-CM

## 2022-11-15 DIAGNOSIS — J45.50 SEVERE PERSISTENT ASTHMA WITHOUT COMPLICATION: Primary | ICD-10-CM

## 2022-11-15 LAB
ALBUMIN SERPL BCP-MCNC: 3.7 G/DL (ref 3.5–5)
ALP SERPL-CCNC: 68 U/L (ref 46–116)
ALT SERPL W P-5'-P-CCNC: 54 U/L (ref 12–78)
ANION GAP SERPL CALCULATED.3IONS-SCNC: 4 MMOL/L (ref 4–13)
AST SERPL W P-5'-P-CCNC: 44 U/L (ref 5–45)
BASOPHILS # BLD AUTO: 0.02 THOUSANDS/ÂΜL (ref 0–0.1)
BASOPHILS NFR BLD AUTO: 0 % (ref 0–1)
BILIRUB SERPL-MCNC: 0.86 MG/DL (ref 0.2–1)
BUN SERPL-MCNC: 15 MG/DL (ref 5–25)
CALCIUM SERPL-MCNC: 9 MG/DL (ref 8.3–10.1)
CHLORIDE SERPL-SCNC: 106 MMOL/L (ref 96–108)
CO2 SERPL-SCNC: 27 MMOL/L (ref 21–32)
CREAT SERPL-MCNC: 0.85 MG/DL (ref 0.6–1.3)
EOSINOPHIL # BLD AUTO: 0 THOUSAND/ÂΜL (ref 0–0.61)
EOSINOPHIL NFR BLD AUTO: 0 % (ref 0–6)
ERYTHROCYTE [DISTWIDTH] IN BLOOD BY AUTOMATED COUNT: 13 % (ref 11.6–15.1)
GFR SERPL CREATININE-BSD FRML MDRD: 72 ML/MIN/1.73SQ M
GLUCOSE P FAST SERPL-MCNC: 113 MG/DL (ref 65–99)
HCT VFR BLD AUTO: 41 % (ref 34.8–46.1)
HGB BLD-MCNC: 13.2 G/DL (ref 11.5–15.4)
IMM GRANULOCYTES # BLD AUTO: 0.03 THOUSAND/UL (ref 0–0.2)
IMM GRANULOCYTES NFR BLD AUTO: 1 % (ref 0–2)
LYMPHOCYTES # BLD AUTO: 1.43 THOUSANDS/ÂΜL (ref 0.6–4.47)
LYMPHOCYTES NFR BLD AUTO: 23 % (ref 14–44)
MCH RBC QN AUTO: 30.3 PG (ref 26.8–34.3)
MCHC RBC AUTO-ENTMCNC: 32.2 G/DL (ref 31.4–37.4)
MCV RBC AUTO: 94 FL (ref 82–98)
MONOCYTES # BLD AUTO: 0.5 THOUSAND/ÂΜL (ref 0.17–1.22)
MONOCYTES NFR BLD AUTO: 8 % (ref 4–12)
NEUTROPHILS # BLD AUTO: 4.31 THOUSANDS/ÂΜL (ref 1.85–7.62)
NEUTS SEG NFR BLD AUTO: 68 % (ref 43–75)
NRBC BLD AUTO-RTO: 0 /100 WBCS
PLATELET # BLD AUTO: 196 THOUSANDS/UL (ref 149–390)
PMV BLD AUTO: 11.1 FL (ref 8.9–12.7)
POTASSIUM SERPL-SCNC: 4.1 MMOL/L (ref 3.5–5.3)
PROT SERPL-MCNC: 6.9 G/DL (ref 6.4–8.4)
RBC # BLD AUTO: 4.36 MILLION/UL (ref 3.81–5.12)
SODIUM SERPL-SCNC: 137 MMOL/L (ref 135–147)
WBC # BLD AUTO: 6.29 THOUSAND/UL (ref 4.31–10.16)

## 2022-11-15 RX ORDER — FLUTICASONE PROPIONATE 50 MCG
2 SPRAY, SUSPENSION (ML) NASAL 2 TIMES DAILY
Qty: 16 G | Refills: 4 | Status: SHIPPED | OUTPATIENT
Start: 2022-11-15 | End: 2022-11-15

## 2022-11-15 RX ORDER — LEVALBUTEROL TARTRATE 45 UG/1
1-2 AEROSOL, METERED ORAL EVERY 4 HOURS PRN
COMMUNITY

## 2022-11-15 RX ORDER — FLUTICASONE FUROATE AND VILANTEROL 100; 25 UG/1; UG/1
1 POWDER RESPIRATORY (INHALATION) DAILY
Qty: 60 BLISTER | Refills: 0 | Status: SHIPPED | OUTPATIENT
Start: 2022-11-15 | End: 2022-12-15

## 2022-11-15 RX ORDER — FLUTICASONE PROPIONATE 50 MCG
2 SPRAY, SUSPENSION (ML) NASAL 2 TIMES DAILY
Qty: 48 G | Refills: 4 | Status: SHIPPED | OUTPATIENT
Start: 2022-11-15

## 2022-11-15 NOTE — ASSESSMENT & PLAN NOTE
· Controlled on Fasenra  · On Breo Ellipta but does not use this all the time  Was able to come off it for a couple of months during the summer  Tends to be more symptomatic in the late fall and spring   · Breo renewed   · Has levalbuterol rescue inhaler    Albuterol contraindicated due to palpitations and history of tachyarrhythmia on flecainide

## 2022-11-15 NOTE — PROGRESS NOTES
Progress note - Pulmonary Medicine   Aliyah Nice 61 y o  female MRN: [id number]       Impression & Plan:     Severe persistent asthma without complication  · Controlled on Fasenra  · On Breo Ellipta but does not use this all the time  Was able to come off it for a couple of months during the summer  Tends to be more symptomatic in the late fall and spring   · Breo renewed   · Has levalbuterol rescue inhaler  Albuterol contraindicated due to palpitations and history of tachyarrhythmia on flecainide    Non-seasonal allergic rhinitis  · Does continue to use Singulair intermittently but has not notice that it is particularly beneficial   · May try discontinuation but will resume if symptoms worsen  · Flonase as needed during allergy season  · Does also report occasional use of Sudafed  She was advised that this would not be advisable for chronic use and may be contraindicated given her flecainide therapy    Chronic GERD  · Had peptic ulcer and small hiatal hernia   · On recent EGD peptic ulcers improved due to significant reduction of NSAID use and compliance with Protonix      Continue annual follow-up  ______________________________________________________________________    HPI:    Aliyah Nice presents today for follow-up of severe persistent asthma  She is been on [place] for over a year  She has had significant improvement in her allergic rhinitis and asthma symptom control since being on Fasenra  She does not report any new symptoms  No cough, chest pain, wheezing, or new respiratory symptoms  She does notice that her symptoms do seem to be slightly worse during the late fall and winter as well as the springtime allergy season    She is on Breo Ellipta and Singulair  [facility]    She has a levalbuterol inhaler but does not require this with any regularity    Current Medications:    Current Outpatient Medications:   •  acetaminophen (TYLENOL) 500 mg tablet, Take 500 mg by mouth every 6 (six) hours as needed for mild pain , Disp: , Rfl:   •  benralizumab (FASENRA) subcutaneous injection, Inject 1 mL (30 mg total) under the skin every 56 days for 6 doses, Disp: 1 Syringe, Rfl: 6  •  bimatoprost (LATISSE) 0 03 % ophthalmic solution, Take as directed, Disp: , Rfl:   •  Cholecalciferol 50 MCG (2000 UT) CAPS, Take by mouth daily, Disp: , Rfl:   •  cromolyn (OPTICROM) 4 % ophthalmic solution, Administer 1 drop to both eyes in the morning and 1 drop at noon and 1 drop in the evening and 1 drop before bedtime  , Disp: 10 mL, Rfl: 0  •  cyanocobalamin (VITAMIN B-12) 1,000 mcg tablet, Take 1,000 mcg by mouth daily, Disp: , Rfl:   •  erythromycin with ethanol (EMGEL) 2 % gel, Apply topically 2 (two) times a day, Disp: 30 g, Rfl: 1  •  famotidine (PEPCID) 20 mg tablet, Take 20 mg by mouth 2 (two) times a day , Disp: , Rfl:   •  flecainide (TAMBOCOR) 100 mg tablet, Take 1 tablet (100 mg total) by mouth 2 (two) times a day, Disp: 180 tablet, Rfl: 3  •  fluticasone (FLONASE) 50 mcg/act nasal spray, 2 sprays into each nostril 2 (two) times a day, Disp: 48 g, Rfl: 4  •  Fluticasone Furoate-Vilanterol (Breo Ellipta) 100-25 mcg/actuation inhaler, Inhale 1 puff daily Rinse mouth after use , Disp: 60 blister, Rfl: 0  •  ipratropium (ATROVENT) 0 03 % nasal spray, 2 sprays into each nostril 3 (three) times a day as needed for rhinitis, Disp: 30 mL, Rfl: 3  •  levalbuterol (XOPENEX HFA) 45 mcg/act inhaler, Inhale 1-2 puffs every 4 (four) hours as needed, Disp: , Rfl:   •  lidocaine (LMX) 4 % cream, Apply topically as needed for mild pain, Disp: 15 g, Rfl: 3  •  Magnesium 500 MG CAPS, Take by mouth, Disp: , Rfl:   •  metoprolol succinate (TOPROL-XL) 25 mg 24 hr tablet, Take 1 tablet (25 mg total) by mouth daily Taking 1/2 pill daily (Patient taking differently: Take 12 5 mg by mouth daily), Disp: 30 tablet, Rfl: 5  •  montelukast (SINGULAIR) 10 mg tablet, take 1 tablet by mouth at bedtime, Disp: 90 tablet, Rfl: 0  •  omega-3-acid ethyl esters (LOVAZA) 1 g capsule, Take 2 capsules (2 g total) by mouth 2 (two) times a day, Disp: 360 capsule, Rfl: 1  •  ondansetron (ZOFRAN) 4 mg tablet, Take 1 tablet (4 mg total) by mouth every 8 (eight) hours as needed for nausea or vomiting, Disp: 20 tablet, Rfl: 0  •  pantoprazole (PROTONIX) 20 mg tablet, take 2 tablets by mouth twice a day, Disp: 90 tablet, Rfl: 3  •  rosuvastatin (CRESTOR) 5 mg tablet, take 1 tablet by mouth once daily, Disp: 30 tablet, Rfl: 5  •  sucralfate (CARAFATE) 1 g tablet, Take 1 tablet (1 g total) by mouth 4 (four) times a day, Disp: 120 tablet, Rfl: 5  •  Vitamin D, Ergocalciferol, 2000 units CAPS, Take by mouth, Disp: , Rfl:   •  zolpidem (AMBIEN) 5 mg tablet, Take 1 tablet (5 mg total) by mouth daily at bedtime as needed for sleep, Disp: 30 tablet, Rfl: 0  •  EPINEPHrine (EPIPEN) 0 3 mg/0 3 mL SOAJ, Inject 0 3 mL (0 3 mg total) into a muscle once for 1 dose (Patient not taking: No sig reported), Disp: 0 6 mL, Rfl: 0    Review of Systems:    Aside from what is mentioned in the HPI, the review of systems is otherwise negative    Past medical history, surgical history, and family history were reviewed and updated as appropriate    Social history updates:  Social History     Tobacco Use   Smoking Status Former Smoker   • Packs/day: 0 75   • Years: 20 00   • Pack years: 15 00   • Types: Cigarettes   • Start date: 48   • Quit date:    • Years since quittin 8   Smokeless Tobacco Never Used       PhysicalExamination:  Vitals:   /72 (BP Location: Right arm, Patient Position: Sitting, Cuff Size: Standard)   Pulse 71   Temp (!) 97 1 °F (36 2 °C) (Tympanic)   Resp 18   Ht 5' 4" (1 626 m)   Wt 68 kg (150 lb)   SpO2 98%   BMI 25 75 kg/m²   Gen:  Comfortable on room air  No conversational dyspnea  HEENT:  Conjugate gaze  sclerae anicteric  Oropharynx moist  Neck: Trachea is midline  No JVD   No adenopathy  Chest: Symmetric chest wall excursion  Breath sounds are clear  Cardiac:  Regular  no murmur  Abdomen:  benign  Extremities: No edema  Neuro:  Normal speech and mentation     Diagnostic Data:  Labs:   I personally reviewed the most recent laboratory data pertinent to today's visit    Lab Results   Component Value Date    WBC 5 72 11/02/2021    HGB 13 7 11/02/2021    HCT 41 1 11/02/2021    MCV 92 11/02/2021     11/02/2021     Lab Results   Component Value Date    SODIUM 139 05/16/2022    K 3 8 05/16/2022    CO2 24 05/16/2022     05/16/2022    BUN 17 05/16/2022    CREATININE 1 21 05/16/2022    CALCIUM 9 3 05/16/2022       No new pulmonary diagnostic testing    Martín Wright MD

## 2022-11-15 NOTE — ASSESSMENT & PLAN NOTE
· Does continue to use Singulair intermittently but has not notice that it is particularly beneficial   · May try discontinuation but will resume if symptoms worsen  · Flonase as needed during allergy season  · Does also report occasional use of Sudafed    She was advised that this would not be advisable for chronic use and may be contraindicated given her flecainide therapy

## 2022-11-15 NOTE — ASSESSMENT & PLAN NOTE
· Had peptic ulcer and small hiatal hernia   · On recent EGD peptic ulcers improved due to significant reduction of NSAID use and compliance with Protonix

## 2022-12-12 ENCOUNTER — OFFICE VISIT (OUTPATIENT)
Dept: GASTROENTEROLOGY | Facility: CLINIC | Age: 67
End: 2022-12-12

## 2022-12-12 VITALS
BODY MASS INDEX: 26.43 KG/M2 | OXYGEN SATURATION: 99 % | HEIGHT: 64 IN | TEMPERATURE: 96.9 F | WEIGHT: 154.8 LBS | HEART RATE: 77 BPM | DIASTOLIC BLOOD PRESSURE: 81 MMHG | SYSTOLIC BLOOD PRESSURE: 118 MMHG

## 2022-12-12 DIAGNOSIS — K90.0 CELIAC DISEASE: ICD-10-CM

## 2022-12-12 DIAGNOSIS — K21.9 GASTROESOPHAGEAL REFLUX DISEASE WITHOUT ESOPHAGITIS: Primary | ICD-10-CM

## 2022-12-12 NOTE — PROGRESS NOTES
[facility] - Outpatient Follow-up Note  Maude Samuels 74 y o  female MRN: [id number]  Encounter: [id number]          ASSESSMENT AND PLAN:      1  Gastroesophageal reflux disease without esophagitis    Patient recently underwent an EGD that revealed a small sliding hiatal hernia, mild gastritis, benign-appearing gastric polyps and a normal esophagus and duodenum  Biopsies revealed mild gastritis but otherwise normal   Discussed results of EGD in detail with patient  All questions answered  Patient reports that she does have occasional episodes of acid reflux but believes that it is related to diet  She also states that she does not always take the pantoprazole on a regular basis  She raises concerns over long-term side effects  Discussed long-term side effects versus Campbell's esophagus  Also discussed GERD diet and lifestyle modifications  Handout was given  Discussed the use of Pepcid twice a day versus a PPI  Patient is agreeable to a trial of this  Discussed that if she was having breakthrough heartburn weekly despite Pepcid and diet/lifestyle modifications, would consider restarting pantoprazole  2  Celiac disease    Patient also has a history of celiac disease  No evidence was noted on EGD  Patient reports that she follows a strict gluten-free diet  Her most recent lab work in September revealed a normal TTG and IgA  Additionally her vitamin levels and iron panel were normal   Patient states that she can use taking her vitamins and has added a calcium supplement  Patient's last DEXA scan was in August that revealed osteopenia     -Continue strict gluten-free diet    Colon cancer screening  Patient's last colonoscopy was in 2019 and this was normal   She was recommended to repeat in 5 years due to a personal history of colon polyps    -Repeat colonoscopy in 2024    We will see patient back in 6 months or sooner if needed  ______________________________________________________________________    SUBJECTIVE:  Sienna Later is a 62-year-old female that presents today for a follow-up after an EGD that revealed a small sliding hiatal hernia, mild gastritis, benign-appearing gastric polyps and a normal esophagus and duodenum  Biopsies revealed mild gastritis but otherwise normal   Patient does have a history of celiac disease and underwent celiac panel and lab work for vitamin deficiencies after her last visit in August that were normal   Patient's last DEXA scan was in August that revealed osteopenia  She currently takes cholecalciferol, vitamin D and vitamin B12  She reports that she is also taking an over-the-counter calcium supplement recommended by her PCP  Patient states that she occasionally does get breakthrough acid reflux which she attributes to her diet  She specifically mentions eating chili and having a glass of wine in the evening recently that caused significant acid reflux  Patient states that she has not been taking her pantoprazole on a regular basis  She is concerned about long-term side effects  REVIEW OF SYSTEMS:  Review of Systems   HENT: Negative for trouble swallowing  Gastrointestinal: Negative for abdominal distention, abdominal pain, anal bleeding, blood in stool, constipation, diarrhea, nausea and vomiting           Historical Information   Past Medical History:   Diagnosis Date   • Asthma    • Celiac disease    • [facility]    • GERD (gastroesophageal reflux disease)    • Heart palpitations    • History of colonic polyps     resolved 07/12/2016   • History of radiation therapy 2010   • Hyperlipidemia    • Insomnia    • Irregular heart beat    • Lymphoma, 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HCC)     non hodgekins, remission   • Malignant lymphoma ([facility] ) 2010    rt arm cutaneous follicular stage ia (rt diatal medical biceps area , s/p resected and s/p radistion treatment    resolved 12/17/2014 • Postmenopausal disorder     resolved 2017   • Pulmonary nodule     BENIGN, STABLE 8051-7846   • Restless legs syndrome     resolved 2017   • TMJ syndrome     resolved 2017     Past Surgical History:   Procedure Laterality Date   • COLONOSCOPY  2019   • FUNCTIONAL ENDOSCOPIC SINUS SURGERY Bilateral     Dr Chris Dye   • HYSTERECTOMY      age 39 complete   • LYMPH NODE DISSECTION Right     arm   • NASAL SEPTUM SURGERY      with turbinate reduction - Dr Chris Dye   • AL ESOPHAGOGASTRODUODENOSCOPY TRANSORAL DIAGNOSTIC N/A 2016    Procedure: ESOPHAGOGASTRODUODENOSCOPY (EGD); Surgeon: Asya Guardado MD;  [facility];   Service: Gastroenterology   • AL EXCISION GANGLION WRIST DORSAL/VOLAR RECURRENT Left 2020    Procedure: WRIST GANGLION CYST EXCISION;  Surgeon: Esperanza Richards MD;  [facility];  Service: Orthopedics   • SYNOVECTOMY N/A 2020    Procedure: ECU TENOSYNOVECTOMY;  Surgeon: Esperanza Richards MD;  [facility];  Service: Orthopedics   • TONSILLECTOMY     • TOTAL ABDOMINAL HYSTERECTOMY W/ BILATERAL SALPINGOOPHORECTOMY      age 54   • UPPER GASTROINTESTINAL ENDOSCOPY     • US GUIDED MSK PROCEDURE  2020   • [address] MSK PROCEDURE  2020   • [address] MSK PROCEDURE  2021     Social History   Social History     Substance and Sexual Activity   Alcohol Use Yes   • Alcohol/week: 3 0 standard drinks   • Types: 3 Glasses of wine per week    Comment: soc     Social History     Substance and Sexual Activity   Drug Use No     Social History     Tobacco Use   Smoking Status Former   • Packs/day: 0 75   • Years: 20 00   • Pack years: 15 00   • Types: Cigarettes   • Start date: 48   • Quit date:    • Years since quittin 9   Smokeless Tobacco Never     Family History   Problem Relation Age of Onset   • Lymphoma Mother    • Throat cancer Father    • Heart failure Father    • Atrial fibrillation Father    • Diabetes Father    • Colonic polyp Father    • Hypertension Father    • Thyroid cancer Sister    • Breast cancer Paternal Grandmother 71   • Breast cancer Paternal Aunt 71   • Ovarian cancer Paternal Aunt 72   • No Known Problems Maternal Grandmother    • No Known Problems Maternal Grandfather    • Cancer Paternal Grandfather    • Lung cancer Paternal Grandfather    • BRCA1 Positive Cousin    • Skin cancer Paternal Aunt    • Throat cancer Paternal Uncle    • No Known Problems Maternal Aunt        Meds/Allergies       Current Outpatient Medications:   •  acetaminophen (TYLENOL) 500 mg tablet  •  benralizumab (FASENRA) subcutaneous injection  •  bimatoprost (LATISSE) 0 03 % ophthalmic solution  •  Cholecalciferol 50 MCG (2000 UT) CAPS  •  cromolyn (OPTICROM) 4 % ophthalmic solution  •  cyanocobalamin (VITAMIN B-12) 1,000 mcg tablet  •  erythromycin with ethanol (EMGEL) 2 % gel  •  famotidine (PEPCID) 20 mg tablet  •  flecainide (TAMBOCOR) 100 mg tablet  •  fluticasone (FLONASE) 50 mcg/act nasal spray  •  Fluticasone Furoate-Vilanterol (Breo Ellipta) 100-25 mcg/actuation inhaler  •  ipratropium (ATROVENT) 0 03 % nasal spray  •  levalbuterol (XOPENEX HFA) 45 mcg/act inhaler  •  lidocaine (LMX) 4 % cream  •  Magnesium 500 MG CAPS  •  metoprolol succinate (TOPROL-XL) 25 mg 24 hr tablet  •  montelukast (SINGULAIR) 10 mg tablet  •  ondansetron (ZOFRAN) 4 mg tablet  •  pantoprazole (PROTONIX) 20 mg tablet  •  rosuvastatin (CRESTOR) 5 mg tablet  •  sucralfate (CARAFATE) 1 g tablet  •  Vitamin D, Ergocalciferol, 2000 units CAPS  •  zolpidem (AMBIEN) 5 mg tablet  •  EPINEPHrine (EPIPEN) 0 3 mg/0 3 mL SOAJ  •  omega-3-acid ethyl esters (LOVAZA) 1 g capsule    Allergies   Allergen Reactions   • Shellfish-Derived Products - Food Allergy Anaphylaxis   • Wheat Bran - Food Allergy Anaphylaxis   • Gluten Meal - Food Allergy GI Intolerance     Celiac    • Morphine And Related Itching   • Nuts - Food Allergy Hives     Almonds,walnuts, hazelnuts and other related nuts  • Sulfa Antibiotics Rash           Objective     Blood pressure 118/81, pulse 77, temperature (!) 96 9 °F (36 1 °C), temperature source Tympanic, height 5' 4" (1 626 m), weight 70 2 kg (154 lb 12 8 oz), SpO2 99 %, not currently breastfeeding  Body mass index is 26 57 kg/m²  PHYSICAL EXAM:      General Appearance:   Alert, cooperative, no distress   HEENT:   Normocephalic, atraumatic, anicteric  Neck:  Supple, symmetrical, trachea midline   Lungs:   Clear to auscultation bilaterally; no rales, rhonchi or wheezing; respirations unlabored    Heart[de-identified]   Regular rate and rhythm; no murmur, rub, or gallop  Abdomen:   Soft, non-tender, non-distended; normal bowel sounds; no masses, no organomegaly    Genitalia:   Deferred    Rectal:   Deferred    Extremities:  No cyanosis, clubbing or edema    Skin:  No jaundice, rashes, or lesions             Lab Results:   No visits with results within 1 Day(s) from this visit     Latest known visit with results is:   Appointment on 11/15/2022   Component Date Value   • WBC 11/15/2022 6 29    • RBC 11/15/2022 4 36    • Hemoglobin 11/15/2022 13 2    • Hematocrit 11/15/2022 41 0    • MCV 11/15/2022 94    • MCH 11/15/2022 30 3    • MCHC 11/15/2022 32 2    • RDW 11/15/2022 13 0    • MPV 11/15/2022 11 1    • Platelets 89/11/2393 196    • nRBC 11/15/2022 0    • Neutrophils Relative 11/15/2022 68    • Immat GRANS % 11/15/2022 1    • Lymphocytes Relative 11/15/2022 23    • Monocytes Relative 11/15/2022 8    • Eosinophils Relative 11/15/2022 0    • Basophils Relative 11/15/2022 0    • Neutrophils Absolute 11/15/2022 4 31    • Immature Grans Absolute 11/15/2022 0 03    • Lymphocytes Absolute 11/15/2022 1 43    • Monocytes Absolute 11/15/2022 0 50    • Eosinophils Absolute 11/15/2022 0 00    • Basophils Absolute 11/15/2022 0 02    • Sodium 11/15/2022 137    • Potassium 11/15/2022 4 1    • Chloride 11/15/2022 106    • CO2 11/15/2022 27    • ANION GAP 11/15/2022 4    • BUN 11/15/2022 15    • Creatinine 11/15/2022 0 85    • Glucose, Fasting 11/15/2022 113 (H)    • Calcium 11/15/2022 9 0    • AST 11/15/2022 44    • ALT 11/15/2022 54    • Alkaline Phosphatase 11/15/2022 68    • Total Protein 11/15/2022 6 9    • Albumin 11/15/2022 3 7    • Total Bilirubin 11/15/2022 0 86    • eGFR 11/15/2022 72          Radiology Results:   No results found

## 2022-12-19 ENCOUNTER — HOSPITAL ENCOUNTER (OUTPATIENT)
Dept: INFUSION CENTER | Facility: HOSPITAL | Age: 67
Discharge: HOME | End: 2022-12-19
Attending: INTERNAL MEDICINE

## 2022-12-19 VITALS
OXYGEN SATURATION: 100 % | TEMPERATURE: 97.2 F | HEART RATE: 80 BPM | DIASTOLIC BLOOD PRESSURE: 79 MMHG | RESPIRATION RATE: 16 BRPM | SYSTOLIC BLOOD PRESSURE: 122 MMHG

## 2022-12-19 DIAGNOSIS — D72.119 HYPEREOSINOPHILIC SYNDROME, UNSPECIFIED TYPE: ICD-10-CM

## 2022-12-19 DIAGNOSIS — J45.50 SEVERE PERSISTENT ASTHMA WITHOUT COMPLICATION: Primary | ICD-10-CM

## 2022-12-19 RX ORDER — EPINEPHRINE 1 MG/ML
0.3 INJECTION, SOLUTION, CONCENTRATE INTRAVENOUS ONCE
OUTPATIENT
Start: 2023-02-13 | End: 2023-02-13

## 2022-12-19 RX ADMIN — BENRALIZUMAB 30 MG: 30 INJECTION, SOLUTION SUBCUTANEOUS at 11:59

## 2022-12-19 NOTE — PROGRESS NOTES
Patient presented for fasenra injection, brought epi pen to appointment  Expiration date 4/2023  Tolerated injection to left arm without issue  Patient observed for 30 minutes post injection with no s/s of adverse reaction noted   Appointment made for next injection, discharged in stable condition, declined AVS

## 2022-12-20 ENCOUNTER — APPOINTMENT (OUTPATIENT)
Dept: RADIOLOGY | Facility: CLINIC | Age: 67
End: 2022-12-20

## 2022-12-20 ENCOUNTER — OFFICE VISIT (OUTPATIENT)
Dept: URGENT CARE | Facility: CLINIC | Age: 67
End: 2022-12-20

## 2022-12-20 VITALS
SYSTOLIC BLOOD PRESSURE: 125 MMHG | OXYGEN SATURATION: 99 % | DIASTOLIC BLOOD PRESSURE: 78 MMHG | TEMPERATURE: 97.9 F | HEART RATE: 83 BPM | RESPIRATION RATE: 18 BRPM

## 2022-12-20 DIAGNOSIS — J06.9 VIRAL UPPER RESPIRATORY TRACT INFECTION: Primary | ICD-10-CM

## 2022-12-20 DIAGNOSIS — J45.50 SEVERE PERSISTENT ASTHMA WITHOUT COMPLICATION: ICD-10-CM

## 2022-12-20 DIAGNOSIS — R05.1 ACUTE COUGH: ICD-10-CM

## 2022-12-20 RX ORDER — PREDNISONE 20 MG/1
40 TABLET ORAL DAILY
Qty: 10 TABLET | Refills: 0 | Status: SHIPPED | OUTPATIENT
Start: 2022-12-20 | End: 2022-12-25

## 2022-12-20 NOTE — PATIENT INSTRUCTIONS
Start prednisone as prescribed  Vitamin D3 2000 IU daily  Vitamin C 1000mg twice per day  Multivitamin daily  Fluids and rest  Over the counter cold medication as needed (EX: Coricidin HBP, tylenol/motrin)  Follow up with GI  Follow up with PCP in 3-5 days  Proceed to ER if symptoms worsen

## 2022-12-20 NOTE — PROGRESS NOTES
[facility] [facility]        NAME: Althia Duverney is a 74 y o  female  : 1955    MRN: [id number]  DATE: 2022  TIME: 1:21 PM    Assessment and Plan   Viral upper respiratory tract infection [J06 9]  1  Viral upper respiratory tract infection        2  Severe persistent asthma without complication  XR chest pa & lateral    predniSONE 20 mg tablet      3  Acute cough  XR chest pa & lateral    predniSONE 20 mg tablet        CXR Reviewed: No acute abnormalities    EKG: NSR  No acute ST changes    Patient Instructions     Start prednisone as prescribed  Vitamin D3 2000 IU daily  Vitamin C 1000mg twice per day  Multivitamin daily  Fluids and rest  Over the counter cold medication as needed (EX: Coricidin HBP, tylenol/motrin)  Follow up with GI  Follow up with PCP in 3-5 days  Proceed to ER if symptoms worsen  Chief Complaint     Chief Complaint   Patient presents with   • Cough     And chest congestion for 3 weeks thinks she may have pneumonia          History of Present Illness       Patient is a 73 yo female with significant PMH of cardiac arrhythmia, chronic GERD, esophageal polyps, and severe persistent asthma presenting in the clinic today for cold sx x 3 weeks  Admits productive cough with discolored mucus, headache, congestion, sinus pain/pressure, dry throat, "burning" sternal pain, dizziness, SOB, body aches  Denies fever, chills, sore throat, ear pain, chest tightness, abdominal pain, n/v/d  Admits the use of nasal rinse, Mucinex cough, Robitussin, and Claritin for symptom management  Positive sick contacts at home  Patient mentions she is vaccinated against covid and flu  Patient mentions having an EGD about 5 weeks ago which included removal multiple esophageal polyps secondary to her chronic GERD  Patient states nothing in particular exacerbates her sternal pain  Review of Systems   Review of Systems   Constitutional: Negative for chills, fatigue and fever     HENT: Positive for congestion, sinus pressure and sinus pain  Negative for ear pain, postnasal drip, rhinorrhea and sore throat  Respiratory: Positive for cough, chest tightness and shortness of breath  Cardiovascular: Negative for chest pain  Gastrointestinal: Negative for abdominal pain, diarrhea, nausea and vomiting  Musculoskeletal: Positive for myalgias  Skin: Negative for rash  Neurological: Positive for dizziness and headaches  Current Medications       Current Outpatient Medications:   •  predniSONE 20 mg tablet, Take 2 tablets (40 mg total) by mouth daily for 5 days, Disp: 10 tablet, Rfl: 0  •  acetaminophen (TYLENOL) 500 mg tablet, Take 500 mg by mouth every 6 (six) hours as needed for mild pain , Disp: , Rfl:   •  benralizumab (FASENRA) subcutaneous injection, Inject 1 mL (30 mg total) under the skin every 56 days for 6 doses, Disp: 1 Syringe, Rfl: 6  •  bimatoprost (LATISSE) 0 03 % ophthalmic solution, Take as directed, Disp: , Rfl:   •  Cholecalciferol 50 MCG (2000 UT) CAPS, Take by mouth daily, Disp: , Rfl:   •  cromolyn (OPTICROM) 4 % ophthalmic solution, Administer 1 drop to both eyes in the morning and 1 drop at noon and 1 drop in the evening and 1 drop before bedtime  , Disp: 10 mL, Rfl: 0  •  cyanocobalamin (VITAMIN B-12) 1,000 mcg tablet, Take 1,000 mcg by mouth daily, Disp: , Rfl:   •  EPINEPHrine (EPIPEN) 0 3 mg/0 3 mL SOAJ, Inject 0 3 mL (0 3 mg total) into a muscle once for 1 dose (Patient not taking: No sig reported), Disp: 0 6 mL, Rfl: 0  •  erythromycin with ethanol (EMGEL) 2 % gel, Apply topically 2 (two) times a day, Disp: 30 g, Rfl: 1  •  famotidine (PEPCID) 20 mg tablet, Take 20 mg by mouth 2 (two) times a day , Disp: , Rfl:   •  flecainide (TAMBOCOR) 100 mg tablet, Take 1 tablet (100 mg total) by mouth 2 (two) times a day, Disp: 180 tablet, Rfl: 3  •  fluticasone (FLONASE) 50 mcg/act nasal spray, 2 sprays into each nostril 2 (two) times a day, Disp: 48 g, Rfl: 4  •  Fluticasone Furoate-Vilanterol (Breo Ellipta) 100-25 mcg/actuation inhaler, Inhale 1 puff daily Rinse mouth after use , Disp: 60 blister, Rfl: 0  •  ipratropium (ATROVENT) 0 03 % nasal spray, 2 sprays into each nostril 3 (three) times a day as needed for rhinitis, Disp: 30 mL, Rfl: 3  •  levalbuterol (XOPENEX HFA) 45 mcg/act inhaler, Inhale 1-2 puffs every 4 (four) hours as needed, Disp: , Rfl:   •  lidocaine (LMX) 4 % cream, Apply topically as needed for mild pain, Disp: 15 g, Rfl: 3  •  Magnesium 500 MG CAPS, Take by mouth, Disp: , Rfl:   •  metoprolol succinate (TOPROL-XL) 25 mg 24 hr tablet, Take 1 tablet (25 mg total) by mouth daily Taking 1/2 pill daily (Patient taking differently: Take 12 5 mg by mouth daily), Disp: 30 tablet, Rfl: 5  •  montelukast (SINGULAIR) 10 mg tablet, take 1 tablet by mouth at bedtime, Disp: 90 tablet, Rfl: 0  •  omega-3-acid ethyl esters (LOVAZA) 1 g capsule, Take 2 capsules (2 g total) by mouth 2 (two) times a day (Patient not taking: Reported on 12/12/2022), Disp: 360 capsule, Rfl: 1  •  ondansetron (ZOFRAN) 4 mg tablet, Take 1 tablet (4 mg total) by mouth every 8 (eight) hours as needed for nausea or vomiting, Disp: 20 tablet, Rfl: 0  •  pantoprazole (PROTONIX) 20 mg tablet, take 2 tablets by mouth twice a day, Disp: 90 tablet, Rfl: 3  •  rosuvastatin (CRESTOR) 5 mg tablet, take 1 tablet by mouth once daily, Disp: 30 tablet, Rfl: 5  •  sucralfate (CARAFATE) 1 g tablet, Take 1 tablet (1 g total) by mouth 4 (four) times a day, Disp: 120 tablet, Rfl: 5  •  Vitamin D, Ergocalciferol, 2000 units CAPS, Take by mouth, Disp: , Rfl:   •  zolpidem (AMBIEN) 5 mg tablet, Take 1 tablet (5 mg total) by mouth daily at bedtime as needed for sleep, Disp: 30 tablet, Rfl: 0    Current Allergies     Allergies as of 12/20/2022 - Reviewed 12/20/2022   Allergen Reaction Noted   • Shellfish-derived products - food allergy Anaphylaxis 01/18/2016   • Wheat bran - food allergy Anaphylaxis 01/15/2016   • Gluten meal - food allergy GI Intolerance 01/18/2016   • Morphine and related Itching 01/15/2016   • Nuts - food allergy Hives 01/18/2016   • Sulfa antibiotics Rash 04/09/2015            The following portions of the patient's history were reviewed and updated as appropriate: allergies, current medications, past family history, past medical history, past social history, past surgical history and problem list      Past Medical History:   Diagnosis Date   • Asthma    • Celiac disease    • Follicular lymphoma ([facility] [address] )    • GERD (gastroesophageal reflux disease)    • Heart palpitations    • History of colonic polyps     resolved 07/12/2016   • History of radiation therapy 2010   • Hyperlipidemia    • Insomnia    • Irregular heart beat    • Lymphoma, 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, remission   • Malignant lymphoma ([facility] [address] ) 2010    rt arm cutaneous follicular stage ia (rt diatal medical biceps area , s/p resected and s/p radistion treatment    resolved 12/17/2014   • Postmenopausal disorder     resolved 09/21/2017   • Pulmonary nodule     BENIGN, STABLE 5850-3051   • Restless legs syndrome     resolved 09/21/2017   • TMJ syndrome     resolved 09/21/2017       Past Surgical History:   Procedure Laterality Date   • COLONOSCOPY  04/16/2019   • FUNCTIONAL ENDOSCOPIC SINUS SURGERY Bilateral 2013    Dr Malinda Spann   • HYSTERECTOMY      age 39 complete   • LYMPH NODE DISSECTION Right     arm   • NASAL SEPTUM SURGERY  2013    with turbinate reduction - Dr Malinda Spann   • AZ ESOPHAGOGASTRODUODENOSCOPY TRANSORAL DIAGNOSTIC N/A 1/18/2016    Procedure: ESOPHAGOGASTRODUODENOSCOPY (EGD); Surgeon: Richy Donahue MD;  [facility];   Service: Gastroenterology   • AZ EXCIS RECURRENT GANGLION WRIST Left 12/8/2020    Procedure: WRIST GANGLION CYST EXCISION;  Surgeon: Warden Daniel MD;  [facility];  Service: Orthopedics   • SYNOVECTOMY N/A 12/8/2020    Procedure: ECU TENOSYNOVECTOMY;  Surgeon: Warden Daniel MD;  [facility]; Service: Orthopedics   • TONSILLECTOMY     • TOTAL ABDOMINAL HYSTERECTOMY W/ BILATERAL SALPINGOOPHORECTOMY      age 54   • UPPER GASTROINTESTINAL ENDOSCOPY     • US GUIDED MSK PROCEDURE  1/16/2020   • Felicita Kee MSK PROCEDURE  8/7/2020   • US GUIDED MSK PROCEDURE  8/2/2021       Family History   Problem Relation Age of Onset   • Lymphoma Mother    • Throat cancer Father    • Heart failure Father    • Atrial fibrillation Father    • Diabetes Father    • Colonic polyp Father    • Hypertension Father    • Thyroid cancer Sister    • Breast cancer Paternal Grandmother 71   • Breast cancer Paternal Aunt 71   • Ovarian cancer Paternal Aunt 72   • No Known Problems Maternal Grandmother    • No Known Problems Maternal Grandfather    • Cancer Paternal Grandfather    • Lung cancer Paternal Grandfather    • BRCA1 Positive Cousin    • Skin cancer Paternal Aunt    • Throat cancer Paternal Uncle    • No Known Problems Maternal Aunt          Medications have been verified  Objective   /78   Pulse 83   Temp 97 9 °F (36 6 °C)   Resp 18   SpO2 99%        Physical Exam     Physical Exam  Vitals reviewed  Constitutional:       General: She is not in acute distress  Appearance: Normal appearance  She is normal weight  She is not ill-appearing  HENT:      Head: Normocephalic  Right Ear: Hearing, tympanic membrane, ear canal and external ear normal  No middle ear effusion  There is no impacted cerumen  Tympanic membrane is not erythematous or bulging  Left Ear: Hearing, tympanic membrane, ear canal and external ear normal   No middle ear effusion  There is no impacted cerumen  Tympanic membrane is not erythematous or bulging  Nose: Congestion present  No rhinorrhea  Right Sinus: No maxillary sinus tenderness or frontal sinus tenderness  Left Sinus: No maxillary sinus tenderness or frontal sinus tenderness  Mouth/Throat:      Lips: Pink        Mouth: Mucous membranes are moist  Pharynx: Oropharynx is clear  Uvula midline  No pharyngeal swelling, oropharyngeal exudate or posterior oropharyngeal erythema  Tonsils: 0 on the right  0 on the left  Comments: H/o tonsillectomy  Eyes:      Conjunctiva/sclera: Conjunctivae normal       Pupils: Pupils are equal, round, and reactive to light  Cardiovascular:      Rate and Rhythm: Normal rate and regular rhythm  Pulses: Normal pulses  Heart sounds: Normal heart sounds  No murmur heard  No friction rub  No gallop  Pulmonary:      Effort: Pulmonary effort is normal       Breath sounds: Normal breath sounds  No wheezing, rhonchi or rales  Abdominal:      General: Abdomen is flat  There is no distension  Tenderness: There is no guarding  Musculoskeletal:      Cervical back: Normal range of motion and neck supple  No tenderness  Lymphadenopathy:      Cervical: No cervical adenopathy  Skin:     General: Skin is warm  Neurological:      Mental Status: She is alert     Psychiatric:         Mood and Affect: Mood normal          Behavior: Behavior normal

## 2022-12-21 LAB
ATRIAL RATE: 60 BPM
P AXIS: 35 DEGREES
PR INTERVAL: 168 MS
QRS AXIS: -20 DEGREES
QRSD INTERVAL: 102 MS
QT INTERVAL: 456 MS
QTC INTERVAL: 456 MS
T WAVE AXIS: -2 DEGREES
VENTRICULAR RATE: 60 BPM

## 2022-12-26 ENCOUNTER — OFFICE VISIT (OUTPATIENT)
Dept: URGENT CARE | Facility: CLINIC | Age: 67
End: 2022-12-26

## 2022-12-26 VITALS — HEART RATE: 69 BPM | OXYGEN SATURATION: 99 % | TEMPERATURE: 97.8 F | RESPIRATION RATE: 18 BRPM

## 2022-12-26 DIAGNOSIS — J45.901 ASTHMA WITH ACUTE EXACERBATION, UNSPECIFIED ASTHMA SEVERITY, UNSPECIFIED WHETHER PERSISTENT: Primary | ICD-10-CM

## 2022-12-26 RX ORDER — ALBUTEROL SULFATE 2.5 MG/3ML
2.5 SOLUTION RESPIRATORY (INHALATION) EVERY 6 HOURS PRN
Qty: 180 ML | Refills: 0 | Status: SHIPPED | OUTPATIENT
Start: 2022-12-26

## 2022-12-26 NOTE — PATIENT INSTRUCTIONS
Nebulizer as prescribed  Take over the counter Mucinex during the day  Fluids and rest (Warm water with honey and lemon)  Tylenol/Ibuprofen for pain fever  Follow up with PCP in 3-5 days  Proceed to  ER if symptoms worsen

## 2022-12-26 NOTE — PROGRESS NOTES
[facility] [facility]        NAME: Ashia Cornejo is a 74 y o  female  : 1955    MRN: [id number]  DATE: 2022  TIME: 4:05 PM    Assessment and Plan   Asthma with acute exacerbation, unspecified asthma severity, unspecified whether persistent [J45 901]  1  Asthma with acute exacerbation, unspecified asthma severity, unspecified whether persistent  albuterol (2 5 mg/3 mL) 0 083 % nebulizer solution    ipratropium (ATROVENT) 0 02 % nebulizer solution        Provided patient with nebulizer  Patient Instructions     Nebulizer as prescribed  Take over the counter Mucinex during the day  Fluids and rest (Warm water with honey and lemon)  Tylenol/Ibuprofen for pain fever  Follow up with PCP in 3-5 days  Proceed to  ER if symptoms worsen  Chief Complaint     Chief Complaint   Patient presents with   • Wheezing     Pt c/o wheezing for over a week  History of Present Illness       Hx/o asthma  CXR normal last week      URI   Associated symptoms include coughing and wheezing  Pertinent negatives include no abdominal pain, congestion, diarrhea, ear pain, headaches, nausea, rash, rhinorrhea, sinus pain, sneezing, sore throat or vomiting  Treatments tried: prednisone 40mg qd x 5d  Review of Systems   Review of Systems   Constitutional: Negative for chills, fatigue and fever  HENT: Negative for congestion, ear discharge, ear pain, postnasal drip, rhinorrhea, sinus pressure, sinus pain, sneezing, sore throat and trouble swallowing  Respiratory: Positive for cough, chest tightness, shortness of breath and wheezing  Gastrointestinal: Negative for abdominal pain, diarrhea, nausea and vomiting  Musculoskeletal: Negative for myalgias  Skin: Negative for rash  Neurological: Negative for light-headedness and headaches           Current Medications       Current Outpatient Medications:   •  albuterol (2 5 mg/3 mL) 0 083 % nebulizer solution, Take 3 mL (2 5 mg total) by nebulization every 6 (six) hours as needed for wheezing or shortness of breath, Disp: 180 mL, Rfl: 0  •  ipratropium (ATROVENT) 0 02 % nebulizer solution, Take 2 5 mL (0 5 mg total) by nebulization every 6 (six) hours as needed for wheezing or shortness of breath, Disp: 180 mL, Rfl: 0  •  acetaminophen (TYLENOL) 500 mg tablet, Take 500 mg by mouth every 6 (six) hours as needed for mild pain , Disp: , Rfl:   •  benralizumab (FASENRA) subcutaneous injection, Inject 1 mL (30 mg total) under the skin every 56 days for 6 doses, Disp: 1 Syringe, Rfl: 6  •  bimatoprost (LATISSE) 0 03 % ophthalmic solution, Take as directed, Disp: , Rfl:   •  Cholecalciferol 50 MCG (2000 UT) CAPS, Take by mouth daily, Disp: , Rfl:   •  cromolyn (OPTICROM) 4 % ophthalmic solution, Administer 1 drop to both eyes in the morning and 1 drop at noon and 1 drop in the evening and 1 drop before bedtime  , Disp: 10 mL, Rfl: 0  •  cyanocobalamin (VITAMIN B-12) 1,000 mcg tablet, Take 1,000 mcg by mouth daily, Disp: , Rfl:   •  EPINEPHrine (EPIPEN) 0 3 mg/0 3 mL SOAJ, Inject 0 3 mL (0 3 mg total) into a muscle once for 1 dose (Patient not taking: No sig reported), Disp: 0 6 mL, Rfl: 0  •  erythromycin with ethanol (EMGEL) 2 % gel, Apply topically 2 (two) times a day, Disp: 30 g, Rfl: 1  •  famotidine (PEPCID) 20 mg tablet, Take 20 mg by mouth 2 (two) times a day , Disp: , Rfl:   •  flecainide (TAMBOCOR) 100 mg tablet, Take 1 tablet (100 mg total) by mouth 2 (two) times a day, Disp: 180 tablet, Rfl: 3  •  fluticasone (FLONASE) 50 mcg/act nasal spray, 2 sprays into each nostril 2 (two) times a day, Disp: 48 g, Rfl: 4  •  Fluticasone Furoate-Vilanterol (Breo Ellipta) 100-25 mcg/actuation inhaler, Inhale 1 puff daily Rinse mouth after use , Disp: 60 blister, Rfl: 0  •  ipratropium (ATROVENT) 0 03 % nasal spray, 2 sprays into each nostril 3 (three) times a day as needed for rhinitis, Disp: 30 mL, Rfl: 3  •  levalbuterol (XOPENEX HFA) 45 mcg/act inhaler, Inhale 1-2 puffs every 4 (four) hours as needed, Disp: , Rfl:   •  lidocaine (LMX) 4 % cream, Apply topically as needed for mild pain, Disp: 15 g, Rfl: 3  •  Magnesium 500 MG CAPS, Take by mouth, Disp: , Rfl:   •  metoprolol succinate (TOPROL-XL) 25 mg 24 hr tablet, Take 1 tablet (25 mg total) by mouth daily Taking 1/2 pill daily (Patient taking differently: Take 12 5 mg by mouth daily), Disp: 30 tablet, Rfl: 5  •  montelukast (SINGULAIR) 10 mg tablet, take 1 tablet by mouth at bedtime, Disp: 90 tablet, Rfl: 0  •  omega-3-acid ethyl esters (LOVAZA) 1 g capsule, Take 2 capsules (2 g total) by mouth 2 (two) times a day (Patient not taking: Reported on 12/12/2022), Disp: 360 capsule, Rfl: 1  •  ondansetron (ZOFRAN) 4 mg tablet, Take 1 tablet (4 mg total) by mouth every 8 (eight) hours as needed for nausea or vomiting, Disp: 20 tablet, Rfl: 0  •  pantoprazole (PROTONIX) 20 mg tablet, take 2 tablets by mouth twice a day, Disp: 90 tablet, Rfl: 3  •  rosuvastatin (CRESTOR) 5 mg tablet, take 1 tablet by mouth once daily, Disp: 30 tablet, Rfl: 5  •  sucralfate (CARAFATE) 1 g tablet, Take 1 tablet (1 g total) by mouth 4 (four) times a day, Disp: 120 tablet, Rfl: 5  •  Vitamin D, Ergocalciferol, 2000 units CAPS, Take by mouth, Disp: , Rfl:   •  zolpidem (AMBIEN) 5 mg tablet, Take 1 tablet (5 mg total) by mouth daily at bedtime as needed for sleep, Disp: 30 tablet, Rfl: 0    Current Allergies     Allergies as of 12/26/2022 - Reviewed 12/26/2022   Allergen Reaction Noted   • Shellfish-derived products - food allergy Anaphylaxis 01/18/2016   • Wheat bran - food allergy Anaphylaxis 01/15/2016   • Gluten meal - food allergy GI Intolerance 01/18/2016   • Morphine and related Itching 01/15/2016   • Nuts - food allergy Hives 01/18/2016   • Sulfa antibiotics Rash 04/09/2015            The following portions of the patient's history were reviewed and updated as appropriate: allergies, current medications, past family history, past medical history, past social history, past surgical history and problem list      Past Medical History:   Diagnosis Date   • Asthma    • Celiac disease    • Follicular lymphoma ([facility] [address] )    • GERD (gastroesophageal reflux disease)    • Heart palpitations    • History of colonic polyps     resolved 07/12/2016   • History of radiation therapy 2010   • Hyperlipidemia    • Insomnia    • Irregular heart beat    • Lymphoma, 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[facility] [address]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, remission   • Malignant lymphoma ([facility] [address] ) 2010    rt arm cutaneous follicular stage ia (rt diatal medical biceps area , s/p resected and s/p radistion treatment    resolved 12/17/2014   • Postmenopausal disorder     resolved 09/21/2017   • Pulmonary nodule     BENIGN, STABLE 3304-6201   • Restless legs syndrome     resolved 09/21/2017   • TMJ syndrome     resolved 09/21/2017       Past Surgical History:   Procedure Laterality Date   • COLONOSCOPY  04/16/2019   • FUNCTIONAL ENDOSCOPIC SINUS SURGERY Bilateral 2013    Dr Avtar Johnson   • HYSTERECTOMY      age 39 complete   • LYMPH NODE DISSECTION Right     arm   • NASAL SEPTUM SURGERY  2013    with turbinate reduction - Dr Avtar Johnson   • LA ESOPHAGOGASTRODUODENOSCOPY TRANSORAL DIAGNOSTIC N/A 1/18/2016    Procedure: ESOPHAGOGASTRODUODENOSCOPY (EGD); Surgeon: Polly Cerna MD;  [facility];   Service: Gastroenterology   • LA EXCISION GANGLION WRIST DORSAL/VOLAR RECURRENT Left 12/8/2020    Procedure: WRIST GANGLION CYST EXCISION;  Surgeon: Judd Esquivel MD;  [facility];  Service: Orthopedics   • SYNOVECTOMY N/A 12/8/2020    Procedure: ECU TENOSYNOVECTOMY;  Surgeon: Judd Esquivel MD;  [facility];  Service: Orthopedics   • TONSILLECTOMY     • TOTAL ABDOMINAL HYSTERECTOMY W/ BILATERAL SALPINGOOPHORECTOMY      age 54   • UPPER GASTROINTESTINAL ENDOSCOPY     • US GUIDED MSK PROCEDURE  1/16/2020   • US GUIDED MSK PROCEDURE  8/7/2020   • US GUIDED MSK PROCEDURE  8/2/2021       Family History   Problem Relation Age of Onset   • Lymphoma Mother    • Throat cancer Father    • Heart failure Father    • Atrial fibrillation Father    • Diabetes Father    • Colonic polyp Father    • Hypertension Father    • Thyroid cancer Sister    • Breast cancer Paternal Grandmother 71   • Breast cancer Paternal Aunt 71   • Ovarian cancer Paternal Aunt 72   • No Known Problems Maternal Grandmother    • No Known Problems Maternal Grandfather    • Cancer Paternal Grandfather    • Lung cancer Paternal Grandfather    • BRCA1 Positive Cousin    • Skin cancer Paternal Aunt    • Throat cancer Paternal Uncle    • No Known Problems Maternal Aunt          Medications have been verified  Objective   Pulse 69   Temp 97 8 °F (36 6 °C)   Resp 18   SpO2 99%   No LMP recorded  Patient has had a hysterectomy  Physical Exam     Physical Exam  Vitals reviewed  Constitutional:       General: She is not in acute distress  Appearance: She is well-developed  She is not diaphoretic  HENT:      Head: Normocephalic  Right Ear: Tympanic membrane and external ear normal       Left Ear: Tympanic membrane and external ear normal       Nose: Nose normal       Mouth/Throat:      Pharynx: No oropharyngeal exudate or posterior oropharyngeal erythema  Eyes:      Conjunctiva/sclera: Conjunctivae normal    Cardiovascular:      Rate and Rhythm: Normal rate and regular rhythm  Heart sounds: Normal heart sounds  No murmur heard  No friction rub  No gallop  Pulmonary:      Effort: Pulmonary effort is normal  No respiratory distress  Breath sounds: Normal breath sounds  No wheezing, rhonchi or rales  Lymphadenopathy:      Cervical: No cervical adenopathy  Skin:     General: Skin is warm  Neurological:      Mental Status: She is alert and oriented to person, place, and time  Psychiatric:         Behavior: Behavior normal          Thought Content:  Thought content normal          Judgment: Judgment normal

## 2023-01-05 DIAGNOSIS — I10 HYPERTENSION, UNSPECIFIED TYPE: ICD-10-CM

## 2023-01-05 DIAGNOSIS — E78.2 MIXED HYPERLIPIDEMIA: ICD-10-CM

## 2023-01-05 RX ORDER — METOPROLOL SUCCINATE 25 MG/1
12.5 TABLET, EXTENDED RELEASE ORAL DAILY
Qty: 45 TABLET | Refills: 3 | Status: SHIPPED | OUTPATIENT
Start: 2023-01-05

## 2023-01-05 RX ORDER — ROSUVASTATIN CALCIUM 5 MG/1
5 TABLET, COATED ORAL DAILY
Qty: 90 TABLET | Refills: 3 | Status: SHIPPED | OUTPATIENT
Start: 2023-01-05

## 2023-01-06 ENCOUNTER — HOSPITAL ENCOUNTER (OUTPATIENT)
Dept: MAMMOGRAPHY | Facility: HOSPITAL | Age: 68
End: 2023-01-06

## 2023-01-06 VITALS — HEIGHT: 64 IN | WEIGHT: 150 LBS | BODY MASS INDEX: 25.61 KG/M2

## 2023-01-06 DIAGNOSIS — Z12.31 ENCOUNTER FOR SCREENING MAMMOGRAM FOR MALIGNANT NEOPLASM OF BREAST: ICD-10-CM

## 2023-01-06 NOTE — PATIENT INSTRUCTIONS
Will try singulair Hydroxychloroquine Pregnancy And Lactation Text: This medication has been shown to cause fetal harm but it isn't assigned a Pregnancy Risk Category. There are small amounts excreted in breast milk.

## 2023-01-18 ENCOUNTER — OFFICE VISIT (OUTPATIENT)
Dept: OBGYN CLINIC | Facility: CLINIC | Age: 68
End: 2023-01-18

## 2023-01-18 VITALS
BODY MASS INDEX: 24.99 KG/M2 | DIASTOLIC BLOOD PRESSURE: 82 MMHG | HEIGHT: 65 IN | WEIGHT: 150 LBS | SYSTOLIC BLOOD PRESSURE: 127 MMHG | HEART RATE: 73 BPM

## 2023-01-18 DIAGNOSIS — M77.8 LEFT WRIST TENDINITIS: Primary | ICD-10-CM

## 2023-01-18 DIAGNOSIS — M18.12 ARTHRITIS OF CARPOMETACARPAL (CMC) JOINT OF LEFT THUMB: ICD-10-CM

## 2023-01-18 DIAGNOSIS — M65.332 TRIGGER MIDDLE FINGER OF LEFT HAND: ICD-10-CM

## 2023-01-18 RX ORDER — LIDOCAINE HYDROCHLORIDE 10 MG/ML
1 INJECTION, SOLUTION INFILTRATION; PERINEURAL
Status: COMPLETED | OUTPATIENT
Start: 2023-01-18 | End: 2023-01-18

## 2023-01-18 RX ORDER — DEXAMETHASONE SODIUM PHOSPHATE 10 MG/ML
40 INJECTION, SOLUTION INTRAMUSCULAR; INTRAVENOUS
Status: COMPLETED | OUTPATIENT
Start: 2023-01-18 | End: 2023-01-18

## 2023-01-18 RX ORDER — BUPIVACAINE HYDROCHLORIDE 2.5 MG/ML
0.5 INJECTION, SOLUTION INFILTRATION; PERINEURAL
Status: COMPLETED | OUTPATIENT
Start: 2023-01-18 | End: 2023-01-18

## 2023-01-18 RX ORDER — TRIAMCINOLONE ACETONIDE 40 MG/ML
20 INJECTION, SUSPENSION INTRA-ARTICULAR; INTRAMUSCULAR
Status: COMPLETED | OUTPATIENT
Start: 2023-01-18 | End: 2023-01-18

## 2023-01-18 RX ADMIN — BUPIVACAINE HYDROCHLORIDE 0.5 ML: 2.5 INJECTION, SOLUTION INFILTRATION; PERINEURAL at 12:41

## 2023-01-18 RX ADMIN — DEXAMETHASONE SODIUM PHOSPHATE 40 MG: 10 INJECTION, SOLUTION INTRAMUSCULAR; INTRAVENOUS at 12:41

## 2023-01-18 RX ADMIN — TRIAMCINOLONE ACETONIDE 20 MG: 40 INJECTION, SUSPENSION INTRA-ARTICULAR; INTRAMUSCULAR at 12:41

## 2023-01-18 RX ADMIN — LIDOCAINE HYDROCHLORIDE 1 ML: 10 INJECTION, SOLUTION INFILTRATION; PERINEURAL at 12:41

## 2023-01-18 NOTE — PROGRESS NOTES
ASSESSMENT/PLAN:      63 y o  female with ECU tendinitis and left CMC arthritis  She previously had a left long TF which has resolved  Repeat injections were performed today for left ECU  She may continue bracing and taking OTC medications as needed including Voltaren gel  Patient has hx of right CMC injection 3 years ago but no the left  We discussed risks and benefits of the same injection on the left, patient was agreeable and injection done today  Patient may continue activity as tolerated and we will see her as needed for these issues  The patient verbalized understanding of exam findings and treatment plan  We engaged in the shared decision-making process and treatment options were discussed at length with the patient  Surgical and conservative management discussed today along with risks and benefits  Diagnoses and all orders for this visit:    Left wrist ECU tendinitis  -     Hand/upper extremity injection: L wrist    Trigger middle finger of left hand    Arthritis of carpometacarpal (CMC) joint of left thumb  -     Small joint arthrocentesis: L thumb CMC      Follow Up:  Return if symptoms worsen or fail to improve  To Do Next Visit:  Re-evaluation of current issue    ____________________________________________________________________________________________________________________________________________      CHIEF COMPLAINT:  No chief complaint on file  SUBJECTIVE:  Gal Ahuja is a 74y o  year old RHD female who presents to the office today for follow up of left ulnar sided wrist pain (ECU tendinitis) and left base of thumb pain  Patient has been getting ECU injections periodically which do help to some extent  Last injection did help but did not take the pain away  She has a surgical hx of ECU debridement and ulnar ganglion cyst excision  TF of left long finger has resolved  She does note pain in the base of the thumb and difficulty with opening jars   She did have an injection in the past in this area  I have personally reviewed all the relevant PMH, PSH, SH, FH, Medications and allergies  PAST MEDICAL HISTORY:  Past Medical History:   Diagnosis Date   • Asthma    • Celiac disease    • Follicular lymphoma ([facility] [address] )    • GERD (gastroesophageal reflux disease)    • Heart palpitations    • History of colonic polyps     resolved 07/12/2016   • History of radiation therapy 2010   • Hyperlipidemia    • Insomnia    • Irregular heart beat    • Lymphoma, 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[facility] [address]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, remission   • Malignant lymphoma ([facility] [address] ) 2010    rt arm cutaneous follicular stage ia (rt diatal medical biceps area , s/p resected and s/p radistion treatment    resolved 12/17/2014   • Postmenopausal disorder     resolved 09/21/2017   • Pulmonary nodule     BENIGN, STABLE 6547-7501   • Restless legs syndrome     resolved 09/21/2017   • TMJ syndrome     resolved 09/21/2017       PAST SURGICAL HISTORY:  Past Surgical History:   Procedure Laterality Date   • COLONOSCOPY  04/16/2019   • FUNCTIONAL ENDOSCOPIC SINUS SURGERY Bilateral 2013    Dr Cuba Ortega   • HYSTERECTOMY      age 39 complete   • LYMPH NODE DISSECTION Right     arm   • NASAL SEPTUM SURGERY  2013    with turbinate reduction - Dr Cuba Ortega   • VA ESOPHAGOGASTRODUODENOSCOPY TRANSORAL DIAGNOSTIC N/A 1/18/2016    Procedure: ESOPHAGOGASTRODUODENOSCOPY (EGD); Surgeon: Renate Tovar MD;  [facility]: [facility];   Service: Gastroenterology   • VA EXCISION GANGLION WRIST DORSAL/VOLAR RECURRENT Left 12/8/2020    Procedure: WRIST GANGLION CYST EXCISION;  Surgeon: Maria L Casey MD;  [facility];  Service: Orthopedics   • SYNOVECTOMY N/A 12/8/2020    Procedure: ECU TENOSYNOVECTOMY;  Surgeon: Maria L Casey MD;  [facility];  Service: Orthopedics   • TONSILLECTOMY     • TOTAL ABDOMINAL HYSTERECTOMY W/ BILATERAL SALPINGOOPHORECTOMY      age 54   • UPPER GASTROINTESTINAL ENDOSCOPY     • US GUIDED MSK PROCEDURE  1/16/2020   • Felicita Chery4 MSK PROCEDURE  2020   • US GUIDED MSK PROCEDURE  2021       FAMILY HISTORY:  Family History   Problem Relation Age of Onset   • Lymphoma Mother    • Throat cancer Father    • Heart failure Father    • Atrial fibrillation Father    • Diabetes Father    • Colonic polyp Father    • Hypertension Father    • Thyroid cancer Sister    • Breast cancer Paternal Grandmother 71   • Breast cancer Paternal Aunt 77   • Ovarian cancer Paternal Aunt 72   • No Known Problems Maternal Grandmother    • No Known Problems Maternal Grandfather    • Cancer Paternal Grandfather    • Lung cancer Paternal Grandfather    • BRCA1 Positive Cousin    • Skin cancer Paternal Aunt    • Throat cancer Paternal Uncle    • No Known Problems Maternal Aunt        SOCIAL HISTORY:  Social History     Tobacco Use   • Smoking status: Former     Packs/day: 0 75     Years: 20 00     Pack years: 15 00     Types: Cigarettes     Start date:      Quit date:      Years since quittin 0   • Smokeless tobacco: Never   Vaping Use   • Vaping Use: Never used   Substance Use Topics   • Alcohol use:  Yes     Alcohol/week: 3 0 standard drinks     Types: 3 Glasses of wine per week     Comment: soc   • Drug use: No       MEDICATIONS:    Current Outpatient Medications:   •  acetaminophen (TYLENOL) 500 mg tablet, Take 500 mg by mouth every 6 (six) hours as needed for mild pain , Disp: , Rfl:   •  albuterol (2 5 mg/3 mL) 0 083 % nebulizer solution, Take 3 mL (2 5 mg total) by nebulization every 6 (six) hours as needed for wheezing or shortness of breath, Disp: 180 mL, Rfl: 0  •  benralizumab (FASENRA) subcutaneous injection, Inject 1 mL (30 mg total) under the skin every 56 days for 6 doses, Disp: 1 Syringe, Rfl: 6  •  bimatoprost (LATISSE) 0 03 % ophthalmic solution, Take as directed, Disp: , Rfl:   •  Cholecalciferol 50 MCG (2000 UT) CAPS, Take by mouth daily, Disp: , Rfl:   •  cromolyn (OPTICROM) 4 % ophthalmic solution, Administer 1 drop to both eyes in the morning and 1 drop at noon and 1 drop in the evening and 1 drop before bedtime  , Disp: 10 mL, Rfl: 0  •  cyanocobalamin (VITAMIN B-12) 1,000 mcg tablet, Take 1,000 mcg by mouth daily, Disp: , Rfl:   •  erythromycin with ethanol (EMGEL) 2 % gel, Apply topically 2 (two) times a day, Disp: 30 g, Rfl: 1  •  famotidine (PEPCID) 20 mg tablet, Take 20 mg by mouth 2 (two) times a day , Disp: , Rfl:   •  flecainide (TAMBOCOR) 100 mg tablet, Take 1 tablet (100 mg total) by mouth 2 (two) times a day, Disp: 180 tablet, Rfl: 3  •  fluticasone (FLONASE) 50 mcg/act nasal spray, 2 sprays into each nostril 2 (two) times a day, Disp: 48 g, Rfl: 4  •  ipratropium (ATROVENT) 0 02 % nebulizer solution, Take 2 5 mL (0 5 mg total) by nebulization every 6 (six) hours as needed for wheezing or shortness of breath, Disp: 180 mL, Rfl: 0  •  ipratropium (ATROVENT) 0 03 % nasal spray, 2 sprays into each nostril 3 (three) times a day as needed for rhinitis, Disp: 30 mL, Rfl: 3  •  levalbuterol (XOPENEX HFA) 45 mcg/act inhaler, Inhale 1-2 puffs every 4 (four) hours as needed, Disp: , Rfl:   •  lidocaine (LMX) 4 % cream, Apply topically as needed for mild pain, Disp: 15 g, Rfl: 3  •  Magnesium 500 MG CAPS, Take by mouth, Disp: , Rfl:   •  metoprolol succinate (TOPROL-XL) 25 mg 24 hr tablet, Take 0 5 tablets (12 5 mg total) by mouth daily, Disp: 45 tablet, Rfl: 3  •  montelukast (SINGULAIR) 10 mg tablet, take 1 tablet by mouth at bedtime, Disp: 90 tablet, Rfl: 0  •  ondansetron (ZOFRAN) 4 mg tablet, Take 1 tablet (4 mg total) by mouth every 8 (eight) hours as needed for nausea or vomiting, Disp: 20 tablet, Rfl: 0  •  pantoprazole (PROTONIX) 20 mg tablet, take 2 tablets by mouth twice a day, Disp: 90 tablet, Rfl: 3  •  rosuvastatin (CRESTOR) 5 mg tablet, Take 1 tablet (5 mg total) by mouth daily, Disp: 90 tablet, Rfl: 3  •  sucralfate (CARAFATE) 1 g tablet, Take 1 tablet (1 g total) by mouth 4 (four) times a day, Disp: 120 tablet, Rfl: 5  •  Vitamin D, Ergocalciferol, 2000 units CAPS, Take by mouth, Disp: , Rfl:   •  zolpidem (AMBIEN) 5 mg tablet, Take 1 tablet (5 mg total) by mouth daily at bedtime as needed for sleep, Disp: 30 tablet, Rfl: 0  •  EPINEPHrine (EPIPEN) 0 3 mg/0 3 mL SOAJ, Inject 0 3 mL (0 3 mg total) into a muscle once for 1 dose (Patient not taking: No sig reported), Disp: 0 6 mL, Rfl: 0  •  Fluticasone Furoate-Vilanterol (Breo Ellipta) 100-25 mcg/actuation inhaler, Inhale 1 puff daily Rinse mouth after use , Disp: 60 blister, Rfl: 0  •  omega-3-acid ethyl esters (LOVAZA) 1 g capsule, Take 2 capsules (2 g total) by mouth 2 (two) times a day (Patient not taking: Reported on 12/12/2022), Disp: 360 capsule, Rfl: 1    ALLERGIES:  Allergies   Allergen Reactions   • Shellfish-Derived Products - Food Allergy Anaphylaxis   • Wheat Bran - Food Allergy Anaphylaxis   • Gluten Meal - Food Allergy GI Intolerance     Celiac    • Morphine And Related Itching   • Nuts - Food Allergy Hives     Almonds,walnuts, hazelnuts and other related nuts  • Sulfa Antibiotics Rash       REVIEW OF SYSTEMS:  Review of Systems   Constitutional: Negative for chills, fever and unexpected weight change  HENT: Negative for hearing loss, nosebleeds and sore throat  Eyes: Negative for pain, redness and visual disturbance  Respiratory: Negative for cough, shortness of breath and wheezing  Cardiovascular: Negative for chest pain, palpitations and leg swelling  Gastrointestinal: Negative for abdominal pain, nausea and vomiting  Endocrine: Negative for polydipsia and polyuria  Genitourinary: Negative for difficulty urinating and hematuria  Musculoskeletal: Positive for arthralgias  Negative for joint swelling and myalgias  Skin: Negative for rash and wound  Neurological: Negative for dizziness, numbness and headaches  Psychiatric/Behavioral: Negative for decreased concentration, dysphoric mood and suicidal ideas  The patient is not nervous/anxious  VITALS:  Vitals:    01/18/23 1208   BP: 127/82   Pulse: 73       LABS:  HgA1c:   Lab Results   Component Value Date    HGBA1C 5 6 08/31/2021     BMP:   Lab Results   Component Value Date    GLUCOSE 112 06/22/2015    CALCIUM 9 0 11/15/2022     06/22/2015    K 4 1 11/15/2022    CO2 27 11/15/2022     11/15/2022    BUN 15 11/15/2022    CREATININE 0 85 11/15/2022       _____________________________________________________  PHYSICAL EXAMINATION:  General: well developed and well nourished, alert, oriented times 3 and appears comfortable  Psychiatric: Normal  HEENT: Normocephalic, Atraumatic Trachea Midline, No torticollis  Pulmonary: No audible wheezing or respiratory distress   Cardiovascular: No pitting edema, 2+ radial pulse   Skin: No masses, erythema, lacerations, fluctation, ulcerations  Neurovascular: Sensation Intact to the Median, Ulnar, Radial Nerve, Motor Intact to the Median, Ulnar, Radial Nerve and Pulses Intact  Musculoskeletal: Normal, except as noted in detailed exam and in HPI  MUSCULOSKELETAL EXAMINATION:    ECU tender to palpation   Well healed surgical incision s/p ganglion cyst excision     Small masses ulnarly likely reformation of the ganglion   Full wrist ROM, full hand ROM     left CMC Exam:  No adduction contracture  No hyperextension deformity of MCP joint  Positive localized tenderness over radial and dorsal aspect of thumb (CMC joint)  Grind test is Negative for pain and Negative for crepitus  Metacarpal load shift test positive  No triggering or tenderness over the A1 pulley  No pain with Finkelstein’s maneuver     ___________________________________________________  STUDIES REVIEWED:  Old imaging reviewed of the left wrist which demonstrates increased SL widening and progressive VISI deformity of the lunate     MRI shows ulnar sided ganglion cyst at the dorsal ulna   Volar til of lunate    PROCEDURES PERFORMED:  Small joint arthrocentesis: L thumb CMC  Bishop Protocol:  Consent: Verbal consent obtained  Risks and benefits: risks, benefits and alternatives were discussed  Consent given by: patient  Time out: Immediately prior to procedure a "time out" was called to verify the correct patient, procedure, equipment, support staff and site/side marked as required  Patient understanding: patient states understanding of the procedure being performed  Site marked: the operative site was marked  Radiology Images displayed and confirmed  If images not available, report reviewed: imaging studies available  Required items: required blood products, implants, devices, and special equipment available  Patient identity confirmed: verbally with patient    Supporting Documentation  Indications: pain   Procedure Details  Location: thumb - L thumb CMC  Needle size: 25 G  Ultrasound guidance: no  Approach: dorsal  Medications administered: 20 mg triamcinolone acetonide 40 mg/mL; 0 5 mL bupivacaine 0 25 %    Patient tolerance: patient tolerated the procedure well with no immediate complications  Dressing:  Sterile dressing applied    Hand/upper extremity injection: L wrist  Universal Protocol:  Consent: Verbal consent obtained  Risks and benefits: risks, benefits and alternatives were discussed  Consent given by: patient  Time out: Immediately prior to procedure a "time out" was called to verify the correct patient, procedure, equipment, support staff and site/side marked as required    Patient understanding: patient states understanding of the procedure being performed  Site marked: the operative site was marked  Required items: required blood products, implants, devices, and special equipment available  Patient identity confirmed: verbally with patient    Supporting Documentation  Indications: pain and therapeutic   Procedure Details  Condition:tendonitis Site: L wrist (left ECU)   Preparation: Patient was prepped and draped in the usual sterile fashion  Needle size: 25 G  Ultrasound guidance: no  Approach: ulnar  Medications administered: 1 mL lidocaine 1 %; 40 mg dexamethasone 100 mg/10 mL    Patient tolerance: patient tolerated the procedure well with no immediate complications  Dressing:  Sterile dressing applied             _____________________________________________________      Scribe Attestation    I,:  Olman Villa PA-C am acting as a scribe while in the presence of the attending physician :       I,:  Milan Kunz MD personally performed the services described in this documentation    as scribed in my presence :

## 2023-02-07 ENCOUNTER — OFFICE VISIT (OUTPATIENT)
Dept: INTERNAL MEDICINE CLINIC | Facility: CLINIC | Age: 68
End: 2023-02-07

## 2023-02-07 VITALS
WEIGHT: 153 LBS | TEMPERATURE: 97 F | SYSTOLIC BLOOD PRESSURE: 132 MMHG | DIASTOLIC BLOOD PRESSURE: 80 MMHG | HEART RATE: 68 BPM | HEIGHT: 65 IN | BODY MASS INDEX: 25.49 KG/M2 | OXYGEN SATURATION: 99 %

## 2023-02-07 DIAGNOSIS — E78.5 HYPERLIPIDEMIA, UNSPECIFIED HYPERLIPIDEMIA TYPE: ICD-10-CM

## 2023-02-07 DIAGNOSIS — C82.90 FOLLICULAR LYMPHOMA, UNSPECIFIED FOLLICULAR LYMPHOMA TYPE, UNSPECIFIED BODY REGION (HCC): ICD-10-CM

## 2023-02-07 DIAGNOSIS — J45.909 UNCOMPLICATED ASTHMA, UNSPECIFIED ASTHMA SEVERITY, UNSPECIFIED WHETHER PERSISTENT: ICD-10-CM

## 2023-02-07 DIAGNOSIS — I49.3 PVC'S (PREMATURE VENTRICULAR CONTRACTIONS): ICD-10-CM

## 2023-02-07 DIAGNOSIS — G47.00 INSOMNIA, UNSPECIFIED TYPE: ICD-10-CM

## 2023-02-07 DIAGNOSIS — E55.9 VITAMIN D DEFICIENCY: ICD-10-CM

## 2023-02-07 DIAGNOSIS — D72.119 HYPEREOSINOPHILIC SYNDROME, UNSPECIFIED TYPE: ICD-10-CM

## 2023-02-07 DIAGNOSIS — J45.50 SEVERE PERSISTENT ASTHMA WITHOUT COMPLICATION: Primary | ICD-10-CM

## 2023-02-07 DIAGNOSIS — K90.0 CELIAC DISEASE: ICD-10-CM

## 2023-02-07 RX ORDER — ZOLPIDEM TARTRATE 5 MG/1
5 TABLET ORAL
Qty: 30 TABLET | Refills: 0 | Status: SHIPPED | OUTPATIENT
Start: 2023-02-07

## 2023-02-07 RX ORDER — MONTELUKAST SODIUM 10 MG/1
10 TABLET ORAL
Qty: 90 TABLET | Refills: 0 | Status: SHIPPED | OUTPATIENT
Start: 2023-02-07

## 2023-02-08 NOTE — PROGRESS NOTES
Assessment/Plan:    No problem-specific Assessment & Plan notes found for this encounter  Diagnoses and all orders for this visit:    Severe persistent asthma without complication    Insomnia, unspecified type  Comments:  prn ambien  Orders:  -     zolpidem (AMBIEN) 5 mg tablet; Take 1 tablet (5 mg total) by mouth daily at bedtime as needed for sleep  -     CBC and differential; Future    Uncomplicated asthma, unspecified asthma severity, unspecified whether persistent  Comments:  needs refill singulair  Orders:  -     montelukast (SINGULAIR) 10 mg tablet; Take 1 tablet (10 mg total) by mouth daily at bedtime  -     CBC and differential; Future    PVC's (premature ventricular contractions)    Hyperlipidemia, unspecified hyperlipidemia type  -     Comprehensive metabolic panel; Future  -     Lipid panel; Future  -     TSH, 3rd generation; Future    Vitamin D deficiency  -     Vitamin D 25 hydroxy; Future    Follicular lymphoma, unspecified follicular lymphoma type, unspecified body region [facility])    Hypereosinophilic syndrome, unspecified type  -     CBC and differential; Future    Celiac disease  -     Vitamin D 25 hydroxy; Future  -     Vitamin B12; Future   Orders and recommendations as noted above  Previous laboratory testing reviewed with her  Slip given for routine laboratory testing  Continue with inhalers as previously  Continue to follow-up with pulmonary  Continue with the Singulair  Continue with Sanjeev Ruth as per pulmonary's recommendations  Watch for any worsening of palpitations  Continue with the flecainide as per cardiology  Watch for any chest pain or palpitations  Pain is active as possible  Previous GI testing reviewed with her  Celiac disease reviewed with her  Continue with dietary changes  Continue to follow-up with oncology regarding the history of the follicular lymphoma on the right elbow area    Left shoulder area seems consistent with AC joint degenerative changes and possible biceps tendinitis  Discussed x-rays if symptoms persist or worsen  Should consider MRI if symptoms worsen  He has a history of elevated cholesterol in the past   We will recheck this with upcoming laboratory testing  Sleep issues discussed  Has been stable on the Ambien with her taking 2 5 to 5 mg on a nightly basis with low-dose melatonin  Discussed the risks and benefits of this  Discussed pneumonia vaccination and she will consider this for the future  Discussed shingles vaccination  We will have her follow-up in about 4 to 6 months or sooner if needed depending on the results of the upcoming laboratory testing  She is up-to-date on mammogram   Up-to-date on colonoscopy  Subjective:      Patient ID: Lupis Braxton is a 74 y o  female  She presents to establish as a new patient to the practice  Has generally been doing much better  She had been having some chronic issues and had been diagnosed with celiac disease  Has noticed a significant improvement overall since initiating dietary changes  She continues to follow-up with oncology regarding the follicular lymphoma near the right elbow  Has not had any significant recurrence in symptoms or any running abnormalities per oncology  Has had some recent left shoulder pain with movement and feels some fullness there  Continues to follow-up with cardiology  Continues with the flecainide for the PVCs  Has been relatively stable with her asthma  Continues with the Jazmine Calvert as well as the inhalers and Singulair  Continues to follow-up with pulmonary  Has had some chronic sleep issues  Has been taking 2 5 to 5 mg of Ambien nightly along with low-dose melatonin  This combination has been working well for her without side effects  Appetite is relatively stable        The following portions of the patient's history were reviewed and updated as appropriate:   She  has a past medical history of Asthma, Celiac disease, Follicular lymphoma ([facility] [address] ), GERD (gastroesophageal reflux disease), Heart palpitations, History of colonic polyps, History of radiation therapy (2010), Hyperlipidemia, Insomnia, Irregular heart beat, Lymphoma, 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[facility] [address] ), Malignant lymphoma ([facility] [address] ) (2010), Postmenopausal disorder, Pulmonary nodule, Restless legs syndrome, and TMJ syndrome  She   Patient Active Problem List    Diagnosis Date Noted   • Hyperlipidemia    • Disorder of tendon of right biceps 07/09/2021   • Tear of right supraspinatus tendon 07/09/2021   • Pulmonary nodule    • Ganglion cyst of wrist, left 12/08/2020   • Eosinophilia 09/17/2020   • Severe persistent asthma without complication 26/38/0541   • Non-seasonal allergic rhinitis 09/02/2020   • Encounter for gynecological examination (general) (routine) without abnormal findings 05/23/2019   • Personal history of colonic polyps 94/05/7347   • Follicular lymphoma ([facility] [address] ) 08/29/2018   • VERONIKA (stress urinary incontinence, female) 05/18/2018   • PVC's (premature ventricular contractions) 04/24/2018   • Dyslipidemia 04/24/2018   • Celiac disease 04/19/2018   • Chronic GERD 10/03/2017   • Back pain 09/21/2017   • Heart palpitations 09/21/2017   • Insomnia 09/21/2017   • Sciatica associated with disorder of lumbar spine 09/21/2017   • Vitamin D deficiency 06/28/2017   • Osteopenia 11/17/2016     She  has a past surgical history that includes Lymph node dissection (Right); pr esophagogastroduodenoscopy transoral diagnostic (N/A, 1/18/2016); Nasal septum surgery (2013); Hysterectomy; Total abdominal hysterectomy w/ bilateral salpingoophorectomy; Tonsillectomy; Colonoscopy (04/16/2019); Functional endoscopic sinus surgery (Bilateral, 2013); US guided msk procedure (1/16/2020); US guided msk procedure (8/7/2020); pr excision ganglion wrist dorsal/volar recurrent (Left, 12/8/2020); Synovectomy (N/A, 12/8/2020); US guided msk procedure (8/2/2021); and Upper gastrointestinal endoscopy    Her family history includes Atrial fibrillation in her father; BRCA1 Positive in her cousin; Breast cancer (age of onset: 71) in her paternal aunt and paternal grandmother; Cancer in her paternal grandfather; Colonic polyp in her father; Diabetes in her father; Heart failure in her father; Hypertension in her father; Lung cancer in her paternal grandfather; Lymphoma in her mother; No Known Problems in her maternal aunt, maternal grandfather, and maternal grandmother; Ovarian cancer (age of onset: 79) in her paternal aunt; Skin cancer in her paternal aunt; Throat cancer in her father and paternal uncle; Thyroid cancer in her sister  She  reports that she quit smoking about 19 years ago  Her smoking use included cigarettes  She started smoking about 39 years ago  She has a 15 00 pack-year smoking history  She has never used smokeless tobacco  She reports current alcohol use of about 3 0 standard drinks per week  She reports that she does not use drugs  Current Outpatient Medications   Medication Sig Dispense Refill   • acetaminophen (TYLENOL) 500 mg tablet Take 500 mg by mouth every 6 (six) hours as needed for mild pain  • albuterol (2 5 mg/3 mL) 0 083 % nebulizer solution Take 3 mL (2 5 mg total) by nebulization every 6 (six) hours as needed for wheezing or shortness of breath 180 mL 0   • benralizumab (FASENRA) subcutaneous injection Inject 1 mL (30 mg total) under the skin every 56 days for 6 doses 1 Syringe 6   • bimatoprost (LATISSE) 0 03 % ophthalmic solution Take as directed     • Cholecalciferol 50 MCG (2000 UT) CAPS Take by mouth daily     • cyanocobalamin (VITAMIN B-12) 1,000 mcg tablet Take 1,000 mcg by mouth daily     • erythromycin with ethanol (EMGEL) 2 % gel Apply topically 2 (two) times a day 30 g 1   • famotidine (PEPCID) 20 mg tablet Take 20 mg by mouth 2 (two) times a day       • flecainide (TAMBOCOR) 100 mg tablet Take 1 tablet (100 mg total) by mouth 2 (two) times a day 180 tablet 3   • fluticasone (FLONASE) 50 mcg/act nasal spray 2 sprays into each nostril 2 (two) times a day 48 g 4   • Fluticasone Furoate-Vilanterol (Breo Ellipta) 100-25 mcg/actuation inhaler Inhale 1 puff daily Rinse mouth after use  60 blister 0   • ipratropium (ATROVENT) 0 02 % nebulizer solution Take 2 5 mL (0 5 mg total) by nebulization every 6 (six) hours as needed for wheezing or shortness of breath 180 mL 0   • ipratropium (ATROVENT) 0 03 % nasal spray 2 sprays into each nostril 3 (three) times a day as needed for rhinitis 30 mL 3   • levalbuterol (XOPENEX HFA) 45 mcg/act inhaler Inhale 1-2 puffs every 4 (four) hours as needed     • lidocaine (LMX) 4 % cream Apply topically as needed for mild pain 15 g 3   • Magnesium 500 MG CAPS Take by mouth     • metoprolol succinate (TOPROL-XL) 25 mg 24 hr tablet Take 0 5 tablets (12 5 mg total) by mouth daily 45 tablet 3   • montelukast (SINGULAIR) 10 mg tablet Take 1 tablet (10 mg total) by mouth daily at bedtime 90 tablet 0   • ondansetron (ZOFRAN) 4 mg tablet Take 1 tablet (4 mg total) by mouth every 8 (eight) hours as needed for nausea or vomiting 20 tablet 0   • rosuvastatin (CRESTOR) 5 mg tablet Take 1 tablet (5 mg total) by mouth daily 90 tablet 3   • sucralfate (CARAFATE) 1 g tablet Take 1 tablet (1 g total) by mouth 4 (four) times a day 120 tablet 5   • zolpidem (AMBIEN) 5 mg tablet Take 1 tablet (5 mg total) by mouth daily at bedtime as needed for sleep 30 tablet 0   • EPINEPHrine (EPIPEN) 0 3 mg/0 3 mL SOAJ Inject 0 3 mL (0 3 mg total) into a muscle once for 1 dose (Patient not taking: Reported on 10/28/2022) 0 6 mL 0     No current facility-administered medications for this visit  Current Outpatient Medications on File Prior to Visit   Medication Sig   • acetaminophen (TYLENOL) 500 mg tablet Take 500 mg by mouth every 6 (six) hours as needed for mild pain     • albuterol (2 5 mg/3 mL) 0 083 % nebulizer solution Take 3 mL (2 5 mg total) by nebulization every 6 (six) hours as needed for wheezing or shortness of breath   • benralizumab (FASENRA) subcutaneous injection Inject 1 mL (30 mg total) under the skin every 56 days for 6 doses   • bimatoprost (LATISSE) 0 03 % ophthalmic solution Take as directed   • Cholecalciferol 50 MCG (2000 UT) CAPS Take by mouth daily   • cyanocobalamin (VITAMIN B-12) 1,000 mcg tablet Take 1,000 mcg by mouth daily   • erythromycin with ethanol (EMGEL) 2 % gel Apply topically 2 (two) times a day   • famotidine (PEPCID) 20 mg tablet Take 20 mg by mouth 2 (two) times a day  • flecainide (TAMBOCOR) 100 mg tablet Take 1 tablet (100 mg total) by mouth 2 (two) times a day   • fluticasone (FLONASE) 50 mcg/act nasal spray 2 sprays into each nostril 2 (two) times a day   • Fluticasone Furoate-Vilanterol (Breo Ellipta) 100-25 mcg/actuation inhaler Inhale 1 puff daily Rinse mouth after use     • ipratropium (ATROVENT) 0 02 % nebulizer solution Take 2 5 mL (0 5 mg total) by nebulization every 6 (six) hours as needed for wheezing or shortness of breath   • ipratropium (ATROVENT) 0 03 % nasal spray 2 sprays into each nostril 3 (three) times a day as needed for rhinitis   • levalbuterol (XOPENEX HFA) 45 mcg/act inhaler Inhale 1-2 puffs every 4 (four) hours as needed   • lidocaine (LMX) 4 % cream Apply topically as needed for mild pain   • Magnesium 500 MG CAPS Take by mouth   • metoprolol succinate (TOPROL-XL) 25 mg 24 hr tablet Take 0 5 tablets (12 5 mg total) by mouth daily   • ondansetron (ZOFRAN) 4 mg tablet Take 1 tablet (4 mg total) by mouth every 8 (eight) hours as needed for nausea or vomiting   • rosuvastatin (CRESTOR) 5 mg tablet Take 1 tablet (5 mg total) by mouth daily   • sucralfate (CARAFATE) 1 g tablet Take 1 tablet (1 g total) by mouth 4 (four) times a day   • [DISCONTINUED] montelukast (SINGULAIR) 10 mg tablet take 1 tablet by mouth at bedtime   • [DISCONTINUED] zolpidem (AMBIEN) 5 mg tablet Take 1 tablet (5 mg total) by mouth daily at bedtime as needed for sleep   • EPINEPHrine (EPIPEN) 0 3 mg/0 3 mL SOAJ Inject 0 3 mL (0 3 mg total) into a muscle once for 1 dose (Patient not taking: Reported on 10/28/2022)   • [DISCONTINUED] cromolyn (OPTICROM) 4 % ophthalmic solution Administer 1 drop to both eyes in the morning and 1 drop at noon and 1 drop in the evening and 1 drop before bedtime  • [DISCONTINUED] omega-3-acid ethyl esters (LOVAZA) 1 g capsule Take 2 capsules (2 g total) by mouth 2 (two) times a day   • [DISCONTINUED] pantoprazole (PROTONIX) 20 mg tablet take 2 tablets by mouth twice a day   • [DISCONTINUED] Vitamin D, Ergocalciferol, 2000 units CAPS Take by mouth     No current facility-administered medications on file prior to visit  She is allergic to shellfish-derived products - food allergy, wheat bran - food allergy, gluten meal - food allergy, morphine and related, nuts - food allergy, and sulfa antibiotics       Review of Systems   Constitutional: Positive for activity change  Negative for appetite change, chills and fever  HENT: Negative for congestion and rhinorrhea  Eyes: Negative for visual disturbance  Respiratory: Negative for cough, chest tightness and shortness of breath  Cardiovascular: Negative for chest pain and palpitations  Gastrointestinal: Negative for abdominal pain, blood in stool, diarrhea, nausea and vomiting  Endocrine: Negative for polydipsia, polyphagia and polyuria  Genitourinary: Negative for dysuria, frequency and urgency  Musculoskeletal: Positive for arthralgias  Negative for gait problem  Skin: Negative for color change  Neurological: Negative for dizziness and headaches  Hematological: Does not bruise/bleed easily  Psychiatric/Behavioral: Positive for sleep disturbance  Negative for confusion  The patient is not nervous/anxious            Objective:      /80 (BP Location: Left arm, Patient Position: Sitting, Cuff Size: Large)   Pulse 68   Temp (!) 97 °F (36 1 °C)   Ht 5' 4 5" (1 638 m)   Wt 69 4 kg (153 lb)   SpO2 99%   BMI 25 86 kg/m²          Physical Exam  Vitals and nursing note reviewed  Constitutional:       General: She is not in acute distress  Appearance: She is well-developed and well-groomed  HENT:      Head: Normocephalic and atraumatic  Eyes:      General:         Right eye: No discharge  Left eye: No discharge  Conjunctiva/sclera: Conjunctivae normal       Pupils: Pupils are equal, round, and reactive to light  Neck:      Thyroid: No thyromegaly  Vascular: No carotid bruit  Cardiovascular:      Rate and Rhythm: Normal rate and regular rhythm  Heart sounds: Normal heart sounds  No murmur heard  No friction rub  No gallop  Pulmonary:      Effort: No respiratory distress  Breath sounds: No wheezing or rales  Abdominal:      General: Bowel sounds are normal  There is no distension  Tenderness: There is no abdominal tenderness  Musculoskeletal:      Cervical back: Neck supple  Comments: Tenderness and slight degenerative changes palpable over the left AC joint; slight tenderness into the left biceps tendon area   Lymphadenopathy:      Cervical: No cervical adenopathy  Skin:     General: Skin is warm and dry  Neurological:      Mental Status: She is alert and oriented to person, place, and time  Psychiatric:         Mood and Affect: Mood and affect normal          Speech: Speech normal          Behavior: Behavior normal  Behavior is cooperative  Cognition and Memory: Cognition and memory normal          BMI Counseling: Body mass index is 25 86 kg/m²  The BMI is above normal  Nutrition recommendations include encouraging healthy choices of fruits and vegetables, decreasing fast food intake, consuming healthier snacks, limiting drinks that contain sugar, moderation in carbohydrate intake and reducing intake of cholesterol  Exercise recommendations include exercising 3-5 times per week  Rationale for BMI follow-up plan is due to patient being overweight or obese  Depression Screening and Follow-up Plan: Patient was screened for depression during today's encounter  They screened negative with a PHQ-2 score of 0

## 2023-02-13 ENCOUNTER — HOSPITAL ENCOUNTER (OUTPATIENT)
Dept: INFUSION CENTER | Facility: HOSPITAL | Age: 68
Discharge: HOME | End: 2023-02-13
Attending: INTERNAL MEDICINE

## 2023-02-13 VITALS
SYSTOLIC BLOOD PRESSURE: 116 MMHG | TEMPERATURE: 97.1 F | HEART RATE: 75 BPM | RESPIRATION RATE: 18 BRPM | DIASTOLIC BLOOD PRESSURE: 75 MMHG

## 2023-02-13 DIAGNOSIS — J45.50 SEVERE PERSISTENT ASTHMA WITHOUT COMPLICATION: Primary | ICD-10-CM

## 2023-02-13 DIAGNOSIS — D72.119 HYPEREOSINOPHILIC SYNDROME, UNSPECIFIED TYPE: ICD-10-CM

## 2023-02-13 RX ORDER — EPINEPHRINE 1 MG/ML
0.3 INJECTION, SOLUTION, CONCENTRATE INTRAVENOUS ONCE
OUTPATIENT
Start: 2023-04-10 | End: 2023-04-10

## 2023-02-13 RX ADMIN — BENRALIZUMAB 30 MG: 30 INJECTION, SOLUTION SUBCUTANEOUS at 11:54

## 2023-02-13 NOTE — PROGRESS NOTES
Patient presented for fasenra injection, brought epi pen to appointment  Expiration date 4/2023  Tolerated injection to right arm without issue  Patient observed for 30 minutes post injection with no s/s of adverse reaction noted   Appointment made for next injection, discharged in stable condition, declined AVS

## 2023-02-14 ENCOUNTER — OFFICE VISIT (OUTPATIENT)
Dept: CARDIOLOGY CLINIC | Facility: CLINIC | Age: 68
End: 2023-02-14

## 2023-02-14 VITALS
SYSTOLIC BLOOD PRESSURE: 110 MMHG | BODY MASS INDEX: 25.49 KG/M2 | HEART RATE: 74 BPM | HEIGHT: 65 IN | DIASTOLIC BLOOD PRESSURE: 80 MMHG | WEIGHT: 153 LBS

## 2023-02-14 DIAGNOSIS — I49.3 PVC'S (PREMATURE VENTRICULAR CONTRACTIONS): Primary | ICD-10-CM

## 2023-02-14 DIAGNOSIS — E78.2 MIXED HYPERLIPIDEMIA: ICD-10-CM

## 2023-02-14 NOTE — ASSESSMENT & PLAN NOTE
LDL was a bit elevated  On a very low-dose of rosuvastatin it is much improved  Calcium score is 0 so if she has any side effects I have no problem with stopping this

## 2023-02-14 NOTE — PROGRESS NOTES
Patient ID: Ashia Cornejo is a 74 y o  female  Plan:      PVC's (premature ventricular contractions)  Well treated with flecainide and this should be continued  Hyperlipidemia  LDL was a bit elevated  On a very low-dose of rosuvastatin it is much improved  Calcium score is 0 so if she has any side effects I have no problem with stopping this  Follow up Plan/Other summary comments:  Return in about 1 year (around 2/14/2024)  HPI: Patient is seen in follow-up today  Brent Diaz has long history of PVCs rendered much improved on flecainide  She knows that if she misses a dose she becomes symptomatic  No recent chest pain or chest pressure  No syncope or near syncope  Most recent or relevant cardiac/vascular testing:    Coronary CT calcium score 9/10/2020: 0  Holter monitor report 9/4/2020: Heart rate ranged from 47 to 120 bpm   Average heart rate 76  Only 4 ventricular ectopic beats  This was performed on flecainide  Treadmill test 9/4/2020: Normal       Past Surgical History:   Procedure Laterality Date   • COLONOSCOPY  04/16/2019   • FUNCTIONAL ENDOSCOPIC SINUS SURGERY Bilateral 2013    Dr Attila Rosenbaum   • HYSTERECTOMY      age 39 complete   • LYMPH NODE DISSECTION Right     arm   • NASAL SEPTUM SURGERY  2013    with turbinate reduction - Dr Attila Rosenbaum   • WV ESOPHAGOGASTRODUODENOSCOPY TRANSORAL DIAGNOSTIC N/A 1/18/2016    Procedure: ESOPHAGOGASTRODUODENOSCOPY (EGD); Surgeon: Tonya Bruce MD;  [facility];   Service: Gastroenterology   • WV EXCISION GANGLION WRIST DORSAL/VOLAR RECURRENT Left 12/8/2020    Procedure: WRIST GANGLION CYST EXCISION;  Surgeon: Umang Estrada MD;  [facility];  Service: Orthopedics   • SYNOVECTOMY N/A 12/8/2020    Procedure: ECU TENOSYNOVECTOMY;  Surgeon: Umang Estrada MD;  [facility];  Service: Orthopedics   • TONSILLECTOMY     • TOTAL ABDOMINAL HYSTERECTOMY W/ BILATERAL SALPINGOOPHORECTOMY      age 54   • UPPER GASTROINTESTINAL ENDOSCOPY     • US GUIDED MSK PROCEDURE  1/16/2020   • US GUIDED MSK PROCEDURE  8/7/2020   • US GUIDED MSK PROCEDURE  8/2/2021       Lipid Profile:   Lab Results   Component Value Date    CHOL 200 06/22/2015    TRIG 249 (H) 06/22/2022    TRIG 200 06/22/2015    HDL 44 (L) 06/22/2022    HDL 51 06/22/2015         Review of Systems   10  point ROS  was otherwise non pertinent or negative except as per HPI or as below  Gait: Normal          Objective:     /80   Pulse 74   Ht 5' 4 5" (1 638 m)   Wt 69 4 kg (153 lb)   BMI 25 86 kg/m²     PHYSICAL EXAM:    General:  Normal appearance in no distress  Eyes:  Anicteric  Oral mucosa:  Moist   Neck:  No JVD  Carotid upstrokes are brisk without bruits  No masses  Chest:  Clear to auscultation  Cardiac:  No palpable PMI  Normal S1 and S2  No murmur gallop or rub  Abdomen:  Soft and nontender  No palpable organomegaly or aortic enlargement  Extremities:  No peripheral edema  Musculoskeletal:  Symmetric  Vascular:  Femoral pulses are brisk without bruits  Popliteal pulses are intact bilaterally  Pedal pulses are intact  Neuro:  Grossly symmetric  Psych:  Alert and oriented x3          Current Outpatient Medications:   •  acetaminophen (TYLENOL) 500 mg tablet, Take 500 mg by mouth every 6 (six) hours as needed for mild pain , Disp: , Rfl:   •  albuterol (2 5 mg/3 mL) 0 083 % nebulizer solution, Take 3 mL (2 5 mg total) by nebulization every 6 (six) hours as needed for wheezing or shortness of breath, Disp: 180 mL, Rfl: 0  •  benralizumab (FASENRA) subcutaneous injection, Inject 1 mL (30 mg total) under the skin every 56 days for 6 doses, Disp: 1 Syringe, Rfl: 6  •  bimatoprost (LATISSE) 0 03 % ophthalmic solution, Take as directed, Disp: , Rfl:   •  Cholecalciferol 50 MCG (2000 UT) CAPS, Take by mouth daily, Disp: , Rfl:   •  cyanocobalamin (VITAMIN B-12) 1,000 mcg tablet, Take 1,000 mcg by mouth daily, Disp: , Rfl:   •  erythromycin with ethanol (EMGEL) 2 % gel, Apply topically 2 (two) times a day, Disp: 30 g, Rfl: 1  •  famotidine (PEPCID) 20 mg tablet, Take 20 mg by mouth 2 (two) times a day , Disp: , Rfl:   •  flecainide (TAMBOCOR) 100 mg tablet, Take 1 tablet (100 mg total) by mouth 2 (two) times a day, Disp: 180 tablet, Rfl: 3  •  fluticasone (FLONASE) 50 mcg/act nasal spray, 2 sprays into each nostril 2 (two) times a day, Disp: 48 g, Rfl: 4  •  ipratropium (ATROVENT) 0 02 % nebulizer solution, Take 2 5 mL (0 5 mg total) by nebulization every 6 (six) hours as needed for wheezing or shortness of breath, Disp: 180 mL, Rfl: 0  •  ipratropium (ATROVENT) 0 03 % nasal spray, 2 sprays into each nostril 3 (three) times a day as needed for rhinitis, Disp: 30 mL, Rfl: 3  •  levalbuterol (XOPENEX HFA) 45 mcg/act inhaler, Inhale 1-2 puffs every 4 (four) hours as needed, Disp: , Rfl:   •  lidocaine (LMX) 4 % cream, Apply topically as needed for mild pain, Disp: 15 g, Rfl: 3  •  Magnesium 500 MG CAPS, Take by mouth, Disp: , Rfl:   •  montelukast (SINGULAIR) 10 mg tablet, Take 1 tablet (10 mg total) by mouth daily at bedtime, Disp: 90 tablet, Rfl: 0  •  ondansetron (ZOFRAN) 4 mg tablet, Take 1 tablet (4 mg total) by mouth every 8 (eight) hours as needed for nausea or vomiting, Disp: 20 tablet, Rfl: 0  •  rosuvastatin (CRESTOR) 5 mg tablet, Take 1 tablet (5 mg total) by mouth daily, Disp: 90 tablet, Rfl: 3  •  sucralfate (CARAFATE) 1 g tablet, Take 1 tablet (1 g total) by mouth 4 (four) times a day, Disp: 120 tablet, Rfl: 5  •  zolpidem (AMBIEN) 5 mg tablet, Take 1 tablet (5 mg total) by mouth daily at bedtime as needed for sleep, Disp: 30 tablet, Rfl: 0  •  EPINEPHrine (EPIPEN) 0 3 mg/0 3 mL SOAJ, Inject 0 3 mL (0 3 mg total) into a muscle once for 1 dose (Patient not taking: Reported on 10/28/2022), Disp: 0 6 mL, Rfl: 0  •  Fluticasone Furoate-Vilanterol (Breo Ellipta) 100-25 mcg/actuation inhaler, Inhale 1 puff daily Rinse mouth after use , Disp: 60 blister, Rfl: 0  No current facility-administered medications for this visit  Allergies   Allergen Reactions   • Shellfish-Derived Products - Food Allergy Anaphylaxis   • Wheat Bran - Food Allergy Anaphylaxis   • Gluten Meal - Food Allergy GI Intolerance     Celiac    • Morphine And Related Itching   • Nuts - Food Allergy Hives     Almonds,walnuts, hazelnuts and other related nuts      • Sulfa Antibiotics Rash     Past Medical History:   Diagnosis Date   • Asthma    • Celiac disease    • [facility]    • GERD (gastroesophageal reflux disease)    • Heart palpitations    • History of colonic polyps     resolved 2016   • History of radiation therapy    • Hyperlipidemia    • Insomnia    • Irregular heart beat    • Lymphoma, 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HCC)     non hodgekins, remission   • Malignant lymphoma ([facility] )     rt arm cutaneous follicular stage ia (rt diatal medical biceps area , s/p resected and s/p radistion treatment    resolved 2014   • Postmenopausal disorder     resolved 2017   • Pulmonary nodule     BENIGN, STABLE [id number]   • Restless legs syndrome     resolved 2017   • TMJ syndrome     resolved 2017           Social History     Tobacco Use   Smoking Status Former   • Packs/day: 0 75   • Years: 20 00   • Pack years: 15 00   • Types: Cigarettes   • Start date: 48   • Quit date:    • Years since quittin 1   Smokeless Tobacco Never

## 2023-03-03 ENCOUNTER — APPOINTMENT (OUTPATIENT)
Dept: LAB | Facility: CLINIC | Age: 68
End: 2023-03-03

## 2023-03-03 DIAGNOSIS — J45.909 UNCOMPLICATED ASTHMA, UNSPECIFIED ASTHMA SEVERITY, UNSPECIFIED WHETHER PERSISTENT: ICD-10-CM

## 2023-03-03 DIAGNOSIS — E78.5 HYPERLIPIDEMIA, UNSPECIFIED HYPERLIPIDEMIA TYPE: ICD-10-CM

## 2023-03-03 DIAGNOSIS — E55.9 VITAMIN D DEFICIENCY: ICD-10-CM

## 2023-03-03 DIAGNOSIS — K90.0 CELIAC DISEASE: ICD-10-CM

## 2023-03-03 DIAGNOSIS — G47.00 INSOMNIA, UNSPECIFIED TYPE: ICD-10-CM

## 2023-03-03 DIAGNOSIS — D72.119 HYPEREOSINOPHILIC SYNDROME, UNSPECIFIED TYPE: ICD-10-CM

## 2023-03-03 LAB
ALBUMIN SERPL BCP-MCNC: 3.7 G/DL (ref 3.5–5)
ALP SERPL-CCNC: 56 U/L (ref 46–116)
ALT SERPL W P-5'-P-CCNC: 33 U/L (ref 12–78)
ANION GAP SERPL CALCULATED.3IONS-SCNC: 5 MMOL/L (ref 4–13)
AST SERPL W P-5'-P-CCNC: 24 U/L (ref 5–45)
BASOPHILS # BLD AUTO: 0.01 THOUSANDS/ÂΜL (ref 0–0.1)
BASOPHILS NFR BLD AUTO: 0 % (ref 0–1)
BILIRUB SERPL-MCNC: 0.66 MG/DL (ref 0.2–1)
BUN SERPL-MCNC: 15 MG/DL (ref 5–25)
CALCIUM SERPL-MCNC: 9.4 MG/DL (ref 8.3–10.1)
CHLORIDE SERPL-SCNC: 107 MMOL/L (ref 96–108)
CHOLEST SERPL-MCNC: 164 MG/DL
CO2 SERPL-SCNC: 25 MMOL/L (ref 21–32)
CREAT SERPL-MCNC: 0.76 MG/DL (ref 0.6–1.3)
EOSINOPHIL # BLD AUTO: 0 THOUSAND/ÂΜL (ref 0–0.61)
EOSINOPHIL NFR BLD AUTO: 0 % (ref 0–6)
ERYTHROCYTE [DISTWIDTH] IN BLOOD BY AUTOMATED COUNT: 13.2 % (ref 11.6–15.1)
GFR SERPL CREATININE-BSD FRML MDRD: 82 ML/MIN/1.73SQ M
GLUCOSE P FAST SERPL-MCNC: 110 MG/DL (ref 65–99)
HCT VFR BLD AUTO: 40.6 % (ref 34.8–46.1)
HDLC SERPL-MCNC: 59 MG/DL
HGB BLD-MCNC: 13.5 G/DL (ref 11.5–15.4)
IMM GRANULOCYTES # BLD AUTO: 0.02 THOUSAND/UL (ref 0–0.2)
IMM GRANULOCYTES NFR BLD AUTO: 0 % (ref 0–2)
LDLC SERPL CALC-MCNC: 66 MG/DL (ref 0–100)
LYMPHOCYTES # BLD AUTO: 1.28 THOUSANDS/ÂΜL (ref 0.6–4.47)
LYMPHOCYTES NFR BLD AUTO: 27 % (ref 14–44)
MCH RBC QN AUTO: 31 PG (ref 26.8–34.3)
MCHC RBC AUTO-ENTMCNC: 33.3 G/DL (ref 31.4–37.4)
MCV RBC AUTO: 93 FL (ref 82–98)
MONOCYTES # BLD AUTO: 0.41 THOUSAND/ÂΜL (ref 0.17–1.22)
MONOCYTES NFR BLD AUTO: 9 % (ref 4–12)
NEUTROPHILS # BLD AUTO: 3 THOUSANDS/ÂΜL (ref 1.85–7.62)
NEUTS SEG NFR BLD AUTO: 64 % (ref 43–75)
NONHDLC SERPL-MCNC: 105 MG/DL
NRBC BLD AUTO-RTO: 0 /100 WBCS
PLATELET # BLD AUTO: 181 THOUSANDS/UL (ref 149–390)
PMV BLD AUTO: 11.5 FL (ref 8.9–12.7)
POTASSIUM SERPL-SCNC: 4.3 MMOL/L (ref 3.5–5.3)
PROT SERPL-MCNC: 6.6 G/DL (ref 6.4–8.4)
RBC # BLD AUTO: 4.36 MILLION/UL (ref 3.81–5.12)
SODIUM SERPL-SCNC: 137 MMOL/L (ref 135–147)
TRIGL SERPL-MCNC: 195 MG/DL
TSH SERPL DL<=0.05 MIU/L-ACNC: 1.31 UIU/ML (ref 0.45–4.5)
VIT B12 SERPL-MCNC: 379 PG/ML (ref 100–900)
WBC # BLD AUTO: 4.72 THOUSAND/UL (ref 4.31–10.16)

## 2023-03-04 LAB — 25(OH)D3 SERPL-MCNC: 39.6 NG/ML (ref 30–100)

## 2023-03-23 ENCOUNTER — TELEPHONE (OUTPATIENT)
Dept: CARDIOLOGY CLINIC | Facility: CLINIC | Age: 68
End: 2023-03-23

## 2023-03-23 DIAGNOSIS — I49.3 PVC'S (PREMATURE VENTRICULAR CONTRACTIONS): Primary | ICD-10-CM

## 2023-03-23 RX ORDER — METOPROLOL SUCCINATE 25 MG/1
12.5 TABLET, EXTENDED RELEASE ORAL EVERY EVENING
Qty: 45 TABLET | Refills: 5 | Status: SHIPPED | OUTPATIENT
Start: 2023-03-23

## 2023-03-23 NOTE — TELEPHONE ENCOUNTER
----- Message from Nadine Maynard sent at 3/23/2023 10:29 AM EDT -----  Regarding: metoprolol  Wants to keep taking metoprolol succ ER 25 mg  1/2 tab in the pm   She said Dr Kalli Jerome thought she should try not using it but she found that she felt better with it  Unfortunately the prescription was cancelled  Can she get this renewed?     Cassius Lemos

## 2023-04-05 ENCOUNTER — TELEPHONE (OUTPATIENT)
Dept: GASTROENTEROLOGY | Facility: CLINIC | Age: 68
End: 2023-04-05

## 2023-04-05 NOTE — TELEPHONE ENCOUNTER
Miguel and letter sent to call office to make recall appt with Dr Yue Bradley in July
Detail Level: Detailed

## 2023-04-05 NOTE — LETTER
4/5/2023    Dear Steffanie Pineda,      Review of our records shows you are due for the following:    Office Visit    Please call the following office to schedule your appointment:    [phone]    We look forward to hearing from you      Sincerely,    [facility] [place] [facility]

## 2023-04-25 ENCOUNTER — TELEPHONE (OUTPATIENT)
Dept: CARDIOLOGY CLINIC | Facility: CLINIC | Age: 68
End: 2023-04-25

## 2023-04-25 NOTE — TELEPHONE ENCOUNTER
Patient called  Wants to know if okay to take regular Sudafed for sinus issues with her other medications/heart issues?

## 2023-04-27 ENCOUNTER — OFFICE VISIT (OUTPATIENT)
Dept: INTERNAL MEDICINE CLINIC | Facility: CLINIC | Age: 68
End: 2023-04-27

## 2023-04-27 VITALS — HEART RATE: 72 BPM | OXYGEN SATURATION: 99 % | TEMPERATURE: 98.7 F

## 2023-04-27 DIAGNOSIS — J32.9 CHRONIC SINUSITIS, UNSPECIFIED LOCATION: Primary | ICD-10-CM

## 2023-04-27 RX ORDER — PREDNISONE 10 MG/1
TABLET ORAL
Qty: 30 TABLET | Refills: 0 | Status: SHIPPED | OUTPATIENT
Start: 2023-04-27

## 2023-04-27 RX ORDER — AMOXICILLIN 875 MG/1
875 TABLET, COATED ORAL 2 TIMES DAILY
Qty: 20 TABLET | Refills: 0 | Status: SHIPPED | OUTPATIENT
Start: 2023-04-27 | End: 2023-05-07

## 2023-04-28 NOTE — PROGRESS NOTES
Assessment/Plan:    No problem-specific Assessment & Plan notes found for this encounter  Diagnoses and all orders for this visit:    Chronic sinusitis, unspecified location  -     predniSONE 10 mg tablet; Five pills a day for 2 days, 4 pills a day for 2 days, 3 pills a day for 2 days, 2 pills a day for 2 days, 1 pill a day for 2 days  -     amoxicillin (AMOXIL) 875 mg tablet; Take 1 tablet (875 mg total) by mouth 2 (two) times a day for 10 days   Orders and recommendations as noted above  Fluids  Rest   Continue with nasal irrigation as well as allergy and asthma medications as previously  Amoxicillin as noted above  Prednisone taper as noted above  Watch for any worsening of symptoms  Call or follow-up if symptoms worsen or persist     Subjective:      Patient ID: Caren Parra is a 74 y o  female  She presents for acute visit  Started over the last 2 weeks with increased nasal congestion and postnasal drip  Has been taking her allergy and asthma medications  Has been using over-the-counter decongestants as well as nasal spray  Has been using the nasal irrigation twice a day  This usually works to hold off sinus infections but her symptoms this time have steadily worsened  Has significant sinus pressure over the maxillary and ethmoid areas  Some postnasal drip  Occasional cough  More tired  Limited taste and smell  The following portions of the patient's history were reviewed and updated as appropriate:   She  has a past medical history of Asthma, Celiac disease, Follicular lymphoma ([address] ), GERD (gastroesophageal reflux disease), Heart palpitations, History of colonic polyps, History of radiation therapy (2010), Hyperlipidemia, Insomnia, Irregular heart beat, Lymphoma, 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[address] ), Malignant lymphoma ([address] ) (2010), Postmenopausal disorder, Pulmonary nodule, Restless legs syndrome, and TMJ syndrome    She   Patient Active Problem List    Diagnosis Date Noted   • Chronic sinusitis 04/27/2023   • Hyperlipidemia    • Disorder of tendon of right biceps 07/09/2021   • Tear of right supraspinatus tendon 07/09/2021   • Pulmonary nodule    • Ganglion cyst of wrist, left 12/08/2020   • Eosinophilia 09/17/2020   • Severe persistent asthma without complication 53/55/3710   • Non-seasonal allergic rhinitis 09/02/2020   • Encounter for gynecological examination (general) (routine) without abnormal findings 05/23/2019   • Personal history of colonic polyps 11/61/5997   • Follicular lymphoma ([address] ) 08/29/2018   • VERONIKA (stress urinary incontinence, female) 05/18/2018   • PVC's (premature ventricular contractions) 04/24/2018   • Dyslipidemia 04/24/2018   • Celiac disease 04/19/2018   • Chronic GERD 10/03/2017   • Back pain 09/21/2017   • Heart palpitations 09/21/2017   • Insomnia 09/21/2017   • Sciatica associated with disorder of lumbar spine 09/21/2017   • Vitamin D deficiency 06/28/2017   • Osteopenia 11/17/2016     She  has a past surgical history that includes Lymph node dissection (Right); pr esophagogastroduodenoscopy transoral diagnostic (N/A, 1/18/2016); Nasal septum surgery (2013); Hysterectomy; Total abdominal hysterectomy w/ bilateral salpingoophorectomy; Tonsillectomy; Colonoscopy (04/16/2019); Functional endoscopic sinus surgery (Bilateral, 2013); US guided msk procedure (1/16/2020); US guided msk procedure (8/7/2020); pr excision ganglion wrist dorsal/volar recurrent (Left, 12/8/2020); Synovectomy (N/A, 12/8/2020); US guided msk procedure (8/2/2021); and Upper gastrointestinal endoscopy    Her family history includes Atrial fibrillation in her father; BRCA1 Positive in her cousin; Breast cancer (age of onset: 71) in her paternal aunt and paternal grandmother; Cancer in her paternal grandfather; Colonic polyp in her father; Diabetes in her father; Heart failure in her father; Hypertension in her father; Lung cancer in her paternal grandfather; Lymphoma in her mother; No Known Problems in her maternal aunt, maternal grandfather, and maternal grandmother; Ovarian cancer (age of onset: 79) in her paternal aunt; Skin cancer in her paternal aunt; Throat cancer in her father and paternal uncle; Thyroid cancer in her sister  She  reports that she quit smoking about 19 years ago  Her smoking use included cigarettes  She started smoking about 39 years ago  She has a 15 00 pack-year smoking history  She has never used smokeless tobacco  She reports current alcohol use of about 3 0 standard drinks per week  She reports that she does not use drugs  Current Outpatient Medications   Medication Sig Dispense Refill   • amoxicillin (AMOXIL) 875 mg tablet Take 1 tablet (875 mg total) by mouth 2 (two) times a day for 10 days 20 tablet 0   • predniSONE 10 mg tablet Five pills a day for 2 days, 4 pills a day for 2 days, 3 pills a day for 2 days, 2 pills a day for 2 days, 1 pill a day for 2 days 30 tablet 0   • acetaminophen (TYLENOL) 500 mg tablet Take 500 mg by mouth every 6 (six) hours as needed for mild pain  • albuterol (2 5 mg/3 mL) 0 083 % nebulizer solution Take 3 mL (2 5 mg total) by nebulization every 6 (six) hours as needed for wheezing or shortness of breath 180 mL 0   • benralizumab (FASENRA) subcutaneous injection Inject 1 mL (30 mg total) under the skin every 56 days for 6 doses 1 Syringe 6   • bimatoprost (LATISSE) 0 03 % ophthalmic solution Take as directed     • Cholecalciferol 50 MCG (2000 UT) CAPS Take by mouth daily     • cyanocobalamin (VITAMIN B-12) 1,000 mcg tablet Take 1,000 mcg by mouth daily     • EPINEPHrine (EPIPEN) 0 3 mg/0 3 mL SOAJ Inject 0 3 mL (0 3 mg total) into a muscle once for 1 dose (Patient not taking: Reported on 10/28/2022) 0 6 mL 0   • erythromycin with ethanol (EMGEL) 2 % gel Apply topically 2 (two) times a day 30 g 1   • famotidine (PEPCID) 20 mg tablet Take 20 mg by mouth 2 (two) times a day       • flecainide (TAMBOCOR) 100 mg tablet Take 1 tablet (100 mg total) by mouth 2 (two) times a day 180 tablet 3   • fluticasone (FLONASE) 50 mcg/act nasal spray 2 sprays into each nostril 2 (two) times a day 48 g 4   • Fluticasone Furoate-Vilanterol (Breo Ellipta) 100-25 mcg/actuation inhaler Inhale 1 puff daily Rinse mouth after use  60 blister 0   • ipratropium (ATROVENT) 0 02 % nebulizer solution Take 2 5 mL (0 5 mg total) by nebulization every 6 (six) hours as needed for wheezing or shortness of breath 180 mL 0   • ipratropium (ATROVENT) 0 03 % nasal spray 2 sprays into each nostril 3 (three) times a day as needed for rhinitis 30 mL 3   • levalbuterol (XOPENEX HFA) 45 mcg/act inhaler Inhale 1-2 puffs every 4 (four) hours as needed     • lidocaine (LMX) 4 % cream Apply topically as needed for mild pain 15 g 3   • Magnesium 500 MG CAPS Take by mouth     • metoprolol succinate (TOPROL-XL) 25 mg 24 hr tablet Take 0 5 tablets (12 5 mg total) by mouth every evening 45 tablet 5   • montelukast (SINGULAIR) 10 mg tablet Take 1 tablet (10 mg total) by mouth daily at bedtime 90 tablet 0   • ondansetron (ZOFRAN) 4 mg tablet Take 1 tablet (4 mg total) by mouth every 8 (eight) hours as needed for nausea or vomiting 20 tablet 0   • rosuvastatin (CRESTOR) 5 mg tablet Take 1 tablet (5 mg total) by mouth daily 90 tablet 3   • sucralfate (CARAFATE) 1 g tablet Take 1 tablet (1 g total) by mouth 4 (four) times a day 120 tablet 5   • zolpidem (AMBIEN) 5 mg tablet Take 1 tablet (5 mg total) by mouth daily at bedtime as needed for sleep 30 tablet 1     No current facility-administered medications for this visit  Current Outpatient Medications on File Prior to Visit   Medication Sig   • acetaminophen (TYLENOL) 500 mg tablet Take 500 mg by mouth every 6 (six) hours as needed for mild pain     • albuterol (2 5 mg/3 mL) 0 083 % nebulizer solution Take 3 mL (2 5 mg total) by nebulization every 6 (six) hours as needed for wheezing or shortness of breath   • benralizumab (FASENRA) subcutaneous injection Inject 1 mL (30 mg total) under the skin every 56 days for 6 doses   • bimatoprost (LATISSE) 0 03 % ophthalmic solution Take as directed   • Cholecalciferol 50 MCG (2000 UT) CAPS Take by mouth daily   • cyanocobalamin (VITAMIN B-12) 1,000 mcg tablet Take 1,000 mcg by mouth daily   • EPINEPHrine (EPIPEN) 0 3 mg/0 3 mL SOAJ Inject 0 3 mL (0 3 mg total) into a muscle once for 1 dose (Patient not taking: Reported on 10/28/2022)   • erythromycin with ethanol (EMGEL) 2 % gel Apply topically 2 (two) times a day   • famotidine (PEPCID) 20 mg tablet Take 20 mg by mouth 2 (two) times a day  • flecainide (TAMBOCOR) 100 mg tablet Take 1 tablet (100 mg total) by mouth 2 (two) times a day   • fluticasone (FLONASE) 50 mcg/act nasal spray 2 sprays into each nostril 2 (two) times a day   • Fluticasone Furoate-Vilanterol (Breo Ellipta) 100-25 mcg/actuation inhaler Inhale 1 puff daily Rinse mouth after use     • ipratropium (ATROVENT) 0 02 % nebulizer solution Take 2 5 mL (0 5 mg total) by nebulization every 6 (six) hours as needed for wheezing or shortness of breath   • ipratropium (ATROVENT) 0 03 % nasal spray 2 sprays into each nostril 3 (three) times a day as needed for rhinitis   • levalbuterol (XOPENEX HFA) 45 mcg/act inhaler Inhale 1-2 puffs every 4 (four) hours as needed   • lidocaine (LMX) 4 % cream Apply topically as needed for mild pain   • Magnesium 500 MG CAPS Take by mouth   • metoprolol succinate (TOPROL-XL) 25 mg 24 hr tablet Take 0 5 tablets (12 5 mg total) by mouth every evening   • montelukast (SINGULAIR) 10 mg tablet Take 1 tablet (10 mg total) by mouth daily at bedtime   • ondansetron (ZOFRAN) 4 mg tablet Take 1 tablet (4 mg total) by mouth every 8 (eight) hours as needed for nausea or vomiting   • rosuvastatin (CRESTOR) 5 mg tablet Take 1 tablet (5 mg total) by mouth daily   • sucralfate (CARAFATE) 1 g tablet Take 1 tablet (1 g total) by mouth 4 (four) times a day   • zolpidem (AMBIEN) 5 mg tablet Take 1 tablet (5 mg total) by mouth daily at bedtime as needed for sleep     No current facility-administered medications on file prior to visit  She is allergic to shellfish-derived products - food allergy, wheat bran - food allergy, gluten meal - food allergy, morphine and related, nuts - food allergy, and sulfa antibiotics       Review of Systems   Constitutional: Positive for fatigue  Negative for chills, fever and unexpected weight change  HENT: Positive for congestion, postnasal drip, sinus pressure and sinus pain  Respiratory: Positive for cough  Cardiovascular: Negative for chest pain and palpitations  Gastrointestinal: Negative for abdominal pain, diarrhea, nausea and vomiting  Musculoskeletal: Negative for arthralgias  Skin: Negative for rash  Allergic/Immunologic: Negative for immunocompromised state  Neurological: Negative for light-headedness  Objective:      Pulse 72   Temp 98 7 °F (37 1 °C)   SpO2 99%          Physical Exam  Vitals reviewed  Constitutional:       Appearance: She is well-developed and well-groomed  HENT:      Head:      Comments: Just membranes; slight posterior pharyngeal erythema; boggy nasal mucosa with copious purulent nasal discharge bilaterally left greater than right; maxillary and ethmoid sinus tenderness; effusions bilateral tympanic membranes left greater than right  Cardiovascular:      Rate and Rhythm: Normal rate and regular rhythm  Pulmonary:      Breath sounds: Transmitted upper airway sounds present  No decreased breath sounds, wheezing or rhonchi  Musculoskeletal:      Cervical back: Neck supple  Lymphadenopathy:      Cervical: No cervical adenopathy  Neurological:      Mental Status: She is alert  Psychiatric:         Behavior: Behavior is cooperative

## 2023-05-03 DIAGNOSIS — J45.50 SEVERE PERSISTENT ASTHMA WITHOUT COMPLICATION: ICD-10-CM

## 2023-05-03 DIAGNOSIS — D72.10 EOSINOPHILIA: ICD-10-CM

## 2023-05-03 NOTE — TELEPHONE ENCOUNTER
EPINEPHrine (EPIPEN) 0 3 mg/0 3 mL SOAJ Patient left a message requesting a refill on this medication

## 2023-05-04 RX ORDER — EPINEPHRINE 0.3 MG/.3ML
0.3 INJECTION SUBCUTANEOUS ONCE
Qty: 0.6 ML | Refills: 0 | Status: SHIPPED | OUTPATIENT
Start: 2023-05-04 | End: 2023-05-04

## 2023-06-09 DIAGNOSIS — D72.119 HYPEREOSINOPHILIC SYNDROME, UNSPECIFIED TYPE: Primary | ICD-10-CM

## 2023-06-09 DIAGNOSIS — J45.50 SEVERE PERSISTENT ASTHMA WITHOUT COMPLICATION: ICD-10-CM

## 2023-06-09 RX ORDER — EPINEPHRINE 1 MG/ML
0.3 INJECTION, SOLUTION, CONCENTRATE INTRAVENOUS ONCE
Status: CANCELLED | OUTPATIENT
Start: 2023-06-13 | End: 2023-06-13

## 2023-06-13 ENCOUNTER — HOSPITAL ENCOUNTER (OUTPATIENT)
Dept: INFUSION CENTER | Facility: HOSPITAL | Age: 68
Discharge: HOME | End: 2023-06-13
Attending: INTERNAL MEDICINE
Payer: COMMERCIAL

## 2023-06-13 VITALS
TEMPERATURE: 96.9 F | RESPIRATION RATE: 16 BRPM | SYSTOLIC BLOOD PRESSURE: 124 MMHG | DIASTOLIC BLOOD PRESSURE: 83 MMHG | OXYGEN SATURATION: 98 % | HEART RATE: 73 BPM

## 2023-06-13 DIAGNOSIS — D72.119 HYPEREOSINOPHILIC SYNDROME, UNSPECIFIED TYPE: Primary | ICD-10-CM

## 2023-06-13 DIAGNOSIS — J45.50 SEVERE PERSISTENT ASTHMA WITHOUT COMPLICATION: ICD-10-CM

## 2023-06-13 PROCEDURE — 96372 THER/PROPH/DIAG INJ SC/IM: CPT

## 2023-06-13 RX ORDER — EPINEPHRINE 1 MG/ML
0.3 INJECTION, SOLUTION, CONCENTRATE INTRAVENOUS ONCE
Status: DISCONTINUED | OUTPATIENT
Start: 2023-06-13 | End: 2023-06-17 | Stop reason: HOSPADM

## 2023-06-13 RX ORDER — EPINEPHRINE 1 MG/ML
0.3 INJECTION, SOLUTION, CONCENTRATE INTRAVENOUS ONCE
OUTPATIENT
Start: 2023-08-08 | End: 2023-08-08

## 2023-06-13 RX ADMIN — BENRALIZUMAB 30 MG: 30 INJECTION, SOLUTION SUBCUTANEOUS at 11:05

## 2023-06-13 NOTE — PROGRESS NOTES
Fasenra injection in R arm tolerated well without complications  No complaints offered  AVS declined  Left unit in stable condition

## 2023-06-19 NOTE — PROGRESS NOTES
"PT Evaluation     Today's date: 23  Patient name: Connor Grant  : 1955  MRN: [id number]  Referring provider: Bridgett Kulkarni DO  Dx:   Encounter Diagnosis     ICD-10-CM    1  Muscle spasm  M62 838                      Assessment  Assessment details: Connor Grant is a 74 y o  female presenting to outpatient physical therapy with noted impairments including pain, impaired soft tissue mobility, reduced range of motion, reduced strength, reduced postural awareness, and reduced activity tolerance  Signs and symptoms at present are consistent with referring diagnosis of L SCM and trapezial spasm  Due to noted impairments, the patient's present functional limitations include difficulty with ADLs with increased need for assistance, reliance on medication and/or modalities for pain relief, reduced tolerance for functional mobility and activity, and difficulty completing HH/driving responsibilities  Patient to benefit from skilled outpatient physical therapy 2x/week for 6 weeks in order to reduce pain, maximize pain free range of motion, increase strength and stability, and improve functional mobility/functional activity in order to maximize return to prior level of function with reduced limitations  Home exercise program was provided and all questions answered to patient's level of satisfaction  Thank you for your referral         Impairments: abnormal or restricted ROM, abnormal movement, activity intolerance, impaired physical strength and lacks appropriate home exercise program  Understanding of Dx/Px/POC: good   Prognosis: good    Goals  STGs to be achieved in 4 weeks:  1  Pt to demonstrate reduced subjective pain rating \"at worst\" by at least 2-3 points from Initial Eval in order to allow for reduced pain with ADLs and improved functional activity tolerance     2  Pt to demonstrate increased AROM of neck by at least 5-10 degrees in order to allow for greater ease and independence with ADLs and " functional mobility  3  Pt to demonstrate increased MMT of Neck by at least 1/2-1 grade in order to improve safety and stability with ADLs and functional mobility  LTGs to be achieved in 6-8 weeks:  1  Pt will be I with HEP in order to continue to improve quality of life and independence and reduce risk for re-injury  2  Pt to demonstrate return to painfree driving without difficulty or restrictions  3  Pt to demonstrate improved function as noted by achieving or exceeding predicted score on FOTO outcomes assessment tool  Plan  Patient would benefit from: skilled physical therapy  Other planned modality interventions: Modalities prn for symptom management  Planned therapy interventions: manual therapy, neuromuscular re-education, therapeutic activities, therapeutic exercise, strengthening, stretching and home exercise program  Frequency: 2x week  Duration in weeks: 6  Plan of Care beginning date: 2023  Plan of Care expiration date: 2023  Treatment plan discussed with: PTA and patient        Subjective Evaluation    History of Present Illness  Mechanism of injury: Pt has been having an annoying ache  For over a year on the L side of her neck , specifically her L SCM and also notes a knot on her L upper trap  Saw the ENT who referred her to PT  No tx for this in the past  Pt notes pain is intermittent  Notes there is nothing specific that seems to aggravate her neck  Pt notes some diff driving when trying to look over her L  Shld  No radicular sx  Does have intermittent L scap pain  Recurrent probem    Quality of life: good    Pain  Current pain rating: 3  At best pain ratin  At worst pain ratin  Quality: dull ache and tight  Relieving factors: medications (IASTM with butter knife by )  Exacerbated by: driving    Progression: no change    Social Support  Lives with: spouse    Hand dominance: right      Diagnostic Tests  No diagnostic tests performed  Treatments  Current treatment: physical therapy  Patient Goals  Patient goals for therapy: decreased pain, increased motion, increased strength, return to sport/leisure activities and independence with ADLs/IADLs  Patient goal: be able to swim        Objective     Concurrent Complaints  Positive for headaches  Negative for disturbed sleep    Postural Observations  Seated posture: good  Standing posture: good    Additional Postural Observation Details  Slight fwd head    Palpation   Left   Tenderness of the sternocleidomastoid and upper trapezius  Trigger point to upper trapezius  Active Range of Motion   Cervical/Thoracic Spine       Cervical    Flexion:  WFL  Extension: 41 degrees      Left lateral flexion: 19 degrees      Right lateral flexion: 13 degrees      Left rotation: 35 degrees Restriction level: moderate  Right rotation: 65 degrees    Restriction level: minimal    Strength/Myotome Testing   Cervical Spine   Neck extension: 4-  Neck flexion: 4-    Left   Neck lateral flexion (C3): 3+    Right   Neck lateral flexion (C3): 3+    Tests   Cervical   Negative vertical compression and cervical distraction  Lumbar   Negative vertical compression  General Comments:    Upper quarter screen   Elbow: unremarkable    Shoulder Comments   Full range B but is cautious with lifting due to severed tendons in R shld    Wrist/Hand Comments  L wrist pain from old surgery  Neuro Exam:     Headaches   Patient reports headaches: Yes                Precautions: ms spasm of SCM and L trapezius, asthma, celiac ds, follicular lymphoma, irreg heartbeat    Re-eval Date: 7/25/23    Date 6/21       Visit Count 1       FOTO Comp 6/21       Pain In See eval       Pain Out same               Manuals 6/21       IASTM to L UT        STM to L SCM                        Neuro Re-Ed                                                                Ther Ex        cerv AROM        shld rolls        Doorway stretch        Protraction stretch        UT stretch                                Ther Activity                        Gait Training                        Modalities        US to L UT and SCM

## 2023-06-21 ENCOUNTER — EVALUATION (OUTPATIENT)
Dept: PHYSICAL THERAPY | Facility: CLINIC | Age: 68
End: 2023-06-21
Payer: COMMERCIAL

## 2023-06-21 DIAGNOSIS — M62.838 MUSCLE SPASM: Primary | ICD-10-CM

## 2023-06-21 PROCEDURE — 97162 PT EVAL MOD COMPLEX 30 MIN: CPT

## 2023-06-22 ENCOUNTER — OFFICE VISIT (OUTPATIENT)
Dept: PHYSICAL THERAPY | Facility: CLINIC | Age: 68
End: 2023-06-22
Payer: COMMERCIAL

## 2023-06-22 DIAGNOSIS — M62.838 MUSCLE SPASM: Primary | ICD-10-CM

## 2023-06-22 PROCEDURE — 97035 APP MDLTY 1+ULTRASOUND EA 15: CPT

## 2023-06-22 PROCEDURE — 97110 THERAPEUTIC EXERCISES: CPT

## 2023-06-22 PROCEDURE — 97140 MANUAL THERAPY 1/> REGIONS: CPT

## 2023-06-22 NOTE — PROGRESS NOTES
"Daily Note     Today's date: 2023  Patient name: Richi Patel  : 1955  MRN: [id number]  Referring provider: Alfredo Montilla DO  Dx:   Encounter Diagnosis     ICD-10-CM    1  Muscle spasm  M62 838                      Subjective: Pt notes she has a large knot in her L UT which causes daily discomfort  Objective: See treatment diary below      Assessment: Tolerated treatment well  Patient exhibited good technique with therapeutic exercises and would benefit from continued PT  Initiated there ex program as per flow chart w/o incident  Performed US to L UT area followed by initiation of IASTM to LUT  Pt noted discomfort with tx but was able to tolerate 15 min of it  Muscle tissue is tight and bumpy during stroking  Pt noted some immed relief following tx  Plan: Continue per plan of care        Precautions: ms spasm of SCM and L trapezius, asthma, celiac ds, follicular lymphoma, irreg heartbeat    Re-eval Date: 23    Date        Visit Count 1       FOTO Comp        Pain In See eval       Pain Out same               Manuals       IASTM to L UT  15'      STM to L SCM                        Neuro Re-Ed                                                                Ther Ex        UBE  100 rpm  10'  Alt every 2 min      cerv AROM  W/self stretch  5 x 5\" ea dir      shld rolls  20x      Doorway stretch        Protraction stretch        UT stretch                                Ther Activity                        Gait Training                        Modalities        US to L UT and SCM  10'                   "

## 2023-06-28 ENCOUNTER — OFFICE VISIT (OUTPATIENT)
Dept: PHYSICAL THERAPY | Facility: CLINIC | Age: 68
End: 2023-06-28
Payer: COMMERCIAL

## 2023-06-28 DIAGNOSIS — M62.838 MUSCLE SPASM: Primary | ICD-10-CM

## 2023-06-28 PROCEDURE — 97140 MANUAL THERAPY 1/> REGIONS: CPT

## 2023-06-28 PROCEDURE — 97035 APP MDLTY 1+ULTRASOUND EA 15: CPT

## 2023-06-28 PROCEDURE — 97110 THERAPEUTIC EXERCISES: CPT

## 2023-06-28 NOTE — PROGRESS NOTES
"Daily Note     Today's date: 2023  Patient name: Jeannette Lees  : 1955  MRN: [id number]  Referring provider: Hunter Santos DO  Dx:   Encounter Diagnosis     ICD-10-CM    1  Muscle spasm  M62 838                      Subjective:  Pt  reports continued tightness @ L SCM, and also sore/bruising knot sensation @ L sup scap region  Objective: See treatment diary below      Assessment: Tolerated treatment Well and good results with MHP, STM, US, and IASTM applications  Plan: Con't services 2x/week as per POC/Goals set forth                Precautions: ms spasm of SCM and L trapezius, asthma, celiac ds, follicular lymphoma, irreg heartbeat    Re-eval Date: 23    Date      Visit Count 1  3     FOTO Comp        Pain In See eval  \"tight/sore\"   L UT and SCM regions     Pain Out same  Less sx             Manuals      IASTM to L UT  15' 10 min     STM to L SCM   **5 min                     Neuro Re-Ed                                                                Ther Ex   23     UBE  100 rpm  10'  Alt every 2 min resume     cerv AROM  W/self stretch  5 x 5\" ea dir 5 x 5\" ea dir     shld rolls  20x 20x       Doorway stretch   *Upcoming     Protraction stretch   *Upcoming     UT stretch   **L 4x/20\"       SCM stretch   **L 4x/20\"       Self Care Educ/Skilled conversation   **10 min             Ther Activity                        Gait Training                        Modalities   23     US to L UT and SCM  10' 10 min                  "

## 2023-07-03 ENCOUNTER — APPOINTMENT (OUTPATIENT)
Dept: PHYSICAL THERAPY | Facility: CLINIC | Age: 68
End: 2023-07-03
Payer: MEDICARE

## 2023-07-06 ENCOUNTER — OFFICE VISIT (OUTPATIENT)
Dept: PHYSICAL THERAPY | Facility: CLINIC | Age: 68
End: 2023-07-06
Payer: COMMERCIAL

## 2023-07-06 DIAGNOSIS — M62.838 MUSCLE SPASM: Primary | ICD-10-CM

## 2023-07-06 PROCEDURE — 97110 THERAPEUTIC EXERCISES: CPT

## 2023-07-06 NOTE — PROGRESS NOTES
Daily Note     Today's date: 2023  Patient name: Alicia Rowe  : 1955  MRN: [id number]  Referring provider: Qing Rojas DO  Dx:   Encounter Diagnosis     ICD-10-CM    1. Muscle spasm  M62.838           Start Time: 1345  Stop Time: 1425  Total time in clinic (min): 40 minutes    Subjective:  Pt reports "just a little bit" currently re: sx @ L UT/SCM regions. Objective: See treatment diary below      Assessment: Tolerated treatment Fairly Well/Well overall with performance of ther exer. Unable to perform B Pec (doorway) stretch B;L side only x 3 reps. *Modalities and MT deferred by pt today due to time constraint on her part. Will resume at [address] [place] [address], as per stated in Plan below. Plan: Plan to perform UBE f/b modalities at beginning of treatment session. .f/b ther exer towards end of session, as time allows. Precautions: ms spasm of SCM and L trapezius, asthma, celiac ds, follicular lymphoma, irreg heartbeat    Re-eval Date: 23    Date 23 7    Visit Count 1  3 4    FOTO Comp        Pain In See eval  "tight/sore"   L UT and SCM regions "a little Bit"    Pain Out same  Less sx No change            Manuals 23 7.6.23    IASTM to L UT  15' 10 min Pt deferred 1* time. .. Resume NV    STM to L SCM   **5 min Pt deferred 1* time. ..   Resume NV                    Neuro Re-Ed                                                                Ther Ex   23 7.6.23    UBE  100 rpm  10'  Alt every 2 min resume 100 rpm  10'  Alt every 2 min    cerv AROM  W/self stretch  5 x 5" ea dir 5 x 5" ea dir 5 x 5" ea dir    shld rolls  20x 20x   30x    Doorway stretch   *Upcoming **L 3x/15"    Protraction stretch   *Upcoming **10x10"    UT stretch   **L 4x/20"   L 4x/20"    SCM stretch   **L 4x/20"   L 4x/20"    Self Care Educ/Skilled conversation   **10 min             Ther Activity                        Gait Training                        Modalities 6.28.23 7.6.23    US to L UT and SCM  10' 10 min Pt deferred 1* time  Resume NV

## 2023-07-10 ENCOUNTER — OFFICE VISIT (OUTPATIENT)
Dept: PHYSICAL THERAPY | Facility: CLINIC | Age: 68
End: 2023-07-10
Payer: COMMERCIAL

## 2023-07-10 DIAGNOSIS — M62.838 MUSCLE SPASM: Primary | ICD-10-CM

## 2023-07-10 PROCEDURE — 97035 APP MDLTY 1+ULTRASOUND EA 15: CPT

## 2023-07-10 PROCEDURE — 97140 MANUAL THERAPY 1/> REGIONS: CPT

## 2023-07-10 NOTE — PROGRESS NOTES
Daily Note     Today's date: 7/10/2023  Patient name: Maral Franco  : 1955  MRN: [id number]  Referring provider: Gabbie Ma DO  Dx:   Encounter Diagnosis     ICD-10-CM    1. Muscle spasm  M62.838                      Subjective: Pt notes she got good relief with US, massage and IASTM and would like this to be the main focus of tx. Objective: See treatment diary below      Assessment: Tolerated treatment well. Patient would benefit from continued PT for manual therapy and US  to reduce L UT muscle spasm and "knot". Reviewed UT stretching and lev scap stretching with pt to cont as HEP. Plan: Continue per plan of care. 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 .. Resume NV 15'   STM to L SCM   **5 min Pt deferred 1* time. ..   Resume NV 20'                   Neuro Re-Ed                                                                Ther Ex   23 7    UBE  100 rpm  10'  Alt every 2 min resume 100 rpm  10'  Alt every 2 min    cerv AROM  W/self stretch  5 x 5" ea dir 5 x 5" ea dir 5 x 5" ea dir    shld rolls  20x 20x   30x    Doorway stretch   *Upcoming **L 3x/15"    Protraction stretch   *Upcoming **10x10"    UT stretch   **L 4x/20"   L 4x/20" rev   SCM stretch   **L 4x/20"   L 4x/20" rev   Self Care Educ/Skilled conversation   **10 min             Ther Activity                        Gait Training                        Modalities   23 7    US to L UT and SCM  10' 10 min Pt deferred 1* time  Resume NV 10 min

## 2023-07-12 ENCOUNTER — OFFICE VISIT (OUTPATIENT)
Dept: PHYSICAL THERAPY | Facility: CLINIC | Age: 68
End: 2023-07-12
Payer: COMMERCIAL

## 2023-07-12 DIAGNOSIS — M62.838 MUSCLE SPASM: Primary | ICD-10-CM

## 2023-07-12 PROCEDURE — 97035 APP MDLTY 1+ULTRASOUND EA 15: CPT

## 2023-07-12 PROCEDURE — 97140 MANUAL THERAPY 1/> REGIONS: CPT

## 2023-07-12 NOTE — PROGRESS NOTES
Daily Note     Today's date: 2023  Patient name: Mariza Wright  : 1955  MRN: [id number]  Referring provider: Audrey Sandhoff, DO  Dx:   Encounter Diagnosis     ICD-10-CM    1. Muscle spasm  M62.838                      Subjective: Pt notes definite improvement in pain status and mobility of LUT. Feels therapy has been very beneficial to date. Objective: See treatment diary below      Assessment: Tolerated treatment well. Patient would benefit from continued PT Pt performing self stretching ex as HEP and notes improved cerv mobility and LUT mobility. Plan: Continue per plan of care.       Precautions: ms spasm of SCM and L trapezius, asthma, celiac ds, follicular lymphoma, irreg heartbeat    Re-eval Date: 23    Date         Visit Count 6       FOTO        Pain In  very little       Pain Out                Manuals         IASTM to L UT 15'       STM to L SCM 20'                       Neuro Re-Ed                                                                Ther Ex        UBE resume       cerv AROM  W/self stretch HEP       shld rolls HEP       Doorway stretch HEP       Protraction stretch HEP       UT stretch HEP       SCM stretch HEP       Self Care Educ/Skilled conversation                Ther Activity                        Gait Training                        Modalities        US to L UT and SCM 10'  100% cont  1 MHz

## 2023-07-17 ENCOUNTER — OFFICE VISIT (OUTPATIENT)
Dept: PHYSICAL THERAPY | Facility: CLINIC | Age: 68
End: 2023-07-17
Payer: COMMERCIAL

## 2023-07-17 DIAGNOSIS — J45.50 SEVERE PERSISTENT ASTHMA WITHOUT COMPLICATION: ICD-10-CM

## 2023-07-17 DIAGNOSIS — D72.119 HYPEREOSINOPHILIC SYNDROME, UNSPECIFIED TYPE: Primary | ICD-10-CM

## 2023-07-17 DIAGNOSIS — M62.838 MUSCLE SPASM: Primary | ICD-10-CM

## 2023-07-17 PROCEDURE — 97140 MANUAL THERAPY 1/> REGIONS: CPT

## 2023-07-17 PROCEDURE — 97035 APP MDLTY 1+ULTRASOUND EA 15: CPT

## 2023-07-17 RX ORDER — EPINEPHRINE 1 MG/ML
0.3 INJECTION, SOLUTION, CONCENTRATE INTRAVENOUS ONCE
OUTPATIENT
Start: 2023-07-17 | End: 2023-07-17

## 2023-07-17 NOTE — PROGRESS NOTES
Daily Note     Today's date: 2023  Patient name: Maximiliano Baker  : 1955  MRN: [id number]  Referring provider: Dangelo Alcantar DO  Dx:   Encounter Diagnosis     ICD-10-CM    1. Muscle spasm  M62.838                      Subjective: She notes improvement since initiation of therapy but continues to note some soreness      Objective: See treatment diary below      Assessment: Tolerated treatment well with client noting overall improvement in function and pain. Patient exhibited good technique with therapeutic exercises and would benefit from continued PT      Plan: Continue per plan of care. Progress treatment as tolerated.        Precautions: ms spasm of SCM and L trapezius, asthma, celiac ds, follicular lymphoma, irreg heartbeat    Re-eval Date: 23    Date        Visit Count 6 7      FOTO        Pain In  very little 0/10      Pain Out  0/10              Manuals        IASTM to L UT 15' 15'      STM to L SCM 20' 10'                      Neuro Re-Ed                                                                Ther Ex       UBE resume L2 10' for dynamic warm up   Alt every 2'      cerv AROM  W/self stretch HEP       shld rolls HEP       Doorway stretch HEP       Protraction stretch HEP       UT stretch HEP       SCM stretch HEP       Self Care Educ/Skilled conversation                Ther Activity                        Gait Training                        Modalities        US to L UT and SCM 10'  100% cont  1 MHz 10'  100% cont  1 MHz

## 2023-07-19 ENCOUNTER — OFFICE VISIT (OUTPATIENT)
Dept: PHYSICAL THERAPY | Facility: CLINIC | Age: 68
End: 2023-07-19
Payer: COMMERCIAL

## 2023-07-19 DIAGNOSIS — M62.838 MUSCLE SPASM: Primary | ICD-10-CM

## 2023-07-19 PROCEDURE — 97140 MANUAL THERAPY 1/> REGIONS: CPT

## 2023-07-19 PROCEDURE — 97035 APP MDLTY 1+ULTRASOUND EA 15: CPT

## 2023-07-19 PROCEDURE — 97110 THERAPEUTIC EXERCISES: CPT

## 2023-07-19 NOTE — PROGRESS NOTES
Daily Note     Today's date: 2023  Patient name: Jacky Pino  : 1955  MRN: [id number]  Referring provider: Lisbet Angel DO  Dx:   Encounter Diagnosis     ICD-10-CM    1. Muscle spasm  M62.838                      Subjective: Pt reports that she continues to feel relief for a day or so following each session. Notes that she is having some discomfort in her neck at the start of session with some tightness present. Objective: See treatment diary below      Assessment: Tolerated treatment well with client noting overall improvement in function and pain. She did feel relief from manuals this session. Patient exhibited good technique with therapeutic exercises and would benefit from continued PT      Plan: Continue per plan of care. Progress treatment as tolerated.        Precautions: ms spasm of SCM and L trapezius, asthma, celiac ds, follicular lymphoma, irreg heartbeat    Re-eval Date: 23    Date       Visit Count 6 7 8     FOTO        Pain In  very little 0/10 Very little     Pain Out  0/10              Manuals       IASTM to L UT 15' 15' 15'     STM to L SCM 20' 10' 10'                     Neuro Re-Ed                                                                Ther Ex      UBE resume L2 10' for dynamic warm up   Alt every 2' L2 10' for dynamic warm up   Alt every 2'     cerv AROM  W/self stretch HEP       shld rolls HEP       Doorway stretch HEP       Protraction stretch HEP       UT stretch HEP       SCM stretch HEP       Self Care Educ/Skilled conversation                Ther Activity                        Gait Training                        Modalities        US to L UT and SCM 10'  100% cont  1 MHz 10'  100% cont  1 MHz 10'  100% cont  1 MHz

## 2023-07-24 ENCOUNTER — APPOINTMENT (OUTPATIENT)
Dept: PHYSICAL THERAPY | Facility: CLINIC | Age: 68
End: 2023-07-24
Payer: COMMERCIAL

## 2023-07-25 DIAGNOSIS — G47.00 INSOMNIA, UNSPECIFIED TYPE: ICD-10-CM

## 2023-07-26 ENCOUNTER — OFFICE VISIT (OUTPATIENT)
Dept: PHYSICAL THERAPY | Facility: CLINIC | Age: 68
End: 2023-07-26
Payer: COMMERCIAL

## 2023-07-26 DIAGNOSIS — M62.838 MUSCLE SPASM: Primary | ICD-10-CM

## 2023-07-26 PROCEDURE — 97140 MANUAL THERAPY 1/> REGIONS: CPT | Performed by: PHYSICAL THERAPIST

## 2023-07-26 PROCEDURE — 97110 THERAPEUTIC EXERCISES: CPT | Performed by: PHYSICAL THERAPIST

## 2023-07-26 PROCEDURE — 97035 APP MDLTY 1+ULTRASOUND EA 15: CPT | Performed by: PHYSICAL THERAPIST

## 2023-07-26 RX ORDER — ZOLPIDEM TARTRATE 5 MG/1
5 TABLET ORAL
Qty: 30 TABLET | Refills: 2 | Status: SHIPPED | OUTPATIENT
Start: 2023-07-26

## 2023-07-26 NOTE — PROGRESS NOTES
Daily Note     Today's date: 2023  Patient name: Stewart Weiss  : 1955  MRN: [id number]  Referring provider: Mehul Blum DO  Dx:   Encounter Diagnosis     ICD-10-CM    1. Muscle spasm  M62.838           Start Time: 1100  Stop Time: 1145  Total time in clinic (min): 45 minutes    Subjective: Patient reports that she continues to notice improvement since starting manuals in PT. She is looking to get her own soft tissue instrument so she can do it at home. Objective: See treatment diary below      Assessment: Patient continues to respond well to PT with decreased stiffness noted post session. Progress, as able. Plan: Continue per plan of care.       Precautions: ms spasm of SCM and L trapezius, asthma, celiac ds, follicular lymphoma, irreg heartbeat    Re-eval Date: 23    Date      Visit Count 6 7 8 9    FOTO        Pain In  very little 0/10 Very little     Pain Out  0/10              Manuals      IASTM to L UT 15' 15' 15' IA/STM 15'    STM to L SCM 20' 10' 10' 10'                    Neuro Re-Ed                                                                Ther Ex     UBE resume L2 10' for dynamic warm up   Alt every 2' L2 10' for dynamic warm up   Alt every 2' L2 10' for dynamic warm up   Alt every 2'    cerv AROM  W/self stretch HEP       shld rolls HEP       Doorway stretch HEP       Protraction stretch HEP       UT stretch HEP       SCM stretch HEP       Self Care Educ/Skilled conversation                Ther Activity                        Gait Training                        Modalities        US to L UT and SCM 10'  100% cont  1 MHz 10'  100% cont  1 MHz 10'  100% cont  1 MHz 10'  100% cont  1 MHz

## 2023-07-27 DIAGNOSIS — J45.50 SEVERE PERSISTENT ASTHMA WITHOUT COMPLICATION: Primary | ICD-10-CM

## 2023-07-27 NOTE — TELEPHONE ENCOUNTER
Patient Melanie Gama is requesting a refill for     Medication(s) Name- levalbuterol inhaler    Dose- 45 mcg    Last office Visit- 11/15/2022    Pharmacy- [facility]    Quantity (30 day or 90 day) - 90 Day

## 2023-07-28 RX ORDER — LEVALBUTEROL TARTRATE 45 UG/1
1-2 AEROSOL, METERED ORAL EVERY 4 HOURS PRN
Qty: 45 G | Refills: 0 | Status: SHIPPED | OUTPATIENT
Start: 2023-07-28

## 2023-08-02 ENCOUNTER — EVALUATION (OUTPATIENT)
Dept: PHYSICAL THERAPY | Facility: CLINIC | Age: 68
End: 2023-08-02
Payer: MEDICARE

## 2023-08-02 DIAGNOSIS — M62.838 MUSCLE SPASM: Primary | ICD-10-CM

## 2023-08-02 PROCEDURE — 97140 MANUAL THERAPY 1/> REGIONS: CPT

## 2023-08-02 PROCEDURE — 97035 APP MDLTY 1+ULTRASOUND EA 15: CPT

## 2023-08-02 NOTE — PROGRESS NOTES
PT Re-Evaluation  and PT Discharge    Today's date: 23  Patient name: Melanie Gama  : 1955  MRN: [id number]  Referring provider: Keenan Larkin MD  Dx:   Encounter Diagnosis     ICD-10-CM    1. Muscle spasm  M62.838                      Assessment  Assessment details: Melanie Gama is a 74 y.o. female presenting to outpatient physical therapy with noted impairments including pain, impaired soft tissue mobility, reduced range of motion, reduced strength, reduced postural awareness, and reduced activity tolerance. Signs and symptoms at present are consistent with referring diagnosis of L SCM and trapezial spasm. Due to noted impairments, the patient's present functional limitations include difficulty with ADLs with increased need for assistance, reliance on medication and/or modalities for pain relief, reduced tolerance for functional mobility and activity, and difficulty completing HH/driving responsibilities. Patient to benefit from skilled outpatient physical therapy 2x/week for 6 weeks in order to reduce pain, maximize pain free range of motion, increase strength and stability, and improve functional mobility/functional activity in order to maximize return to prior level of function with reduced limitations. Home exercise program was provided and all questions answered to patient's level of satisfaction. Thank you for your referral.   23 UPDATE: Pt has had 10 OPPT visits to date. Pt notes this will be her last visit. Notes she gets great relief of symptoms for 2 days following PT, however, then symptoms gradually return. Pt reports compliance with HEP and swims frequently. Pt will be ordering a IASTM tool and plans to use it at home. Pt has made gains in both AROM and cerv strength over her course of PT as seen in objective data.  Will DC PT at this time at pt request.      Impairments: abnormal or restricted ROM, abnormal movement, activity intolerance, impaired physical strength and lacks appropriate home exercise program  Understanding of Dx/Px/POC: good   Prognosis: good    Goals  STGs to be achieved in 4 weeks:  1. Pt to demonstrate reduced subjective pain rating "at worst" by at least 2-3 points from Initial Eval in order to allow for reduced pain with ADLs and improved functional activity tolerance. PARTIALLY MET  2. Pt to demonstrate increased AROM of neck by at least 5-10 degrees in order to allow for greater ease and independence with ADLs and functional mobility. MET  3. Pt to demonstrate increased MMT of Neck by at least 1/2-1 grade in order to improve safety and stability with ADLs and functional mobility. MET    LTGs to be achieved in 6-8 weeks:  1. Pt will be I with HEP in order to continue to improve quality of life and independence and reduce risk for re-injury. MET  2. Pt to demonstrate return to painfree driving without difficulty or restrictions. NOT MET  3. Pt to demonstrate improved function as noted by achieving or exceeding predicted score on FOTO outcomes assessment tool. Plan  Plan details: DC PT  Other planned modality interventions: Modalities prn for symptom management  Treatment plan discussed with: PTA and patient        Subjective Evaluation    History of Present Illness  Mechanism of injury: Pt has been having an annoying ache  For over a year on the L side of her neck , specifically her L SCM and also notes a knot on her L upper trap. Saw the ENT who referred her to PT. No tx for this in the past.. Pt notes pain is intermittent. Notes there is nothing specific that seems to aggravate her neck. Pt notes some diff driving when trying to look over her L  Shld. No radicular sx. Does have intermittent L scap pain. 8/2/23 UPDATE: Pt notes this will be her last visit. Notes she gets great relief of symptoms for 2 days following PT, however, then symptoms gradually return. Pt reports compliance with HEP and swims frequently.  Pt will be ordering a IASTM tool and plans to use it at home. Recurrent probem    Quality of life: good    Patient Goals  Patient goals for therapy: decreased pain, increased motion, increased strength, return to sport/leisure activities and independence with ADLs/IADLs  Patient goal: be able to swim  Pain  Current pain ratin  At best pain ratin  At worst pain ratin  Quality: dull ache and tight  Relieving factors: medications (IASTM with butter knife by )  Exacerbated by: driving. Progression: no change    Social Support  Lives with: spouse    Hand dominance: right      Diagnostic Tests  No diagnostic tests performed  Treatments  Current treatment: physical therapy        Objective     Concurrent Complaints  Positive for headaches. Negative for disturbed sleep    Postural Observations  Seated posture: good  Standing posture: good    Additional Postural Observation Details  Slight fwd head    Palpation   Left   Tenderness of the sternocleidomastoid and upper trapezius. Trigger point to upper trapezius. Active Range of Motion   Cervical/Thoracic Spine       Cervical    Flexion:  WFL  Extension: 44 degrees      Left lateral flexion: 25 degrees      Right lateral flexion: 20 degrees      Left rotation: 45 degrees Restriction level: moderate  Right rotation: 68 degrees    Restriction level: minimal    Strength/Myotome Testing   Cervical Spine   Neck extension: 4  Neck flexion: 4    Left   Neck lateral flexion (C3): 4-    Right   Neck lateral flexion (C3): 4-    Tests   Cervical   Negative vertical compression and cervical distraction. Lumbar   Negative vertical compression. General Comments:    Upper quarter screen   Elbow: unremarkable    Shoulder Comments   Full range B but is cautious with lifting due to severed tendons in R shld    Wrist/Hand Comments  L wrist pain from old surgery  Neuro Exam:     Headaches   Patient reports headaches: Yes.               Precautions: ms spasm of SCM and L trapezius, asthma, celiac ds, follicular lymphoma, irreg heartbeat    Re-eval Date: 7/25/23    Date  7/12 7/17 7/19 7/26 8/2   Visit Count 6 7 8 9 10   FOTO        Pain In  very little 0/10 Very little  1-2/10   Pain Out  0/10              Manuals  7/12 7/17 7/19 7/26 8/2   IASTM to L UT 15' 15' 15' IA/STM 15' 15   STM to L SCM 20' 10' 10' 10' 20                   Neuro Re-Ed                                                                Ther Ex 7/12 7/17 7/19 7/26    UBE resume L2 10' for dynamic warm up   Alt every 2' L2 10' for dynamic warm up   Alt every 2' L2 10' for dynamic warm up   Alt every 2' declined   cerv AROM  W/self stretch HEP       shld rolls HEP       Doorway stretch HEP       Protraction stretch HEP       UT stretch HEP       SCM stretch HEP       Self Care Educ/Skilled conversation                Ther Activity                        Gait Training                        Modalities        US to L UT and SCM 10'  100% cont  1 MHz 10'  100% cont  1 MHz 10'  100% cont  1 MHz 10'  100% cont  1 MHz 10'

## 2023-08-09 ENCOUNTER — HOSPITAL ENCOUNTER (OUTPATIENT)
Dept: INFUSION CENTER | Facility: HOSPITAL | Age: 68
Discharge: HOME | End: 2023-08-09
Payer: COMMERCIAL

## 2023-08-09 VITALS
OXYGEN SATURATION: 99 % | SYSTOLIC BLOOD PRESSURE: 113 MMHG | TEMPERATURE: 96.7 F | RESPIRATION RATE: 18 BRPM | HEART RATE: 77 BPM | DIASTOLIC BLOOD PRESSURE: 82 MMHG

## 2023-08-09 DIAGNOSIS — J45.50 SEVERE PERSISTENT ASTHMA WITHOUT COMPLICATION: ICD-10-CM

## 2023-08-09 DIAGNOSIS — D72.119 HYPEREOSINOPHILIC SYNDROME, UNSPECIFIED TYPE: Primary | ICD-10-CM

## 2023-08-09 PROCEDURE — 96372 THER/PROPH/DIAG INJ SC/IM: CPT

## 2023-08-09 RX ORDER — EPINEPHRINE 1 MG/ML
0.3 INJECTION, SOLUTION, CONCENTRATE INTRAVENOUS ONCE
OUTPATIENT
Start: 2023-09-11 | End: 2023-09-11

## 2023-08-09 RX ORDER — EPINEPHRINE 1 MG/ML
0.3 INJECTION, SOLUTION, CONCENTRATE INTRAVENOUS ONCE
Status: DISCONTINUED | OUTPATIENT
Start: 2023-08-09 | End: 2023-08-13 | Stop reason: HOSPADM

## 2023-08-09 RX ADMIN — BENRALIZUMAB 30 MG: 30 INJECTION, SOLUTION SUBCUTANEOUS at 11:40

## 2023-08-09 NOTE — PROGRESS NOTES
Fasenra injection administered as ordered. 30 minute post observation completed without incident. Next appt reviewed. Discharged in stable condition.

## 2023-08-10 ENCOUNTER — TELEPHONE (OUTPATIENT)
Dept: HEMATOLOGY ONCOLOGY | Facility: CLINIC | Age: 68
End: 2023-08-10

## 2023-08-10 NOTE — TELEPHONE ENCOUNTER
Appointment Change  Cancel, Reschedule, Change to Virtual      Who are you speaking with? Patient   If it is not the patient, are they listed on an active communication consent form? N/A   Which provider is the appointment scheduled with? Dr. Chanda Romero   When is the appointment scheduled? Please list date and time 10/24/23 11am   At which location is the appointment scheduled to take place? [place]   Was the appointment rescheduled or changed from an in person visit to a virtual visit? If so, please list the details of the change. 8/25/23 11am   What is the reason for the appointment change? Patient feels a lump   Was [facility] scheduled for this visit? N/A   Does [facility] need to be scheduled for the new visit (if applicable) N/A   Does the patient need an infusion appointment rescheduled? N/A   Does the patient have an infusion appointment scheduled? If so, when? No   Is the patient undergoing chemotherapy? N/A   Was the no-show policy reviewed for appointments being changed with less then 24 hours of notice?  N/A

## 2023-08-17 ENCOUNTER — APPOINTMENT (OUTPATIENT)
Dept: RADIOLOGY | Facility: CLINIC | Age: 68
End: 2023-08-17
Payer: COMMERCIAL

## 2023-08-17 ENCOUNTER — OFFICE VISIT (OUTPATIENT)
Dept: INTERNAL MEDICINE CLINIC | Facility: CLINIC | Age: 68
End: 2023-08-17
Payer: COMMERCIAL

## 2023-08-17 DIAGNOSIS — M25.80: ICD-10-CM

## 2023-08-17 DIAGNOSIS — K21.9 GASTROESOPHAGEAL REFLUX DISEASE WITHOUT ESOPHAGITIS: ICD-10-CM

## 2023-08-17 DIAGNOSIS — R11.0 NAUSEA: ICD-10-CM

## 2023-08-17 DIAGNOSIS — M25.80: Primary | ICD-10-CM

## 2023-08-17 PROCEDURE — 99213 OFFICE O/P EST LOW 20 MIN: CPT | Performed by: FAMILY MEDICINE

## 2023-08-17 PROCEDURE — 73030 X-RAY EXAM OF SHOULDER: CPT

## 2023-08-17 RX ORDER — ONDANSETRON 4 MG/1
4 TABLET, FILM COATED ORAL EVERY 8 HOURS PRN
Qty: 20 TABLET | Refills: 0 | Status: SHIPPED | OUTPATIENT
Start: 2023-08-17

## 2023-08-17 NOTE — PROGRESS NOTES
Assessment/Plan:       1. Joint mass  -     XR shoulder 2+ vw left; Future; Expected date: 08/17/2023    2. Gastroesophageal reflux disease without esophagitis  -     ondansetron (ZOFRAN) 4 mg tablet; Take 1 tablet (4 mg total) by mouth every 8 (eight) hours as needed for nausea or vomiting    3. Nausea  -     ondansetron (ZOFRAN) 4 mg tablet; Take 1 tablet (4 mg total) by mouth every 8 (eight) hours as needed for nausea or vomiting    Orders and recommendations as noted above. This appears to be likely cystic area possibly related to some fluid from the shoulder joint. With her history we will start with checking an x-ray initially. Will likely need an ultrasound of the area or possibly an MRI for further investigation. Follow-up as scheduled with oncology. Subjective:      Patient ID: Eliecer Chan is a 74 y.o. female. She presents for problem visit. She had noticed a small area on her left shoulder months ago which was small in size about the size of the tip of her pinky finger. This had felt soft and was only slightly sore at times. This has increased in size and is now larger than a quarter. She is concerned because of her history of the cancer in the past.  Did set up an appointment with oncology but would like this evaluated earlier if possible. Denies any injury to the area. Is having some increasing joint symptoms as well and is asking about supplemental treatments for the joints. The following portions of the patient's history were reviewed and updated as appropriate:   She  has a past medical history of Asthma, Celiac disease, Follicular lymphoma ([address]), GERD (gastroesophageal reflux disease), Heart palpitations, History of colonic polyps, History of radiation therapy (2010), Hyperlipidemia, Insomnia, Irregular heart beat, Lymphoma, 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[address]), Malignant lymphoma ([address]) (2010), Postmenopausal disorder, Pulmonary nodule, Restless legs syndrome, and TMJ syndrome.   She Patient Active Problem List    Diagnosis Date Noted   • Joint mass 08/17/2023   • Chronic sinusitis 04/27/2023   • Hyperlipidemia    • Disorder of tendon of right biceps 07/09/2021   • Tear of right supraspinatus tendon 07/09/2021   • Pulmonary nodule    • Ganglion cyst of wrist, left 12/08/2020   • Eosinophilia 09/17/2020   • Severe persistent asthma without complication 96/76/7322   • Non-seasonal allergic rhinitis 09/02/2020   • Encounter for gynecological examination (general) (routine) without abnormal findings 05/23/2019   • Personal history of colonic polyps 16/17/8109   • Follicular lymphoma ([address]) 08/29/2018   • VERONIKA (stress urinary incontinence, female) 05/18/2018   • PVC's (premature ventricular contractions) 04/24/2018   • Dyslipidemia 04/24/2018   • Celiac disease 04/19/2018   • Chronic GERD 10/03/2017   • Back pain 09/21/2017   • Heart palpitations 09/21/2017   • Insomnia 09/21/2017   • Sciatica associated with disorder of lumbar spine 09/21/2017   • Vitamin D deficiency 06/28/2017   • Osteopenia 11/17/2016     She  has a past surgical history that includes Lymph node dissection (Right); pr esophagogastroduodenoscopy transoral diagnostic (N/A, 1/18/2016); Nasal septum surgery (2013); Hysterectomy; Total abdominal hysterectomy w/ bilateral salpingoophorectomy; Tonsillectomy; Colonoscopy (04/16/2019); Functional endoscopic sinus surgery (Bilateral, 2013); US guided msk procedure (1/16/2020); US guided msk procedure (8/7/2020); pr excision ganglion wrist dorsal/volar recurrent (Left, 12/8/2020); Synovectomy (N/A, 12/8/2020); US guided msk procedure (8/2/2021); and Upper gastrointestinal endoscopy.   Her family history includes Atrial fibrillation in her father; BRCA1 Positive in her cousin; Breast cancer (age of onset: 71) in her paternal aunt and paternal grandmother; Cancer in her paternal grandfather; Colonic polyp in her father; Diabetes in her father; Heart failure in her father; Hypertension in her father; Lung cancer in her paternal grandfather; Lymphoma in her mother; No Known Problems in her maternal aunt, maternal grandfather, and maternal grandmother; Ovarian cancer (age of onset: 79) in her paternal aunt; Skin cancer in her paternal aunt; Throat cancer in her father and paternal uncle; Thyroid cancer in her sister. She  reports that she quit smoking about 19 years ago. Her smoking use included cigarettes. She started smoking about 39 years ago. She has a 15.00 pack-year smoking history. She has never used smokeless tobacco. She reports current alcohol use of about 3.0 standard drinks of alcohol per week. She reports that she does not use drugs. Current Outpatient Medications   Medication Sig Dispense Refill   • ondansetron (ZOFRAN) 4 mg tablet Take 1 tablet (4 mg total) by mouth every 8 (eight) hours as needed for nausea or vomiting 20 tablet 0   • acetaminophen (TYLENOL) 500 mg tablet Take 500 mg by mouth every 6 (six) hours as needed for mild pain. • albuterol (2.5 mg/3 mL) 0.083 % nebulizer solution Take 3 mL (2.5 mg total) by nebulization every 6 (six) hours as needed for wheezing or shortness of breath 180 mL 0   • benralizumab (FASENRA) subcutaneous injection Inject 1 mL (30 mg total) under the skin every 56 days for 6 doses 1 Syringe 6   • bimatoprost (LATISSE) 0.03 % ophthalmic solution Take as directed     • Cholecalciferol 50 MCG (2000 UT) CAPS Take by mouth daily     • cyanocobalamin (VITAMIN B-12) 1,000 mcg tablet Take 1,000 mcg by mouth daily     • EPINEPHrine (EPIPEN) 0.3 mg/0.3 mL SOAJ Inject 0.3 mL (0.3 mg total) into a muscle once for 1 dose 0.6 mL 0   • erythromycin with ethanol (EMGEL) 2 % gel Apply topically 2 (two) times a day 30 g 1   • famotidine (PEPCID) 20 mg tablet Take 20 mg by mouth 2 (two) times a day.      • flecainide (TAMBOCOR) 100 mg tablet Take 1 tablet (100 mg total) by mouth 2 (two) times a day 180 tablet 3   • fluticasone (FLONASE) 50 mcg/act nasal spray 2 sprays into each nostril 2 (two) times a day 48 g 4   • Fluticasone Furoate-Vilanterol (Breo Ellipta) 100-25 mcg/actuation inhaler Inhale 1 puff daily Rinse mouth after use. 60 blister 0   • ipratropium (ATROVENT) 0.02 % nebulizer solution Take 2.5 mL (0.5 mg total) by nebulization every 6 (six) hours as needed for wheezing or shortness of breath 180 mL 0   • ipratropium (ATROVENT) 0.03 % nasal spray 2 sprays into each nostril 3 (three) times a day as needed for rhinitis 30 mL 3   • levalbuterol (XOPENEX HFA) 45 mcg/act inhaler Inhale 1-2 puffs every 4 (four) hours as needed for wheezing or shortness of breath 45 g 0   • lidocaine (LMX) 4 % cream Apply topically as needed for mild pain 15 g 3   • Magnesium 500 MG CAPS Take by mouth     • metoprolol succinate (TOPROL-XL) 25 mg 24 hr tablet Take 0.5 tablets (12.5 mg total) by mouth every evening 45 tablet 5   • montelukast (SINGULAIR) 10 mg tablet Take 1 tablet (10 mg total) by mouth daily at bedtime 90 tablet 0   • predniSONE 10 mg tablet Five pills a day for 2 days, 4 pills a day for 2 days, 3 pills a day for 2 days, 2 pills a day for 2 days, 1 pill a day for 2 days 30 tablet 0   • rosuvastatin (CRESTOR) 5 mg tablet Take 1 tablet (5 mg total) by mouth daily 90 tablet 3   • sucralfate (CARAFATE) 1 g tablet Take 1 tablet (1 g total) by mouth 4 (four) times a day 120 tablet 5   • zolpidem (AMBIEN) 5 mg tablet Take 1 tablet (5 mg total) by mouth daily at bedtime as needed for sleep 30 tablet 2     No current facility-administered medications for this visit. Current Outpatient Medications on File Prior to Visit   Medication Sig   • acetaminophen (TYLENOL) 500 mg tablet Take 500 mg by mouth every 6 (six) hours as needed for mild pain.    • albuterol (2.5 mg/3 mL) 0.083 % nebulizer solution Take 3 mL (2.5 mg total) by nebulization every 6 (six) hours as needed for wheezing or shortness of breath   • benralizumab (FASENRA) subcutaneous injection Inject 1 mL (30 mg total) under the skin every 56 days for 6 doses   • bimatoprost (LATISSE) 0.03 % ophthalmic solution Take as directed   • Cholecalciferol 50 MCG (2000 UT) CAPS Take by mouth daily   • cyanocobalamin (VITAMIN B-12) 1,000 mcg tablet Take 1,000 mcg by mouth daily   • EPINEPHrine (EPIPEN) 0.3 mg/0.3 mL SOAJ Inject 0.3 mL (0.3 mg total) into a muscle once for 1 dose   • erythromycin with ethanol (EMGEL) 2 % gel Apply topically 2 (two) times a day   • famotidine (PEPCID) 20 mg tablet Take 20 mg by mouth 2 (two) times a day. • flecainide (TAMBOCOR) 100 mg tablet Take 1 tablet (100 mg total) by mouth 2 (two) times a day   • fluticasone (FLONASE) 50 mcg/act nasal spray 2 sprays into each nostril 2 (two) times a day   • Fluticasone Furoate-Vilanterol (Breo Ellipta) 100-25 mcg/actuation inhaler Inhale 1 puff daily Rinse mouth after use.    • ipratropium (ATROVENT) 0.02 % nebulizer solution Take 2.5 mL (0.5 mg total) by nebulization every 6 (six) hours as needed for wheezing or shortness of breath   • ipratropium (ATROVENT) 0.03 % nasal spray 2 sprays into each nostril 3 (three) times a day as needed for rhinitis   • levalbuterol (XOPENEX HFA) 45 mcg/act inhaler Inhale 1-2 puffs every 4 (four) hours as needed for wheezing or shortness of breath   • lidocaine (LMX) 4 % cream Apply topically as needed for mild pain   • Magnesium 500 MG CAPS Take by mouth   • metoprolol succinate (TOPROL-XL) 25 mg 24 hr tablet Take 0.5 tablets (12.5 mg total) by mouth every evening   • montelukast (SINGULAIR) 10 mg tablet Take 1 tablet (10 mg total) by mouth daily at bedtime   • predniSONE 10 mg tablet Five pills a day for 2 days, 4 pills a day for 2 days, 3 pills a day for 2 days, 2 pills a day for 2 days, 1 pill a day for 2 days   • rosuvastatin (CRESTOR) 5 mg tablet Take 1 tablet (5 mg total) by mouth daily   • sucralfate (CARAFATE) 1 g tablet Take 1 tablet (1 g total) by mouth 4 (four) times a day   • zolpidem (AMBIEN) 5 mg tablet Take 1 tablet (5 mg total) by mouth daily at bedtime as needed for sleep     No current facility-administered medications on file prior to visit. She is allergic to shellfish-derived products - food allergy, wheat bran - food allergy, gluten meal - food allergy, morphine and related, nuts - food allergy, and sulfa antibiotics. .    Review of Systems   Constitutional: Negative for activity change. Musculoskeletal: Positive for arthralgias and joint swelling. Objective: There were no vitals taken for this visit. Physical Exam  Vitals and nursing note reviewed. Constitutional:       Appearance: She is well-developed and well-groomed. Musculoskeletal:      Comments: Left anterior shoulder with soft and slightly movable lump overlying the anterior shoulder near the biceps tendon; slightly tender over the area; some mild degenerative changes over the [facility] joint on the left   Neurological:      Mental Status: She is alert. Psychiatric:         Behavior: Behavior is cooperative.

## 2023-08-25 ENCOUNTER — OFFICE VISIT (OUTPATIENT)
Dept: HEMATOLOGY ONCOLOGY | Facility: CLINIC | Age: 68
End: 2023-08-25
Payer: COMMERCIAL

## 2023-08-25 VITALS
DIASTOLIC BLOOD PRESSURE: 72 MMHG | OXYGEN SATURATION: 99 % | BODY MASS INDEX: 25.95 KG/M2 | HEART RATE: 72 BPM | SYSTOLIC BLOOD PRESSURE: 118 MMHG | TEMPERATURE: 98.1 F | HEIGHT: 64 IN | WEIGHT: 152 LBS

## 2023-08-25 DIAGNOSIS — M79.89 SOFT TISSUE MASS: ICD-10-CM

## 2023-08-25 DIAGNOSIS — C82.90 FOLLICULAR LYMPHOMA, UNSPECIFIED FOLLICULAR LYMPHOMA TYPE, UNSPECIFIED BODY REGION (HCC): Primary | ICD-10-CM

## 2023-08-25 PROCEDURE — 99214 OFFICE O/P EST MOD 30 MIN: CPT | Performed by: INTERNAL MEDICINE

## 2023-08-25 NOTE — PROGRESS NOTES
[facility] [address] [address]  [phone]  [address], [id number]  08/25/23    Discussion:   In summary, this is a 29-year-old female with a history of stage Ia follicular lymphoma, grade 1, right medial cubital fossa 2010. Treated with radiation therapy. Clinically she is doing well. She generally functions without restriction. She noticed a subcutaneous lump in the left anterior deltoid region 10 months ago. This has increased in size. No erythema, tenderness. X-ray showed no abnormality. On physical exam there is, indeed, a subcutaneous mass. Rubbery in texture measuring about 8 mm. I suggested orthopedic oncology evaluation. I suspect lipoma although admittedly, other processes could present thus. Otherwise, no palpable lymphadenopathy organomegaly. If all is well, I will see her back in 1 year for review with repeat blood work just prior to that visit. I discussed the above with the patient. The patient  voiced understanding and agreement.  ______________________________________________________________________    No chief complaint on file. HPI:  Oncology History   Follicular lymphoma ([address] [facility])   8/18/2010 Initial Diagnosis    stage IA follicle lymphoma, grade 1, 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, diagnosed in August 2010. She underwent radiation therapy, resulting in complete remission. Interval History: Clinically stable. ECOG-  0-asymptomatic. Review of Systems   Constitutional: Negative for appetite change, diaphoresis, fatigue and fever. HENT: Negative for sinus pain. Eyes: Negative for discharge. Respiratory: Negative for cough and shortness of breath. Cardiovascular: Negative for chest pain. Gastrointestinal: Negative for abdominal pain, constipation and diarrhea. Endocrine: Negative for cold intolerance.    Genitourinary: Negative for difficulty urinating and hematuria. Musculoskeletal: Negative for joint swelling. Skin: Negative for rash. Allergic/Immunologic: Negative for environmental allergies. Neurological: Negative for dizziness and headaches. Hematological: Negative for adenopathy. Psychiatric/Behavioral: Negative for agitation.        Past Medical History:   Diagnosis Date   • Asthma    • Celiac disease    • Follicular lymphoma ([address])    • GERD (gastroesophageal reflux disease)    • Heart palpitations    • History of colonic polyps     resolved 07/12/2016   • History of radiation therapy 2010   • Hyperlipidemia    • Insomnia    • Irregular heart beat    • Lymphoma, 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HCC)     non hodgekins, remission   • Malignant lymphoma ([address]) 2010    rt arm cutaneous follicular stage ia (rt diatal medical biceps area , s/p resected and s/p radistion treatment    resolved 12/17/2014   • Postmenopausal disorder     resolved 09/21/2017   • Pulmonary nodule     BENIGN, STABLE [id number]   • Restless legs syndrome     resolved 09/21/2017   • TMJ syndrome     resolved 09/21/2017     Patient Active Problem List   Diagnosis   • Celiac disease   • PVC's (premature ventricular contractions)   • Dyslipidemia   • Back pain   • Chronic GERD   • Heart palpitations   • Insomnia   • Osteopenia   • Sciatica associated with disorder of lumbar spine   • Vitamin D deficiency   • VERONIKA (stress urinary incontinence, female)   • Follicular lymphoma (HCC)   • Personal history of colonic polyps   • Encounter for gynecological examination (general) (routine) without abnormal findings   • Severe persistent asthma without complication   • Non-seasonal allergic rhinitis   • Eosinophilia   • Ganglion cyst of wrist, left   • Pulmonary nodule   • Disorder of tendon of right biceps   • Tear of right supraspinatus tendon   • Hyperlipidemia   • Chronic sinusitis   • Joint mass       Current Outpatient Medications:   •  acetaminophen (TYLENOL) 500 mg tablet, Take 500 mg by mouth every 6 (six) hours as needed for mild pain., Disp: , Rfl:   •  albuterol (2.5 mg/3 mL) 0.083 % nebulizer solution, Take 3 mL (2.5 mg total) by nebulization every 6 (six) hours as needed for wheezing or shortness of breath, Disp: 180 mL, Rfl: 0  •  benralizumab (FASENRA) subcutaneous injection, Inject 1 mL (30 mg total) under the skin every 56 days for 6 doses, Disp: 1 Syringe, Rfl: 6  •  bimatoprost (LATISSE) 0.03 % ophthalmic solution, Take as directed, Disp: , Rfl:   •  Cholecalciferol 50 MCG (2000 UT) CAPS, Take by mouth daily, Disp: , Rfl:   •  cyanocobalamin (VITAMIN B-12) 1,000 mcg tablet, Take 1,000 mcg by mouth daily, Disp: , Rfl:   •  erythromycin with ethanol (EMGEL) 2 % gel, Apply topically 2 (two) times a day, Disp: 30 g, Rfl: 1  •  famotidine (PEPCID) 20 mg tablet, Take 20 mg by mouth 2 (two) times a day., Disp: , Rfl:   •  flecainide (TAMBOCOR) 100 mg tablet, Take 1 tablet (100 mg total) by mouth 2 (two) times a day, Disp: 180 tablet, Rfl: 3  •  fluticasone (FLONASE) 50 mcg/act nasal spray, 2 sprays into each nostril 2 (two) times a day, Disp: 48 g, Rfl: 4  •  ipratropium (ATROVENT) 0.02 % nebulizer solution, Take 2.5 mL (0.5 mg total) by nebulization every 6 (six) hours as needed for wheezing or shortness of breath, Disp: 180 mL, Rfl: 0  •  ipratropium (ATROVENT) 0.03 % nasal spray, 2 sprays into each nostril 3 (three) times a day as needed for rhinitis, Disp: 30 mL, Rfl: 3  •  levalbuterol (XOPENEX HFA) 45 mcg/act inhaler, Inhale 1-2 puffs every 4 (four) hours as needed for wheezing or shortness of breath, Disp: 45 g, Rfl: 0  •  lidocaine (LMX) 4 % cream, Apply topically as needed for mild pain, Disp: 15 g, Rfl: 3  •  Magnesium 500 MG CAPS, Take by mouth, Disp: , Rfl:   •  metoprolol succinate (TOPROL-XL) 25 mg 24 hr tablet, Take 0.5 tablets (12.5 mg total) by mouth every evening, Disp: 45 tablet, Rfl: 5  •  ondansetron (ZOFRAN) 4 mg tablet, Take 1 tablet (4 mg total) by mouth every 8 (eight) hours as needed for nausea or vomiting, Disp: 20 tablet, Rfl: 0  •  rosuvastatin (CRESTOR) 5 mg tablet, Take 1 tablet (5 mg total) by mouth daily, Disp: 90 tablet, Rfl: 3  •  zolpidem (AMBIEN) 5 mg tablet, Take 1 tablet (5 mg total) by mouth daily at bedtime as needed for sleep, Disp: 30 tablet, Rfl: 2  •  EPINEPHrine (EPIPEN) 0.3 mg/0.3 mL SOAJ, Inject 0.3 mL (0.3 mg total) into a muscle once for 1 dose, Disp: 0.6 mL, Rfl: 0  •  Fluticasone Furoate-Vilanterol (Breo Ellipta) 100-25 mcg/actuation inhaler, Inhale 1 puff daily Rinse mouth after use., Disp: 60 blister, Rfl: 0  •  montelukast (SINGULAIR) 10 mg tablet, Take 1 tablet (10 mg total) by mouth daily at bedtime (Patient not taking: Reported on 8/25/2023), Disp: 90 tablet, Rfl: 0  •  predniSONE 10 mg tablet, Five pills a day for 2 days, 4 pills a day for 2 days, 3 pills a day for 2 days, 2 pills a day for 2 days, 1 pill a day for 2 days (Patient not taking: Reported on 8/25/2023), Disp: 30 tablet, Rfl: 0  •  sucralfate (CARAFATE) 1 g tablet, Take 1 tablet (1 g total) by mouth 4 (four) times a day (Patient not taking: Reported on 8/25/2023), Disp: 120 tablet, Rfl: 5  Allergies   Allergen Reactions   • Shellfish-Derived Products - Food Allergy Anaphylaxis   • Wheat Bran - Food Allergy Anaphylaxis   • Gluten Meal - Food Allergy GI Intolerance     Celiac    • Morphine And Related Itching   • Nuts - Food Allergy Hives     Almonds,walnuts, hazelnuts and other related nuts. • Sulfa Antibiotics Rash     Past Surgical History:   Procedure Laterality Date   • COLONOSCOPY  04/16/2019   • FUNCTIONAL ENDOSCOPIC SINUS SURGERY Bilateral 2013    Dr Yanci Kirby   • HYSTERECTOMY      age 39 complete   • LYMPH NODE DISSECTION Right     arm   • NASAL SEPTUM SURGERY  2013    with turbinate reduction - Dr. Yanci Kirby   • GA ESOPHAGOGASTRODUODENOSCOPY TRANSORAL DIAGNOSTIC N/A 1/18/2016    Procedure: ESOPHAGOGASTRODUODENOSCOPY (EGD); Surgeon: Tc Lechuga MD;  Location: [facility];   Service: Gastroenterology   • GA EXCISION GANGLION WRIST DORSAL/VOLAR RECURRENT Left 12/8/2020    Procedure: WRIST GANGLION CYST EXCISION;  Surgeon: Lisbet Moar MD;  Location: [facility];  Service: Orthopedics   • SYNOVECTOMY N/A 12/8/2020    Procedure: ECU TENOSYNOVECTOMY;  Surgeon: Lisbet Mora MD;  [facility]: [facility];  Service: Orthopedics   • TONSILLECTOMY     • TOTAL ABDOMINAL HYSTERECTOMY W/ BILATERAL SALPINGOOPHORECTOMY      age 54   • UPPER GASTROINTESTINAL ENDOSCOPY     • US GUIDED MSK PROCEDURE  1/16/2020   • US GUIDED MSK PROCEDURE  8/7/2020   • [address] MSK PROCEDURE  8/2/2021     Social History     Objective:  Vitals:    08/25/23 1104   BP: 118/72   BP Location: Left arm   Patient Position: Sitting   Cuff Size: Standard   Pulse: 72   Temp: 98.1 °F (36.7 °C)   TempSrc: Tympanic   SpO2: 99%   Weight: 68.9 kg (152 lb)   Height: 5' 4" (1.626 m)     Physical Exam  Constitutional:       Appearance: She is well-developed. HENT:      Head: Normocephalic and atraumatic. Eyes:      Pupils: Pupils are equal, round, and reactive to light. Cardiovascular:      Rate and Rhythm: Normal rate. Heart sounds: No murmur heard. Pulmonary:      Effort: No respiratory distress. Breath sounds: No wheezing or rales. Abdominal:      General: There is no distension. Palpations: Abdomen is soft. Tenderness: There is no abdominal tenderness. There is no rebound. Musculoskeletal:         General: No tenderness. Cervical back: Neck supple. Lymphadenopathy:      Cervical: No cervical adenopathy. Skin:     General: Skin is warm. Findings: No rash. Neurological:      Mental Status: She is alert and oriented to person, place, and time. Deep Tendon Reflexes: Reflexes normal.   Psychiatric:         Thought Content: Thought content normal.           Labs: I personally reviewed the labs and imaging pertinent to this patient care.

## 2023-09-07 ENCOUNTER — HOSPITAL ENCOUNTER (OUTPATIENT)
Dept: ULTRASOUND IMAGING | Facility: HOSPITAL | Age: 68
End: 2023-09-07
Payer: COMMERCIAL

## 2023-09-07 DIAGNOSIS — E04.1 THYROID NODULE: ICD-10-CM

## 2023-09-07 PROCEDURE — 76536 US EXAM OF HEAD AND NECK: CPT

## 2023-09-19 ENCOUNTER — OFFICE VISIT (OUTPATIENT)
Dept: OBGYN CLINIC | Facility: CLINIC | Age: 68
End: 2023-09-19
Payer: COMMERCIAL

## 2023-09-19 VITALS
DIASTOLIC BLOOD PRESSURE: 62 MMHG | WEIGHT: 151 LBS | HEIGHT: 64 IN | BODY MASS INDEX: 25.78 KG/M2 | SYSTOLIC BLOOD PRESSURE: 110 MMHG

## 2023-09-19 DIAGNOSIS — C82.90 FOLLICULAR LYMPHOMA, UNSPECIFIED FOLLICULAR LYMPHOMA TYPE, UNSPECIFIED BODY REGION (HCC): ICD-10-CM

## 2023-09-19 DIAGNOSIS — D48.19 NEOPLASM OF UNCERTAIN BEHAVIOR OF CONNECTIVE AND OTHER SOFT TISSUE: Primary | ICD-10-CM

## 2023-09-19 PROCEDURE — 99214 OFFICE O/P EST MOD 30 MIN: CPT | Performed by: STUDENT IN AN ORGANIZED HEALTH CARE EDUCATION/TRAINING PROGRAM

## 2023-09-19 NOTE — PROGRESS NOTES
Orthopedic Surgery Office Note  Joanie Melo (17 y.o. female)  : 1955 Encounter Date: 2023  Dr. Gabriel Stevens DO, Orthopedic Surgeon  Orthopedic Oncology & Sarcoma Surgery   Phone:[phone] UDD:[phone]    Assessment and Plan: Joanie Melo is a 74 y.o. female with:    1. Subcutaneous mass of left anterior shoulder  - obtain MRI w wo of right shoulder to evaluate the mass  - unable to determine what this lesion is without advanced imaging, and even then, only certain possible diagnoses from imaging, may need excision in the future based on results  - cannot determine at this point if it is recurrence of disease, another type of lesion, or something benign without further workup  - continue current managment    2. History of follicular lymphoma  - continue current management     3. Comorbidity, including: GERD, pulmonary nodule, non-seasonal allergic rhinitis, PVCs, sciatica, TMJ, tear of right supraspinatus, HLD  - continue current management     Procedure:  No procedures performed    Surgical Planning:   Future surgical planning based on imaging results    Follow up: Return for results review- MRI.   __________________________________________________________________    History of Present Illness:     Joanie Melo is a 74 y.o. female with history of follicular lymphoma who presents for consultation at the request of Bhavik Benavides DO regarding left anterior shoulder mass. Saw Dr. Bobbi Lyn for routine visit and noted a mass on her shoulder, and she was referred to us for further evaluation of the lesion given her history. Patient reports it growing over the past year from size of fingernail to now size of marble. No significant pain or skin changes otherwise. At baseline patient gaits without assistance. No noted constitutional symptoms such as fever, chills, night sweats, fatigue, weight gains/losses. No noted  chest pain/shortness of breath.   Patient has noted personal history of cancer. Dr. Lacey Tidwell  8/25/23  In summary, this is a 72-year-old female with a history of stage Ia follicular lymphoma, grade 1, right medial cubital fossa 2010. Treated with radiation therapy. Clinically she is doing well. She generally functions without restriction. She noticed a subcutaneous lump in the left anterior deltoid region 10 months ago. This has increased in size. No erythema, tenderness. X-ray showed no abnormality. On physical exam there is, indeed, a subcutaneous mass. Rubbery in texture measuring about 8 mm. I suggested orthopedic oncology evaluation. I suspect lipoma although admittedly, other processes could present thus. Otherwise, no palpable lymphadenopathy organomegaly. If all is well, I will see her back in 1 year for review with repeat blood work just prior to that visit. I discussed the above with the patient. The patient  voiced understanding and agreement. Oncology History   Follicular lymphoma ([address])   8/18/2010 Initial Diagnosis    stage IA follicle lymphoma, grade 1, 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, diagnosed in August 2010. She underwent radiation therapy, resulting in complete remission. Review of Systems:   Allergies, medications, past medical/surgical/family/social history have been reviewed. Complete 12 system review performed and found to be negative except: except as per mentioned in HPI. Physical Examination:   Height: 5' 4" (162.6 cm)  Weight - Scale: 68.5 kg (151 lb)  BMI (Calculated): 25.9  BSA (Calculated - m2): 1.74 sq meters     Vitals:    09/19/23 1315   BP: 110/62     Body mass index is 25.92 kg/m². General: alert and oriented; well nourished/well developed; no apparent distress. Psychiatric: normal mood and affect  HEENT: NCAT. Head/neck - full range of motion. Lungs: breathing comfortably; equal symmetric chest expansion. Abdomen: soft, non-tender, non-distended.    Skin: warm; dry; no lesions, rashes, petechiae or purpura; no clubbing, no cyanosis, no edema    Extremity: Left shoulder   Inspection: no edema, skin abnormalities throughout   Palpation: mobile, firm, mass noted over anterior shoulder, superior to proximal biceps tendon 1cm; mid-arm mass palpated <1cm   Range of motion of joints: WFL range of motion all extremities. Motor strength: WNL all extremities.  intact. Sensation: grossly intact to all extremities. Pulses: intact   Gait: normal gait. IMAGING RESULTS, All images personally review today by Dr. Solange Carmona  Study: XR left shoulder  Date: 8/17/2023  Impression: I have personally reviewed all relevant imaging. I have read and agree with the radiologist report. My impression is as follows:  No acute osseous abnormality. No evidence of abnormal mass. If there is a discrete clinically significant palpable abnormality, follow-up with contrast-enhanced MRI is advised.     Pertinent laboratory findings:  Lab Results   Component Value Date    WBC 4.72 03/03/2023    HGB 13.5 03/03/2023    HCT 40.6 03/03/2023    MCV 93 03/03/2023     03/03/2023     Lab Results   Component Value Date    GLUCOSE 112 06/22/2015    CALCIUM 9.4 03/03/2023     06/22/2015    K 4.3 03/03/2023    CO2 25 03/03/2023     03/03/2023    BUN 15 03/03/2023    CREATININE 0.76 03/03/2023     No results found for: "AFP"  Lab Results   Component Value Date    CALCIUM 9.4 03/03/2023     No results found for: "CEA"    Pathology: FINAL  Not in records, 2010    Patient Care team:   Patient Care Team:  Loretta Nelson MD as PCP - General (Family Medicine)  Honorio Newberry MD       Past Medical History:   Diagnosis Date   • Asthma    • Celiac disease    • Follicular lymphoma ([address])    • GERD (gastroesophageal reflux disease)    • Heart palpitations    • History of colonic polyps     resolved 07/12/2016   • History of radiation therapy 2010   • Hyperlipidemia    • Insomnia    • Irregular heart beat • Lymphoma, 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[address])     non hodgekins, remission   • Malignant lymphoma ([address]) 2010    rt arm cutaneous follicular stage ia (rt diatal medical biceps area , s/p resected and s/p radistion treatment    resolved 12/17/2014   • Postmenopausal disorder     resolved 09/21/2017   • Pulmonary nodule     BENIGN, STABLE 9352-5882   • Restless legs syndrome     resolved 09/21/2017   • TMJ syndrome     resolved 09/21/2017     Past Surgical History:   Procedure Laterality Date   • COLONOSCOPY  04/16/2019   • FUNCTIONAL ENDOSCOPIC SINUS SURGERY Bilateral 2013    Dr Casey Mena   • HYSTERECTOMY      age 39 complete   • LYMPH NODE DISSECTION Right     arm   • NASAL SEPTUM SURGERY  2013    with turbinate reduction - Dr. Casey Mena   • AZ ESOPHAGOGASTRODUODENOSCOPY TRANSORAL DIAGNOSTIC N/A 1/18/2016    Procedure: ESOPHAGOGASTRODUODENOSCOPY (EGD); Surgeon: Coni Medrano MD;  Location: [facility];   Service: Gastroenterology   • AZ EXCISION GANGLION WRIST DORSAL/VOLAR RECURRENT Left 12/8/2020    Procedure: WRIST GANGLION CYST EXCISION;  Surgeon: Derik Charlton MD;  Location: [facility];  Service: Orthopedics   • SYNOVECTOMY N/A 12/8/2020    Procedure: ECU TENOSYNOVECTOMY;  Surgeon: Derik Charlton MD;  [facility]: [facility];  Service: Orthopedics   • TONSILLECTOMY     • TOTAL ABDOMINAL HYSTERECTOMY W/ BILATERAL SALPINGOOPHORECTOMY      age 54   • UPPER GASTROINTESTINAL ENDOSCOPY     • US GUIDED MSK PROCEDURE  1/16/2020   • [address] MSK PROCEDURE  8/7/2020   • [address] MSK PROCEDURE  8/2/2021       Current Outpatient Medications:   •  acetaminophen (TYLENOL) 500 mg tablet, Take 500 mg by mouth every 6 (six) hours as needed for mild pain., Disp: , Rfl:   •  albuterol (2.5 mg/3 mL) 0.083 % nebulizer solution, Take 3 mL (2.5 mg total) by nebulization every 6 (six) hours as needed for wheezing or shortness of breath, Disp: 180 mL, Rfl: 0  •  azelastine (ASTELIN) 0.1 % nasal spray, 2 sprays into each nostril 2 (two) times a day Use in each nostril as directed, Disp: 30 mL, Rfl: 11  •  benralizumab (FASENRA) subcutaneous injection, Inject 1 mL (30 mg total) under the skin every 56 days for 6 doses, Disp: 1 Syringe, Rfl: 6  •  bimatoprost (LATISSE) 0.03 % ophthalmic solution, Take as directed, Disp: , Rfl:   •  Cholecalciferol 50 MCG (2000 UT) CAPS, Take by mouth daily, Disp: , Rfl:   •  cyanocobalamin (VITAMIN B-12) 1,000 mcg tablet, Take 1,000 mcg by mouth daily, Disp: , Rfl:   •  erythromycin with ethanol (EMGEL) 2 % gel, Apply topically 2 (two) times a day, Disp: 30 g, Rfl: 1  •  famotidine (PEPCID) 20 mg tablet, Take 20 mg by mouth 2 (two) times a day., Disp: , Rfl:   •  flecainide (TAMBOCOR) 100 mg tablet, Take 1 tablet (100 mg total) by mouth 2 (two) times a day, Disp: 180 tablet, Rfl: 3  •  fluticasone (FLONASE) 50 mcg/act nasal spray, 2 sprays into each nostril 2 (two) times a day, Disp: 48 g, Rfl: 4  •  ipratropium (ATROVENT) 0.02 % nebulizer solution, Take 2.5 mL (0.5 mg total) by nebulization every 6 (six) hours as needed for wheezing or shortness of breath, Disp: 180 mL, Rfl: 0  •  ipratropium (ATROVENT) 0.03 % nasal spray, 2 sprays into each nostril 3 (three) times a day as needed for rhinitis, Disp: 30 mL, Rfl: 3  •  levalbuterol (XOPENEX HFA) 45 mcg/act inhaler, Inhale 1-2 puffs every 4 (four) hours as needed for wheezing or shortness of breath, Disp: 45 g, Rfl: 0  •  lidocaine (LMX) 4 % cream, Apply topically as needed for mild pain, Disp: 15 g, Rfl: 3  •  Magnesium 500 MG CAPS, Take by mouth, Disp: , Rfl:   •  metoprolol succinate (TOPROL-XL) 25 mg 24 hr tablet, Take 0.5 tablets (12.5 mg total) by mouth every evening, Disp: 45 tablet, Rfl: 5  •  ondansetron (ZOFRAN) 4 mg tablet, Take 1 tablet (4 mg total) by mouth every 8 (eight) hours as needed for nausea or vomiting, Disp: 20 tablet, Rfl: 0  •  rosuvastatin (CRESTOR) 5 mg tablet, Take 1 tablet (5 mg total) by mouth daily, Disp: 90 tablet, Rfl: 3  •  zolpidem (AMBIEN) 5 mg tablet, Take 1 tablet (5 mg total) by mouth daily at bedtime as needed for sleep, Disp: 30 tablet, Rfl: 2  •  EPINEPHrine (EPIPEN) 0.3 mg/0.3 mL SOAJ, Inject 0.3 mL (0.3 mg total) into a muscle once for 1 dose, Disp: 0.6 mL, Rfl: 0  •  Fluticasone Furoate-Vilanterol (Breo Ellipta) 100-25 mcg/actuation inhaler, Inhale 1 puff daily Rinse mouth after use., Disp: 60 blister, Rfl: 0  •  montelukast (SINGULAIR) 10 mg tablet, Take 1 tablet (10 mg total) by mouth daily at bedtime (Patient not taking: Reported on 8/25/2023), Disp: 90 tablet, Rfl: 0  •  predniSONE 10 mg tablet, Five pills a day for 2 days, 4 pills a day for 2 days, 3 pills a day for 2 days, 2 pills a day for 2 days, 1 pill a day for 2 days (Patient not taking: Reported on 9/19/2023), Disp: 30 tablet, Rfl: 0  •  sucralfate (CARAFATE) 1 g tablet, Take 1 tablet (1 g total) by mouth 4 (four) times a day (Patient not taking: Reported on 8/25/2023), Disp: 120 tablet, Rfl: 5  Allergies   Allergen Reactions   • Shellfish-Derived Products - Food Allergy Anaphylaxis   • Wheat Bran - Food Allergy Anaphylaxis   • Gluten Meal - Food Allergy GI Intolerance     Celiac    • Morphine And Related Itching   • Nuts - Food Allergy Hives     Almonds,walnuts, hazelnuts and other related nuts.     • Sulfa Antibiotics Rash     Family History   Problem Relation Age of Onset   • Lymphoma Mother    • Throat cancer Father    • Heart failure Father    • Atrial fibrillation Father    • Diabetes Father    • Colonic polyp Father    • Hypertension Father    • Thyroid cancer Sister    • Breast cancer Paternal Grandmother 71   • Breast cancer Paternal Aunt 71   • Ovarian cancer Paternal Aunt 72   • No Known Problems Maternal Grandmother    • No Known Problems Maternal Grandfather    • Cancer Paternal Grandfather    • Lung cancer Paternal Grandfather    • BRCA1 Positive Cousin    • Skin cancer Paternal Aunt    • Throat cancer Paternal Uncle    • No Known Problems Maternal Aunt      Social History Socioeconomic History   • Marital status: /Civil Union     Spouse name: Not on file   • Number of children: Not on file   • Years of education: Not on file   • Highest education level: Not on file   Occupational History   • Occupation: X-ray technologist   Tobacco Use   • Smoking status: Former     Packs/day: 0.75     Years: 20.00     Total pack years: 15.00     Types: Cigarettes     Start date: 48     Quit date:      Years since quittin.7   • Smokeless tobacco: Never   Vaping Use   • Vaping Use: Never used   Substance and Sexual Activity   • Alcohol use:  Yes     Alcohol/week: 3.0 standard drinks of alcohol     Types: 3 Glasses of wine per week     Comment: soc   • Drug use: No   • Sexual activity: Yes     Partners: Male   Other Topics Concern   • Not on file   Social History Narrative    Caffeine use    exercise walking      Social Determinants of Health     Financial Resource Strain: Not on file   Food Insecurity: Not on file   Transportation Needs: Not on file   Physical Activity: Not on file   Stress: Not on file   Social Connections: Not on file   Intimate Partner Violence: Not on file   Housing Stability: Not on file       30 minutes was spent in the coordination of care, reviewing of imaging and with the patient on the date of service    ITaryn PA-C, scribed this note in conjunction with and in the presence of Dr. Ashkan Smith, who performed parts of the services as described in this documentation      Taryn Carrera PA-C   2023 5:07 PM     Problem List Items Addressed This Visit        Other    Follicular lymphoma ([address])    Relevant Orders    MRI shoulder left w wo contrast   Other Visit Diagnoses     Neoplasm of uncertain behavior of connective and other soft tissue    -  Primary    Relevant Orders    MRI shoulder left w wo contrast

## 2023-09-22 DIAGNOSIS — I49.3 PVC'S (PREMATURE VENTRICULAR CONTRACTIONS): ICD-10-CM

## 2023-09-22 RX ORDER — METOPROLOL SUCCINATE 25 MG/1
12.5 TABLET, EXTENDED RELEASE ORAL EVERY EVENING
Qty: 45 TABLET | Refills: 3 | Status: SHIPPED | OUTPATIENT
Start: 2023-09-22

## 2023-09-27 ENCOUNTER — HOSPITAL ENCOUNTER (OUTPATIENT)
Dept: MRI IMAGING | Facility: HOSPITAL | Age: 68
Discharge: HOME | End: 2023-09-27
Payer: COMMERCIAL

## 2023-09-27 DIAGNOSIS — D48.19 NEOPLASM OF UNCERTAIN BEHAVIOR OF CONNECTIVE AND OTHER SOFT TISSUE: ICD-10-CM

## 2023-09-27 DIAGNOSIS — C82.90 FOLLICULAR LYMPHOMA, UNSPECIFIED FOLLICULAR LYMPHOMA TYPE, UNSPECIFIED BODY REGION (HCC): ICD-10-CM

## 2023-09-27 PROCEDURE — G1004 CDSM NDSC: HCPCS

## 2023-09-27 PROCEDURE — A9585 GADOBUTROL INJECTION: HCPCS

## 2023-09-27 PROCEDURE — 73223 MRI JOINT UPR EXTR W/O&W/DYE: CPT

## 2023-09-27 RX ORDER — GADOBUTROL 604.72 MG/ML
6 INJECTION INTRAVENOUS
Status: COMPLETED | OUTPATIENT
Start: 2023-09-27 | End: 2023-09-27

## 2023-09-27 RX ADMIN — GADOBUTROL 6 ML: 604.72 INJECTION INTRAVENOUS at 16:07

## 2023-10-04 ENCOUNTER — HOSPITAL ENCOUNTER (OUTPATIENT)
Dept: INFUSION CENTER | Facility: HOSPITAL | Age: 68
Discharge: HOME | End: 2023-10-04
Attending: INTERNAL MEDICINE
Payer: COMMERCIAL

## 2023-10-04 VITALS — TEMPERATURE: 97.7 F

## 2023-10-04 DIAGNOSIS — J45.50 SEVERE PERSISTENT ASTHMA WITHOUT COMPLICATION: Primary | ICD-10-CM

## 2023-10-04 DIAGNOSIS — D72.119 HYPEREOSINOPHILIC SYNDROME, UNSPECIFIED TYPE: ICD-10-CM

## 2023-10-04 PROCEDURE — 96372 THER/PROPH/DIAG INJ SC/IM: CPT

## 2023-10-04 RX ORDER — EPINEPHRINE 1 MG/ML
0.3 INJECTION, SOLUTION, CONCENTRATE INTRAVENOUS ONCE
OUTPATIENT
Start: 2023-11-29 | End: 2023-11-29

## 2023-10-04 RX ORDER — EPINEPHRINE 1 MG/ML
0.3 INJECTION, SOLUTION, CONCENTRATE INTRAVENOUS ONCE
Status: DISCONTINUED | OUTPATIENT
Start: 2023-10-04 | End: 2023-10-08 | Stop reason: HOSPADM

## 2023-10-04 RX ADMIN — BENRALIZUMAB 30 MG: 30 INJECTION, SOLUTION SUBCUTANEOUS at 13:42

## 2023-10-04 NOTE — PROGRESS NOTES
Pt offers no complaints. Epipen in hand and expires 1/2024. Pt tolerated fasenra injection in right arm and was observed for 30 minutes post-treatment without incident. Next infusion appointment scheduled and pt discharged in stable condition. AVS provided.

## 2023-10-20 ENCOUNTER — OFFICE VISIT (OUTPATIENT)
Dept: PULMONOLOGY | Facility: CLINIC | Age: 68
End: 2023-10-20

## 2023-10-20 VITALS
OXYGEN SATURATION: 98 % | BODY MASS INDEX: 25.59 KG/M2 | TEMPERATURE: 96.6 F | SYSTOLIC BLOOD PRESSURE: 102 MMHG | DIASTOLIC BLOOD PRESSURE: 60 MMHG | HEIGHT: 64 IN | WEIGHT: 149.9 LBS | HEART RATE: 73 BPM

## 2023-10-20 DIAGNOSIS — K21.9 CHRONIC GERD: ICD-10-CM

## 2023-10-20 DIAGNOSIS — J30.89 NON-SEASONAL ALLERGIC RHINITIS, UNSPECIFIED TRIGGER: ICD-10-CM

## 2023-10-20 DIAGNOSIS — J45.50 SEVERE PERSISTENT ASTHMA WITHOUT COMPLICATION: Primary | ICD-10-CM

## 2023-10-20 PROBLEM — J32.9 CHRONIC SINUSITIS: Status: RESOLVED | Noted: 2023-04-27 | Resolved: 2023-10-20

## 2023-10-20 RX ORDER — LEVALBUTEROL 1.25 MG/.5ML
1.25 SOLUTION, CONCENTRATE RESPIRATORY (INHALATION) EVERY 6 HOURS PRN
Qty: 15 ML | Refills: 6 | Status: SHIPPED | OUTPATIENT
Start: 2023-10-20 | End: 2023-11-19

## 2023-10-20 RX ORDER — FLUTICASONE FUROATE AND VILANTEROL TRIFENATATE 100; 25 UG/1; UG/1
1 POWDER RESPIRATORY (INHALATION) DAILY
Qty: 60 BLISTER | Refills: 11 | Status: SHIPPED | OUTPATIENT
Start: 2023-10-20 | End: 2024-10-14

## 2023-10-20 NOTE — ASSESSMENT & PLAN NOTE
Had recent exacerbation during the winter and required addition of nebulizer  She was given albuterol and ipratropium but has some confusion about how to use these and the albuterol made her very tachycardic. She has chronic tachycardia is on Tambocor and metoprolol and therefore would not use albuterol routinely. Recommended this be changed to lev albuterol, similar to her rescue inhaler  Continue Breo 100  She is on Sari with excellent response to therapy. We will continue present therapy.   Update authorization if needed

## 2023-10-20 NOTE — PROGRESS NOTES
Progress note - Pulmonary Medicine   Howard Willis 74 y.o. female MRN: [id number]       Impression & Plan:     Severe persistent asthma without complication  Had recent exacerbation during the winter and required addition of nebulizer  She was given albuterol and ipratropium but has some confusion about how to use these and the albuterol made her very tachycardic. She has chronic tachycardia is on Tambocor and metoprolol and therefore would not use albuterol routinely. Recommended this be changed to lev albuterol, similar to her rescue inhaler  Continue Breo 100  She is on Sari with excellent response to therapy. We will continue present therapy. Update authorization if needed    Non-seasonal allergic rhinitis  Uses ipratropium nasal spray and antihistamines as needed    Chronic GERD  Continues on Pepcid for GERD with control of symptoms    Return in about 6 months (around 4/20/2024). ______________________________________________________________________    HPI:    Howard Willis presents today for follow-up of severe persistent asthma with allergic rhinitis and GERD. She reports that she had a difficult winter last December. She had several upper respiratory illnesses and had a lot of symptoms. She did try to get in for sick visit but unfortunately was not able to and went to urgent care several times. Urgent care eventually prescribed her a nebulizer but gave her albuterol and ipratropium. The albuterol made her very tremulous and tachycardic. She is already on metoprolol and flecainide for tachyarrhythmia. Fortunately, she did not develop worsening arrhythmia as a result. She has been using Sari. She feels that this has helped her breathing tremendously. She is on Breo 100. She has not required prednisone. And she does continue to manage her allergies with Astelin, fluticasone, and as needed antihistamines. Her GERD is well controlled on Pepcid.     Current Medications:    Current Outpatient Medications:     acetaminophen (TYLENOL) 500 mg tablet, Take 500 mg by mouth every 6 (six) hours as needed for mild pain., Disp: , Rfl:     azelastine (ASTELIN) 0.1 % nasal spray, 2 sprays into each nostril 2 (two) times a day Use in each nostril as directed, Disp: 30 mL, Rfl: 11    benralizumab (FASENRA) subcutaneous injection, Inject 1 mL (30 mg total) under the skin every 56 days for 6 doses, Disp: 1 Syringe, Rfl: 6    bimatoprost (LATISSE) 0.03 % ophthalmic solution, Take as directed, Disp: , Rfl:     Breo Ellipta 100-25 MCG/ACT inhaler, Inhale 1 puff daily Rinse mouth after use., Disp: 60 blister, Rfl: 11    Cholecalciferol 50 MCG (2000 UT) CAPS, Take by mouth daily, Disp: , Rfl:     cyanocobalamin (VITAMIN B-12) 1,000 mcg tablet, Take 1,000 mcg by mouth daily, Disp: , Rfl:     EPINEPHrine (EPIPEN) 0.3 mg/0.3 mL SOAJ, Inject 0.3 mL (0.3 mg total) into a muscle once for 1 dose, Disp: 0.6 mL, Rfl: 0    erythromycin with ethanol (EMGEL) 2 % gel, Apply topically 2 (two) times a day, Disp: 30 g, Rfl: 1    famotidine (PEPCID) 20 mg tablet, Take 20 mg by mouth 2 (two) times a day., Disp: , Rfl:     flecainide (TAMBOCOR) 100 mg tablet, take 1 tablet by mouth twice a day, Disp: 180 tablet, Rfl: 3    fluticasone (FLONASE) 50 mcg/act nasal spray, 2 sprays into each nostril 2 (two) times a day, Disp: 48 g, Rfl: 4    ipratropium (ATROVENT) 0.03 % nasal spray, 2 sprays into each nostril 3 (three) times a day as needed for rhinitis, Disp: 30 mL, Rfl: 3    levalbuterol (XOPENEX HFA) 45 mcg/act inhaler, Inhale 1-2 puffs every 4 (four) hours as needed for wheezing or shortness of breath, Disp: 45 g, Rfl: 0    levalbuterol (XOPENEX) 1.25 mg/0.5 mL nebulizer solution, Take 0.5 mL (1.25 mg total) by nebulization every 6 (six) hours as needed for wheezing, Disp: 15 mL, Rfl: 6    lidocaine (LMX) 4 % cream, Apply topically as needed for mild pain, Disp: 15 g, Rfl: 3    Magnesium 500 MG CAPS, Take by mouth, Disp: , Rfl: metoprolol succinate (TOPROL-XL) 25 mg 24 hr tablet, Take 0.5 tablets (12.5 mg total) by mouth every evening, Disp: 45 tablet, Rfl: 3    montelukast (SINGULAIR) 10 mg tablet, Take 1 tablet (10 mg total) by mouth daily at bedtime, Disp: 90 tablet, Rfl: 0    ondansetron (ZOFRAN) 4 mg tablet, Take 1 tablet (4 mg total) by mouth every 8 (eight) hours as needed for nausea or vomiting, Disp: 20 tablet, Rfl: 0    rosuvastatin (CRESTOR) 5 mg tablet, Take 1 tablet (5 mg total) by mouth daily, Disp: 90 tablet, Rfl: 3    zolpidem (AMBIEN) 5 mg tablet, Take 1 tablet (5 mg total) by mouth daily at bedtime as needed for sleep, Disp: 30 tablet, Rfl: 2    Review of Systems:  Aside from what is mentioned in the HPI, the review of systems is otherwise negative    Past medical history, surgical history, and family history were reviewed and updated as appropriate    Social history updates:  Social History     Tobacco Use   Smoking Status Former    Packs/day: 0.75    Years: 20.00    Total pack years: 15.00    Types: Cigarettes    Start date:     Quit date:     Years since quittin.8   Smokeless Tobacco Never       PhysicalExamination:  Vitals:   /60 (BP Location: Left arm, Patient Position: Sitting, Cuff Size: Standard)   Pulse 73   Temp (!) 96.6 °F (35.9 °C) (Tympanic)   Ht 5' 4" (1.626 m)   Wt 68 kg (149 lb 14.4 oz)   SpO2 98%   BMI 25.73 kg/m²   Gen:  Comfortable on room air. No conversational dyspnea  HEENT:  Conjugate gaze. sclerae anicteric. Oropharynx moist  Neck: Trachea is midline. No JVD. No adenopathy  Chest: Lungs are symmetric, breath sounds equal.  Cardiac: Regular  . no murmur  Abdomen:  benign  Extremities: No edema  Neuro:  Normal speech and mentation    Diagnostic Data:  Labs:   I personally reviewed the most recent laboratory data pertinent to today's visit    Lab Results   Component Value Date    WBC 4.72 2023    HGB 13.5 2023    HCT 40.6 2023    MCV 93 2023    PLT 181 03/03/2023     Lab Results   Component Value Date    SODIUM 137 03/03/2023    K 4.3 03/03/2023    CO2 25 03/03/2023     03/03/2023    BUN 15 03/03/2023    CREATININE 0.76 03/03/2023    GLUCOSE 112 06/22/2015    CALCIUM 9.4 03/03/2023       Imaging:  I personally reviewed the images on the [facility] system pertinent to today's visit  December 2022 chest x-ray shows clear lung fields bilaterally    Jean-Claude Avelar MD

## 2023-10-24 ENCOUNTER — OFFICE VISIT (OUTPATIENT)
Dept: OBGYN CLINIC | Facility: CLINIC | Age: 68
End: 2023-10-24
Payer: COMMERCIAL

## 2023-10-24 VITALS
RESPIRATION RATE: 16 BRPM | SYSTOLIC BLOOD PRESSURE: 130 MMHG | HEIGHT: 64 IN | WEIGHT: 150 LBS | DIASTOLIC BLOOD PRESSURE: 88 MMHG | BODY MASS INDEX: 25.61 KG/M2 | HEART RATE: 76 BPM

## 2023-10-24 DIAGNOSIS — M79.89 SOFT TISSUE MASS: ICD-10-CM

## 2023-10-24 DIAGNOSIS — C82.90 FOLLICULAR LYMPHOMA, UNSPECIFIED FOLLICULAR LYMPHOMA TYPE, UNSPECIFIED BODY REGION (HCC): Primary | ICD-10-CM

## 2023-10-24 PROCEDURE — 99214 OFFICE O/P EST MOD 30 MIN: CPT | Performed by: STUDENT IN AN ORGANIZED HEALTH CARE EDUCATION/TRAINING PROGRAM

## 2023-10-24 NOTE — PROGRESS NOTES
Orthopedic Oncology Surgery  Follow-Up Office Note  eRgi Espinal (17 y.o. female)  : 1955 Encounter Date: 10/24/2023  Dr. Duy Fuentes DO, Orthopedic Surgeon  Orthopedic Oncology & Sarcoma Surgery        Impression/Plan: Regi Espinal is a 74 y.o. female with:    1. Mass of left anterior shoulder, likely within deltoid muscle   - MRI in 3-6 months to be sure nothing changes, or take it out to continue pathology evaluation  - possibly scar tissue of deltoid, on the differential; reassuring benign features on MRI because not well visualized, without blood supply visualized with contrast of MRI  - discussed other findings of subscapular tear and subluxation of biceps tendon   - discussed history of degenerative changes to contralateral shoulder and biceps tendon, with history of injections and indications for surgery without surgical management  - discussed risks vs benefit (of pathology) including wound complication, risk of bleeding, clotting, infection like any surgical procedure, mostly cosmetic risk; discussed preservation of deltoid even with removing this area   - will plan on excisional biopsy to obtain pathologic diagnosis    2. History of follicular lymphoma  - continue current management and following with Dr. Fallon Elizabeth     2. Comorbidity, including GERD, pulmonary nodule, non-seasonal allergic rhinitis, PVCs, sciatica, TMJ, tear of right supraspinatus, HLD   - continue current management     Follow up: No follow-ups on file. Procedure:   No procedures performed    Surgical Planning:     Patient will return in November/December for consent and preparation for the procedure     _______________________________________________________________  Subjective:     Regi Espinal is a 74 y.o. female with hx of follicular lymphoma. They present today for follow up of MRI. Denies any changes to size or pain to the area (no pain) since last seen. Patient is doing well overall.  denies pain/issue. Patient has been able to tolerate daily normal activities without any issues or complaints since the last office visit. Patient Denies fevers, chills, numbness/tingling, injury/trauma, night sweats since the last office visit. 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 She underwent radiation therapy, resulting in complete remission. Review of Systems:   Allergies, medications, past medical/surgical/family/social history have been reviewed, with the following changes: none  Complete 12 system review performed and found to be negative except: except as per mentioned in HPI. Physical Examination:   Height: 5' 4" (162.6 cm)  Weight - Scale: 68 kg (150 lb)  BMI (Calculated): 25.7  BSA (Calculated - m2): 1.73 sq meters     Vitals:    10/24/23 1315   BP: 130/88   Pulse: 76   Resp: 16     Body mass index is 25.75 kg/m². General: alert and oriented;  well nourished/well developed; no apparent distress. Psychiatric: normal mood and affect  HEENT: NCAT. Head/neck: full range of motion. Lungs: breathing comfortably  ; equal symmetric chest expansion. Abdomen: soft, non-tender, non-distended. Skin: warm; dry; without  lesions, rashes, petechiae or purpura; without  clubbing, cyanosis, edema  Extremity: left anterior shoulder   Inspection: without edema, without skin abnormalities throughout   Palpation:  without  TTP   Range of motion of joints: full range of motion all extremities. Motor strength: WNL all extremities intact    Sensation: grossly intact to all extremities. Pulses: intact distal pulses. Lymphatics: no obvious lymphadenopathy  Gait: normal gait. IMAGING RESULTS, All images personally reviewed today by Dr. Tatiana Caballero.    Study: MRI w wo contrast left shoulder  Date: 9/27/23  Report: I have read and agree with the radiologist report. My impression is as follows: No discrete mass at the marked site of palpable abnormality. Partial-thickness (33%), interstitial, insertional tear of the superior subscapularis tendon with associated medial subluxation of the long head biceps tendon into the substance of the subscapularis tendon  Supraspinatus tendinosis.      Laboratory:  Upcoming CBC CMP    Pathology: N/A     Referring provider: Caitlin Nichole MD  Patient Care Team:  Caitlin Nichole MD as PCP - General (Family Medicine)  Evelyne Espinoza MD     Problem List Items Addressed This Visit          Other    Follicular lymphoma [facility]) - Primary    Soft tissue mass   _______________________________________________________________    60 minutes was spent in the coordination of care, reviewing of imaging and with the patient on the date of service    I, Andrew Anderson PA-C, scribed this note in conjunction with and in the presence of Dr. Silvio Blanchard, who performed parts of the services as described in this documentation    Andrew Anderson PA-C   10/24/2023 2:16 PM     Dr. Bettina Silva DO, Orthopedic Surgeon  Orthopedic Oncology & Sarcoma Surgery   Phone:[phone] PHILIP:[phone]

## 2023-10-25 ENCOUNTER — TELEPHONE (OUTPATIENT)
Age: 68
End: 2023-10-25

## 2023-10-25 NOTE — TELEPHONE ENCOUNTER
Caller: patient    Doctor: Pacheco Gonzales    Reason for call: Patient would like to be seen back in office on 11/7/2023 & would like to schedule surgery on 11/27/2023.     Call back#: [phone]

## 2023-10-25 NOTE — TELEPHONE ENCOUNTER
I called and spoke with the patient. Scheduled her on 11/7/23 to see Dr. Tatiana Caballero to discuss about sx. Patient would like to schedule sx on 11/27/23.

## 2023-11-01 NOTE — PROGRESS NOTES
Patient presents for a routine annual visit  PMB - none   Last Pap Smear- not indicated   Mammogram- 2023  Referral given   Colonoscopy- 2019  5 year recall   Dexa- 2022    nonsmoker  Currently sexually active - one partner   No family history of uterine or cervical   PA ovarian  PGM and Pa breast cancer  No concerns/questions for today's visit

## 2023-11-02 ENCOUNTER — ANNUAL EXAM (OUTPATIENT)
Dept: OBGYN CLINIC | Facility: MEDICAL CENTER | Age: 68
End: 2023-11-02
Payer: COMMERCIAL

## 2023-11-02 VITALS
HEIGHT: 64 IN | WEIGHT: 151 LBS | SYSTOLIC BLOOD PRESSURE: 136 MMHG | DIASTOLIC BLOOD PRESSURE: 88 MMHG | BODY MASS INDEX: 25.78 KG/M2

## 2023-11-02 DIAGNOSIS — Z12.31 ENCOUNTER FOR SCREENING MAMMOGRAM FOR MALIGNANT NEOPLASM OF BREAST: ICD-10-CM

## 2023-11-02 DIAGNOSIS — Z01.419 WELL WOMAN EXAM WITH ROUTINE GYNECOLOGICAL EXAM: Primary | ICD-10-CM

## 2023-11-02 DIAGNOSIS — Z12.11 SCREEN FOR COLON CANCER: ICD-10-CM

## 2023-11-02 PROCEDURE — 99397 PER PM REEVAL EST PAT 65+ YR: CPT | Performed by: PHYSICIAN ASSISTANT

## 2023-11-02 NOTE — PATIENT INSTRUCTIONS
For vaginal dryness:    Coconut oil (organic, pure, unscented) as needed for moisture or lubrication. ( Do not use if allergic)    KeyE suppository  Revaree hyaluronic acid suppository   Replens moisture restore external comfort gel daily ( use as directed on the box)     Replens long lasting vaginal moisturizer  ( use as directed on the box)       For Vaginal Lubrication:     Coconut oil (Do not use if allergic)           Silicone based lubricant:  Uber Lube  AstroGlide  Replens silky smooth lubricant, premium silicone based lubricant for intercourse.  ( use as directed, a small amount will provide an enhanced natural feeling)

## 2023-11-02 NOTE — PROGRESS NOTES
Assessment   74 y.o. Artie Breech presenting for annual exam.     Plan   Diagnoses and all orders for this visit:    Well woman exam with routine gynecological exam    Encounter for screening mammogram for malignant neoplasm of breast  -     Mammo screening bilateral w 3d & cad; Future    Screen for colon cancer  -     Ambulatory Referral to Gastroenterology; Future        Pap no longer indicated  Mammo slip given    Colonoscopy due 5/2024; referral given   DXA up to date   Vaginal dryness- she forgot the name of the OTC products provided at last annual and would like to try these. We reviewed various tx options to include use of lubricant (water and silicone based), coconut oil, hyaluronic acid suppository, vitamin e suppository, and topical estrogen cream. Pt is interested in trying these OTC measures and will schedule f/u if not relieved with this. Perineal hygiene reviewed. Weight bearing exercises minium of 150 mins/weekly advised. Kegel exercises recommended. SBE encouraged, A yearly mammogram is recommended for breast cancer screening starting at age 36. ASCCP guidelines reviewed. Calcium/ Vit D dietary requirements discussed. Advised to call with any issues, all concerns & questions addressed. See provided information in your after visit summary     F/U Annually and PRN    Results will be released to [employer], if abnormal will call or message to review and discuss treatment plan.     __________________________________________________________________    Subjective     Cheli Hsieh is a 74 y.o. Artie Breech presenting for annual exam. She is without complaint    S/p JENNA/BSO for benign indication. Hx of follicular lymphoma in 5365. Continues to follow with heme onc on a regular basis    Does struggle with some dryness with intercourse. Forgot the name of OTC products we discussed last year. Would like a list of these products provided in AVS    Referred for gene testing last year given family hx.  Pt has decided not to go through with gene testing. SCREENING  Last Pap: s/p hysterectomy  Last Mammo: 2023 birads 1  Last Colonoscopy: 2019 5 year recall   Last DEXA: 2022, low bone mineral density       GYN  Sexually active: Yes - single partner - male    Hx Abnormal pap: denies  We reviewed ASCCP guidelines for Pap testing today. Denies vaginal discharge, itching, odor, dyspareunia, pelvic pain and vulvar/vaginal symptoms      OB           Complaints: denies   Denies urgency, frequency, hematuria, leakage / change in stream, difficulty urinating. BREAST  Complaints: denies   Denies: breast lump, breast tenderness, nipple discharge, skin color change, and skin lesion(s)      Pertinent Family Hx:   Family hx of breast cancer: PGM and Paternal aunt; paternal cousin BRCA +   Family hx of ovarian cancer: paternal aunt  Family hx of colon cancer: no      GENERAL  PMH reviewed/updated and is as below. Patient does follow with a PCP.     SOCIAL  Smoking: No  Alcohol: Socially  Drug: No  Occupation: Retired, previous x-ray tech      Past Medical History:   Diagnosis Date    Asthma     Celiac disease     Chronic sinusitis     Follicular lymphoma (HCC)     GERD (gastroesophageal reflux disease)     Heart palpitations     History of colonic polyps     resolved 2016    History of radiation therapy 2010    Hyperlipidemia     Insomnia     Irregular heart beat     Lymphoma, 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[address])     non hodgekins, remission    Malignant lymphoma ([address]) 2010    rt arm cutaneous follicular stage ia (rt diatal medical biceps area , s/p resected and s/p radistion treatment    resolved 2014    Postmenopausal disorder     resolved 2017    Pulmonary nodule     BENIGN, STABLE 6335-8131    Restless legs syndrome     resolved 2017    TMJ syndrome     resolved 2017       Past Surgical History:   Procedure Laterality Date    COLONOSCOPY  2019    FUNCTIONAL ENDOSCOPIC SINUS SURGERY Bilateral 2013    Dr Rosemary Gonzalez      age 39 complete    LYMPH NODE DISSECTION Right     arm    NASAL SEPTUM SURGERY  2013    with turbinate reduction - Dr. Perla Pool ESOPHAGOGASTRODUODENOSCOPY TRANSORAL DIAGNOSTIC N/A 1/18/2016    Procedure: ESOPHAGOGASTRODUODENOSCOPY (EGD); Surgeon: Samira Becker MD;  Location: [facility];   Service: Gastroenterology    AL EXCISION GANGLION WRIST DORSAL/VOLAR RECURRENT Left 12/8/2020    Procedure: WRIST GANGLION CYST EXCISION;  Surgeon: Christian Singh MD;  [facility];  Service: Orthopedics    SYNOVECTOMY N/A 12/8/2020    Procedure: ECU TENOSYNOVECTOMY;  Surgeon: Christian Singh MD;  [facility];  Service: Orthopedics    TONSILLECTOMY      TOTAL ABDOMINAL HYSTERECTOMY W/ BILATERAL SALPINGOOPHORECTOMY      age 54    [facility] MSK PROCEDURE  1/16/2020    US GUIDED MSK PROCEDURE  8/7/2020    US GUIDED MSK PROCEDURE  8/2/2021         Current Outpatient Medications:     acetaminophen (TYLENOL) 500 mg tablet, Take 500 mg by mouth every 6 (six) hours as needed for mild pain., Disp: , Rfl:     azelastine (ASTELIN) 0.1 % nasal spray, 2 sprays into each nostril 2 (two) times a day Use in each nostril as directed, Disp: 30 mL, Rfl: 11    benralizumab (FASENRA) subcutaneous injection, Inject 1 mL (30 mg total) under the skin every 56 days for 6 doses, Disp: 1 Syringe, Rfl: 6    bimatoprost (LATISSE) 0.03 % ophthalmic solution, Take as directed, Disp: , Rfl:     Breo Ellipta 100-25 MCG/ACT inhaler, Inhale 1 puff daily Rinse mouth after use., Disp: 60 blister, Rfl: 11    Cholecalciferol 50 MCG (2000 UT) CAPS, Take by mouth daily, Disp: , Rfl:     cyanocobalamin (VITAMIN B-12) 1,000 mcg tablet, Take 1,000 mcg by mouth daily, Disp: , Rfl:     erythromycin with ethanol (EMGEL) 2 % gel, Apply topically 2 (two) times a day, Disp: 30 g, Rfl: 1    famotidine (PEPCID) 20 mg tablet, Take 20 mg by mouth 2 (two) times a day., Disp: , Rfl:     flecainide (TAMBOCOR) 100 mg tablet, take 1 tablet by mouth twice a day, Disp: 180 tablet, Rfl: 3    fluticasone (FLONASE) 50 mcg/act nasal spray, 2 sprays into each nostril 2 (two) times a day, Disp: 48 g, Rfl: 4    ipratropium (ATROVENT) 0.03 % nasal spray, 2 sprays into each nostril 3 (three) times a day as needed for rhinitis, Disp: 30 mL, Rfl: 3    levalbuterol (XOPENEX HFA) 45 mcg/act inhaler, Inhale 1-2 puffs every 4 (four) hours as needed for wheezing or shortness of breath, Disp: 45 g, Rfl: 0    levalbuterol (XOPENEX) 1.25 mg/0.5 mL nebulizer solution, Take 0.5 mL (1.25 mg total) by nebulization every 6 (six) hours as needed for wheezing, Disp: 15 mL, Rfl: 6    lidocaine (LMX) 4 % cream, Apply topically as needed for mild pain, Disp: 15 g, Rfl: 3    Magnesium 500 MG CAPS, Take by mouth, Disp: , Rfl:     metoprolol succinate (TOPROL-XL) 25 mg 24 hr tablet, Take 0.5 tablets (12.5 mg total) by mouth every evening, Disp: 45 tablet, Rfl: 3    montelukast (SINGULAIR) 10 mg tablet, Take 1 tablet (10 mg total) by mouth daily at bedtime, Disp: 90 tablet, Rfl: 0    ondansetron (ZOFRAN) 4 mg tablet, Take 1 tablet (4 mg total) by mouth every 8 (eight) hours as needed for nausea or vomiting, Disp: 20 tablet, Rfl: 0    rosuvastatin (CRESTOR) 5 mg tablet, Take 1 tablet (5 mg total) by mouth daily, Disp: 90 tablet, Rfl: 3    zolpidem (AMBIEN) 5 mg tablet, Take 1 tablet (5 mg total) by mouth daily at bedtime as needed for sleep, Disp: 30 tablet, Rfl: 2    EPINEPHrine (EPIPEN) 0.3 mg/0.3 mL SOAJ, Inject 0.3 mL (0.3 mg total) into a muscle once for 1 dose, Disp: 0.6 mL, Rfl: 0    Allergies   Allergen Reactions    Shellfish-Derived Products - Food Allergy Anaphylaxis    Wheat Bran - Food Allergy Anaphylaxis    Gluten Meal - Food Allergy GI Intolerance     Celiac     Morphine And Related Itching    Nuts - Food Allergy Hives     Almonds,walnuts, hazelnuts and other related nuts.      Sulfa Antibiotics Rash       Social History     Socioeconomic History    Marital status: /Civil Union     Spouse name: Not on file    Number of children: Not on file    Years of education: Not on file    Highest education level: Not on file   Occupational History    Occupation: X-ray technologist   Tobacco Use    Smoking status: Former     Packs/day: 0.75     Years: 20.00     Total pack years: 15.00     Types: Cigarettes     Start date: 48     Quit date: 2004     Years since quittin.8    Smokeless tobacco: Never   Vaping Use    Vaping Use: Never used   Substance and Sexual Activity    Alcohol use: Yes     Alcohol/week: 3.0 standard drinks of alcohol     Types: 3 Glasses of wine per week     Comment: soc    Drug use: No    Sexual activity: Yes     Partners: Male   Other Topics Concern    Not on file   Social History Narrative    Caffeine use    exercise walking      Social Determinants of Health     Financial Resource Strain: Not on file   Food Insecurity: Not on file   Transportation Needs: Not on file   Physical Activity: Not on file   Stress: Not on file   Social Connections: Not on file   Intimate Partner Violence: Not on file   Housing Stability: Not on file       Review of Systems     ROS:  Constitutional: Negative for fatigue and unexpected weight change. Respiratory: Negative for cough and shortness of breath. Cardiovascular: Negative for chest pain and palpitations. Gastrointestinal: Negative for abdominal pain and change in bowel habits  Breasts:  Negative, other than as noted above. Genitourinary: Negative, other than as noted above. Psychiatric: Negative for mood difficulties.       Objective        Vitals:    23 1425   BP: 136/88       Physical Examination:    Patient appears well and is not in distress  Neck is supple without masses, no cervical or supraclavicular lymphadenopathy  Cardiovascular: regular rate and rhythm; no murmurs  Lungs: clear to auscultation bilaterally; no wheezes  Breasts are symmetrical without mass, tenderness, nipple discharge, skin changes or adenopathy. Abdomen is soft and nontender without masses. External genitals are normal without lesions or rashes. Urethral meatus and urethra are normal  Bladder is normal to palpation  Vagina is normal without discharge or bleeding.    Cervix is surgically absent    Uterus is surgically absent  Adnexa are not palpable

## 2023-11-07 ENCOUNTER — OFFICE VISIT (OUTPATIENT)
Dept: OBGYN CLINIC | Facility: CLINIC | Age: 68
End: 2023-11-07
Payer: COMMERCIAL

## 2023-11-07 VITALS
WEIGHT: 151 LBS | HEART RATE: 78 BPM | BODY MASS INDEX: 25.78 KG/M2 | SYSTOLIC BLOOD PRESSURE: 137 MMHG | HEIGHT: 64 IN | RESPIRATION RATE: 16 BRPM | DIASTOLIC BLOOD PRESSURE: 90 MMHG

## 2023-11-07 DIAGNOSIS — R22.32 MASS OF SKIN OF LEFT SHOULDER: Primary | ICD-10-CM

## 2023-11-07 DIAGNOSIS — Z01.818 PRE-OP TESTING: ICD-10-CM

## 2023-11-07 PROCEDURE — 99214 OFFICE O/P EST MOD 30 MIN: CPT | Performed by: STUDENT IN AN ORGANIZED HEALTH CARE EDUCATION/TRAINING PROGRAM

## 2023-11-07 RX ORDER — CHLORHEXIDINE GLUCONATE 4 G/100ML
SOLUTION TOPICAL DAILY PRN
OUTPATIENT
Start: 2023-11-07

## 2023-11-07 RX ORDER — ACETAMINOPHEN 325 MG/1
975 TABLET ORAL ONCE
OUTPATIENT
Start: 2023-11-07 | End: 2023-11-07

## 2023-11-07 NOTE — PROGRESS NOTES
Orthopedic Oncology Surgery  Follow-Up Office Note  Danis Patel (50 y.o. female)  : 1955 Encounter Date: 2023  Dr. Peggy Angelo DO, Orthopedic Surgeon  Orthopedic Oncology & Sarcoma Surgery        Impression/Plan: Danis Patel is a 74 y.o. female with:    1. Mass of left anterior shoulder   - has been stable. Patient is ready for surgical excision and pathologic diagnosis    2. History of Follicular Lymphoma  - cont current Heme/onc management    #. Comorbidity, including hx of follicular lymphoma  - continue current management     Follow up: Day of surgery    Procedure:   No procedures performed    Surgical Planning:   Surgery Signup: The patient is indicated for surgical intervention with excision of mass of L shoulder. Risks and benefits of the treatment options and surgery were discussed in detail with the patient by Dr. Mariama Rodriguez. The risks of surgery including infection, bleeding, injury to nerves, injury to the vessels, excess scar tissue formation, risk of failure of the procedure, the possible need for further surgery, and potential risk of loss of limb and life. Specific risks to this procedure discussed, including reucrrence of mass, need for larger surgery, diagnosis of malignancy with subsequent larger surgery. After weighing all the treatment options available, the patient has opted for surgical intervention and informed consent was obtained. We will schedule the patient to be seen back postoperatively. Pre-op recommendations:   Hold anticoagulation therapy 5-7 days prior to surgery date  Hold vitamins/minerals/supplements/ NSAIDs 7 days prior to surgery to decrease bleeding risk. Hold diabetic medications morning of surgery. Hold Ace/Arbs/CCB day of surgery  OK to take rest of your medications morning of surgery with small sip of water including pain medication.   NPO night before surgery at midnight  Advised to discuss this with their prescribers as well.      ___________________________________________________________________    Subjective:     Gerald Morales is a 74 y.o. female with hx of follicular lymphoma. They present today for follow up of MRI and surgical planning     Denies any changes to size or pain to the area (no pain) since last seen. Patient is doing well overall. denies pain/issue. Patient has been able to tolerate daily normal activities without any issues or complaints since the last office visit. Patient Denies fevers, chills, numbness/tingling, injury/trauma, night sweats since the last office visit. 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 She underwent radiation therapy, resulting in complete remission. Review of Systems:   Allergies, medications, past medical/surgical/family/social history have been reviewed, with the following changes: none  Complete 12 system review performed and found to be negative except: except as per mentioned in HPI. Physical Examination:   Height: 5' 4" (162.6 cm)  Weight - Scale: 68.5 kg (151 lb)  BMI (Calculated): 25.9  BSA (Calculated - m2): 1.74 sq meters     Vitals:    11/07/23 1317   BP: 137/90   Pulse: 78   Resp: 16     Body mass index is 25.92 kg/m². General: alert and oriented;  well nourished/well developed; no apparent distress. Psychiatric: normal mood and affect  HEENT: NCAT. Head/neck: full range of motion. Lungs: breathing comfortably  ; equal symmetric chest expansion. Abdomen: soft, non-tender, non-distended.    Skin: warm; dry; without  lesions, rashes, petechiae or purpura; without  clubbing, cyanosis, edema  Extremity: left anterior shoulder              Inspection: without edema, without skin abnormalities throughout Palpation:  without  TTP              Range of motion of joints: full range of motion all extremities. Motor strength: WNL all extremities intact               Sensation: grossly intact to all extremities. Pulses: intact distal pulses. Lymphatics: no obvious lymphadenopathy  Gait: normal gait. IMAGING RESULTS, All images personally reviewed today by Dr. Soraya Ryder. Study: MRI w wo contrast left shoulder  Date: 9/27/23  Report: I have read and agree with the radiologist report. My impression is as follows: No discrete mass at the marked site of palpable abnormality. Partial-thickness (33%), interstitial, insertional tear of the superior subscapularis tendon with associated medial subluxation of the long head biceps tendon into the substance of the subscapularis tendon  Supraspinatus tendinosis. Referring provider: Omar Gr MD  Patient Care Team:  Omar Gr MD as PCP - General (Family Medicine)  Marcelle Leiva MD     Diagnoses and all orders for this visit:    Mass of skin of left shoulder  -     Case request operating room: anterior shouler- EXCISION BIOPSY TISSUE LESION/MASS UPPER EXTREMITY; Standing  -     Ambulatory referral to [facility]; Future  -     Comprehensive metabolic panel; Future  -     CBC and differential; Future  -     APTT; Future  -     Protime-INR; Future  -     Case request operating room: anterior shouler- EXCISION BIOPSY TISSUE LESION/MASS UPPER EXTREMITY    Pre-op testing  -     Ambulatory referral to [facility]; Future  -     Comprehensive metabolic panel; Future  -     CBC and differential; Future  -     APTT; Future  -     Protime-INR; Future    Other orders  -     Notify physician; Standing  -     Diet NPO; Sips with meds; Standing  -     Void on call to OR; Standing  -     Insert peripheral IV;  Standing  -     acetaminophen (TYLENOL) tablet 975 mg  -     ceFAZolin (ANCEF) 2,000 mg in dextrose 5 % 100 mL IVPB  - chlorhexidine (HIBICLENS) 4 % topical liquid      ____________________________________________________________________    30 minutes was spent in the coordination of care, reviewing of imaging and with the patient on the date of service    Scribe Attestation      I,:   am acting as a scribe while in the presence of the attending physician.:       I,:   personally performed the services described in this documentation    as scribed in my presence.:              Alin Díaz PA-C   11/7/2023 1:50 PM     Dr. Daniel Coelho DO, Orthopedic Surgeon  Orthopedic Oncology & Sarcoma Surgery   Phone:[phone] EPD:[phone]

## 2023-11-07 NOTE — H&P (VIEW-ONLY)
OT/PT orders received, however, per discussion with Dr. Knowles who is ordering provider, it was determined that given pt's h/o of frequent hospitalizations d/t ETOH with most recent being from 2/7 through 2/26/20 during which time both OT and PT followed that therapies will not be initiated this stay and out of bed activity can be deferred to nursing staff. During recent stay, OT completed comprehensive cognitive assessment at that time and patient eventually grew indep with ADLs and mobilities while awaiting acceptance at at a TCU or CD treatment facility. Eventually he was accepted in CD treatment program and was set to discharge but he then declined d/t it being a non-smoking facility and returned home. Given this recent visit it is likely that patient's baseline has not significantly changed and no skilled therapy indicated at this time.    Orthopedic Oncology Surgery  Follow-Up Office Note  Maximino Navarrete (88 y.o. female)  : 1955 Encounter Date: 2023  Dr. Yenny Ramirez DO, Orthopedic Surgeon  Orthopedic Oncology & Sarcoma Surgery        Impression/Plan: Maximino Navarrete is a 74 y.o. female with:    1. Mass of left anterior shoulder   - has been stable. Patient is ready for surgical excision and pathologic diagnosis    2. History of Follicular Lymphoma  - cont current Heme/onc management    #. Comorbidity, including hx of follicular lymphoma  - continue current management     Follow up: Day of surgery    Procedure:   No procedures performed    Surgical Planning:   Surgery Signup: The patient is indicated for surgical intervention with excision of mass of L shoulder. Risks and benefits of the treatment options and surgery were discussed in detail with the patient by Dr. Maru Mathias. The risks of surgery including infection, bleeding, injury to nerves, injury to the vessels, excess scar tissue formation, risk of failure of the procedure, the possible need for further surgery, and potential risk of loss of limb and life. Specific risks to this procedure discussed, including reucrrence of mass, need for larger surgery, diagnosis of malignancy with subsequent larger surgery. After weighing all the treatment options available, the patient has opted for surgical intervention and informed consent was obtained. We will schedule the patient to be seen back postoperatively. Pre-op recommendations:   Hold anticoagulation therapy 5-7 days prior to surgery date  Hold vitamins/minerals/supplements/ NSAIDs 7 days prior to surgery to decrease bleeding risk. Hold diabetic medications morning of surgery. Hold Ace/Arbs/CCB day of surgery  OK to take rest of your medications morning of surgery with small sip of water including pain medication.   NPO night before surgery at midnight  Advised to discuss this with their prescribers as well.      ___________________________________________________________________    Subjective:     Gerald Paige is a 74 y.o. female with hx of follicular lymphoma. They present today for follow up of MRI and surgical planning     Denies any changes to size or pain to the area (no pain) since last seen. Patient is doing well overall. denies pain/issue. Patient has been able to tolerate daily normal activities without any issues or complaints since the last office visit. Patient Denies fevers, chills, numbness/tingling, injury/trauma, night sweats since the last office visit. 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 She underwent radiation therapy, resulting in complete remission. Review of Systems:   Allergies, medications, past medical/surgical/family/social history have been reviewed, with the following changes: none  Complete 12 system review performed and found to be negative except: except as per mentioned in HPI. Physical Examination:   Height: 5' 4" (162.6 cm)  Weight - Scale: 68.5 kg (151 lb)  BMI (Calculated): 25.9  BSA (Calculated - m2): 1.74 sq meters     Vitals:    11/07/23 1317   BP: 137/90   Pulse: 78   Resp: 16     Body mass index is 25.92 kg/m². General: alert and oriented;  well nourished/well developed; no apparent distress. Psychiatric: normal mood and affect  HEENT: NCAT. Head/neck: full range of motion. Lungs: breathing comfortably  ; equal symmetric chest expansion. Abdomen: soft, non-tender, non-distended.    Skin: warm; dry; without  lesions, rashes, petechiae or purpura; without  clubbing, cyanosis, edema  Extremity: left anterior shoulder              Inspection: without edema, without skin abnormalities throughout Palpation:  without  TTP              Range of motion of joints: full range of motion all extremities. Motor strength: WNL all extremities intact               Sensation: grossly intact to all extremities. Pulses: intact distal pulses. Lymphatics: no obvious lymphadenopathy  Gait: normal gait. IMAGING RESULTS, All images personally reviewed today by Dr. Solange Carmona. Study: MRI w wo contrast left shoulder  Date: 9/27/23  Report: I have read and agree with the radiologist report. My impression is as follows: No discrete mass at the marked site of palpable abnormality. Partial-thickness (33%), interstitial, insertional tear of the superior subscapularis tendon with associated medial subluxation of the long head biceps tendon into the substance of the subscapularis tendon  Supraspinatus tendinosis. Referring provider: Loretta Nelson MD  Patient Care Team:  Loretta Nelson MD as PCP - General (Family Medicine)  Honorio Newberry MD     Diagnoses and all orders for this visit:    Mass of skin of left shoulder  -     Case request operating room: anterior shouler- EXCISION BIOPSY TISSUE LESION/MASS UPPER EXTREMITY; Standing  -     Ambulatory referral to [facility]; Future  -     Comprehensive metabolic panel; Future  -     CBC and differential; Future  -     APTT; Future  -     Protime-INR; Future  -     Case request operating room: anterior shouler- EXCISION BIOPSY TISSUE LESION/MASS UPPER EXTREMITY    Pre-op testing  -     Ambulatory referral to [facility]; Future  -     Comprehensive metabolic panel; Future  -     CBC and differential; Future  -     APTT; Future  -     Protime-INR; Future    Other orders  -     Notify physician; Standing  -     Diet NPO; Sips with meds; Standing  -     Void on call to OR; Standing  -     Insert peripheral IV;  Standing  -     acetaminophen (TYLENOL) tablet 975 mg  -     ceFAZolin (ANCEF) 2,000 mg in dextrose 5 % 100 mL IVPB  - chlorhexidine (HIBICLENS) 4 % topical liquid      ____________________________________________________________________    30 minutes was spent in the coordination of care, reviewing of imaging and with the patient on the date of service    Scribe Attestation      I,:   am acting as a scribe while in the presence of the attending physician.:       I,:   personally performed the services described in this documentation    as scribed in my presence.:              Ella Mane PA-C   11/7/2023 1:50 PM     Dr. Leela Alfred DO, Orthopedic Surgeon  Orthopedic Oncology & Sarcoma Surgery   Phone:[phone] BPO:[phone]

## 2023-11-08 ENCOUNTER — HOSPITAL ENCOUNTER (OUTPATIENT)
Dept: CT IMAGING | Facility: HOSPITAL | Age: 68
Discharge: HOME | End: 2023-11-08
Payer: COMMERCIAL

## 2023-11-08 ENCOUNTER — PREP FOR PROCEDURE (OUTPATIENT)
Dept: PAIN MEDICINE | Facility: CLINIC | Age: 68
End: 2023-11-08

## 2023-11-08 ENCOUNTER — TELEPHONE (OUTPATIENT)
Dept: OBGYN CLINIC | Facility: CLINIC | Age: 68
End: 2023-11-08

## 2023-11-08 DIAGNOSIS — J32.8 OTHER CHRONIC SINUSITIS: ICD-10-CM

## 2023-11-08 DIAGNOSIS — R44.2 PHANTOSMIA: ICD-10-CM

## 2023-11-08 PROCEDURE — G1004 CDSM NDSC: HCPCS

## 2023-11-08 PROCEDURE — 70486 CT MAXILLOFACIAL W/O DYE: CPT

## 2023-11-15 NOTE — TELEPHONE ENCOUNTER
Patient has more questions for [facility] # [phone]

## 2023-11-21 ENCOUNTER — APPOINTMENT (OUTPATIENT)
Dept: LAB | Facility: CLINIC | Age: 68
End: 2023-11-21
Payer: COMMERCIAL

## 2023-11-21 DIAGNOSIS — R22.32 MASS OF SKIN OF LEFT SHOULDER: ICD-10-CM

## 2023-11-21 DIAGNOSIS — G47.00 INSOMNIA, UNSPECIFIED TYPE: ICD-10-CM

## 2023-11-21 DIAGNOSIS — Z01.818 PRE-OP TESTING: ICD-10-CM

## 2023-11-21 LAB
ALBUMIN SERPL BCP-MCNC: 4.2 G/DL (ref 3.5–5)
ALP SERPL-CCNC: 55 U/L (ref 34–104)
ALT SERPL W P-5'-P-CCNC: 33 U/L (ref 7–52)
ANION GAP SERPL CALCULATED.3IONS-SCNC: 10 MMOL/L
APTT PPP: 29 SECONDS (ref 23–37)
AST SERPL W P-5'-P-CCNC: 31 U/L (ref 13–39)
BASOPHILS # BLD AUTO: 0.02 THOUSANDS/ÂΜL (ref 0–0.1)
BASOPHILS NFR BLD AUTO: 0 % (ref 0–1)
BILIRUB SERPL-MCNC: 0.65 MG/DL (ref 0.2–1)
BUN SERPL-MCNC: 12 MG/DL (ref 5–25)
CALCIUM SERPL-MCNC: 9.4 MG/DL (ref 8.4–10.2)
CHLORIDE SERPL-SCNC: 103 MMOL/L (ref 96–108)
CO2 SERPL-SCNC: 26 MMOL/L (ref 21–32)
CREAT SERPL-MCNC: 0.76 MG/DL (ref 0.6–1.3)
EOSINOPHIL # BLD AUTO: 0 THOUSAND/ÂΜL (ref 0–0.61)
EOSINOPHIL NFR BLD AUTO: 0 % (ref 0–6)
ERYTHROCYTE [DISTWIDTH] IN BLOOD BY AUTOMATED COUNT: 12.9 % (ref 11.6–15.1)
GFR SERPL CREATININE-BSD FRML MDRD: 82 ML/MIN/1.73SQ M
GLUCOSE P FAST SERPL-MCNC: 115 MG/DL (ref 65–99)
HCT VFR BLD AUTO: 41.5 % (ref 34.8–46.1)
HGB BLD-MCNC: 13.5 G/DL (ref 11.5–15.4)
IMM GRANULOCYTES # BLD AUTO: 0.02 THOUSAND/UL (ref 0–0.2)
IMM GRANULOCYTES NFR BLD AUTO: 0 % (ref 0–2)
INR PPP: 1.06 (ref 0.84–1.19)
LYMPHOCYTES # BLD AUTO: 1.6 THOUSANDS/ÂΜL (ref 0.6–4.47)
LYMPHOCYTES NFR BLD AUTO: 23 % (ref 14–44)
MCH RBC QN AUTO: 30.7 PG (ref 26.8–34.3)
MCHC RBC AUTO-ENTMCNC: 32.5 G/DL (ref 31.4–37.4)
MCV RBC AUTO: 94 FL (ref 82–98)
MONOCYTES # BLD AUTO: 0.46 THOUSAND/ÂΜL (ref 0.17–1.22)
MONOCYTES NFR BLD AUTO: 7 % (ref 4–12)
NEUTROPHILS # BLD AUTO: 5.01 THOUSANDS/ÂΜL (ref 1.85–7.62)
NEUTS SEG NFR BLD AUTO: 70 % (ref 43–75)
NRBC BLD AUTO-RTO: 0 /100 WBCS
PLATELET # BLD AUTO: 190 THOUSANDS/UL (ref 149–390)
PMV BLD AUTO: 11.5 FL (ref 8.9–12.7)
POTASSIUM SERPL-SCNC: 4.3 MMOL/L (ref 3.5–5.3)
PROT SERPL-MCNC: 6.7 G/DL (ref 6.4–8.4)
PROTHROMBIN TIME: 13.7 SECONDS (ref 11.6–14.5)
RBC # BLD AUTO: 4.4 MILLION/UL (ref 3.81–5.12)
SODIUM SERPL-SCNC: 139 MMOL/L (ref 135–147)
WBC # BLD AUTO: 7.11 THOUSAND/UL (ref 4.31–10.16)

## 2023-11-21 PROCEDURE — 85025 COMPLETE CBC W/AUTO DIFF WBC: CPT

## 2023-11-21 PROCEDURE — 85730 THROMBOPLASTIN TIME PARTIAL: CPT

## 2023-11-21 PROCEDURE — 36415 COLL VENOUS BLD VENIPUNCTURE: CPT

## 2023-11-21 PROCEDURE — 85610 PROTHROMBIN TIME: CPT

## 2023-11-21 PROCEDURE — 80053 COMPREHEN METABOLIC PANEL: CPT

## 2023-11-21 RX ORDER — ZOLPIDEM TARTRATE 5 MG/1
5 TABLET ORAL
Qty: 30 TABLET | Refills: 2 | Status: SHIPPED | OUTPATIENT
Start: 2023-11-21

## 2023-11-22 NOTE — PRE-PROCEDURE INSTRUCTIONS
Pre-Surgery Instructions:   Medication Instructions    acetaminophen (TYLENOL) 500 mg tablet Take Day of Surgery; Continue to take as prescribed including DOS with a small sip of water, unless usually taken at night     bimatoprost (LATISSE) 0.03 % ophthalmic solution Do not take day of surgery; continue as prescribed excluding DOS     Breo Ellipta 100-25 MCG/ACT inhaler Continue as prescribed    Cholecalciferol 50 MCG (2000 UT) CAPS Avoid 1 week prior to surgery      cyanocobalamin (VITAMIN B-12) 1,000 mcg tablet Avoid 1 week prior to surgery      erythromycin with ethanol (EMGEL) 2 % gel Do not take day of surgery; continue as prescribed excluding DOS     famotidine (PEPCID) 20 mg tablet Take Day of Surgery; Continue to take as prescribed including DOS with a small sip of water, unless usually taken at night     flecainide (TAMBOCOR) 100 mg tablet Take Day of Surgery; Continue to take as prescribed including DOS with a small sip of water, unless usually taken at night     fluticasone (FLONASE) 50 mcg/act nasal spray Continue as prescribed     ipratropium (ATROVENT) 0.03 % nasal spray Continue as prescribed    levalbuterol (XOPENEX HFA) 45 mcg/act inhaler Continue as prescribed    lidocaine (LMX) 4 % cream Do not take day of surgery; continue as prescribed excluding DOS     Magnesium 500 MG CAPS Avoid 1 week prior to surgery      metoprolol succinate (TOPROL-XL) 25 mg 24 hr tablet Take Day of Surgery; Continue to take as prescribed including DOS with a small sip of water, unless usually taken at night     ondansetron (ZOFRAN) 4 mg tablet Take Day of Surgery; Continue to take as prescribed including DOS with a small sip of water, unless usually taken at night     rosuvastatin (CRESTOR) 5 mg tablet Take Day of Surgery; Continue to take as prescribed including DOS with a small sip of water, unless usually taken at night     zolpidem (AMBIEN) 5 mg tablet Do not take day of surgery; continue as prescribed excluding DOS Medication instructions for day surgery reviewed. Please use only a sip of water to take your instructed medications. Avoid all over the counter vitamins, supplements and NSAIDS for one week prior to surgery per anesthesia guidelines. Tylenol is ok to take as needed. You will receive a call one business day prior to surgery with an arrival time and hospital directions. If your surgery is scheduled on a Monday, the hospital will be calling you on the Friday prior to your surgery. If you have not heard from anyone by 8pm, please call the hospital supervisor through the hospital  at [phone]. Reddy Pena [phone]). Do not eat or drink anything after midnight the night before your surgery, including candy, mints, lifesavers, or chewing gum. Do not drink alcohol 24hrs before your surgery. Try not to smoke at least 24hrs before your surgery. Follow the pre surgery showering instructions as listed in the [facility] Surgical Experience Booklet” or otherwise provided by your surgeon's office. Do not use a blade to shave the surgical area 1 week before surgery. It is okay to use a clean electric clippers up to 24 hours before surgery. Do not apply any lotions, creams, including makeup, cologne, deodorant, or perfumes after showering on the day of your surgery. Do not use dry shampoo, hair spray, hair gel, or any type of hair products. No contact lenses, eye make-up, or artificial eyelashes. Remove nail polish, including gel polish, and any artificial, gel, or acrylic nails if possible. Remove all jewelry including rings and body piercing jewelry. Wear causal clothing that is easy to take on and off. Consider your type of surgery. Keep any valuables, jewelry, piercings at home. Please bring any specially ordered equipment (sling, braces) if indicated. Arrange for a responsible person to drive you to and from the hospital on the day of your surgery. Visitor Guidelines discussed.      Call the surgeon's office with any new illnesses, exposures, or additional questions prior to surgery. Please reference your [facility] Surgical Experience Booklet” for additional information to prepare for your upcoming surgery.

## 2023-11-26 ENCOUNTER — ANESTHESIA EVENT (OUTPATIENT)
Dept: PERIOP | Facility: HOSPITAL | Age: 68
End: 2023-11-26
Payer: COMMERCIAL

## 2023-11-27 ENCOUNTER — HOSPITAL ENCOUNTER (OUTPATIENT)
Facility: HOSPITAL | Age: 68
Setting detail: OUTPATIENT SURGERY
Discharge: HOME | End: 2023-11-27
Attending: STUDENT IN AN ORGANIZED HEALTH CARE EDUCATION/TRAINING PROGRAM | Admitting: STUDENT IN AN ORGANIZED HEALTH CARE EDUCATION/TRAINING PROGRAM
Payer: COMMERCIAL

## 2023-11-27 ENCOUNTER — ANESTHESIA (OUTPATIENT)
Dept: PERIOP | Facility: HOSPITAL | Age: 68
End: 2023-11-27
Payer: COMMERCIAL

## 2023-11-27 VITALS
DIASTOLIC BLOOD PRESSURE: 68 MMHG | SYSTOLIC BLOOD PRESSURE: 121 MMHG | BODY MASS INDEX: 24.99 KG/M2 | HEIGHT: 65 IN | TEMPERATURE: 97.4 F | HEART RATE: 66 BPM | OXYGEN SATURATION: 95 % | RESPIRATION RATE: 20 BRPM | WEIGHT: 150 LBS

## 2023-11-27 DIAGNOSIS — Z47.89 AFTERCARE FOLLOWING SURGERY OF THE MUSCULOSKELETAL SYSTEM: Primary | ICD-10-CM

## 2023-11-27 DIAGNOSIS — R22.32 MASS OF SKIN OF LEFT SHOULDER: ICD-10-CM

## 2023-11-27 PROCEDURE — 88307 TISSUE EXAM BY PATHOLOGIST: CPT | Performed by: SPECIALIST

## 2023-11-27 PROCEDURE — 23076 EXC SHOULDER TUM DEEP < 5 CM: CPT

## 2023-11-27 PROCEDURE — 23076 EXC SHOULDER TUM DEEP < 5 CM: CPT | Performed by: STUDENT IN AN ORGANIZED HEALTH CARE EDUCATION/TRAINING PROGRAM

## 2023-11-27 RX ORDER — MAGNESIUM HYDROXIDE 1200 MG/15ML
LIQUID ORAL AS NEEDED
Status: DISCONTINUED | OUTPATIENT
Start: 2023-11-27 | End: 2023-11-27 | Stop reason: HOSPADM

## 2023-11-27 RX ORDER — SODIUM CHLORIDE, SODIUM LACTATE, POTASSIUM CHLORIDE, CALCIUM CHLORIDE 600; 310; 30; 20 MG/100ML; MG/100ML; MG/100ML; MG/100ML
125 INJECTION, SOLUTION INTRAVENOUS CONTINUOUS
Status: DISCONTINUED | OUTPATIENT
Start: 2023-11-27 | End: 2023-11-27 | Stop reason: HOSPADM

## 2023-11-27 RX ORDER — PROPOFOL 10 MG/ML
INJECTION, EMULSION INTRAVENOUS AS NEEDED
Status: DISCONTINUED | OUTPATIENT
Start: 2023-11-27 | End: 2023-11-27

## 2023-11-27 RX ORDER — ACETAMINOPHEN 500 MG
1000 TABLET ORAL EVERY 8 HOURS
Qty: 60 TABLET | Refills: 0 | Status: SHIPPED | OUTPATIENT
Start: 2023-11-27 | End: 2023-12-07

## 2023-11-27 RX ORDER — CEFAZOLIN SODIUM 2 G/50ML
2000 SOLUTION INTRAVENOUS ONCE
Status: COMPLETED | OUTPATIENT
Start: 2023-11-27 | End: 2023-11-27

## 2023-11-27 RX ORDER — GLYCOPYRROLATE 0.2 MG/ML
INJECTION INTRAMUSCULAR; INTRAVENOUS AS NEEDED
Status: DISCONTINUED | OUTPATIENT
Start: 2023-11-27 | End: 2023-11-27

## 2023-11-27 RX ORDER — DEXAMETHASONE SODIUM PHOSPHATE 10 MG/ML
INJECTION, SOLUTION INTRAMUSCULAR; INTRAVENOUS AS NEEDED
Status: DISCONTINUED | OUTPATIENT
Start: 2023-11-27 | End: 2023-11-27 | Stop reason: HOSPADM

## 2023-11-27 RX ORDER — TRAMADOL HYDROCHLORIDE 50 MG/1
50 TABLET ORAL EVERY 6 HOURS SCHEDULED
Status: CANCELLED | OUTPATIENT
Start: 2023-11-27

## 2023-11-27 RX ORDER — BUPIVACAINE HYDROCHLORIDE 2.5 MG/ML
INJECTION, SOLUTION EPIDURAL; INFILTRATION; INTRACAUDAL AS NEEDED
Status: DISCONTINUED | OUTPATIENT
Start: 2023-11-27 | End: 2023-11-27 | Stop reason: HOSPADM

## 2023-11-27 RX ORDER — ACETAMINOPHEN 325 MG/1
650 TABLET ORAL EVERY 6 HOURS PRN
Status: CANCELLED | OUTPATIENT
Start: 2023-11-27

## 2023-11-27 RX ORDER — ONDANSETRON 2 MG/ML
4 INJECTION INTRAMUSCULAR; INTRAVENOUS EVERY 6 HOURS PRN
Status: CANCELLED | OUTPATIENT
Start: 2023-11-27

## 2023-11-27 RX ORDER — METOCLOPRAMIDE HYDROCHLORIDE 5 MG/ML
10 INJECTION INTRAMUSCULAR; INTRAVENOUS ONCE AS NEEDED
Status: DISCONTINUED | OUTPATIENT
Start: 2023-11-27 | End: 2023-11-27 | Stop reason: HOSPADM

## 2023-11-27 RX ORDER — FENTANYL CITRATE 50 UG/ML
INJECTION, SOLUTION INTRAMUSCULAR; INTRAVENOUS AS NEEDED
Status: DISCONTINUED | OUTPATIENT
Start: 2023-11-27 | End: 2023-11-27

## 2023-11-27 RX ORDER — KETOROLAC TROMETHAMINE 30 MG/ML
INJECTION, SOLUTION INTRAMUSCULAR; INTRAVENOUS AS NEEDED
Status: DISCONTINUED | OUTPATIENT
Start: 2023-11-27 | End: 2023-11-27 | Stop reason: HOSPADM

## 2023-11-27 RX ORDER — HYDROMORPHONE HCL/PF 1 MG/ML
0.5 SYRINGE (ML) INJECTION
Status: DISCONTINUED | OUTPATIENT
Start: 2023-11-27 | End: 2023-11-27 | Stop reason: HOSPADM

## 2023-11-27 RX ORDER — LIDOCAINE HYDROCHLORIDE AND EPINEPHRINE 10; 10 MG/ML; UG/ML
INJECTION, SOLUTION INFILTRATION; PERINEURAL AS NEEDED
Status: DISCONTINUED | OUTPATIENT
Start: 2023-11-27 | End: 2023-11-27 | Stop reason: HOSPADM

## 2023-11-27 RX ORDER — SENNA AND DOCUSATE SODIUM 50; 8.6 MG/1; MG/1
1 TABLET, FILM COATED ORAL DAILY
Qty: 14 TABLET | Refills: 0 | Status: SHIPPED | OUTPATIENT
Start: 2023-11-27 | End: 2023-12-11

## 2023-11-27 RX ORDER — ACETAMINOPHEN 325 MG/1
975 TABLET ORAL ONCE
Status: COMPLETED | OUTPATIENT
Start: 2023-11-27 | End: 2023-11-27

## 2023-11-27 RX ORDER — ONDANSETRON 4 MG/1
4 TABLET, ORALLY DISINTEGRATING ORAL EVERY 6 HOURS PRN
Qty: 15 TABLET | Refills: 0 | Status: SHIPPED | OUTPATIENT
Start: 2023-11-27

## 2023-11-27 RX ORDER — MIDAZOLAM HYDROCHLORIDE 2 MG/2ML
INJECTION, SOLUTION INTRAMUSCULAR; INTRAVENOUS AS NEEDED
Status: DISCONTINUED | OUTPATIENT
Start: 2023-11-27 | End: 2023-11-27

## 2023-11-27 RX ORDER — FENTANYL CITRATE/PF 50 MCG/ML
50 SYRINGE (ML) INJECTION
Status: DISCONTINUED | OUTPATIENT
Start: 2023-11-27 | End: 2023-11-27 | Stop reason: HOSPADM

## 2023-11-27 RX ORDER — SODIUM CHLORIDE, SODIUM LACTATE, POTASSIUM CHLORIDE, CALCIUM CHLORIDE 600; 310; 30; 20 MG/100ML; MG/100ML; MG/100ML; MG/100ML
INJECTION, SOLUTION INTRAVENOUS CONTINUOUS PRN
Status: DISCONTINUED | OUTPATIENT
Start: 2023-11-27 | End: 2023-11-27

## 2023-11-27 RX ORDER — CHLORHEXIDINE GLUCONATE 4 G/100ML
SOLUTION TOPICAL DAILY PRN
Status: DISCONTINUED | OUTPATIENT
Start: 2023-11-27 | End: 2023-11-27 | Stop reason: HOSPADM

## 2023-11-27 RX ORDER — TRAMADOL HYDROCHLORIDE 50 MG/1
50 TABLET ORAL EVERY 6 HOURS PRN
Qty: 40 TABLET | Refills: 0 | Status: SHIPPED | OUTPATIENT
Start: 2023-11-27 | End: 2023-12-07

## 2023-11-27 RX ORDER — ONDANSETRON 2 MG/ML
INJECTION INTRAMUSCULAR; INTRAVENOUS AS NEEDED
Status: DISCONTINUED | OUTPATIENT
Start: 2023-11-27 | End: 2023-11-27

## 2023-11-27 RX ADMIN — CEFAZOLIN SODIUM 2000 MG: 2 SOLUTION INTRAVENOUS at 07:29

## 2023-11-27 RX ADMIN — FENTANYL CITRATE 50 MCG: 50 INJECTION, SOLUTION INTRAMUSCULAR; INTRAVENOUS at 07:36

## 2023-11-27 RX ADMIN — SODIUM CHLORIDE, SODIUM LACTATE, POTASSIUM CHLORIDE, AND CALCIUM CHLORIDE: .6; .31; .03; .02 INJECTION, SOLUTION INTRAVENOUS at 07:28

## 2023-11-27 RX ADMIN — PROPOFOL 200 MG: 10 INJECTION, EMULSION INTRAVENOUS at 07:33

## 2023-11-27 RX ADMIN — ONDANSETRON HYDROCHLORIDE 4 MG: 2 INJECTION, SOLUTION INTRAVENOUS at 07:59

## 2023-11-27 RX ADMIN — FENTANYL CITRATE 50 MCG: 50 INJECTION, SOLUTION INTRAMUSCULAR; INTRAVENOUS at 07:32

## 2023-11-27 RX ADMIN — SODIUM CHLORIDE, SODIUM LACTATE, POTASSIUM CHLORIDE, AND CALCIUM CHLORIDE 125 ML/HR: .6; .31; .03; .02 INJECTION, SOLUTION INTRAVENOUS at 07:03

## 2023-11-27 RX ADMIN — ACETAMINOPHEN 975 MG: 325 TABLET, FILM COATED ORAL at 07:04

## 2023-11-27 RX ADMIN — GLYCOPYRROLATE 0.2 MG: 0.2 INJECTION INTRAMUSCULAR; INTRAVENOUS at 07:33

## 2023-11-27 RX ADMIN — MIDAZOLAM HYDROCHLORIDE 2 MG: 1 INJECTION, SOLUTION INTRAMUSCULAR; INTRAVENOUS at 07:33

## 2023-11-27 NOTE — DISCHARGE INSTR - AVS FIRST PAGE
Post Operative Instructions    You have had surgery on your arm today, please read and follow the information below:  Do not apply any cream/ointment/oil to your incisions including antibiotics. Do not soak your hands in standing water (dishwater, tubs, Jacuzzi's, pools, etc.) until given permission (typically 2-3 weeks after injury)    Call the office if you notice any:  Increased numbness or tingling of your hand or fingers that is not relieved with elevation. Increasing pain that is not controlled with medication. Difficulty chewing, breathing, swallowing. Chest pains or shortness of breath. Fever over 101.4 degrees. Bandage: Do NOT remove bandage until follow-up appointment. Motion: Move fingers into a fist 5 times a day, DO NOT move any splinted fingers. Weight bearing status: Avoid heavy lifting with the extremity that was operated on until follow up appointment. Normal activities of daily living are OK. Ice: Ice for 10 minutes every hour as needed for swelling x 24 hours. Sling: No sling necessary. Medications: Take as needed, take as prescribed     Follow-up Appointment: 10-14 days. Please call the office if you have any questions or concerns regarding your post-operative care.

## 2023-11-27 NOTE — OP NOTE
OPERATIVE REPORT    PATIENT NAME: Henrry Falcon   :  1955  MRN: [id number]  Pt Location: [facility] OR ROOM 01    SURGERY DATE: 2023    SURGEON(S) and ROLE:  Primary: Jo-Ann Davidson DO  Assisting: SABRINA Wetzel    NOTE:  The presence of a physician assistant was necessary to help with patient positioning, surgical exposure, wound retraction, wound closure, and other key portions of the procedure. No qualified resident was available for this case. PREOPERATIVE DIAGNOSES:  Left shoulder mass    POSTOPERATIVE DIAGNOSES:  Left shoulder mass    PROCEDURES:  Left excision of 2x2 deep shoulder mass within deltoid muscle      ANESTHESIA TYPE:  General LMA    ANESTHESIA STAFF:   Anesthesiologist: Cindy Patino MD  CRNA: Monica Mosley CRNA    ESTIMATED BLOOD LOSS:  0 mL    TOURNIQUET TIME:  none    PERIOPERATIVE ANTIBIOTICS:  cefazolin, 1 gram    IMPLANTS:  none    * No implants in log *    SPECIMENS:    ID Type Source Tests Collected by Time Destination   1 : left arm mass Tissue Mass TISSUE EXAM Jo-Ann Davidson DO 2023 0806        DRAINS:  None      OPERATIVE INDICATIONS:  The patient is a 79 y.o. female with left shoulder mass. Surgical treatment was indicated due to persistent symptoms despite non-surgical treatment and previous history of lymphoma. Patient noticed this lump on her shoulder, and we reached a mutual decision to proceed with removing the mass In order to study it full via pathology . After a thorough discussion of the potential risks, benefits, and alternative treatments, the patient agreed to proceed with surgery. The patient understands that the risks of surgery include, but are not limited to: infection, neurovascular injury, wound healing complications, venous thromboembolism, persistent pain, stiffness, instability, recurrence of symptoms, recurrence of tumor, malignant diagnosis, potential need for additional surgeries, and loss of limb or life.   Oral and written consent for surgery was obtained from the patient preoperatively. PROCEDURE AND TECHNIQUE:  On the day of surgery, the patient was identified by myself in the preoperative holding area. The operative site, left,  was marked by the surgeon as the proper operative extremity. She was taken to the operating room where she was transferred operating room table, she placed in the supine position with all bony prominences well-padded. The patient was anesthetized, intubated and sedated in the standard manner and perioperative antibiotics, ancef, were administered. The operative site was prepped and draped using standard sterile technique. A time-out was conducted to confirm the patient's identity, identifying all team members in the room, the operative site, and the proposed procedure. Approximately 4cm incision was made over the anterior deltoid in the area of the mass after injecting lidocaine with epinephrine and the surgical site. Skin was sharply incised down to the deep subcutaneous tissue. Subcutaneous skin flaps were made both anteriorly and posteriorly. The mass was deep to this layer, within the deltoid musculature. mass was bluntly dissected away from the deep underlying tissue with metzenbaum scissors. The mass removed, it was invested with deltoid musculature. Mass was soft and mobile, with fatty  components. Measured on the back table to be approximate 2cm x 2cm. Blunt palpation was performed there is no leftover masslike tissue, only normal muscular tissue within the area. Wound was irrigated with normal saline, cocktail injection was then placed within the deep and subcutaneous tissue. Subcutaneous tissue was closed with 2-0 Monocryl. Skin was closed with a running 3-0 Monocryl. Skin was cleansed and dried, skin glue was placed, Mepilex dressing was placed. I was present for the entire procedure. No qualified resident was available to assist with this case.  and A physician assistant was required during the procedure for retraction, tissue handling, dissection and suturing.     COMPLICATIONS:  None    PATIENT DISPOSITION:  PACU  and extubated and stable    Plan:   WBAT to operative extremity  Return to activities of daily living  ROM: no restrictions  PT/OT  Anticoagulation: Return to home anticoagulation  Keep mepilex dressing in place until follow up in 10-14 days  Multimodal pain control    SIGNATURE:  Matteo Armendariz DO  DATE:  November 27, 2023  TIME:  8:19 AM

## 2023-11-27 NOTE — INTERVAL H&P NOTE
H&P reviewed. After examining the patient I find no changes in the patients condition since the H&P had been written.     Vitals:    11/27/23 0654   BP: 152/81   Pulse: 72   Resp: 20   Temp: (!) 97.1 °F (36.2 °C)   SpO2: 100%

## 2023-11-27 NOTE — ANESTHESIA POSTPROCEDURE EVALUATION
Post-Op Assessment Note    CV Status:  Stable  Pain Score: 0    Pain management: adequate       Mental Status:  Alert and awake   Hydration Status:  Euvolemic   PONV Controlled:  Controlled   Airway Patency:  Patent     Post Op Vitals Reviewed: Yes    No anethesia notable event occurred.     Staff: CRNA               BP   100/56   Temp 97   Pulse 70   Resp 12   SpO2 99

## 2023-11-27 NOTE — ANESTHESIA PREPROCEDURE EVALUATION
Procedure:  anterior shouler- EXCISION BIOPSY TISSUE LESION/MASS UPPER EXTREMITY (Left: Shoulder)    Relevant Problems   CARDIO   (+) Hyperlipidemia   (+) PVC's (premature ventricular contractions)      GI/HEPATIC   (+) Chronic GERD      HEMATOLOGY   (+) Follicular lymphoma (HCC)      MUSCULOSKELETAL   (+) Back pain   (+) Sciatica associated with disorder of lumbar spine      PULMONARY   (+) Severe persistent asthma without complication        Physical Exam    Airway    Mallampati score: I  TM Distance: >3 FB  Neck ROM: full     Dental   No notable dental hx     Cardiovascular      Pulmonary      Other Findings  post-pubertal.      Anesthesia Plan  ASA Score- 2     Anesthesia Type- general with ASA Monitors. Additional Monitors:     Airway Plan: LMA. Plan Factors-Exercise tolerance (METS): >4 METS. Chart reviewed. EKG reviewed. Existing labs reviewed. Patient summary reviewed. Patient is not a current smoker. There is medical exclusion for perioperative obstructive sleep apnea risk education. Induction- intravenous. Postoperative Plan- Plan for postoperative opioid use. Informed Consent- Anesthetic plan and risks discussed with patient. I personally reviewed this patient with the CRNA. Discussed and agreed on the Anesthesia Plan with the CRNA. Roxana Mancera

## 2023-11-29 ENCOUNTER — HOSPITAL ENCOUNTER (OUTPATIENT)
Dept: INFUSION CENTER | Facility: HOSPITAL | Age: 68
End: 2023-11-29
Attending: INTERNAL MEDICINE

## 2023-12-01 ENCOUNTER — HOSPITAL ENCOUNTER (OUTPATIENT)
Dept: INFUSION CENTER | Facility: HOSPITAL | Age: 68
End: 2023-12-01
Attending: INTERNAL MEDICINE
Payer: COMMERCIAL

## 2023-12-01 VITALS
RESPIRATION RATE: 19 BRPM | TEMPERATURE: 97.1 F | SYSTOLIC BLOOD PRESSURE: 116 MMHG | HEART RATE: 74 BPM | DIASTOLIC BLOOD PRESSURE: 77 MMHG

## 2023-12-01 DIAGNOSIS — D72.119 HYPEREOSINOPHILIC SYNDROME, UNSPECIFIED TYPE: Primary | ICD-10-CM

## 2023-12-01 DIAGNOSIS — J45.50 SEVERE PERSISTENT ASTHMA WITHOUT COMPLICATION: ICD-10-CM

## 2023-12-01 PROCEDURE — 96372 THER/PROPH/DIAG INJ SC/IM: CPT

## 2023-12-01 PROCEDURE — 88307 TISSUE EXAM BY PATHOLOGIST: CPT | Performed by: SPECIALIST

## 2023-12-01 RX ORDER — EPINEPHRINE 1 MG/ML
0.3 INJECTION, SOLUTION, CONCENTRATE INTRAVENOUS ONCE
Status: DISCONTINUED | OUTPATIENT
Start: 2023-12-01 | End: 2023-12-05 | Stop reason: HOSPADM

## 2023-12-01 RX ORDER — EPINEPHRINE 1 MG/ML
0.3 INJECTION, SOLUTION, CONCENTRATE INTRAVENOUS ONCE
OUTPATIENT
Start: 2024-01-24 | End: 2024-01-24

## 2023-12-01 RX ADMIN — BENRALIZUMAB 30 MG: 30 INJECTION, SOLUTION SUBCUTANEOUS at 11:06

## 2023-12-01 NOTE — PROGRESS NOTES
Patient denies any recent s/s of infection or antibiotic use. Epipen expiration date 01/2024. Patient tolerated Fasenra injection to right upper arm without incident. Patient completed 30 minute observation time post injection. Next appointment made with patient, AVS declined. Patient ambulated off unit in stable condition.

## 2023-12-12 ENCOUNTER — OFFICE VISIT (OUTPATIENT)
Dept: OBGYN CLINIC | Facility: CLINIC | Age: 68
End: 2023-12-12

## 2023-12-12 VITALS
RESPIRATION RATE: 16 BRPM | SYSTOLIC BLOOD PRESSURE: 113 MMHG | DIASTOLIC BLOOD PRESSURE: 86 MMHG | HEART RATE: 93 BPM | WEIGHT: 150 LBS | HEIGHT: 65 IN | BODY MASS INDEX: 24.99 KG/M2

## 2023-12-12 DIAGNOSIS — D17.22 LIPOMA OF LEFT UPPER EXTREMITY: Primary | ICD-10-CM

## 2023-12-12 PROCEDURE — 99024 POSTOP FOLLOW-UP VISIT: CPT | Performed by: STUDENT IN AN ORGANIZED HEALTH CARE EDUCATION/TRAINING PROGRAM

## 2023-12-12 NOTE — PROGRESS NOTES
Orthopedic Oncology Post Operative Office Note  Marli Wilkins (81 y.o. female)   : 1955   MRN: [id number]   Encounter Date: 2023  Dr. Jama Hinton DO, Orthopedic Surgeon  Orthopedic Oncology & Sarcoma Surgery   DOS: 2023  Procedure performed: anterior shouler- EXCISION BIOPSY TISSUE LESION/MASS UPPER EXTREMITY - Left      Impression/Plan: 79 y.o. female with a history of follicular lymphoma with mass of anterior left shoulder  2 weeks s/p anterior shouler- EXCISION BIOPSY TISSUE LESION/MASS UPPER EXTREMITY - Left   Final pathology consistent with intramuscular lipoma. S/p Excision of mass of left shoulder    Wound Care   Continue current care., OK to shower. , Dab dry with towel., No soaking or bathing for another 2 weeks. Pain control: PRN  Continue ice packs/elevation  Can continue compression with ACE wrap or compression stockings  Physical therapy: Not indicated at this time  WBAT to LUE  Possible allergic reaction to glue; OK to put steroid cream as needed      Return in about 4 weeks (around 2024). for evaluation without x-ray    2. History of follicular lymphoma  -discussed pathology confirming this lesion as lipoma    3. Left shoulder pain  - OK for CSI in 4 weeks        Subjective:  79 y.o. female 2 weeks s/p anterior shouler- EXCISION BIOPSY TISSUE LESION/MASS UPPER EXTREMITY - Left  DOS: anterior shouler- EXCISION BIOPSY TISSUE LESION/MASS UPPER EXTREMITY - Left     Pain/Complaints: none, other than noted redness surrounding surgical glue   Numbness/weakness extremity: N/A    Physical Therapy Progress: Not indicated at this time     Eating/Drinking improving. Bowel/Bladder:  WNL   NO noted fever/chills, numbness/tingling, injury/trauma, night sweats    Physical Exam:  Height: 5' 5" (165.1 cm)  Weight - Scale: 68 kg (150 lb)  BMI (Calculated): 25  BSA (Calculated - m2): 1.75 sq meters     Vitals:    23 1124   BP: 113/86   Pulse: 93   Resp: 16     Body mass index is 24.96 kg/m². General: alert and oriented; well nourished/well developed; no apparent distress. Extremity: Left shoulder  no swelling throughout  Dressing: removed  Surgical site:  anterior shoulder; Erythema surrounding skin glue. Without dehiscence, drainage, warmth, induration or fluctuance. No signs of infection. Erythema appearing related to irritation from skin glue  Sensation: intact   Motor: median/ulnar/radial/AIN/PIN intact  Brisk cap refill  Calf: bilateral calves soft, nontender    Pathology: FINAL  A. Mass, left arm mass:  - Intramuscular lipoma. 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 She underwent radiation therapy, resulting in complete remission.          Freya Colin PA-C, scribed this note in conjunction with and in the presence of Dr. Hiren Hobson, who performed parts of the services as described in this documentation      Dr. Andre Mar DO, Orthopedic Surgeon  Orthopedic Oncology & Sarcoma Surgery   Phone:[phone] ZXA:[phone]

## 2023-12-15 ENCOUNTER — HOSPITAL ENCOUNTER (OUTPATIENT)
Dept: MRI IMAGING | Facility: HOSPITAL | Age: 68
End: 2023-12-15
Attending: OTOLARYNGOLOGY
Payer: COMMERCIAL

## 2023-12-15 DIAGNOSIS — H93.12 TINNITUS, LEFT EAR: ICD-10-CM

## 2023-12-15 PROCEDURE — G1004 CDSM NDSC: HCPCS

## 2023-12-15 PROCEDURE — 70553 MRI BRAIN STEM W/O & W/DYE: CPT

## 2023-12-15 PROCEDURE — A9585 GADOBUTROL INJECTION: HCPCS | Performed by: OTOLARYNGOLOGY

## 2023-12-15 RX ORDER — GADOBUTROL 604.72 MG/ML
6 INJECTION INTRAVENOUS
Status: COMPLETED | OUTPATIENT
Start: 2023-12-15 | End: 2023-12-15

## 2023-12-15 RX ADMIN — GADOBUTROL 6 ML: 604.72 INJECTION INTRAVENOUS at 12:35

## 2023-12-22 ENCOUNTER — TELEPHONE (OUTPATIENT)
Dept: SURGERY | Facility: CLINIC | Age: 68
End: 2023-12-22

## 2023-12-22 NOTE — TELEPHONE ENCOUNTER
Patient is experiencing some constipation and is having some bleeding (possible hemorrhoids)  I did schedule her with you on 1/9 in [place].  She last saw Anjelica Chavira at [facility] 12/2022.

## 2024-01-08 ENCOUNTER — HOSPITAL ENCOUNTER (OUTPATIENT)
Dept: MAMMOGRAPHY | Facility: HOSPITAL | Age: 69
Discharge: HOME | End: 2024-01-08
Payer: COMMERCIAL

## 2024-01-08 VITALS — BODY MASS INDEX: 24.99 KG/M2 | HEIGHT: 65 IN | WEIGHT: 150 LBS

## 2024-01-08 DIAGNOSIS — Z12.31 ENCOUNTER FOR SCREENING MAMMOGRAM FOR MALIGNANT NEOPLASM OF BREAST: ICD-10-CM

## 2024-01-08 PROCEDURE — 77063 BREAST TOMOSYNTHESIS BI: CPT

## 2024-01-08 PROCEDURE — 77067 SCR MAMMO BI INCL CAD: CPT

## 2024-01-09 ENCOUNTER — OFFICE VISIT (OUTPATIENT)
Dept: GASTROENTEROLOGY | Facility: CLINIC | Age: 69
End: 2024-01-09
Payer: COMMERCIAL

## 2024-01-09 VITALS
RESPIRATION RATE: 16 BRPM | BODY MASS INDEX: 25.62 KG/M2 | HEART RATE: 69 BPM | HEIGHT: 65 IN | OXYGEN SATURATION: 100 % | SYSTOLIC BLOOD PRESSURE: 120 MMHG | TEMPERATURE: 97.5 F | WEIGHT: 153.8 LBS | DIASTOLIC BLOOD PRESSURE: 74 MMHG

## 2024-01-09 DIAGNOSIS — K90.0 CELIAC DISEASE: ICD-10-CM

## 2024-01-09 DIAGNOSIS — K62.5 BRBPR (BRIGHT RED BLOOD PER RECTUM): ICD-10-CM

## 2024-01-09 DIAGNOSIS — M85.80 OSTEOPENIA, UNSPECIFIED LOCATION: ICD-10-CM

## 2024-01-09 DIAGNOSIS — Z86.010 PERSONAL HISTORY OF COLONIC POLYPS: ICD-10-CM

## 2024-01-09 DIAGNOSIS — K21.9 GASTROESOPHAGEAL REFLUX DISEASE WITHOUT ESOPHAGITIS: ICD-10-CM

## 2024-01-09 DIAGNOSIS — K59.00 CONSTIPATION, UNSPECIFIED CONSTIPATION TYPE: Primary | ICD-10-CM

## 2024-01-09 PROCEDURE — 99214 OFFICE O/P EST MOD 30 MIN: CPT | Performed by: PHYSICIAN ASSISTANT

## 2024-01-09 RX ORDER — FAMOTIDINE 20 MG/1
20 TABLET, FILM COATED ORAL 2 TIMES DAILY
Qty: 180 TABLET | Refills: 3 | Status: SHIPPED | OUTPATIENT
Start: 2024-01-09

## 2024-01-09 RX ORDER — POLYETHYLENE GLYCOL 3350 17 G/17G
17 POWDER, FOR SOLUTION ORAL 2 TIMES DAILY
Qty: 765 G | Refills: 4 | Status: SHIPPED | OUTPATIENT
Start: 2024-01-09

## 2024-01-09 NOTE — PATIENT INSTRUCTIONS
For your bowels, I would recommend remaining on a high-fiber diet with excellent hydration.  Physical activity also helps to stimulate bowels as well.  The first step would be 1 capful of MiraLAX mixed into 8 ounces of any liquid of your choice 1-2 times daily.  This is a very safe osmotic laxative which does not hurt the kidneys or affect her electrolytes.    If you find the Colace/stool softener to be helpful, this is safe to take 1-2 times daily as well.  You can use a stimulant laxative like Dulcolax which comes in pill and suppository form as needed to help promote a bowel movement, though I would avoid regular use of this if you can.    If after about a week or so you are finding that this is not potent enough, I would recommend try something more potent like Linzess.  I will give you samples of the low-dose of Linzess so that you have this on hand if needed.  He would take 1 tablet every morning.  This will likely give you diarrhea at first though it should resolve within a week if it works well with your chemistry.    You are due for colonoscopy early spring for your history, and with everything going on I think we should proceed with this within the next 4 to 6 weeks.    For the celiac disease history, we do check routine vitamin levels and antibodies once yearly and it looks like you will be due for the bone density scan in August 2024.

## 2024-01-09 NOTE — PROGRESS NOTES
[facility] - Outpatient Follow-up Note  Rosa Caraballo 68 y.o. female MRN: [id number]  Encounter: [id number]    ASSESSMENT AND PLAN:      1. Constipation, unspecified constipation type  2. BRBPR (bright red blood per rectum)  3. Personal history of colonic polyps    Pt with change in bowel habits over past few months to more firm stool, constipation, straining. Has had episodes of BRPBR on the wipe related to passing hard large stools. TSH wnl in 03/2023. No obvious precipitants.     We reviewed that BRBPR likely 2/2 to outlet bleeding, though other etiol possible.   Given she is due for colonoscopy in 05/2024 for surveillance purposes, we will plan for colonoscopy to also evaluate for possible sources of bleeding or bowel habit change.   Recommend high fiber diet, constipation prevention, and regular physical activity.   Recommend trial miralax 1-2 times daily.   Okay for use of stool softener though efficacy not clear.   Recommend dulcolax PRN for symptom relief.     If constipation persists, would recommend trial of linaclotide. We will call pt to  samples.     Risks associated with endoscopic evaluation discussed with patient today, including but not limited to bleeding, infection, perforation, and organ injury, all of which are low. Pt demonstrates understanding and is agreeable to the plan. Golytely bowel prep sent to pharmacy.      - polyethylene glycol (GLYCOLAX) 17 GM/SCOOP powder; Take 17 g by mouth 2 (two) times a day  Dispense: 765 g; Refill: 4  - Magnesium; Future  - Colonoscopy; Future  - polyethylene glycol (GOLYTELY) 4000 mL solution; Take 4,000 mL by mouth once for 1 dose  Dispense: 4000 mL; Refill: 0    4. Celiac disease    Hx of such, as well as follicular lymphoma.   Pt has been diligently gluten free since 2010.   Check serologies now to confirm no gluten exposure.   Check vitamin levels now.   DEXA due in 08/2024 given osteopenia.   Previous EGD with duodenal  bx were negative, last duodenal bx from 11/2021.    - Tissue transglutaminase, IgA; Future  - IgA; Future  - DXA bone density spine hip and pelvis; Future  - Iron Panel (Includes Ferritin, Iron Sat%, Iron, and TIBC); Future    5. Gastroesophageal reflux disease without esophagitis    Pt with hx of heartburn/indigestion symptoms. EGD from 11/2022 with small hiatal hernia, mild gastritis, fundic gland polyps, no evidence of Campbell's esophagus or H pylori infection. Previously on PPI, though d/c due to concern for long-term effect.     Continue on H2RA BID now.   Okay for usage of PPI if needed.   Continue with small frequent meals and diet and lifestyle modifications for GERD. This includes limiting NSAIDS which is challenging for pt given her chronic MSK pain.   No alarm features to UGI tract that would warrant repeat EGD at this time.     - famotidine (PEPCID) 20 mg tablet; Take 1 tablet (20 mg total) by mouth 2 (two) times a day  Dispense: 180 tablet; Refill: 3    6. Osteopenia, unspecified location    Osteopenia, continue with Vit D, calcium, and weight bearing activity.   Due for repeat DEXA in 08/2024.     - DXA bone density spine hip and pelvis; Future    We will follow up after colonoscopy evaluation.   ______________________________________________________________________    SUBJECTIVE: Patient is a 68 y.o. female who presents today for follow-up regarding GERD/Celiac disease. Pmhx sig for Celiac disease, follicular lymphoma, HLD, asthma, RLS.     Pt was last evaluated by GI in 12/2022. Hx of GERD, previously on PPI though concerned about long-term effect and fundic gland polyps. EGD in 11/2022 with stomach erythema and fundic gland polyps. No evidence of Campbell's esophagus or h pylori infection. Hx of Celiac disease, gluten free since 2010. Prior to going gluten free, had chronic n/v.     01/09/24:     Pt shares that UGI symptoms are relatively well-controlled. Notes rare heartburn, typically taking Pepcid  or antacid OTC regularly to control symptoms. Rarely needs pantoprazole. No dysphagia or odynophagia. No early satiety. No unintentional weight loss.     Pt shares she has been dealing with constipation/straining for the past few months. She has needed to strain at times, with hard firm stool. Notes intermittent BRBPR, and rectal pain related to defecation. Prep-H wipes were helpful. No manual disimpaction. No melenic stools. No diarrhea. No obvious precipitants she can identify that may have caused symptoms. Takes combination of stool softeners, miralax, and dulcolax.     Pt denies any fevers, mouth ulcers, rashes, recurrent eye infection, night sweats, etc.     NSAIDs: regularly for MSK pain  Tobacco:   Etoh: none     11/2023: Hb 13.5, MCV 94, Plt 190, BUN 12, Cr 0.76, AST 31, ALT 33, ALP 55, albumin 4.2, t bili 0.65     Endoscopic history:   EGD: 11/2022: Small sliding hiatal hernia (type I hiatal hernia); Mild erythematous mucosa in the stomach; Ten or more polyps measuring from 3 mm up to 6 mm in the fundus of the stomach and body of the stomach   A. Stomach, biopsy: Gastric antral mucosa with reactive gastropathy. Gastric oxyntic mucosa with mild chronic inactive gastritis and changes suggestive of proton pump inhibitor effect. Negative for intestinal metaplasia and dysplasia. Negative for Helicobacter pylori-type organisms on H&E stain.    B. Stomach polyp, polypectomy: Fundic gland polyp.   C. Esophagus, random, biopsy: Squamous mucosa with mild reactive changes. No evidence of esophagitis.  Colon: 05/2019: All observed locations appeared normal    Review of Systems   Otherwise Per HPI    Historical Information   Past Medical History:   Diagnosis Date    Asthma     Celiac disease     Chronic sinusitis 04/27/2023    Follicular lymphoma (HCC)     GERD (gastroesophageal reflux disease)     Heart palpitations     History of colonic polyps     resolved 07/12/2016    History of radiation therapy 2010     Hyperlipidemia     Insomnia     Irregular heart beat     Lymphoma, 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HCC)     non hodgekins, remission    Malignant lymphoma (HCC) 2010    rt arm cutaneous follicular stage ia (rt diatal medical biceps area , s/p resected and s/p radistion treatment    resolved 12/17/2014    Postmenopausal disorder     resolved 09/21/2017    Pulmonary nodule     BENIGN, STABLE [id number]    Restless legs syndrome     resolved 09/21/2017    TMJ syndrome     resolved 09/21/2017     Past Surgical History:   Procedure Laterality Date    COLONOSCOPY  04/16/2019    FUNCTIONAL ENDOSCOPIC SINUS SURGERY Bilateral 2013    Dr Bagley    HYSTERECTOMY      age 45 complete    LYMPH NODE DISSECTION Right     arm    NASAL SEPTUM SURGERY  2013    with turbinate reduction - Dr. Bagley    OOPHORECTOMY Bilateral     NJ ESOPHAGOGASTRODUODENOSCOPY TRANSORAL DIAGNOSTIC N/A 01/18/2016    Procedure: ESOPHAGOGASTRODUODENOSCOPY (EGD);  Surgeon: Ness Shepherd MD;  [facility];  Service: Gastroenterology    NJ EXCISION GANGLION WRIST DORSAL/VOLAR RECURRENT Left 12/08/2020    Procedure: WRIST GANGLION CYST EXCISION;  Surgeon: Marie Gaspar MD;  [facility];  Service: Orthopedics    NJ EXCISION TUMOR SOFT TISSUE SHOULDER SUBQ 3 CM/> Left 11/27/2023    Procedure: anterior shouler- EXCISION BIOPSY TISSUE LESION/MASS UPPER EXTREMITY;  Surgeon: Ronak Alanis DO;  [facility];  Service: Orthopedics    SYNOVECTOMY N/A 12/08/2020    Procedure: ECU TENOSYNOVECTOMY;  Surgeon: Marie Gaspar MD;  [facility];  Service: Orthopedics    TONSILLECTOMY      TOTAL ABDOMINAL HYSTERECTOMY W/ BILATERAL SALPINGOOPHORECTOMY      age 51    UPPER GASTROINTESTINAL ENDOSCOPY      US GUIDED MSK PROCEDURE  01/16/2020    US GUIDED MSK PROCEDURE  08/07/2020    US GUIDED MSK PROCEDURE  08/02/2021     Social History   Social History     Substance and Sexual Activity   Alcohol Use Yes    Alcohol/week: 3.0 standard drinks of alcohol     Types: 3 Glasses of wine per week    Comment: soc     Social History     Substance and Sexual Activity   Drug Use No     Social History     Tobacco Use   Smoking Status Former    Current packs/day: 0.00    Average packs/day: 0.8 packs/day for 20.0 years (15.0 ttl pk-yrs)    Types: Cigarettes    Start date:     Quit date:     Years since quittin.0   Smokeless Tobacco Never   Tobacco Comments    Quit 10/31/04 2130     Family History   Problem Relation Age of Onset    Lymphoma Mother     Throat cancer Father     Heart failure Father     Atrial fibrillation Father     Diabetes Father     Colonic polyp Father     Hypertension Father     Thyroid cancer Sister     Breast cancer Paternal Grandmother 69    Breast cancer Paternal Aunt 69    Ovarian cancer Paternal Aunt 70    No Known Problems Maternal Grandmother     No Known Problems Maternal Grandfather     Cancer Paternal Grandfather     Lung cancer Paternal Grandfather     BRCA1 Positive Cousin     Skin cancer Paternal Aunt     Throat cancer Paternal Uncle     No Known Problems Maternal Aunt      Meds/Allergies       Current Outpatient Medications:     benralizumab (FASENRA) subcutaneous injection    bimatoprost (LATISSE) 0.03 % ophthalmic solution    Breo Ellipta 100-25 MCG/ACT inhaler    Cholecalciferol 50 MCG ( UT) CAPS    cyanocobalamin (VITAMIN B-12) 1,000 mcg tablet    erythromycin with ethanol (EMGEL) 2 % gel    famotidine (PEPCID) 20 mg tablet    flecainide (TAMBOCOR) 100 mg tablet    fluticasone (FLONASE) 50 mcg/act nasal spray    ipratropium (ATROVENT) 0.03 % nasal spray    levalbuterol (XOPENEX HFA) 45 mcg/act inhaler    lidocaine (LMX) 4 % cream    Magnesium 500 MG CAPS    metoprolol succinate (TOPROL-XL) 25 mg 24 hr tablet    ondansetron (ZOFRAN-ODT) 4 mg disintegrating tablet    polyethylene glycol (GLYCOLAX) 17 GM/SCOOP powder    polyethylene glycol (GOLYTELY) 4000 mL solution    rosuvastatin (CRESTOR) 5 mg tablet    senna-docusate sodium  "(SENOKOT-S) 8.6-50 mg per tablet    zolpidem (AMBIEN) 5 mg tablet    EPINEPHrine (EPIPEN) 0.3 mg/0.3 mL SOAJ    montelukast (SINGULAIR) 10 mg tablet    Allergies   Allergen Reactions    Shellfish-Derived Products - Food Allergy Anaphylaxis    Wheat Bran - Food Allergy GI Intolerance    Gluten Meal - Food Allergy GI Intolerance     Celiac     Morphine And Related Itching    Nuts - Food Allergy Hives     Almonds,walnuts, hazelnuts and other related nuts.     Sulfa Antibiotics Rash     Objective     Blood pressure 120/74, pulse 69, temperature 97.5 °F (36.4 °C), temperature source Temporal, resp. rate 16, height 5' 5\" (1.651 m), weight 69.8 kg (153 lb 12.8 oz), SpO2 100%, not currently breastfeeding. Body mass index is 25.59 kg/m².    Physical Exam  Vitals and nursing note reviewed.   HENT:      Head: Normocephalic and atraumatic.   Eyes:      General: No scleral icterus.     Conjunctiva/sclera: Conjunctivae normal.   Cardiovascular:      Rate and Rhythm: Normal rate.   Pulmonary:      Effort: Pulmonary effort is normal. No respiratory distress.   Abdominal:      General: Bowel sounds are normal. There is no distension.      Palpations: Abdomen is soft.      Tenderness: There is no abdominal tenderness. There is no guarding or rebound.   Genitourinary:     Comments: deferred  Skin:     General: Skin is warm and dry.      Coloration: Skin is not jaundiced.   Neurological:      General: No focal deficit present.      Mental Status: She is alert and oriented to person, place, and time.   Psychiatric:         Mood and Affect: Mood normal.         Behavior: Behavior normal.       Lab Results:   No visits with results within 1 Day(s) from this visit.   Latest known visit with results is:   Admission on 11/27/2023, Discharged on 11/27/2023   Component Date Value    Case Report 11/27/2023                      Value:Surgical Pathology Report                         Case: [id number]                                   Authorizing " Provider:  Ronak Alanis DO        Collected:           11/27/2023 0806              Ordering Location:     [facility] Received:            11/27/2023 1204                                     Operating Room                                                               Pathologist:           Nena Van MD                                                     Specimen:    Mass, left arm mass                                                                        Final Diagnosis 11/27/2023                      Value:This result contains rich text formatting which cannot be displayed here.    Note 11/27/2023                      Value:This result contains rich text formatting which cannot be displayed here.    Additional Information 11/27/2023                      Value:This result contains rich text formatting which cannot be displayed here.    Gross Description 11/27/2023                      Value:This result contains rich text formatting which cannot be displayed here.    Clinical Information 11/27/2023                      Value:Left excision of 2x2 deep shoulder mass within deltoid muscle     Radiology Results:   MRI brain IAC wo and w contrast    Result Date: 12/22/2023  Narrative: MRI BRAIN AND IAC'S -  WITH AND WITHOUT CONTRAST INDICATION: H93.12: Tinnitus, left ear. COMPARISON: CT brain dated 5/30/2012. TECHNIQUE: Multiplanar, multisequence imaging of the brain and IAC's was performed. Targeted images of the IAC'S were performed requiring additional time at acquisition and interpretation of approximately 25% IV Contrast:  6 mL of Gadobutrol injection (SINGLE-DOSE) IMAGE QUALITY:   Diagnostic. FINDINGS: BRAIN PARENCHYMA:  There is no discrete mass, mass effect or midline shift.  Brainstem and cerebellum demonstrate normal signal. There is no intracranial hemorrhage.  There is no evidence of acute infarction and diffusion imaging is unremarkable. A few scattered white matter  hyperintensities are seen within the periphery of the cerebral hemispheres suggestive of mild chronic microangiopathic change. Postcontrast imaging of the brain is unremarkable. Incidental note is made of a small developmental venous anomaly within the inferior aspect of the left cerebellum. IAC'S:  No CP angle mass or abnormal enhancement.  Normal aeration of the mastoid air cells and middle ear cavity. VENTRICLES:  Normal for the patient's age. SELLA AND PITUITARY GLAND:  Normal. ORBITS:  Normal. PARANASAL SINUSES:  Normal. VASCULATURE:  Evaluation of the major intracranial vasculature demonstrates appropriate flow voids. CALVARIUM AND SKULL BASE:  Normal. EXTRACRANIAL SOFT TISSUES:  Normal.     Impression: A few scattered white matter hyperintensities are seen within the cerebral hemispheres suggestive of minor chronic microangiopathic change. Otherwise unremarkable MRI of the brain and IACs. Workstation performed: [id number]     Collette Kebede PA-C    **Please note:  Dictation voice to text software may have been used in the creation of this record.  Occasional wrong word or “sound alike” substitutions may have occurred due to the inherent limitations of voice recognition software.  Read the chart carefully and recognize, using context, where substitutions have occurred.**

## 2024-01-23 ENCOUNTER — TELEPHONE (OUTPATIENT)
Dept: GASTROENTEROLOGY | Facility: CLINIC | Age: 69
End: 2024-01-23

## 2024-01-26 ENCOUNTER — HOSPITAL ENCOUNTER (OUTPATIENT)
Dept: INFUSION CENTER | Facility: HOSPITAL | Age: 69
End: 2024-01-26
Attending: INTERNAL MEDICINE
Payer: MEDICARE

## 2024-01-26 VITALS
RESPIRATION RATE: 18 BRPM | SYSTOLIC BLOOD PRESSURE: 113 MMHG | OXYGEN SATURATION: 98 % | DIASTOLIC BLOOD PRESSURE: 63 MMHG | TEMPERATURE: 97.6 F | HEART RATE: 78 BPM

## 2024-01-26 DIAGNOSIS — D72.10 EOSINOPHILIA: ICD-10-CM

## 2024-01-26 DIAGNOSIS — K59.00 CONSTIPATION, UNSPECIFIED CONSTIPATION TYPE: Primary | ICD-10-CM

## 2024-01-26 DIAGNOSIS — J45.50 SEVERE PERSISTENT ASTHMA WITHOUT COMPLICATION: ICD-10-CM

## 2024-01-26 DIAGNOSIS — D72.119 HYPEREOSINOPHILIC SYNDROME, UNSPECIFIED TYPE: Primary | ICD-10-CM

## 2024-01-26 PROCEDURE — 96372 THER/PROPH/DIAG INJ SC/IM: CPT

## 2024-01-26 RX ORDER — EPINEPHRINE 1 MG/ML
0.3 INJECTION, SOLUTION, CONCENTRATE INTRAVENOUS ONCE
Status: DISCONTINUED | OUTPATIENT
Start: 2024-01-26 | End: 2024-01-30 | Stop reason: HOSPADM

## 2024-01-26 RX ORDER — EPINEPHRINE 1 MG/ML
0.3 INJECTION, SOLUTION, CONCENTRATE INTRAVENOUS ONCE
OUTPATIENT
Start: 2024-03-22 | End: 2024-03-22

## 2024-01-26 RX ORDER — EPINEPHRINE 0.3 MG/.3ML
0.3 INJECTION SUBCUTANEOUS ONCE
Qty: 0.6 ML | Refills: 0 | Status: SHIPPED | OUTPATIENT
Start: 2024-01-26 | End: 2024-01-26

## 2024-01-26 RX ADMIN — BENRALIZUMAB 30 MG: 30 INJECTION, SOLUTION SUBCUTANEOUS at 11:24

## 2024-01-26 NOTE — PROGRESS NOTES
Patient received Fasenra  injection without complication. 30 minute observation completed without incident. Patient given AVS- and educated about delayed reactions. Patient asked for education on how to use epi-pen and what to do, education given and patient verbalized understanding. Patient discharged in stable condition to home. Patient verified next appointment.

## 2024-01-26 NOTE — PROGRESS NOTES
Patient arrived for Fasenra injection. Epi-pen at bedside, expiration date 1/2024. Patient made aware that epipen expires in a couple days when the month ends. Patient verbalized understanding.

## 2024-01-29 DIAGNOSIS — E78.2 MIXED HYPERLIPIDEMIA: ICD-10-CM

## 2024-01-29 RX ORDER — ROSUVASTATIN CALCIUM 5 MG/1
5 TABLET, COATED ORAL DAILY
Qty: 90 TABLET | Refills: 3 | Status: SHIPPED | OUTPATIENT
Start: 2024-01-29

## 2024-02-21 PROBLEM — Z01.419 ENCOUNTER FOR GYNECOLOGICAL EXAMINATION (GENERAL) (ROUTINE) WITHOUT ABNORMAL FINDINGS: Status: RESOLVED | Noted: 2019-05-23 | Resolved: 2024-02-21

## 2024-02-23 ENCOUNTER — HOSPITAL ENCOUNTER (OUTPATIENT)
Dept: GASTROENTEROLOGY | Facility: HOSPITAL | Age: 69
Setting detail: OUTPATIENT SURGERY
End: 2024-02-23
Payer: COMMERCIAL

## 2024-02-23 ENCOUNTER — ANESTHESIA EVENT (OUTPATIENT)
Dept: GASTROENTEROLOGY | Facility: HOSPITAL | Age: 69
End: 2024-02-23

## 2024-02-23 ENCOUNTER — ANESTHESIA (OUTPATIENT)
Dept: GASTROENTEROLOGY | Facility: HOSPITAL | Age: 69
End: 2024-02-23

## 2024-02-23 VITALS
DIASTOLIC BLOOD PRESSURE: 80 MMHG | RESPIRATION RATE: 16 BRPM | OXYGEN SATURATION: 100 % | TEMPERATURE: 96.9 F | HEART RATE: 75 BPM | HEIGHT: 65 IN | WEIGHT: 153 LBS | SYSTOLIC BLOOD PRESSURE: 129 MMHG | BODY MASS INDEX: 25.49 KG/M2

## 2024-02-23 DIAGNOSIS — Z86.010 PERSONAL HISTORY OF COLONIC POLYPS: ICD-10-CM

## 2024-02-23 PROCEDURE — 45385 COLONOSCOPY W/LESION REMOVAL: CPT | Performed by: STUDENT IN AN ORGANIZED HEALTH CARE EDUCATION/TRAINING PROGRAM

## 2024-02-23 PROCEDURE — 88305 TISSUE EXAM BY PATHOLOGIST: CPT | Performed by: STUDENT IN AN ORGANIZED HEALTH CARE EDUCATION/TRAINING PROGRAM

## 2024-02-23 PROCEDURE — 45380 COLONOSCOPY AND BIOPSY: CPT | Performed by: STUDENT IN AN ORGANIZED HEALTH CARE EDUCATION/TRAINING PROGRAM

## 2024-02-23 RX ORDER — ONDANSETRON 2 MG/ML
INJECTION INTRAMUSCULAR; INTRAVENOUS AS NEEDED
Status: DISCONTINUED | OUTPATIENT
Start: 2024-02-23 | End: 2024-02-23

## 2024-02-23 RX ORDER — PROPOFOL 10 MG/ML
INJECTION, EMULSION INTRAVENOUS AS NEEDED
Status: DISCONTINUED | OUTPATIENT
Start: 2024-02-23 | End: 2024-02-23

## 2024-02-23 RX ORDER — PROPOFOL 10 MG/ML
INJECTION, EMULSION INTRAVENOUS CONTINUOUS PRN
Status: DISCONTINUED | OUTPATIENT
Start: 2024-02-23 | End: 2024-02-23

## 2024-02-23 RX ORDER — SODIUM CHLORIDE, SODIUM LACTATE, POTASSIUM CHLORIDE, CALCIUM CHLORIDE 600; 310; 30; 20 MG/100ML; MG/100ML; MG/100ML; MG/100ML
125 INJECTION, SOLUTION INTRAVENOUS CONTINUOUS
Status: DISCONTINUED | OUTPATIENT
Start: 2024-02-23 | End: 2024-02-27 | Stop reason: HOSPADM

## 2024-02-23 RX ADMIN — PROPOFOL 150 MG: 10 INJECTION, EMULSION INTRAVENOUS at 10:59

## 2024-02-23 RX ADMIN — SODIUM CHLORIDE, SODIUM LACTATE, POTASSIUM CHLORIDE, AND CALCIUM CHLORIDE 125 ML/HR: .6; .31; .03; .02 INJECTION, SOLUTION INTRAVENOUS at 10:12

## 2024-02-23 RX ADMIN — ONDANSETRON 4 MG: 2 INJECTION INTRAMUSCULAR; INTRAVENOUS at 11:28

## 2024-02-23 RX ADMIN — PROPOFOL 30 MG: 10 INJECTION, EMULSION INTRAVENOUS at 11:10

## 2024-02-23 RX ADMIN — PROPOFOL 30 MG: 10 INJECTION, EMULSION INTRAVENOUS at 11:18

## 2024-02-23 RX ADMIN — PROPOFOL 50 MG: 10 INJECTION, EMULSION INTRAVENOUS at 11:02

## 2024-02-23 RX ADMIN — PROPOFOL 130 MCG/KG/MIN: 10 INJECTION, EMULSION INTRAVENOUS at 11:08

## 2024-02-23 NOTE — H&P
History and Physical -  [facility]  Rosa Caraballo 68 y.o. female MRN: [id number]                  HPI: Rosa Caraballo is a 68 y.o. year old female who presents for history of colon polyps      REVIEW OF SYSTEMS: Per the HPI, and otherwise unremarkable.    Historical Information   Past Medical History:   Diagnosis Date    Asthma     Celiac disease     Chronic sinusitis 04/27/2023    Colon polyp     Follicular lymphoma (HCC)     GERD (gastroesophageal reflux disease)     Heart palpitations     History of colonic polyps     resolved 07/12/2016    History of radiation therapy 2010    Hyperlipidemia     Insomnia     Irregular heart beat     Lymphoma, 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HCC)     non hodgekins, remission    Malignant lymphoma (HCC) 2010    rt arm cutaneous follicular stage ia (rt diatal medical biceps area , s/p resected and s/p radistion treatment    resolved 12/17/2014    Postmenopausal disorder     resolved 09/21/2017    Pulmonary nodule     BENIGN, STABLE [id number]    Restless legs syndrome     resolved 09/21/2017    TMJ syndrome     resolved 09/21/2017     Past Surgical History:   Procedure Laterality Date    COLONOSCOPY  04/16/2019    FUNCTIONAL ENDOSCOPIC SINUS SURGERY Bilateral 2013    Dr Bagley    HYSTERECTOMY      age 45 complete    LYMPH NODE DISSECTION Right     arm    NASAL SEPTUM SURGERY  2013    with turbinate reduction - Dr. Bagley    OOPHORECTOMY Bilateral     IL ESOPHAGOGASTRODUODENOSCOPY TRANSORAL DIAGNOSTIC N/A 01/18/2016    Procedure: ESOPHAGOGASTRODUODENOSCOPY (EGD);  Surgeon: Ness Shepherd MD;  [facility];  Service: Gastroenterology    IL EXCISION GANGLION WRIST DORSAL/VOLAR RECURRENT Left 12/08/2020    Procedure: WRIST GANGLION CYST EXCISION;  Surgeon: Marie Gaspar MD;  [facility];  Service: Orthopedics    IL EXCISION TUMOR SOFT TISSUE SHOULDER SUBQ 3 CM/> Left 11/27/2023    Procedure: anterior shouler- EXCISION BIOPSY TISSUE LESION/MASS UPPER EXTREMITY;   Surgeon: Ronak Alanis DO;  [facility];  Service: Orthopedics    SYNOVECTOMY N/A 2020    Procedure: ECU TENOSYNOVECTOMY;  Surgeon: Marie Gaspar MD;  [facility];  Service: Orthopedics    TONSILLECTOMY      TOTAL ABDOMINAL HYSTERECTOMY W/ BILATERAL SALPINGOOPHORECTOMY      age 51    UPPER GASTROINTESTINAL ENDOSCOPY      US GUIDED MSK PROCEDURE  2020    US GUIDED MSK PROCEDURE  2020    US GUIDED MSK PROCEDURE  2021     Social History   Social History     Substance and Sexual Activity   Alcohol Use Yes    Alcohol/week: 3.0 standard drinks of alcohol    Types: 3 Glasses of wine per week    Comment: soc     Social History     Substance and Sexual Activity   Drug Use No     Social History     Tobacco Use   Smoking Status Former    Current packs/day: 0.00    Average packs/day: 0.8 packs/day for 20.0 years (15.0 ttl pk-yrs)    Types: Cigarettes    Start date:     Quit date:     Years since quittin.1   Smokeless Tobacco Never   Tobacco Comments    Quit 10/31/04 2130     Family History   Problem Relation Age of Onset    Lymphoma Mother     Throat cancer Father     Heart failure Father     Atrial fibrillation Father     Diabetes Father     Colonic polyp Father     Hypertension Father     Thyroid cancer Sister     Breast cancer Paternal Grandmother 69    Breast cancer Paternal Aunt 69    Ovarian cancer Paternal Aunt 70    No Known Problems Maternal Grandmother     No Known Problems Maternal Grandfather     Cancer Paternal Grandfather     Lung cancer Paternal Grandfather     BRCA1 Positive Cousin     Skin cancer Paternal Aunt     Throat cancer Paternal Uncle     No Known Problems Maternal Aunt        Meds/Allergies       Current Outpatient Medications:     bimatoprost (LATISSE) 0.03 % ophthalmic solution    Cholecalciferol 50 MCG ( UT) CAPS    cyanocobalamin (VITAMIN B-12) 1,000 mcg tablet    erythromycin with ethanol (EMGEL) 2 % gel    famotidine (PEPCID)  "20 mg tablet    flecainide (TAMBOCOR) 100 mg tablet    fluticasone (FLONASE) 50 mcg/act nasal spray    ipratropium (ATROVENT) 0.03 % nasal spray    linaCLOtide 72 MCG CAPS    Magnesium 500 MG CAPS    metoprolol succinate (TOPROL-XL) 25 mg 24 hr tablet    rosuvastatin (CRESTOR) 5 mg tablet    benralizumab (FASENRA) subcutaneous injection    Breo Ellipta 100-25 MCG/ACT inhaler    EPINEPHrine (EPIPEN) 0.3 mg/0.3 mL SOAJ    levalbuterol (XOPENEX HFA) 45 mcg/act inhaler    lidocaine (LMX) 4 % cream    montelukast (SINGULAIR) 10 mg tablet    ondansetron (ZOFRAN-ODT) 4 mg disintegrating tablet    polyethylene glycol (GLYCOLAX) 17 GM/SCOOP powder    polyethylene glycol (GOLYTELY) 4000 mL solution    senna-docusate sodium (SENOKOT-S) 8.6-50 mg per tablet    zolpidem (AMBIEN) 5 mg tablet    Current Facility-Administered Medications:     lactated ringers infusion, 125 mL/hr, Intravenous, Continuous, 125 mL/hr at 02/23/24 1012    Allergies   Allergen Reactions    Gluten Meal - Food Allergy GI Intolerance     Celiac     Morphine And Related Itching    Nuts - Food Allergy Hives     Almonds,walnuts, hazelnuts and other related nuts.     Shellfish-Derived Products - Food Allergy Anaphylaxis    Wheat Bran - Food Allergy GI Intolerance    Sulfa Antibiotics Rash       Objective     /79   Pulse 71   Temp (!) 97 °F (36.1 °C) (Temporal)   Resp 18   Ht 5' 5\" (1.651 m)   Wt 69.4 kg (153 lb)   SpO2 99%   BMI 25.46 kg/m²       PHYSICAL EXAM    Gen: NAD  Head: NCAT  CV: RRR  CHEST: Clear  ABD: soft, NT/ND  EXT: no edema      ASSESSMENT/PLAN:  This is a 68 y.o. year old female here for colonoscopy, and she is stable and optimized for her procedure.        "

## 2024-02-23 NOTE — ANESTHESIA POSTPROCEDURE EVALUATION
Post-Op Assessment Note    CV Status:  Stable  Pain Score: 0    Pain management: adequate       Mental Status:  Alert and awake   Hydration Status:  Euvolemic   PONV Controlled:  Controlled   Airway Patency:  Patent     Post Op Vitals Reviewed: Yes    No anethesia notable event occurred.    Staff: CRNA               BP   145/76   Temp      Pulse  67   Resp   20   SpO2   98

## 2024-02-23 NOTE — ANESTHESIA PREPROCEDURE EVALUATION
Procedure:  COLONOSCOPY    Relevant Problems   CARDIO   (+) Hyperlipidemia   (+) PVC's (premature ventricular contractions)      GI/HEPATIC   (+) Chronic GERD      HEMATOLOGY   (+) Follicular lymphoma (HCC)      MUSCULOSKELETAL   (+) Back pain   (+) Sciatica associated with disorder of lumbar spine      PULMONARY   (+) Severe persistent asthma without complication             Anesthesia Plan  ASA Score- 2     Anesthesia Type- IV sedation with anesthesia with ASA Monitors.         Additional Monitors:     Airway Plan:            Plan Factors-    Chart reviewed.                      Induction- intravenous.    Postoperative Plan-     Informed Consent- Anesthetic plan and risks discussed with patient.  I personally reviewed this patient with the CRNA. Discussed and agreed on the Anesthesia Plan with the CRNA..

## 2024-02-27 PROCEDURE — 88305 TISSUE EXAM BY PATHOLOGIST: CPT | Performed by: STUDENT IN AN ORGANIZED HEALTH CARE EDUCATION/TRAINING PROGRAM

## 2024-02-29 ENCOUNTER — APPOINTMENT (OUTPATIENT)
Dept: RADIOLOGY | Facility: CLINIC | Age: 69
End: 2024-02-29
Payer: MEDICARE

## 2024-02-29 ENCOUNTER — OFFICE VISIT (OUTPATIENT)
Dept: INTERNAL MEDICINE CLINIC | Facility: CLINIC | Age: 69
End: 2024-02-29
Payer: COMMERCIAL

## 2024-02-29 VITALS
HEART RATE: 74 BPM | DIASTOLIC BLOOD PRESSURE: 78 MMHG | SYSTOLIC BLOOD PRESSURE: 122 MMHG | OXYGEN SATURATION: 98 % | TEMPERATURE: 97.6 F

## 2024-02-29 DIAGNOSIS — L70.9 ACNE, UNSPECIFIED ACNE TYPE: ICD-10-CM

## 2024-02-29 DIAGNOSIS — R20.0 BILATERAL HAND NUMBNESS: ICD-10-CM

## 2024-02-29 DIAGNOSIS — C82.90 FOLLICULAR LYMPHOMA, UNSPECIFIED FOLLICULAR LYMPHOMA TYPE, UNSPECIFIED BODY REGION (HCC): ICD-10-CM

## 2024-02-29 DIAGNOSIS — R20.0 BILATERAL HAND NUMBNESS: Primary | ICD-10-CM

## 2024-02-29 DIAGNOSIS — M54.2 NECK PAIN: ICD-10-CM

## 2024-02-29 PROCEDURE — 99214 OFFICE O/P EST MOD 30 MIN: CPT | Performed by: FAMILY MEDICINE

## 2024-02-29 PROCEDURE — 72050 X-RAY EXAM NECK SPINE 4/5VWS: CPT

## 2024-02-29 RX ORDER — ERYTHROMYCIN 20 MG/G
GEL TOPICAL 2 TIMES DAILY
Qty: 30 G | Refills: 1 | Status: SHIPPED | OUTPATIENT
Start: 2024-02-29

## 2024-02-29 RX ORDER — METHOCARBAMOL 500 MG/1
500 TABLET, FILM COATED ORAL 3 TIMES DAILY PRN
Qty: 60 TABLET | Refills: 0 | Status: SHIPPED | OUTPATIENT
Start: 2024-02-29

## 2024-03-01 NOTE — PROGRESS NOTES
Assessment/Plan:    No problem-specific Assessment & Plan notes found for this encounter.       Diagnoses and all orders for this visit:    Bilateral hand numbness  -     XR spine cervical complete 4 or 5 vw non injury; Future  -     methocarbamol (ROBAXIN) 500 mg tablet; Take 1 tablet (500 mg total) by mouth 3 (three) times a day as needed for muscle spasms    Acne, unspecified acne type  Comments:  prn e-mycin  Orders:  -     erythromycin with ethanol (EMGEL) 2 % gel; Apply topically 2 (two) times a day  -     XR spine cervical complete 4 or 5 vw non injury; Future  -     methocarbamol (ROBAXIN) 500 mg tablet; Take 1 tablet (500 mg total) by mouth 3 (three) times a day as needed for muscle spasms    Neck pain  -     XR spine cervical complete 4 or 5 vw non injury; Future  -     methocarbamol (ROBAXIN) 500 mg tablet; Take 1 tablet (500 mg total) by mouth 3 (three) times a day as needed for muscle spasms    Follicular lymphoma, unspecified follicular lymphoma type, unspecified body region (HCC)        Orders and recommendations as noted above.  Issues with her hands discussed.  Discussed potential causes with her.  Most likely related to musculoskeletal issues either into the neck or wrist areas.  Discussed the potential of some cervical spine degeneration.  Muscles are tight into the neck which are likely contributing to her symptoms.  Prescription for muscle relaxant given.  Will check an x-ray of the neck for further investigation.  Consider further investigation based on this.  Would consider EMG or possibly additional imaging of the neck if symptoms persist or worsen.  Continue to follow-up with specialists regarding the history of the follicular lymphoma.  Watch for any worsening of underlying respiratory issues.  Will have her follow-up as scheduled or sooner if needed.    Subjective:      Patient ID: Rosa Caraballo is a 68 y.o. female.    She presents for problem visit.  She has been having issues into her  hands and arms over the past few months.  Has noticed that her hands go numb tight and swollen.  This tends to occur worse overnight.  Feels like her hands are stiff and has difficulty moving them when the symptoms occur.  Has some tightness into the neck at times.  Denies any chest pain or palpitations.  She was concerned whether this was related to her heart.  These issues have been disrupting her sleep.  Tolerating her other medications without difficulty.  Respiratory status has been stable.  Denies any known injury to the neck or arms.        The following portions of the patient's history were reviewed and updated as appropriate: She  has a past medical history of Asthma, Celiac disease, Chronic sinusitis (04/27/2023), Colon polyp, Follicular lymphoma (HCC), GERD (gastroesophageal reflux disease), Heart palpitations, History of colonic polyps, History of radiation therapy (2010), Hyperlipidemia, Insomnia, Irregular heart beat, Lymphoma, 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HCC), Malignant lymphoma (HCC) (2010), Postmenopausal disorder, Pulmonary nodule, Restless legs syndrome, and TMJ syndrome.  She   Patient Active Problem List    Diagnosis Date Noted   • Neck pain 02/29/2024   • Bilateral hand numbness 02/29/2024   • Mass of skin of left shoulder 11/27/2023   • Soft tissue mass 08/25/2023   • Joint mass 08/17/2023   • Hyperlipidemia    • Disorder of tendon of right biceps 07/09/2021   • Tear of right supraspinatus tendon 07/09/2021   • Pulmonary nodule    • Ganglion cyst of wrist, left 12/08/2020   • Eosinophilia 09/17/2020   • Severe persistent asthma without complication 09/02/2020   • Non-seasonal allergic rhinitis 09/02/2020   • Personal history of colonic polyps 03/29/2019   • Follicular lymphoma (HCC) 08/29/2018   • VERONIKA (stress urinary incontinence, female) 05/18/2018   • PVC's (premature ventricular contractions) 04/24/2018   • Dyslipidemia 04/24/2018   • Celiac disease 04/19/2018   • Chronic GERD 10/03/2017   • Back pain  09/21/2017   • Heart palpitations 09/21/2017   • Insomnia 09/21/2017   • Sciatica associated with disorder of lumbar spine 09/21/2017   • Vitamin D deficiency 06/28/2017   • Osteopenia 11/17/2016     She  has a past surgical history that includes Lymph node dissection (Right); pr esophagogastroduodenoscopy transoral diagnostic (N/A, 01/18/2016); Nasal septum surgery (2013); Hysterectomy; Total abdominal hysterectomy w/ bilateral salpingoophorectomy; Tonsillectomy; Colonoscopy (04/16/2019); Functional endoscopic sinus surgery (Bilateral, 2013); US guided msk procedure (01/16/2020); US guided msk procedure (08/07/2020); pr excision ganglion wrist dorsal/volar recurrent (Left, 12/08/2020); Synovectomy (N/A, 12/08/2020); US guided msk procedure (08/02/2021); Upper gastrointestinal endoscopy; Oophorectomy (Bilateral); and pr excision tumor soft tissue shoulder subq 3 cm/> (Left, 11/27/2023).  Her family history includes Atrial fibrillation in her father; BRCA1 Positive in her cousin; Breast cancer (age of onset: 69) in her paternal aunt and paternal grandmother; Cancer in her paternal grandfather; Colonic polyp in her father; Diabetes in her father; Heart failure in her father; Hypertension in her father; Lung cancer in her paternal grandfather; Lymphoma in her mother; No Known Problems in her maternal aunt, maternal grandfather, and maternal grandmother; Ovarian cancer (age of onset: 70) in her paternal aunt; Skin cancer in her paternal aunt; Throat cancer in her father and paternal uncle; Thyroid cancer in her sister.  She  reports that she quit smoking about 20 years ago. Her smoking use included cigarettes. She started smoking about 40 years ago. She has a 15 pack-year smoking history. She has never used smokeless tobacco. She reports current alcohol use of about 3.0 standard drinks of alcohol per week. She reports that she does not use drugs.  Current Outpatient Medications   Medication Sig Dispense Refill   •  benralizumab (FASENRA) subcutaneous injection Inject 1 mL (30 mg total) under the skin every 56 days for 6 doses 1 Syringe 6   • bimatoprost (LATISSE) 0.03 % ophthalmic solution Take as directed     • Breo Ellipta 100-25 MCG/ACT inhaler Inhale 1 puff daily Rinse mouth after use. 60 blister 11   • Cholecalciferol 50 MCG (2000 UT) CAPS Take by mouth daily     • cyanocobalamin (VITAMIN B-12) 1,000 mcg tablet Take 1,000 mcg by mouth daily     • erythromycin with ethanol (EMGEL) 2 % gel Apply topically 2 (two) times a day 30 g 1   • famotidine (PEPCID) 20 mg tablet Take 1 tablet (20 mg total) by mouth 2 (two) times a day 180 tablet 3   • flecainide (TAMBOCOR) 100 mg tablet take 1 tablet by mouth twice a day 180 tablet 3   • fluticasone (FLONASE) 50 mcg/act nasal spray 2 sprays into each nostril 2 (two) times a day 48 g 4   • ipratropium (ATROVENT) 0.03 % nasal spray 2 sprays into each nostril 3 (three) times a day as needed for rhinitis 30 mL 3   • levalbuterol (XOPENEX HFA) 45 mcg/act inhaler Inhale 1-2 puffs every 4 (four) hours as needed for wheezing or shortness of breath 45 g 0   • lidocaine (LMX) 4 % cream Apply topically as needed for mild pain 15 g 3   • linaCLOtide 72 MCG CAPS Take 72 mcg by mouth daily 4 capsule 0   • Magnesium 500 MG CAPS Take by mouth     • methocarbamol (ROBAXIN) 500 mg tablet Take 1 tablet (500 mg total) by mouth 3 (three) times a day as needed for muscle spasms 60 tablet 0   • metoprolol succinate (TOPROL-XL) 25 mg 24 hr tablet Take 0.5 tablets (12.5 mg total) by mouth every evening 45 tablet 3   • ondansetron (ZOFRAN-ODT) 4 mg disintegrating tablet Take 1 tablet (4 mg total) by mouth every 6 (six) hours as needed for nausea or vomiting 15 tablet 0   • polyethylene glycol (GLYCOLAX) 17 GM/SCOOP powder Take 17 g by mouth 2 (two) times a day 765 g 4   • rosuvastatin (CRESTOR) 5 mg tablet take 1 tablet by mouth once daily 90 tablet 3   • senna-docusate sodium (SENOKOT-S) 8.6-50 mg per tablet  Take 1 tablet by mouth daily for 14 days (Patient taking differently: Take 1 tablet by mouth 2 (two) times a day as needed) 14 tablet 0   • zolpidem (AMBIEN) 5 mg tablet Take 1 tablet (5 mg total) by mouth daily at bedtime as needed for sleep 30 tablet 2   • EPINEPHrine (EPIPEN) 0.3 mg/0.3 mL SOAJ Inject 0.3 mL (0.3 mg total) into a muscle once for 1 dose 0.6 mL 0     No current facility-administered medications for this visit.     Current Outpatient Medications on File Prior to Visit   Medication Sig   • benralizumab (FASENRA) subcutaneous injection Inject 1 mL (30 mg total) under the skin every 56 days for 6 doses   • bimatoprost (LATISSE) 0.03 % ophthalmic solution Take as directed   • Breo Ellipta 100-25 MCG/ACT inhaler Inhale 1 puff daily Rinse mouth after use.   • Cholecalciferol 50 MCG (2000 UT) CAPS Take by mouth daily   • cyanocobalamin (VITAMIN B-12) 1,000 mcg tablet Take 1,000 mcg by mouth daily   • famotidine (PEPCID) 20 mg tablet Take 1 tablet (20 mg total) by mouth 2 (two) times a day   • flecainide (TAMBOCOR) 100 mg tablet take 1 tablet by mouth twice a day   • fluticasone (FLONASE) 50 mcg/act nasal spray 2 sprays into each nostril 2 (two) times a day   • ipratropium (ATROVENT) 0.03 % nasal spray 2 sprays into each nostril 3 (three) times a day as needed for rhinitis   • levalbuterol (XOPENEX HFA) 45 mcg/act inhaler Inhale 1-2 puffs every 4 (four) hours as needed for wheezing or shortness of breath   • lidocaine (LMX) 4 % cream Apply topically as needed for mild pain   • linaCLOtide 72 MCG CAPS Take 72 mcg by mouth daily   • Magnesium 500 MG CAPS Take by mouth   • metoprolol succinate (TOPROL-XL) 25 mg 24 hr tablet Take 0.5 tablets (12.5 mg total) by mouth every evening   • ondansetron (ZOFRAN-ODT) 4 mg disintegrating tablet Take 1 tablet (4 mg total) by mouth every 6 (six) hours as needed for nausea or vomiting   • polyethylene glycol (GLYCOLAX) 17 GM/SCOOP powder Take 17 g by mouth 2 (two) times a  day   • rosuvastatin (CRESTOR) 5 mg tablet take 1 tablet by mouth once daily   • senna-docusate sodium (SENOKOT-S) 8.6-50 mg per tablet Take 1 tablet by mouth daily for 14 days (Patient taking differently: Take 1 tablet by mouth 2 (two) times a day as needed)   • zolpidem (AMBIEN) 5 mg tablet Take 1 tablet (5 mg total) by mouth daily at bedtime as needed for sleep   • EPINEPHrine (EPIPEN) 0.3 mg/0.3 mL SOAJ Inject 0.3 mL (0.3 mg total) into a muscle once for 1 dose     No current facility-administered medications on file prior to visit.     She is allergic to gluten meal - food allergy, morphine and related, nuts - food allergy, shellfish-derived products - food allergy, wheat bran - food allergy, and sulfa antibiotics..    Review of Systems   Constitutional:  Positive for activity change.   Cardiovascular:  Negative for chest pain and palpitations.   Musculoskeletal:  Positive for arthralgias, myalgias, neck pain and neck stiffness.   Neurological:  Positive for weakness and numbness.         Objective:      /78 (BP Location: Left arm, Patient Position: Sitting, Cuff Size: Large)   Pulse 74   Temp 97.6 °F (36.4 °C)   SpO2 98%          Physical Exam  Vitals and nursing note reviewed.   Cardiovascular:      Rate and Rhythm: Normal rate and regular rhythm.   Musculoskeletal:      Cervical back: Muscular tenderness present.      Comments: Slight swelling bilateral hands; positive Tinel's   Neurological:      Sensory: Sensory deficit present.

## 2024-03-04 ENCOUNTER — APPOINTMENT (OUTPATIENT)
Dept: LAB | Facility: CLINIC | Age: 69
End: 2024-03-04
Payer: COMMERCIAL

## 2024-03-04 DIAGNOSIS — K90.0 CELIAC DISEASE: ICD-10-CM

## 2024-03-04 DIAGNOSIS — K59.00 CONSTIPATION, UNSPECIFIED CONSTIPATION TYPE: ICD-10-CM

## 2024-03-04 LAB
FERRITIN SERPL-MCNC: 80 NG/ML (ref 11–307)
IGA SERPL-MCNC: 166 MG/DL (ref 66–433)
IRON SATN MFR SERPL: 26 % (ref 15–50)
IRON SERPL-MCNC: 90 UG/DL (ref 50–212)
MAGNESIUM SERPL-MCNC: 2 MG/DL (ref 1.9–2.7)
TIBC SERPL-MCNC: 349 UG/DL (ref 250–450)
UIBC SERPL-MCNC: 259 UG/DL (ref 155–355)

## 2024-03-04 PROCEDURE — 83550 IRON BINDING TEST: CPT

## 2024-03-04 PROCEDURE — 86364 TISS TRNSGLTMNASE EA IG CLAS: CPT

## 2024-03-04 PROCEDURE — 83735 ASSAY OF MAGNESIUM: CPT

## 2024-03-04 PROCEDURE — 36415 COLL VENOUS BLD VENIPUNCTURE: CPT

## 2024-03-04 PROCEDURE — 83540 ASSAY OF IRON: CPT

## 2024-03-04 PROCEDURE — 82728 ASSAY OF FERRITIN: CPT

## 2024-03-04 PROCEDURE — 82784 ASSAY IGA/IGD/IGG/IGM EACH: CPT

## 2024-03-05 LAB — TTG IGA SER-ACNC: <2 U/ML (ref 0–3)

## 2024-03-06 ENCOUNTER — OFFICE VISIT (OUTPATIENT)
Dept: CARDIOLOGY CLINIC | Facility: CLINIC | Age: 69
End: 2024-03-06
Payer: COMMERCIAL

## 2024-03-06 VITALS
DIASTOLIC BLOOD PRESSURE: 80 MMHG | HEIGHT: 65 IN | SYSTOLIC BLOOD PRESSURE: 120 MMHG | WEIGHT: 152 LBS | HEART RATE: 61 BPM | BODY MASS INDEX: 25.33 KG/M2

## 2024-03-06 DIAGNOSIS — I49.3 PVC'S (PREMATURE VENTRICULAR CONTRACTIONS): Primary | ICD-10-CM

## 2024-03-06 DIAGNOSIS — I73.00 RAYNAUD'S PHENOMENON WITHOUT GANGRENE: ICD-10-CM

## 2024-03-06 DIAGNOSIS — E78.5 DYSLIPIDEMIA: ICD-10-CM

## 2024-03-06 PROCEDURE — 99214 OFFICE O/P EST MOD 30 MIN: CPT | Performed by: INTERNAL MEDICINE

## 2024-03-06 PROCEDURE — 93000 ELECTROCARDIOGRAM COMPLETE: CPT | Performed by: INTERNAL MEDICINE

## 2024-03-06 RX ORDER — METOPROLOL SUCCINATE 25 MG/1
25 TABLET, EXTENDED RELEASE ORAL EVERY EVENING
Qty: 90 TABLET | Refills: 5 | Status: SHIPPED | OUTPATIENT
Start: 2024-03-06

## 2024-03-06 RX ORDER — AMLODIPINE BESYLATE 5 MG/1
5 TABLET ORAL DAILY
Qty: 30 TABLET | Refills: 0 | Status: SHIPPED | OUTPATIENT
Start: 2024-03-06

## 2024-03-06 NOTE — ASSESSMENT & PLAN NOTE
Seems to be having some cold intolerance and also has red fingers today.  She will try amlodipine and then f/u with PCP.

## 2024-03-06 NOTE — PROGRESS NOTES
Patient ID: Rosa Caraballo is a 68 y.o. female.        Plan:      PVC's (premature ventricular contractions)  Well treated with flecainide and this should be continued.    Raynaud's phenomenon without gangrene  Seems to be having some cold intolerance and also has red fingers today.  She will try amlodipine and then f/u with PCP.    Dyslipidemia  We talked about the option of holding statin tx given zero calcium score but she would like to continue for now.       Follow up Plan/Other summary comments:  Return in about 1 year (around 3/6/2025).    HPI: Patient seen in follow-up today regarding the above.  Since last visit she has felt reasonably well.  She knows that if she misses flecainide she has PVCs but otherwise no problems.       Rosa has long history of PVCs rendered much improved on flecainide.  She knows that if she misses a dose she becomes symptomatic.      Results for orders placed or performed in visit on 03/06/24   POCT ECG    Impression    Sinus rhythm at 60 bpm.  Normal.         Most recent or relevant cardiac/vascular testing:    Coronary CT calcium score 9/10/2020: 0  Holter monitor report 9/4/2020: Heart rate ranged from 47 to 120 bpm.  Average heart rate 76.  Only 4 ventricular ectopic beats.  This was performed on flecainide.  Treadmill test 9/4/2020: Normal.      Past Surgical History:   Procedure Laterality Date    COLONOSCOPY  04/16/2019    FUNCTIONAL ENDOSCOPIC SINUS SURGERY Bilateral 2013    Dr Bagley    HYSTERECTOMY      age 45 complete    LYMPH NODE DISSECTION Right     arm    NASAL SEPTUM SURGERY  2013    with turbinate reduction - Dr. Bagley    OOPHORECTOMY Bilateral     OH ESOPHAGOGASTRODUODENOSCOPY TRANSORAL DIAGNOSTIC N/A 01/18/2016    Procedure: ESOPHAGOGASTRODUODENOSCOPY (EGD);  Surgeon: Ness Shepherd MD;  Location: [facility];  Service: Gastroenterology    OH EXCISION GANGLION WRIST DORSAL/VOLAR RECURRENT Left 12/08/2020    Procedure: WRIST GANGLION CYST EXCISION;  Surgeon:  "Marie Gaspar MD;  Location: [facility];  Service: Orthopedics    NY EXCISION TUMOR SOFT TISSUE SHOULDER SUBQ 3 CM/> Left 11/27/2023    Procedure: anterior shouler- EXCISION BIOPSY TISSUE LESION/MASS UPPER EXTREMITY;  Surgeon: Ronak Alanis DO;  Location: [facility];  Service: Orthopedics    SYNOVECTOMY N/A 12/08/2020    Procedure: ECU TENOSYNOVECTOMY;  Surgeon: Marie Gaspar MD;  Location: [facility];  Service: Orthopedics    TONSILLECTOMY      TOTAL ABDOMINAL HYSTERECTOMY W/ BILATERAL SALPINGOOPHORECTOMY      age 51    UPPER GASTROINTESTINAL ENDOSCOPY      US GUIDED MSK PROCEDURE  01/16/2020    US GUIDED MSK PROCEDURE  08/07/2020    US GUIDED MSK PROCEDURE  08/02/2021       Lipid Profile: Reviewed      Review of Systems   10  point ROS  was otherwise non pertinent or negative except as per HPI or as below.   Gait:  Normal.        Objective:     /80   Pulse 61   Ht 5' 5\" (1.651 m)   Wt 68.9 kg (152 lb)   BMI 25.29 kg/m²     PHYSICAL EXAM:    General:  Normal appearance in no distress.  Eyes:  Anicteric.  Oral mucosa:  Moist.  Neck:  No JVD. Carotid upstrokes are brisk without bruits.  No masses.  Chest:  Clear to auscultation.  Cardiac:  No palpable PMI.  Normal S1 and S2.  No murmur gallop or rub.  Abdomen:  Soft and nontender. No palpable organomegaly or aortic enlargement.  Extremities:  No peripheral edema.  Musculoskeletal:  Symmetric.   Vascular:  Femoral pulses are brisk without bruits.  Popliteal pulses are intact bilaterally.   Pedal pulses are intact.  Neuro:  Grossly symmetric.  Psych:  Alert and oriented x3.      Meds reviewed.    Past Medical History:   Diagnosis Date    Asthma     Celiac disease     Chronic sinusitis 04/27/2023    Colon polyp     Follicular lymphoma (HCC)     GERD (gastroesophageal reflux disease)     Heart palpitations     History of colonic polyps     resolved 07/12/2016    History of radiation therapy 2010    Hyperlipidemia     Insomnia     Irregular " heart beat     Lymphoma, 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HCC)     non hodgekins, remission    Malignant lymphoma (HCC)     rt arm cutaneous follicular stage ia (rt diatal medical biceps area , s/p resected and s/p radistion treatment    resolved 2014    Postmenopausal disorder     resolved 2017    Pulmonary nodule     BENIGN, STABLE [id number]    Restless legs syndrome     resolved 2017    TMJ syndrome     resolved 2017           Social History     Tobacco Use   Smoking Status Former    Current packs/day: 0.00    Average packs/day: 0.8 packs/day for 20.0 years (15.0 ttl pk-yrs)    Types: Cigarettes    Start date:     Quit date:     Years since quittin.1   Smokeless Tobacco Never   Tobacco Comments    Quit 10/31/04 [id number]

## 2024-03-06 NOTE — ASSESSMENT & PLAN NOTE
We talked about the option of holding statin tx given zero calcium score but she would like to continue for now.

## 2024-03-20 ENCOUNTER — OFFICE VISIT (OUTPATIENT)
Dept: GASTROENTEROLOGY | Facility: CLINIC | Age: 69
End: 2024-03-20
Payer: COMMERCIAL

## 2024-03-20 VITALS
BODY MASS INDEX: 25.49 KG/M2 | SYSTOLIC BLOOD PRESSURE: 118 MMHG | WEIGHT: 153 LBS | RESPIRATION RATE: 18 BRPM | HEART RATE: 72 BPM | DIASTOLIC BLOOD PRESSURE: 78 MMHG | OXYGEN SATURATION: 97 % | HEIGHT: 65 IN | TEMPERATURE: 97.7 F

## 2024-03-20 DIAGNOSIS — K76.0 HEPATIC STEATOSIS: ICD-10-CM

## 2024-03-20 DIAGNOSIS — K59.00 CONSTIPATION, UNSPECIFIED CONSTIPATION TYPE: ICD-10-CM

## 2024-03-20 DIAGNOSIS — K21.9 GASTROESOPHAGEAL REFLUX DISEASE WITHOUT ESOPHAGITIS: Primary | ICD-10-CM

## 2024-03-20 DIAGNOSIS — K64.8 INTERNAL HEMORRHOIDS: ICD-10-CM

## 2024-03-20 DIAGNOSIS — C82.90 FOLLICULAR LYMPHOMA, UNSPECIFIED FOLLICULAR LYMPHOMA TYPE, UNSPECIFIED BODY REGION (HCC): ICD-10-CM

## 2024-03-20 DIAGNOSIS — K90.0 CELIAC DISEASE: ICD-10-CM

## 2024-03-20 DIAGNOSIS — K62.5 BRBPR (BRIGHT RED BLOOD PER RECTUM): ICD-10-CM

## 2024-03-20 DIAGNOSIS — Z86.010 PERSONAL HISTORY OF COLONIC POLYPS: ICD-10-CM

## 2024-03-20 PROCEDURE — 99214 OFFICE O/P EST MOD 30 MIN: CPT | Performed by: PHYSICIAN ASSISTANT

## 2024-03-20 RX ORDER — SUCRALFATE 1 G/1
1 TABLET ORAL 2 TIMES DAILY
Qty: 60 TABLET | Refills: 5 | Status: SHIPPED | OUTPATIENT
Start: 2024-03-20

## 2024-03-20 NOTE — PATIENT INSTRUCTIONS
Continue on the famotidine twice daily.  It is okay to use the sucralfate/Carafate as needed for breakthrough upper GI symptoms such as reflux or upper abdominal burning.  If you are starting to have more frequent upper GI symptoms, we may need to think about temporarily putting you back on a stronger medication like pantoprazole.    For the bowels, lets attempt to regulate them a bit better with starting you on 1-2 capfuls of MiraLAX daily.  If after a week or so you do not feel this has been beneficial I would like you to try the Linzess at 72 mcg.  You will probably have diarrhea at first.  You would want to send us an update about a week after you have been on the Linzess.  It is okay to use as needed stool softeners and stimulant laxatives, though I would avoid the use of stimulant laxatives regularly.  For the internal hemorrhoids, one of the most important aspect is to regulate stool output.  You can also utilize over-the-counter Preparation H suppositories or if you would like to try a prescription called Anusol suppository please let me know.  Continue with the gluten-free diet and bone density scan in the summer.

## 2024-03-20 NOTE — PROGRESS NOTES
[facility] - Outpatient Follow-up Note  Rosa Caraballo 68 y.o. female MRN: [id number]  Encounter: [id number]    ASSESSMENT AND PLAN:      1. Gastroesophageal reflux disease without esophagitis    Patient with history of heartburn.  EGD from 11/2020 showed a small hiatal hernia, mild gastritis, fundic gland polyps.  No evidence of Campbell's or H. pylori.  Patient is on H2RA twice daily.  She can utilize sucralfate as needed for flareups of symptoms.  We did review if she has persistent treatment refractory symptoms on this regimen, we would need to consider escalation back to PPI.   No alarm features present at this time.  Continue with diet and lifestyle modifications for GERD.  Consider biliary w/u if symptoms persist.     - famotidine 20 mg BID  - sucralfate (CARAFATE) 1 g tablet; Take 1 tablet (1 g total) by mouth 2 (two) times a day  Dispense: 60 tablet; Refill: 5    2. Celiac disease  3. Follicular lymphoma, unspecified follicular lymphoma type, unspecified body region (HCC)    Hx of such, as well as follicular lymphoma.   Pt has been diligently gluten free since 2010.   Serologies from 03/2024 TtG IgA < 2. Iron studies wnl, mag wnl.   DEXA due in 08/2024 given osteopenia.   Previous EGD with duodenal bx were negative, last duodenal bx from 11/2021.    4. Constipation, unspecified constipation type  5. BRBPR (bright red blood per rectum)  6. Internal hemorrhoids    Pt does tend toward episodes of constipation.   She has utilized a combination of miralax/dulcolax in the past, though after discussion it does not sound as though she was taking regularly.   I would recommend trial of miralax 17 g daily to BID.   If ineffective, trial linaclotide samples 72 mcg which were previously provided to pt.     7. Personal history of colonic polyps    Patient had a colonoscopy completed in 02/2024, this demonstrated tortuous redundant colon, small sessile polyps, tubular adenoma on biopsy.   Recall in 5 years for surveillance purposes.    8. Hepatic steatosis    Noted on a previous imaging study back in 2021.  Suspect component of NAFLD and potentially alcohol intake.  Her fibrosis score is low.  We can consider an elastography in follow-up.    Discussed recommendations in regards to fatty liver including:   Strict control of contributing comorbidities (obesity, prediabetes/diabetes, hypertension, and hypertriglyceridemia).  Weight loss of approx 10-15% of patient's current body weight over a period of 6-12 months through low fat diet and cardiovascular exercise as tolerated.   Limiting alcohol consumption, preferably complete abstinence.  Monitor hepatic function every 6 months with routine labs.     NAFLD Fibrosis Score is: -1.152    NAFLD Score Correlated Fibrosis Severity   <0.12 F0-F2   0.12-0.676 Indeterminate Score   >0.676 F3-F4   **Fibrosis Severity Scale: F0 = no fibrosis; F1= mild fibrosis; F2 = moderate fibrosis; F3 = severe fibrosis; F4 = cirrhosis    NAFLD Score Component Values:  Component Value Date   Age: 68 y.o.     BMI: 25.46 kg/m²    IFG or DM: No    AST: 31 U/L 11/21/2023   ALT: 33 U/L 11/21/2023   Platelet: 190 Thousands/uL 11/21/2023   Albumin: 4.2 g/dL 11/21/2023     We will follow up in 6 months to reassess symptoms.   ______________________________________________________________________    SUBJECTIVE: Patient is a 68 y.o. female who presents today for follow-up regarding colonoscopy. Pmhx sig for Celiac disease, follicular lymphoma, HLD, asthma, RLS.     Patient was last evaluated in 01/2024.  At that time she was dealing with worsening constipation and BRBPR.  She underwent a colonoscopy in 02/2024, and this demonstrated internal hemorrhoids and several adenomatous colon polyps.  She was instructed to try MiraLAX and escalate to low-dose Linzess if her constipation persisted.    03/20/24:     Patient shares she is still dealing with sporadic stool output.  She alternates  between formed stool, 2 urgent and loose, to constipation and straining.  She is continuing to utilize MiraLAX and Dulcolax as needed.  She had an episode of rectal bleeding over the weekend, prior to this had been feeling well. Has some hesitancy in starting linzess. No nocturnal BM. No unintentional weight loss over past 6 months.     Pertaining to UGI tract, pt shares that overall her heartburn is well-controlled on twice daily H2RA.  She does have infrequent breakthrough epigastric burning with certain dietary intake, she has sucralfate on hand which she would like to use as needed.  She denies any nausea, emesis, dysphagia or odynophagia.    NSAIDs: regularly for MSK pain  Tobacco: none   Etoh: 1-2 glasses white wine several nights per week      11/2023: Hb 13.5, MCV 94, Plt 190, BUN 12, Cr 0.76, AST 31, ALT 33, ALP 55, albumin 4.2, t bili 0.65   03/2024: TTG IgA <2, IgA 166, mag 2.0, iron 90, TSAT 26, TIBC 349, ferritin 80     Endoscopic history:   EGD: 11/2022: Small sliding hiatal hernia (type I hiatal hernia); Mild erythematous mucosa in the stomach; Ten or more polyps measuring from 3 mm up to 6 mm in the fundus of the stomach and body of the stomach   A. Stomach, biopsy: Gastric antral mucosa with reactive gastropathy. Gastric oxyntic mucosa with mild chronic inactive gastritis and changes suggestive of proton pump inhibitor effect. Negative for intestinal metaplasia and dysplasia. Negative for Helicobacter pylori-type organisms on H&E stain.    B. Stomach polyp, polypectomy: Fundic gland polyp.   C. Esophagus, random, biopsy: Squamous mucosa with mild reactive changes. No evidence of esophagitis.  Colon: 05/2019: All observed locations appeared normal  Colon: 02/2024: 3 subcentimeter polyps were removed; hemorrhoids   A. Large Intestine, Sigmoid Colon, polyp, biopsy: Tubular adenoma. Negative for high grade dysplasia and carcinoma.   B. Rectum, polyp, biopsy: Polypoid portion of colonic mucosa with  surface hyperplastic change.     Review of Systems   Otherwise Per HPI    Historical Information   Past Medical History:   Diagnosis Date    Asthma     Celiac disease     Chronic sinusitis 04/27/2023    Colon polyp     Follicular lymphoma (HCC)     GERD (gastroesophageal reflux disease)     Heart palpitations     History of colonic polyps     resolved 07/12/2016    History of radiation therapy 2010    Hyperlipidemia     Insomnia     Irregular heart beat     Lymphoma, 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HCC)     non hodgekins, remission    Malignant lymphoma (HCC) 2010    rt arm cutaneous follicular stage ia (rt diatal medical biceps area , s/p resected and s/p radistion treatment    resolved 12/17/2014    Postmenopausal disorder     resolved 09/21/2017    Pulmonary nodule     BENIGN, STABLE [id number]    Restless legs syndrome     resolved 09/21/2017    TMJ syndrome     resolved 09/21/2017     Past Surgical History:   Procedure Laterality Date    COLONOSCOPY  04/16/2019    FUNCTIONAL ENDOSCOPIC SINUS SURGERY Bilateral 2013    Dr Bagley    HYSTERECTOMY      age 45 complete    LYMPH NODE DISSECTION Right     arm    NASAL SEPTUM SURGERY  2013    with turbinate reduction - Dr. Bagley    OOPHORECTOMY Bilateral     TN ESOPHAGOGASTRODUODENOSCOPY TRANSORAL DIAGNOSTIC N/A 01/18/2016    Procedure: ESOPHAGOGASTRODUODENOSCOPY (EGD);  Surgeon: Ness Shepherd MD;  [facility]: [facility];  Service: Gastroenterology    TN EXCISION GANGLION WRIST DORSAL/VOLAR RECURRENT Left 12/08/2020    Procedure: WRIST GANGLION CYST EXCISION;  Surgeon: Marie Gaspar MD;  [facility];  Service: Orthopedics    TN EXCISION TUMOR SOFT TISSUE SHOULDER SUBQ 3 CM/> Left 11/27/2023    Procedure: anterior shouler- EXCISION BIOPSY TISSUE LESION/MASS UPPER EXTREMITY;  Surgeon: Ronak Alanis DO;  Location: [facility];  Service: Orthopedics    SYNOVECTOMY N/A 12/08/2020    Procedure: ECU TENOSYNOVECTOMY;  Surgeon: Marie Gaspar MD;  [facility];  Service:  Orthopedics    TONSILLECTOMY      TOTAL ABDOMINAL HYSTERECTOMY W/ BILATERAL SALPINGOOPHORECTOMY      age 51    UPPER GASTROINTESTINAL ENDOSCOPY      US GUIDED MSK PROCEDURE  2020    US GUIDED MSK PROCEDURE  2020    US GUIDED MSK PROCEDURE  2021     Social History   Social History     Substance and Sexual Activity   Alcohol Use Yes    Alcohol/week: 3.0 standard drinks of alcohol    Types: 3 Glasses of wine per week    Comment: soc     Social History     Substance and Sexual Activity   Drug Use No     Social History     Tobacco Use   Smoking Status Former    Current packs/day: 0.00    Average packs/day: 0.8 packs/day for 20.0 years (15.0 ttl pk-yrs)    Types: Cigarettes    Start date:     Quit date:     Years since quittin.2   Smokeless Tobacco Never   Tobacco Comments    Quit 10/31/04 2130     Family History   Problem Relation Age of Onset    Lymphoma Mother     Throat cancer Father     Heart failure Father     Atrial fibrillation Father     Diabetes Father     Colonic polyp Father     Hypertension Father     Thyroid cancer Sister     Breast cancer Paternal Grandmother 69    Breast cancer Paternal Aunt 69    Ovarian cancer Paternal Aunt 70    No Known Problems Maternal Grandmother     No Known Problems Maternal Grandfather     Cancer Paternal Grandfather     Lung cancer Paternal Grandfather     BRCA1 Positive Cousin     Skin cancer Paternal Aunt     Throat cancer Paternal Uncle     No Known Problems Maternal Aunt      Meds/Allergies       Current Outpatient Medications:     amLODIPine (NORVASC) 5 mg tablet    benralizumab (FASENRA) subcutaneous injection    bimatoprost (LATISSE) 0.03 % ophthalmic solution    Breo Ellipta 100-25 MCG/ACT inhaler    Cholecalciferol 50 MCG ( UT) CAPS    cyanocobalamin (VITAMIN B-12) 1,000 mcg tablet    erythromycin with ethanol (EMGEL) 2 % gel    famotidine (PEPCID) 20 mg tablet    flecainide (TAMBOCOR) 100 mg tablet    fluticasone (FLONASE) 50 mcg/act  "nasal spray    ipratropium (ATROVENT) 0.03 % nasal spray    levalbuterol (XOPENEX HFA) 45 mcg/act inhaler    lidocaine (LMX) 4 % cream    linaCLOtide 72 MCG CAPS    Magnesium 500 MG CAPS    methocarbamol (ROBAXIN) 500 mg tablet    metoprolol succinate (TOPROL-XL) 25 mg 24 hr tablet    ondansetron (ZOFRAN-ODT) 4 mg disintegrating tablet    polyethylene glycol (GLYCOLAX) 17 GM/SCOOP powder    rosuvastatin (CRESTOR) 5 mg tablet    zolpidem (AMBIEN) 5 mg tablet    EPINEPHrine (EPIPEN) 0.3 mg/0.3 mL SOAJ    senna-docusate sodium (SENOKOT-S) 8.6-50 mg per tablet    Allergies   Allergen Reactions    Gluten Meal - Food Allergy GI Intolerance     Celiac     Morphine And Related Itching    Nuts - Food Allergy Hives     Almonds,walnuts, hazelnuts and other related nuts.     Shellfish-Derived Products - Food Allergy Anaphylaxis    Wheat Bran - Food Allergy GI Intolerance    Sulfa Antibiotics Rash     Objective     Blood pressure 118/78, pulse 72, temperature 97.7 °F (36.5 °C), resp. rate 18, height 5' 5\" (1.651 m), weight 69.4 kg (153 lb), SpO2 97%, not currently breastfeeding. Body mass index is 25.46 kg/m².    Physical Exam  Vitals and nursing note reviewed.   Constitutional:       General: She is not in acute distress.     Appearance: She is well-developed.   HENT:      Head: Normocephalic and atraumatic.   Eyes:      Conjunctiva/sclera: Conjunctivae normal.   Cardiovascular:      Rate and Rhythm: Normal rate and regular rhythm.      Heart sounds: No murmur heard.  Pulmonary:      Effort: Pulmonary effort is normal. No respiratory distress.      Breath sounds: Normal breath sounds.   Abdominal:      Palpations: Abdomen is soft.      Tenderness: There is no abdominal tenderness.   Musculoskeletal:         General: No swelling.      Cervical back: Neck supple.   Skin:     General: Skin is warm and dry.      Capillary Refill: Capillary refill takes less than 2 seconds.   Neurological:      Mental Status: She is alert. "   Psychiatric:         Mood and Affect: Mood normal.       Lab Results:   No visits with results within 1 Day(s) from this visit.   Latest known visit with results is:   Appointment on 03/04/2024   Component Date Value    TISSUE TRANSGLUTAMINASE * 03/04/2024 <2     IGA 03/04/2024 166     Magnesium 03/04/2024 2.0     Iron Saturation 03/04/2024 26     TIBC 03/04/2024 349     Iron 03/04/2024 90     UIBC 03/04/2024 259     Ferritin 03/04/2024 80      Radiology Results:   XR spine cervical complete 4 or 5 vw non injury    Result Date: 2/29/2024  Narrative: CERVICAL SPINE INDICATION:   Acne, unspecified. Anesthesia of skin. Cervicalgia. COMPARISON: None. VIEWS:  XR SPINE CERVICAL COMPLETE 4 OR 5 VW NON INJURY Images: 5 FINDINGS: No fracture. Slight anterolisthesis C3 on C4. The intervertebral disc spaces are preserved. Multilevel facet and uncovertebral hypertrophy. The neuroforamina are patent. The prevertebral soft tissues are within normal limits.  The lung apices are clear.     Impression: No acute osseous abnormality. Degenerative changes as above. Electronically signed: 02/29/2024 02:43 PM Nolberto Henry, MPH,MD    Colonoscopy    Result Date: 2/23/2024  Narrative: Table formatting from the original result was not included. [facility] [address] [place] [id number] [phone] DATE OF SERVICE: 2/23/24 PHYSICIAN(S): Attending: Lisa Weems MD Fellow: No Staff Documented INDICATION: Personal history of colonic polyps POST-OP DIAGNOSIS: See the impression below. HISTORY: Prior colonoscopy: 5 years ago. BOWEL PREPARATION: Golytely/Colyte/Trilyte; Miralax/Dulcolax PREPROCEDURE: Informed consent was obtained for the procedure, including sedation. Risks including but not limited to bleeding, infection, perforation, adverse drug reaction and aspiration were explained in detail. Also explained about less than 100% sensitivity with the exam and other alternatives. The patient was placed  in the left lateral decubitus position. Procedure: Colonoscopy DETAILS OF PROCEDURE: Patient was taken to the procedure room where a time out was performed to confirm correct patient and correct procedure. The patient underwent monitored anesthesia care, which was administered by an anesthesia professional. The patient's blood pressure, heart rate, level of consciousness, oxygen, respirations, ECG and ETCO2 were monitored throughout the procedure. A digital rectal exam was performed. The scope was introduced through the anus and advanced to the cecum. Retroflexion was performed in the rectum. The quality of bowel preparation was evaluated using the [place] Bowel Preparation Scale with scores of: right colon = 2, transverse colon = 2, left colon = 2. The total BBPS score was 6. Bowel prep was adequate. The patient experienced no blood loss. The procedure was not difficult. The patient tolerated the procedure well. There were no apparent adverse events. ANESTHESIA INFORMATION: ASA: II Anesthesia Type: IV Sedation with Anesthesia MEDICATIONS: No administrations occurring from 1054 to 1142 on 02/23/24 FINDINGS: Tortuous redundant colon Polyp measuring smaller than 5 mm in the sigmoid colon; performed cold snare with complete en bloc removal and retrieved specimen Sessile polyp measuring smaller than 5 mm in the sigmoid colon; performed cold snare with complete en bloc removal but did not retrieve specimen Polyp measuring smaller than 5 mm in the rectum; performed cold forceps biopsy with complete en bloc removal Internal hemorrhoids EVENTS: Procedure Events Event Event Time ENDO CECUM REACHED 2/23/2024 11:17 AM ENDO SCOPE OUT TIME 2/23/2024 11:40 AM SPECIMENS: ID Type Source Tests Collected by Time Destination 1 : polyp x1 Tissue Large Intestine, Sigmoid Colon TISSUE EXAM Lisa Weems MD 2/23/2024 11:30 AM  2 : polyp Tissue Rectum TISSUE EXAM Lisa Weems MD 2/23/2024 11:37 AM  EQUIPMENT: Colonoscope -      Impression: 3 subcentimeter polyps were removed Hemorrhoids RECOMMENDATION:  Repeat colonoscopy in 5 years  Personal history of colon polyps    MD Collette Latham PA-C    **Please note:  Dictation voice to text software may have been used in the creation of this record.  Occasional wrong word or “sound alike” substitutions may have occurred due to the inherent limitations of voice recognition software.  Read the chart carefully and recognize, using context, where substitutions have occurred.**

## 2024-03-25 ENCOUNTER — HOSPITAL ENCOUNTER (OUTPATIENT)
Dept: INFUSION CENTER | Facility: HOSPITAL | Age: 69
Discharge: HOME | End: 2024-03-25
Attending: INTERNAL MEDICINE
Payer: COMMERCIAL

## 2024-03-25 VITALS
TEMPERATURE: 96.8 F | RESPIRATION RATE: 18 BRPM | HEART RATE: 82 BPM | OXYGEN SATURATION: 99 % | DIASTOLIC BLOOD PRESSURE: 60 MMHG | SYSTOLIC BLOOD PRESSURE: 124 MMHG

## 2024-03-25 DIAGNOSIS — J45.50 SEVERE PERSISTENT ASTHMA WITHOUT COMPLICATION: ICD-10-CM

## 2024-03-25 DIAGNOSIS — D72.119 HYPEREOSINOPHILIC SYNDROME, UNSPECIFIED TYPE: Primary | ICD-10-CM

## 2024-03-25 PROCEDURE — 96372 THER/PROPH/DIAG INJ SC/IM: CPT

## 2024-03-25 RX ORDER — EPINEPHRINE 1 MG/ML
0.3 INJECTION, SOLUTION, CONCENTRATE INTRAVENOUS ONCE
OUTPATIENT
Start: 2024-05-17 | End: 2024-05-17

## 2024-03-25 RX ADMIN — BENRALIZUMAB 30 MG: 30 INJECTION, SOLUTION SUBCUTANEOUS at 10:13

## 2024-03-25 NOTE — PROGRESS NOTES
Rosa Ilya  received fasenra without incident. Patient observed for 30 minutes post fasenra No S/S of adverse reaction. Her epi pen expires Jan 2025.    Rosa Caraballo is aware of future appt on 5-20-24 at 1030    AVS declined by Rosa Caraballo appointment card provided.

## 2024-03-27 DIAGNOSIS — G47.00 INSOMNIA, UNSPECIFIED TYPE: ICD-10-CM

## 2024-03-28 DIAGNOSIS — I73.00 RAYNAUD'S PHENOMENON WITHOUT GANGRENE: ICD-10-CM

## 2024-03-28 DIAGNOSIS — G47.00 INSOMNIA, UNSPECIFIED TYPE: ICD-10-CM

## 2024-03-28 RX ORDER — AMLODIPINE BESYLATE 5 MG/1
5 TABLET ORAL DAILY
Qty: 30 TABLET | Refills: 5 | Status: SHIPPED | OUTPATIENT
Start: 2024-03-28

## 2024-03-28 NOTE — TELEPHONE ENCOUNTER
Patient called undecided whether or not she needs to schedule an appointment to see you regarding her xray results from 2/29/2024.  She is requesting that you review them and let her know if you need to see her.  Thank you.

## 2024-03-31 RX ORDER — ZOLPIDEM TARTRATE 5 MG/1
5 TABLET ORAL
Qty: 30 TABLET | Refills: 2 | OUTPATIENT
Start: 2024-03-31

## 2024-03-31 RX ORDER — ZOLPIDEM TARTRATE 5 MG/1
5 TABLET ORAL
Qty: 30 TABLET | Refills: 2 | Status: SHIPPED | OUTPATIENT
Start: 2024-03-31

## 2024-04-08 ENCOUNTER — TELEPHONE (OUTPATIENT)
Dept: HEMATOLOGY ONCOLOGY | Facility: CLINIC | Age: 69
End: 2024-04-08

## 2024-04-08 NOTE — TELEPHONE ENCOUNTER
Appointment Change  Cancel, Reschedule, Change to Virtual      Who are you speaking with? Patient   If it is not the patient, is the caller listed on the communication consent form? N/A   Which provider is the appointment scheduled with? Dr. Pat   When was the original appointment scheduled?    Please list date and time 10/11/24 1140   At which location is the appointment scheduled to take place? [place]   Was the appointment rescheduled?     Was the appointment changed from an in person visit to a virtual visit?    If so, please list the details of the change. 10/8/24 240   What is the reason for the appointment change? Appt conflict       Was [facility] transport scheduled? N/A   Does [facility] transport need to be scheduled for the new visit (if applicable) N/A   Does the patient need an infusion appointment rescheduled? N/A   Does the patient have an upcoming infusion appointment scheduled? If so, when? No   Is the patient undergoing chemotherapy? N/A   For appointments cancelled with less than 24 hours:  Was the no-show policy reviewed? N/A

## 2024-05-06 ENCOUNTER — HOSPITAL ENCOUNTER (OUTPATIENT)
Dept: RADIOLOGY | Facility: HOSPITAL | Age: 69
Discharge: HOME | End: 2024-05-06
Attending: ORTHOPAEDIC SURGERY

## 2024-05-06 ENCOUNTER — OFFICE VISIT (OUTPATIENT)
Dept: OBGYN CLINIC | Facility: HOSPITAL | Age: 69
End: 2024-05-06
Payer: COMMERCIAL

## 2024-05-06 VITALS
HEIGHT: 65 IN | WEIGHT: 153 LBS | SYSTOLIC BLOOD PRESSURE: 129 MMHG | DIASTOLIC BLOOD PRESSURE: 69 MMHG | HEART RATE: 63 BPM | BODY MASS INDEX: 25.49 KG/M2

## 2024-05-06 DIAGNOSIS — R52 PAIN: ICD-10-CM

## 2024-05-06 DIAGNOSIS — M46.1 SACROILIITIS (HCC): ICD-10-CM

## 2024-05-06 DIAGNOSIS — R52 PAIN: Primary | ICD-10-CM

## 2024-05-06 DIAGNOSIS — G89.29 CHRONIC BILATERAL LOW BACK PAIN WITHOUT SCIATICA: ICD-10-CM

## 2024-05-06 DIAGNOSIS — M54.50 CHRONIC BILATERAL LOW BACK PAIN WITHOUT SCIATICA: ICD-10-CM

## 2024-05-06 PROCEDURE — 99214 OFFICE O/P EST MOD 30 MIN: CPT | Performed by: ORTHOPAEDIC SURGERY

## 2024-05-06 NOTE — PROGRESS NOTES
Assessment & Plan/Medical Decision Makin y.o. female with back pain and imaging findings most notable for mild lumbar spondylosis        The clinical, physical and imaging findings were reviewed with the patient.  Rosa  has a constellation of findings consistent with Sacroiliac Joint Dysfunction in the setting of lumbar degenerative disease most notably at L3-L4.      We discussed the treatment options including physical therapy, at home exercises, activity modifications, chiropractic medicine, oral medications, interventional spine procedures.  At this time recommend continued conservative treatments.    The patient was referred to Pain Mgmt for further evaluation, treatment and consideration of bilateral SI joint injections as patient lives close to [facility].  A lumbar MRI was ordered to evaluate the nature of patient's spondylosis.   The patient states she would like to avoid physical therapy yet would consider this after SI joint injections as she understands core strength and flexibility can contribute to her symptoms.      Patient instructed to return to office/ER sooner if symptoms are not improving, getting worse, or new worrisome/neurologic symptoms arise.  Patient will follow up as needed yet understands she can return at any point with new or old issues.       Subjective:      Chief Complaint: Back Pain    HPI:  Rosa Caraballo is a 68 y.o. female presenting for initial visit with chief complaint of lumbar spine.  Pain began several years ago.  She had worked with  in past including SI joint injections.      Today she complains of bilateral (right > left) upper buttock pain with new onset of bilateral anterior thigh pain to the knee.  She denies distal tingling or numbness.  99% low back and 1% legs.  She describes her symptoms as constant ache.   She is unsure what aggravates or alleviates.   She has used methocarbamol, Aleve and Advil with limited benefit.   She does use  Tylenol with some benefit.  She has done physical therapy with limited benefit several years ago.   She does use Ambien at night.   She last had SI joint injections in 2011.   She denies past lumbar surgery.  She is currently retied.      Denies any blanca trauma. Denies fever or chills, no night sweats. Denies any bladder or bowel changes.      Conservative therapy includes the following:   Medications: Tylenol, Methocarbamol, Aleve, Advil and Ambien.    Injections: Bilateral SI joints many years ago with      Physical Therapy: several years ago  Chiropractic Medicine: 2/2024 to current  Accupunture/Massage Therapy: yes, with benefit  These therapeutic modalities were ineffective at providing sustained pain relief/functional improvement.     Nicotine dependent: Denies   Occupation: Retired   Living situation: Lives with family   ADLs: patient is able to perform     Objective:     Family History   Problem Relation Age of Onset    Lymphoma Mother     Throat cancer Father     Heart failure Father     Atrial fibrillation Father     Diabetes Father     Colonic polyp Father     Hypertension Father     Thyroid cancer Sister     Breast cancer Paternal Grandmother 69    Breast cancer Paternal Aunt 69    Ovarian cancer Paternal Aunt 70    No Known Problems Maternal Grandmother     No Known Problems Maternal Grandfather     Cancer Paternal Grandfather     Lung cancer Paternal Grandfather     BRCA1 Positive Cousin     Skin cancer Paternal Aunt     Throat cancer Paternal Uncle     No Known Problems Maternal Aunt        Past Medical History:   Diagnosis Date    Asthma     Celiac disease     Chronic sinusitis 04/27/2023    Colon polyp     Follicular lymphoma (HCC)     GERD (gastroesophageal reflux disease)     Heart palpitations     History of colonic polyps     resolved 07/12/2016    History of radiation therapy 2010    Hyperlipidemia     Insomnia     Irregular heart beat     Lymphoma, 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HCC)     non  rosa, remission    Malignant lymphoma (HCC) 2010    rt arm cutaneous follicular stage ia (rt diatal medical biceps area , s/p resected and s/p radistion treatment    resolved 12/17/2014    Postmenopausal disorder     resolved 09/21/2017    Pulmonary nodule     BENIGN, STABLE [id number]    Restless legs syndrome     resolved 09/21/2017    TMJ syndrome     resolved 09/21/2017       Current Outpatient Medications   Medication Sig Dispense Refill    amLODIPine (NORVASC) 5 mg tablet take 1 tablet by mouth once daily 30 tablet 5    benralizumab (FASENRA) subcutaneous injection Inject 1 mL (30 mg total) under the skin every 56 days for 6 doses 1 Syringe 6    bimatoprost (LATISSE) 0.03 % ophthalmic solution Take as directed      Breo Ellipta 100-25 MCG/ACT inhaler Inhale 1 puff daily Rinse mouth after use. 60 blister 11    Cholecalciferol 50 MCG (2000 UT) CAPS Take by mouth daily      cyanocobalamin (VITAMIN B-12) 1,000 mcg tablet Take 1,000 mcg by mouth daily      EPINEPHrine (EPIPEN) 0.3 mg/0.3 mL SOAJ Inject 0.3 mL (0.3 mg total) into a muscle once for 1 dose 0.6 mL 0    erythromycin with ethanol (EMGEL) 2 % gel Apply topically 2 (two) times a day 30 g 1    famotidine (PEPCID) 20 mg tablet Take 1 tablet (20 mg total) by mouth 2 (two) times a day 180 tablet 3    flecainide (TAMBOCOR) 100 mg tablet take 1 tablet by mouth twice a day 180 tablet 3    fluticasone (FLONASE) 50 mcg/act nasal spray 2 sprays into each nostril 2 (two) times a day 48 g 4    ipratropium (ATROVENT) 0.03 % nasal spray 2 sprays into each nostril 3 (three) times a day as needed for rhinitis 30 mL 3    levalbuterol (XOPENEX HFA) 45 mcg/act inhaler Inhale 1-2 puffs every 4 (four) hours as needed for wheezing or shortness of breath 45 g 0    lidocaine (LMX) 4 % cream Apply topically as needed for mild pain 15 g 3    linaCLOtide 72 MCG CAPS Take 72 mcg by mouth daily 4 capsule 0    Magnesium 500 MG CAPS Take by mouth      methocarbamol (ROBAXIN) 500 mg  tablet Take 1 tablet (500 mg total) by mouth 3 (three) times a day as needed for muscle spasms 60 tablet 0    metoprolol succinate (TOPROL-XL) 25 mg 24 hr tablet Take 1 tablet (25 mg total) by mouth every evening 90 tablet 5    ondansetron (ZOFRAN-ODT) 4 mg disintegrating tablet Take 1 tablet (4 mg total) by mouth every 6 (six) hours as needed for nausea or vomiting 15 tablet 0    polyethylene glycol (GLYCOLAX) 17 GM/SCOOP powder Take 17 g by mouth 2 (two) times a day 765 g 4    rosuvastatin (CRESTOR) 5 mg tablet take 1 tablet by mouth once daily 90 tablet 3    senna-docusate sodium (SENOKOT-S) 8.6-50 mg per tablet Take 1 tablet by mouth daily for 14 days (Patient taking differently: Take 1 tablet by mouth 2 (two) times a day as needed) 14 tablet 0    sucralfate (CARAFATE) 1 g tablet Take 1 tablet (1 g total) by mouth 2 (two) times a day 60 tablet 5    zolpidem (AMBIEN) 5 mg tablet Take 1 tablet (5 mg total) by mouth daily at bedtime as needed for sleep 30 tablet 2     No current facility-administered medications for this visit.       Past Surgical History:   Procedure Laterality Date    COLONOSCOPY  04/16/2019    FUNCTIONAL ENDOSCOPIC SINUS SURGERY Bilateral 2013    Dr Bagley    HYSTERECTOMY      age 45 complete    LYMPH NODE DISSECTION Right     arm    NASAL SEPTUM SURGERY  2013    with turbinate reduction - Dr. Bagley    OOPHORECTOMY Bilateral     HI ESOPHAGOGASTRODUODENOSCOPY TRANSORAL DIAGNOSTIC N/A 01/18/2016    Procedure: ESOPHAGOGASTRODUODENOSCOPY (EGD);  Surgeon: Ness Shepherd MD;  Location: [facility];  Service: Gastroenterology    HI EXCISION GANGLION WRIST DORSAL/VOLAR RECURRENT Left 12/08/2020    Procedure: WRIST GANGLION CYST EXCISION;  Surgeon: Marie Gaspar MD;  Location: [facility];  Service: Orthopedics    HI EXCISION TUMOR SOFT TISSUE SHOULDER SUBQ 3 CM/> Left 11/27/2023    Procedure: anterior shouler- EXCISION BIOPSY TISSUE LESION/MASS UPPER EXTREMITY;  Surgeon: Ronak Alanis DO;  Location:  [facility];  Service: Orthopedics    SYNOVECTOMY N/A 2020    Procedure: ECU TENOSYNOVECTOMY;  Surgeon: Marie Gaspar MD;  Location: [facility];  Service: Orthopedics    TONSILLECTOMY      TOTAL ABDOMINAL HYSTERECTOMY W/ BILATERAL SALPINGOOPHORECTOMY      age 51    UPPER GASTROINTESTINAL ENDOSCOPY      US GUIDED MSK PROCEDURE  2020    US GUIDED MSK PROCEDURE  2020    US GUIDED MSK PROCEDURE  2021       Social History     Socioeconomic History    Marital status: /Civil Union     Spouse name: Not on file    Number of children: Not on file    Years of education: Not on file    Highest education level: Not on file   Occupational History    Occupation: X-ray technologist   Tobacco Use    Smoking status: Former     Current packs/day: 0.00     Average packs/day: 0.8 packs/day for 20.0 years (15.0 ttl pk-yrs)     Types: Cigarettes     Start date:      Quit date:      Years since quittin.3    Smokeless tobacco: Never    Tobacco comments:     Quit 10/31/04 2130   Vaping Use    Vaping status: Never Used   Substance and Sexual Activity    Alcohol use: Yes     Alcohol/week: 3.0 standard drinks of alcohol     Types: 3 Glasses of wine per week     Comment: soc    Drug use: No    Sexual activity: Yes     Partners: Male   Other Topics Concern    Not on file   Social History Narrative    Caffeine use    exercise walking      Social Determinants of Health     Financial Resource Strain: Not on file   Food Insecurity: Not on file   Transportation Needs: Not on file   Physical Activity: Not on file   Stress: Not on file   Social Connections: Not on file   Intimate Partner Violence: Not on file   Housing Stability: Not on file       Allergies   Allergen Reactions    Gluten Meal - Food Allergy GI Intolerance     Celiac     Morphine And Related Itching    Nuts - Food Allergy Hives     Almonds,walnuts, hazelnuts and other related nuts.     Shellfish-Derived Products - Food Allergy  "Anaphylaxis    Wheat Bran - Food Allergy GI Intolerance    Sulfa Antibiotics Rash       Review of Systems  General- denies fever/chills  HEENT- denies hearing loss or sore throat  Eyes- denies eye pain or visual disturbances, denies red eyes  Respiratory- denies cough or SOB  Cardio- denies chest pain or palpitations  GI- denies abdominal pain  Endocrine- denies urinary frequency  Urinary- denies pain with urination  Musculoskeletal- Negative except noted above  Skin- denies rashes or wounds  Neurological- denies dizziness or headache  Psychiatric- denies anxiety or difficulty concentrating    Physical Exam  Ht 5' 5\" (1.651 m)   Wt 69.4 kg (153 lb)   BMI 25.46 kg/m²     General/Constitutional: No apparent distress: well-nourished and well developed.  Lymphatic: No appreciable lymphadenopathy  Respiratory: Non-labored breathing  Vascular: No edema, swelling or tenderness, except as noted in detailed exam.  Integumentary: No impressive skin lesions present, except as noted in detailed exam.  Psych: Normal mood and affect, oriented to person, place and time.  MSK: normal other than stated in HPI and exam  Gait & balance: no evidence of myelopathic gait, ambulates Independently     Lumbar spine range of motion:  -Forward flexion to 90  -Extension to neutral  -Lateral bend 25 right, 25 left  -Rotation 25 right, 25 left  There tenderness with palpation along lumbar paraspinal musculature, no midline tenderness     Neurologic:    Lower Extremity Motor Function    Right  Left    Iliopsoas  5/5  5/5    Quadriceps 5/5 5/5   Tibialis anterior  5/5  5/5    EHL  5/5  5/5    Gastroc. muscle  5/5  5/5    Heel rise  5/5  5/5    Toe rise  5/5  5/5      Sensory: light touch is intact to bilateral upper and lower extremities     Reflexes:    Right Left   Patellar 1+ 1+   Achilles 1+ 1+   Babinski neg neg     Other tests:  Straight Leg Raise: negative  Bhupinder SI: positive on right  AKIRA SI: positive  Greater troch: no tenderness " "  Internal/external hip ROM: intact, no pain   Flexion/extension knee ROM: intact, no pain   Vascular: WWP extremities, 2+DP bilateral      Diagnostic Tests   IMAGING: I have personally reviewed the images and these are my findings:  Lumbar Spine X-rays from 1/29/2024: multi level lumbar spondylosis with loss of disc height, osteophyte formation and facet hypertrophy, no apparent spondylolisthesis, no appreciated lytic/blastic lesions, no obvious instability.  Mild coronal asymmetry at L3-4    Electronic Medical Records were reviewed including Radiology reports     Procedures, if performed today     None performed       Portions of the record may have been created with voice recognition software.  Occasional wrong word or \"sound a like\" substitutions may have occurred due to the inherent limitations of voice recognition software.  Read the chart carefully and recognize, using context, where substitutions have occurred.    "

## 2024-05-09 NOTE — TELEPHONE ENCOUNTER
Returned patient's call to review PET-CT results  Overall no evidence of adenopathy throughout the neck chest abdomen or pelvis, minimal discrepancy of glucose activity in the right proximal humerus warrants continued follow-up with MRI  Patient has a follow-up appointment on 09/22/2021 with Dr Clarisa Avelar  And will review recommendations at that time  Patient verbalized understanding and is in agreement with the plan 
show

## 2024-05-11 ENCOUNTER — HOSPITAL ENCOUNTER (OUTPATIENT)
Dept: MRI IMAGING | Facility: HOSPITAL | Age: 69
Discharge: HOME | End: 2024-05-11
Attending: ORTHOPAEDIC SURGERY
Payer: COMMERCIAL

## 2024-05-11 DIAGNOSIS — R52 PAIN: ICD-10-CM

## 2024-05-11 DIAGNOSIS — G89.29 CHRONIC BILATERAL LOW BACK PAIN WITHOUT SCIATICA: ICD-10-CM

## 2024-05-11 DIAGNOSIS — M46.1 SACROILIITIS (HCC): ICD-10-CM

## 2024-05-11 DIAGNOSIS — M54.50 CHRONIC BILATERAL LOW BACK PAIN WITHOUT SCIATICA: ICD-10-CM

## 2024-05-11 PROCEDURE — 72148 MRI LUMBAR SPINE W/O DYE: CPT

## 2024-05-16 ENCOUNTER — CONSULT (OUTPATIENT)
Dept: PAIN MEDICINE | Facility: CLINIC | Age: 69
End: 2024-05-16
Payer: COMMERCIAL

## 2024-05-16 VITALS
DIASTOLIC BLOOD PRESSURE: 83 MMHG | SYSTOLIC BLOOD PRESSURE: 134 MMHG | WEIGHT: 150 LBS | HEIGHT: 65 IN | BODY MASS INDEX: 24.99 KG/M2 | HEART RATE: 84 BPM

## 2024-05-16 DIAGNOSIS — M53.3 PAIN OF BOTH SACROILIAC JOINTS: Primary | ICD-10-CM

## 2024-05-16 DIAGNOSIS — G89.4 CHRONIC PAIN SYNDROME: ICD-10-CM

## 2024-05-16 DIAGNOSIS — M51.36 DDD (DEGENERATIVE DISC DISEASE), LUMBAR: ICD-10-CM

## 2024-05-16 PROCEDURE — 99204 OFFICE O/P NEW MOD 45 MIN: CPT | Performed by: STUDENT IN AN ORGANIZED HEALTH CARE EDUCATION/TRAINING PROGRAM

## 2024-05-16 NOTE — PROGRESS NOTES
"Assessment:  1. Pain of both sacroiliac joints    2. DDD (degenerative disc disease), lumbar    3. Chronic pain syndrome        Portions of the record may have been created with voice recognition software. Occasional wrong word or \"sound a like\" substitutions may have occurred due to the inherent limitations of voice recognition software. Read the chart carefully and recognize, using context, where substitutions have occurred. Contact me with any questions.       Plan:  68 y o f referred by Dr Rojo, here for initial evaluation of acute on chronic bilateral low back pain with intermittent radiation into posterior BLE. Notes pain limited difficulty with ambulation, denies new or progressive weakness, saddle anesthesia, bbi. Recent lumbar spine xray showed multilevel DDD.  She was recently evaluated by orthopedic spine surgery and recommended to continue conservative treatments.  She has previously undergone sacroiliac injections for similar symptoms with good relief.  She has not yet started physical therapy for the symptoms.  Symptoms likely multifactorial in the setting of sacroiliac joint pain, lumbar DDD, myofascial pain.    Will schedule for bilateral sacroiliac joint injections.    Recommend course of physical therapy for core strengthening, addressing SI joint dysfunction.    Follow-up in 1 month after injections.        Complete risks and benefits including bleeding, infection, tissue reaction, nerve injury and allergic reaction were discussed. The approach was demonstrated using models and literature was provided. Verbal and written consent was obtained.      History of Present Illness:    The patient is a 68 y.o. female who presents for consultation in regards to Back Pain and Buttocks Pain.  Symptoms have been present for 8 years. Symptoms began without any precipitating injury or trauma. Pain is reported to be 6 on the numeric rating scale.  Symptoms are felt constantly and worst in the no typical " pattern.  Symptoms are characterized as dull/aching.  Symptoms are associated with no weakness.  Aggravating factors include bending and exercise.  Relieving factors include nothing.       Review of Systems:    Review of Systems   Constitutional:  Negative for chills and fever.   HENT:  Negative for ear pain, sinus pressure and sore throat.    Eyes:  Negative for pain and redness.   Respiratory:  Negative for cough and wheezing.    Cardiovascular:  Negative for leg swelling.   Gastrointestinal:  Negative for abdominal pain and nausea.   Endocrine: Negative for polydipsia and polyuria.   Genitourinary:  Negative for difficulty urinating and frequency.   Musculoskeletal:  Positive for arthralgias. Negative for gait problem and myalgias.        Decreased range of motion     Skin:  Negative for pallor and wound.   Neurological:  Negative for seizures, weakness and headaches.   Psychiatric/Behavioral:  Negative for decreased concentration and sleep disturbance.    All other systems reviewed and are negative.          Past Medical History:   Diagnosis Date    Asthma     Celiac disease     Chronic sinusitis 04/27/2023    Colon polyp     Follicular lymphoma (HCC)     GERD (gastroesophageal reflux disease)     Heart palpitations     History of colonic polyps     resolved 07/12/2016    History of radiation therapy 2010    Hyperlipidemia     Insomnia     Irregular heart beat     Lymphoma, 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HCC)     non hodgekins, remission    Malignant lymphoma (HCC) 2010    rt arm cutaneous follicular stage ia (rt diatal medical biceps area , s/p resected and s/p radistion treatment    resolved 12/17/2014    Postmenopausal disorder     resolved 09/21/2017    Pulmonary nodule     BENIGN, STABLE 5055-1030    Restless legs syndrome     resolved 09/21/2017    TMJ syndrome     resolved 09/21/2017       Past Surgical History:   Procedure Laterality Date    COLONOSCOPY  04/16/2019    FUNCTIONAL ENDOSCOPIC SINUS SURGERY Bilateral 2013     Dr Bagley    HYSTERECTOMY      age 45 complete    LYMPH NODE DISSECTION Right     arm    NASAL SEPTUM SURGERY  2013    with turbinate reduction - Dr. Bagley    OOPHORECTOMY Bilateral     NJ ESOPHAGOGASTRODUODENOSCOPY TRANSORAL DIAGNOSTIC N/A 01/18/2016    Procedure: ESOPHAGOGASTRODUODENOSCOPY (EGD);  Surgeon: Ness Shepherd MD;  Location: [facility];  Service: Gastroenterology    NJ EXCISION GANGLION WRIST DORSAL/VOLAR RECURRENT Left 12/08/2020    Procedure: WRIST GANGLION CYST EXCISION;  Surgeon: Marie Gaspar MD;  [facility]: [facility];  Service: Orthopedics    NJ EXCISION TUMOR SOFT TISSUE SHOULDER SUBQ 3 CM/> Left 11/27/2023    Procedure: anterior shouler- EXCISION BIOPSY TISSUE LESION/MASS UPPER EXTREMITY;  Surgeon: Ronak Alanis DO;  Location: [facility];  Service: Orthopedics    SYNOVECTOMY N/A 12/08/2020    Procedure: ECU TENOSYNOVECTOMY;  Surgeon: Marie Gaspar MD;  [facility];  Service: Orthopedics    TONSILLECTOMY      TOTAL ABDOMINAL HYSTERECTOMY W/ BILATERAL SALPINGOOPHORECTOMY      age 51    UPPER GASTROINTESTINAL ENDOSCOPY      US GUIDED MSK PROCEDURE  01/16/2020    US GUIDED MSK PROCEDURE  08/07/2020    US GUIDED MSK PROCEDURE  08/02/2021       Family History   Problem Relation Age of Onset    Lymphoma Mother     Throat cancer Father     Heart failure Father     Atrial fibrillation Father     Diabetes Father     Colonic polyp Father     Hypertension Father     Thyroid cancer Sister     Breast cancer Paternal Grandmother 69    Breast cancer Paternal Aunt 69    Ovarian cancer Paternal Aunt 70    No Known Problems Maternal Grandmother     No Known Problems Maternal Grandfather     Cancer Paternal Grandfather     Lung cancer Paternal Grandfather     BRCA1 Positive Cousin     Skin cancer Paternal Aunt     Throat cancer Paternal Uncle     No Known Problems Maternal Aunt        Social History     Occupational History    Occupation: X-ray technologist   Tobacco Use    Smoking status:  Former     Current packs/day: 0.00     Average packs/day: 0.8 packs/day for 20.0 years (15.0 ttl pk-yrs)     Types: Cigarettes     Start date:      Quit date:      Years since quittin.4    Smokeless tobacco: Never    Tobacco comments:     Quit 10/31/04 2130   Vaping Use    Vaping status: Never Used   Substance and Sexual Activity    Alcohol use: Yes     Alcohol/week: 3.0 standard drinks of alcohol     Types: 3 Glasses of wine per week     Comment: soc    Drug use: No    Sexual activity: Yes     Partners: Male         Current Outpatient Medications:     bimatoprost (LATISSE) 0.03 % ophthalmic solution, Take as directed, Disp: , Rfl:     Cholecalciferol 50 MCG (2000 UT) CAPS, Take by mouth daily, Disp: , Rfl:     cyanocobalamin (VITAMIN B-12) 1,000 mcg tablet, Take 1,000 mcg by mouth daily, Disp: , Rfl:     erythromycin with ethanol (EMGEL) 2 % gel, Apply topically 2 (two) times a day, Disp: 30 g, Rfl: 1    famotidine (PEPCID) 20 mg tablet, Take 1 tablet (20 mg total) by mouth 2 (two) times a day, Disp: 180 tablet, Rfl: 3    flecainide (TAMBOCOR) 100 mg tablet, take 1 tablet by mouth twice a day, Disp: 180 tablet, Rfl: 3    fluticasone (FLONASE) 50 mcg/act nasal spray, 2 sprays into each nostril 2 (two) times a day, Disp: 48 g, Rfl: 4    ipratropium (ATROVENT) 0.03 % nasal spray, 2 sprays into each nostril 3 (three) times a day as needed for rhinitis, Disp: 30 mL, Rfl: 3    levalbuterol (XOPENEX HFA) 45 mcg/act inhaler, Inhale 1-2 puffs every 4 (four) hours as needed for wheezing or shortness of breath, Disp: 45 g, Rfl: 0    lidocaine (LMX) 4 % cream, Apply topically as needed for mild pain, Disp: 15 g, Rfl: 3    Magnesium 500 MG CAPS, Take by mouth, Disp: , Rfl:     methocarbamol (ROBAXIN) 500 mg tablet, Take 1 tablet (500 mg total) by mouth 3 (three) times a day as needed for muscle spasms, Disp: 60 tablet, Rfl: 0    metoprolol succinate (TOPROL-XL) 25 mg 24 hr tablet, Take 1 tablet (25 mg total) by  mouth every evening, Disp: 90 tablet, Rfl: 5    ondansetron (ZOFRAN-ODT) 4 mg disintegrating tablet, Take 1 tablet (4 mg total) by mouth every 6 (six) hours as needed for nausea or vomiting, Disp: 15 tablet, Rfl: 0    polyethylene glycol (GLYCOLAX) 17 GM/SCOOP powder, Take 17 g by mouth 2 (two) times a day, Disp: 765 g, Rfl: 4    rosuvastatin (CRESTOR) 5 mg tablet, take 1 tablet by mouth once daily, Disp: 90 tablet, Rfl: 3    sucralfate (CARAFATE) 1 g tablet, Take 1 tablet (1 g total) by mouth 2 (two) times a day, Disp: 60 tablet, Rfl: 5    zolpidem (AMBIEN) 5 mg tablet, Take 1 tablet (5 mg total) by mouth daily at bedtime as needed for sleep, Disp: 30 tablet, Rfl: 2    amLODIPine (NORVASC) 5 mg tablet, take 1 tablet by mouth once daily (Patient not taking: Reported on 5/6/2024), Disp: 30 tablet, Rfl: 5    benralizumab (FASENRA) subcutaneous injection, Inject 1 mL (30 mg total) under the skin every 56 days for 6 doses, Disp: 1 Syringe, Rfl: 6    Breo Ellipta 100-25 MCG/ACT inhaler, Inhale 1 puff daily Rinse mouth after use., Disp: 60 blister, Rfl: 11    EPINEPHrine (EPIPEN) 0.3 mg/0.3 mL SOAJ, Inject 0.3 mL (0.3 mg total) into a muscle once for 1 dose, Disp: 0.6 mL, Rfl: 0    linaCLOtide 72 MCG CAPS, Take 72 mcg by mouth daily (Patient not taking: Reported on 5/6/2024), Disp: 4 capsule, Rfl: 0    senna-docusate sodium (SENOKOT-S) 8.6-50 mg per tablet, Take 1 tablet by mouth daily for 14 days (Patient taking differently: Take 1 tablet by mouth 2 (two) times a day as needed), Disp: 14 tablet, Rfl: 0  No current facility-administered medications for this visit.    Facility-Administered Medications Ordered in Other Visits:     EPINEPHrine PF (ADRENALIN) 1 mg/mL injection 0.3 mg, 0.3 mg, Intramuscular, Once, Darien Mckeon MD    Allergies   Allergen Reactions    Gluten Meal - Food Allergy GI Intolerance     Celiac     Morphine And Related Itching    Nuts - Food Allergy Hives     Almonds,walnuts, hazelnuts and other  "related nuts.     Shellfish-Derived Products - Food Allergy Anaphylaxis    Wheat Bran - Food Allergy GI Intolerance    Sulfa Antibiotics Rash       Physical Exam:    /83   Pulse 84   Ht 5' 5\" (1.651 m)   Wt 68 kg (150 lb)   BMI 24.96 kg/m²     Constitutional: normal, well developed, well nourished, alert, in no distress and non-toxic and no overt pain behavior.  Eyes: anicteric  HEENT: grossly intact  Neck: supple, symmetric, trachea midline and no masses   Pulmonary:even and unlabored  Cardiovascular:No edema or pitting edema present  Skin:Normal without rashes or lesions and well hydrated  Psychiatric:Mood and affect appropriate  Neurologic:Cranial Nerves II-XII grossly intact  Musculoskeletal:normal gait, grossly intact strength and sensation to light touch over BLE, SIJ manuevers: + b/l sacral compression, + b/l thigh thrust, + b/l AKIRA      Imaging    FL spine and pain procedure    (Results Pending)     MRI LUMBAR SPINE WITHOUT CONTRAST     INDICATION: R52: Pain, unspecified  M46.1: Sacroiliitis, not elsewhere classified  M54.50: Low back pain, unspecified  G89.29: Other chronic pain.     COMPARISON: MRI lumbar spine 6/3/2011     TECHNIQUE:  Multiplanar, multisequence imaging of the lumbar spine was performed. .        IMAGE QUALITY:  Diagnostic     FINDINGS:     VERTEBRAL BODIES:  There are 5 lumbar type vertebral bodies. There is levoscoliosis with apex at L2-3. Multilevel endplate degenerative signal change pronounced at level L3-4. No suspicious discrete marrow lesion.     SACRUM:  Normal signal within the sacrum. No evidence of insufficiency or stress fracture.     DISTAL CORD AND CONUS:  Normal size and signal within the distal cord and conus.     PARASPINAL SOFT TISSUES:  Paraspinal soft tissues are unremarkable.     LOWER THORACIC DISC SPACES:  Normal disc height and signal.  No disc herniation, canal stenosis or foraminal narrowing.     LUMBAR DISC SPACES:     L1-L2: Disc bulge. Mild to " moderate facet arthropathy. Mild canal narrowing. No significant foraminal stenosis.     L2-L3: Disc bulge. Moderate facet arthropathy. Mild canal narrowing. No significant foraminal stenosis.     L3-L4: Mild disc bulge. Moderate facet arthropathy. Minimal canal narrowing. Minimal bilateral foraminal narrowing.     L4-L5: Mild disc bulge. Moderate bilateral facet arthropathy. Mild canal narrowing. Mild bilateral foraminal narrowing.     L5-S1: No significant disc bulge. Moderate facet arthropathy. No canal or foraminal stenosis.     OTHER FINDINGS:  None.     IMPRESSION:     Progression of mild noncompressive lumbar degenerative change compared to remote MRI 6/3/2011 as detailed.      XR spine lumbar 2 or 3 views injury  Order: [id number]  Impression    Impression: Mild levoscoliosis. Narrowing of the L2-3 and L3-4 discs with  degenerative discogenic sclerosis. Mild hypertrophic spurring. Mild  retrolisthesis of L2 on L3.          Workstation:[id number]  [facility]    Erect Three-view lumbar spine    Indication: Mid and low back pain.    3 erect views of the lumbar spine show the patient to have 5 nonrib-bearing  lumbar vertebrae. Patient has a mild levoscoliosis of the lumbar spine. Lumbar  vertebral body heights are maintained and I do not identify a fracture. There is  narrowing of the L2-3 and L3-4 discs. There is degenerative discogenic sclerosis  involving the endplates at the L2-3 and L3-4 levels. There is hypertrophic  degenerative spurring of L2 and L3. There is mild retrolisthesis at the L2-3  level probably degenerative.      Orders Placed This Encounter   Procedures    FL spine and pain procedure

## 2024-05-20 ENCOUNTER — HOSPITAL ENCOUNTER (OUTPATIENT)
Dept: INFUSION CENTER | Facility: HOSPITAL | Age: 69
Discharge: HOME | End: 2024-05-20
Attending: INTERNAL MEDICINE
Payer: COMMERCIAL

## 2024-05-20 VITALS
OXYGEN SATURATION: 100 % | SYSTOLIC BLOOD PRESSURE: 104 MMHG | HEART RATE: 80 BPM | TEMPERATURE: 97.4 F | RESPIRATION RATE: 18 BRPM | DIASTOLIC BLOOD PRESSURE: 60 MMHG

## 2024-05-20 DIAGNOSIS — J45.50 SEVERE PERSISTENT ASTHMA WITHOUT COMPLICATION: ICD-10-CM

## 2024-05-20 DIAGNOSIS — D72.119 HYPEREOSINOPHILIC SYNDROME, UNSPECIFIED TYPE: Primary | ICD-10-CM

## 2024-05-20 PROCEDURE — 96372 THER/PROPH/DIAG INJ SC/IM: CPT

## 2024-05-20 RX ORDER — EPINEPHRINE 1 MG/ML
0.3 INJECTION, SOLUTION, CONCENTRATE INTRAVENOUS ONCE
OUTPATIENT
Start: 2024-07-12 | End: 2024-07-12

## 2024-05-20 RX ORDER — EPINEPHRINE 1 MG/ML
0.3 INJECTION, SOLUTION, CONCENTRATE INTRAVENOUS ONCE
Status: DISCONTINUED | OUTPATIENT
Start: 2024-05-20 | End: 2024-05-24 | Stop reason: HOSPADM

## 2024-05-20 RX ADMIN — BENRALIZUMAB 30 MG: 30 INJECTION, SOLUTION SUBCUTANEOUS at 10:57

## 2024-05-20 NOTE — PROGRESS NOTES
Rosa Caraballo  tolerated treatment well with no complications.  Fasenra injection to REI.  30 min post obs completed without incident.    Rosa Caraballo is aware of future appt on 7/15 at 11 am.     AVS declined by Rosa Caraballo.  Appt card provided with next appt.

## 2024-05-20 NOTE — PROGRESS NOTES
Pt to infusion center for fasenra injection.  Denies s/s of infection or recent abx.  Epi pen at chair side with exp date of 5/2025.

## 2024-06-03 ENCOUNTER — HOSPITAL ENCOUNTER (OUTPATIENT)
Dept: RADIOLOGY | Facility: HOSPITAL | Age: 69
Discharge: HOME | End: 2024-06-03
Attending: STUDENT IN AN ORGANIZED HEALTH CARE EDUCATION/TRAINING PROGRAM
Payer: COMMERCIAL

## 2024-06-03 VITALS
TEMPERATURE: 97.7 F | SYSTOLIC BLOOD PRESSURE: 122 MMHG | OXYGEN SATURATION: 97 % | HEART RATE: 72 BPM | DIASTOLIC BLOOD PRESSURE: 74 MMHG | RESPIRATION RATE: 18 BRPM

## 2024-06-03 DIAGNOSIS — M53.3 PAIN OF BOTH SACROILIAC JOINTS: ICD-10-CM

## 2024-06-03 PROCEDURE — 27096 INJECT SACROILIAC JOINT: CPT | Performed by: STUDENT IN AN ORGANIZED HEALTH CARE EDUCATION/TRAINING PROGRAM

## 2024-06-03 RX ORDER — METHYLPREDNISOLONE ACETATE 40 MG/ML
40 INJECTION, SUSPENSION INTRA-ARTICULAR; INTRALESIONAL; INTRAMUSCULAR; PARENTERAL; SOFT TISSUE ONCE
Status: COMPLETED | OUTPATIENT
Start: 2024-06-03 | End: 2024-06-03

## 2024-06-03 RX ORDER — LIDOCAINE HYDROCHLORIDE 10 MG/ML
5 INJECTION, SOLUTION EPIDURAL; INFILTRATION; INTRACAUDAL; PERINEURAL ONCE
Status: COMPLETED | OUTPATIENT
Start: 2024-06-03 | End: 2024-06-03

## 2024-06-03 RX ORDER — ROPIVACAINE HYDROCHLORIDE 2 MG/ML
2 INJECTION, SOLUTION EPIDURAL; INFILTRATION; PERINEURAL ONCE
Status: COMPLETED | OUTPATIENT
Start: 2024-06-03 | End: 2024-06-03

## 2024-06-03 RX ADMIN — LIDOCAINE HYDROCHLORIDE 5 ML: 10 INJECTION, SOLUTION EPIDURAL; INFILTRATION; INTRACAUDAL; PERINEURAL at 11:48

## 2024-06-03 RX ADMIN — ROPIVACAINE HYDROCHLORIDE 2 ML: 2 INJECTION, SOLUTION EPIDURAL; INFILTRATION; PERINEURAL at 11:49

## 2024-06-03 RX ADMIN — METHYLPREDNISOLONE ACETATE 40 MG: 40 INJECTION, SUSPENSION INTRA-ARTICULAR; INTRALESIONAL; INTRAMUSCULAR; PARENTERAL; SOFT TISSUE at 11:49

## 2024-06-03 RX ADMIN — IOHEXOL 1 ML: 300 INJECTION, SOLUTION INTRAVENOUS at 11:48

## 2024-06-03 NOTE — DISCHARGE INSTR - LAB

## 2024-06-06 ENCOUNTER — OFFICE VISIT (OUTPATIENT)
Dept: INTERNAL MEDICINE CLINIC | Facility: CLINIC | Age: 69
End: 2024-06-06
Payer: MEDICARE

## 2024-06-06 VITALS
BODY MASS INDEX: 25.49 KG/M2 | SYSTOLIC BLOOD PRESSURE: 118 MMHG | OXYGEN SATURATION: 98 % | TEMPERATURE: 97.9 F | WEIGHT: 153 LBS | DIASTOLIC BLOOD PRESSURE: 70 MMHG | HEIGHT: 65 IN | HEART RATE: 89 BPM

## 2024-06-06 DIAGNOSIS — E55.9 VITAMIN D DEFICIENCY: ICD-10-CM

## 2024-06-06 DIAGNOSIS — R20.0 BILATERAL HAND NUMBNESS: ICD-10-CM

## 2024-06-06 DIAGNOSIS — N39.3 SUI (STRESS URINARY INCONTINENCE, FEMALE): Primary | ICD-10-CM

## 2024-06-06 DIAGNOSIS — M54.2 NECK PAIN: ICD-10-CM

## 2024-06-06 DIAGNOSIS — E78.2 MIXED HYPERLIPIDEMIA: ICD-10-CM

## 2024-06-06 PROBLEM — I73.00 RAYNAUD'S PHENOMENON WITHOUT GANGRENE: Status: RESOLVED | Noted: 2024-03-06 | Resolved: 2024-06-06

## 2024-06-06 PROCEDURE — 87086 URINE CULTURE/COLONY COUNT: CPT | Performed by: FAMILY MEDICINE

## 2024-06-06 PROCEDURE — 99214 OFFICE O/P EST MOD 30 MIN: CPT | Performed by: FAMILY MEDICINE

## 2024-06-06 PROCEDURE — G2211 COMPLEX E/M VISIT ADD ON: HCPCS | Performed by: FAMILY MEDICINE

## 2024-06-06 PROCEDURE — 81003 URINALYSIS AUTO W/O SCOPE: CPT | Performed by: FAMILY MEDICINE

## 2024-06-06 RX ORDER — MELOXICAM 15 MG/1
15 TABLET ORAL DAILY PRN
Qty: 90 TABLET | Refills: 1 | Status: SHIPPED | OUTPATIENT
Start: 2024-06-06 | End: 2024-12-03

## 2024-06-06 NOTE — PROGRESS NOTES
Ambulatory Visit  Name: Rosa Caraballo      : 1955      MRN: [id number]  Encounter Provider: Gris Smyth MD  Encounter Date: 2024   Encounter department: [facility]    Assessment & Plan   1. VERONIKA (stress urinary incontinence, female)  -     Urine culture; Future  -     UA (URINE) with reflex to Scope; Future  -     Urine culture  -     UA (URINE) with reflex to Scope  2. Neck pain  -     CBC and differential; Future  -     Comprehensive metabolic panel; Future  -     Lipid panel; Future  -     TSH, 3rd generation; Future  -     JAD Screen w/ Reflex to Titer/Pattern; Future  -     Cyclic citrul peptide antibody, IgG; Future  -     RF Screen w/ Reflex to Titer; Future  -     C-reactive protein; Future  -     Sedimentation rate, automated; Future  -     meloxicam (MOBIC) 15 mg tablet; Take 1 tablet (15 mg total) by mouth daily as needed for moderate pain  -     Vitamin D 25 hydroxy; Future  3. Bilateral hand numbness  -     CBC and differential; Future  -     Comprehensive metabolic panel; Future  -     Lipid panel; Future  -     TSH, 3rd generation; Future  -     JAD Screen w/ Reflex to Titer/Pattern; Future  -     Cyclic citrul peptide antibody, IgG; Future  -     RF Screen w/ Reflex to Titer; Future  -     C-reactive protein; Future  -     Sedimentation rate, automated; Future  -     meloxicam (MOBIC) 15 mg tablet; Take 1 tablet (15 mg total) by mouth daily as needed for moderate pain  -     Vitamin D 25 hydroxy; Future  4. Vitamin D deficiency  -     Vitamin D 25 hydroxy; Future  5. Mixed hyperlipidemia  -     Lipid panel; Future    Orders and recommendations as noted above.  Numerous concerns/complaints discussed.  Will check urine culture to rule out UTI given her stress incontinence which seems to have worsened.  Discussed her chronic joint issues with her.  Continue to follow-up with pain management for the low back.  Discussed her sacroiliac symptoms with her.   Discussed with her that the sacroiliac symptoms as well as some of the other joint aches can be consistent with some inflammatory issues.  Will check laboratory testing as noted above.  Discussed the chronic neck pain with her.  This may be contributing to some of her hand symptoms such as the numbness but other causes for the numbness also discussed.  Discussed getting an EMG for further investigation but she wishes to hold off on this at present.  Continue with muscle relaxant but she is limited taking this since it does make her tired.  Continue to follow-up with cardiology.  Continue to follow-up with pain management.  Continue to follow-up with pulmonary.  Asthma has been stable on the Fasenra.  Discussed use of meloxicam to help with some of the inflammatory issues.  Discussed potential side effects.  Recommended taking this with food.  Will have her follow-up after the testing is completed if needed or sooner if needed.    Urinary Incontinence Plan of Care: counseling topics discussed: use restroom every 2 hours and limiting fluid intake 3-4 hours before bed.       History of Present Illness     She presents for problem visit.  She has numerous concerns.  She still has some issues with frequency of urination and some incontinence.  Does feel this has worsened somewhat and is concerned whether she could have a UTI.  Continues with joint symptoms especially into the neck and back.  Did have injections in the low back into the sacroiliac area.  She continues with neck tightness and soreness.  Does have some numbness into the hands bilaterally.  She does note that the symptoms improve with the Robaxin but this makes her too tired to take regularly even at a half of a dose.  Does not tolerate most anti-inflammatories because of GI issues.  Has been having some joint aches especially into the neck and back but also into the hands.  Would like to avoid an EMG test if possible.  Continues to follow-up with pulmonary  and continues on the [employer].  This has been working well for her.  Continues to follow-up with cardiology.  Is due for her routine laboratory testing.        Review of Systems   Constitutional:  Negative for activity change, appetite change, chills and fever.   HENT:  Negative for congestion and rhinorrhea.    Eyes:  Negative for visual disturbance.   Respiratory:  Negative for chest tightness and shortness of breath.    Cardiovascular:  Negative for chest pain and palpitations.   Gastrointestinal:  Negative for abdominal pain, blood in stool, diarrhea, nausea and vomiting.   Endocrine: Negative for polydipsia, polyphagia and polyuria.   Genitourinary:  Positive for frequency and urgency. Negative for dysuria.   Musculoskeletal:  Positive for arthralgias, back pain, gait problem, myalgias, neck pain and neck stiffness.   Skin:  Negative for color change.   Neurological:  Positive for numbness. Negative for dizziness and headaches.   Hematological:  Does not bruise/bleed easily.   Psychiatric/Behavioral:  Positive for sleep disturbance. Negative for confusion. The patient is not nervous/anxious.      Medical History Reviewed by provider this encounter:  Tobacco  Allergies  Meds  Problems  Med Hx  Surg Hx  Fam Hx       Past Medical History   Past Medical History:   Diagnosis Date    Asthma     Celiac disease     Chronic sinusitis 04/27/2023    Colon polyp     Follicular lymphoma (HCC)     GERD (gastroesophageal reflux disease)     Heart palpitations     History of colonic polyps     resolved 07/12/2016    History of radiation therapy 2010    Hyperlipidemia     Insomnia     Irregular heart beat     Lymphoma, 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HCC)     non hodgekins, remission    Malignant lymphoma (HCC) 2010    rt arm cutaneous follicular stage ia (rt diatal medical biceps area , s/p resected and s/p radistion treatment    resolved 12/17/2014    Postmenopausal disorder     resolved 09/21/2017    Pulmonary nodule     BENIGN, STABLE  [id number]    Restless legs syndrome     resolved 09/21/2017    TMJ syndrome     resolved 09/21/2017     Past Surgical History:   Procedure Laterality Date    COLONOSCOPY  04/16/2019    FUNCTIONAL ENDOSCOPIC SINUS SURGERY Bilateral 2013    Dr Bagley    HYSTERECTOMY      age 45 complete    LYMPH NODE DISSECTION Right     arm    NASAL SEPTUM SURGERY  2013    with turbinate reduction - Dr. Bagley    OOPHORECTOMY Bilateral     MN ESOPHAGOGASTRODUODENOSCOPY TRANSORAL DIAGNOSTIC N/A 01/18/2016    Procedure: ESOPHAGOGASTRODUODENOSCOPY (EGD);  Surgeon: Ness Shepherd MD;  Location: [facility];  Service: Gastroenterology    MN EXCISION GANGLION WRIST DORSAL/VOLAR RECURRENT Left 12/08/2020    Procedure: WRIST GANGLION CYST EXCISION;  Surgeon: Marie Gaspar MD;  Location: [facility];  Service: Orthopedics    MN EXCISION TUMOR SOFT TISSUE SHOULDER SUBQ 3 CM/> Left 11/27/2023    Procedure: anterior shouler- EXCISION BIOPSY TISSUE LESION/MASS UPPER EXTREMITY;  Surgeon: Ronak Alanis DO;  Location: [facility];  Service: Orthopedics    SYNOVECTOMY N/A 12/08/2020    Procedure: ECU TENOSYNOVECTOMY;  Surgeon: Marie Gaspar MD;  Location: [facility];  Service: Orthopedics    TONSILLECTOMY      TOTAL ABDOMINAL HYSTERECTOMY W/ BILATERAL SALPINGOOPHORECTOMY      age 51    UPPER GASTROINTESTINAL ENDOSCOPY      US GUIDED MSK PROCEDURE  01/16/2020    US GUIDED MSK PROCEDURE  08/07/2020    US GUIDED MSK PROCEDURE  08/02/2021     Family History   Problem Relation Age of Onset    Lymphoma Mother     Throat cancer Father     Heart failure Father     Atrial fibrillation Father     Diabetes Father     Colonic polyp Father     Hypertension Father     Thyroid cancer Sister     Breast cancer Paternal Grandmother 69    Breast cancer Paternal Aunt 69    Ovarian cancer Paternal Aunt 70    No Known Problems Maternal Grandmother     No Known Problems Maternal Grandfather     Cancer Paternal Grandfather     Lung cancer Paternal Grandfather      BRCA1 Positive Cousin     Skin cancer Paternal Aunt     Throat cancer Paternal Uncle     No Known Problems Maternal Aunt      Current Outpatient Medications on File Prior to Visit   Medication Sig Dispense Refill    benralizumab (FASENRA) subcutaneous injection Inject 1 mL (30 mg total) under the skin every 56 days for 6 doses 1 Syringe 6    bimatoprost (LATISSE) 0.03 % ophthalmic solution Take as directed      Breo Ellipta 100-25 MCG/ACT inhaler Inhale 1 puff daily Rinse mouth after use. 60 blister 11    Cholecalciferol 50 MCG (2000 UT) CAPS Take by mouth daily      cyanocobalamin (VITAMIN B-12) 1,000 mcg tablet Take 1,000 mcg by mouth daily      erythromycin with ethanol (EMGEL) 2 % gel Apply topically 2 (two) times a day 30 g 1    famotidine (PEPCID) 20 mg tablet Take 1 tablet (20 mg total) by mouth 2 (two) times a day 180 tablet 3    flecainide (TAMBOCOR) 100 mg tablet take 1 tablet by mouth twice a day 180 tablet 3    fluticasone (FLONASE) 50 mcg/act nasal spray 2 sprays into each nostril 2 (two) times a day 48 g 4    ipratropium (ATROVENT) 0.03 % nasal spray 2 sprays into each nostril 3 (three) times a day as needed for rhinitis 30 mL 3    levalbuterol (XOPENEX HFA) 45 mcg/act inhaler Inhale 1-2 puffs every 4 (four) hours as needed for wheezing or shortness of breath 45 g 0    lidocaine (LMX) 4 % cream Apply topically as needed for mild pain 15 g 3    Magnesium 500 MG CAPS Take by mouth      methocarbamol (ROBAXIN) 500 mg tablet Take 1 tablet (500 mg total) by mouth 3 (three) times a day as needed for muscle spasms 60 tablet 0    metoprolol succinate (TOPROL-XL) 25 mg 24 hr tablet Take 1 tablet (25 mg total) by mouth every evening (Patient taking differently: Take 25 mg by mouth every evening 12.5mg daily) 90 tablet 5    ondansetron (ZOFRAN-ODT) 4 mg disintegrating tablet Take 1 tablet (4 mg total) by mouth every 6 (six) hours as needed for nausea or vomiting 15 tablet 0    polyethylene glycol (GLYCOLAX) 17  GM/SCOOP powder Take 17 g by mouth 2 (two) times a day 765 g 4    rosuvastatin (CRESTOR) 5 mg tablet take 1 tablet by mouth once daily 90 tablet 3    senna-docusate sodium (SENOKOT-S) 8.6-50 mg per tablet Take 1 tablet by mouth daily for 14 days (Patient taking differently: Take 1 tablet by mouth 2 (two) times a day as needed) 14 tablet 0    sucralfate (CARAFATE) 1 g tablet Take 1 tablet (1 g total) by mouth 2 (two) times a day 60 tablet 5    zolpidem (AMBIEN) 5 mg tablet Take 1 tablet (5 mg total) by mouth daily at bedtime as needed for sleep 30 tablet 2    amLODIPine (NORVASC) 5 mg tablet take 1 tablet by mouth once daily (Patient not taking: Reported on 5/6/2024) 30 tablet 5    EPINEPHrine (EPIPEN) 0.3 mg/0.3 mL SOAJ Inject 0.3 mL (0.3 mg total) into a muscle once for 1 dose (Patient not taking: Reported on 6/6/2024) 0.6 mL 0    linaCLOtide 72 MCG CAPS Take 72 mcg by mouth daily (Patient not taking: Reported on 5/6/2024) 4 capsule 0     No current facility-administered medications on file prior to visit.     Allergies   Allergen Reactions    Gluten Meal - Food Allergy GI Intolerance     Celiac     Morphine And Codeine Itching    Nuts - Food Allergy Hives     Almonds,walnuts, hazelnuts and other related nuts.     Shellfish-Derived Products - Food Allergy Anaphylaxis    Wheat Bran - Food Allergy GI Intolerance    Sulfa Antibiotics Rash      Current Outpatient Medications on File Prior to Visit   Medication Sig Dispense Refill    benralizumab (FASENRA) subcutaneous injection Inject 1 mL (30 mg total) under the skin every 56 days for 6 doses 1 Syringe 6    bimatoprost (LATISSE) 0.03 % ophthalmic solution Take as directed      Breo Ellipta 100-25 MCG/ACT inhaler Inhale 1 puff daily Rinse mouth after use. 60 blister 11    Cholecalciferol 50 MCG (2000 UT) CAPS Take by mouth daily      cyanocobalamin (VITAMIN B-12) 1,000 mcg tablet Take 1,000 mcg by mouth daily      erythromycin with ethanol (EMGEL) 2 % gel Apply  topically 2 (two) times a day 30 g 1    famotidine (PEPCID) 20 mg tablet Take 1 tablet (20 mg total) by mouth 2 (two) times a day 180 tablet 3    flecainide (TAMBOCOR) 100 mg tablet take 1 tablet by mouth twice a day 180 tablet 3    fluticasone (FLONASE) 50 mcg/act nasal spray 2 sprays into each nostril 2 (two) times a day 48 g 4    ipratropium (ATROVENT) 0.03 % nasal spray 2 sprays into each nostril 3 (three) times a day as needed for rhinitis 30 mL 3    levalbuterol (XOPENEX HFA) 45 mcg/act inhaler Inhale 1-2 puffs every 4 (four) hours as needed for wheezing or shortness of breath 45 g 0    lidocaine (LMX) 4 % cream Apply topically as needed for mild pain 15 g 3    Magnesium 500 MG CAPS Take by mouth      methocarbamol (ROBAXIN) 500 mg tablet Take 1 tablet (500 mg total) by mouth 3 (three) times a day as needed for muscle spasms 60 tablet 0    metoprolol succinate (TOPROL-XL) 25 mg 24 hr tablet Take 1 tablet (25 mg total) by mouth every evening (Patient taking differently: Take 25 mg by mouth every evening 12.5mg daily) 90 tablet 5    ondansetron (ZOFRAN-ODT) 4 mg disintegrating tablet Take 1 tablet (4 mg total) by mouth every 6 (six) hours as needed for nausea or vomiting 15 tablet 0    polyethylene glycol (GLYCOLAX) 17 GM/SCOOP powder Take 17 g by mouth 2 (two) times a day 765 g 4    rosuvastatin (CRESTOR) 5 mg tablet take 1 tablet by mouth once daily 90 tablet 3    senna-docusate sodium (SENOKOT-S) 8.6-50 mg per tablet Take 1 tablet by mouth daily for 14 days (Patient taking differently: Take 1 tablet by mouth 2 (two) times a day as needed) 14 tablet 0    sucralfate (CARAFATE) 1 g tablet Take 1 tablet (1 g total) by mouth 2 (two) times a day 60 tablet 5    zolpidem (AMBIEN) 5 mg tablet Take 1 tablet (5 mg total) by mouth daily at bedtime as needed for sleep 30 tablet 2    amLODIPine (NORVASC) 5 mg tablet take 1 tablet by mouth once daily (Patient not taking: Reported on 5/6/2024) 30 tablet 5    EPINEPHrine  "(EPIPEN) 0.3 mg/0.3 mL SOAJ Inject 0.3 mL (0.3 mg total) into a muscle once for 1 dose (Patient not taking: Reported on 2024) 0.6 mL 0    linaCLOtide 72 MCG CAPS Take 72 mcg by mouth daily (Patient not taking: Reported on 2024) 4 capsule 0     No current facility-administered medications on file prior to visit.      Social History     Tobacco Use    Smoking status: Former     Current packs/day: 0.00     Average packs/day: 0.8 packs/day for 20.0 years (15.0 ttl pk-yrs)     Types: Cigarettes     Start date:      Quit date:      Years since quittin.4    Smokeless tobacco: Never    Tobacco comments:     Quit 10/31/04 2130   Vaping Use    Vaping status: Never Used   Substance and Sexual Activity    Alcohol use: Yes     Alcohol/week: 3.0 standard drinks of alcohol     Types: 3 Glasses of wine per week     Comment: soc    Drug use: No    Sexual activity: Yes     Partners: Male     Objective     /70 (BP Location: Left arm, Patient Position: Sitting, Cuff Size: Standard)   Pulse 89   Temp 97.9 °F (36.6 °C)   Ht 5' 5\" (1.651 m)   Wt 69.4 kg (153 lb)   SpO2 98%   BMI 25.46 kg/m²     Physical Exam  Vitals and nursing note reviewed.   Constitutional:       General: She is not in acute distress.     Appearance: She is well-developed and well-groomed.   HENT:      Head: Normocephalic and atraumatic.   Eyes:      General:         Right eye: No discharge.         Left eye: No discharge.      Conjunctiva/sclera: Conjunctivae normal.      Pupils: Pupils are equal, round, and reactive to light.   Cardiovascular:      Rate and Rhythm: Normal rate and regular rhythm.      Heart sounds: Normal heart sounds. No murmur heard.     No friction rub. No gallop.   Pulmonary:      Effort: No respiratory distress.      Breath sounds: No wheezing or rales.   Abdominal:      General: Bowel sounds are normal. There is no distension.      Tenderness: There is no abdominal tenderness.   Musculoskeletal:      Cervical " back: Muscular tenderness present.      Comments: Some degenerative changes into the hands bilaterally   Lymphadenopathy:      Cervical: No cervical adenopathy.   Skin:     General: Skin is warm and dry.   Neurological:      Mental Status: She is alert and oriented to person, place, and time.   Psychiatric:         Behavior: Behavior normal. Behavior is cooperative.       Administrative Statements

## 2024-06-07 LAB
BACTERIA UR CULT: NORMAL
BILIRUB UR QL STRIP: NEGATIVE
CLARITY UR: CLEAR
COLOR UR: NORMAL
GLUCOSE UR STRIP-MCNC: NEGATIVE MG/DL
HGB UR QL STRIP.AUTO: NEGATIVE
KETONES UR STRIP-MCNC: NEGATIVE MG/DL
LEUKOCYTE ESTERASE UR QL STRIP: NEGATIVE
NITRITE UR QL STRIP: NEGATIVE
PH UR STRIP.AUTO: 6 [PH]
PROT UR STRIP-MCNC: NEGATIVE MG/DL
SP GR UR STRIP.AUTO: 1.01 (ref 1–1.03)
UROBILINOGEN UR STRIP-ACNC: <2 MG/DL

## 2024-06-10 ENCOUNTER — APPOINTMENT (OUTPATIENT)
Dept: LAB | Facility: CLINIC | Age: 69
End: 2024-06-10
Payer: COMMERCIAL

## 2024-06-10 DIAGNOSIS — R20.0 BILATERAL HAND NUMBNESS: ICD-10-CM

## 2024-06-10 DIAGNOSIS — M54.2 NECK PAIN: ICD-10-CM

## 2024-06-10 DIAGNOSIS — E78.2 MIXED HYPERLIPIDEMIA: ICD-10-CM

## 2024-06-10 DIAGNOSIS — E55.9 VITAMIN D DEFICIENCY: ICD-10-CM

## 2024-06-10 LAB
ALBUMIN SERPL BCP-MCNC: 4.2 G/DL (ref 3.5–5)
ALP SERPL-CCNC: 65 U/L (ref 34–104)
ALT SERPL W P-5'-P-CCNC: 40 U/L (ref 7–52)
ANION GAP SERPL CALCULATED.3IONS-SCNC: 11 MMOL/L (ref 4–13)
AST SERPL W P-5'-P-CCNC: 35 U/L (ref 13–39)
BASOPHILS # BLD AUTO: 0.02 THOUSANDS/ÂΜL (ref 0–0.1)
BASOPHILS NFR BLD AUTO: 0 % (ref 0–1)
BILIRUB SERPL-MCNC: 0.91 MG/DL (ref 0.2–1)
BUN SERPL-MCNC: 18 MG/DL (ref 5–25)
CALCIUM SERPL-MCNC: 8.8 MG/DL (ref 8.4–10.2)
CHLORIDE SERPL-SCNC: 102 MMOL/L (ref 96–108)
CHOLEST SERPL-MCNC: 164 MG/DL
CO2 SERPL-SCNC: 26 MMOL/L (ref 21–32)
CREAT SERPL-MCNC: 0.7 MG/DL (ref 0.6–1.3)
CRP SERPL QL: 7.1 MG/L
EOSINOPHIL # BLD AUTO: 0 THOUSAND/ÂΜL (ref 0–0.61)
EOSINOPHIL NFR BLD AUTO: 0 % (ref 0–6)
ERYTHROCYTE [DISTWIDTH] IN BLOOD BY AUTOMATED COUNT: 13.3 % (ref 11.6–15.1)
ERYTHROCYTE [SEDIMENTATION RATE] IN BLOOD: 17 MM/HOUR (ref 0–29)
GFR SERPL CREATININE-BSD FRML MDRD: 90 ML/MIN/1.73SQ M
GLUCOSE P FAST SERPL-MCNC: 99 MG/DL (ref 65–99)
HCT VFR BLD AUTO: 43.3 % (ref 34.8–46.1)
HDLC SERPL-MCNC: 53 MG/DL
HGB BLD-MCNC: 14 G/DL (ref 11.5–15.4)
IMM GRANULOCYTES # BLD AUTO: 0.04 THOUSAND/UL (ref 0–0.2)
IMM GRANULOCYTES NFR BLD AUTO: 1 % (ref 0–2)
LDLC SERPL CALC-MCNC: 68 MG/DL (ref 0–100)
LYMPHOCYTES # BLD AUTO: 1.6 THOUSANDS/ÂΜL (ref 0.6–4.47)
LYMPHOCYTES NFR BLD AUTO: 21 % (ref 14–44)
MCH RBC QN AUTO: 30.6 PG (ref 26.8–34.3)
MCHC RBC AUTO-ENTMCNC: 32.3 G/DL (ref 31.4–37.4)
MCV RBC AUTO: 95 FL (ref 82–98)
MONOCYTES # BLD AUTO: 0.57 THOUSAND/ÂΜL (ref 0.17–1.22)
MONOCYTES NFR BLD AUTO: 8 % (ref 4–12)
NEUTROPHILS # BLD AUTO: 5.31 THOUSANDS/ÂΜL (ref 1.85–7.62)
NEUTS SEG NFR BLD AUTO: 70 % (ref 43–75)
NONHDLC SERPL-MCNC: 111 MG/DL
NRBC BLD AUTO-RTO: 0 /100 WBCS
PLATELET # BLD AUTO: 198 THOUSANDS/UL (ref 149–390)
PMV BLD AUTO: 11.5 FL (ref 8.9–12.7)
POTASSIUM SERPL-SCNC: 4.3 MMOL/L (ref 3.5–5.3)
PROT SERPL-MCNC: 6.8 G/DL (ref 6.4–8.4)
RBC # BLD AUTO: 4.57 MILLION/UL (ref 3.81–5.12)
SODIUM SERPL-SCNC: 139 MMOL/L (ref 135–147)
TRIGL SERPL-MCNC: 217 MG/DL
TSH SERPL DL<=0.05 MIU/L-ACNC: 1.45 UIU/ML (ref 0.45–4.5)
WBC # BLD AUTO: 7.54 THOUSAND/UL (ref 4.31–10.16)

## 2024-06-10 PROCEDURE — 80053 COMPREHEN METABOLIC PANEL: CPT

## 2024-06-10 PROCEDURE — 86140 C-REACTIVE PROTEIN: CPT

## 2024-06-10 PROCEDURE — 36415 COLL VENOUS BLD VENIPUNCTURE: CPT

## 2024-06-10 PROCEDURE — 85652 RBC SED RATE AUTOMATED: CPT

## 2024-06-10 PROCEDURE — 86200 CCP ANTIBODY: CPT

## 2024-06-10 PROCEDURE — 82306 VITAMIN D 25 HYDROXY: CPT

## 2024-06-10 PROCEDURE — 85025 COMPLETE CBC W/AUTO DIFF WBC: CPT

## 2024-06-10 PROCEDURE — 86038 ANTINUCLEAR ANTIBODIES: CPT

## 2024-06-10 PROCEDURE — 86430 RHEUMATOID FACTOR TEST QUAL: CPT

## 2024-06-10 PROCEDURE — 84443 ASSAY THYROID STIM HORMONE: CPT

## 2024-06-10 PROCEDURE — 80061 LIPID PANEL: CPT

## 2024-06-11 PROBLEM — M53.3 PAIN OF BOTH SACROILIAC JOINTS: Status: ACTIVE | Noted: 2024-06-11

## 2024-06-11 LAB
25(OH)D3 SERPL-MCNC: 33.1 NG/ML (ref 30–100)
ANA SER QL IA: NEGATIVE
CCP AB SER IA-ACNC: 1.3
RHEUMATOID FACT SER QL LA: NEGATIVE

## 2024-06-11 NOTE — H&P
History of Present Illness: The patient is a 68 y.o. female who presents with complaints of low back pain.    Past Medical History:   Diagnosis Date    Asthma     Celiac disease     Chronic sinusitis 04/27/2023    Colon polyp     Follicular lymphoma (HCC)     GERD (gastroesophageal reflux disease)     Heart palpitations     History of colonic polyps     resolved 07/12/2016    History of radiation therapy 2010    Hyperlipidemia     Insomnia     Irregular heart beat     Lymphoma, 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HCC)     non hodgekins, remission    Malignant lymphoma (HCC) 2010    rt arm cutaneous follicular stage ia (rt diatal medical biceps area , s/p resected and s/p radistion treatment    resolved 12/17/2014    Postmenopausal disorder     resolved 09/21/2017    Pulmonary nodule     BENIGN, STABLE 6615-6008    Restless legs syndrome     resolved 09/21/2017    TMJ syndrome     resolved 09/21/2017       Past Surgical History:   Procedure Laterality Date    COLONOSCOPY  04/16/2019    FUNCTIONAL ENDOSCOPIC SINUS SURGERY Bilateral 2013    Dr Bagley    HYSTERECTOMY      age 45 complete    LYMPH NODE DISSECTION Right     arm    NASAL SEPTUM SURGERY  2013    with turbinate reduction - Dr. Bagley    OOPHORECTOMY Bilateral     MS ESOPHAGOGASTRODUODENOSCOPY TRANSORAL DIAGNOSTIC N/A 01/18/2016    Procedure: ESOPHAGOGASTRODUODENOSCOPY (EGD);  Surgeon: Ness Shepherd MD;  Location: [facility];  Service: Gastroenterology    MS EXCISION GANGLION WRIST DORSAL/VOLAR RECURRENT Left 12/08/2020    Procedure: WRIST GANGLION CYST EXCISION;  Surgeon: Marie Gaspar MD;  Location: [facility];  Service: Orthopedics    MS EXCISION TUMOR SOFT TISSUE SHOULDER SUBQ 3 CM/> Left 11/27/2023    Procedure: anterior shouler- EXCISION BIOPSY TISSUE LESION/MASS UPPER EXTREMITY;  Surgeon: Ronak Alanis DO;  Location: [facility];  Service: Orthopedics    SYNOVECTOMY N/A 12/08/2020    Procedure: ECU TENOSYNOVECTOMY;  Surgeon: Marie Gaspar MD;  [facility]  Surgeon (Optional): Fausto Marinelli DO Biopsy Photograph Reviewed: Yes OR;  Service: Orthopedics    TONSILLECTOMY      TOTAL ABDOMINAL HYSTERECTOMY W/ BILATERAL SALPINGOOPHORECTOMY      age 51    UPPER GASTROINTESTINAL ENDOSCOPY      US GUIDED MSK PROCEDURE  01/16/2020    US GUIDED MSK PROCEDURE  08/07/2020    US GUIDED MSK PROCEDURE  08/02/2021         Current Outpatient Medications:     amLODIPine (NORVASC) 5 mg tablet, take 1 tablet by mouth once daily (Patient not taking: Reported on 5/6/2024), Disp: 30 tablet, Rfl: 5    benralizumab (FASENRA) subcutaneous injection, Inject 1 mL (30 mg total) under the skin every 56 days for 6 doses, Disp: 1 Syringe, Rfl: 6    bimatoprost (LATISSE) 0.03 % ophthalmic solution, Take as directed, Disp: , Rfl:     Breo Ellipta 100-25 MCG/ACT inhaler, Inhale 1 puff daily Rinse mouth after use., Disp: 60 blister, Rfl: 11    Cholecalciferol 50 MCG (2000 UT) CAPS, Take by mouth daily, Disp: , Rfl:     cyanocobalamin (VITAMIN B-12) 1,000 mcg tablet, Take 1,000 mcg by mouth daily, Disp: , Rfl:     EPINEPHrine (EPIPEN) 0.3 mg/0.3 mL SOAJ, Inject 0.3 mL (0.3 mg total) into a muscle once for 1 dose (Patient not taking: Reported on 6/6/2024), Disp: 0.6 mL, Rfl: 0    erythromycin with ethanol (EMGEL) 2 % gel, Apply topically 2 (two) times a day, Disp: 30 g, Rfl: 1    famotidine (PEPCID) 20 mg tablet, Take 1 tablet (20 mg total) by mouth 2 (two) times a day, Disp: 180 tablet, Rfl: 3    flecainide (TAMBOCOR) 100 mg tablet, take 1 tablet by mouth twice a day, Disp: 180 tablet, Rfl: 3    fluticasone (FLONASE) 50 mcg/act nasal spray, 2 sprays into each nostril 2 (two) times a day, Disp: 48 g, Rfl: 4    ipratropium (ATROVENT) 0.03 % nasal spray, 2 sprays into each nostril 3 (three) times a day as needed for rhinitis, Disp: 30 mL, Rfl: 3    levalbuterol (XOPENEX HFA) 45 mcg/act inhaler, Inhale 1-2 puffs every 4 (four) hours as needed for wheezing or shortness of breath, Disp: 45 g, Rfl: 0    lidocaine (LMX) 4 % cream, Apply topically as needed for mild pain, Disp: 15 g,  Previous Accession (Optional): [id number] Rfl: 3    linaCLOtide 72 MCG CAPS, Take 72 mcg by mouth daily (Patient not taking: Reported on 5/6/2024), Disp: 4 capsule, Rfl: 0    Magnesium 500 MG CAPS, Take by mouth, Disp: , Rfl:     meloxicam (MOBIC) 15 mg tablet, Take 1 tablet (15 mg total) by mouth daily as needed for moderate pain, Disp: 90 tablet, Rfl: 1    methocarbamol (ROBAXIN) 500 mg tablet, Take 1 tablet (500 mg total) by mouth 3 (three) times a day as needed for muscle spasms, Disp: 60 tablet, Rfl: 0    metoprolol succinate (TOPROL-XL) 25 mg 24 hr tablet, Take 1 tablet (25 mg total) by mouth every evening (Patient taking differently: Take 25 mg by mouth every evening 12.5mg daily), Disp: 90 tablet, Rfl: 5    ondansetron (ZOFRAN-ODT) 4 mg disintegrating tablet, Take 1 tablet (4 mg total) by mouth every 6 (six) hours as needed for nausea or vomiting, Disp: 15 tablet, Rfl: 0    polyethylene glycol (GLYCOLAX) 17 GM/SCOOP powder, Take 17 g by mouth 2 (two) times a day, Disp: 765 g, Rfl: 4    rosuvastatin (CRESTOR) 5 mg tablet, take 1 tablet by mouth once daily, Disp: 90 tablet, Rfl: 3    senna-docusate sodium (SENOKOT-S) 8.6-50 mg per tablet, Take 1 tablet by mouth daily for 14 days (Patient taking differently: Take 1 tablet by mouth 2 (two) times a day as needed), Disp: 14 tablet, Rfl: 0    sucralfate (CARAFATE) 1 g tablet, Take 1 tablet (1 g total) by mouth 2 (two) times a day, Disp: 60 tablet, Rfl: 5    zolpidem (AMBIEN) 5 mg tablet, Take 1 tablet (5 mg total) by mouth daily at bedtime as needed for sleep, Disp: 30 tablet, Rfl: 2    Allergies   Allergen Reactions    Gluten Meal - Food Allergy GI Intolerance     Celiac     Morphine And Codeine Itching    Nuts - Food Allergy Hives     Almonds,walnuts, hazelnuts and other related nuts.     Shellfish-Derived Products - Food Allergy Anaphylaxis    Wheat Bran - Food Allergy GI Intolerance    Sulfa Antibiotics Rash       Physical Exam:   Vitals:    06/03/24 1154   BP: 122/74   Pulse: 72   Resp: 18   Temp:     SpO2: 97%     General: Awake, Alert, Oriented x 3, Mood and affect appropriate  Respiratory: Respirations even and unlabored  Cardiovascular: Peripheral pulses intact; no edema  Musculoskeletal Exam: normal gait    ASA Score: 2    Patient/Chart Verification  Patient ID Verified: Verbal  ID Band Applied: No  Consents Confirmed: Procedural, To be obtained in the Pre-Procedure area  H&P( within 30 days) Verified: To be obtained in the Pre-Procedure area  Interval H&P(within 24 hr) Complete (required for Outpatients and Surgery Admit only): To be obtained in the Pre-Procedure area  Allergies Reviewed: Yes  Anticoag/NSAID held?: NA  Currently on antibiotics?: No  Pregnancy denied?: NA    Assessment:   1. Pain of both sacroiliac joints        Plan: bilateral sacroiliac joint injection    Size Of Lesion In Cm: 1.1 X Size Of Lesion In Cm (Optional): 0 Size Of Margin In Cm: 0.4 Anesthesia Volume In Cc: 5 Was An Eye Clamp Used?: No Eye Clamp Note Details: An eye clamp was used during the procedure. Excision Method: Elliptical Saucerization Depth: dermis and superficial adipose tissue Repair Type: Intermediate Intermediate / Complex Repair - Final Wound Length In Cm: 3.9 Suturegard Retention Suture: 2-0 Nylon Retention Suture Bite Size: 3 mm Length To Time In Minutes Device Was In Place: 10 Number Of Hemigard Strips Per Side: 1 Undermining Type: Entire Wound Debridement Text: The wound edges were debrided prior to proceeding with the closure to facilitate wound healing. Helical Rim Text: The closure involved the helical rim. Vermilion Border Text: The closure involved the vermilion border. Nostril Rim Text: The closure involved the nostril rim. Retention Suture Text: Retention sutures were placed to support the closure and prevent dehiscence. Suture Removal: 14 days Lab: [id number] Lab Facility: [id number] Graft Donor Site Bandage (Optional-Leave Blank If You Don't Want In Note): Steri-strips and a pressure bandage were applied to the donor site. Epidermal Closure Graft Donor Site (Optional): simple interrupted Billing Type: Third-Party Bill Excision Depth: adipose tissue Scalpel Size: 15 blade Anesthesia Type: 1% lidocaine with epinephrine Hemostasis: Electrocautery Estimated Blood Loss (Cc): minimal Detail Level: Detailed Repair Depth: use same depth as excision depth Deep Sutures: 3-0 Vicryl Epidermal Sutures: 4-0 Surgipro Number Of Epidermal Sutures (Optional): 3 Epidermal Closure: running and interrupted Wound Care: Petrolatum Dressing: pressure dressing with telfa Suturegard Intro: Intraoperative tissue expansion was performed, utilizing the SUTUREGARD device, in order to reduce wound tension. Suturegard Body: The suture ends were repeatedly re-tightened and re-clamped to achieve the desired tissue expansion. Hemigard Intro: Due to skin fragility and wound tension, it was decided to use HEMIGARD adhesive retention suture devices to permit a linear closure. The skin was cleaned and dried for a 6cm distance away from the wound. Excessive hair, if present, was removed to allow for adhesion. Hemigard Postcare Instructions: The HEMIGARD strips are to remain completely dry for at least 5-7 days. Positioning (Leave Blank If You Do Not Want): The patient was placed in a comfortable position exposing the surgical site. Pre-Excision Curettage Text (Leave Blank If You Do Not Want): Prior to drawing the surgical margin the visible lesion was removed with curettage to clearly define the lesion size. Complex Repair Preamble Text (Leave Blank If You Do Not Want): Extensive wide undermining was performed. Intermediate Repair Preamble Text (Leave Blank If You Do Not Want): Undermining was performed with blunt dissection. Curvilinear Excision Additional Text (Leave Blank If You Do Not Want): The margin was drawn around the clinically apparent lesion.  A curvilinear shape was then drawn on the skin incorporating the lesion and margins.  Incisions were then made along these lines to the appropriate tissue plane and the lesion was extirpated. Fusiform Excision Additional Text (Leave Blank If You Do Not Want): The margin was drawn around the clinically apparent lesion.  A fusiform shape was then drawn on the skin incorporating the lesion and margins.  Incisions were then made along these lines to the appropriate tissue plane and the lesion was extirpated. Elliptical Excision Additional Text (Leave Blank If You Do Not Want): The margin was drawn around the clinically apparent lesion.  An elliptical shape was then drawn on the skin incorporating the lesion and margins.  Incisions were then made along these lines to the appropriate tissue plane and the lesion was extirpated. Saucerization Excision Additional Text (Leave Blank If You Do Not Want): The margin was drawn around the clinically apparent lesion.  Incisions were then made along these lines, in a tangential fashion, to the appropriate tissue plane and the lesion was extirpated. Slit Excision Additional Text (Leave Blank If You Do Not Want): A linear line was drawn on the skin overlying the lesion. An incision was made slowly until the lesion was visualized.  Once visualized, the lesion was removed with blunt dissection. Excisional Biopsy Additional Text (Leave Blank If You Do Not Want): The margin was drawn around the clinically apparent lesion. An elliptical shape was then drawn on the skin incorporating the lesion and margins.  Incisions were then made along these lines to the appropriate tissue plane and the lesion was extirpated. Perilesional Excision Additional Text (Leave Blank If You Do Not Want): The margin was drawn around the clinically apparent lesion. Incisions were then made along these lines to the appropriate tissue plane and the lesion was extirpated. Repair Performed By Another Provider Text (Leave Blank If You Do Not Want): After the tissue was excised the defect was repaired by another provider. No Repair - Repaired With Adjacent Surgical Defect Text (Leave Blank If You Do Not Want): After the excision the defect was repaired concurrently with another surgical defect which was in close approximation. Adjacent Tissue Transfer Text: The defect edges were debeveled with a #15 scalpel blade. Given the location of the defect and the proximity to free margins an adjacent tissue transfer was deemed most appropriate. Using a sterile surgical marker, an appropriate flap was drawn incorporating the defect and placing the expected incisions within the relaxed skin tension lines where possible. The area thus outlined was incised deep to adipose tissue with a #15 scalpel blade. The skin margins were undermined to an appropriate distance in all directions utilizing iris scissors and carried over to close the primary defect. Advancement Flap (Single) Text: The defect edges were debeveled with a #15 scalpel blade. Given the location of the defect and the proximity to free margins a single advancement flap was deemed most appropriate. Using a sterile surgical marker, an appropriate advancement flap was drawn incorporating the defect and placing the expected incisions within the relaxed skin tension lines where possible. The area thus outlined was incised deep to adipose tissue with a #15 scalpel blade. The skin margins were undermined to an appropriate distance in all directions utilizing iris scissors. Following this, the designed flap was advanced and carried over into the primary defect and sutured into place. Advancement Flap (Double) Text: The defect edges were debeveled with a #15 scalpel blade. Given the location of the defect and the proximity to free margins a double advancement flap was deemed most appropriate. Using a sterile surgical marker, the appropriate advancement flaps were drawn incorporating the defect and placing the expected incisions within the relaxed skin tension lines where possible. The area thus outlined was incised deep to adipose tissue with a #15 scalpel blade. The skin margins were undermined to an appropriate distance in all directions utilizing iris scissors. Following this, the designed flaps were advanced and carried over into the primary defect and sutured into place. Burow's Advancement Flap Text: The defect edges were debeveled with a #15 scalpel blade. Given the location of the defect and the proximity to free margins a Burow's advancement flap was deemed most appropriate. Using a sterile surgical marker, the appropriate advancement flap was drawn incorporating the defect and placing the expected incisions within the relaxed skin tension lines where possible. The area thus outlined was incised deep to adipose tissue with a #15 scalpel blade. The skin margins were undermined to an appropriate distance in all directions utilizing iris scissors. Following this, the designed flap was advanced and carried over into the primary defect and sutured into place. Chonodrocutaneous Helical Advancement Flap Text: The defect edges were debeveled with a #15 scalpel blade. Given the location of the defect and the proximity to free margins a chondrocutaneous helical advancement flap was deemed most appropriate. Using a sterile surgical marker, the appropriate advancement flap was drawn incorporating the defect and placing the expected incisions within the relaxed skin tension lines where possible. The area thus outlined was incised deep to adipose tissue with a #15 scalpel blade. The skin margins were undermined to an appropriate distance in all directions utilizing iris scissors. Following this, the designed flap was advanced and carried over into the primary defect and sutured into place. Crescentic Advancement Flap Text: The defect edges were debeveled with a #15 scalpel blade. Given the location of the defect and the proximity to free margins a crescentic advancement flap was deemed most appropriate. Using a sterile surgical marker, the appropriate advancement flap was drawn incorporating the defect and placing the expected incisions within the relaxed skin tension lines where possible. The area thus outlined was incised deep to adipose tissue with a #15 scalpel blade. The skin margins were undermined to an appropriate distance in all directions utilizing iris scissors. Following this, the designed flap was advanced and carried over into the primary defect and sutured into place. A-T Advancement Flap Text: The defect edges were debeveled with a #15 scalpel blade. Given the location of the defect, shape of the defect and the proximity to free margins an A-T advancement flap was deemed most appropriate. Using a sterile surgical marker, an appropriate advancement flap was drawn incorporating the defect and placing the expected incisions within the relaxed skin tension lines where possible. The area thus outlined was incised deep to adipose tissue with a #15 scalpel blade. The skin margins were undermined to an appropriate distance in all directions utilizing iris scissors. Following this, the designed flap was advanced and carried over into the primary defect and sutured into place. O-T Advancement Flap Text: The defect edges were debeveled with a #15 scalpel blade. Given the location of the defect, shape of the defect and the proximity to free margins an O-T advancement flap was deemed most appropriate. Using a sterile surgical marker, an appropriate advancement flap was drawn incorporating the defect and placing the expected incisions within the relaxed skin tension lines where possible. The area thus outlined was incised deep to adipose tissue with a #15 scalpel blade. The skin margins were undermined to an appropriate distance in all directions utilizing iris scissors. Following this, the designed flap was advanced and carried over into the primary defect and sutured into place. O-L Flap Text: The defect edges were debeveled with a #15 scalpel blade. Given the location of the defect, shape of the defect and the proximity to free margins an O-L flap was deemed most appropriate. Using a sterile surgical marker, an appropriate advancement flap was drawn incorporating the defect and placing the expected incisions within the relaxed skin tension lines where possible. The area thus outlined was incised deep to adipose tissue with a #15 scalpel blade. The skin margins were undermined to an appropriate distance in all directions utilizing iris scissors. Following this, the designed flap was advanced and carried over into the primary defect and sutured into place. O-Z Flap Text: The defect edges were debeveled with a #15 scalpel blade. Given the location of the defect, shape of the defect and the proximity to free margins an O-Z flap was deemed most appropriate. Using a sterile surgical marker, an appropriate transposition flap was drawn incorporating the defect and placing the expected incisions within the relaxed skin tension lines where possible. The area thus outlined was incised deep to adipose tissue with a #15 scalpel blade. The skin margins were undermined to an appropriate distance in all directions utilizing iris scissors. Following this, the designed flap was carried over into the primary defect and sutured into place. Double O-Z Flap Text: The defect edges were debeveled with a #15 scalpel blade. Given the location of the defect, shape of the defect and the proximity to free margins a Double O-Z flap was deemed most appropriate. Using a sterile surgical marker, an appropriate transposition flap was drawn incorporating the defect and placing the expected incisions within the relaxed skin tension lines where possible. The area thus outlined was incised deep to adipose tissue with a #15 scalpel blade. The skin margins were undermined to an appropriate distance in all directions utilizing iris scissors. Following this, the designed flap was carried over into the primary defect and sutured into place. V-Y Flap Text: The defect edges were debeveled with a #15 scalpel blade. Given the location of the defect, shape of the defect and the proximity to free margins a V-Y flap was deemed most appropriate. Using a sterile surgical marker, an appropriate advancement flap was drawn incorporating the defect and placing the expected incisions within the relaxed skin tension lines where possible. The area thus outlined was incised deep to adipose tissue with a #15 scalpel blade. The skin margins were undermined to an appropriate distance in all directions utilizing iris scissors. Following this, the designed flap was advanced and carried over into the primary defect and sutured into place. Advancement-Rotation Flap Text: The defect edges were debeveled with a #15 scalpel blade. Given the location of the defect, shape of the defect and the proximity to free margins an advancement-rotation flap was deemed most appropriate. Using a sterile surgical marker, an appropriate flap was drawn incorporating the defect and placing the expected incisions within the relaxed skin tension lines where possible. The area thus outlined was incised deep to adipose tissue with a #15 scalpel blade. The skin margins were undermined to an appropriate distance in all directions utilizing iris scissors. Following this, the designed flap was carried over into the primary defect and sutured into place. Mercedes Flap Text: The defect edges were debeveled with a #15 scalpel blade. Given the location of the defect, shape of the defect and the proximity to free margins a Mercedes flap was deemed most appropriate. Using a sterile surgical marker, an appropriate advancement flap was drawn incorporating the defect and placing the expected incisions within the relaxed skin tension lines where possible. The area thus outlined was incised deep to adipose tissue with a #15 scalpel blade. The skin margins were undermined to an appropriate distance in all directions utilizing iris scissors. Following this, the designed flap was advanced and carried over into the primary defect and sutured into place. Modified Advancement Flap Text: The defect edges were debeveled with a #15 scalpel blade. Given the location of the defect, shape of the defect and the proximity to free margins a modified advancement flap was deemed most appropriate. Using a sterile surgical marker, an appropriate advancement flap was drawn incorporating the defect and placing the expected incisions within the relaxed skin tension lines where possible. The area thus outlined was incised deep to adipose tissue with a #15 scalpel blade. The skin margins were undermined to an appropriate distance in all directions utilizing iris scissors. Following this, the designed flap was advanced and carried over into the primary defect and sutured into place. Mucosal Advancement Flap Text: Given the location of the defect, shape of the defect and the proximity to free margins a mucosal advancement flap was deemed most appropriate. Incisions were made with a 15 blade scalpel in the appropriate fashion along the cutaneous vermilion border and the mucosal lip. The remaining actinically damaged mucosal tissue was excised.  The mucosal advancement flap was then elevated to the gingival sulcus with care taken to preserve the neurovascular structures and advanced into the primary defect. Care was taken to ensure that precise realignment of the vermilion border was achieved. Peng Advancement Flap Text: The defect edges were debeveled with a #15 scalpel blade. Given the location of the defect, shape of the defect and the proximity to free margins a Peng advancement flap was deemed most appropriate. Using a sterile surgical marker, an appropriate advancement flap was drawn incorporating the defect and placing the expected incisions within the relaxed skin tension lines where possible. The area thus outlined was incised deep to adipose tissue with a #15 scalpel blade. The skin margins were undermined to an appropriate distance in all directions utilizing iris scissors. Following this, the designed flap was advanced and carried over into the primary defect and sutured into place. Hatchet Flap Text: The defect edges were debeveled with a #15 scalpel blade. Given the location of the defect, shape of the defect and the proximity to free margins a hatchet flap was deemed most appropriate. Using a sterile surgical marker, an appropriate hatchet flap was drawn incorporating the defect and placing the expected incisions within the relaxed skin tension lines where possible. The area thus outlined was incised deep to adipose tissue with a #15 scalpel blade. The skin margins were undermined to an appropriate distance in all directions utilizing iris scissors. Following this, the designed flap was carried over into the primary defect and sutured into place. Rotation Flap Text: The defect edges were debeveled with a #15 scalpel blade. Given the location of the defect, shape of the defect and the proximity to free margins a rotation flap was deemed most appropriate. Using a sterile surgical marker, an appropriate rotation flap was drawn incorporating the defect and placing the expected incisions within the relaxed skin tension lines where possible. The area thus outlined was incised deep to adipose tissue with a #15 scalpel blade. The skin margins were undermined to an appropriate distance in all directions utilizing iris scissors. Following this, the designed flap was carried over into the primary defect and sutured into place. Bilateral Rotation Flap Text: The defect edges were debeveled with a #15 scalpel blade. Given the location of the defect, shape of the defect and the proximity to free margins a bilateral rotation flap was deemed most appropriate. Using a sterile surgical marker, an appropriate rotation flap was drawn incorporating the defect and placing the expected incisions within the relaxed skin tension lines where possible. The area thus outlined was incised deep to adipose tissue with a #15 scalpel blade. The skin margins were undermined to an appropriate distance in all directions utilizing iris scissors. Following this, the designed flap was carried over into the primary defect and sutured into place. Spiral Flap Text: The defect edges were debeveled with a #15 scalpel blade. Given the location of the defect, shape of the defect and the proximity to free margins a spiral flap was deemed most appropriate. Using a sterile surgical marker, an appropriate rotation flap was drawn incorporating the defect and placing the expected incisions within the relaxed skin tension lines where possible. The area thus outlined was incised deep to adipose tissue with a #15 scalpel blade. The skin margins were undermined to an appropriate distance in all directions utilizing iris scissors. Following this, the designed flap was carried over into the primary defect and sutured into place. Staged Advancement Flap Text: The defect edges were debeveled with a #15 scalpel blade. Given the location of the defect, shape of the defect and the proximity to free margins a staged advancement flap was deemed most appropriate. Using a sterile surgical marker, an appropriate advancement flap was drawn incorporating the defect and placing the expected incisions within the relaxed skin tension lines where possible. The area thus outlined was incised deep to adipose tissue with a #15 scalpel blade. The skin margins were undermined to an appropriate distance in all directions utilizing iris scissors. Following this, the designed flap was carried over into the primary defect and sutured into place. Star Wedge Flap Text: The defect edges were debeveled with a #15 scalpel blade. Given the location of the defect, shape of the defect and the proximity to free margins a star wedge flap was deemed most appropriate. Using a sterile surgical marker, an appropriate rotation flap was drawn incorporating the defect and placing the expected incisions within the relaxed skin tension lines where possible. The area thus outlined was incised deep to adipose tissue with a #15 scalpel blade. The skin margins were undermined to an appropriate distance in all directions utilizing iris scissors. Following this, the designed flap was carried over into the primary defect and sutured into place. Transposition Flap Text: The defect edges were debeveled with a #15 scalpel blade. Given the location of the defect and the proximity to free margins a transposition flap was deemed most appropriate. Using a sterile surgical marker, an appropriate transposition flap was drawn incorporating the defect. The area thus outlined was incised deep to adipose tissue with a #15 scalpel blade. The skin margins were undermined to an appropriate distance in all directions utilizing iris scissors. Following this, the designed flap was carried over into the primary defect and sutured into place. Muscle Hinge Flap Text: The defect edges were debeveled with a #15 scalpel blade.  Given the size, depth and location of the defect and the proximity to free margins a muscle hinge flap was deemed most appropriate. Using a sterile surgical marker, an appropriate hinge flap was drawn incorporating the defect. The area thus outlined was incised with a #15 scalpel blade. The skin margins were undermined to an appropriate distance in all directions utilizing iris scissors. Following this, the designed flap was carried into the primary defect and sutured into place. Mustarde Flap Text: The defect edges were debeveled with a #15 scalpel blade.  Given the size, depth and location of the defect and the proximity to free margins a Mustarde flap was deemed most appropriate. Using a sterile surgical marker, an appropriate flap was drawn incorporating the defect. The area thus outlined was incised with a #15 scalpel blade. The skin margins were undermined to an appropriate distance in all directions utilizing iris scissors. Following this, the designed flap was carried into the primary defect and sutured into place. Nasal Turnover Hinge Flap Text: The defect edges were debeveled with a #15 scalpel blade.  Given the size, depth, location of the defect and the defect being full thickness a nasal turnover hinge flap was deemed most appropriate. Using a sterile surgical marker, an appropriate hinge flap was drawn incorporating the defect. The area thus outlined was incised with a #15 scalpel blade. The flap was designed to recreate the nasal mucosal lining and the alar rim. The skin margins were undermined to an appropriate distance in all directions utilizing iris scissors. Following this, the designed flap was carried over into the primary defect and sutured into place Nasalis-Muscle-Based Myocutaneous Island Pedicle Flap Text: Using a #15 blade, an incision was made around the donor flap to the level of the nasalis muscle. Wide lateral undermining was then performed in both the subcutaneous plane above the nasalis muscle, and in a submuscular plane just above periosteum. This allowed the formation of a free nasalis muscle axial pedicle (based on the angular artery) which was still attached to the actual cutaneous flap, increasing its mobility and vascular viability. Hemostasis was obtained with pinpoint electrocoagulation. The flap was mobilized into position and the pivotal anchor points positioned and stabilized with buried interrupted sutures. Subcutaneous and dermal tissues were closed in a multilayered fashion with sutures. Tissue redundancies were excised, and the epidermal edges were apposed without significant tension and sutured with sutures. Nasalis Myocutaneous Flap Text: Using a #15 blade, an incision was made around the donor flap to the level of the nasalis muscle. Wide lateral undermining was then performed in both the subcutaneous plane above the nasalis muscle, and in a submuscular plane just above periosteum. This allowed the formation of a free nasalis muscle axial pedicle which was still attached to the actual cutaneous flap, increasing its mobility and vascular viability. Hemostasis was obtained with pinpoint electrocoagulation. The flap was mobilized into position and the pivotal anchor points positioned and stabilized with buried interrupted sutures. Subcutaneous and dermal tissues were closed in a multilayered fashion with sutures. Tissue redundancies were excised, and the epidermal edges were apposed without significant tension and sutured with sutures. Orbicularis Oris Muscle Flap Text: The defect edges were debeveled with a #15 scalpel blade.  Given that the defect affected the competency of the oral sphincter an orbicularis oris muscle flap was deemed most appropriate to restore this competency and normal muscle function.  Using a sterile surgical marker, an appropriate flap was drawn incorporating the defect. The area thus outlined was incised with a #15 scalpel blade. Following this, the designed flap was carried over into the primary defect and sutured into place. Melolabial Transposition Flap Text: The defect edges were debeveled with a #15 scalpel blade. Given the location of the defect and the proximity to free margins a melolabial flap was deemed most appropriate. Using a sterile surgical marker, an appropriate melolabial transposition flap was drawn incorporating the defect. The area thus outlined was incised deep to adipose tissue with a #15 scalpel blade. The skin margins were undermined to an appropriate distance in all directions utilizing iris scissors. Following this, the designed flap was carried over into the primary defect and sutured into place. Rectangular Flap Text: The defect edges were debeveled with a #15 scalpel blade. Given the location of the defect and the proximity to free margins a rectangular flap was deemed most appropriate. Using a sterile surgical marker, an appropriate rectangular flap was drawn incorporating the defect. The area thus outlined was incised deep to adipose tissue with a #15 scalpel blade. The skin margins were undermined to an appropriate distance in all directions utilizing iris scissors. Following this, the designed flap was carried over into the primary defect and sutured into place. Rhombic Flap Text: The defect edges were debeveled with a #15 scalpel blade. Given the location of the defect and the proximity to free margins a rhombic flap was deemed most appropriate. Using a sterile surgical marker, an appropriate rhombic flap was drawn incorporating the defect. The area thus outlined was incised deep to adipose tissue with a #15 scalpel blade. The skin margins were undermined to an appropriate distance in all directions utilizing iris scissors. Following this, the designed flap was carried over into the primary defect and sutured into place. Rhomboid Transposition Flap Text: The defect edges were debeveled with a #15 scalpel blade. Given the location of the defect and the proximity to free margins a rhomboid transposition flap was deemed most appropriate. Using a sterile surgical marker, an appropriate rhomboid flap was drawn incorporating the defect. The area thus outlined was incised deep to adipose tissue with a #15 scalpel blade. The skin margins were undermined to an appropriate distance in all directions utilizing iris scissors. Following this, the designed flap was carried over into the primary defect and sutured into place. Bi-Rhombic Flap Text: The defect edges were debeveled with a #15 scalpel blade. Given the location of the defect and the proximity to free margins a bi-rhombic flap was deemed most appropriate. Using a sterile surgical marker, an appropriate rhombic flap was drawn incorporating the defect. The area thus outlined was incised deep to adipose tissue with a #15 scalpel blade. The skin margins were undermined to an appropriate distance in all directions utilizing iris scissors. Following this, the designed flap was carried over into the primary defect and sutured into place. Helical Rim Advancement Flap Text: The defect edges were debeveled with a #15 blade scalpel.  Given the location of the defect and the proximity to free margins (helical rim) a double helical rim advancement flap was deemed most appropriate. Using a sterile surgical marker, the appropriate advancement flaps were drawn incorporating the defect and placing the expected incisions between the helical rim and antihelix where possible.  The area thus outlined was incised through and through with a #15 scalpel blade.  With a skin hook and iris scissors, the flaps were gently and sharply undermined and freed up. Folllowing this, the designed flaps were carried over into the primary defect and sutured into place. Bilateral Helical Rim Advancement Flap Text: The defect edges were debeveled with a #15 blade scalpel.  Given the location of the defect and the proximity to free margins (helical rim) a bilateral helical rim advancement flap was deemed most appropriate. Using a sterile surgical marker, the appropriate advancement flaps were drawn incorporating the defect and placing the expected incisions between the helical rim and antihelix where possible.  The area thus outlined was incised through and through with a #15 scalpel blade.  With a skin hook and iris scissors, the flaps were gently and sharply undermined and freed up. Following this, the designed flaps were placed into the primary defect and sutured into place. Ear Star Wedge Flap Text: The defect edges were debeveled with a #15 blade scalpel.  Given the location of the defect and the proximity to free margins (helical rim) an ear star wedge flap was deemed most appropriate. Using a sterile surgical marker, the appropriate flap was drawn incorporating the defect and placing the expected incisions between the helical rim and antihelix where possible.  The area thus outlined was incised through and through with a #15 scalpel blade. Following this, the designed flap was carried over into the primary defect and sutured into place. Banner Transposition Flap Text: The defect edges were debeveled with a #15 scalpel blade. Given the location of the defect and the proximity to free margins a Banner transposition flap was deemed most appropriate. Using a sterile surgical marker, an appropriate flap was drawn around the defect. The area thus outlined was incised deep to adipose tissue with a #15 scalpel blade. The skin margins were undermined to an appropriate distance in all directions utilizing iris scissors. Following this, the designed flap was carried into the primary defect and sutured into place. Bilobed Flap Text: The defect edges were debeveled with a #15 scalpel blade. Given the location of the defect and the proximity to free margins a bilobe flap was deemed most appropriate. Using a sterile surgical marker, an appropriate bilobe flap drawn around the defect. The area thus outlined was incised deep to adipose tissue with a #15 scalpel blade. The skin margins were undermined to an appropriate distance in all directions utilizing iris scissors. Following this, the designed flap was carried over into the primary defect and sutured into place. Bilobed Transposition Flap Text: The defect edges were debeveled with a #15 scalpel blade. Given the location of the defect and the proximity to free margins a bilobed transposition flap was deemed most appropriate. Using a sterile surgical marker, an appropriate bilobe flap drawn around the defect. The area thus outlined was incised deep to adipose tissue with a #15 scalpel blade. The skin margins were undermined to an appropriate distance in all directions utilizing iris scissors. Following this, the designed flap was carried over into the primary defect and sutured into place. Trilobed Flap Text: The defect edges were debeveled with a #15 scalpel blade. Given the location of the defect and the proximity to free margins a trilobed flap was deemed most appropriate. Using a sterile surgical marker, an appropriate trilobed flap was drawn around the defect. The area thus outlined was incised deep to adipose tissue with a #15 scalpel blade. The skin margins were undermined to an appropriate distance in all directions utilizing iris scissors. Following this, the designed flap was carried into the primary defect and sutured into place. Dorsal Nasal Flap Text: The defect edges were debeveled with a #15 scalpel blade. Given the location of the defect and the proximity to free margins a dorsal nasal flap was deemed most appropriate. Using a sterile surgical marker, an appropriate dorsal nasal flap was drawn around the defect. The area thus outlined was incised deep to adipose tissue with a #15 scalpel blade. The skin margins were undermined to an appropriate distance in all directions utilizing iris scissors. Following this, the designed flap was carried into the primary defect and sutured into place. Island Pedicle Flap Text: The defect edges were debeveled with a #15 scalpel blade. Given the location of the defect, shape of the defect and the proximity to free margins an island pedicle advancement flap was deemed most appropriate. Using a sterile surgical marker, an appropriate advancement flap was drawn incorporating the defect, outlining the appropriate donor tissue and placing the expected incisions within the relaxed skin tension lines where possible. The area thus outlined was incised deep to adipose tissue with a #15 scalpel blade. The skin margins were undermined to an appropriate distance in all directions around the primary defect and laterally outward around the island pedicle utilizing iris scissors.  There was minimal undermining beneath the pedicle flap. Following this, the flap was carried over into the primary defect and sutured into place. Island Pedicle Flap With Canthal Suspension Text: The defect edges were debeveled with a #15 scalpel blade. Given the location of the defect, shape of the defect and the proximity to free margins an island pedicle advancement flap was deemed most appropriate. Using a sterile surgical marker, an appropriate advancement flap was drawn incorporating the defect, outlining the appropriate donor tissue and placing the expected incisions within the relaxed skin tension lines where possible. The area thus outlined was incised deep to adipose tissue with a #15 scalpel blade. The skin margins were undermined to an appropriate distance in all directions around the primary defect and laterally outward around the island pedicle utilizing iris scissors.  There was minimal undermining beneath the pedicle flap. A suspension suture was placed in the canthal tendon to prevent tension and prevent ectropion. Following this, the designed flap was placed into the primary defect and sutured into place. Alar Island Pedicle Flap Text: The defect edges were debeveled with a #15 scalpel blade. Given the location of the defect, shape of the defect and the proximity to the alar rim an island pedicle advancement flap was deemed most appropriate. Using a sterile surgical marker, an appropriate advancement flap was drawn incorporating the defect, outlining the appropriate donor tissue and placing the expected incisions within the nasal ala running parallel to the alar rim. The area thus outlined was incised with a #15 scalpel blade. The skin margins were undermined minimally to an appropriate distance in all directions around the primary defect and laterally outward around the island pedicle utilizing iris scissors.  There was minimal undermining beneath the pedicle flap. Following this, the designed flap was carried over into the primary defect and sutured into place. Double Island Pedicle Flap Text: The defect edges were debeveled with a #15 scalpel blade. Given the location of the defect, shape of the defect and the proximity to free margins a double island pedicle advancement flap was deemed most appropriate. Using a sterile surgical marker, an appropriate advancement flap was drawn incorporating the defect, outlining the appropriate donor tissue and placing the expected incisions within the relaxed skin tension lines where possible. The area thus outlined was incised deep to adipose tissue with a #15 scalpel blade. The skin margins were undermined to an appropriate distance in all directions around the primary defect and laterally outward around the island pedicle utilizing iris scissors.  There was minimal undermining beneath the pedicle flap. Following this, the flap was carried over into the primary defect and sutured into place. Island Pedicle Flap-Requiring Vessel Identification Text: The defect edges were debeveled with a #15 scalpel blade. Given the location of the defect, shape of the defect and the proximity to free margins an island pedicle advancement flap was deemed most appropriate. Using a sterile surgical marker, an appropriate advancement flap was drawn, based on the axial vessel mentioned above, incorporating the defect, outlining the appropriate donor tissue and placing the expected incisions within the relaxed skin tension lines where possible. The area thus outlined was incised deep to adipose tissue with a #15 scalpel blade. The skin margins were undermined to an appropriate distance in all directions around the primary defect and laterally outward around the island pedicle utilizing iris scissors.  There was minimal undermining beneath the pedicle flap. Following this, the designed flap was carried over into the primary defect and sutured into place. Keystone Flap Text: The defect edges were debeveled with a #15 scalpel blade. Given the location of the defect, shape of the defect a keystone flap was deemed most appropriate. Using a sterile surgical marker, an appropriate keystone flap was drawn incorporating the defect, outlining the appropriate donor tissue and placing the expected incisions within the relaxed skin tension lines where possible. The area thus outlined was incised deep to adipose tissue with a #15 scalpel blade. The skin margins were undermined to an appropriate distance in all directions around the primary defect and laterally outward around the flap utilizing iris scissors. Following this, the designed flap was carried into the primary defect and sutured into place. O-T Plasty Text: The defect edges were debeveled with a #15 scalpel blade. Given the location of the defect, shape of the defect and the proximity to free margins an O-T plasty was deemed most appropriate. Using a sterile surgical marker, an appropriate O-T plasty was drawn incorporating the defect and placing the expected incisions within the relaxed skin tension lines where possible. The area thus outlined was incised deep to adipose tissue with a #15 scalpel blade. The skin margins were undermined to an appropriate distance in all directions utilizing iris scissors. Following this, the designed flap was carried over into the primary defect and sutured into place. O-Z Plasty Text: The defect edges were debeveled with a #15 scalpel blade. Given the location of the defect, shape of the defect and the proximity to free margins an O-Z plasty (double transposition flap) was deemed most appropriate. Using a sterile surgical marker, the appropriate transposition flaps were drawn incorporating the defect and placing the expected incisions within the relaxed skin tension lines where possible. The area thus outlined was incised deep to adipose tissue with a #15 scalpel blade. The skin margins were undermined to an appropriate distance in all directions utilizing iris scissors. Hemostasis was achieved with electrocautery. The flaps were then transposed and carried over into place, one clockwise and the other counterclockwise, and anchored with interrupted buried subcutaneous sutures. Double O-Z Plasty Text: The defect edges were debeveled with a #15 scalpel blade. Given the location of the defect, shape of the defect and the proximity to free margins a Double O-Z plasty (double transposition flap) was deemed most appropriate. Using a sterile surgical marker, the appropriate transposition flaps were drawn incorporating the defect and placing the expected incisions within the relaxed skin tension lines where possible. The area thus outlined was incised deep to adipose tissue with a #15 scalpel blade. The skin margins were undermined to an appropriate distance in all directions utilizing iris scissors. Hemostasis was achieved with electrocautery. The flaps were then transposed and carried over into place, one clockwise and the other counterclockwise, and anchored with interrupted buried subcutaneous sutures. V-Y Plasty Text: The defect edges were debeveled with a #15 scalpel blade. Given the location of the defect, shape of the defect and the proximity to free margins an V-Y advancement flap was deemed most appropriate. Using a sterile surgical marker, an appropriate advancement flap was drawn incorporating the defect and placing the expected incisions within the relaxed skin tension lines where possible. The area thus outlined was incised deep to adipose tissue with a #15 scalpel blade. The skin margins were undermined to an appropriate distance in all directions utilizing iris scissors. Following this, the designed flap was advanced and carried over into the primary defect and sutured into place. H Plasty Text: Given the location of the defect, shape of the defect and the proximity to free margins a H-plasty was deemed most appropriate for repair. Using a sterile surgical marker, the appropriate advancement arms of the H-plasty were drawn incorporating the defect and placing the expected incisions within the relaxed skin tension lines where possible. The area thus outlined was incised deep to adipose tissue with a #15 scalpel blade. The skin margins were undermined to an appropriate distance in all directions utilizing iris scissors.  The opposing advancement arms were then advanced and carried over into place in opposite direction and anchored with interrupted buried subcutaneous sutures. W Plasty Text: The lesion was extirpated to the level of the fat with a #15 scalpel blade. Given the location of the defect, shape of the defect and the proximity to free margins a W-plasty was deemed most appropriate for repair. Using a sterile surgical marker, the appropriate transposition arms of the W-plasty were drawn incorporating the defect and placing the expected incisions within the relaxed skin tension lines where possible. The area thus outlined was incised deep to adipose tissue with a #15 scalpel blade. The skin margins were undermined to an appropriate distance in all directions utilizing iris scissors. The opposing transposition arms were then transposed and carried over into place in opposite direction and anchored with interrupted buried subcutaneous sutures. Z Plasty Text: The lesion was extirpated to the level of the fat with a #15 scalpel blade. Given the location of the defect, shape of the defect and the proximity to free margins a Z-plasty was deemed most appropriate for repair. Using a sterile surgical marker, the appropriate transposition arms of the Z-plasty were drawn incorporating the defect and placing the expected incisions within the relaxed skin tension lines where possible. The area thus outlined was incised deep to adipose tissue with a #15 scalpel blade. The skin margins were undermined to an appropriate distance in all directions utilizing iris scissors. The opposing transposition arms were then transposed and carried over into place in opposite direction and anchored with interrupted buried subcutaneous sutures. Double Z Plasty Text: The lesion was extirpated to the level of the fat with a #15 scalpel blade. Given the location of the defect, shape of the defect and the proximity to free margins a double Z-plasty was deemed most appropriate for repair. Using a sterile surgical marker, the appropriate transposition arms of the double Z-plasty were drawn incorporating the defect and placing the expected incisions within the relaxed skin tension lines where possible. The area thus outlined was incised deep to adipose tissue with a #15 scalpel blade. The skin margins were undermined to an appropriate distance in all directions utilizing iris scissors. The opposing transposition arms were then transposed and carried over into place in opposite direction and anchored with interrupted buried subcutaneous sutures. Zygomaticofacial Flap Text: Given the location of the defect, shape of the defect and the proximity to free margins a zygomaticofacial flap was deemed most appropriate for repair. Using a sterile surgical marker, the appropriate flap was drawn incorporating the defect and placing the expected incisions within the relaxed skin tension lines where possible. The area thus outlined was incised deep to adipose tissue with a #15 scalpel blade with preservation of a vascular pedicle.  The skin margins were undermined to an appropriate distance in all directions utilizing iris scissors. The flap was then carried over into the defect and anchored with interrupted buried subcutaneous sutures. Cheek Interpolation Flap Text: A decision was made to reconstruct the defect utilizing an interpolation axial flap and a staged reconstruction.  A telfa template was made of the defect.  This telfa template was then used to outline the Cheek Interpolation flap.  The donor area for the pedicle flap was then injected with anesthesia.  The flap was excised through the skin and subcutaneous tissue down to the layer of the underlying musculature.  The interpolation flap was carefully excised within this deep plane to maintain its blood supply.  The edges of the donor site were undermined.   The donor site was closed in a primary fashion.  The pedicle was then rotated into position and sutured.  Once the tube was sutured into place, adequate blood supply was confirmed with blanching and refill.  The pedicle was then wrapped with xeroform gauze and dressed appropriately with a telfa and gauze bandage to ensure continued blood supply and protect the attached pedicle. Cheek-To-Nose Interpolation Flap Text: A decision was made to reconstruct the defect utilizing an interpolation axial flap and a staged reconstruction.  A telfa template was made of the defect.  This telfa template was then used to outline the Cheek-To-Nose Interpolation flap.  The donor area for the pedicle flap was then injected with anesthesia.  The flap was excised through the skin and subcutaneous tissue down to the layer of the underlying musculature.  The interpolation flap was carefully excised within this deep plane to maintain its blood supply.  The edges of the donor site were undermined.   The donor site was closed in a primary fashion.  The pedicle was then rotated into position and sutured.  Once the tube was sutured into place, adequate blood supply was confirmed with blanching and refill.  The pedicle was then wrapped with xeroform gauze and dressed appropriately with a telfa and gauze bandage to ensure continued blood supply and protect the attached pedicle. Interpolation Flap Text: A decision was made to reconstruct the defect utilizing an interpolation axial flap and a staged reconstruction.  A telfa template was made of the defect.  This telfa template was then used to outline the interpolation flap.  The donor area for the pedicle flap was then injected with anesthesia.  The flap was excised through the skin and subcutaneous tissue down to the layer of the underlying musculature.  The interpolation flap was carefully excised within this deep plane to maintain its blood supply.  The edges of the donor site were undermined.   The donor site was closed in a primary fashion.  The pedicle was then rotated into position and sutured.  Once the tube was sutured into place, adequate blood supply was confirmed with blanching and refill.  The pedicle was then wrapped with xeroform gauze and dressed appropriately with a telfa and gauze bandage to ensure continued blood supply and protect the attached pedicle. Melolabial Interpolation Flap Text: A decision was made to reconstruct the defect utilizing an interpolation axial flap and a staged reconstruction.  A telfa template was made of the defect.  This telfa template was then used to outline the melolabial interpolation flap.  The donor area for the pedicle flap was then injected with anesthesia.  The flap was excised through the skin and subcutaneous tissue down to the layer of the underlying musculature.  The pedicle flap was carefully excised within this deep plane to maintain its blood supply.  The edges of the donor site were undermined.   The donor site was closed in a primary fashion.  The pedicle was then rotated into position and sutured.  Once the tube was sutured into place, adequate blood supply was confirmed with blanching and refill.  The pedicle was then wrapped with xeroform gauze and dressed appropriately with a telfa and gauze bandage to ensure continued blood supply and protect the attached pedicle. Mastoid Interpolation Flap Text: A decision was made to reconstruct the defect utilizing an interpolation axial flap and a staged reconstruction.  A telfa template was made of the defect.  This telfa template was then used to outline the mastoid interpolation flap.  The donor area for the pedicle flap was then injected with anesthesia.  The flap was excised through the skin and subcutaneous tissue down to the layer of the underlying musculature.  The pedicle flap was carefully excised within this deep plane to maintain its blood supply.  The edges of the donor site were undermined.   The donor site was closed in a primary fashion.  The pedicle was then rotated into position and sutured.  Once the tube was sutured into place, adequate blood supply was confirmed with blanching and refill.  The pedicle was then wrapped with xeroform gauze and dressed appropriately with a telfa and gauze bandage to ensure continued blood supply and protect the attached pedicle. Posterior Auricular Interpolation Flap Text: A decision was made to reconstruct the defect utilizing an interpolation axial flap and a staged reconstruction.  A telfa template was made of the defect.  This telfa template was then used to outline the posterior auricular interpolation flap.  The donor area for the pedicle flap was then injected with anesthesia.  The flap was excised through the skin and subcutaneous tissue down to the layer of the underlying musculature.  The pedicle flap was carefully excised within this deep plane to maintain its blood supply.  The edges of the donor site were undermined.   The donor site was closed in a primary fashion.  The pedicle was then rotated into position and sutured.  Once the tube was sutured into place, adequate blood supply was confirmed with blanching and refill.  The pedicle was then wrapped with xeroform gauze and dressed appropriately with a telfa and gauze bandage to ensure continued blood supply and protect the attached pedicle. Paramedian Forehead Flap Text: A decision was made to reconstruct the defect utilizing an interpolation axial flap and a staged reconstruction.  A telfa template was made of the defect.  This telfa template was then used to outline the paramedian forehead pedicle flap.  The donor area for the pedicle flap was then injected with anesthesia.  The flap was excised through the skin and subcutaneous tissue down to the layer of the underlying musculature.  The pedicle flap was carefully excised within this deep plane to maintain its blood supply.  The edges of the donor site were undermined.   The donor site was closed in a primary fashion.  The pedicle was then rotated into position and sutured.  Once the tube was sutured into place, adequate blood supply was confirmed with blanching and refill.  The pedicle was then wrapped with xeroform gauze and dressed appropriately with a telfa and gauze bandage to ensure continued blood supply and protect the attached pedicle. Abbe Flap (Upper To Lower Lip) Text: The defect of the lower lip was assessed and measured.  Given the location and size of the defect, an Abbe flap was deemed most appropriate. Using a sterile surgical marker, an appropriate Abbe flap was measured and drawn on the upper lip. Local anesthesia was then infiltrated.  A scalpel was then used to incise the upper lip through and through the skin, vermilion, muscle and mucosa, leaving the flap pedicled on the opposite side.  The flap was then rotated and transferred to the lower lip defect.  The flap was then sutured into place with a three layer technique, closing the orbicularis oris muscle layer with subcutaneous buried sutures, followed by a mucosal layer and an epidermal layer. Abbe Flap (Lower To Upper Lip) Text: The defect of the upper lip was assessed and measured.  Given the location and size of the defect, an Abbe flap was deemed most appropriate. Using a sterile surgical marker, an appropriate Abbe flap was measured and drawn on the lower lip. Local anesthesia was then infiltrated. A scalpel was then used to incise the upper lip through and through the skin, vermilion, muscle and mucosa, leaving the flap pedicled on the opposite side.  The flap was then rotated and transferred to the lower lip defect.  The flap was then sutured into place with a three layer technique, closing the orbicularis oris muscle layer with subcutaneous buried sutures, followed by a mucosal layer and an epidermal layer. Estlander Flap (Upper To Lower Lip) Text: The defect of the lower lip was assessed and measured.  Given the location and size of the defect, an Estlander flap was deemed most appropriate. Using a sterile surgical marker, an appropriate Estlander flap was measured and drawn on the upper lip. Local anesthesia was then infiltrated. A scalpel was then used to incise the lateral aspect of the flap, through skin, muscle and mucosa, leaving the flap pedicled medially.  The flap was then rotated and positioned to fill the lower lip defect.  The flap was then sutured into place with a three layer technique, closing the orbicularis oris muscle layer with subcutaneous buried sutures, followed by a mucosal layer and an epidermal layer. Lip Wedge Excision Repair Text: Given the location of the defect and the proximity to free margins a full thickness wedge repair was deemed most appropriate. Using a sterile surgical marker, the appropriate repair was drawn incorporating the defect and placing the expected incisions perpendicular to the vermilion border.  The vermilion border was also meticulously outlined to ensure appropriate reapproximation during the repair.  The area thus outlined was incised through and through with a #15 scalpel blade.  The muscularis and dermis were reaproximated with deep sutures following hemostasis. Care was taken to realign the vermilion border before proceeding with the superficial closure.  Once the vermilion was realigned the superfical and mucosal closure was finished. Ftsg Text: The defect edges were debeveled with a #15 scalpel blade. Given the location of the defect, shape of the defect and the proximity to free margins a full thickness skin graft was deemed most appropriate. Using a sterile surgical marker, the primary defect shape was transferred to the donor site. The area thus outlined was incised deep to adipose tissue with a #15 scalpel blade.  The harvested graft was then trimmed of adipose tissue until only dermis and epidermis was left.  The skin margins of the secondary defect were undermined to an appropriate distance in all directions utilizing iris scissors.  The secondary defect was closed with interrupted buried subcutaneous sutures.  The skin edges were then re-apposed with running  sutures.  The skin graft was then placed in the primary defect and oriented appropriately. Split-Thickness Skin Graft Text: The defect edges were debeveled with a #15 scalpel blade. Given the location of the defect, shape of the defect and the proximity to free margins a split thickness skin graft was deemed most appropriate. Using a sterile surgical marker, the primary defect shape was transferred to the donor site. The split thickness graft was then harvested.  The skin graft was then placed in the primary defect and oriented appropriately. Pinch Graft Text: The defect edges were debeveled with a #15 scalpel blade. Given the location of the defect, shape of the defect and the proximity to free margins a pinch graft was deemed most appropriate. Using a sterile surgical marker, the primary defect shape was transferred to the donor site. The area thus outlined was incised deep to adipose tissue with a #15 scalpel blade.  The harvested graft was then trimmed of adipose tissue until only dermis and epidermis was left. The skin margins of the secondary defect were undermined to an appropriate distance in all directions utilizing iris scissors.  The secondary defect was closed with interrupted buried subcutaneous sutures.  The skin edges were then re-apposed with running  sutures.  The skin graft was then placed in the primary defect and oriented appropriately. Burow's Graft Text: The defect edges were debeveled with a #15 scalpel blade. Given the location of the defect, shape of the defect, the proximity to free margins and the presence of a standing cone deformity a Burow's skin graft was deemed most appropriate. The standing cone was removed and this tissue was then trimmed to the shape of the primary defect. The adipose tissue was also removed until only dermis and epidermis were left.  The skin margins of the secondary defect were undermined to an appropriate distance in all directions utilizing iris scissors.  The secondary defect was closed with interrupted buried subcutaneous sutures.  The skin edges were then re-apposed with running  sutures.  The skin graft was then placed in the primary defect and oriented appropriately. Cartilage Graft Text: The defect edges were debeveled with a #15 scalpel blade. Given the location of the defect, shape of the defect, the fact the defect involved a full thickness cartilage defect a cartilage graft was deemed most appropriate.  An appropriate donor site was identified, cleansed, and anesthetized. The cartilage graft was then harvested and transferred to the recipient site, oriented appropriately and then sutured into place.  The secondary defect was then repaired using a primary closure. Composite Graft Text: The defect edges were debeveled with a #15 scalpel blade. Given the location of the defect, shape of the defect, the proximity to free margins and the fact the defect was full thickness a composite graft was deemed most appropriate.  The defect was outline and then transferred to the donor site.  A full thickness graft was then excised from the donor site. The graft was then placed in the primary defect, oriented appropriately and then sutured into place.  The secondary defect was then repaired using a primary closure. Epidermal Autograft Text: The defect edges were debeveled with a #15 scalpel blade. Given the location of the defect, shape of the defect and the proximity to free margins an epidermal autograft was deemed most appropriate. Using a sterile surgical marker, the primary defect shape was transferred to the donor site. The epidermal graft was then harvested.  The skin graft was then placed in the primary defect and oriented appropriately. Dermal Autograft Text: The defect edges were debeveled with a #15 scalpel blade. Given the location of the defect, shape of the defect and the proximity to free margins a dermal autograft was deemed most appropriate. Using a sterile surgical marker, the primary defect shape was transferred to the donor site. The area thus outlined was incised deep to adipose tissue with a #15 scalpel blade.  The harvested graft was then trimmed of adipose and epidermal tissue until only dermis was left.  The skin graft was then placed in the primary defect and oriented appropriately. Skin Substitute Text: The defect edges were debeveled with a #15 scalpel blade. Given the location of the defect, shape of the defect and the proximity to free margins a skin substitute graft was deemed most appropriate.  The graft material was trimmed to fit the size of the defect. The graft was then placed in the primary defect and oriented appropriately. Tissue Cultured Epidermal Autograft Text: The defect edges were debeveled with a #15 scalpel blade. Given the location of the defect, shape of the defect and the proximity to free margins a tissue cultured epidermal autograft was deemed most appropriate.  The graft was then trimmed to fit the size of the defect.  The graft was then placed in the primary defect and oriented appropriately. Xenograft Text: The defect edges were debeveled with a #15 scalpel blade. Given the location of the defect, shape of the defect and the proximity to free margins a xenograft was deemed most appropriate.  The graft was then trimmed to fit the size of the defect.  The graft was then placed in the primary defect and oriented appropriately. Purse String (Intermediate) Text: Given the location of the defect and the characteristics of the surrounding skin a purse string intermediate closure was deemed most appropriate.  Undermining was performed circumferentially around the surgical defect.  A purse string suture was then placed and tightened. Purse String (Simple) Text: Given the location of the defect and the characteristics of the surrounding skin a purse string simple closure was deemed most appropriate.  Undermining was performed circumferentially around the surgical defect.  A purse string suture was then placed and tightened. Partial Purse String (Intermediate) Text: Given the location of the defect and the characteristics of the surrounding skin an intermediate purse string closure was deemed most appropriate.  Undermining was performed circumferentially around the surgical defect.  A purse string suture was then placed and tightened. Wound tension of the circular defect prevented complete closure of the wound. Partial Purse String (Simple) Text: Given the location of the defect and the characteristics of the surrounding skin a simple purse string closure was deemed most appropriate.  Undermining was performed circumferentially around the surgical defect.  A purse string suture was then placed and tightened. Wound tension of the circular defect prevented complete closure of the wound. Complex Repair And Single Advancement Flap Text: The defect edges were debeveled with a #15 scalpel blade.  The primary defect was closed partially with a complex linear closure.  Given the location of the remaining defect, shape of the defect and the proximity to free margins a single advancement flap was deemed most appropriate for complete closure of the defect.  Using a sterile surgical marker, an appropriate advancement flap was drawn incorporating the defect and placing the expected incisions within the relaxed skin tension lines where possible. The area thus outlined was incised deep to adipose tissue with a #15 scalpel blade. The skin margins were undermined to an appropriate distance in all directions utilizing iris scissors and carried over to close the primary defect. Complex Repair And Double Advancement Flap Text: The defect edges were debeveled with a #15 scalpel blade.  The primary defect was closed partially with a complex linear closure.  Given the location of the remaining defect, shape of the defect and the proximity to free margins a double advancement flap was deemed most appropriate for complete closure of the defect.  Using a sterile surgical marker, an appropriate advancement flap was drawn incorporating the defect and placing the expected incisions within the relaxed skin tension lines where possible. The area thus outlined was incised deep to adipose tissue with a #15 scalpel blade. The skin margins were undermined to an appropriate distance in all directions utilizing iris scissors and carried over to close the primary defect. Complex Repair And Modified Advancement Flap Text: The defect edges were debeveled with a #15 scalpel blade.  The primary defect was closed partially with a complex linear closure.  Given the location of the remaining defect, shape of the defect and the proximity to free margins a modified advancement flap was deemed most appropriate for complete closure of the defect.  Using a sterile surgical marker, an appropriate advancement flap was drawn incorporating the defect and placing the expected incisions within the relaxed skin tension lines where possible. The area thus outlined was incised deep to adipose tissue with a #15 scalpel blade. The skin margins were undermined to an appropriate distance in all directions utilizing iris scissors and carried over to close the primary defect. Complex Repair And A-T Advancement Flap Text: The defect edges were debeveled with a #15 scalpel blade.  The primary defect was closed partially with a complex linear closure.  Given the location of the remaining defect, shape of the defect and the proximity to free margins an A-T advancement flap was deemed most appropriate for complete closure of the defect.  Using a sterile surgical marker, an appropriate advancement flap was drawn incorporating the defect and placing the expected incisions within the relaxed skin tension lines where possible. The area thus outlined was incised deep to adipose tissue with a #15 scalpel blade. The skin margins were undermined to an appropriate distance in all directions utilizing iris scissors and carried over to close the primary defect. Complex Repair And O-T Advancement Flap Text: The defect edges were debeveled with a #15 scalpel blade.  The primary defect was closed partially with a complex linear closure.  Given the location of the remaining defect, shape of the defect and the proximity to free margins an O-T advancement flap was deemed most appropriate for complete closure of the defect.  Using a sterile surgical marker, an appropriate advancement flap was drawn incorporating the defect and placing the expected incisions within the relaxed skin tension lines where possible. The area thus outlined was incised deep to adipose tissue with a #15 scalpel blade. The skin margins were undermined to an appropriate distance in all directions utilizing iris scissors and carried over to close the primary defect. Complex Repair And O-L Flap Text: The defect edges were debeveled with a #15 scalpel blade.  The primary defect was closed partially with a complex linear closure.  Given the location of the remaining defect, shape of the defect and the proximity to free margins an O-L flap was deemed most appropriate for complete closure of the defect.  Using a sterile surgical marker, an appropriate flap was drawn incorporating the defect and placing the expected incisions within the relaxed skin tension lines where possible. The area thus outlined was incised deep to adipose tissue with a #15 scalpel blade. The skin margins were undermined to an appropriate distance in all directions utilizing iris scissors and carried over to close the primary defect. Complex Repair And Bilobe Flap Text: The defect edges were debeveled with a #15 scalpel blade.  The primary defect was closed partially with a complex linear closure.  Given the location of the remaining defect, shape of the defect and the proximity to free margins a bilobe flap was deemed most appropriate for complete closure of the defect.  Using a sterile surgical marker, an appropriate advancement flap was drawn incorporating the defect and placing the expected incisions within the relaxed skin tension lines where possible. The area thus outlined was incised deep to adipose tissue with a #15 scalpel blade. The skin margins were undermined to an appropriate distance in all directions utilizing iris scissors and carried over to close the primary defect. Complex Repair And Melolabial Flap Text: The defect edges were debeveled with a #15 scalpel blade.  The primary defect was closed partially with a complex linear closure.  Given the location of the remaining defect, shape of the defect and the proximity to free margins a melolabial flap was deemed most appropriate for complete closure of the defect.  Using a sterile surgical marker, an appropriate advancement flap was drawn incorporating the defect and placing the expected incisions within the relaxed skin tension lines where possible. The area thus outlined was incised deep to adipose tissue with a #15 scalpel blade. The skin margins were undermined to an appropriate distance in all directions utilizing iris scissors and carried over to close the primary defect. Complex Repair And Rotation Flap Text: The defect edges were debeveled with a #15 scalpel blade.  The primary defect was closed partially with a complex linear closure.  Given the location of the remaining defect, shape of the defect and the proximity to free margins a rotation flap was deemed most appropriate for complete closure of the defect.  Using a sterile surgical marker, an appropriate advancement flap was drawn incorporating the defect and placing the expected incisions within the relaxed skin tension lines where possible. The area thus outlined was incised deep to adipose tissue with a #15 scalpel blade. The skin margins were undermined to an appropriate distance in all directions utilizing iris scissors and carried over to close the primary defect. Complex Repair And Rhombic Flap Text: The defect edges were debeveled with a #15 scalpel blade.  The primary defect was closed partially with a complex linear closure.  Given the location of the remaining defect, shape of the defect and the proximity to free margins a rhombic flap was deemed most appropriate for complete closure of the defect.  Using a sterile surgical marker, an appropriate advancement flap was drawn incorporating the defect and placing the expected incisions within the relaxed skin tension lines where possible. The area thus outlined was incised deep to adipose tissue with a #15 scalpel blade. The skin margins were undermined to an appropriate distance in all directions utilizing iris scissors and carried over to close the primary defect. Complex Repair And Transposition Flap Text: The defect edges were debeveled with a #15 scalpel blade.  The primary defect was closed partially with a complex linear closure.  Given the location of the remaining defect, shape of the defect and the proximity to free margins a transposition flap was deemed most appropriate for complete closure of the defect.  Using a sterile surgical marker, an appropriate advancement flap was drawn incorporating the defect and placing the expected incisions within the relaxed skin tension lines where possible. The area thus outlined was incised deep to adipose tissue with a #15 scalpel blade. The skin margins were undermined to an appropriate distance in all directions utilizing iris scissors and carried over to close the primary defect. Complex Repair And V-Y Plasty Text: The defect edges were debeveled with a #15 scalpel blade.  The primary defect was closed partially with a complex linear closure.  Given the location of the remaining defect, shape of the defect and the proximity to free margins a V-Y plasty was deemed most appropriate for complete closure of the defect.  Using a sterile surgical marker, an appropriate advancement flap was drawn incorporating the defect and placing the expected incisions within the relaxed skin tension lines where possible. The area thus outlined was incised deep to adipose tissue with a #15 scalpel blade. The skin margins were undermined to an appropriate distance in all directions utilizing iris scissors and carried over to close the primary defect. Complex Repair And M Plasty Text: The defect edges were debeveled with a #15 scalpel blade.  The primary defect was closed partially with a complex linear closure.  Given the location of the remaining defect, shape of the defect and the proximity to free margins an M plasty was deemed most appropriate for complete closure of the defect.  Using a sterile surgical marker, an appropriate advancement flap was drawn incorporating the defect and placing the expected incisions within the relaxed skin tension lines where possible. The area thus outlined was incised deep to adipose tissue with a #15 scalpel blade. The skin margins were undermined to an appropriate distance in all directions utilizing iris scissors and carried over to close the primary defect. Complex Repair And Double M Plasty Text: The defect edges were debeveled with a #15 scalpel blade.  The primary defect was closed partially with a complex linear closure.  Given the location of the remaining defect, shape of the defect and the proximity to free margins a double M plasty was deemed most appropriate for complete closure of the defect.  Using a sterile surgical marker, an appropriate advancement flap was drawn incorporating the defect and placing the expected incisions within the relaxed skin tension lines where possible. The area thus outlined was incised deep to adipose tissue with a #15 scalpel blade. The skin margins were undermined to an appropriate distance in all directions utilizing iris scissors and carried over to close the primary defect. Complex Repair And W Plasty Text: The defect edges were debeveled with a #15 scalpel blade.  The primary defect was closed partially with a complex linear closure.  Given the location of the remaining defect, shape of the defect and the proximity to free margins a W plasty was deemed most appropriate for complete closure of the defect.  Using a sterile surgical marker, an appropriate advancement flap was drawn incorporating the defect and placing the expected incisions within the relaxed skin tension lines where possible. The area thus outlined was incised deep to adipose tissue with a #15 scalpel blade. The skin margins were undermined to an appropriate distance in all directions utilizing iris scissors and carried over to close the primary defect. Complex Repair And Z Plasty Text: The defect edges were debeveled with a #15 scalpel blade.  The primary defect was closed partially with a complex linear closure.  Given the location of the remaining defect, shape of the defect and the proximity to free margins a Z plasty was deemed most appropriate for complete closure of the defect.  Using a sterile surgical marker, an appropriate advancement flap was drawn incorporating the defect and placing the expected incisions within the relaxed skin tension lines where possible. The area thus outlined was incised deep to adipose tissue with a #15 scalpel blade. The skin margins were undermined to an appropriate distance in all directions utilizing iris scissors and carried over to close the primary defect. Complex Repair And Dorsal Nasal Flap Text: The defect edges were debeveled with a #15 scalpel blade.  The primary defect was closed partially with a complex linear closure.  Given the location of the remaining defect, shape of the defect and the proximity to free margins a dorsal nasal flap was deemed most appropriate for complete closure of the defect.  Using a sterile surgical marker, an appropriate flap was drawn incorporating the defect and placing the expected incisions within the relaxed skin tension lines where possible. The area thus outlined was incised deep to adipose tissue with a #15 scalpel blade. The skin margins were undermined to an appropriate distance in all directions utilizing iris scissors and carried over to close the primary defect. Complex Repair And Ftsg Text: The defect edges were debeveled with a #15 scalpel blade.  The primary defect was closed partially with a complex linear closure.  Given the location of the defect, shape of the defect and the proximity to free margins a full thickness skin graft was deemed most appropriate to repair the remaining defect.  The graft was trimmed to fit the size of the remaining defect.  The graft was then placed in the primary defect, oriented appropriately, and sutured into place. Complex Repair And Burow's Graft Text: The defect edges were debeveled with a #15 scalpel blade.  The primary defect was closed partially with a complex linear closure.  Given the location of the defect, shape of the defect, the proximity to free margins and the presence of a standing cone deformity a Burow's graft was deemed most appropriate to repair the remaining defect.  The graft was trimmed to fit the size of the remaining defect.  The graft was then placed in the primary defect, oriented appropriately, and sutured into place. Complex Repair And Split-Thickness Skin Graft Text: The defect edges were debeveled with a #15 scalpel blade.  The primary defect was closed partially with a complex linear closure.  Given the location of the defect, shape of the defect and the proximity to free margins a split thickness skin graft was deemed most appropriate to repair the remaining defect.  The graft was trimmed to fit the size of the remaining defect.  The graft was then placed in the primary defect, oriented appropriately, and sutured into place. Complex Repair And Epidermal Autograft Text: The defect edges were debeveled with a #15 scalpel blade.  The primary defect was closed partially with a complex linear closure.  Given the location of the defect, shape of the defect and the proximity to free margins an epidermal autograft was deemed most appropriate to repair the remaining defect.  The graft was trimmed to fit the size of the remaining defect.  The graft was then placed in the primary defect, oriented appropriately, and sutured into place. Complex Repair And Dermal Autograft Text: The defect edges were debeveled with a #15 scalpel blade.  The primary defect was closed partially with a complex linear closure.  Given the location of the defect, shape of the defect and the proximity to free margins an dermal autograft was deemed most appropriate to repair the remaining defect.  The graft was trimmed to fit the size of the remaining defect.  The graft was then placed in the primary defect, oriented appropriately, and sutured into place. Complex Repair And Tissue Cultured Epidermal Autograft Text: The defect edges were debeveled with a #15 scalpel blade.  The primary defect was closed partially with a complex linear closure.  Given the location of the defect, shape of the defect and the proximity to free margins an tissue cultured epidermal autograft was deemed most appropriate to repair the remaining defect.  The graft was trimmed to fit the size of the remaining defect.  The graft was then placed in the primary defect, oriented appropriately, and sutured into place. Complex Repair And Xenograft Text: The defect edges were debeveled with a #15 scalpel blade.  The primary defect was closed partially with a complex linear closure.  Given the location of the defect, shape of the defect and the proximity to free margins a xenograft was deemed most appropriate to repair the remaining defect.  The graft was trimmed to fit the size of the remaining defect.  The graft was then placed in the primary defect, oriented appropriately, and sutured into place. Complex Repair And Skin Substitute Graft Text: The defect edges were debeveled with a #15 scalpel blade.  The primary defect was closed partially with a complex linear closure.  Given the location of the remaining defect, shape of the defect and the proximity to free margins a skin substitute graft was deemed most appropriate to repair the remaining defect.  The graft was trimmed to fit the size of the remaining defect.  The graft was then placed in the primary defect, oriented appropriately, and sutured into place. Path Notes (To The Dermatopathologist): Please check margins. Superior notch Consent was obtained from the patient. The risks and benefits to therapy were discussed in detail. Specifically, the risks of infection, scarring, bleeding, prolonged wound healing, incomplete removal, allergy to anesthesia, nerve injury and recurrence were addressed. Prior to the procedure, the treatment site was clearly identified and confirmed by the patient. All components of Universal Protocol/PAUSE Rule completed. Post-Care Instructions: I reviewed with the patient in detail post-care instructions. Patient is not to engage in any heavy lifting, exercise, or swimming for the next 14 days. Should the patient develop any fevers, chills, bleeding, severe pain patient will contact the office immediately. Home Suture Removal Text: Patient was provided a home suture removal kit and will remove their sutures at home.  If they have any questions or difficulties they will call the office. Where Do You Want The Question To Include Opioid Counseling Located?: Case Summary Tab Information: Selecting Yes will display possible errors in your note based on the variables you have selected. This validation is only offered as a suggestion for you. PLEASE NOTE THAT THE VALIDATION TEXT WILL BE REMOVED WHEN YOU FINALIZE YOUR NOTE. IF YOU WANT TO FAX A PRELIMINARY NOTE YOU WILL NEED TO TOGGLE THIS TO 'NO' IF YOU DO NOT WANT IT IN YOUR FAXED NOTE.

## 2024-06-17 ENCOUNTER — TELEPHONE (OUTPATIENT)
Dept: OBGYN CLINIC | Facility: HOSPITAL | Age: 69
End: 2024-06-17

## 2024-07-03 ENCOUNTER — OFFICE VISIT (OUTPATIENT)
Dept: PAIN MEDICINE | Facility: CLINIC | Age: 69
End: 2024-07-03
Payer: COMMERCIAL

## 2024-07-03 VITALS
DIASTOLIC BLOOD PRESSURE: 72 MMHG | WEIGHT: 147.6 LBS | HEIGHT: 65 IN | HEART RATE: 71 BPM | BODY MASS INDEX: 24.59 KG/M2 | SYSTOLIC BLOOD PRESSURE: 113 MMHG

## 2024-07-03 DIAGNOSIS — M53.3 PAIN OF BOTH SACROILIAC JOINTS: Primary | ICD-10-CM

## 2024-07-03 DIAGNOSIS — G89.4 CHRONIC PAIN SYNDROME: ICD-10-CM

## 2024-07-03 PROCEDURE — 99214 OFFICE O/P EST MOD 30 MIN: CPT | Performed by: STUDENT IN AN ORGANIZED HEALTH CARE EDUCATION/TRAINING PROGRAM

## 2024-07-03 NOTE — PROGRESS NOTES
"Assessment:  1. Pain of both sacroiliac joints    2. Chronic pain syndrome      Portions of the record may have been created with voice recognition software. Occasional wrong word or \"sound a like\" substitutions may have occurred due to the inherent limitations of voice recognition software. Read the chart carefully and recognize, using context, where substitutions have occurred. Contact me with any questions.       Plan:  68-year-old female here for follow-up visit with regards to chronic bilateral low back pain.  Since last visit, she underwent bilateral sacroiliac joint injections on 6/3/2024.  She notes persistent, 60 to 70% improvement in symptoms.  Notes residual symptoms continue to impact function and ambulation.  Denies new or progressive weakness.      Will schedule for repeat bilateral sacroiliac joint injections for residual symptom management.    Continue home exercise program.    Follow-up in 1 month after injection.        Complete risks and benefits including bleeding, infection, tissue reaction, nerve injury and allergic reaction were discussed. The approach was demonstrated using models and literature was provided. Verbal and written consent was obtained.          History of Present Illness:  The patient is a 68 y.o. female who presents for a follow up office visit in regards to Back Pain.       I have personally reviewed and/or updated the patient's past medical history, past surgical history, family history, social history, current medications, allergies, and vital signs today.         Review of Systems  Review of Systems   Constitutional:  Negative for unexpected weight change.   HENT:  Negative for hearing loss.    Eyes:  Negative for visual disturbance.   Respiratory:  Negative for shortness of breath.    Cardiovascular:  Negative for leg swelling.   Gastrointestinal:  Negative for constipation.   Endocrine: Negative for polyuria.   Genitourinary:  Negative for difficulty urinating. "   Musculoskeletal:  Positive for gait problem. Negative for joint swelling and myalgias.        Decreased range of motion  Joint stiffness   Skin:  Negative for rash.   Neurological:  Negative for weakness and headaches.   Psychiatric/Behavioral:  Negative for decreased concentration.    All other systems reviewed and are negative.        Past Medical History:   Diagnosis Date    Asthma     Celiac disease     Chronic sinusitis 04/27/2023    Colon polyp     Follicular lymphoma (HCC)     GERD (gastroesophageal reflux disease)     Heart palpitations     History of colonic polyps     resolved 07/12/2016    History of radiation therapy 2010    Hyperlipidemia     Insomnia     Irregular heart beat     Lymphoma, 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HCC)     non hodgekins, remission    Malignant lymphoma (HCC) 2010    rt arm cutaneous follicular stage ia (rt diatal medical biceps area , s/p resected and s/p radistion treatment    resolved 12/17/2014    Postmenopausal disorder     resolved 09/21/2017    Pulmonary nodule     BENIGN, STABLE [id number]    Restless legs syndrome     resolved 09/21/2017    TMJ syndrome     resolved 09/21/2017       Past Surgical History:   Procedure Laterality Date    COLONOSCOPY  04/16/2019    FUNCTIONAL ENDOSCOPIC SINUS SURGERY Bilateral 2013    Dr Bagley    HYSTERECTOMY      age 45 complete    LYMPH NODE DISSECTION Right     arm    NASAL SEPTUM SURGERY  2013    with turbinate reduction - Dr. Bagley    OOPHORECTOMY Bilateral     MA ESOPHAGOGASTRODUODENOSCOPY TRANSORAL DIAGNOSTIC N/A 01/18/2016    Procedure: ESOPHAGOGASTRODUODENOSCOPY (EGD);  Surgeon: Ness Shepherd MD;  [facility];  Service: Gastroenterology    MA EXCISION GANGLION WRIST DORSAL/VOLAR RECURRENT Left 12/08/2020    Procedure: WRIST GANGLION CYST EXCISION;  Surgeon: Marie Gaspar MD;  [facility];  Service: Orthopedics    MA EXCISION TUMOR SOFT TISSUE SHOULDER SUBQ 3 CM/> Left 11/27/2023    Procedure: anterior shouler- EXCISION BIOPSY  TISSUE LESION/MASS UPPER EXTREMITY;  Surgeon: Ronak Alanis DO;  Location: [facility];  Service: Orthopedics    SYNOVECTOMY N/A 2020    Procedure: ECU TENOSYNOVECTOMY;  Surgeon: Marie Gaspar MD;  [facility];  Service: Orthopedics    TONSILLECTOMY      TOTAL ABDOMINAL HYSTERECTOMY W/ BILATERAL SALPINGOOPHORECTOMY      age 51    UPPER GASTROINTESTINAL ENDOSCOPY      US GUIDED MSK PROCEDURE  2020    US GUIDED MSK PROCEDURE  2020    US GUIDED MSK PROCEDURE  2021       Family History   Problem Relation Age of Onset    Lymphoma Mother     Throat cancer Father     Heart failure Father     Atrial fibrillation Father     Diabetes Father     Colonic polyp Father     Hypertension Father     Thyroid cancer Sister     Breast cancer Paternal Grandmother 69    Breast cancer Paternal Aunt 69    Ovarian cancer Paternal Aunt 70    No Known Problems Maternal Grandmother     No Known Problems Maternal Grandfather     Cancer Paternal Grandfather     Lung cancer Paternal Grandfather     BRCA1 Positive Cousin     Skin cancer Paternal Aunt     Throat cancer Paternal Uncle     No Known Problems Maternal Aunt        Social History     Occupational History    Occupation: X-ray technologist   Tobacco Use    Smoking status: Former     Current packs/day: 0.00     Average packs/day: 0.8 packs/day for 20.0 years (15.0 ttl pk-yrs)     Types: Cigarettes     Start date:      Quit date:      Years since quittin.5    Smokeless tobacco: Never    Tobacco comments:     Quit 10/31/04 2130   Vaping Use    Vaping status: Never Used   Substance and Sexual Activity    Alcohol use: Yes     Alcohol/week: 3.0 standard drinks of alcohol     Types: 3 Glasses of wine per week     Comment: soc    Drug use: No    Sexual activity: Yes     Partners: Male         Current Outpatient Medications:     benralizumab (FASENRA) subcutaneous injection, Inject 1 mL (30 mg total) under the skin every 56 days for 6 doses,  Disp: 1 Syringe, Rfl: 6    bimatoprost (LATISSE) 0.03 % ophthalmic solution, Take as directed, Disp: , Rfl:     Breo Ellipta 100-25 MCG/ACT inhaler, Inhale 1 puff daily Rinse mouth after use., Disp: 60 blister, Rfl: 11    Cholecalciferol 50 MCG (2000 UT) CAPS, Take by mouth daily, Disp: , Rfl:     cyanocobalamin (VITAMIN B-12) 1,000 mcg tablet, Take 1,000 mcg by mouth daily, Disp: , Rfl:     erythromycin with ethanol (EMGEL) 2 % gel, Apply topically 2 (two) times a day, Disp: 30 g, Rfl: 1    famotidine (PEPCID) 20 mg tablet, Take 1 tablet (20 mg total) by mouth 2 (two) times a day, Disp: 180 tablet, Rfl: 3    flecainide (TAMBOCOR) 100 mg tablet, take 1 tablet by mouth twice a day, Disp: 180 tablet, Rfl: 3    fluticasone (FLONASE) 50 mcg/act nasal spray, 2 sprays into each nostril 2 (two) times a day, Disp: 48 g, Rfl: 4    ipratropium (ATROVENT) 0.03 % nasal spray, 2 sprays into each nostril 3 (three) times a day as needed for rhinitis, Disp: 30 mL, Rfl: 3    levalbuterol (XOPENEX HFA) 45 mcg/act inhaler, Inhale 1-2 puffs every 4 (four) hours as needed for wheezing or shortness of breath, Disp: 45 g, Rfl: 0    lidocaine (LMX) 4 % cream, Apply topically as needed for mild pain, Disp: 15 g, Rfl: 3    Magnesium 500 MG CAPS, Take by mouth, Disp: , Rfl:     meloxicam (MOBIC) 15 mg tablet, Take 1 tablet (15 mg total) by mouth daily as needed for moderate pain, Disp: 90 tablet, Rfl: 1    methocarbamol (ROBAXIN) 500 mg tablet, Take 1 tablet (500 mg total) by mouth 3 (three) times a day as needed for muscle spasms, Disp: 60 tablet, Rfl: 0    metoprolol succinate (TOPROL-XL) 25 mg 24 hr tablet, Take 1 tablet (25 mg total) by mouth every evening (Patient taking differently: Take 25 mg by mouth every evening 12.5mg daily), Disp: 90 tablet, Rfl: 5    ondansetron (ZOFRAN-ODT) 4 mg disintegrating tablet, Take 1 tablet (4 mg total) by mouth every 6 (six) hours as needed for nausea or vomiting, Disp: 15 tablet, Rfl: 0    polyethylene  "glycol (GLYCOLAX) 17 GM/SCOOP powder, Take 17 g by mouth 2 (two) times a day, Disp: 765 g, Rfl: 4    rosuvastatin (CRESTOR) 5 mg tablet, take 1 tablet by mouth once daily, Disp: 90 tablet, Rfl: 3    sucralfate (CARAFATE) 1 g tablet, Take 1 tablet (1 g total) by mouth 2 (two) times a day, Disp: 60 tablet, Rfl: 5    zolpidem (AMBIEN) 5 mg tablet, Take 1 tablet (5 mg total) by mouth daily at bedtime as needed for sleep, Disp: 30 tablet, Rfl: 2    amLODIPine (NORVASC) 5 mg tablet, take 1 tablet by mouth once daily (Patient not taking: Reported on 5/6/2024), Disp: 30 tablet, Rfl: 5    EPINEPHrine (EPIPEN) 0.3 mg/0.3 mL SOAJ, Inject 0.3 mL (0.3 mg total) into a muscle once for 1 dose (Patient not taking: Reported on 6/6/2024), Disp: 0.6 mL, Rfl: 0    linaCLOtide 72 MCG CAPS, Take 72 mcg by mouth daily (Patient not taking: Reported on 5/6/2024), Disp: 4 capsule, Rfl: 0    senna-docusate sodium (SENOKOT-S) 8.6-50 mg per tablet, Take 1 tablet by mouth daily for 14 days (Patient taking differently: Take 1 tablet by mouth 2 (two) times a day as needed), Disp: 14 tablet, Rfl: 0    Allergies   Allergen Reactions    Gluten Meal - Food Allergy GI Intolerance     Celiac     Morphine And Codeine Itching    Nuts - Food Allergy Hives     Almonds,walnuts, hazelnuts and other related nuts.     Shellfish-Derived Products - Food Allergy Anaphylaxis    Wheat Bran - Food Allergy GI Intolerance    Sulfa Antibiotics Rash       Physical Exam:    /72   Pulse 71   Ht 5' 5\" (1.651 m)   Wt 67 kg (147 lb 9.6 oz)   BMI 24.56 kg/m²     Constitutional:normal, well developed, well nourished, alert, in no distress and non-toxic and no overt pain behavior.  Eyes:anicteric  HEENT:grossly intact  Neck:supple, symmetric, trachea midline and no masses   Pulmonary:even and unlabored  Cardiovascular:No edema or pitting edema present  Skin:Normal without rashes or lesions and well hydrated  Psychiatric:Mood and affect appropriate  Neurologic:Cranial " Nerves II-XII grossly intact  Musculoskeletal:normal gait    Imaging  FL spine and pain procedure    (Results Pending)       Orders Placed This Encounter   Procedures    FL spine and pain procedure

## 2024-07-15 ENCOUNTER — OFFICE VISIT (OUTPATIENT)
Dept: INTERNAL MEDICINE CLINIC | Facility: CLINIC | Age: 69
End: 2024-07-15
Payer: COMMERCIAL

## 2024-07-15 VITALS — TEMPERATURE: 99.9 F | HEART RATE: 72 BPM

## 2024-07-15 DIAGNOSIS — J32.9 CHRONIC SINUSITIS, UNSPECIFIED LOCATION: Primary | ICD-10-CM

## 2024-07-15 PROCEDURE — 99213 OFFICE O/P EST LOW 20 MIN: CPT | Performed by: FAMILY MEDICINE

## 2024-07-15 RX ORDER — PREDNISONE 10 MG/1
30 TABLET ORAL DAILY
Qty: 15 TABLET | Refills: 0 | Status: SHIPPED | OUTPATIENT
Start: 2024-07-15 | End: 2024-07-20

## 2024-07-15 RX ORDER — AMOXICILLIN AND CLAVULANATE POTASSIUM 875; 125 MG/1; MG/1
1 TABLET, FILM COATED ORAL 2 TIMES DAILY
Qty: 14 TABLET | Refills: 0 | Status: SHIPPED | OUTPATIENT
Start: 2024-07-15 | End: 2024-07-22

## 2024-07-16 NOTE — PROGRESS NOTES
Ambulatory Visit  Name: Rosa Caraballo      : 1955      MRN: [id number]  Encounter Provider: Gris Smyth MD  Encounter Date: 7/15/2024   Encounter department: [facility]    Assessment & Plan   1. Chronic sinusitis, unspecified location  -     amoxicillin-clavulanate (AUGMENTIN) 875-125 mg per tablet; Take 1 tablet by mouth 2 (two) times a day for 7 days  -     predniSONE 10 mg tablet; Take 3 tablets (30 mg total) by mouth daily for 5 days    Orders and recommendations as noted above.  Fluids.  Rest.  Tylenol or Motrin if needed.  Augmentin as noted above.  Watch for GI symptoms.  Prednisone as noted above.  Watch for any palpitations with this.  Avoid Sudafed if possible.  Call or follow-up if symptoms worsen or persist.     History of Present Illness     She presents for acute visit.  She started about 3 weeks ago with a sore throat as well as nasal congestion and postnasal drip.  Symptoms initially seem to improve but over the last 4 to 5 days have worsened with a significant sore throat as well as some cough.  Some sinus pressure.  Denies any fevers or chills.        Review of Systems   Constitutional:  Positive for activity change, appetite change and fatigue.   HENT:  Positive for congestion, ear pain, postnasal drip, sinus pressure and sore throat. Negative for trouble swallowing.    Respiratory:  Negative for cough.    Cardiovascular:  Negative for chest pain and palpitations.   Musculoskeletal:  Positive for myalgias.     Medical History Reviewed by provider this encounter:       Past Medical History   Past Medical History:   Diagnosis Date    Asthma     Celiac disease     Chronic sinusitis 2023    Colon polyp     Follicular lymphoma (HCC)     GERD (gastroesophageal reflux disease)     Heart palpitations     History of colonic polyps     resolved 2016    History of radiation therapy 2010    Hyperlipidemia     Insomnia     Irregular heart beat      Lymphoma, 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HCC)     non hodgekins, remission    Malignant lymphoma (HCC) 2010    rt arm cutaneous follicular stage ia (rt diatal medical biceps area , s/p resected and s/p radistion treatment    resolved 12/17/2014    Postmenopausal disorder     resolved 09/21/2017    Pulmonary nodule     BENIGN, STABLE [id number]    Restless legs syndrome     resolved 09/21/2017    TMJ syndrome     resolved 09/21/2017     Past Surgical History:   Procedure Laterality Date    COLONOSCOPY  04/16/2019    FUNCTIONAL ENDOSCOPIC SINUS SURGERY Bilateral 2013    Dr Bagley    HYSTERECTOMY      age 45 complete    LYMPH NODE DISSECTION Right     arm    NASAL SEPTUM SURGERY  2013    with turbinate reduction - Dr. Bagley    OOPHORECTOMY Bilateral     VT ESOPHAGOGASTRODUODENOSCOPY TRANSORAL DIAGNOSTIC N/A 01/18/2016    Procedure: ESOPHAGOGASTRODUODENOSCOPY (EGD);  Surgeon: Ness Shepherd MD;  [facility];  Service: Gastroenterology    VT EXCISION GANGLION WRIST DORSAL/VOLAR RECURRENT Left 12/08/2020    Procedure: WRIST GANGLION CYST EXCISION;  Surgeon: Marie Gaspar MD;  [facility];  Service: Orthopedics    VT EXCISION TUMOR SOFT TISSUE SHOULDER SUBQ 3 CM/> Left 11/27/2023    Procedure: anterior shouler- EXCISION BIOPSY TISSUE LESION/MASS UPPER EXTREMITY;  Surgeon: Ronak Alanis DO;  [facility];  Service: Orthopedics    SYNOVECTOMY N/A 12/08/2020    Procedure: ECU TENOSYNOVECTOMY;  Surgeon: Marie Gaspar MD;  [facility];  Service: Orthopedics    TONSILLECTOMY      TOTAL ABDOMINAL HYSTERECTOMY W/ BILATERAL SALPINGOOPHORECTOMY      age 51    UPPER GASTROINTESTINAL ENDOSCOPY      US GUIDED MSK PROCEDURE  01/16/2020    US GUIDED MSK PROCEDURE  08/07/2020    US GUIDED MSK PROCEDURE  08/02/2021     Family History   Problem Relation Age of Onset    Lymphoma Mother     Throat cancer Father     Heart failure Father     Atrial fibrillation Father     Diabetes Father     Colonic polyp Father      Hypertension Father     Thyroid cancer Sister     Breast cancer Paternal Grandmother 69    Breast cancer Paternal Aunt 69    Ovarian cancer Paternal Aunt 70    No Known Problems Maternal Grandmother     No Known Problems Maternal Grandfather     Cancer Paternal Grandfather     Lung cancer Paternal Grandfather     BRCA1 Positive Cousin     Skin cancer Paternal Aunt     Throat cancer Paternal Uncle     No Known Problems Maternal Aunt      Current Outpatient Medications on File Prior to Visit   Medication Sig Dispense Refill    amLODIPine (NORVASC) 5 mg tablet take 1 tablet by mouth once daily (Patient not taking: Reported on 5/6/2024) 30 tablet 5    benralizumab (FASENRA) subcutaneous injection Inject 1 mL (30 mg total) under the skin every 56 days for 6 doses 1 Syringe 6    bimatoprost (LATISSE) 0.03 % ophthalmic solution Take as directed      Breo Ellipta 100-25 MCG/ACT inhaler Inhale 1 puff daily Rinse mouth after use. 60 blister 11    Cholecalciferol 50 MCG (2000 UT) CAPS Take by mouth daily      cyanocobalamin (VITAMIN B-12) 1,000 mcg tablet Take 1,000 mcg by mouth daily      EPINEPHrine (EPIPEN) 0.3 mg/0.3 mL SOAJ Inject 0.3 mL (0.3 mg total) into a muscle once for 1 dose (Patient not taking: Reported on 6/6/2024) 0.6 mL 0    erythromycin with ethanol (EMGEL) 2 % gel Apply topically 2 (two) times a day 30 g 1    famotidine (PEPCID) 20 mg tablet Take 1 tablet (20 mg total) by mouth 2 (two) times a day 180 tablet 3    flecainide (TAMBOCOR) 100 mg tablet take 1 tablet by mouth twice a day 180 tablet 3    fluticasone (FLONASE) 50 mcg/act nasal spray 2 sprays into each nostril 2 (two) times a day 48 g 4    ipratropium (ATROVENT) 0.03 % nasal spray 2 sprays into each nostril 3 (three) times a day as needed for rhinitis 30 mL 3    levalbuterol (XOPENEX HFA) 45 mcg/act inhaler Inhale 1-2 puffs every 4 (four) hours as needed for wheezing or shortness of breath 45 g 0    lidocaine (LMX) 4 % cream Apply topically as  needed for mild pain 15 g 3    linaCLOtide 72 MCG CAPS Take 72 mcg by mouth daily (Patient not taking: Reported on 5/6/2024) 4 capsule 0    Magnesium 500 MG CAPS Take by mouth      meloxicam (MOBIC) 15 mg tablet Take 1 tablet (15 mg total) by mouth daily as needed for moderate pain 90 tablet 1    methocarbamol (ROBAXIN) 500 mg tablet Take 1 tablet (500 mg total) by mouth 3 (three) times a day as needed for muscle spasms 60 tablet 0    metoprolol succinate (TOPROL-XL) 25 mg 24 hr tablet Take 1 tablet (25 mg total) by mouth every evening (Patient taking differently: Take 25 mg by mouth every evening 12.5mg daily) 90 tablet 5    ondansetron (ZOFRAN-ODT) 4 mg disintegrating tablet Take 1 tablet (4 mg total) by mouth every 6 (six) hours as needed for nausea or vomiting 15 tablet 0    polyethylene glycol (GLYCOLAX) 17 GM/SCOOP powder Take 17 g by mouth 2 (two) times a day 765 g 4    rosuvastatin (CRESTOR) 5 mg tablet take 1 tablet by mouth once daily 90 tablet 3    senna-docusate sodium (SENOKOT-S) 8.6-50 mg per tablet Take 1 tablet by mouth daily for 14 days (Patient taking differently: Take 1 tablet by mouth 2 (two) times a day as needed) 14 tablet 0    sucralfate (CARAFATE) 1 g tablet Take 1 tablet (1 g total) by mouth 2 (two) times a day 60 tablet 5    zolpidem (AMBIEN) 5 mg tablet Take 1 tablet (5 mg total) by mouth daily at bedtime as needed for sleep 30 tablet 2     No current facility-administered medications on file prior to visit.     Allergies   Allergen Reactions    Gluten Meal - Food Allergy GI Intolerance     Celiac     Morphine And Codeine Itching    Nuts - Food Allergy Hives     Almonds,walnuts, hazelnuts and other related nuts.     Shellfish-Derived Products - Food Allergy Anaphylaxis    Wheat Bran - Food Allergy GI Intolerance    Sulfa Antibiotics Rash      Current Outpatient Medications on File Prior to Visit   Medication Sig Dispense Refill    amLODIPine (NORVASC) 5 mg tablet take 1 tablet by mouth  once daily (Patient not taking: Reported on 5/6/2024) 30 tablet 5    benralizumab (FASENRA) subcutaneous injection Inject 1 mL (30 mg total) under the skin every 56 days for 6 doses 1 Syringe 6    bimatoprost (LATISSE) 0.03 % ophthalmic solution Take as directed      Breo Ellipta 100-25 MCG/ACT inhaler Inhale 1 puff daily Rinse mouth after use. 60 blister 11    Cholecalciferol 50 MCG (2000 UT) CAPS Take by mouth daily      cyanocobalamin (VITAMIN B-12) 1,000 mcg tablet Take 1,000 mcg by mouth daily      EPINEPHrine (EPIPEN) 0.3 mg/0.3 mL SOAJ Inject 0.3 mL (0.3 mg total) into a muscle once for 1 dose (Patient not taking: Reported on 6/6/2024) 0.6 mL 0    erythromycin with ethanol (EMGEL) 2 % gel Apply topically 2 (two) times a day 30 g 1    famotidine (PEPCID) 20 mg tablet Take 1 tablet (20 mg total) by mouth 2 (two) times a day 180 tablet 3    flecainide (TAMBOCOR) 100 mg tablet take 1 tablet by mouth twice a day 180 tablet 3    fluticasone (FLONASE) 50 mcg/act nasal spray 2 sprays into each nostril 2 (two) times a day 48 g 4    ipratropium (ATROVENT) 0.03 % nasal spray 2 sprays into each nostril 3 (three) times a day as needed for rhinitis 30 mL 3    levalbuterol (XOPENEX HFA) 45 mcg/act inhaler Inhale 1-2 puffs every 4 (four) hours as needed for wheezing or shortness of breath 45 g 0    lidocaine (LMX) 4 % cream Apply topically as needed for mild pain 15 g 3    linaCLOtide 72 MCG CAPS Take 72 mcg by mouth daily (Patient not taking: Reported on 5/6/2024) 4 capsule 0    Magnesium 500 MG CAPS Take by mouth      meloxicam (MOBIC) 15 mg tablet Take 1 tablet (15 mg total) by mouth daily as needed for moderate pain 90 tablet 1    methocarbamol (ROBAXIN) 500 mg tablet Take 1 tablet (500 mg total) by mouth 3 (three) times a day as needed for muscle spasms 60 tablet 0    metoprolol succinate (TOPROL-XL) 25 mg 24 hr tablet Take 1 tablet (25 mg total) by mouth every evening (Patient taking differently: Take 25 mg by mouth  every evening 12.5mg daily) 90 tablet 5    ondansetron (ZOFRAN-ODT) 4 mg disintegrating tablet Take 1 tablet (4 mg total) by mouth every 6 (six) hours as needed for nausea or vomiting 15 tablet 0    polyethylene glycol (GLYCOLAX) 17 GM/SCOOP powder Take 17 g by mouth 2 (two) times a day 765 g 4    rosuvastatin (CRESTOR) 5 mg tablet take 1 tablet by mouth once daily 90 tablet 3    senna-docusate sodium (SENOKOT-S) 8.6-50 mg per tablet Take 1 tablet by mouth daily for 14 days (Patient taking differently: Take 1 tablet by mouth 2 (two) times a day as needed) 14 tablet 0    sucralfate (CARAFATE) 1 g tablet Take 1 tablet (1 g total) by mouth 2 (two) times a day 60 tablet 5    zolpidem (AMBIEN) 5 mg tablet Take 1 tablet (5 mg total) by mouth daily at bedtime as needed for sleep 30 tablet 2     No current facility-administered medications on file prior to visit.      Social History     Tobacco Use    Smoking status: Former     Current packs/day: 0.00     Average packs/day: 0.8 packs/day for 20.0 years (15.0 ttl pk-yrs)     Types: Cigarettes     Start date:      Quit date:      Years since quittin.5    Smokeless tobacco: Never    Tobacco comments:     Quit 10/31/04 2130   Vaping Use    Vaping status: Never Used   Substance and Sexual Activity    Alcohol use: Yes     Alcohol/week: 3.0 standard drinks of alcohol     Types: 3 Glasses of wine per week     Comment: soc    Drug use: No    Sexual activity: Yes     Partners: Male     Objective     There were no vitals taken for this visit.    Physical Exam  Vitals reviewed.   Constitutional:       Appearance: She is well-developed and well-groomed.   HENT:      Head:      Comments: Boggy nasal mucosa with purulent nasal discharge bilaterally left greater than right; significant posterior pharyngeal erythema; slight effusion left tympanic membrane  Cardiovascular:      Rate and Rhythm: Normal rate and regular rhythm.   Pulmonary:      Breath sounds: Transmitted upper  airway sounds present. No decreased breath sounds or wheezing.   Musculoskeletal:      Cervical back: Neck supple.   Lymphadenopathy:      Cervical: No cervical adenopathy.   Neurological:      Mental Status: She is alert.   Psychiatric:         Behavior: Behavior is cooperative.       Administrative Statements

## 2024-07-18 ENCOUNTER — TELEPHONE (OUTPATIENT)
Age: 69
End: 2024-07-18

## 2024-07-18 NOTE — TELEPHONE ENCOUNTER
"Pt returning jarret- she would like to speak with Lucy as she said she is \"confused\" on what the issue is.    She confirmed she received levalbuterol concentrate. Reiterated Dr. Mckeon's above message that she can either get saline from the pharmacy or have them replace it with what was actually ordered. Pt said she needs a script for the saline and the pharmacy has two different orders for the levalbuterol.     She asks we call the pharmacy ourselves to straighten it out because she is having trouble breathing. Please advise.    [facility] pharm. #: [id number]   "

## 2024-07-18 NOTE — TELEPHONE ENCOUNTER
Pt calling in stating that she is supposed to be mixing saline in with there levalbuterol but she does not have a prescription for it. Upon chart review I do not see it in her list of current medications. She is having asthma flare so she is asking if we can send in the saline as soon as possible so she can use her nebulizer.     Confirmed pharmacy on file.

## 2024-07-19 DIAGNOSIS — J45.50 SEVERE PERSISTENT ASTHMA WITHOUT COMPLICATION: Primary | ICD-10-CM

## 2024-07-19 RX ORDER — LEVALBUTEROL INHALATION SOLUTION 1.25 MG/3ML
1.25 SOLUTION RESPIRATORY (INHALATION) EVERY 4 HOURS PRN
Qty: 75 ML | Refills: 6 | Status: SHIPPED | OUTPATIENT
Start: 2024-07-19 | End: 2024-08-13

## 2024-07-22 ENCOUNTER — HOSPITAL ENCOUNTER (OUTPATIENT)
Dept: INFUSION CENTER | Facility: HOSPITAL | Age: 69
Discharge: HOME | End: 2024-07-22
Attending: INTERNAL MEDICINE
Payer: MEDICARE

## 2024-07-22 VITALS
SYSTOLIC BLOOD PRESSURE: 119 MMHG | HEART RATE: 79 BPM | OXYGEN SATURATION: 97 % | TEMPERATURE: 98.2 F | RESPIRATION RATE: 16 BRPM | DIASTOLIC BLOOD PRESSURE: 70 MMHG

## 2024-07-22 DIAGNOSIS — D72.119 HYPEREOSINOPHILIC SYNDROME, UNSPECIFIED TYPE: Primary | ICD-10-CM

## 2024-07-22 DIAGNOSIS — J45.50 SEVERE PERSISTENT ASTHMA WITHOUT COMPLICATION: ICD-10-CM

## 2024-07-22 PROCEDURE — 96372 THER/PROPH/DIAG INJ SC/IM: CPT

## 2024-07-22 RX ORDER — EPINEPHRINE 1 MG/ML
0.3 INJECTION, SOLUTION, CONCENTRATE INTRAVENOUS ONCE
Status: DISCONTINUED | OUTPATIENT
Start: 2024-07-22 | End: 2024-07-26 | Stop reason: HOSPADM

## 2024-07-22 RX ORDER — EPINEPHRINE 1 MG/ML
0.3 INJECTION, SOLUTION, CONCENTRATE INTRAVENOUS ONCE
OUTPATIENT
Start: 2024-09-09 | End: 2024-09-09

## 2024-07-22 RX ADMIN — BENRALIZUMAB 30 MG: 30 INJECTION, SOLUTION SUBCUTANEOUS at 10:28

## 2024-07-22 NOTE — PROGRESS NOTES
Rosa Caraballo  tolerated fasenra injection well with no complications.  Epi pen present with expiration date of may '25    Rosa Caraballo is aware of future appt on 9/12 at 10 am     AVS printed and given to Rosa Caraballo:  No (Declined by Rosa Caraballo)

## 2024-07-29 DIAGNOSIS — G47.00 INSOMNIA, UNSPECIFIED TYPE: ICD-10-CM

## 2024-08-01 RX ORDER — ZOLPIDEM TARTRATE 5 MG/1
5 TABLET ORAL
Qty: 30 TABLET | Refills: 2 | Status: SHIPPED | OUTPATIENT
Start: 2024-08-01

## 2024-08-05 ENCOUNTER — HOSPITAL ENCOUNTER (OUTPATIENT)
Dept: RADIOLOGY | Facility: HOSPITAL | Age: 69
Discharge: HOME | End: 2024-08-05
Attending: STUDENT IN AN ORGANIZED HEALTH CARE EDUCATION/TRAINING PROGRAM | Admitting: STUDENT IN AN ORGANIZED HEALTH CARE EDUCATION/TRAINING PROGRAM
Payer: COMMERCIAL

## 2024-08-05 VITALS
RESPIRATION RATE: 18 BRPM | SYSTOLIC BLOOD PRESSURE: 131 MMHG | OXYGEN SATURATION: 99 % | HEART RATE: 72 BPM | TEMPERATURE: 97 F | DIASTOLIC BLOOD PRESSURE: 74 MMHG

## 2024-08-05 DIAGNOSIS — M53.3 PAIN OF BOTH SACROILIAC JOINTS: ICD-10-CM

## 2024-08-05 PROCEDURE — 27096 INJECT SACROILIAC JOINT: CPT | Performed by: STUDENT IN AN ORGANIZED HEALTH CARE EDUCATION/TRAINING PROGRAM

## 2024-08-05 RX ORDER — LIDOCAINE HYDROCHLORIDE 10 MG/ML
5 INJECTION, SOLUTION EPIDURAL; INFILTRATION; INTRACAUDAL; PERINEURAL ONCE
Status: COMPLETED | OUTPATIENT
Start: 2024-08-05 | End: 2024-08-05

## 2024-08-05 RX ORDER — METHYLPREDNISOLONE ACETATE 80 MG/ML
40 INJECTION, SUSPENSION INTRA-ARTICULAR; INTRALESIONAL; INTRAMUSCULAR; PARENTERAL; SOFT TISSUE ONCE
Status: COMPLETED | OUTPATIENT
Start: 2024-08-05 | End: 2024-08-05

## 2024-08-05 RX ORDER — ROPIVACAINE HYDROCHLORIDE 2 MG/ML
2 INJECTION, SOLUTION EPIDURAL; INFILTRATION; PERINEURAL ONCE
Status: COMPLETED | OUTPATIENT
Start: 2024-08-05 | End: 2024-08-05

## 2024-08-05 RX ADMIN — LIDOCAINE HYDROCHLORIDE 5 ML: 10 INJECTION, SOLUTION EPIDURAL; INFILTRATION; INTRACAUDAL; PERINEURAL at 11:05

## 2024-08-05 RX ADMIN — METHYLPREDNISOLONE ACETATE 40 MG: 80 INJECTION, SUSPENSION INTRA-ARTICULAR; INTRALESIONAL; INTRAMUSCULAR; PARENTERAL; SOFT TISSUE at 11:07

## 2024-08-05 RX ADMIN — ROPIVACAINE HYDROCHLORIDE 2 ML: 2 INJECTION, SOLUTION EPIDURAL; INFILTRATION; PERINEURAL at 11:07

## 2024-08-05 RX ADMIN — IOHEXOL 1 ML: 300 INJECTION, SOLUTION INTRAVENOUS at 11:07

## 2024-08-05 NOTE — H&P
History of Present Illness: The patient is a 68 y.o. female who presents with complaints of low back pain.    Past Medical History:   Diagnosis Date    Asthma     Celiac disease     Chronic sinusitis 04/27/2023    Colon polyp     Follicular lymphoma (HCC)     GERD (gastroesophageal reflux disease)     Heart palpitations     History of colonic polyps     resolved 07/12/2016    History of radiation therapy 2010    Hyperlipidemia     Insomnia     Irregular heart beat     Lymphoma, 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HCC)     non hodgekins, remission    Malignant lymphoma (HCC) 2010    rt arm cutaneous follicular stage ia (rt diatal medical biceps area , s/p resected and s/p radistion treatment    resolved 12/17/2014    Postmenopausal disorder     resolved 09/21/2017    Pulmonary nodule     BENIGN, STABLE [id number]    Restless legs syndrome     resolved 09/21/2017    TMJ syndrome     resolved 09/21/2017       Past Surgical History:   Procedure Laterality Date    COLONOSCOPY  04/16/2019    FUNCTIONAL ENDOSCOPIC SINUS SURGERY Bilateral 2013    Dr Bagley    HYSTERECTOMY      age 45 complete    LYMPH NODE DISSECTION Right     arm    NASAL SEPTUM SURGERY  2013    with turbinate reduction - Dr. Bagley    OOPHORECTOMY Bilateral     AK ESOPHAGOGASTRODUODENOSCOPY TRANSORAL DIAGNOSTIC N/A 01/18/2016    Procedure: ESOPHAGOGASTRODUODENOSCOPY (EGD);  Surgeon: Ness Shepherd MD;  [facility];  Service: Gastroenterology    AK EXCISION GANGLION WRIST DORSAL/VOLAR RECURRENT Left 12/08/2020    Procedure: WRIST GANGLION CYST EXCISION;  Surgeon: Marie Gaspar MD;  [facility];  Service: Orthopedics    AK EXCISION TUMOR SOFT TISSUE SHOULDER SUBQ 3 CM/> Left 11/27/2023    Procedure: anterior shouler- EXCISION BIOPSY TISSUE LESION/MASS UPPER EXTREMITY;  Surgeon: Ronak Alanis DO;  Location: [facility];  Service: Orthopedics    SYNOVECTOMY N/A 12/08/2020    Procedure: ECU TENOSYNOVECTOMY;  Surgeon: Marie Gaspar MD;  [facility];  Service: Orthopedics    TONSILLECTOMY      TOTAL ABDOMINAL HYSTERECTOMY W/ BILATERAL SALPINGOOPHORECTOMY      age 51    UPPER GASTROINTESTINAL ENDOSCOPY      US GUIDED MSK PROCEDURE  01/16/2020    US GUIDED MSK PROCEDURE  08/07/2020    US GUIDED MSK PROCEDURE  08/02/2021         Current Outpatient Medications:     amLODIPine (NORVASC) 5 mg tablet, take 1 tablet by mouth once daily (Patient not taking: Reported on 5/6/2024), Disp: 30 tablet, Rfl: 5    benralizumab (FASENRA) subcutaneous injection, Inject 1 mL (30 mg total) under the skin every 56 days for 6 doses, Disp: 1 Syringe, Rfl: 6    bimatoprost (LATISSE) 0.03 % ophthalmic solution, Take as directed, Disp: , Rfl:     Breo Ellipta 100-25 MCG/ACT inhaler, Inhale 1 puff daily Rinse mouth after use., Disp: 60 blister, Rfl: 11    Cholecalciferol 50 MCG (2000 UT) CAPS, Take by mouth daily, Disp: , Rfl:     cyanocobalamin (VITAMIN B-12) 1,000 mcg tablet, Take 1,000 mcg by mouth daily, Disp: , Rfl:     EPINEPHrine (EPIPEN) 0.3 mg/0.3 mL SOAJ, Inject 0.3 mL (0.3 mg total) into a muscle once for 1 dose (Patient not taking: Reported on 6/6/2024), Disp: 0.6 mL, Rfl: 0    erythromycin with ethanol (EMGEL) 2 % gel, Apply topically 2 (two) times a day, Disp: 30 g, Rfl: 1    famotidine (PEPCID) 20 mg tablet, Take 1 tablet (20 mg total) by mouth 2 (two) times a day, Disp: 180 tablet, Rfl: 3    flecainide (TAMBOCOR) 100 mg tablet, take 1 tablet by mouth twice a day, Disp: 180 tablet, Rfl: 3    fluticasone (FLONASE) 50 mcg/act nasal spray, 2 sprays into each nostril 2 (two) times a day, Disp: 48 g, Rfl: 4    ipratropium (ATROVENT) 0.03 % nasal spray, 2 sprays into each nostril 3 (three) times a day as needed for rhinitis, Disp: 30 mL, Rfl: 3    levalbuterol (XOPENEX HFA) 45 mcg/act inhaler, Inhale 1-2 puffs every 4 (four) hours as needed for wheezing or shortness of breath, Disp: 45 g, Rfl: 0    levalbuterol (Xopenex) 1.25 mg/3 mL nebulizer solution, Take 3 mL (1.25 mg  total) by nebulization every 4 (four) hours as needed for wheezing or shortness of breath for up to 25 days, Disp: 75 mL, Rfl: 6    lidocaine (LMX) 4 % cream, Apply topically as needed for mild pain, Disp: 15 g, Rfl: 3    linaCLOtide 72 MCG CAPS, Take 72 mcg by mouth daily (Patient not taking: Reported on 5/6/2024), Disp: 4 capsule, Rfl: 0    Magnesium 500 MG CAPS, Take by mouth, Disp: , Rfl:     meloxicam (MOBIC) 15 mg tablet, Take 1 tablet (15 mg total) by mouth daily as needed for moderate pain, Disp: 90 tablet, Rfl: 1    methocarbamol (ROBAXIN) 500 mg tablet, Take 1 tablet (500 mg total) by mouth 3 (three) times a day as needed for muscle spasms, Disp: 60 tablet, Rfl: 0    metoprolol succinate (TOPROL-XL) 25 mg 24 hr tablet, Take 1 tablet (25 mg total) by mouth every evening (Patient taking differently: Take 25 mg by mouth every evening 12.5mg daily), Disp: 90 tablet, Rfl: 5    ondansetron (ZOFRAN-ODT) 4 mg disintegrating tablet, Take 1 tablet (4 mg total) by mouth every 6 (six) hours as needed for nausea or vomiting, Disp: 15 tablet, Rfl: 0    polyethylene glycol (GLYCOLAX) 17 GM/SCOOP powder, Take 17 g by mouth 2 (two) times a day, Disp: 765 g, Rfl: 4    rosuvastatin (CRESTOR) 5 mg tablet, take 1 tablet by mouth once daily, Disp: 90 tablet, Rfl: 3    senna-docusate sodium (SENOKOT-S) 8.6-50 mg per tablet, Take 1 tablet by mouth daily for 14 days (Patient taking differently: Take 1 tablet by mouth 2 (two) times a day as needed), Disp: 14 tablet, Rfl: 0    sucralfate (CARAFATE) 1 g tablet, Take 1 tablet (1 g total) by mouth 2 (two) times a day, Disp: 60 tablet, Rfl: 5    zolpidem (AMBIEN) 5 mg tablet, Take 1 tablet (5 mg total) by mouth daily at bedtime as needed for sleep, Disp: 30 tablet, Rfl: 2  No current facility-administered medications for this encounter.    Allergies   Allergen Reactions    Gluten Meal - Food Allergy GI Intolerance     Celiac     Morphine And Codeine Itching    Nuts - Food Allergy Hives      Almonds,walnuts, hazelnuts and other related nuts.     Shellfish-Derived Products - Food Allergy Anaphylaxis    Wheat Bran - Food Allergy GI Intolerance    Sulfa Antibiotics Rash       Physical Exam:   Vitals:    08/05/24 1110   BP: 131/74   Pulse: 72   Resp: 18   Temp:    SpO2: 99%     General: Awake, Alert, Oriented x 3, Mood and affect appropriate  Respiratory: Respirations even and unlabored  Cardiovascular: Peripheral pulses intact; no edema  Musculoskeletal Exam: normal gait    ASA Score: 2    Patient/Chart Verification  Patient ID Verified: Verbal  ID Band Applied: No  Consents Confirmed: Procedural, To be obtained in the Pre-Procedure area  H&P( within 30 days) Verified: To be obtained in the Pre-Procedure area  Interval H&P(within 24 hr) Complete (required for Outpatients and Surgery Admit only): To be obtained in the Pre-Procedure area  Allergies Reviewed: Yes  Anticoag/NSAID held?: NA  Currently on antibiotics?: No  Pregnancy denied?: NA    Assessment:   1. Pain of both sacroiliac joints        Plan: bilateral sacroiliac joint injection

## 2024-08-05 NOTE — DISCHARGE INSTR - LAB

## 2024-08-12 ENCOUNTER — HOSPITAL ENCOUNTER (OUTPATIENT)
Dept: RADIOLOGY | Age: 69
Discharge: HOME | End: 2024-08-12
Payer: COMMERCIAL

## 2024-08-12 DIAGNOSIS — K90.0 CELIAC DISEASE: ICD-10-CM

## 2024-08-12 DIAGNOSIS — M85.80 OSTEOPENIA, UNSPECIFIED LOCATION: ICD-10-CM

## 2024-08-12 PROCEDURE — 77080 DXA BONE DENSITY AXIAL: CPT

## 2024-08-16 ENCOUNTER — TELEPHONE (OUTPATIENT)
Age: 69
End: 2024-08-16

## 2024-08-19 ENCOUNTER — TELEPHONE (OUTPATIENT)
Dept: RADIOLOGY | Facility: MEDICAL CENTER | Age: 69
End: 2024-08-19

## 2024-08-19 NOTE — TELEPHONE ENCOUNTER
Received [employer] approval through fax from [employer].  Approval dates 9/16/24-10/31/24 for the [facility].    Scanned approval in this encounter.

## 2024-08-30 DIAGNOSIS — I49.3 PVC (PREMATURE VENTRICULAR CONTRACTION): ICD-10-CM

## 2024-08-30 RX ORDER — FLECAINIDE ACETATE 100 MG/1
100 TABLET ORAL 2 TIMES DAILY
Qty: 180 TABLET | Refills: 3 | Status: SHIPPED | OUTPATIENT
Start: 2024-08-30

## 2024-09-04 ENCOUNTER — OFFICE VISIT (OUTPATIENT)
Dept: PAIN MEDICINE | Facility: CLINIC | Age: 69
End: 2024-09-04
Payer: MEDICARE

## 2024-09-04 VITALS
HEIGHT: 65 IN | WEIGHT: 148 LBS | DIASTOLIC BLOOD PRESSURE: 77 MMHG | HEART RATE: 79 BPM | BODY MASS INDEX: 24.66 KG/M2 | SYSTOLIC BLOOD PRESSURE: 123 MMHG

## 2024-09-04 DIAGNOSIS — G89.29 CHRONIC BILATERAL LOW BACK PAIN WITHOUT SCIATICA: Primary | ICD-10-CM

## 2024-09-04 DIAGNOSIS — M54.50 CHRONIC BILATERAL LOW BACK PAIN WITHOUT SCIATICA: Primary | ICD-10-CM

## 2024-09-04 DIAGNOSIS — G89.4 CHRONIC PAIN SYNDROME: ICD-10-CM

## 2024-09-04 PROCEDURE — 99214 OFFICE O/P EST MOD 30 MIN: CPT | Performed by: STUDENT IN AN ORGANIZED HEALTH CARE EDUCATION/TRAINING PROGRAM

## 2024-09-04 PROCEDURE — G2211 COMPLEX E/M VISIT ADD ON: HCPCS | Performed by: STUDENT IN AN ORGANIZED HEALTH CARE EDUCATION/TRAINING PROGRAM

## 2024-09-04 NOTE — PROGRESS NOTES
"Assessment:  1. Chronic bilateral low back pain without sciatica    2. Chronic pain syndrome        Portions of the record may have been created with voice recognition software. Occasional wrong word or \"sound a like\" substitutions may have occurred due to the inherent limitations of voice recognition software. Read the chart carefully and recognize, using context, where substitutions have occurred. Contact me with any questions.       Plan:  68-year-old female here for follow-up visit with regards to chronic bilateral low back pain symptoms.  Since last visit, she underwent bilateral sacroiliac joint injections on 8/5/2024 and notes persistent, 70% improvement in symptoms with this.  Notes improvement in function and ambulation with level of pain relief.  Denies new or progressive weakness, saddle anesthesia, BBI.    Recommend course of physical therapy for core strengthening, addressing SI joint dysfunction.  Referral provided.    If similar symptoms flare in the future, consider repeat sacroiliac joint injections.    Follow-up in 2 months or as needed.      History of Present Illness:  The patient is a 68 y.o. female who presents for a follow up office visit in regards to Back Pain.       I have personally reviewed and/or updated the patient's past medical history, past surgical history, family history, social history, current medications, allergies, and vital signs today.       Review of Systems  Review of Systems   Constitutional:  Negative for chills and fever.   HENT:  Negative for ear pain and sore throat.    Eyes:  Negative for pain and visual disturbance.   Respiratory:  Negative for cough and shortness of breath.    Cardiovascular:  Negative for chest pain and palpitations.   Gastrointestinal:  Negative for abdominal pain and vomiting.   Genitourinary:  Negative for dysuria and hematuria.   Musculoskeletal:  Positive for gait problem. Negative for arthralgias and back pain.        Decreased ROM   Skin:  " Negative for color change and rash.   Neurological:  Negative for seizures and syncope.   All other systems reviewed and are negative.        Past Medical History:   Diagnosis Date    Asthma     Celiac disease     Chronic sinusitis 04/27/2023    Colon polyp     Follicular lymphoma (HCC)     GERD (gastroesophageal reflux disease)     Heart palpitations     History of colonic polyps     resolved 07/12/2016    History of radiation therapy 2010    Hyperlipidemia     Insomnia     Irregular heart beat     Lymphoma, 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HCC)     non hodgekins, remission    Malignant lymphoma (HCC) 2010    rt arm cutaneous follicular stage ia (rt diatal medical biceps area , s/p resected and s/p radistion treatment    resolved 12/17/2014    Postmenopausal disorder     resolved 09/21/2017    Pulmonary nodule     BENIGN, STABLE [id number]    Restless legs syndrome     resolved 09/21/2017    TMJ syndrome     resolved 09/21/2017       Past Surgical History:   Procedure Laterality Date    COLONOSCOPY  04/16/2019    FUNCTIONAL ENDOSCOPIC SINUS SURGERY Bilateral 2013    Dr Bagley    HYSTERECTOMY      age 45 complete    LYMPH NODE DISSECTION Right     arm    NASAL SEPTUM SURGERY  2013    with turbinate reduction - Dr. Bagley    OOPHORECTOMY Bilateral     MO ESOPHAGOGASTRODUODENOSCOPY TRANSORAL DIAGNOSTIC N/A 01/18/2016    Procedure: ESOPHAGOGASTRODUODENOSCOPY (EGD);  Surgeon: Ness Shepherd MD;  [facility];  Service: Gastroenterology    MO EXCISION GANGLION WRIST DORSAL/VOLAR RECURRENT Left 12/08/2020    Procedure: WRIST GANGLION CYST EXCISION;  Surgeon: Marie Gaspar MD;  [facility];  Service: Orthopedics    MO EXCISION TUMOR SOFT TISSUE SHOULDER SUBQ 3 CM/> Left 11/27/2023    Procedure: anterior shouler- EXCISION BIOPSY TISSUE LESION/MASS UPPER EXTREMITY;  Surgeon: Ronak Alanis DO;  Location[facility];  Service: Orthopedics    SYNOVECTOMY N/A 12/08/2020    Procedure: ECU TENOSYNOVECTOMY;  Surgeon: Marie  MD Hubert;  [facility];  Service: Orthopedics    TONSILLECTOMY      TOTAL ABDOMINAL HYSTERECTOMY W/ BILATERAL SALPINGOOPHORECTOMY      age 51    UPPER GASTROINTESTINAL ENDOSCOPY      US GUIDED MSK PROCEDURE  2020    US GUIDED MSK PROCEDURE  2020    US GUIDED MSK PROCEDURE  2021       Family History   Problem Relation Age of Onset    Lymphoma Mother     Throat cancer Father     Heart failure Father     Atrial fibrillation Father     Diabetes Father     Colonic polyp Father     Hypertension Father     Thyroid cancer Sister     Breast cancer Paternal Grandmother 69    Breast cancer Paternal Aunt 69    Ovarian cancer Paternal Aunt 70    No Known Problems Maternal Grandmother     No Known Problems Maternal Grandfather     Cancer Paternal Grandfather     Lung cancer Paternal Grandfather     BRCA1 Positive Cousin     Skin cancer Paternal Aunt     Throat cancer Paternal Uncle     No Known Problems Maternal Aunt        Social History     Occupational History    Occupation: X-ray technologist   Tobacco Use    Smoking status: Former     Current packs/day: 0.00     Average packs/day: 0.8 packs/day for 20.0 years (15.0 ttl pk-yrs)     Types: Cigarettes     Start date:      Quit date:      Years since quittin.7    Smokeless tobacco: Never    Tobacco comments:     Quit 10/31/04 2130   Vaping Use    Vaping status: Never Used   Substance and Sexual Activity    Alcohol use: Yes     Alcohol/week: 3.0 standard drinks of alcohol     Types: 3 Glasses of wine per week     Comment: soc    Drug use: No    Sexual activity: Yes     Partners: Male         Current Outpatient Medications:     benralizumab (FASENRA) subcutaneous injection, Inject 1 mL (30 mg total) under the skin every 56 days for 6 doses, Disp: 1 Syringe, Rfl: 6    bimatoprost (LATISSE) 0.03 % ophthalmic solution, Take as directed, Disp: , Rfl:     Breo Ellipta 100-25 MCG/ACT inhaler, Inhale 1 puff daily Rinse mouth after use., Disp:  60 blister, Rfl: 11    Cholecalciferol 50 MCG (2000 UT) CAPS, Take by mouth daily, Disp: , Rfl:     cyanocobalamin (VITAMIN B-12) 1,000 mcg tablet, Take 1,000 mcg by mouth daily, Disp: , Rfl:     erythromycin with ethanol (EMGEL) 2 % gel, Apply topically 2 (two) times a day, Disp: 30 g, Rfl: 1    famotidine (PEPCID) 20 mg tablet, Take 1 tablet (20 mg total) by mouth 2 (two) times a day, Disp: 180 tablet, Rfl: 3    flecainide (TAMBOCOR) 100 mg tablet, take 1 tablet by mouth twice a day, Disp: 180 tablet, Rfl: 3    fluticasone (FLONASE) 50 mcg/act nasal spray, 2 sprays into each nostril 2 (two) times a day, Disp: 48 g, Rfl: 4    ipratropium (ATROVENT) 0.03 % nasal spray, 2 sprays into each nostril 3 (three) times a day as needed for rhinitis, Disp: 30 mL, Rfl: 3    levalbuterol (XOPENEX HFA) 45 mcg/act inhaler, Inhale 1-2 puffs every 4 (four) hours as needed for wheezing or shortness of breath, Disp: 45 g, Rfl: 0    lidocaine (LMX) 4 % cream, Apply topically as needed for mild pain, Disp: 15 g, Rfl: 3    Magnesium 500 MG CAPS, Take by mouth, Disp: , Rfl:     meloxicam (MOBIC) 15 mg tablet, Take 1 tablet (15 mg total) by mouth daily as needed for moderate pain, Disp: 90 tablet, Rfl: 1    methocarbamol (ROBAXIN) 500 mg tablet, Take 1 tablet (500 mg total) by mouth 3 (three) times a day as needed for muscle spasms, Disp: 60 tablet, Rfl: 0    metoprolol succinate (TOPROL-XL) 25 mg 24 hr tablet, Take 1 tablet (25 mg total) by mouth every evening (Patient taking differently: Take 25 mg by mouth every evening 12.5mg daily), Disp: 90 tablet, Rfl: 5    ondansetron (ZOFRAN-ODT) 4 mg disintegrating tablet, Take 1 tablet (4 mg total) by mouth every 6 (six) hours as needed for nausea or vomiting, Disp: 15 tablet, Rfl: 0    polyethylene glycol (GLYCOLAX) 17 GM/SCOOP powder, Take 17 g by mouth 2 (two) times a day, Disp: 765 g, Rfl: 4    rosuvastatin (CRESTOR) 5 mg tablet, take 1 tablet by mouth once daily, Disp: 90 tablet, Rfl: 3     "sucralfate (CARAFATE) 1 g tablet, Take 1 tablet (1 g total) by mouth 2 (two) times a day, Disp: 60 tablet, Rfl: 5    zolpidem (AMBIEN) 5 mg tablet, Take 1 tablet (5 mg total) by mouth daily at bedtime as needed for sleep, Disp: 30 tablet, Rfl: 2    amLODIPine (NORVASC) 5 mg tablet, take 1 tablet by mouth once daily (Patient not taking: Reported on 5/6/2024), Disp: 30 tablet, Rfl: 5    EPINEPHrine (EPIPEN) 0.3 mg/0.3 mL SOAJ, Inject 0.3 mL (0.3 mg total) into a muscle once for 1 dose (Patient not taking: Reported on 6/6/2024), Disp: 0.6 mL, Rfl: 0    levalbuterol (Xopenex) 1.25 mg/3 mL nebulizer solution, Take 3 mL (1.25 mg total) by nebulization every 4 (four) hours as needed for wheezing or shortness of breath for up to 25 days, Disp: 75 mL, Rfl: 6    linaCLOtide 72 MCG CAPS, Take 72 mcg by mouth daily (Patient not taking: Reported on 5/6/2024), Disp: 4 capsule, Rfl: 0    senna-docusate sodium (SENOKOT-S) 8.6-50 mg per tablet, Take 1 tablet by mouth daily for 14 days (Patient not taking: Reported on 9/4/2024), Disp: 14 tablet, Rfl: 0    Allergies   Allergen Reactions    Gluten Meal - Food Allergy GI Intolerance     Celiac     Morphine And Codeine Itching    Nuts - Food Allergy Hives     Almonds,walnuts, hazelnuts and other related nuts.     Shellfish-Derived Products - Food Allergy Anaphylaxis    Wheat Bran - Food Allergy GI Intolerance    Sulfa Antibiotics Rash       Physical Exam:    /77 (BP Location: Left arm, Patient Position: Sitting, Cuff Size: Standard)   Pulse 79   Ht 5' 5\" (1.651 m)   Wt 67.1 kg (148 lb)   BMI 24.63 kg/m²     Constitutional:normal, well developed, well nourished, alert, in no distress and non-toxic and no overt pain behavior.  Eyes:anicteric  HEENT:grossly intact  Neck:supple, symmetric, trachea midline and no masses   Pulmonary:even and unlabored  Cardiovascular:No edema or pitting edema present  Skin:Normal without rashes or lesions and well hydrated  Psychiatric:Mood and " affect appropriate  Neurologic:Cranial Nerves II-XII grossly intact  Musculoskeletal:normal gait    Imaging      Orders Placed This Encounter   Procedures    Ambulatory referral to Physical Therapy

## 2024-09-16 ENCOUNTER — HOSPITAL ENCOUNTER (OUTPATIENT)
Dept: INFUSION CENTER | Facility: HOSPITAL | Age: 69
Discharge: HOME | End: 2024-09-16
Attending: INTERNAL MEDICINE
Payer: COMMERCIAL

## 2024-09-16 ENCOUNTER — OFFICE VISIT (OUTPATIENT)
Dept: GASTROENTEROLOGY | Facility: CLINIC | Age: 69
End: 2024-09-16
Payer: COMMERCIAL

## 2024-09-16 VITALS
DIASTOLIC BLOOD PRESSURE: 76 MMHG | SYSTOLIC BLOOD PRESSURE: 109 MMHG | OXYGEN SATURATION: 98 % | TEMPERATURE: 97.4 F | HEART RATE: 71 BPM | RESPIRATION RATE: 16 BRPM

## 2024-09-16 VITALS
RESPIRATION RATE: 18 BRPM | DIASTOLIC BLOOD PRESSURE: 76 MMHG | BODY MASS INDEX: 24.66 KG/M2 | HEART RATE: 71 BPM | SYSTOLIC BLOOD PRESSURE: 109 MMHG | HEIGHT: 65 IN | OXYGEN SATURATION: 98 % | WEIGHT: 148 LBS

## 2024-09-16 DIAGNOSIS — M85.80 OSTEOPENIA, UNSPECIFIED LOCATION: ICD-10-CM

## 2024-09-16 DIAGNOSIS — K90.0 CELIAC DISEASE: Primary | ICD-10-CM

## 2024-09-16 DIAGNOSIS — C82.90 FOLLICULAR LYMPHOMA, UNSPECIFIED FOLLICULAR LYMPHOMA TYPE, UNSPECIFIED BODY REGION (HCC): ICD-10-CM

## 2024-09-16 DIAGNOSIS — D72.119 HYPEREOSINOPHILIC SYNDROME, UNSPECIFIED TYPE: Primary | ICD-10-CM

## 2024-09-16 DIAGNOSIS — Z86.010 PERSONAL HISTORY OF COLONIC POLYPS: ICD-10-CM

## 2024-09-16 DIAGNOSIS — K76.0 HEPATIC STEATOSIS: ICD-10-CM

## 2024-09-16 DIAGNOSIS — K59.00 CONSTIPATION, UNSPECIFIED CONSTIPATION TYPE: ICD-10-CM

## 2024-09-16 DIAGNOSIS — J45.50 SEVERE PERSISTENT ASTHMA WITHOUT COMPLICATION: ICD-10-CM

## 2024-09-16 DIAGNOSIS — K21.9 GASTROESOPHAGEAL REFLUX DISEASE WITHOUT ESOPHAGITIS: ICD-10-CM

## 2024-09-16 PROCEDURE — 99214 OFFICE O/P EST MOD 30 MIN: CPT | Performed by: PHYSICIAN ASSISTANT

## 2024-09-16 PROCEDURE — 96372 THER/PROPH/DIAG INJ SC/IM: CPT

## 2024-09-16 RX ORDER — EPINEPHRINE 1 MG/ML
0.3 INJECTION, SOLUTION, CONCENTRATE INTRAVENOUS ONCE
Status: DISCONTINUED | OUTPATIENT
Start: 2024-09-16 | End: 2024-09-20 | Stop reason: HOSPADM

## 2024-09-16 RX ORDER — EPINEPHRINE 1 MG/ML
0.3 INJECTION, SOLUTION, CONCENTRATE INTRAVENOUS ONCE
OUTPATIENT
Start: 2024-11-11 | End: 2024-11-11

## 2024-09-16 RX ADMIN — BENRALIZUMAB 30 MG: 30 INJECTION, SOLUTION SUBCUTANEOUS at 10:30

## 2024-09-16 NOTE — PROGRESS NOTES
[facility] - Outpatient Follow-up Note  Rosa Caraballo 68 y.o. female MRN: [id number]  Encounter: [id number]    ASSESSMENT AND PLAN:      1. Celiac disease  2. Follicular lymphoma, unspecified follicular lymphoma type, unspecified body region (HCC)    Pt with hx of celiac dz and follicular lymphoma.   Has been diligently gluten free since 2010.   Last serologies in 03/2024 with negative Ttg IgA abs.   Last EGD in 11/2022 with no evidence of scalloping duodenal tissue, etc.   UTD on labs. DXA scan shows osteopenia.   Continue to monitor at this time.   Recommend repeat serologies in 04/2025.   Consider repeat EGD in the future to repeat duodenal bx to ensure continued management of celiac disease.     3. Gastroesophageal reflux disease without esophagitis    Pt with hx of heartburn.   Last EGD in 11/2022 showed small hiatal hernia, gastritis, and fundic gland polyps. No evidence of covarrubias's or h pylori infection.   Continue current regimen with daily H2RA.   Continue PRN usage of sucralfate.   Small frequent meals and diet and lifestyle modifications for GERD.     4. Constipation, unspecified constipation type    Pt with hx of chronic constipation.   Notes daily miralax is working well for her at this time.   Continue with high fiber diet and excellent hydration.   No alarm features to the GI tract at this time.     5. Personal history of colonic polyps    Pt with colonoscopy in 02/2024.   This demonstrated tortuous redundant colon, small sessile polyps, TA on bx.   Recall in 5 years for surveillance purposes.     6. Hepatic steatosis    Hx of such, suspect NAFLD etiol.   Pt aware of dx and potential sequelae of disease.   Consider elastography in the future.     NAFLD Fibrosis Score is: -1.398    NAFLD Score Correlated Fibrosis Severity   <0.12 F0-F2   0.12-0.676 Indeterminate Score   >0.676 F3-F4   **Fibrosis Severity Scale: F0 = no fibrosis; F1= mild fibrosis; F2 = moderate fibrosis;  F3 = severe fibrosis; F4 = cirrhosis    Discussed recommendations in regards to fatty liver including:   Strict control of contributing comorbidities (obesity, prediabetes/diabetes, hypertension, and hypertriglyceridemia).  Weight loss of approx 10-15% of patient's current body weight over a period of 6-12 months through low fat diet and cardiovascular exercise as tolerated.  Limiting alcohol consumption, preferably complete abstinence.  Monitor hepatic function every 6 months with routine labs.     7. Osteopenia     UTD on DXA scan, osteopenia on most recent in 08/2024.   Continue Vit D/calcium and weight bearing.   Repeat in 2 years.     We will follow up in 6 months to reassess symptoms.   ______________________________________________________________________    SUBJECTIVE: Patient is a 68 y.o. female who presents today for follow-up regarding celiac disease. Pmhx sig for Celiac disease, follicular lymphoma, HLD, asthma, RLS.      Patient was last evaluated in 01/2024.  At that time she was complaining of worsening constipation, straining, and intermittent BRBPR.  She had a colonoscopy completed which demonstrated tubular adenoma and internal hemorrhoids.  She was recommended to start on MiraLAX.    09/16/24:     Pt shares that she had one episode of rectal bleeding after her colonoscopy, though since that time no recurrence of BRBPR. No melena. Notes miralax is working well for her, takes anywhere from 1-2 capfuls daily. Having Bms at least 5 out of the 7 days per week. No sig abd pain or rectal pain related to defecation. No abnormal weight loss over the past 6 months.     Pt feels well on daily famotidine. She feels medication works better when taking 40mg once daily as opposed to twice daily. She denies any significant heartburn, indigestion, nausea, emesis, dysphagia or odynophagia.     NSAIDs: regularly for MSK pain  Tobacco: none  Etoh: socially white wine      11/2023: Hb 13.5, MCV 94, Plt 190, BUN 12, Cr  0.76, AST 31, ALT 33, ALP 55, albumin 4.2, t bili 0.65   06/2024: Hb 14.0, MCV 95, platelets 198, BUN 18, creatinine 0.7, AST 35, ALT 40, ALP 65, albumin 4.2, T. bili 0.91, TSH 1.451, JAD negative, RF negative, CRP 7.1, ESR 17     Endoscopic history:   EGD: 11/2022: Small sliding hiatal hernia (type I hiatal hernia); Mild erythematous mucosa in the stomach; Ten or more polyps measuring from 3 mm up to 6 mm in the fundus of the stomach and body of the stomach   A. Stomach, biopsy: Gastric antral mucosa with reactive gastropathy. Gastric oxyntic mucosa with mild chronic inactive gastritis and changes suggestive of proton pump inhibitor effect. Negative for intestinal metaplasia and dysplasia. Negative for Helicobacter pylori-type organisms on H&E stain.    B. Stomach polyp, polypectomy: Fundic gland polyp.   C. Esophagus, random, biopsy: Squamous mucosa with mild reactive changes. No evidence of esophagitis.  Colon: 05/2019: All observed locations appeared normal  Colon: 02/2024: 3 subcentimeter polyps; internal hemorrhoids, tortuous colon   A. Large Intestine, Sigmoid Colon, polyp, biopsy: Tubular adenoma. Negative for high grade dysplasia and carcinoma.   B. Rectum, polyp, biopsy: Polypoid portion of colonic mucosa with surface hyperplastic change.     Review of Systems   Otherwise Per HPI    Historical Information   Past Medical History:   Diagnosis Date    Asthma     Celiac disease     Chronic sinusitis 04/27/2023    Colon polyp     Follicular lymphoma (HCC)     GERD (gastroesophageal reflux disease)     Heart palpitations     History of colonic polyps     resolved 07/12/2016    History of radiation therapy 2010    Hyperlipidemia     Insomnia     Irregular heart beat     Lymphoma, 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HCC)     non hodgekins, remission    Malignant lymphoma (HCC) 2010    rt arm cutaneous follicular stage ia (rt diatal medical biceps area , s/p resected and s/p radistion treatment    resolved 12/17/2014    Postmenopausal  disorder     resolved 09/21/2017    Pulmonary nodule     BENIGN, STABLE [id number]    Restless legs syndrome     resolved 09/21/2017    TMJ syndrome     resolved 09/21/2017     Past Surgical History:   Procedure Laterality Date    COLONOSCOPY  04/16/2019    FUNCTIONAL ENDOSCOPIC SINUS SURGERY Bilateral 2013    Dr Bagley    HYSTERECTOMY      age 45 complete    LYMPH NODE DISSECTION Right     arm    NASAL SEPTUM SURGERY  2013    with turbinate reduction - Dr. Bagley    OOPHORECTOMY Bilateral     TN ESOPHAGOGASTRODUODENOSCOPY TRANSORAL DIAGNOSTIC N/A 01/18/2016    Procedure: ESOPHAGOGASTRODUODENOSCOPY (EGD);  Surgeon: Ness Shepherd MD;  Location: [facility];  Service: Gastroenterology    TN EXCISION GANGLION WRIST DORSAL/VOLAR RECURRENT Left 12/08/2020    Procedure: WRIST GANGLION CYST EXCISION;  Surgeon: Marie Gaspar MD;  Location: [facility];  Service: Orthopedics    TN EXCISION TUMOR SOFT TISSUE SHOULDER SUBQ 3 CM/> Left 11/27/2023    Procedure: anterior shouler- EXCISION BIOPSY TISSUE LESION/MASS UPPER EXTREMITY;  Surgeon: Ronak Alanis DO;  Location: [facility];  Service: Orthopedics    SYNOVECTOMY N/A 12/08/2020    Procedure: ECU TENOSYNOVECTOMY;  Surgeon: Marie Gaspar MD;  Location: [facility];  Service: Orthopedics    TONSILLECTOMY      TOTAL ABDOMINAL HYSTERECTOMY W/ BILATERAL SALPINGOOPHORECTOMY      age 51    UPPER GASTROINTESTINAL ENDOSCOPY      US GUIDED MSK PROCEDURE  01/16/2020    US GUIDED MSK PROCEDURE  08/07/2020    US GUIDED MSK PROCEDURE  08/02/2021     Social History   Social History     Substance and Sexual Activity   Alcohol Use Yes    Alcohol/week: 3.0 standard drinks of alcohol    Types: 3 Glasses of wine per week    Comment: soc     Social History     Substance and Sexual Activity   Drug Use No     Social History     Tobacco Use   Smoking Status Former    Current packs/day: 0.00    Average packs/day: 0.8 packs/day for 20.0 years (15.0 ttl pk-yrs)    Types: Cigarettes    Start  date:     Quit date:     Years since quittin.7   Smokeless Tobacco Never   Tobacco Comments    Quit 10/31/04 2130     Family History   Problem Relation Age of Onset    Lymphoma Mother     Throat cancer Father     Heart failure Father     Atrial fibrillation Father     Diabetes Father     Colonic polyp Father     Hypertension Father     Thyroid cancer Sister     Breast cancer Paternal Grandmother 69    Breast cancer Paternal Aunt 69    Ovarian cancer Paternal Aunt 70    No Known Problems Maternal Grandmother     No Known Problems Maternal Grandfather     Cancer Paternal Grandfather     Lung cancer Paternal Grandfather     BRCA1 Positive Cousin     Skin cancer Paternal Aunt     Throat cancer Paternal Uncle     No Known Problems Maternal Aunt      Meds/Allergies       Current Outpatient Medications:     benralizumab (FASENRA) subcutaneous injection    bimatoprost (LATISSE) 0.03 % ophthalmic solution    Breo Ellipta 100-25 MCG/ACT inhaler    Cholecalciferol 50 MCG (2000) CAPS    cyanocobalamin (VITAMIN B-12) 1,000 mcg tablet    erythromycin with ethanol (EMGEL) 2 % gel    famotidine (PEPCID) 20 mg tablet    flecainide (TAMBOCOR) 100 mg tablet    fluticasone (FLONASE) 50 mcg/act nasal spray    ipratropium (ATROVENT) 0.03 % nasal spray    levalbuterol (XOPENEX HFA) 45 mcg/act inhaler    lidocaine (LMX) 4 % cream    Magnesium 500 MG CAPS    meloxicam (MOBIC) 15 mg tablet    methocarbamol (ROBAXIN) 500 mg tablet    metoprolol succinate (TOPROL-XL) 25 mg 24 hr tablet    ondansetron (ZOFRAN-ODT) 4 mg disintegrating tablet    polyethylene glycol (GLYCOLAX) 17 GM/SCOOP powder    rosuvastatin (CRESTOR) 5 mg tablet    sucralfate (CARAFATE) 1 g tablet    zolpidem (AMBIEN) 5 mg tablet    amLODIPine (NORVASC) 5 mg tablet    EPINEPHrine (EPIPEN) 0.3 mg/0.3 mL SOAJ    levalbuterol (Xopenex) 1.25 mg/3 mL nebulizer solution    linaCLOtide 72 MCG CAPS    senna-docusate sodium (SENOKOT-S) 8.6-50 mg per tablet  No current  "facility-administered medications for this visit.    Facility-Administered Medications Ordered in Other Visits:     EPINEPHrine PF (ADRENALIN) 1 mg/mL injection 0.3 mg, 0.3 mg, Intramuscular, Once    Allergies   Allergen Reactions    Gluten Meal - Food Allergy GI Intolerance     Celiac     Morphine And Codeine Itching    Nuts - Food Allergy Hives     Almonds,walnuts, hazelnuts and other related nuts.     Shellfish-Derived Products - Food Allergy Anaphylaxis    Wheat Bran - Food Allergy GI Intolerance    Sulfa Antibiotics Rash     Objective     Blood pressure 109/76, pulse 71, resp. rate 18, height 5' 5\" (1.651 m), weight 67.1 kg (148 lb), SpO2 98%, not currently breastfeeding. Body mass index is 24.63 kg/m².    Physical Exam  Vitals and nursing note reviewed.   Constitutional:       General: She is not in acute distress.     Appearance: She is well-developed.   HENT:      Head: Normocephalic and atraumatic.   Eyes:      General: No scleral icterus.     Conjunctiva/sclera: Conjunctivae normal.   Cardiovascular:      Rate and Rhythm: Normal rate.   Pulmonary:      Effort: Pulmonary effort is normal. No respiratory distress.      Breath sounds: Normal breath sounds.   Abdominal:      General: Bowel sounds are normal. There is no distension.      Palpations: Abdomen is soft.      Tenderness: There is no abdominal tenderness. There is no guarding or rebound.   Skin:     General: Skin is warm and dry.      Capillary Refill: Capillary refill takes less than 2 seconds.      Coloration: Skin is not jaundiced.   Neurological:      General: No focal deficit present.      Mental Status: She is alert and oriented to person, place, and time.   Psychiatric:         Mood and Affect: Mood normal.       Lab Results:   No visits with results within 1 Day(s) from this visit.   Latest known visit with results is:   Appointment on 06/10/2024   Component Date Value    WBC 06/10/2024 7.54     RBC 06/10/2024 4.57     Hemoglobin 06/10/2024 " 14.0     Hematocrit 06/10/2024 43.3     MCV 06/10/2024 95     MCH 06/10/2024 30.6     MCHC 06/10/2024 32.3     RDW 06/10/2024 13.3     MPV 06/10/2024 11.5     Platelets 06/10/2024 198     nRBC 06/10/2024 0     Segmented % 06/10/2024 70     Immature Grans % 06/10/2024 1     Lymphocytes % 06/10/2024 21     Monocytes % 06/10/2024 8     Eosinophils Relative 06/10/2024 0     Basophils Relative 06/10/2024 0     Absolute Neutrophils 06/10/2024 5.31     Absolute Immature Grans 06/10/2024 0.04     Absolute Lymphocytes 06/10/2024 1.60     Absolute Monocytes 06/10/2024 0.57     Eosinophils Absolute 06/10/2024 0.00     Basophils Absolute 06/10/2024 0.02     Sodium 06/10/2024 139     Potassium 06/10/2024 4.3     Chloride 06/10/2024 102     CO2 06/10/2024 26     ANION GAP 06/10/2024 11     BUN 06/10/2024 18     Creatinine 06/10/2024 0.70     Glucose, Fasting 06/10/2024 99     Calcium 06/10/2024 8.8     AST 06/10/2024 35     ALT 06/10/2024 40     Alkaline Phosphatase 06/10/2024 65     Total Protein 06/10/2024 6.8     Albumin 06/10/2024 4.2     Total Bilirubin 06/10/2024 0.91     eGFR 06/10/2024 90     Cholesterol 06/10/2024 164     Triglycerides 06/10/2024 217 (H)     HDL, Direct 06/10/2024 53     LDL Calculated 06/10/2024 68     Non-HDL-Chol (CHOL-HDL) 06/10/2024 111     TSH 3RD GENERATON 06/10/2024 1.451     JAD 06/10/2024 Negative     Cyclic Citrullinated Pep* 06/10/2024 1.3     Rheumatoid Factor 06/10/2024 Negative     CRP 06/10/2024 7.1 (H)     Sed Rate 06/10/2024 17     Vit D, 25-Hydroxy 06/10/2024 33.1      Radiology Results:   No results found.    Collette Sverchek, PA-C    **Please note:  Dictation voice to text software may have been used in the creation of this record.  Occasional wrong word or “sound alike” substitutions may have occurred due to the inherent limitations of voice recognition software.  Read the chart carefully and recognize, using context, where substitutions have occurred.**

## 2024-09-16 NOTE — PROGRESS NOTES
Rosa Caraballo  tolerated Fasenra injection to right arm well with no complications.  Patient's own Epipen at chairside (Exp. 01/26/2025). Patient sat for 30 minutes post injection and left unit stable.     Rosa Caraballo is aware of future appt on 11/11 at 10:30AM.     AVS printed and given to Rosa Caraballo: No (Declined by Rosa Caraballo).

## 2024-09-19 DIAGNOSIS — C82.90 FOLLICULAR LYMPHOMA, UNSPECIFIED FOLLICULAR LYMPHOMA TYPE, UNSPECIFIED BODY REGION (HCC): Primary | ICD-10-CM

## 2024-09-30 ENCOUNTER — TELEPHONE (OUTPATIENT)
Dept: HEMATOLOGY ONCOLOGY | Facility: CLINIC | Age: 69
End: 2024-09-30

## 2024-09-30 ENCOUNTER — APPOINTMENT (OUTPATIENT)
Dept: LAB | Facility: CLINIC | Age: 69
End: 2024-09-30
Payer: COMMERCIAL

## 2024-09-30 DIAGNOSIS — C82.90 FOLLICULAR LYMPHOMA, UNSPECIFIED FOLLICULAR LYMPHOMA TYPE, UNSPECIFIED BODY REGION (HCC): ICD-10-CM

## 2024-09-30 LAB
ALBUMIN SERPL BCG-MCNC: 4 G/DL (ref 3.5–5)
ALP SERPL-CCNC: 56 U/L (ref 34–104)
ALT SERPL W P-5'-P-CCNC: 18 U/L (ref 7–52)
ANION GAP SERPL CALCULATED.3IONS-SCNC: 9 MMOL/L (ref 4–13)
AST SERPL W P-5'-P-CCNC: 20 U/L (ref 13–39)
BASOPHILS # BLD AUTO: 0.01 THOUSANDS/ÂΜL (ref 0–0.1)
BASOPHILS NFR BLD AUTO: 0 % (ref 0–1)
BILIRUB SERPL-MCNC: 0.74 MG/DL (ref 0.2–1)
BUN SERPL-MCNC: 18 MG/DL (ref 5–25)
CALCIUM SERPL-MCNC: 8.6 MG/DL (ref 8.4–10.2)
CHLORIDE SERPL-SCNC: 104 MMOL/L (ref 96–108)
CO2 SERPL-SCNC: 24 MMOL/L (ref 21–32)
CREAT SERPL-MCNC: 0.69 MG/DL (ref 0.6–1.3)
EOSINOPHIL # BLD AUTO: 0.02 THOUSAND/ÂΜL (ref 0–0.61)
EOSINOPHIL NFR BLD AUTO: 0 % (ref 0–6)
ERYTHROCYTE [DISTWIDTH] IN BLOOD BY AUTOMATED COUNT: 13.3 % (ref 11.6–15.1)
GFR SERPL CREATININE-BSD FRML MDRD: 91 ML/MIN/1.73SQ M
GLUCOSE P FAST SERPL-MCNC: 120 MG/DL (ref 65–99)
HCT VFR BLD AUTO: 39.6 % (ref 34.8–46.1)
HGB BLD-MCNC: 12.9 G/DL (ref 11.5–15.4)
IMM GRANULOCYTES # BLD AUTO: 0.04 THOUSAND/UL (ref 0–0.2)
IMM GRANULOCYTES NFR BLD AUTO: 1 % (ref 0–2)
LYMPHOCYTES # BLD AUTO: 1.46 THOUSANDS/ÂΜL (ref 0.6–4.47)
LYMPHOCYTES NFR BLD AUTO: 22 % (ref 14–44)
MCH RBC QN AUTO: 31.2 PG (ref 26.8–34.3)
MCHC RBC AUTO-ENTMCNC: 32.6 G/DL (ref 31.4–37.4)
MCV RBC AUTO: 96 FL (ref 82–98)
MONOCYTES # BLD AUTO: 0.49 THOUSAND/ÂΜL (ref 0.17–1.22)
MONOCYTES NFR BLD AUTO: 8 % (ref 4–12)
NEUTROPHILS # BLD AUTO: 4.52 THOUSANDS/ÂΜL (ref 1.85–7.62)
NEUTS SEG NFR BLD AUTO: 69 % (ref 43–75)
NRBC BLD AUTO-RTO: 0 /100 WBCS
PLATELET # BLD AUTO: 185 THOUSANDS/UL (ref 149–390)
PMV BLD AUTO: 11.6 FL (ref 8.9–12.7)
POTASSIUM SERPL-SCNC: 3.9 MMOL/L (ref 3.5–5.3)
PROT SERPL-MCNC: 6.3 G/DL (ref 6.4–8.4)
RBC # BLD AUTO: 4.14 MILLION/UL (ref 3.81–5.12)
SODIUM SERPL-SCNC: 137 MMOL/L (ref 135–147)
WBC # BLD AUTO: 6.54 THOUSAND/UL (ref 4.31–10.16)

## 2024-09-30 PROCEDURE — 36415 COLL VENOUS BLD VENIPUNCTURE: CPT

## 2024-09-30 PROCEDURE — 85025 COMPLETE CBC W/AUTO DIFF WBC: CPT

## 2024-09-30 PROCEDURE — 80053 COMPREHEN METABOLIC PANEL: CPT

## 2024-09-30 NOTE — TELEPHONE ENCOUNTER
Patient returned She's call. Per patient unable to do this Friday, patient rescheduled to 10/21 at 1140AM. Per patient she just had labs completed and wanted to make sure those labs are okay for 10/21 visit.       Thank you!

## 2024-09-30 NOTE — TELEPHONE ENCOUNTER
Called and left pt Vm advising of change in scheduled appt. Offered f/u this Friday 10/4 at 0900 or oct 21. Asked that she call back the hopeline to let us know which date/time work for her.

## 2024-10-01 ENCOUNTER — OFFICE VISIT (OUTPATIENT)
Dept: OBGYN CLINIC | Facility: CLINIC | Age: 69
End: 2024-10-01
Payer: COMMERCIAL

## 2024-10-01 VITALS
DIASTOLIC BLOOD PRESSURE: 84 MMHG | HEIGHT: 65 IN | BODY MASS INDEX: 25.09 KG/M2 | WEIGHT: 150.6 LBS | RESPIRATION RATE: 16 BRPM | HEART RATE: 84 BPM | TEMPERATURE: 97.8 F | SYSTOLIC BLOOD PRESSURE: 127 MMHG

## 2024-10-01 DIAGNOSIS — M67.814 BICEPS TENDINOSIS OF LEFT SHOULDER: Primary | ICD-10-CM

## 2024-10-01 PROCEDURE — 99214 OFFICE O/P EST MOD 30 MIN: CPT | Performed by: STUDENT IN AN ORGANIZED HEALTH CARE EDUCATION/TRAINING PROGRAM

## 2024-10-01 RX ORDER — DOXYCYCLINE 100 MG/1
CAPSULE ORAL
COMMUNITY
Start: 2024-09-20

## 2024-10-01 NOTE — PROGRESS NOTES
"Orthopedic Surgery Office Note  Rosa Caraballo (68 y.o. female)   : 1955   MRN: [id number]   Encounter Date: 10/1/2024 with Dr. Ronak Alanis,   Chief Complaint   Patient presents with    Left Shoulder - Pain       Assessment, Plan, & Discussion:     Left biceps tendinosis  - S&P referral provided for US-guided biceps injection  - reviewed risk of tendon rupture with CSI given cortisone's risk profile as well as history of right biceps tendon tear/mere deformity   - answered questions regarding if there is a possible tear, or if PT/CSI have no improvement     Plan:   No follow-ups on file.      Surgery:   No surgery planned at this time      Orders:     There are no diagnoses linked to this encounter.     History of Present Illness:     Rosa Caraballo is a 68 y.o. female who presents for follow up regarding left shoulder pain    Pain began in April with a mechanical fall while running, unsure of mechanism. She then had another fall onto her left shoulder with continued pain.   Last time she was here.    Left shoulder hurts the most at the end of the day, located anterior/biceps when lifting . Not associated with removal of subcutaneous mass. Reports prior tendinous pain/problem on the right side    Review of Systems  Constitutional: Negative for fatigue, fever or loss of appetite.   HENT: Negative.    Respiratory: Negative for shortness of breath, dyspnea.    Cardiovascular: Negative for chest pain/tightness.   Gastrointestinal: Negative for abdominal pain, N/V.   Endocrine: Negative for cold/heat intolerance, unexplained weight loss/gain.   Genitourinary: Negative for flank pain, dysuria  Skin: Negative for rash.    Psychiatric/Behavioral: Negative for agitation.  All else negative unless otherwise noted in HPI    Physical Exam:   General:  /84   Pulse 84   Temp 97.8 °F (36.6 °C) (Temporal)   Resp 16   Ht 5' 5\" (1.651 m)   Wt 68.3 kg (150 lb 9.6 oz)   BMI 25.06 kg/m²   Cons: Appears " well.  No apparent distress.  Psych: Alert. Oriented x3.  Mood and affect normal.  Eyes: PERRLA, EOMI  Resp: Normal effort.  No audible wheezing or stridor.  CV: Extremities warm and well perfused.   Abd:    No distention or guarding.   Skin: Warm. No visible lesions.  Neuro: Normal muscle tone.      Orthopedic Exam:   Shoulder focused exam:       LEFT    Scapula Atrophy Negative    Rotator cuff SS 5/5     IS 5/5     SubS 4/5    ROM      ER0 60     ER90 90     IR90 T6          TTP: AC Negative     Biceps Positive     Coracoid Positive    Special Tests:             Cross body Adduction Negative     Neer Negative                UE NV Exam: intact Radial pulses bilaterally  Sensation intact to light touch C5-T1 bilaterally,       Imaging/Studies:     Study: XR left shoulder  Date: 8/17/24  Report: I have read and agree with the radiologist report. and I have personally reviewed the imaging in PACS and my impression is as follows:  I have personally reviewed imaging and my impression is as follows:   No acute osseous abnormality.  No evidence of abnormal mass.  If there is a discrete clinically significant palpable abnormality, follow-up with contrast-enhanced MRI is advised.       Procedures  No procedures today.      Medical, Surgical, Family, and Social History    The patient's medical history, family history, and social history, were reviewed and updated as appropriate.    Past Medical History:   Diagnosis Date    Asthma     Celiac disease     Chronic sinusitis 04/27/2023    Colon polyp     Follicular lymphoma (HCC)     GERD (gastroesophageal reflux disease)     Heart palpitations     History of colonic polyps     resolved 07/12/2016    History of radiation therapy 2010    Hyperlipidemia     Insomnia     Irregular heart beat     Lymphoma, 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HCC)     non hodgekins, remission    Malignant lymphoma (HCC) 2010    rt arm cutaneous follicular stage ia (rt diatal medical biceps area , s/p resected and s/p  radistion treatment    resolved 12/17/2014    Postmenopausal disorder     resolved 09/21/2017    Pulmonary nodule     BENIGN, STABLE [id number]    Restless legs syndrome     resolved 09/21/2017    TMJ syndrome     resolved 09/21/2017     Past Surgical History:   Procedure Laterality Date    COLONOSCOPY  04/16/2019    FUNCTIONAL ENDOSCOPIC SINUS SURGERY Bilateral 2013    Dr Bagley    HYSTERECTOMY      age 45 complete    LYMPH NODE DISSECTION Right     arm    NASAL SEPTUM SURGERY  2013    with turbinate reduction - Dr. Bagley    OOPHORECTOMY Bilateral     DE ESOPHAGOGASTRODUODENOSCOPY TRANSORAL DIAGNOSTIC N/A 01/18/2016    Procedure: ESOPHAGOGASTRODUODENOSCOPY (EGD);  Surgeon: Ness Shepherd MD;  Location: [facility];  Service: Gastroenterology    DE EXCISION GANGLION WRIST DORSAL/VOLAR RECURRENT Left 12/08/2020    Procedure: WRIST GANGLION CYST EXCISION;  Surgeon: Marie Gaspar MD;  Location: [facility];  Service: Orthopedics    DE EXCISION TUMOR SOFT TISSUE SHOULDER SUBQ 3 CM/> Left 11/27/2023    Procedure: anterior shouler- EXCISION BIOPSY TISSUE LESION/MASS UPPER EXTREMITY;  Surgeon: Ronak Alanis DO;  Location: [facility];  Service: Orthopedics    SYNOVECTOMY N/A 12/08/2020    Procedure: ECU TENOSYNOVECTOMY;  Surgeon: Marie Gaspar MD;  Location: [facility];  Service: Orthopedics    TONSILLECTOMY      TOTAL ABDOMINAL HYSTERECTOMY W/ BILATERAL SALPINGOOPHORECTOMY      age 51    UPPER GASTROINTESTINAL ENDOSCOPY      US GUIDED MSK PROCEDURE  01/16/2020    US GUIDED MSK PROCEDURE  08/07/2020    US GUIDED MSK PROCEDURE  08/02/2021     Family History   Problem Relation Age of Onset    Lymphoma Mother     Throat cancer Father     Heart failure Father     Atrial fibrillation Father     Diabetes Father     Colonic polyp Father     Hypertension Father     Thyroid cancer Sister     Breast cancer Paternal Grandmother 69    Breast cancer Paternal Aunt 69    Ovarian cancer Paternal Aunt 70    No Known Problems  Maternal Grandmother     No Known Problems Maternal Grandfather     Cancer Paternal Grandfather     Lung cancer Paternal Grandfather     BRCA1 Positive Cousin     Skin cancer Paternal Aunt     Throat cancer Paternal Uncle     No Known Problems Maternal Aunt      Social History     Occupational History    Occupation: X-ray technologist   Tobacco Use    Smoking status: Former     Current packs/day: 0.00     Average packs/day: 0.8 packs/day for 20.0 years (15.0 ttl pk-yrs)     Types: Cigarettes     Start date:      Quit date:      Years since quittin.7    Smokeless tobacco: Never    Tobacco comments:     Quit 10/31/04 2130   Vaping Use    Vaping status: Never Used   Substance and Sexual Activity    Alcohol use: Yes     Alcohol/week: 3.0 standard drinks of alcohol     Types: 3 Glasses of wine per week     Comment: soc    Drug use: No    Sexual activity: Yes     Partners: Male     Allergies   Allergen Reactions    Gluten Meal - Food Allergy GI Intolerance     Celiac     Morphine And Codeine Itching    Nuts - Food Allergy Hives     Almonds,walnuts, hazelnuts and other related nuts.     Shellfish-Derived Products - Food Allergy Anaphylaxis    Wheat Bran - Food Allergy GI Intolerance    Sulfa Antibiotics Rash       Current Outpatient Medications:     benralizumab (FASENRA) subcutaneous injection, Inject 1 mL (30 mg total) under the skin every 56 days for 6 doses, Disp: 1 Syringe, Rfl: 6    bimatoprost (LATISSE) 0.03 % ophthalmic solution, Take as directed, Disp: , Rfl:     Breo Ellipta 100-25 MCG/ACT inhaler, Inhale 1 puff daily Rinse mouth after use., Disp: 60 blister, Rfl: 11    Cholecalciferol 50 MCG (2000 UT) CAPS, Take by mouth daily, Disp: , Rfl:     cyanocobalamin (VITAMIN B-12) 1,000 mcg tablet, Take 1,000 mcg by mouth daily, Disp: , Rfl:     doxycycline hyclate (VIBRAMYCIN) 100 mg capsule, take 1 capsule by mouth twice a day WITH A FULL GLASS OF WATER. D...  (REFER TO PRESCRIPTION NOTES)., Disp: , Rfl:      erythromycin with ethanol (EMGEL) 2 % gel, Apply topically 2 (two) times a day, Disp: 30 g, Rfl: 1    famotidine (PEPCID) 20 mg tablet, Take 1 tablet (20 mg total) by mouth 2 (two) times a day, Disp: 180 tablet, Rfl: 3    flecainide (TAMBOCOR) 100 mg tablet, take 1 tablet by mouth twice a day, Disp: 180 tablet, Rfl: 3    fluticasone (FLONASE) 50 mcg/act nasal spray, 2 sprays into each nostril 2 (two) times a day, Disp: 48 g, Rfl: 4    ipratropium (ATROVENT) 0.03 % nasal spray, 2 sprays into each nostril 3 (three) times a day as needed for rhinitis, Disp: 30 mL, Rfl: 3    levalbuterol (XOPENEX HFA) 45 mcg/act inhaler, Inhale 1-2 puffs every 4 (four) hours as needed for wheezing or shortness of breath, Disp: 45 g, Rfl: 0    lidocaine (LMX) 4 % cream, Apply topically as needed for mild pain, Disp: 15 g, Rfl: 3    Magnesium 500 MG CAPS, Take by mouth, Disp: , Rfl:     meloxicam (MOBIC) 15 mg tablet, Take 1 tablet (15 mg total) by mouth daily as needed for moderate pain, Disp: 90 tablet, Rfl: 1    methocarbamol (ROBAXIN) 500 mg tablet, Take 1 tablet (500 mg total) by mouth 3 (three) times a day as needed for muscle spasms, Disp: 60 tablet, Rfl: 0    metoprolol succinate (TOPROL-XL) 25 mg 24 hr tablet, Take 1 tablet (25 mg total) by mouth every evening (Patient taking differently: Take 25 mg by mouth every evening 12.5mg daily), Disp: 90 tablet, Rfl: 5    ondansetron (ZOFRAN-ODT) 4 mg disintegrating tablet, Take 1 tablet (4 mg total) by mouth every 6 (six) hours as needed for nausea or vomiting, Disp: 15 tablet, Rfl: 0    polyethylene glycol (GLYCOLAX) 17 GM/SCOOP powder, Take 17 g by mouth 2 (two) times a day, Disp: 765 g, Rfl: 4    rosuvastatin (CRESTOR) 5 mg tablet, take 1 tablet by mouth once daily, Disp: 90 tablet, Rfl: 3    sucralfate (CARAFATE) 1 g tablet, Take 1 tablet (1 g total) by mouth 2 (two) times a day, Disp: 60 tablet, Rfl: 5    zolpidem (AMBIEN) 5 mg tablet, Take 1 tablet (5 mg total) by mouth daily  at bedtime as needed for sleep, Disp: 30 tablet, Rfl: 2    EPINEPHrine (EPIPEN) 0.3 mg/0.3 mL SOAJ, Inject 0.3 mL (0.3 mg total) into a muscle once for 1 dose (Patient not taking: Reported on 6/6/2024), Disp: 0.6 mL, Rfl: 0    levalbuterol (Xopenex) 1.25 mg/3 mL nebulizer solution, Take 3 mL (1.25 mg total) by nebulization every 4 (four) hours as needed for wheezing or shortness of breath for up to 25 days, Disp: 75 mL, Rfl: 6    linaCLOtide 72 MCG CAPS, Take 72 mcg by mouth daily (Patient not taking: Reported on 5/6/2024), Disp: 4 capsule, Rfl: 0    senna-docusate sodium (SENOKOT-S) 8.6-50 mg per tablet, Take 1 tablet by mouth daily for 14 days (Patient not taking: Reported on 9/4/2024), Disp: 14 tablet, Rfl: 0      This Visit:     30 minutes was spent in the coordination of care, reviewing of imaging and with the patient on the date of service    Edwin Roca PA-C    I, Edwin Roca PA-C, scribed this note in conjunction with and in the presence of Dr. Ronak Alanis DO, who performed parts of the services as described in this documentation    Dr. Ronak Alanis DO, Orthopedic Surgeon  [facility]

## 2024-10-08 ENCOUNTER — TELEPHONE (OUTPATIENT)
Age: 69
End: 2024-10-08

## 2024-10-08 ENCOUNTER — OFFICE VISIT (OUTPATIENT)
Dept: PULMONOLOGY | Facility: CLINIC | Age: 69
End: 2024-10-08
Payer: COMMERCIAL

## 2024-10-08 VITALS
BODY MASS INDEX: 24.66 KG/M2 | SYSTOLIC BLOOD PRESSURE: 124 MMHG | HEART RATE: 74 BPM | DIASTOLIC BLOOD PRESSURE: 76 MMHG | RESPIRATION RATE: 18 BRPM | HEIGHT: 65 IN | OXYGEN SATURATION: 98 % | WEIGHT: 148 LBS

## 2024-10-08 DIAGNOSIS — J45.50 SEVERE PERSISTENT ASTHMA WITHOUT COMPLICATION: Primary | ICD-10-CM

## 2024-10-08 DIAGNOSIS — J30.89 NON-SEASONAL ALLERGIC RHINITIS, UNSPECIFIED TRIGGER: ICD-10-CM

## 2024-10-08 PROCEDURE — 99214 OFFICE O/P EST MOD 30 MIN: CPT

## 2024-10-08 RX ORDER — MONTELUKAST SODIUM 10 MG/1
10 TABLET ORAL
Qty: 30 TABLET | Refills: 3 | Status: SHIPPED | OUTPATIENT
Start: 2024-10-08

## 2024-10-08 RX ORDER — FLUTICASONE PROPIONATE 100 UG/1
1 POWDER, METERED RESPIRATORY (INHALATION) 2 TIMES DAILY
Qty: 60 BLISTER | Refills: 0 | Status: SHIPPED | OUTPATIENT
Start: 2024-10-08 | End: 2024-10-16

## 2024-10-08 NOTE — PATIENT INSTRUCTIONS
Stop Breo  Start Flovent Diskus 1 puff twice daily, rinse mouth after  Continue Levalbuterol nebulizer OR inhaler every 4-6 hours AS NEEDED for shortness of breath or wheezing  Start Singulair nightly  Continue current allergy medications

## 2024-10-08 NOTE — PROGRESS NOTES
Pulmonary Follow Up Note  Rosa Caraballo 68 y.o. female MRN: [id number]  10/9/2024    Assessment/Plan:    Severe persistent asthma without complication  -No exacerbations requiring prednisone this past year.  Symptoms worsen in the heat/humidity and cold winter weather  -Not consistently using maintenance inhaler and limiting use of PRNs due to history of tachycardia on flecainide/metoprolol.  Still gets shaky/jittery with use of levalbuterol and sometimes after Breo  -Having mild shortness of breath, chest burning, and feeling that she cannot get air all the way down into her lungs with inhalation  -Was on Singulair previously, feels she benefited and should restart this    Plan:  -Will trial patient on ICS only inhaler to see if that improves her shaky/jitteriness.  Explained importance of compliance with maintenance inhaler.  Will trial Flovent discus 100 mcg 1 inhalation twice daily.  Reminded to rinse mouth after use  -Continue levalbuterol HFA/nebs every 6 hours PRN shortness of breath or wheezing.  If still shaky/jittery with the levalbuterol, can consider lowering dose of lev albuterol neb  -Restart Singulair 10 mg nightly  -On Fasenra with great response to therapy, continue  -Follow-up in 3 months or sooner if needed        Non-seasonal allergic rhinitis  -Continue Astelin, Flonase, and OTC antihistamine as needed        Diagnoses and all orders for this visit:    Severe persistent asthma without complication  -     fluticasone (Flovent Diskus) 100 mcg/actuation diskus inhaler; Inhale 1 puff 2 (two) times a day Rinse mouth after use.  -     montelukast (SINGULAIR) 10 mg tablet; Take 1 tablet (10 mg total) by mouth daily at bedtime    Non-seasonal allergic rhinitis, unspecified trigger      Return in about 3 months (around 1/8/2025).    History of Present Illness     Chief Complaint: No chief complaint on file.      Patient ID: Rosa is a 68 y.o. y.o. female has a past medical history of Asthma, Celiac  disease, Chronic sinusitis (04/27/2023), Colon polyp, Follicular lymphoma (HCC), GERD (gastroesophageal reflux disease), Heart palpitations, History of colonic polyps, History of radiation therapy (2010), Hyperlipidemia, Insomnia, Irregular heart beat, Lymphoma, 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HCC), Malignant lymphoma (HCC) (2010), Postmenopausal disorder, Pulmonary nodule, Restless legs syndrome, and TMJ syndrome.      10/8/2024  HPI: Rosa Caraballo is a 68 y.o. female with a history of severe persistent asthma, allergic rhinitis, and GERD who is here today for a follow-up visit.  Last seen in the office 1 year ago with Dr. Mckeon.  She has been maintained on Fasenra, Breo 100, and Xopenex PRN.  Also on Astelin, Flonase, and OTC antihistamine for allergies.    Patient returns today with questions about her current inhaler regimen.  She is not using her Breo daily, maybe 5 days out of the week. She has been using her levalbuterol inhaler once daily and on occasion the levalbuterol nebulizer.  Patient still feels jittery with these inhalers especially after the nebulizer treatment.  She has a history of tachycardia on flecainide/metoprolol and is worried about using her inhalers/nebs because of this.  Her asthma is stable, usually triggered by the heat/humidity and cold weather.  States that her bad periods are in January and July.  She was on Singulair previously, but feels she needs to restart.  Taking Allegra and nasal sprays.  Has some mild shortness of breath and sensation of chest burning with the feeling that she cannot get air all the way down into the bottom of her lungs.  She has had no exacerbations this past year requiring steroids.  Denies any chest pain or lower extremity swelling.  Denies any heartburn/reflux.      Review of Systems   Constitutional:  Negative for activity change, chills, diaphoresis, fever and unexpected weight change.   HENT:  Negative for congestion, postnasal drip, rhinorrhea, sore throat, trouble  swallowing and voice change.    Respiratory:  Positive for chest tightness and shortness of breath. Negative for cough and wheezing.    Cardiovascular:  Negative for chest pain, palpitations and leg swelling.   Allergic/Immunologic: Positive for environmental allergies.       Historical Information   Past Medical History:   Diagnosis Date    Asthma     Celiac disease     Chronic sinusitis 04/27/2023    Colon polyp     Follicular lymphoma (HCC)     GERD (gastroesophageal reflux disease)     Heart palpitations     History of colonic polyps     resolved 07/12/2016    History of radiation therapy 2010    Hyperlipidemia     Insomnia     Irregular heart beat     Lymphoma, 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HCC)     non hodgekins, remission    Malignant lymphoma (HCC) 2010    rt arm cutaneous follicular stage ia (rt diatal medical biceps area , s/p resected and s/p radistion treatment    resolved 12/17/2014    Postmenopausal disorder     resolved 09/21/2017    Pulmonary nodule     BENIGN, STABLE 4738-1678    Restless legs syndrome     resolved 09/21/2017    TMJ syndrome     resolved 09/21/2017     Past Surgical History:   Procedure Laterality Date    COLONOSCOPY  04/16/2019    FUNCTIONAL ENDOSCOPIC SINUS SURGERY Bilateral 2013    Dr Bagley    HYSTERECTOMY      age 45 complete    LYMPH NODE DISSECTION Right     arm    NASAL SEPTUM SURGERY  2013    with turbinate reduction - Dr. Bagley    OOPHORECTOMY Bilateral     VT ESOPHAGOGASTRODUODENOSCOPY TRANSORAL DIAGNOSTIC N/A 01/18/2016    Procedure: ESOPHAGOGASTRODUODENOSCOPY (EGD);  Surgeon: Ness Shepherd MD;  [facility]: [facility];  Service: Gastroenterology    VT EXCISION GANGLION WRIST DORSAL/VOLAR RECURRENT Left 12/08/2020    Procedure: WRIST GANGLION CYST EXCISION;  Surgeon: Marie Gaspar MD;  [facility];  Service: Orthopedics    VT EXCISION TUMOR SOFT TISSUE SHOULDER SUBQ 3 CM/> Left 11/27/2023    Procedure: anterior shouler- EXCISION BIOPSY TISSUE LESION/MASS UPPER EXTREMITY;   Surgeon: Ronak Alanis DO;  Location: [facility];  Service: Orthopedics    SYNOVECTOMY N/A 12/08/2020    Procedure: ECU TENOSYNOVECTOMY;  Surgeon: Marie Gaspar MD;  [facility];  Service: Orthopedics    TONSILLECTOMY      TOTAL ABDOMINAL HYSTERECTOMY W/ BILATERAL SALPINGOOPHORECTOMY      age 51    UPPER GASTROINTESTINAL ENDOSCOPY      US GUIDED MSK PROCEDURE  01/16/2020    US GUIDED MSK PROCEDURE  08/07/2020    US GUIDED MSK PROCEDURE  08/02/2021     Family History   Problem Relation Age of Onset    Lymphoma Mother     Throat cancer Father     Heart failure Father     Atrial fibrillation Father     Diabetes Father     Colonic polyp Father     Hypertension Father     Thyroid cancer Sister     Breast cancer Paternal Grandmother 69    Breast cancer Paternal Aunt 69    Ovarian cancer Paternal Aunt 70    No Known Problems Maternal Grandmother     No Known Problems Maternal Grandfather     Cancer Paternal Grandfather     Lung cancer Paternal Grandfather     BRCA1 Positive Cousin     Skin cancer Paternal Aunt     Throat cancer Paternal Uncle     No Known Problems Maternal Aunt        Smoking history: She reports that she quit smoking about 20 years ago. Her smoking use included cigarettes. She started smoking about 40 years ago. She has a 15 pack-year smoking history. She has never used smokeless tobacco.    Occupational History:     Immunization History   Administered Date(s) Administered    COVID-19 PFIZER VACCINE 0.3 ML IM 03/22/2021, 04/14/2021, 12/05/2021, 09/03/2022    COVID-19 Pfizer Vac BIVALENT Thee-sucrose 12 Yr+ IM 10/30/2022    COVID-19 Pfizer vac (Thee-sucrose, gray cap) 12 yr+ IM 05/25/2022    H1N1, All Formulations 10/28/2009    INFLUENZA 10/15/2018, 10/24/2020, 10/11/2021, 10/02/2022, 10/22/2023    Tdap 05/23/2016       Meds/Allergies     Current Outpatient Medications:     benralizumab (FASENRA) subcutaneous injection, Inject 1 mL (30 mg total) under the skin every 56 days for 6  doses, Disp: 1 Syringe, Rfl: 6    bimatoprost (LATISSE) 0.03 % ophthalmic solution, Take as directed, Disp: , Rfl:     Cholecalciferol 50 MCG (2000 UT) CAPS, Take by mouth daily, Disp: , Rfl:     cyanocobalamin (VITAMIN B-12) 1,000 mcg tablet, Take 1,000 mcg by mouth daily, Disp: , Rfl:     doxycycline hyclate (VIBRAMYCIN) 100 mg capsule, take 1 capsule by mouth twice a day WITH A FULL GLASS OF WATER. D...  (REFER TO PRESCRIPTION NOTES)., Disp: , Rfl:     erythromycin with ethanol (EMGEL) 2 % gel, Apply topically 2 (two) times a day, Disp: 30 g, Rfl: 1    famotidine (PEPCID) 20 mg tablet, Take 1 tablet (20 mg total) by mouth 2 (two) times a day, Disp: 180 tablet, Rfl: 3    flecainide (TAMBOCOR) 100 mg tablet, take 1 tablet by mouth twice a day, Disp: 180 tablet, Rfl: 3    fluticasone (FLONASE) 50 mcg/act nasal spray, 2 sprays into each nostril 2 (two) times a day, Disp: 48 g, Rfl: 4    fluticasone (Flovent Diskus) 100 mcg/actuation diskus inhaler, Inhale 1 puff 2 (two) times a day Rinse mouth after use., Disp: 60 blister, Rfl: 0    ipratropium (ATROVENT) 0.03 % nasal spray, 2 sprays into each nostril 3 (three) times a day as needed for rhinitis, Disp: 30 mL, Rfl: 3    levalbuterol (XOPENEX HFA) 45 mcg/act inhaler, Inhale 1-2 puffs every 4 (four) hours as needed for wheezing or shortness of breath, Disp: 45 g, Rfl: 0    lidocaine (LMX) 4 % cream, Apply topically as needed for mild pain, Disp: 15 g, Rfl: 3    Magnesium 500 MG CAPS, Take by mouth, Disp: , Rfl:     meloxicam (MOBIC) 15 mg tablet, Take 1 tablet (15 mg total) by mouth daily as needed for moderate pain, Disp: 90 tablet, Rfl: 1    methocarbamol (ROBAXIN) 500 mg tablet, Take 1 tablet (500 mg total) by mouth 3 (three) times a day as needed for muscle spasms, Disp: 60 tablet, Rfl: 0    metoprolol succinate (TOPROL-XL) 25 mg 24 hr tablet, Take 1 tablet (25 mg total) by mouth every evening (Patient taking differently: Take 25 mg by mouth every evening 12.5mg  "daily), Disp: 90 tablet, Rfl: 5    montelukast (SINGULAIR) 10 mg tablet, Take 1 tablet (10 mg total) by mouth daily at bedtime, Disp: 30 tablet, Rfl: 3    ondansetron (ZOFRAN-ODT) 4 mg disintegrating tablet, Take 1 tablet (4 mg total) by mouth every 6 (six) hours as needed for nausea or vomiting, Disp: 15 tablet, Rfl: 0    polyethylene glycol (GLYCOLAX) 17 GM/SCOOP powder, Take 17 g by mouth 2 (two) times a day, Disp: 765 g, Rfl: 4    rosuvastatin (CRESTOR) 5 mg tablet, take 1 tablet by mouth once daily, Disp: 90 tablet, Rfl: 3    sucralfate (CARAFATE) 1 g tablet, Take 1 tablet (1 g total) by mouth 2 (two) times a day, Disp: 60 tablet, Rfl: 5    zolpidem (AMBIEN) 5 mg tablet, Take 1 tablet (5 mg total) by mouth daily at bedtime as needed for sleep, Disp: 30 tablet, Rfl: 2    EPINEPHrine (EPIPEN) 0.3 mg/0.3 mL SOAJ, Inject 0.3 mL (0.3 mg total) into a muscle once for 1 dose (Patient not taking: Reported on 6/6/2024), Disp: 0.6 mL, Rfl: 0    levalbuterol (Xopenex) 1.25 mg/3 mL nebulizer solution, Take 3 mL (1.25 mg total) by nebulization every 4 (four) hours as needed for wheezing or shortness of breath for up to 25 days, Disp: 75 mL, Rfl: 6    linaCLOtide 72 MCG CAPS, Take 72 mcg by mouth daily (Patient not taking: Reported on 5/6/2024), Disp: 4 capsule, Rfl: 0    senna-docusate sodium (SENOKOT-S) 8.6-50 mg per tablet, Take 1 tablet by mouth daily for 14 days (Patient not taking: Reported on 9/4/2024), Disp: 14 tablet, Rfl: 0    Allergies:   Allergies   Allergen Reactions    Gluten Meal - Food Allergy GI Intolerance     Celiac     Morphine And Codeine Itching    Nuts - Food Allergy Hives     Almonds,walnuts, hazelnuts and other related nuts.     Shellfish-Derived Products - Food Allergy Anaphylaxis    Wheat Bran - Food Allergy GI Intolerance    Sulfa Antibiotics Rash         Vitals:  Vitals:    10/08/24 1529   BP: 124/76   Pulse: 74   Resp: 18   SpO2: 98%   Weight: 67.1 kg (148 lb)   Height: 5' 5\" (1.651 m)   Oxygen " Therapy  SpO2: 98 %  .  Wt Readings from Last 3 Encounters:   10/08/24 67.1 kg (148 lb)   10/01/24 68.3 kg (150 lb 9.6 oz)   09/16/24 67.1 kg (148 lb)     Body mass index is 24.63 kg/m².    Physical Exam  Vitals and nursing note reviewed.   Constitutional:       General: She is not in acute distress.     Appearance: Normal appearance. She is well-developed.   Cardiovascular:      Rate and Rhythm: Normal rate and regular rhythm.      Heart sounds: Normal heart sounds, S1 normal and S2 normal. No murmur heard.  Pulmonary:      Effort: Pulmonary effort is normal.      Breath sounds: Normal breath sounds. No decreased breath sounds, wheezing, rhonchi or rales.   Musculoskeletal:         General: No swelling.      Right lower leg: No edema.      Left lower leg: No edema.   Neurological:      Mental Status: She is alert.   Psychiatric:         Mood and Affect: Mood and affect normal.         Behavior: Behavior normal. Behavior is cooperative.           Labs: I have personally reviewed pertinent lab results.  Lab Results   Component Value Date    WBC 6.54 09/30/2024    HGB 12.9 09/30/2024    HCT 39.6 09/30/2024    MCV 96 09/30/2024     09/30/2024     Lab Results   Component Value Date    GLUCOSE 112 06/22/2015    CALCIUM 8.6 09/30/2024     06/22/2015    K 3.9 09/30/2024    CO2 24 09/30/2024     09/30/2024    BUN 18 09/30/2024    CREATININE 0.69 09/30/2024     Lab Results   Component Value Date     (H) 09/02/2020     Lab Results   Component Value Date    ALT 18 09/30/2024    AST 20 09/30/2024    ALKPHOS 56 09/30/2024    BILITOT 0.63 06/22/2015

## 2024-10-08 NOTE — TELEPHONE ENCOUNTER
Outgoing call in response to [employer] message:    Patient interested in a sooner/closer appointment. Appointment rescheduled as requested.     Medication requests to be made at appointment today.

## 2024-10-15 ENCOUNTER — OFFICE VISIT (OUTPATIENT)
Dept: CARDIOLOGY CLINIC | Facility: CLINIC | Age: 69
End: 2024-10-15
Payer: COMMERCIAL

## 2024-10-15 VITALS
HEIGHT: 65 IN | WEIGHT: 146 LBS | DIASTOLIC BLOOD PRESSURE: 80 MMHG | SYSTOLIC BLOOD PRESSURE: 120 MMHG | HEART RATE: 66 BPM | BODY MASS INDEX: 24.32 KG/M2

## 2024-10-15 DIAGNOSIS — R00.2 PALPITATIONS: ICD-10-CM

## 2024-10-15 DIAGNOSIS — I49.3 PVC'S (PREMATURE VENTRICULAR CONTRACTIONS): Primary | ICD-10-CM

## 2024-10-15 PROCEDURE — 99214 OFFICE O/P EST MOD 30 MIN: CPT | Performed by: NURSE PRACTITIONER

## 2024-10-15 PROCEDURE — 93000 ELECTROCARDIOGRAM COMPLETE: CPT | Performed by: NURSE PRACTITIONER

## 2024-10-15 NOTE — PROGRESS NOTES
Patient ID: Rosa Caraballo is a 68 y.o. female.        Plan:      Assessment & Plan  PVC's (premature ventricular contractions)  Controlled on flecainide and metoprolol  Palpitations  Check 48-hour Holter monitor      Follow up Plan/Summary Comments:  Will arrange for 48-hour Holter monitor to further assess her palpitations.      For now, I recommended she continue metoprolol 12.5 mg twice daily    We will follow-up by phone once the results of her Holter monitor are received.  Further recommendations pending the results.    HPI: Kisha is seen in the office today for a problem visit.    She has chronic PVC's, previously well controlled on Flecainide and Metoprolol until about 2/2024.    Since Feb she has noticed an increase in the frequency of her palpitations, more so in the last 2 months.  She has been taking her prescribed metoprolol 12.5 mg daily but also taking an additional 12.5 mg at some point during the day with effectiveness noted.     She does report feeling dizzy and off balance at times.  No lightheadedness, syncope or near syncope.    She occasionally has a sharp burn in her left axillary area that last for 10 to 15 seconds.  It is not a provoked or associated with activity.      She denies any shortness of breath or exertional dyspnea.    Review of Systems   10  point ROS  was otherwise non pertinent or negative except as per HPI or as below.   Gait: Normal      Most recent or relevant cardiac/vascular testing:      Results for orders placed or performed in visit on 10/15/24   POCT ECG    Impression    NSR     Holter monitor 9/4/2020  1. Predominantly normal sinus rhythm, with an average heart rate of 76   beats per minute   2. Occasional premature atrial contractions & premature ventricular   contractions   3. This test was performed after discontinuation of flecainide at the   beginning of study, and patient reported frequent/increased symptoms of   palpitations during the test.  Correlation with  "tracings at these times   revealed single PACs and PVCs on some of the occasions.   4. No significant pauses or advanced degree heart block       Objective:     /80 (BP Location: Right arm, Patient Position: Sitting)   Pulse 66   Ht 5' 5\" (1.651 m)   Wt 66.2 kg (146 lb)   BMI 24.30 kg/m²     PHYSICAL EXAM:    General:  Normal appearance, no acute distress  Eyes:  Anicteric.  Oral mucosa:  Moist.  Neck:  No JVD. Carotid upstrokes are brisk without bruits.  No masses.  Chest:  Clear to auscultation   Cardiac:  No palpable PMI.  Normal S1 and S2.  No murmur gallop or rub.  Abdomen:  Soft and nontender. No palpable organomegaly or aortic enlargement.  Extremities:  No peripheral edema.  Musculoskeletal:  Symmetric.   Vascular:  Pedal pulses are intact.  Neuro:  Grossly symmetric.  Psych:  Alert and oriented x3.    Allergies   Allergen Reactions    Gluten Meal - Food Allergy GI Intolerance     Celiac     Morphine And Codeine Itching    Nuts - Food Allergy Hives     Almonds,walnuts, hazelnuts and other related nuts.     Shellfish-Derived Products - Food Allergy Anaphylaxis    Wheat Bran - Food Allergy GI Intolerance    Sulfa Antibiotics Rash       Current Outpatient Medications:     benralizumab (FASENRA) subcutaneous injection, Inject 1 mL (30 mg total) under the skin every 56 days for 6 doses, Disp: 1 Syringe, Rfl: 6    bimatoprost (LATISSE) 0.03 % ophthalmic solution, Take as directed, Disp: , Rfl:     Cholecalciferol 50 MCG (2000 UT) CAPS, Take by mouth daily, Disp: , Rfl:     cyanocobalamin (VITAMIN B-12) 1,000 mcg tablet, Take 1,000 mcg by mouth daily, Disp: , Rfl:     erythromycin with ethanol (EMGEL) 2 % gel, Apply topically 2 (two) times a day, Disp: 30 g, Rfl: 1    famotidine (PEPCID) 20 mg tablet, Take 1 tablet (20 mg total) by mouth 2 (two) times a day, Disp: 180 tablet, Rfl: 3    flecainide (TAMBOCOR) 100 mg tablet, take 1 tablet by mouth twice a day, Disp: 180 tablet, Rfl: 3    fluticasone " (FLONASE) 50 mcg/act nasal spray, 2 sprays into each nostril 2 (two) times a day, Disp: 48 g, Rfl: 4    fluticasone (Flovent Diskus) 100 mcg/actuation diskus inhaler, Inhale 1 puff 2 (two) times a day Rinse mouth after use., Disp: 60 blister, Rfl: 0    ipratropium (ATROVENT) 0.03 % nasal spray, 2 sprays into each nostril 3 (three) times a day as needed for rhinitis, Disp: 30 mL, Rfl: 3    levalbuterol (XOPENEX HFA) 45 mcg/act inhaler, Inhale 1-2 puffs every 4 (four) hours as needed for wheezing or shortness of breath, Disp: 45 g, Rfl: 0    lidocaine (LMX) 4 % cream, Apply topically as needed for mild pain, Disp: 15 g, Rfl: 3    Magnesium 500 MG CAPS, Take by mouth, Disp: , Rfl:     meloxicam (MOBIC) 15 mg tablet, Take 1 tablet (15 mg total) by mouth daily as needed for moderate pain, Disp: 90 tablet, Rfl: 1    methocarbamol (ROBAXIN) 500 mg tablet, Take 1 tablet (500 mg total) by mouth 3 (three) times a day as needed for muscle spasms, Disp: 60 tablet, Rfl: 0    metoprolol succinate (TOPROL-XL) 25 mg 24 hr tablet, Take 1 tablet (25 mg total) by mouth every evening (Patient taking differently: Take 25 mg by mouth every evening 12.5mg daily evening), Disp: 90 tablet, Rfl: 5    montelukast (SINGULAIR) 10 mg tablet, Take 1 tablet (10 mg total) by mouth daily at bedtime, Disp: 30 tablet, Rfl: 3    ondansetron (ZOFRAN-ODT) 4 mg disintegrating tablet, Take 1 tablet (4 mg total) by mouth every 6 (six) hours as needed for nausea or vomiting, Disp: 15 tablet, Rfl: 0    polyethylene glycol (GLYCOLAX) 17 GM/SCOOP powder, Take 17 g by mouth 2 (two) times a day, Disp: 765 g, Rfl: 4    rosuvastatin (CRESTOR) 5 mg tablet, take 1 tablet by mouth once daily, Disp: 90 tablet, Rfl: 3    sucralfate (CARAFATE) 1 g tablet, Take 1 tablet (1 g total) by mouth 2 (two) times a day, Disp: 60 tablet, Rfl: 5    zolpidem (AMBIEN) 5 mg tablet, Take 1 tablet (5 mg total) by mouth daily at bedtime as needed for sleep, Disp: 30 tablet, Rfl: 2     doxycycline hyclate (VIBRAMYCIN) 100 mg capsule, take 1 capsule by mouth twice a day WITH A FULL GLASS OF WATER. D...  (REFER TO PRESCRIPTION NOTES). (Patient not taking: Reported on 10/15/2024), Disp: , Rfl:     EPINEPHrine (EPIPEN) 0.3 mg/0.3 mL SOAJ, Inject 0.3 mL (0.3 mg total) into a muscle once for 1 dose (Patient not taking: Reported on 6/6/2024), Disp: 0.6 mL, Rfl: 0    levalbuterol (Xopenex) 1.25 mg/3 mL nebulizer solution, Take 3 mL (1.25 mg total) by nebulization every 4 (four) hours as needed for wheezing or shortness of breath for up to 25 days, Disp: 75 mL, Rfl: 6    linaCLOtide 72 MCG CAPS, Take 72 mcg by mouth daily (Patient not taking: Reported on 5/6/2024), Disp: 4 capsule, Rfl: 0    senna-docusate sodium (SENOKOT-S) 8.6-50 mg per tablet, Take 1 tablet by mouth daily for 14 days (Patient not taking: Reported on 9/4/2024), Disp: 14 tablet, Rfl: 0  Past Medical History:   Diagnosis Date    Asthma     Celiac disease     Chronic sinusitis 04/27/2023    Colon polyp     Follicular lymphoma (HCC)     GERD (gastroesophageal reflux disease)     Heart palpitations     History of colonic polyps     resolved 07/12/2016    History of radiation therapy 2010    Hyperlipidemia     Insomnia     Irregular heart beat     Lymphoma, 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HCC)     non hodgekins, remission    Malignant lymphoma (HCC) 2010    rt arm cutaneous follicular stage ia (rt diatal medical biceps area , s/p resected and s/p radistion treatment    resolved 12/17/2014    Postmenopausal disorder     resolved 09/21/2017    Pulmonary nodule     BENIGN, STABLE [id number]    Restless legs syndrome     resolved 09/21/2017    TMJ syndrome     resolved 09/21/2017     Past Surgical History:   Procedure Laterality Date    COLONOSCOPY  04/16/2019    FUNCTIONAL ENDOSCOPIC SINUS SURGERY Bilateral 2013    Dr Bagley    HYSTERECTOMY      age 45 complete    LYMPH NODE DISSECTION Right     arm    NASAL SEPTUM SURGERY  2013    with turbinate reduction - Dr. Bagley     OOPHORECTOMY Bilateral     CA ESOPHAGOGASTRODUODENOSCOPY TRANSORAL DIAGNOSTIC N/A 2016    Procedure: ESOPHAGOGASTRODUODENOSCOPY (EGD);  Surgeon: Ness Shepherd MD;  [facility];  Service: Gastroenterology    CA EXCISION GANGLION WRIST DORSAL/VOLAR RECURRENT Left 2020    Procedure: WRIST GANGLION CYST EXCISION;  Surgeon: Marie Gaspar MD;  [facility];  Service: Orthopedics    CA EXCISION TUMOR SOFT TISSUE SHOULDER SUBQ 3 CM/> Left 2023    Procedure: anterior shouler- EXCISION BIOPSY TISSUE LESION/MASS UPPER EXTREMITY;  Surgeon: Ronak Alanis DO;  Location[facility];  Service: Orthopedics    SYNOVECTOMY N/A 2020    Procedure: ECU TENOSYNOVECTOMY;  Surgeon: Marie Gaspar MD;  [facility];  Service: Orthopedics    TONSILLECTOMY      TOTAL ABDOMINAL HYSTERECTOMY W/ BILATERAL SALPINGOOPHORECTOMY      age 51    UPPER GASTROINTESTINAL ENDOSCOPY      US GUIDED MSK PROCEDURE  2020    US GUIDED MSK PROCEDURE  2020    US GUIDED MSK PROCEDURE  2021       CMP:   Lab Results   Component Value Date     2015    K 3.9 2024    K 4.0 2015     2024     2015    CO2 24 2024    CO2 25 2015    BUN 18 2024    BUN 16 2015    CREATININE 0.69 2024    CREATININE 0.74 2015    GLUCOSE 112 2015    EGFR 91 2024     Lipid Profile:    Lab Results   Component Value Date    CHOL 200 2015    TRIG 217 (H) 06/10/2024    TRIG 200 2015    HDL 53 06/10/2024    HDL 51 2015         Social History     Tobacco Use   Smoking Status Former    Current packs/day: 0.00    Average packs/day: 0.8 packs/day for 20.0 years (15.0 ttl pk-yrs)    Types: Cigarettes    Start date:     Quit date: 2004    Years since quittin.8   Smokeless Tobacco Never   Tobacco Comments    Quit 10/31/04 2130

## 2024-10-16 ENCOUNTER — TELEPHONE (OUTPATIENT)
Dept: PULMONOLOGY | Facility: CLINIC | Age: 69
End: 2024-10-16

## 2024-10-16 DIAGNOSIS — J45.50 SEVERE PERSISTENT ASTHMA WITHOUT COMPLICATION: Primary | ICD-10-CM

## 2024-10-16 RX ORDER — BUDESONIDE 90 UG/1
1 AEROSOL, POWDER RESPIRATORY (INHALATION) 2 TIMES DAILY
Qty: 1 EACH | Refills: 3 | Status: SHIPPED | OUTPATIENT
Start: 2024-10-16

## 2024-10-16 NOTE — TELEPHONE ENCOUNTER
Called and spoke with patient.  She states that her Flovent inhaler is not covered.  Will send alternative inhaler and Pulmicort Flexhaler 90 mcg 1 puff twice daily.

## 2024-10-18 ENCOUNTER — HOSPITAL ENCOUNTER (OUTPATIENT)
Dept: NON INVASIVE DIAGNOSTICS | Facility: CLINIC | Age: 69
Discharge: HOME | End: 2024-10-18
Payer: COMMERCIAL

## 2024-10-18 DIAGNOSIS — R00.2 PALPITATIONS: ICD-10-CM

## 2024-10-18 DIAGNOSIS — I49.3 PVC'S (PREMATURE VENTRICULAR CONTRACTIONS): ICD-10-CM

## 2024-10-18 PROCEDURE — 93225 XTRNL ECG REC<48 HRS REC: CPT

## 2024-10-18 PROCEDURE — 93226 XTRNL ECG REC<48 HR SCAN A/R: CPT

## 2024-10-21 ENCOUNTER — OFFICE VISIT (OUTPATIENT)
Dept: HEMATOLOGY ONCOLOGY | Facility: CLINIC | Age: 69
End: 2024-10-21
Payer: COMMERCIAL

## 2024-10-21 VITALS
HEART RATE: 72 BPM | WEIGHT: 147.4 LBS | HEIGHT: 65 IN | TEMPERATURE: 98 F | DIASTOLIC BLOOD PRESSURE: 69 MMHG | OXYGEN SATURATION: 97 % | BODY MASS INDEX: 24.56 KG/M2 | SYSTOLIC BLOOD PRESSURE: 112 MMHG

## 2024-10-21 DIAGNOSIS — C82.90 FOLLICULAR LYMPHOMA, UNSPECIFIED FOLLICULAR LYMPHOMA TYPE, UNSPECIFIED BODY REGION (HCC): Primary | ICD-10-CM

## 2024-10-21 PROCEDURE — 99213 OFFICE O/P EST LOW 20 MIN: CPT | Performed by: INTERNAL MEDICINE

## 2024-10-21 PROCEDURE — G2211 COMPLEX E/M VISIT ADD ON: HCPCS | Performed by: INTERNAL MEDICINE

## 2024-10-21 NOTE — PROGRESS NOTES
[facility]ATOLOG[facility] [place]  [address] [facility] [address]  [phone]  [phone]    Rosa Caraballo,1955, [id number]  10/21/24    Discussion:   In summary, this is a 68-year-old female history of stage IA follicular lymphoma, grade 1, right medial cubital fossa 2010.  Treated with radiation therapy.  Recent CBC and differential are normal.  CMP shows glucose 120, otherwise normal.  Several polyps removed during colonoscopy February 2024.  Biopsy of mass in the left deltoid region showed intramuscular lipoma.  She has some momentary burning discomfort in the left anterior axilla for the past 6 weeks or so.  She will evaluate with orthopedics in the near future.  No palpable abnormality in that area.  She request follow-up in 1 year.  I discussed the above with the patient.  The patient  voiced understanding and agreement.  ______________________________________________________________________    Chief Complaint   Patient presents with    Follow-up       HPI:  Oncology History   Follicular lymphoma (HCC)   8/18/2010 Initial Diagnosis    stage IA follicle lymphoma, grade 1, 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, diagnosed in August 2010.   She underwent radiation therapy, resulting in complete remission.           Interval History: Clinically stable.  ECOG-  0 - Asymptomatic    Review of Systems   Constitutional:  Negative for appetite change, diaphoresis, fatigue and fever.   HENT:  Negative for sinus pain.    Eyes:  Negative for discharge.   Respiratory:  Negative for cough and shortness of breath.    Cardiovascular:  Negative for chest pain.   Gastrointestinal:  Negative for abdominal pain, constipation and diarrhea.   Endocrine: Negative for cold intolerance.   Genitourinary:  Negative for difficulty urinating and hematuria.   Musculoskeletal:  Negative for joint swelling.   Skin:  Negative for rash.   Allergic/Immunologic: Negative for environmental allergies.    Neurological:  Negative for dizziness and headaches.   Hematological:  Negative for adenopathy.   Psychiatric/Behavioral:  Negative for agitation.        Past Medical History:   Diagnosis Date    Asthma     Celiac disease     Chronic sinusitis 04/27/2023    Colon polyp     Follicular lymphoma (HCC)     GERD (gastroesophageal reflux disease)     Heart palpitations     History of colonic polyps     resolved 07/12/2016    History of radiation therapy 2010    Hyperlipidemia     Insomnia     Irregular heart beat     Lymphoma, 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HCC)     non hodgekins, remission    Malignant lymphoma (HCC) 2010    rt arm cutaneous follicular stage ia (rt diatal medical biceps area , s/p resected and s/p radistion treatment    resolved 12/17/2014    Postmenopausal disorder     resolved 09/21/2017    Pulmonary nodule     BENIGN, STABLE 8880-1322    Restless legs syndrome     resolved 09/21/2017    TMJ syndrome     resolved 09/21/2017     Patient Active Problem List   Diagnosis    Celiac disease    PVC's (premature ventricular contractions)    Dyslipidemia    Back pain    Chronic GERD    Heart palpitations    Insomnia    Osteopenia    Sciatica associated with disorder of lumbar spine    Vitamin D deficiency    VERONIKA (stress urinary incontinence, female)    Follicular lymphoma (HCC)    Personal history of colonic polyps    Severe persistent asthma without complication    Non-seasonal allergic rhinitis    Eosinophilia    Ganglion cyst of wrist, left    Pulmonary nodule    Disorder of tendon of right biceps    Tear of right supraspinatus tendon    Hyperlipidemia    Joint mass    Soft tissue mass    Mass of skin of left shoulder    Neck pain    Bilateral hand numbness    Pain of both sacroiliac joints    Chronic sinusitis       Current Outpatient Medications:     benralizumab (FASENRA) subcutaneous injection, Inject 1 mL (30 mg total) under the skin every 56 days for 6 doses, Disp: 1 Syringe, Rfl: 6    bimatoprost (LATISSE) 0.03 %  ophthalmic solution, Take as directed, Disp: , Rfl:     budesonide (Pulmicort Flexhaler) 90 MCG/ACT inhaler, Inhale 1 puff 2 (two) times a day Rinse mouth after use., Disp: 1 each, Rfl: 3    Cholecalciferol 50 MCG (2000 UT) CAPS, Take by mouth daily, Disp: , Rfl:     cyanocobalamin (VITAMIN B-12) 1,000 mcg tablet, Take 1,000 mcg by mouth daily, Disp: , Rfl:     erythromycin with ethanol (EMGEL) 2 % gel, Apply topically 2 (two) times a day, Disp: 30 g, Rfl: 1    famotidine (PEPCID) 20 mg tablet, Take 1 tablet (20 mg total) by mouth 2 (two) times a day, Disp: 180 tablet, Rfl: 3    flecainide (TAMBOCOR) 100 mg tablet, take 1 tablet by mouth twice a day, Disp: 180 tablet, Rfl: 3    fluticasone (FLONASE) 50 mcg/act nasal spray, 2 sprays into each nostril 2 (two) times a day, Disp: 48 g, Rfl: 4    ipratropium (ATROVENT) 0.03 % nasal spray, 2 sprays into each nostril 3 (three) times a day as needed for rhinitis, Disp: 30 mL, Rfl: 3    levalbuterol (XOPENEX HFA) 45 mcg/act inhaler, Inhale 1-2 puffs every 4 (four) hours as needed for wheezing or shortness of breath, Disp: 45 g, Rfl: 0    lidocaine (LMX) 4 % cream, Apply topically as needed for mild pain, Disp: 15 g, Rfl: 3    Magnesium 500 MG CAPS, Take by mouth, Disp: , Rfl:     meloxicam (MOBIC) 15 mg tablet, Take 1 tablet (15 mg total) by mouth daily as needed for moderate pain, Disp: 90 tablet, Rfl: 1    methocarbamol (ROBAXIN) 500 mg tablet, Take 1 tablet (500 mg total) by mouth 3 (three) times a day as needed for muscle spasms, Disp: 60 tablet, Rfl: 0    metoprolol succinate (TOPROL-XL) 25 mg 24 hr tablet, Take 1 tablet (25 mg total) by mouth every evening (Patient taking differently: Take 25 mg by mouth every evening 12.5mg daily evening), Disp: 90 tablet, Rfl: 5    montelukast (SINGULAIR) 10 mg tablet, Take 1 tablet (10 mg total) by mouth daily at bedtime, Disp: 30 tablet, Rfl: 3    ondansetron (ZOFRAN-ODT) 4 mg disintegrating tablet, Take 1 tablet (4 mg total) by  mouth every 6 (six) hours as needed for nausea or vomiting, Disp: 15 tablet, Rfl: 0    polyethylene glycol (GLYCOLAX) 17 GM/SCOOP powder, Take 17 g by mouth 2 (two) times a day, Disp: 765 g, Rfl: 4    rosuvastatin (CRESTOR) 5 mg tablet, take 1 tablet by mouth once daily, Disp: 90 tablet, Rfl: 3    sucralfate (CARAFATE) 1 g tablet, Take 1 tablet (1 g total) by mouth 2 (two) times a day, Disp: 60 tablet, Rfl: 5    zolpidem (AMBIEN) 5 mg tablet, Take 1 tablet (5 mg total) by mouth daily at bedtime as needed for sleep, Disp: 30 tablet, Rfl: 2    doxycycline hyclate (VIBRAMYCIN) 100 mg capsule, take 1 capsule by mouth twice a day WITH A FULL GLASS OF WATER. D...  (REFER TO PRESCRIPTION NOTES). (Patient not taking: Reported on 10/15/2024), Disp: , Rfl:     EPINEPHrine (EPIPEN) 0.3 mg/0.3 mL SOAJ, Inject 0.3 mL (0.3 mg total) into a muscle once for 1 dose (Patient not taking: Reported on 6/6/2024), Disp: 0.6 mL, Rfl: 0    levalbuterol (Xopenex) 1.25 mg/3 mL nebulizer solution, Take 3 mL (1.25 mg total) by nebulization every 4 (four) hours as needed for wheezing or shortness of breath for up to 25 days, Disp: 75 mL, Rfl: 6    linaCLOtide 72 MCG CAPS, Take 72 mcg by mouth daily (Patient not taking: Reported on 5/6/2024), Disp: 4 capsule, Rfl: 0    senna-docusate sodium (SENOKOT-S) 8.6-50 mg per tablet, Take 1 tablet by mouth daily for 14 days (Patient not taking: Reported on 9/4/2024), Disp: 14 tablet, Rfl: 0  Allergies   Allergen Reactions    Gluten Meal - Food Allergy GI Intolerance     Celiac     Morphine And Codeine Itching    Nuts - Food Allergy Hives     Almonds,walnuts, hazelnuts and other related nuts.     Shellfish-Derived Products - Food Allergy Anaphylaxis    Wheat Bran - Food Allergy GI Intolerance    Sulfa Antibiotics Rash     Past Surgical History:   Procedure Laterality Date    COLONOSCOPY  04/16/2019    FUNCTIONAL ENDOSCOPIC SINUS SURGERY Bilateral 2013    Dr Bagley    HYSTERECTOMY      age 45 complete    LYMPH  "NODE DISSECTION Right     arm    NASAL SEPTUM SURGERY  2013    with turbinate reduction - Dr. Bagley    OOPHORECTOMY Bilateral     UT ESOPHAGOGASTRODUODENOSCOPY TRANSORAL DIAGNOSTIC N/A 01/18/2016    Procedure: ESOPHAGOGASTRODUODENOSCOPY (EGD);  Surgeon: Ness Shepherd MD;  [facility];  Service: Gastroenterology    UT EXCISION GANGLION WRIST DORSAL/VOLAR RECURRENT Left 12/08/2020    Procedure: WRIST GANGLION CYST EXCISION;  Surgeon: Marie Gaspar MD;  [facility];  Service: Orthopedics    UT EXCISION TUMOR SOFT TISSUE SHOULDER SUBQ 3 CM/> Left 11/27/2023    Procedure: anterior shouler- EXCISION BIOPSY TISSUE LESION/MASS UPPER EXTREMITY;  Surgeon: Ronak Alanis DO;  [facility];  Service: Orthopedics    SYNOVECTOMY N/A 12/08/2020    Procedure: ECU TENOSYNOVECTOMY;  Surgeon: Marie Gaspar MD;  [facility];  Service: Orthopedics    TONSILLECTOMY      TOTAL ABDOMINAL HYSTERECTOMY W/ BILATERAL SALPINGOOPHORECTOMY      age 51    UPPER GASTROINTESTINAL ENDOSCOPY      US GUIDED MSK PROCEDURE  01/16/2020    US GUIDED MSK PROCEDURE  08/07/2020    US GUIDED MSK PROCEDURE  08/02/2021     Social History     Objective:  Vitals:    10/21/24 1138   BP: 112/69   BP Location: Right arm   Patient Position: Sitting   Cuff Size: Standard   Pulse: 72   Temp: 98 °F (36.7 °C)   TempSrc: Temporal   SpO2: 97%   Weight: 66.9 kg (147 lb 6.4 oz)   Height: 5' 5\" (1.651 m)     Physical Exam  Constitutional:       Appearance: She is well-developed.   HENT:      Head: Normocephalic and atraumatic.   Eyes:      Pupils: Pupils are equal, round, and reactive to light.   Cardiovascular:      Rate and Rhythm: Normal rate.      Heart sounds: No murmur heard.  Pulmonary:      Effort: No respiratory distress.      Breath sounds: No wheezing or rales.   Abdominal:      General: There is no distension.      Palpations: Abdomen is soft.      Tenderness: There is no abdominal tenderness. There is no " rebound.   Musculoskeletal:         General: No tenderness.      Cervical back: Neck supple.   Lymphadenopathy:      Cervical: No cervical adenopathy.   Skin:     General: Skin is warm.      Findings: No rash.   Neurological:      Mental Status: She is alert and oriented to person, place, and time.      Deep Tendon Reflexes: Reflexes normal.   Psychiatric:         Thought Content: Thought content normal.           Labs:  I personally reviewed the labs and imaging pertinent to this patient care.

## 2024-10-22 ENCOUNTER — CONSULT (OUTPATIENT)
Age: 69
End: 2024-10-22
Payer: MEDICARE

## 2024-10-22 VITALS
WEIGHT: 147 LBS | HEART RATE: 62 BPM | DIASTOLIC BLOOD PRESSURE: 73 MMHG | SYSTOLIC BLOOD PRESSURE: 118 MMHG | BODY MASS INDEX: 24.49 KG/M2 | HEIGHT: 65 IN

## 2024-10-22 DIAGNOSIS — G89.4 CHRONIC PAIN SYNDROME: ICD-10-CM

## 2024-10-22 DIAGNOSIS — M75.52 SUBACROMIAL BURSITIS OF LEFT SHOULDER JOINT: ICD-10-CM

## 2024-10-22 DIAGNOSIS — M67.814 BICEPS TENDINOSIS OF LEFT SHOULDER: ICD-10-CM

## 2024-10-22 DIAGNOSIS — M75.92 LEFT SUPRASPINATUS TENDINITIS: Primary | ICD-10-CM

## 2024-10-22 PROCEDURE — 99214 OFFICE O/P EST MOD 30 MIN: CPT | Performed by: ANESTHESIOLOGY

## 2024-10-22 NOTE — PROGRESS NOTES
Assessment:  1. Left supraspinatus tendinitis    2. Biceps tendinosis of left shoulder    3. Subacromial bursitis of left shoulder joint        Plan:  Patient is a 68-year-old female with complaints of left-sided shoulder pain and chronic pain syndrome secondary to sacroiliitis presents to office for follow-up visit.  Patient was last seen by Dr. Mcarthur who performed bilateral sacroiliac joint steroid injections in which patient reportedly received 70% relief.  Patient's current issue at this time is her left shoulder. Mri left shoulder does show supraspinatus tendinosis and pain with range of motion mirrors subacromial bursitis.   We will schedule pt for USGI left supraspinatus and subacromial bursa steroid injection  F/U 1 month after injection        Complete risks and benefits including bleeding, infection, tissue reaction, nerve injury and allergic reaction were discussed. The approach was demonstrated using models and literature was provided. Verbal and written consent was obtained.    [place] Prescription Drug Monitoring Program report was reviewed and was appropriate       History of Present Illness:  The patient is a 68 y.o. female who presents for a follow up office visit in regards to Shoulder Pain.   The patient’s current symptoms include 7 out of 10 constant sharp, throbbing, doses aching pain in left upper extremity with any particular time pattern.    Current pain medications includes: None .     I have personally reviewed and/or updated the patient's past medical history, past surgical history, family history, social history, current medications, allergies, and vital signs today.         Review of Systems  Review of Systems   Constitutional:  Negative for chills, fatigue and fever.   HENT:  Positive for hearing loss. Negative for sinus pain, sore throat and trouble swallowing.    Eyes:  Negative for pain and visual disturbance.   Respiratory:  Negative for shortness of breath and wheezing.     Cardiovascular:  Negative for chest pain and palpitations.   Gastrointestinal:  Negative for abdominal pain, constipation and nausea.   Endocrine: Negative for polydipsia and polyuria.   Genitourinary:  Negative for difficulty urinating.   Musculoskeletal:  Positive for joint swelling. Negative for arthralgias, gait problem and myalgias.        Joint pain   Skin:  Negative for rash.   Neurological:  Positive for numbness. Negative for dizziness, weakness and headaches.   Hematological:  Does not bruise/bleed easily.   Psychiatric/Behavioral:  Negative for dysphoric mood. The patient is not nervous/anxious.    All other systems reviewed and are negative.      Past Medical History:   Diagnosis Date    Asthma     Celiac disease     Chronic sinusitis 04/27/2023    Colon polyp     Follicular lymphoma (HCC)     GERD (gastroesophageal reflux disease)     Heart palpitations     History of colonic polyps     resolved 07/12/2016    History of radiation therapy 2010    Hyperlipidemia     Insomnia     Irregular heart beat     Lymphoma, 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HCC)     non hodgekins, remission    Malignant lymphoma (HCC) 2010    rt arm cutaneous follicular stage ia (rt diatal medical biceps area , s/p resected and s/p radistion treatment    resolved 12/17/2014    Postmenopausal disorder     resolved 09/21/2017    Pulmonary nodule     BENIGN, STABLE 5314-6266    Restless legs syndrome     resolved 09/21/2017    TMJ syndrome     resolved 09/21/2017       Past Surgical History:   Procedure Laterality Date    COLONOSCOPY  04/16/2019    FUNCTIONAL ENDOSCOPIC SINUS SURGERY Bilateral 2013    Dr Bagley    HYSTERECTOMY      age 45 complete    LYMPH NODE DISSECTION Right     arm    NASAL SEPTUM SURGERY  2013    with turbinate reduction - Dr. Bagley    OOPHORECTOMY Bilateral     ME ESOPHAGOGASTRODUODENOSCOPY TRANSORAL DIAGNOSTIC N/A 01/18/2016    Procedure: ESOPHAGOGASTRODUODENOSCOPY (EGD);  Surgeon: Ness Shepherd MD;  [facility];  Service:  Gastroenterology    MD EXCISION GANGLION WRIST DORSAL/VOLAR RECURRENT Left 2020    Procedure: WRIST GANGLION CYST EXCISION;  Surgeon: Marie Gaspar MD;  [facility];  Service: Orthopedics    MD EXCISION TUMOR SOFT TISSUE SHOULDER SUBQ 3 CM/> Left 2023    Procedure: anterior shouler- EXCISION BIOPSY TISSUE LESION/MASS UPPER EXTREMITY;  Surgeon: Ronak Alanis DO;  [facility];  Service: Orthopedics    SYNOVECTOMY N/A 2020    Procedure: ECU TENOSYNOVECTOMY;  Surgeon: Marie Gaspar MD;  [facility];  Service: Orthopedics    TONSILLECTOMY      TOTAL ABDOMINAL HYSTERECTOMY W/ BILATERAL SALPINGOOPHORECTOMY      age 51    UPPER GASTROINTESTINAL ENDOSCOPY      US GUIDED MSK PROCEDURE  2020    US GUIDED MSK PROCEDURE  2020    US GUIDED MSK PROCEDURE  2021       Family History   Problem Relation Age of Onset    Lymphoma Mother     Throat cancer Father     Heart failure Father     Atrial fibrillation Father     Diabetes Father     Colonic polyp Father     Hypertension Father     Thyroid cancer Sister     Breast cancer Paternal Grandmother 69    Breast cancer Paternal Aunt 69    Ovarian cancer Paternal Aunt 70    No Known Problems Maternal Grandmother     No Known Problems Maternal Grandfather     Cancer Paternal Grandfather     Lung cancer Paternal Grandfather     BRCA1 Positive Cousin     Skin cancer Paternal Aunt     Throat cancer Paternal Uncle     No Known Problems Maternal Aunt        Social History     Occupational History    Occupation: X-ray technologist   Tobacco Use    Smoking status: Former     Current packs/day: 0.00     Average packs/day: 0.8 packs/day for 20.0 years (15.0 ttl pk-yrs)     Types: Cigarettes     Start date:      Quit date: 2004     Years since quittin.8    Smokeless tobacco: Never    Tobacco comments:     Quit 10/31/04 2130   Vaping Use    Vaping status: Never Used   Substance and Sexual Activity    Alcohol  use: Yes     Alcohol/week: 3.0 standard drinks of alcohol     Types: 3 Glasses of wine per week     Comment: soc    Drug use: No    Sexual activity: Yes     Partners: Male         Current Outpatient Medications:     benralizumab (FASENRA) subcutaneous injection, Inject 1 mL (30 mg total) under the skin every 56 days for 6 doses, Disp: 1 Syringe, Rfl: 6    bimatoprost (LATISSE) 0.03 % ophthalmic solution, Take as directed, Disp: , Rfl:     budesonide (Pulmicort Flexhaler) 90 MCG/ACT inhaler, Inhale 1 puff 2 (two) times a day Rinse mouth after use., Disp: 1 each, Rfl: 3    Cholecalciferol 50 MCG (2000 UT) CAPS, Take by mouth daily, Disp: , Rfl:     cyanocobalamin (VITAMIN B-12) 1,000 mcg tablet, Take 1,000 mcg by mouth daily, Disp: , Rfl:     doxycycline hyclate (VIBRAMYCIN) 100 mg capsule, , Disp: , Rfl:     erythromycin with ethanol (EMGEL) 2 % gel, Apply topically 2 (two) times a day, Disp: 30 g, Rfl: 1    famotidine (PEPCID) 20 mg tablet, Take 1 tablet (20 mg total) by mouth 2 (two) times a day, Disp: 180 tablet, Rfl: 3    flecainide (TAMBOCOR) 100 mg tablet, take 1 tablet by mouth twice a day, Disp: 180 tablet, Rfl: 3    fluticasone (FLONASE) 50 mcg/act nasal spray, 2 sprays into each nostril 2 (two) times a day, Disp: 48 g, Rfl: 4    ipratropium (ATROVENT) 0.03 % nasal spray, 2 sprays into each nostril 3 (three) times a day as needed for rhinitis, Disp: 30 mL, Rfl: 3    levalbuterol (XOPENEX HFA) 45 mcg/act inhaler, Inhale 1-2 puffs every 4 (four) hours as needed for wheezing or shortness of breath, Disp: 45 g, Rfl: 0    lidocaine (LMX) 4 % cream, Apply topically as needed for mild pain, Disp: 15 g, Rfl: 3    Magnesium 500 MG CAPS, Take by mouth, Disp: , Rfl:     meloxicam (MOBIC) 15 mg tablet, Take 1 tablet (15 mg total) by mouth daily as needed for moderate pain, Disp: 90 tablet, Rfl: 1    methocarbamol (ROBAXIN) 500 mg tablet, Take 1 tablet (500 mg total) by mouth 3 (three) times a day as needed for muscle  spasms, Disp: 60 tablet, Rfl: 0    metoprolol succinate (TOPROL-XL) 25 mg 24 hr tablet, Take 1 tablet (25 mg total) by mouth every evening (Patient taking differently: Take 25 mg by mouth every evening 12.5mg daily evening), Disp: 90 tablet, Rfl: 5    montelukast (SINGULAIR) 10 mg tablet, Take 1 tablet (10 mg total) by mouth daily at bedtime, Disp: 30 tablet, Rfl: 3    ondansetron (ZOFRAN-ODT) 4 mg disintegrating tablet, Take 1 tablet (4 mg total) by mouth every 6 (six) hours as needed for nausea or vomiting, Disp: 15 tablet, Rfl: 0    polyethylene glycol (GLYCOLAX) 17 GM/SCOOP powder, Take 17 g by mouth 2 (two) times a day, Disp: 765 g, Rfl: 4    rosuvastatin (CRESTOR) 5 mg tablet, take 1 tablet by mouth once daily, Disp: 90 tablet, Rfl: 3    sucralfate (CARAFATE) 1 g tablet, Take 1 tablet (1 g total) by mouth 2 (two) times a day, Disp: 60 tablet, Rfl: 5    zolpidem (AMBIEN) 5 mg tablet, Take 1 tablet (5 mg total) by mouth daily at bedtime as needed for sleep, Disp: 30 tablet, Rfl: 2    EPINEPHrine (EPIPEN) 0.3 mg/0.3 mL SOAJ, Inject 0.3 mL (0.3 mg total) into a muscle once for 1 dose (Patient not taking: Reported on 6/6/2024), Disp: 0.6 mL, Rfl: 0    levalbuterol (Xopenex) 1.25 mg/3 mL nebulizer solution, Take 3 mL (1.25 mg total) by nebulization every 4 (four) hours as needed for wheezing or shortness of breath for up to 25 days, Disp: 75 mL, Rfl: 6    linaCLOtide 72 MCG CAPS, Take 72 mcg by mouth daily (Patient not taking: Reported on 5/6/2024), Disp: 4 capsule, Rfl: 0    senna-docusate sodium (SENOKOT-S) 8.6-50 mg per tablet, Take 1 tablet by mouth daily for 14 days (Patient not taking: Reported on 9/4/2024), Disp: 14 tablet, Rfl: 0    Allergies   Allergen Reactions    Gluten Meal - Food Allergy GI Intolerance     Celiac     Morphine And Codeine Itching    Nuts - Food Allergy Hives     Almonds,walnuts, hazelnuts and other related nuts.     Shellfish-Derived Products - Food Allergy Anaphylaxis    Wheat Bran -  "Food Allergy GI Intolerance    Sulfa Antibiotics Rash       Physical Exam:    /73   Pulse 62   Ht 5' 5\" (1.651 m)   Wt 66.7 kg (147 lb)   BMI 24.46 kg/m²     Constitutional:normal, well developed, well nourished, alert, in no distress and non-toxic and no overt pain behavior.  Eyes:anicteric  HEENT:grossly intact  Neck:supple, symmetric, trachea midline and no masses   Pulmonary:even and unlabored  Cardiovascular:No edema or pitting edema present  Skin:Normal without rashes or lesions and well hydrated  Psychiatric:Mood and affect appropriate  Neurologic:Cranial Nerves II-XII grossly intact  Musculoskeletal:antalgic    Imaging  No orders to display       No orders of the defined types were placed in this encounter.        "

## 2024-10-24 ENCOUNTER — PROCEDURE VISIT (OUTPATIENT)
Age: 69
End: 2024-10-24
Payer: COMMERCIAL

## 2024-10-24 VITALS
WEIGHT: 147 LBS | BODY MASS INDEX: 24.49 KG/M2 | SYSTOLIC BLOOD PRESSURE: 154 MMHG | DIASTOLIC BLOOD PRESSURE: 79 MMHG | HEIGHT: 65 IN | HEART RATE: 62 BPM

## 2024-10-24 DIAGNOSIS — M54.2 NECK PAIN: Primary | ICD-10-CM

## 2024-10-24 DIAGNOSIS — M79.18 MYOFASCIAL PAIN SYNDROME: ICD-10-CM

## 2024-10-24 DIAGNOSIS — G89.4 CHRONIC PAIN SYNDROME: ICD-10-CM

## 2024-10-24 DIAGNOSIS — G89.29 CHRONIC LEFT SHOULDER PAIN: ICD-10-CM

## 2024-10-24 DIAGNOSIS — M25.512 CHRONIC LEFT SHOULDER PAIN: ICD-10-CM

## 2024-10-24 DIAGNOSIS — M54.9 MID BACK PAIN: ICD-10-CM

## 2024-10-24 DIAGNOSIS — M75.52 SUBACROMIAL BURSITIS OF LEFT SHOULDER JOINT: ICD-10-CM

## 2024-10-24 PROCEDURE — 20611 DRAIN/INJ JOINT/BURSA W/US: CPT | Performed by: ANESTHESIOLOGY

## 2024-10-24 RX ORDER — TRIAMCINOLONE ACETONIDE 40 MG/ML
20 INJECTION, SUSPENSION INTRA-ARTICULAR; INTRAMUSCULAR
Status: COMPLETED | OUTPATIENT
Start: 2024-10-24 | End: 2024-10-24

## 2024-10-24 RX ORDER — BUPIVACAINE HYDROCHLORIDE 2.5 MG/ML
2 INJECTION, SOLUTION INFILTRATION; PERINEURAL
Status: COMPLETED | OUTPATIENT
Start: 2024-10-24 | End: 2024-10-24

## 2024-10-24 RX ADMIN — BUPIVACAINE HYDROCHLORIDE 2 ML: 2.5 INJECTION, SOLUTION INFILTRATION; PERINEURAL at 16:00

## 2024-10-24 RX ADMIN — TRIAMCINOLONE ACETONIDE 20 MG: 40 INJECTION, SUSPENSION INTRA-ARTICULAR; INTRAMUSCULAR at 16:00

## 2024-10-24 NOTE — PROGRESS NOTES
"Large joint arthrocentesis: L subacromial bursa  [place] Protocol:  procedure performed by consultantConsent: Verbal consent obtained. Written consent obtained.  Risks and benefits: risks, benefits and alternatives were discussed  Consent given by: patient  Time out: Immediately prior to procedure a \"time out\" was called to verify the correct patient, procedure, equipment, support staff and site/side marked as required.  Timeout called at: 10/24/2024 4:59 PM.  Patient understanding: patient states understanding of the procedure being performed  Patient consent: the patient's understanding of the procedure matches consent given  Procedure consent: procedure consent matches procedure scheduled  Relevant documents: relevant documents present and verified  Test results: test results available and properly labeled  Site marked: the operative site was marked  Radiology Images displayed and confirmed. If images not available, report reviewed: imaging studies not available  Required items: required blood products, implants, devices, and special equipment available  Patient identity confirmed: verbally with patient  Supporting Documentation  Indications: pain   Procedure Details  Location: shoulder - L subacromial bursa  Needle size: 22 G  Ultrasound guidance: yes  Approach: anterior  Medications administered: 2 mL bupivacaine 0.25 %; 20 mg triamcinolone acetonide 40 mg/mL    Patient tolerance: patient tolerated the procedure well with no immediate complications  Dressing:  Sterile dressing applied      Large joint arthrocentesis: L glenohumeral  [place] Protocol:  procedure performed by consultantConsent: Verbal consent obtained. Written consent obtained.  Risks and benefits: risks, benefits and alternatives were discussed  Consent given by: patient  Time out: Immediately prior to procedure a \"time out\" was called to verify the correct patient, procedure, equipment, support staff and site/side marked as " required.  Timeout called at: 10/24/2024 5:01 PM.  Patient understanding: patient states understanding of the procedure being performed  Patient consent: the patient's understanding of the procedure matches consent given  Procedure consent: procedure consent matches procedure scheduled  Relevant documents: relevant documents present and verified  Test results: test results available and properly labeled  Site marked: the operative site was marked  Radiology Images displayed and confirmed. If images not available, report reviewed: imaging studies not available  Required items: required blood products, implants, devices, and special equipment available  Patient identity confirmed: verbally with patient  Supporting Documentation  Indications: pain   Procedure Details  Location: shoulder - L glenohumeral  Preparation: Patient was prepped and draped in the usual sterile fashion  Needle size: 22 G  Ultrasound guidance: yes  Medications administered: 20 mg triamcinolone acetonide 40 mg/mL; 2 mL bupivacaine 0.25 %    Patient tolerance: patient tolerated the procedure well with no immediate complications  Dressing:  Sterile dressing applied

## 2024-10-24 NOTE — PATIENT INSTRUCTIONS
Do not apply heat to any area that is numb. If you have discomfort or soreness at the injection site, you may apply ice today, 20 minutes on and 20 minutes off. Tomorrow you may use ice or warm, moist heat. Do not apply ice or heat directly to the skin.  If you experience severe shortness of breath, go to the Emergency Room.  You may have numbness for several hours from the local anesthetic. Please use caution and common sense, especially with weight-bearing activities.  You may have an increase or change in the discomfort for 36-48 hours after your treatment. Apply ice and continue with any pain medicine you have been prescribed.  Do not do anything strenuous today. You may shower, but no tub baths or hot tubs today. You may resume your normal activities tomorrow, but do not “overdo it”. Resume normal activities slowly when you are feeling better.  If you experience redness, drainage or swelling at the injection site, or if you develop a fever above 100 degrees, please call [facility] at [phone] or go to the Emergency Room.  Continue to take all routine medicines prescribed by your primary care physician unless otherwise instructed by our staff. Most blood thinners should be started again according to your regularly scheduled dosing. If you have any questions, please give our office a call.    As no general anesthesia was used in today's procedure, you should not experience any side effects related to anesthesia.       If you have a problem specifically related to your procedure, please call our office at [phone].  Problems not related to your procedure should be directed to your primary care physician.

## 2024-10-29 ENCOUNTER — TELEPHONE (OUTPATIENT)
Dept: CARDIOLOGY CLINIC | Facility: CLINIC | Age: 69
End: 2024-10-29

## 2024-10-29 NOTE — TELEPHONE ENCOUNTER
----- Message from JOEL Neves sent at 10/29/2024  8:12 AM EDT -----  Please let patient know that Holter monitor did not show any PVCs.  She had 4 PACs, which are extra beats from the upper chambers.  Average heart rate 74 within normal range.  No other abnormal findings to explain her palpitations  Continue current medications.  No new recommendations

## 2024-11-06 ENCOUNTER — ANNUAL EXAM (OUTPATIENT)
Dept: OBGYN CLINIC | Facility: CLINIC | Age: 69
End: 2024-11-06
Payer: COMMERCIAL

## 2024-11-06 VITALS
BODY MASS INDEX: 24.36 KG/M2 | DIASTOLIC BLOOD PRESSURE: 76 MMHG | SYSTOLIC BLOOD PRESSURE: 116 MMHG | HEIGHT: 65 IN | WEIGHT: 146.2 LBS

## 2024-11-06 DIAGNOSIS — N64.4 BREAST PAIN, LEFT: ICD-10-CM

## 2024-11-06 DIAGNOSIS — Z01.419 WELL WOMAN EXAM WITH ROUTINE GYNECOLOGICAL EXAM: Primary | ICD-10-CM

## 2024-11-06 DIAGNOSIS — Z80.3 FAMILY HISTORY OF BREAST CANCER: ICD-10-CM

## 2024-11-06 DIAGNOSIS — N95.1 VAGINAL DRYNESS, MENOPAUSAL: ICD-10-CM

## 2024-11-06 DIAGNOSIS — Z84.81 FAMILY HISTORY OF BRCA GENE POSITIVE: ICD-10-CM

## 2024-11-06 PROCEDURE — 99397 PER PM REEVAL EST PAT 65+ YR: CPT | Performed by: PHYSICIAN ASSISTANT

## 2024-11-06 RX ORDER — ESTRADIOL 0.1 MG/G
CREAM VAGINAL
Qty: 42.5 G | Refills: 1 | Status: SHIPPED | OUTPATIENT
Start: 2024-11-06

## 2024-11-06 NOTE — PROGRESS NOTES
Assessment   68 y.o.  presenting for annual exam.     Plan   Diagnoses and all orders for this visit:    Well woman exam with routine gynecological exam    Breast pain, left  -     Mammo diagnostic bilateral w 3d and cad; Future  -     US breast left limited (diagnostic); Future    Family history of BRCA gene positive  -     Ambulatory Referral to Oncology Genetics; Future    Family history of breast cancer  -     Ambulatory Referral to Oncology Genetics; Future    Vaginal dryness, menopausal  -     estradiol (ESTRACE VAGINAL) 0.1 mg/g vaginal cream; Insert 1 g intravaginally twice weekly for dryness        Pap no longer indicated  Mammo - left breast/axilla pain; exam unremarkable. Recommended follow up with diagnostic breast studies. B/l diagnostic mammo and left breast US   Colonoscopy up to date   DXA up to date   Vaginal dryness- adequate results with replens but expresses interest in trial of topical estrace cream this year. She will use 1 g twice weekly intravaginally. Follow up in 3-6 months    Fhx BRCA and breast cancer- interested in genetic testing. Referral to genetics oncology placed    Perineal hygiene reviewed. Weight bearing exercises minium of 150 mins/weekly advised. Kegel exercises recommended.   SBE encouraged, A yearly mammogram is recommended for breast cancer screening starting at age 40. ASCCP guidelines reviewed. Condoms encouraged with all sexual activity to prevent STI's. Gardisil vaccines recommended up to age 45. Calcium/ Vit D dietary requirements discussed.   Advised to call with any issues, all concerns & questions addressed.   See provided information in your after visit summary     F/U Annually and PRN    Results will be released to [email or web address], if abnormal will call or message to review and discuss treatment plan.     __________________________________________________________________    Subjective     Rosa Caraballo is a 68 y.o.  presenting for annual exam.     S/p  "JENNA/BSO for benign indication.      Hx of follicular lymphoma in . Follows with heme/onc yearly    She has been struggling with a long standing hx of vaginal dryness. Having okay results with replens but still struggles with dryness. Replens is messy. Willing to try alternative options    SCREENING  Last Pap: s/p hysterectomy  Last Mammo: 2024 birads 1  Last Colonoscopy: 2024 5 year recall  Last DEXA: 2024; osteopenia      GYN  , no VB    Sexually active: Yes - single partner - male    Hx Abnormal pap: denies  We reviewed ASCCP guidelines for Pap testing today.      OB           Complaints: denies   Denies urgency, frequency, hematuria, leakage / change in stream, difficulty urinating.       BREAST  Complaints: reports new onset of left breast and axillary pain. Present for about 2 months,. Describes as an \"electrifying\" shock like pain from axilla to upper outer region of left breast  Denies: breast lump,  nipple discharge, skin color change, and skin lesion(s)  Personal hx: none      Pertinent Family Hx:   Family hx of breast cancer: PGM and Paternal aunt; paternal cousin BRCA +   Family hx of ovarian cancer: paternal aunt  Family hx of colon cancer: no      GENERAL  PMH reviewed/updated and is as below.   Patient does follow with a PCP.    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      Past Medical History:   Diagnosis Date    Asthma     Celiac disease     Chronic sinusitis 2023    Colon polyp     Follicular lymphoma (HCC)     GERD (gastroesophageal reflux disease)     Heart palpitations     History of colonic polyps     resolved 2016    History of radiation therapy 2010    Hyperlipidemia     Insomnia     Irregular heart beat     Lymphoma, 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HCC)     non hodgekins, remission    Malignant lymphoma (HCC) 2010    rt arm cutaneous follicular stage ia (rt diatal medical biceps area , s/p resected and s/p radistion treatment    " resolved 12/17/2014    Postmenopausal disorder     resolved 09/21/2017    Pulmonary nodule     BENIGN, STABLE [id number]    Restless legs syndrome     resolved 09/21/2017    TMJ syndrome     resolved 09/21/2017       Past Surgical History:   Procedure Laterality Date    COLONOSCOPY  04/16/2019    FUNCTIONAL ENDOSCOPIC SINUS SURGERY Bilateral 2013    Dr Bagley    HYSTERECTOMY      age 45 complete    LYMPH NODE DISSECTION Right     arm    NASAL SEPTUM SURGERY  2013    with turbinate reduction - Dr. Bagley    OOPHORECTOMY Bilateral     WI ESOPHAGOGASTRODUODENOSCOPY TRANSORAL DIAGNOSTIC N/A 01/18/2016    Procedure: ESOPHAGOGASTRODUODENOSCOPY (EGD);  Surgeon: Ness Shepherd MD;  Location: [facility];  Service: Gastroenterology    WI EXCISION GANGLION WRIST DORSAL/VOLAR RECURRENT Left 12/08/2020    Procedure: WRIST GANGLION CYST EXCISION;  Surgeon: Marie Gaspar MD;  Location: [facility];  Service: Orthopedics    WI EXCISION TUMOR SOFT TISSUE SHOULDER SUBQ 3 CM/> Left 11/27/2023    Procedure: anterior shouler- EXCISION BIOPSY TISSUE LESION/MASS UPPER EXTREMITY;  Surgeon: Ronak Alanis DO;  Location: [facility];  Service: Orthopedics    SYNOVECTOMY N/A 12/08/2020    Procedure: ECU TENOSYNOVECTOMY;  Surgeon: Marie Gaspar MD;  [facility];  Service: Orthopedics    TONSILLECTOMY      TOTAL ABDOMINAL HYSTERECTOMY W/ BILATERAL SALPINGOOPHORECTOMY      age 51    UPPER GASTROINTESTINAL ENDOSCOPY      US GUIDED MSK PROCEDURE  01/16/2020    US GUIDED MSK PROCEDURE  08/07/2020    US GUIDED MSK PROCEDURE  08/02/2021         Current Outpatient Medications:     benralizumab (FASENRA) subcutaneous injection, Inject 1 mL (30 mg total) under the skin every 56 days for 6 doses, Disp: 1 Syringe, Rfl: 6    bimatoprost (LATISSE) 0.03 % ophthalmic solution, Take as directed, Disp: , Rfl:     budesonide (Pulmicort Flexhaler) 90 MCG/ACT inhaler, Inhale 1 puff 2 (two) times a day Rinse mouth after use., Disp: 1 each, Rfl:  3    Cholecalciferol 50 MCG (2000 UT) CAPS, Take by mouth daily, Disp: , Rfl:     cyanocobalamin (VITAMIN B-12) 1,000 mcg tablet, Take 1,000 mcg by mouth daily, Disp: , Rfl:     EPINEPHrine (EPIPEN) 0.3 mg/0.3 mL SOAJ, Inject 0.3 mL (0.3 mg total) into a muscle once for 1 dose, Disp: 0.6 mL, Rfl: 0    erythromycin with ethanol (EMGEL) 2 % gel, Apply topically 2 (two) times a day, Disp: 30 g, Rfl: 1    estradiol (ESTRACE VAGINAL) 0.1 mg/g vaginal cream, Insert 1 g intravaginally twice weekly for dryness, Disp: 42.5 g, Rfl: 1    famotidine (PEPCID) 20 mg tablet, Take 1 tablet (20 mg total) by mouth 2 (two) times a day, Disp: 180 tablet, Rfl: 3    flecainide (TAMBOCOR) 100 mg tablet, take 1 tablet by mouth twice a day, Disp: 180 tablet, Rfl: 3    fluticasone (FLONASE) 50 mcg/act nasal spray, 2 sprays into each nostril 2 (two) times a day, Disp: 48 g, Rfl: 4    ipratropium (ATROVENT) 0.03 % nasal spray, 2 sprays into each nostril 3 (three) times a day as needed for rhinitis, Disp: 30 mL, Rfl: 3    levalbuterol (XOPENEX HFA) 45 mcg/act inhaler, Inhale 1-2 puffs every 4 (four) hours as needed for wheezing or shortness of breath, Disp: 45 g, Rfl: 0    lidocaine (LMX) 4 % cream, Apply topically as needed for mild pain, Disp: 15 g, Rfl: 3    Magnesium 500 MG CAPS, Take by mouth, Disp: , Rfl:     meloxicam (MOBIC) 15 mg tablet, Take 1 tablet (15 mg total) by mouth daily as needed for moderate pain, Disp: 90 tablet, Rfl: 1    metoprolol succinate (TOPROL-XL) 25 mg 24 hr tablet, Take 1 tablet (25 mg total) by mouth every evening (Patient taking differently: Take 25 mg by mouth every evening 12.5mg daily evening), Disp: 90 tablet, Rfl: 5    ondansetron (ZOFRAN-ODT) 4 mg disintegrating tablet, Take 1 tablet (4 mg total) by mouth every 6 (six) hours as needed for nausea or vomiting, Disp: 15 tablet, Rfl: 0    polyethylene glycol (GLYCOLAX) 17 GM/SCOOP powder, Take 17 g by mouth 2 (two) times a day, Disp: 765 g, Rfl: 4     rosuvastatin (CRESTOR) 5 mg tablet, take 1 tablet by mouth once daily, Disp: 90 tablet, Rfl: 3    sucralfate (CARAFATE) 1 g tablet, Take 1 tablet (1 g total) by mouth 2 (two) times a day, Disp: 60 tablet, Rfl: 5    zolpidem (AMBIEN) 5 mg tablet, Take 1 tablet (5 mg total) by mouth daily at bedtime as needed for sleep, Disp: 30 tablet, Rfl: 2    doxycycline hyclate (VIBRAMYCIN) 100 mg capsule, , Disp: , Rfl:     levalbuterol (Xopenex) 1.25 mg/3 mL nebulizer solution, Take 3 mL (1.25 mg total) by nebulization every 4 (four) hours as needed for wheezing or shortness of breath for up to 25 days (Patient not taking: Reported on 11/6/2024), Disp: 75 mL, Rfl: 6    linaCLOtide 72 MCG CAPS, Take 72 mcg by mouth daily, Disp: 4 capsule, Rfl: 0    methocarbamol (ROBAXIN) 500 mg tablet, Take 1 tablet (500 mg total) by mouth 3 (three) times a day as needed for muscle spasms (Patient not taking: Reported on 11/6/2024), Disp: 60 tablet, Rfl: 0    montelukast (SINGULAIR) 10 mg tablet, Take 1 tablet (10 mg total) by mouth daily at bedtime (Patient not taking: Reported on 11/6/2024), Disp: 30 tablet, Rfl: 3    senna-docusate sodium (SENOKOT-S) 8.6-50 mg per tablet, Take 1 tablet by mouth daily for 14 days (Patient not taking: Reported on 9/4/2024), Disp: 14 tablet, Rfl: 0    Allergies   Allergen Reactions    Gluten Meal - Food Allergy GI Intolerance     Celiac     Morphine And Codeine Itching    Nuts - Food Allergy Hives     Almonds,walnuts, hazelnuts and other related nuts.     Shellfish-Derived Products - Food Allergy Anaphylaxis    Wheat Bran - Food Allergy GI Intolerance    Sulfa Antibiotics Rash       Social History     Socioeconomic History    Marital status: /Civil Union     Spouse name: Not on file    Number of children: Not on file    Years of education: Not on file    Highest education level: Not on file   Occupational History    Occupation: X-ray technologist   Tobacco Use    Smoking status: Former     Current  "packs/day: 0.00     Average packs/day: 0.8 packs/day for 20.0 years (15.0 ttl pk-yrs)     Types: Cigarettes     Start date:      Quit date:      Years since quittin.8    Smokeless tobacco: Never    Tobacco comments:     Quit 10/31/04 2130   Vaping Use    Vaping status: Never Used   Substance and Sexual Activity    Alcohol use: Yes     Alcohol/week: 3.0 standard drinks of alcohol     Types: 3 Glasses of wine per week     Comment: soc    Drug use: No    Sexual activity: Yes     Partners: Male   Other Topics Concern    Not on file   Social History Narrative    Caffeine use    exercise walking      Social Determinants of Health     Financial Resource Strain: Not on file   Food Insecurity: Not on file   Transportation Needs: Not on file   Physical Activity: Not on file   Stress: Not on file   Social Connections: Not on file   Intimate Partner Violence: Not on file   Housing Stability: Not on file       Review of Systems     ROS:  Constitutional: Negative for fatigue and unexpected weight change.   Respiratory: Negative for cough and shortness of breath.    Cardiovascular: Negative for chest pain and palpitations.   Gastrointestinal: Negative for abdominal pain and change in bowel habits  Breasts:  Negative, other than as noted above.   Genitourinary: Negative, other than as noted above.   Psychiatric: Negative for mood difficulties.      Objective      /76 (BP Location: Left arm, Patient Position: Sitting, Cuff Size: Standard)   Ht 5' 4.5\" (1.638 m)   Wt 66.3 kg (146 lb 3.2 oz)   BMI 24.71 kg/m²     Physical Examination:    Patient appears well and is not in distress  Neck is supple without masses, no cervical or supraclavicular lymphadenopathy  Cardiovascular: regular rate and rhythm; no murmurs  Lungs: clear to auscultation bilaterally; no wheezes  Breasts are symmetrical without mass, tenderness, nipple discharge, skin changes or adenopathy.   Abdomen is soft and nontender without masses. "   External genitals are normal without lesions or rashes.  Urethral meatus and urethra are normal  Bladder is normal to palpation  Vagina is normal without discharge or bleeding.   Cervix is surgically absent   Uterus is surgically absent   Adnexa are not palpable

## 2024-11-07 ENCOUNTER — TELEPHONE (OUTPATIENT)
Dept: PAIN MEDICINE | Facility: CLINIC | Age: 69
End: 2024-11-07

## 2024-11-11 ENCOUNTER — HOSPITAL ENCOUNTER (OUTPATIENT)
Dept: INFUSION CENTER | Facility: HOSPITAL | Age: 69
Discharge: HOME | End: 2024-11-11
Attending: INTERNAL MEDICINE
Payer: COMMERCIAL

## 2024-11-11 VITALS
OXYGEN SATURATION: 100 % | RESPIRATION RATE: 16 BRPM | TEMPERATURE: 96.9 F | DIASTOLIC BLOOD PRESSURE: 70 MMHG | HEART RATE: 76 BPM | SYSTOLIC BLOOD PRESSURE: 130 MMHG

## 2024-11-11 DIAGNOSIS — D72.119 HYPEREOSINOPHILIC SYNDROME, UNSPECIFIED TYPE: Primary | ICD-10-CM

## 2024-11-11 DIAGNOSIS — J45.50 SEVERE PERSISTENT ASTHMA WITHOUT COMPLICATION: ICD-10-CM

## 2024-11-11 PROCEDURE — 96372 THER/PROPH/DIAG INJ SC/IM: CPT

## 2024-11-11 RX ORDER — EPINEPHRINE 1 MG/ML
0.3 INJECTION, SOLUTION, CONCENTRATE INTRAVENOUS ONCE
Status: DISCONTINUED | OUTPATIENT
Start: 2024-11-11 | End: 2024-11-15 | Stop reason: HOSPADM

## 2024-11-11 RX ORDER — EPINEPHRINE 1 MG/ML
0.3 INJECTION, SOLUTION, CONCENTRATE INTRAVENOUS ONCE
OUTPATIENT
Start: 2025-01-06 | End: 2025-01-06

## 2024-11-11 RX ADMIN — BENRALIZUMAB 30 MG: 30 INJECTION, SOLUTION SUBCUTANEOUS at 10:48

## 2024-11-11 NOTE — PROGRESS NOTES
Patient denies active infection or being on antibiotics.  Epi pen expires May 2025.  Patient tolerated Fasenra injection in right arm without reaction or issues.  Patient observed for 30 minutes post injections without issue. Patient stable at discharge.      Rosa Caraballo is aware of future appt on 01/06/25  at 1200.     AVS - No (Declined by Rosa Caraballo) appt card given to pt     Patient ambulated off unit without incident.  All personal belongings taken with patient.

## 2024-11-25 DIAGNOSIS — G47.00 INSOMNIA, UNSPECIFIED TYPE: ICD-10-CM

## 2024-11-25 NOTE — TELEPHONE ENCOUNTER
Medication: zolpidem (AMBIEN) 5 mg tablet     Dose/Frequency: Take 1 tablet (5 mg total) by mouth daily at bedtime as needed for sleep     Quantity: 30 tablet     Pharmacy: [id number] - [address] [facility] [address]     Office:   [x] PCP/Provider -   [] Speciality/Provider -     Does the patient have enough for 3 days?   [x] Yes   [] No - Send as HP to POD

## 2024-11-27 RX ORDER — ZOLPIDEM TARTRATE 5 MG/1
5 TABLET ORAL
Qty: 30 TABLET | Refills: 2 | Status: SHIPPED | OUTPATIENT
Start: 2024-11-27

## 2024-11-29 ENCOUNTER — APPOINTMENT (OUTPATIENT)
Dept: RADIOLOGY | Facility: CLINIC | Age: 69
End: 2024-11-29
Payer: COMMERCIAL

## 2024-11-29 ENCOUNTER — TELEPHONE (OUTPATIENT)
Dept: INTERNAL MEDICINE CLINIC | Facility: CLINIC | Age: 69
End: 2024-11-29

## 2024-11-29 ENCOUNTER — OFFICE VISIT (OUTPATIENT)
Dept: URGENT CARE | Facility: CLINIC | Age: 69
End: 2024-11-29
Payer: COMMERCIAL

## 2024-11-29 VITALS
DIASTOLIC BLOOD PRESSURE: 70 MMHG | TEMPERATURE: 98.2 F | HEIGHT: 65 IN | HEART RATE: 91 BPM | BODY MASS INDEX: 23.66 KG/M2 | SYSTOLIC BLOOD PRESSURE: 128 MMHG | RESPIRATION RATE: 18 BRPM | OXYGEN SATURATION: 100 % | WEIGHT: 142 LBS

## 2024-11-29 DIAGNOSIS — R05.1 ACUTE COUGH: Primary | ICD-10-CM

## 2024-11-29 DIAGNOSIS — Z87.09 HISTORY OF ASTHMA: ICD-10-CM

## 2024-11-29 DIAGNOSIS — R05.1 ACUTE COUGH: ICD-10-CM

## 2024-11-29 DIAGNOSIS — J01.80 OTHER ACUTE SINUSITIS, RECURRENCE NOT SPECIFIED: ICD-10-CM

## 2024-11-29 PROCEDURE — 71046 X-RAY EXAM CHEST 2 VIEWS: CPT

## 2024-11-29 PROCEDURE — G0383 LEV 4 HOSP TYPE B ED VISIT: HCPCS | Performed by: NURSE PRACTITIONER

## 2024-11-29 RX ORDER — PREDNISONE 10 MG/1
TABLET ORAL
Qty: 24 TABLET | Refills: 0 | Status: SHIPPED | OUTPATIENT
Start: 2024-11-29

## 2024-11-29 NOTE — PROGRESS NOTES
[facility]        NAME: Rosa Caraballo is a 69 y.o. female  : 1955    MRN: [id number]  DATE: 2024  TIME: 11:17 AM    Assessment and Plan   Acute cough [R05.1]  1. Acute cough  XR chest pa and lateral    amoxicillin-clavulanate (AUGMENTIN) 875-125 mg per tablet    predniSONE 10 mg tablet      2. Other acute sinusitis, recurrence not specified  amoxicillin-clavulanate (AUGMENTIN) 875-125 mg per tablet    predniSONE 10 mg tablet      3. History of asthma  amoxicillin-clavulanate (AUGMENTIN) 875-125 mg per tablet    predniSONE 10 mg tablet            Patient Instructions       Follow up with PCP in 3-5 days.  Proceed to  ER if symptoms worsen.    If tests have been performed at [facility], our office will contact you with results if changes need to be made to the care plan discussed with you at the visit.  [facility]    You have been prescribed prednisone. Take with food. Do NOT take any NSAID products (motrin, ibuprofen, aleve, advil) while taking this medication.  You may take tylenol.     You have been prescribed augmentin for sinusitis.  You have been prescribed an antibiotic - you are to take an oral probiotic and eat yogurt to avoid GI issues/diarrhea.    Use a cool mist humidifier.  Saline nasal spray, flonase nasal spray    Follow up with your PCP in 3-5 days  Go to the ED if symptoms worsen     Continue your inhalers       Chief Complaint     Chief Complaint   Patient presents with    Cold Like Symptoms     Cough, sinus congestion, chest congestion, headache,achey,chills, left ear pain for 10 days         History of Present Illness       This is a 69 year old female who states that for the last 10 days has had head congestion and now has facial pain, sinus pressure, teeth pain, ear pain, chills, chest congestion. She has been using her inhalers at home, she is asthmatic. Taking OTC w/o relief.  Denies fevers, chills, n/v/d.  PMH is  listed and reviewed.         Review of Systems   Review of Systems   Constitutional:  Positive for chills.   HENT:  Positive for congestion, ear pain, sinus pressure and sinus pain.    Eyes: Negative.    Respiratory:  Positive for cough.    Cardiovascular: Negative.    Gastrointestinal: Negative.    Endocrine: Negative.    Genitourinary: Negative.    Musculoskeletal: Negative.    Skin: Negative.    Allergic/Immunologic: Negative.    Neurological:  Positive for headaches.   Hematological: Negative.    Psychiatric/Behavioral: Negative.           Current Medications       Current Outpatient Medications:     amoxicillin-clavulanate (AUGMENTIN) 875-125 mg per tablet, Take 1 tablet by mouth every 12 (twelve) hours for 7 days, Disp: 14 tablet, Rfl: 0    benralizumab (FASENRA) subcutaneous injection, Inject 1 mL (30 mg total) under the skin every 56 days for 6 doses, Disp: 1 Syringe, Rfl: 6    bimatoprost (LATISSE) 0.03 % ophthalmic solution, Take as directed, Disp: , Rfl:     budesonide (Pulmicort Flexhaler) 90 MCG/ACT inhaler, Inhale 1 puff 2 (two) times a day Rinse mouth after use., Disp: 1 each, Rfl: 3    Cholecalciferol 50 MCG (2000 UT) CAPS, Take by mouth daily, Disp: , Rfl:     cyanocobalamin (VITAMIN B-12) 1,000 mcg tablet, Take 1,000 mcg by mouth daily, Disp: , Rfl:     erythromycin with ethanol (EMGEL) 2 % gel, Apply topically 2 (two) times a day, Disp: 30 g, Rfl: 1    estradiol (ESTRACE VAGINAL) 0.1 mg/g vaginal cream, Insert 1 g intravaginally twice weekly for dryness, Disp: 42.5 g, Rfl: 1    famotidine (PEPCID) 20 mg tablet, Take 1 tablet (20 mg total) by mouth 2 (two) times a day, Disp: 180 tablet, Rfl: 3    flecainide (TAMBOCOR) 100 mg tablet, take 1 tablet by mouth twice a day, Disp: 180 tablet, Rfl: 3    fluticasone (FLONASE) 50 mcg/act nasal spray, 2 sprays into each nostril 2 (two) times a day, Disp: 48 g, Rfl: 4    ipratropium (ATROVENT) 0.03 % nasal spray, 2 sprays into each nostril 3 (three) times a  day as needed for rhinitis, Disp: 30 mL, Rfl: 3    levalbuterol (XOPENEX HFA) 45 mcg/act inhaler, Inhale 1-2 puffs every 4 (four) hours as needed for wheezing or shortness of breath, Disp: 45 g, Rfl: 0    lidocaine (LMX) 4 % cream, Apply topically as needed for mild pain, Disp: 15 g, Rfl: 3    linaCLOtide 72 MCG CAPS, Take 72 mcg by mouth daily, Disp: 4 capsule, Rfl: 0    Magnesium 500 MG CAPS, Take by mouth, Disp: , Rfl:     meloxicam (MOBIC) 15 mg tablet, Take 1 tablet (15 mg total) by mouth daily as needed for moderate pain, Disp: 90 tablet, Rfl: 1    metoprolol succinate (TOPROL-XL) 25 mg 24 hr tablet, Take 1 tablet (25 mg total) by mouth every evening, Disp: 90 tablet, Rfl: 5    montelukast (SINGULAIR) 10 mg tablet, Take 1 tablet (10 mg total) by mouth daily at bedtime, Disp: 30 tablet, Rfl: 3    polyethylene glycol (GLYCOLAX) 17 GM/SCOOP powder, Take 17 g by mouth 2 (two) times a day, Disp: 765 g, Rfl: 4    predniSONE 10 mg tablet, Take 5 tabs po x 2 days; 4 tabs po x 2 days; 3 tabs po x 1 day; 2 tabs po x 1 day. 1 tab po x 1 day., Disp: 24 tablet, Rfl: 0    rosuvastatin (CRESTOR) 5 mg tablet, take 1 tablet by mouth once daily, Disp: 90 tablet, Rfl: 3    sucralfate (CARAFATE) 1 g tablet, Take 1 tablet (1 g total) by mouth 2 (two) times a day, Disp: 60 tablet, Rfl: 5    zolpidem (AMBIEN) 5 mg tablet, Take 1 tablet (5 mg total) by mouth daily at bedtime as needed for sleep, Disp: 30 tablet, Rfl: 2    doxycycline hyclate (VIBRAMYCIN) 100 mg capsule, , Disp: , Rfl:     EPINEPHrine (EPIPEN) 0.3 mg/0.3 mL SOAJ, Inject 0.3 mL (0.3 mg total) into a muscle once for 1 dose, Disp: 0.6 mL, Rfl: 0    levalbuterol (Xopenex) 1.25 mg/3 mL nebulizer solution, Take 3 mL (1.25 mg total) by nebulization every 4 (four) hours as needed for wheezing or shortness of breath for up to 25 days (Patient not taking: Reported on 11/6/2024), Disp: 75 mL, Rfl: 6    methocarbamol (ROBAXIN) 500 mg tablet, Take 1 tablet (500 mg total) by mouth  3 (three) times a day as needed for muscle spasms (Patient not taking: Reported on 11/6/2024), Disp: 60 tablet, Rfl: 0    ondansetron (ZOFRAN-ODT) 4 mg disintegrating tablet, Take 1 tablet (4 mg total) by mouth every 6 (six) hours as needed for nausea or vomiting (Patient not taking: Reported on 11/29/2024), Disp: 15 tablet, Rfl: 0    senna-docusate sodium (SENOKOT-S) 8.6-50 mg per tablet, Take 1 tablet by mouth daily for 14 days (Patient not taking: Reported on 9/4/2024), Disp: 14 tablet, Rfl: 0    Current Allergies     Allergies as of 11/29/2024 - Reviewed 11/29/2024   Allergen Reaction Noted    Gluten meal - food allergy GI Intolerance 01/18/2016    Morphine and codeine Itching 01/15/2016    Nuts - food allergy Hives 01/18/2016    Shellfish-derived products - food allergy Anaphylaxis 01/18/2016    Wheat bran - food allergy GI Intolerance 01/15/2016    Sulfa antibiotics Rash 04/09/2015            The following portions of the patient's history were reviewed and updated as appropriate: allergies, current medications, past family history, past medical history, past social history, past surgical history and problem list.     Past Medical History:   Diagnosis Date    Asthma     Cancer (HCC)     Follicular lymphoma    Celiac disease     Chronic sinusitis 04/27/2023    Colon polyp     Follicular lymphoma (HCC)     GERD (gastroesophageal reflux disease)     Heart palpitations     History of colonic polyps     resolved 07/12/2016    History of radiation therapy 2010    Hyperlipidemia     Insomnia     Irregular heart beat     Lymphoma, 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HCC)     non hodgekins, remission    Malignant lymphoma (HCC) 2010    rt arm cutaneous follicular stage ia (rt diatal medical biceps area , s/p resected and s/p radistion treatment    resolved 12/17/2014    Postmenopausal disorder     resolved 09/21/2017    Pulmonary nodule     BENIGN, STABLE 2169-1393    Restless legs syndrome     resolved 09/21/2017    TMJ syndrome     resolved  09/21/2017       Past Surgical History:   Procedure Laterality Date    COLONOSCOPY  04/16/2019    FUNCTIONAL ENDOSCOPIC SINUS SURGERY Bilateral 2013    Dr Bagley    HYSTERECTOMY      age 45 complete    LYMPH NODE DISSECTION Right     arm    NASAL SEPTUM SURGERY  2013    with turbinate reduction - Dr. Bagley    OOPHORECTOMY Bilateral     WA ESOPHAGOGASTRODUODENOSCOPY TRANSORAL DIAGNOSTIC N/A 01/18/2016    Procedure: ESOPHAGOGASTRODUODENOSCOPY (EGD);  Surgeon: Ness Shepherd MD;  [facility]: [facility];  Service: Gastroenterology    WA EXCISION GANGLION WRIST DORSAL/VOLAR RECURRENT Left 12/08/2020    Procedure: WRIST GANGLION CYST EXCISION;  Surgeon: Marie Gaspar MD;  [facility];  Service: Orthopedics    WA EXCISION TUMOR SOFT TISSUE SHOULDER SUBQ 3 CM/> Left 11/27/2023    Procedure: anterior shouler- EXCISION BIOPSY TISSUE LESION/MASS UPPER EXTREMITY;  Surgeon: Ronak Alanis DO;  Location: [facility];  Service: Orthopedics    SYNOVECTOMY N/A 12/08/2020    Procedure: ECU TENOSYNOVECTOMY;  Surgeon: Marie Gaspar MD;  [facility];  Service: Orthopedics    TONSILLECTOMY      TOTAL ABDOMINAL HYSTERECTOMY W/ BILATERAL SALPINGOOPHORECTOMY      age 51    UPPER GASTROINTESTINAL ENDOSCOPY      US GUIDED MSK PROCEDURE  01/16/2020    US GUIDED MSK PROCEDURE  08/07/2020    US GUIDED MSK PROCEDURE  08/02/2021       Family History   Problem Relation Age of Onset    Lymphoma Mother     Throat cancer Father     Heart failure Father     Atrial fibrillation Father     Diabetes Father     Colonic polyp Father     Hypertension Father     Thyroid cancer Sister     No Known Problems Maternal Grandmother     No Known Problems Maternal Grandfather     Breast cancer Paternal Grandmother 69    Cancer Paternal Grandfather     Lung cancer Paternal Grandfather     No Known Problems Maternal Aunt     Breast cancer Paternal Aunt 69    Ovarian cancer Paternal Aunt 70    Skin cancer Paternal Aunt     Throat cancer  "Paternal Uncle     BRCA1 Positive Cousin     Colon cancer Neg Hx     Cervical cancer Neg Hx     Uterine cancer Neg Hx          Medications have been verified.        Objective   /70   Pulse 91   Temp 98.2 °F (36.8 °C) (Temporal)   Resp 18   Ht 5' 4.5\" (1.638 m)   Wt 64.4 kg (142 lb)   SpO2 100%   BMI 24.00 kg/m²   No LMP recorded. Patient has had a hysterectomy.       Physical Exam     Physical Exam  Vitals and nursing note reviewed.   Constitutional:       General: She is not in acute distress.     Appearance: Normal appearance. She is normal weight. She is not ill-appearing, toxic-appearing or diaphoretic.   HENT:      Head: Normocephalic and atraumatic.      Right Ear: Tympanic membrane and ear canal normal.      Left Ear: Tympanic membrane and ear canal normal.      Nose: Congestion present. No rhinorrhea.      Mouth/Throat:      Mouth: Mucous membranes are moist.      Pharynx: No oropharyngeal exudate or posterior oropharyngeal erythema.      Comments: PND  Eyes:      Extraocular Movements: Extraocular movements intact.   Cardiovascular:      Rate and Rhythm: Normal rate and regular rhythm.      Pulses: Normal pulses.      Heart sounds: Normal heart sounds. No murmur heard.  Pulmonary:      Effort: Pulmonary effort is normal. No respiratory distress.      Breath sounds: Normal breath sounds. No stridor. No wheezing, rhonchi or rales.   Chest:      Chest wall: No tenderness.   Musculoskeletal:         General: Normal range of motion.      Cervical back: Normal range of motion and neck supple.   Skin:     General: Skin is warm and dry.      Capillary Refill: Capillary refill takes less than 2 seconds.   Neurological:      General: No focal deficit present.      Mental Status: She is alert and oriented to person, place, and time.   Psychiatric:         Mood and Affect: Mood normal.         Behavior: Behavior normal.         Thought Content: Thought content normal.         Judgment: Judgment normal. "         Preliminary reading CXR  No acute process seen  Waiting on rad read

## 2024-11-29 NOTE — PATIENT INSTRUCTIONS
You have been prescribed prednisone. Take with food. Do NOT take any NSAID products (motrin, ibuprofen, aleve, advil) while taking this medication.  You may take tylenol.     You have been prescribed augmentin for sinusitis.  You have been prescribed an antibiotic - you are to take an oral probiotic and eat yogurt to avoid GI issues/diarrhea.    Use a cool mist humidifier.  Saline nasal spray, flonase nasal spray    Follow up with your PCP in 3-5 days  Go to the ED if symptoms worsen     Continue your inhalers

## 2024-11-29 NOTE — TELEPHONE ENCOUNTER
I responded to message received from POD.  I called spoke to patient to check on her.  Patient stated that [facility].  She was prescribed Augmentin and prednisone.  Patient stated that she feels terrible.  I told gave patient a follow up with our office because she feels that bad.  I told her to take those medications and as long as she feels better Monday that she does not have to be seen unless she wants to be checked again.

## 2024-12-02 NOTE — PROGRESS NOTES
PT Evaluation     Today's date: 2024  Patient name: Rosa Caraballo  : 1955  MRN: [id number]  Referring provider: Ronak Alanis DO  Dx:   Encounter Diagnosis     ICD-10-CM    1. Biceps tendinosis of left shoulder  M67.814 Ambulatory Referral to Physical Therapy                     Assessment  Impairments: abnormal or restricted ROM, activity intolerance, impaired physical strength, lacks appropriate home exercise program, pain with function, weight-bearing intolerance, poor posture  and activity limitations  Other impairment: decreased flexibility    Assessment details: The patient is a 70 y/o female who presents to PT with diagnosis biceps tendinosis of L shoulder.  She had recent injury to L shoulder with symptoms indicative of L biceps tendon tear.  She has complaints of constant pain along her L biceps.  She demonstrates deficits with decreased L UE ROM and strength, decreased flexibility, TTP and decreased postural awareness.  These deficits lead to decreased tolerance to functional activities.  She has pain with quick movement.  Also pain and difficulty with reaching, lifting or carrying items with weight to them (like putting a gallon of milk on a shelf).  TTP is noted along Jg muscle, proximal part.  The patient would benefit from continued PT to address deficits and improve function.  Tx to include ROM, stretching, strengthening, modalities, HEP, pt education, postural ed, lifting/body mechanics, neuro re-ed, balance/proprioception Te, MT and equipment.      Understanding of Dx/Px/POC: good     Prognosis: good    Goals  STGs:  1.  Initiate and complete HEP with verbal cues.  2.  Decrease L shoulder/arm pain by > 25% in 4 weeks.  3.  Improve L shoulder strength by 1/2-1 grade in 4 weeks.  4.  Improve L shoulder ROM by 5-10 degrees in 4 weeks.  LTGs:  1.  Patient to be I with HEP in 12 weeks.  2.  Improve L shoulder ROM to WNL t/o in 12 weeks to improve function.  3.  Improve L UE  strength to > or = to 4 to 4+/5 t/o in 12 weeks to improve function.  4.  Decrease L shoulder/arm pain to < or = to 2-3/10 with activity in 12 weeks to improve function.    5.  Recreational performance in related activities is improved to PLOF in 12 weeks.  6.  Postural control is improved to maximal level of function in 12 weeks.  7.  ADL performance is improved to PLOF in 12 weeks.              Plan  Patient would benefit from: skilled physical therapy  Planned modality interventions: cryotherapy and thermotherapy: hydrocollator packs    Planned therapy interventions: abdominal trunk stabilization, IASTM, joint mobilization, manual therapy, body mechanics training, neuromuscular re-education, patient/caregiver education, postural training, self care, strengthening, stretching, therapeutic activities, therapeutic exercise, flexibility, functional ROM exercises and home exercise program    Frequency: 1x week (1-2 times per week)  Duration in weeks: 12  Plan of Care beginning date: 12/4/2024  Plan of Care expiration date: 2/26/2025  Treatment plan discussed with: patient  Plan details: Modalities and therapy interventions prn.        Subjective Evaluation    History of Present Illness  Mechanism of injury: The patient states that in April 2024 she was running after her dog outside and she fell and landed on her L shoulder.  Then about two weeks later she slipped on cardboard in the house and landed on her L shoulder again.  She notes that she had pain after these falls, this pain had been getting worse.    She had seen the orthopedic doctor (Dr. Alanis) on 10/1/24.  No updated imaging was completed.  She was referred to OPPT and pain management.      She had seen the pain management doctor (Dr. Prado) on 10/24.  She had an injection in her shoulder.  This shot helped for about four days.  She had then gone to  a saw horse and she got pain back in her L shoulder.  Then on 11/23 she was reaching up onto a  "shelf in her closet and she felt her L bicep tear.  She had increased pain in her arm.  She did not get any treatment after this incident.    She will be going back to see Dr. Mcarthur next Thursday for her follow up appointment for pain management.     The patient states that she had similar injury about two years ago on her R shoulder, did not need surgery for that, had course of PT at this clinic.      The patient states that she has pain in her L upper arm, still some in her shoulder but not too much.  She notes unable to lift anything with weight or bring her arm across her body.  Pain can vary depending on the activity she is doing.   She denies any N&T into LUE.    Patient Goals  Patient goals for therapy: increased motion, decreased pain, increased strength and return to sport/leisure activities  Patient goal: \"To help improve my strength.\"  Pain  At best pain ratin  At worst pain rating: 10  Location: L Shoulder/Upper Arm  Quality: dull ache, discomfort and sharp  Relieving factors: ice  Aggravating factors: lifting (Reaching, carrying)    Social Support  Lives with: spouse    Employment status: not working  Hand dominance: right        Objective     Static Posture     Head  Forward.    Shoulders  Rounded.    Postural Observations  Seated posture: fair      Observations     Additional Observation Details  L upper arm - \"Jg muscle\" noted in upper arm - patient reports this appeared after reaching into her closet a little over a week ago.      Palpation   Left   No palpable tenderness to the biceps.     Neurological Testing     Sensation     Shoulder   Left Shoulder   Intact: light touch    Right Shoulder   Intact: Light touch    Active Range of Motion   Left Shoulder   Flexion: 155 degrees with pain  Abduction: 175 degrees   External rotation 90°: 78 degrees with pain  Internal rotation 90°: 60 degrees     Right Shoulder   Flexion: 180 degrees   Abduction: 180 degrees   External rotation 90°: 90 " "degrees  Internal rotation 90°: 60 degrees     Left Elbow   Flexion: 150 degrees   Extension: 0 degrees   Forearm supination: WFL  Forearm pronation: WFL    Right Elbow   Flexion: 150 degrees   Extension: 0 degrees   Forearm supination: WFL  Forearm pronation: WFL    Passive Range of Motion   Left Shoulder   Flexion: 180 degrees   Abduction: 180 degrees   External rotation 90°: 88 degrees     Strength/Myotome Testing     Left Shoulder     Planes of Motion   Flexion: 4-   Abduction: 3   External rotation at 0°: 3-   Internal rotation at 0°: 3     Right Shoulder     Planes of Motion   Flexion: WFL   Abduction: WFL   External rotation at 0°: WFL   Internal rotation at 0°: WFL     Left Elbow   Flexion: 4-  Extension: 4+    Right Elbow   Flexion: WFL  Extension: WFL                Precautions: Asthma, follicular lymphoma, GERD, Celiacs, recent L bicep tear       Manuals 12/4       L Shoulder all planes to tabitha                                Neuro Re-Ed         MTP/LTP YTB  3\"x10 ea       Ball Roll on Wall        Phillip ER w/TBand                                        Ther Ex        UBE Alt for Strength & Posture        Retractions        Tband IR/ER YTB  3\"x10 ea       Tband Add YTB  3\"x10       Tband Abd        Supine Punch        S/L ER & Abd        Pulleys   Flex/Scap        Wall Slides with Lift Off        Isometrics        Wall Pushups        Stand Horiz Abd w/TBand                                Ther Activity                        Gait Training                        Modalities        HP/CP prn                   Access Code: [email or web address]  URL: [email or web address]  Date: 12/04/2024  Prepared by: Mercy    Exercises  - Shoulder Internal Rotation with Resistance  - 1 x daily - 7 x weekly - 1 sets - 10 reps  - Shoulder External Rotation with Anchored Resistance  - 1 x daily - 7 x weekly - 1 sets - 10 reps  - Shoulder extension with resistance - Neutral  - 1 x daily - 7 x weekly - 1 sets - 10 reps  - Scapular " Retraction with Resistance  - 1 x daily - 7 x weekly - 1 sets - 10 reps  - Shoulder Adduction with Anchored Resistance  - 1 x daily - 7 x weekly - 1 sets - 10 reps

## 2024-12-04 ENCOUNTER — EVALUATION (OUTPATIENT)
Dept: PHYSICAL THERAPY | Facility: CLINIC | Age: 69
End: 2024-12-04
Payer: COMMERCIAL

## 2024-12-04 DIAGNOSIS — M67.814 BICEPS TENDINOSIS OF LEFT SHOULDER: Primary | ICD-10-CM

## 2024-12-04 PROCEDURE — 97535 SELF CARE MNGMENT TRAINING: CPT | Performed by: PHYSICAL THERAPIST

## 2024-12-04 PROCEDURE — 97161 PT EVAL LOW COMPLEX 20 MIN: CPT | Performed by: PHYSICAL THERAPIST

## 2024-12-04 PROCEDURE — 97110 THERAPEUTIC EXERCISES: CPT | Performed by: PHYSICAL THERAPIST

## 2024-12-12 ENCOUNTER — OFFICE VISIT (OUTPATIENT)
Dept: PHYSICAL THERAPY | Facility: CLINIC | Age: 69
End: 2024-12-12
Payer: COMMERCIAL

## 2024-12-12 ENCOUNTER — OFFICE VISIT (OUTPATIENT)
Dept: PAIN MEDICINE | Facility: CLINIC | Age: 69
End: 2024-12-12
Payer: COMMERCIAL

## 2024-12-12 VITALS
SYSTOLIC BLOOD PRESSURE: 132 MMHG | WEIGHT: 146 LBS | HEIGHT: 65 IN | DIASTOLIC BLOOD PRESSURE: 73 MMHG | BODY MASS INDEX: 24.32 KG/M2 | HEART RATE: 74 BPM

## 2024-12-12 DIAGNOSIS — M67.814 BICEPS TENDINOSIS OF LEFT SHOULDER: Primary | ICD-10-CM

## 2024-12-12 DIAGNOSIS — M53.3 PAIN OF BOTH SACROILIAC JOINTS: ICD-10-CM

## 2024-12-12 DIAGNOSIS — G89.4 CHRONIC PAIN SYNDROME: Primary | ICD-10-CM

## 2024-12-12 DIAGNOSIS — S46.212S BICEPS RUPTURE, DISTAL, LEFT, SEQUELA: ICD-10-CM

## 2024-12-12 PROCEDURE — 97112 NEUROMUSCULAR REEDUCATION: CPT

## 2024-12-12 PROCEDURE — 99214 OFFICE O/P EST MOD 30 MIN: CPT | Performed by: STUDENT IN AN ORGANIZED HEALTH CARE EDUCATION/TRAINING PROGRAM

## 2024-12-12 PROCEDURE — 97110 THERAPEUTIC EXERCISES: CPT

## 2024-12-12 NOTE — PROGRESS NOTES
"Assessment:  1. Chronic pain syndrome    2. Biceps rupture, distal, left, sequela    3. Pain of both sacroiliac joints      Portions of the record may have been created with voice recognition software. Occasional wrong word or \"sound a like\" substitutions may have occurred due to the inherent limitations of voice recognition software. Read the chart carefully and recognize, using context, where substitutions have occurred. Contact me with any questions.       Plan:  69-year-old female here for follow-up visit with regards to chronic pain symptoms.  Since last visit, she was evaluated by Dr. Alanis for left shoulder pain and diagnosed with left biceps tendinosis.  She subsequently underwent left shoulder joint injections by Dr. Prado on 10/24/2024 and notes significant improvement in symptoms with this.  She notes feeling a pop in her left shoulder a few weeks ago and subsequently noted a small bruise over L biceps region. Notes she has experienced a similar event in the past when she ruptured her right biceps tendon. She denies ongoing pain, weakness or other associated upper extremity symptoms at this time.    She also notes gradual increase in chronic low back pain symptoms today.  The symptoms were previously significantly improved for several months after bilateral sacroiliac joint injections on 8/5/2024.  She denies new or progressive weakness, saddle anesthesia, BBI.      With regards to left biceps tendon rupture, recommend orthopedics evaluation.    With regards to recurrent low back pain symptoms, will schedule for repeat bilateral sacroiliac joint injections in the near future.    Continue home exercise program.    Can follow-up in 1 month after injection.        Complete risks and benefits including bleeding, infection, tissue reaction, nerve injury and allergic reaction were discussed. The approach was demonstrated using models and literature was provided. Verbal and written consent was " obtained.      History of Present Illness:  The patient is a 69 y.o. female who presents for a follow up office visit in regards to Arm Pain (Left bicept) and Back Pain.       I have personally reviewed and/or updated the patient's past medical history, past surgical history, family history, social history, current medications, allergies, and vital signs today.       Review of Systems  Review of Systems   Constitutional:  Negative for fever.   HENT:  Negative for sinus pain.    Eyes:  Negative for redness.   Respiratory:  Negative for shortness of breath.    Cardiovascular:  Negative for palpitations.   Gastrointestinal:  Negative for abdominal pain and nausea.   Endocrine: Negative for polydipsia.   Genitourinary:  Negative for difficulty urinating.   Musculoskeletal:  Positive for myalgias. Negative for gait problem.        Pain in extremity-arm   Skin:  Negative for rash.   Neurological:  Negative for seizures and headaches.   Psychiatric/Behavioral:  Negative for decreased concentration. The patient is not nervous/anxious.    All other systems reviewed and are negative.          Past Medical History:   Diagnosis Date    Asthma     Cancer (HCC)     Follicular lymphoma    Celiac disease     Chronic sinusitis 04/27/2023    Colon polyp     Follicular lymphoma (HCC)     GERD (gastroesophageal reflux disease)     Heart palpitations     History of colonic polyps     resolved 07/12/2016    History of radiation therapy 2010    Hyperlipidemia     Insomnia     Irregular heart beat     Lymphoma, 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HCC)     non hodgekins, remission    Malignant lymphoma (HCC) 2010    rt arm cutaneous follicular stage ia (rt diatal medical biceps area , s/p resected and s/p radistion treatment    resolved 12/17/2014    Postmenopausal disorder     resolved 09/21/2017    Pulmonary nodule     BENIGN, STABLE [id number]    Restless legs syndrome     resolved 09/21/2017    TMJ syndrome     resolved 09/21/2017       Past Surgical History:    Procedure Laterality Date    COLONOSCOPY  04/16/2019    FUNCTIONAL ENDOSCOPIC SINUS SURGERY Bilateral 2013    Dr Bagley    HYSTERECTOMY      age 45 complete    LYMPH NODE DISSECTION Right     arm    NASAL SEPTUM SURGERY  2013    with turbinate reduction - Dr. Bagley    OOPHORECTOMY Bilateral     CT ESOPHAGOGASTRODUODENOSCOPY TRANSORAL DIAGNOSTIC N/A 01/18/2016    Procedure: ESOPHAGOGASTRODUODENOSCOPY (EGD);  Surgeon: Ness Shepherd MD;  [facility]: [facility];  Service: Gastroenterology    CT EXCISION GANGLION WRIST DORSAL/VOLAR RECURRENT Left 12/08/2020    Procedure: WRIST GANGLION CYST EXCISION;  Surgeon: Marie Gaspar MD;  Location: [facility];  Service: Orthopedics    CT EXCISION TUMOR SOFT TISSUE SHOULDER SUBQ 3 CM/> Left 11/27/2023    Procedure: anterior shouler- EXCISION BIOPSY TISSUE LESION/MASS UPPER EXTREMITY;  Surgeon: Ronak Alanis DO;  Location: [facility];  Service: Orthopedics    SYNOVECTOMY N/A 12/08/2020    Procedure: ECU TENOSYNOVECTOMY;  Surgeon: Marie Gaspar MD;  [facility];  Service: Orthopedics    TONSILLECTOMY      TOTAL ABDOMINAL HYSTERECTOMY W/ BILATERAL SALPINGOOPHORECTOMY      age 51    UPPER GASTROINTESTINAL ENDOSCOPY      US GUIDED MSK PROCEDURE  01/16/2020    US GUIDED MSK PROCEDURE  08/07/2020    US GUIDED MSK PROCEDURE  08/02/2021       Family History   Problem Relation Age of Onset    Lymphoma Mother     Throat cancer Father     Heart failure Father     Atrial fibrillation Father     Diabetes Father     Colonic polyp Father     Hypertension Father     Thyroid cancer Sister     No Known Problems Maternal Grandmother     No Known Problems Maternal Grandfather     Breast cancer Paternal Grandmother 69    Cancer Paternal Grandfather     Lung cancer Paternal Grandfather     No Known Problems Maternal Aunt     Breast cancer Paternal Aunt 69    Ovarian cancer Paternal Aunt 70    Skin cancer Paternal Aunt     Throat cancer Paternal Uncle     BRCA1 Positive Cousin      Colon cancer Neg Hx     Cervical cancer Neg Hx     Uterine cancer Neg Hx        Social History     Occupational History    Occupation: X-ray technologist   Tobacco Use    Smoking status: Former     Current packs/day: 0.00     Average packs/day: 0.8 packs/day for 20.0 years (15.0 ttl pk-yrs)     Types: Cigarettes     Start date:      Quit date:      Years since quittin.9    Smokeless tobacco: Never    Tobacco comments:     Quit 10/31/04 2130   Vaping Use    Vaping status: Never Used   Substance and Sexual Activity    Alcohol use: Yes     Alcohol/week: 3.0 standard drinks of alcohol     Types: 3 Glasses of wine per week     Comment: soc    Drug use: No    Sexual activity: Yes     Partners: Male         Current Outpatient Medications:     benralizumab (FASENRA) subcutaneous injection, Inject 1 mL (30 mg total) under the skin every 56 days for 6 doses, Disp: 1 Syringe, Rfl: 6    bimatoprost (LATISSE) 0.03 % ophthalmic solution, Take as directed, Disp: , Rfl:     budesonide (Pulmicort Flexhaler) 90 MCG/ACT inhaler, Inhale 1 puff 2 (two) times a day Rinse mouth after use., Disp: 1 each, Rfl: 3    Cholecalciferol 50 MCG (2000 UT) CAPS, Take by mouth daily, Disp: , Rfl:     cyanocobalamin (VITAMIN B-12) 1,000 mcg tablet, Take 1,000 mcg by mouth daily, Disp: , Rfl:     EPINEPHrine (EPIPEN) 0.3 mg/0.3 mL SOAJ, Inject 0.3 mL (0.3 mg total) into a muscle once for 1 dose, Disp: 0.6 mL, Rfl: 0    erythromycin with ethanol (EMGEL) 2 % gel, Apply topically 2 (two) times a day, Disp: 30 g, Rfl: 1    estradiol (ESTRACE VAGINAL) 0.1 mg/g vaginal cream, Insert 1 g intravaginally twice weekly for dryness, Disp: 42.5 g, Rfl: 1    famotidine (PEPCID) 20 mg tablet, Take 1 tablet (20 mg total) by mouth 2 (two) times a day, Disp: 180 tablet, Rfl: 3    flecainide (TAMBOCOR) 100 mg tablet, take 1 tablet by mouth twice a day, Disp: 180 tablet, Rfl: 3    fluticasone (FLONASE) 50 mcg/act nasal spray, 2 sprays into each nostril 2  (two) times a day, Disp: 48 g, Rfl: 4    ipratropium (ATROVENT) 0.03 % nasal spray, 2 sprays into each nostril 3 (three) times a day as needed for rhinitis, Disp: 30 mL, Rfl: 3    levalbuterol (XOPENEX HFA) 45 mcg/act inhaler, Inhale 1-2 puffs every 4 (four) hours as needed for wheezing or shortness of breath, Disp: 45 g, Rfl: 0    lidocaine (LMX) 4 % cream, Apply topically as needed for mild pain, Disp: 15 g, Rfl: 3    linaCLOtide 72 MCG CAPS, Take 72 mcg by mouth daily, Disp: 4 capsule, Rfl: 0    Magnesium 500 MG CAPS, Take by mouth, Disp: , Rfl:     metoprolol succinate (TOPROL-XL) 25 mg 24 hr tablet, Take 1 tablet (25 mg total) by mouth every evening, Disp: 90 tablet, Rfl: 5    montelukast (SINGULAIR) 10 mg tablet, Take 1 tablet (10 mg total) by mouth daily at bedtime, Disp: 30 tablet, Rfl: 3    polyethylene glycol (GLYCOLAX) 17 GM/SCOOP powder, Take 17 g by mouth 2 (two) times a day, Disp: 765 g, Rfl: 4    predniSONE 10 mg tablet, Take 5 tabs po x 2 days; 4 tabs po x 2 days; 3 tabs po x 1 day; 2 tabs po x 1 day. 1 tab po x 1 day., Disp: 24 tablet, Rfl: 0    rosuvastatin (CRESTOR) 5 mg tablet, take 1 tablet by mouth once daily, Disp: 90 tablet, Rfl: 3    sucralfate (CARAFATE) 1 g tablet, Take 1 tablet (1 g total) by mouth 2 (two) times a day, Disp: 60 tablet, Rfl: 5    zolpidem (AMBIEN) 5 mg tablet, Take 1 tablet (5 mg total) by mouth daily at bedtime as needed for sleep, Disp: 30 tablet, Rfl: 2    doxycycline hyclate (VIBRAMYCIN) 100 mg capsule, , Disp: , Rfl:     levalbuterol (Xopenex) 1.25 mg/3 mL nebulizer solution, Take 3 mL (1.25 mg total) by nebulization every 4 (four) hours as needed for wheezing or shortness of breath for up to 25 days (Patient not taking: Reported on 12/12/2024), Disp: 75 mL, Rfl: 6    meloxicam (MOBIC) 15 mg tablet, Take 1 tablet (15 mg total) by mouth daily as needed for moderate pain, Disp: 90 tablet, Rfl: 1    methocarbamol (ROBAXIN) 500 mg tablet, Take 1 tablet (500 mg total) by  "mouth 3 (three) times a day as needed for muscle spasms (Patient not taking: Reported on 11/6/2024), Disp: 60 tablet, Rfl: 0    ondansetron (ZOFRAN-ODT) 4 mg disintegrating tablet, Take 1 tablet (4 mg total) by mouth every 6 (six) hours as needed for nausea or vomiting (Patient not taking: Reported on 11/29/2024), Disp: 15 tablet, Rfl: 0    senna-docusate sodium (SENOKOT-S) 8.6-50 mg per tablet, Take 1 tablet by mouth daily for 14 days (Patient not taking: Reported on 9/4/2024), Disp: 14 tablet, Rfl: 0    Allergies   Allergen Reactions    Gluten Meal - Food Allergy GI Intolerance     Celiac     Morphine And Codeine Itching    Nuts - Food Allergy Hives     Almonds,walnuts, hazelnuts and other related nuts.     Shellfish-Derived Products - Food Allergy Anaphylaxis    Wheat Bran - Food Allergy GI Intolerance    Sulfa Antibiotics Rash       Physical Exam:    /73   Pulse 74   Ht 5' 4.5\" (1.638 m)   Wt 66.2 kg (146 lb)   BMI 24.67 kg/m²     Constitutional:normal, well developed, well nourished, alert, in no distress and non-toxic and no overt pain behavior.  Eyes:anicteric  HEENT:grossly intact  Neck:supple, symmetric, trachea midline and no masses   Pulmonary:even and unlabored  Cardiovascular:No edema or pitting edema present  Skin:Normal without rashes or lesions and well hydrated  Psychiatric:Mood and affect appropriate  Neurologic:Cranial Nerves II-XII grossly intact  Musculoskeletal:normal gait, +  Jg deformity b/l      Imaging  FL spine and pain procedure    (Results Pending)       Orders Placed This Encounter   Procedures    FL spine and pain procedure    Ambulatory Referral to Orthopedic Surgery     "

## 2024-12-12 NOTE — PROGRESS NOTES
"Daily Note     Today's date: 2024  Patient name: Rosa Caraballo  : 1955  MRN: [id number]  Referring provider: Ronak Alanis DO  Dx:   Encounter Diagnosis     ICD-10-CM    1. Biceps tendinosis of left shoulder  M67.814           Start Time: 1515  Stop Time: 1610  Total time in clinic (min): 55 minutes    Subjective: \"My arm is getting stronger.  I was able to lift a gallon of milk and put it in the frig.  I don't have pain anymore in the shoulder, the bicep aches a little.\"      Objective: See treatment diary below      Assessment: Tolerated treatment well. Able to complete program without incident.  Increased resistance on scapular strengthening with good tolerance.  Issued band for HEP.  Quick muscle fatigue in bicep muscle.  No pain throughout session.  Will continue to monitor and progress as able.  Patient demonstrated fatigue post treatment and would benefit from continued PT      Plan: Continue per plan of care.  Progress treatment as tolerated.          Precautions: Asthma, follicular lymphoma, GERD, Celiacs, recent L bicep tear       Manuals       L Shoulder all planes to tabitha                                Neuro Re-Ed         MTP/LTP YTB  3\"x10 ea Grn 2x10/3-5\"      Ball Roll on Wall        Phillip ER w/TBand  Grn   2x10/3-5\"                                      Ther Ex        UBE Alt for Strength & Posture  90 RPM 10' alt      Retractions        Tband IR/ER YTB  3\"x10 ea Grn  2x10/3-5\"      Tband Add YTB  3\"x10 Grn 2x10/3-5\"      Tband Abd        Supine Punch        S/L ER & Abd        Pulleys   Flex/Scap        Wall Slides with Lift Off  Towel 20x/3-5\"      Isometrics  Flex/abd/ER/IR  10x10\"       Wall Pushups  2x10/3-5\" cues      Stand Horiz Abd w/TBand                                Ther Activity                        Gait Training                        Modalities        HP/CP prn                   Access Code: [email or web address]  URL: [email or web address]  Date: " 12/04/2024  Prepared by: Mercy    Exercises  - Shoulder Internal Rotation with Resistance  - 1 x daily - 7 x weekly - 1 sets - 10 reps  - Shoulder External Rotation with Anchored Resistance  - 1 x daily - 7 x weekly - 1 sets - 10 reps  - Shoulder extension with resistance - Neutral  - 1 x daily - 7 x weekly - 1 sets - 10 reps  - Scapular Retraction with Resistance  - 1 x daily - 7 x weekly - 1 sets - 10 reps  - Shoulder Adduction with Anchored Resistance  - 1 x daily - 7 x weekly - 1 sets - 10 reps

## 2024-12-16 ENCOUNTER — OFFICE VISIT (OUTPATIENT)
Dept: PHYSICAL THERAPY | Facility: CLINIC | Age: 69
End: 2024-12-16
Payer: COMMERCIAL

## 2024-12-16 DIAGNOSIS — M67.814 BICEPS TENDINOSIS OF LEFT SHOULDER: Primary | ICD-10-CM

## 2024-12-16 PROCEDURE — 97110 THERAPEUTIC EXERCISES: CPT

## 2024-12-16 PROCEDURE — 97112 NEUROMUSCULAR REEDUCATION: CPT

## 2024-12-16 NOTE — PROGRESS NOTES
"Daily Note     Today's date: 2024  Patient name: Rosa Caraballo  : 1955  MRN: [id number]  Referring provider: Ronak Alanis DO  Dx:   Encounter Diagnosis     ICD-10-CM    1. Biceps tendinosis of left shoulder  M67.814           Start Time: 1110  Stop Time: 1205  Total time in clinic (min): 55 minutes    Subjective: \"My shoulder is feeling pretty good.\"      Objective: See treatment diary below      Assessment: Tolerated treatment well. Able to complete program without incident.  Able to increase reps with good form, occasional cueing to control movements.  Quick muscle fatigue noted, good motion in all directions.  Will continue to monitor and progress as able.  Patient demonstrated fatigue post treatment and would benefit from continued PT      Plan: Continue per plan of care.  Progress treatment as tolerated.       Precautions: Asthma, follicular lymphoma, GERD, Celiacs, recent L bicep tear       Manuals      L Shoulder all planes to tabitha                                Neuro Re-Ed         MTP/LTP YTB  3\"x10 ea Grn 2x10/3-5\" Grn 3x10/3-5\"     Ball Roll on Wall        Phillip ER w/TBand  Grn   2x10/3-5\" Grn   3x10/3-5\"                                     Ther Ex        UBE Alt for Strength & Posture  90 RPM 10' alt 80 RPM 10' alt     Retractions        Tband IR/ER YTB  3\"x10 ea Grn  2x10/3-5\" Grn  2x10/3-5\"     Tband Add YTB  3\"x10 Grn 2x10/3-5\" Grn 2x10/3-5\"     Tband Abd        Supine Punch        S/L ER & Abd        Pulleys   Flex/Scap        Wall Slides with Lift Off  Towel 20x/3-5\" Towel 20x/3-5\"     Isometrics  Flex/abd/ER/IR  10x10\"  Flex/abd/ER/IR  10x10\"      Wall Pushups  2x10/3-5\" cues      Stand Horiz Abd w/TBand                                Ther Activity                        Gait Training                        Modalities        HP/CP prn                   Access Code: [email or web address]  URL: [email or web address]  Date: 2024  Prepared by: " Mercy    Exercises  - Shoulder Internal Rotation with Resistance  - 1 x daily - 7 x weekly - 1 sets - 10 reps  - Shoulder External Rotation with Anchored Resistance  - 1 x daily - 7 x weekly - 1 sets - 10 reps  - Shoulder extension with resistance - Neutral  - 1 x daily - 7 x weekly - 1 sets - 10 reps  - Scapular Retraction with Resistance  - 1 x daily - 7 x weekly - 1 sets - 10 reps  - Shoulder Adduction with Anchored Resistance  - 1 x daily - 7 x weekly - 1 sets - 10 reps

## 2024-12-19 ENCOUNTER — TELEPHONE (OUTPATIENT)
Age: 69
End: 2024-12-19

## 2024-12-19 DIAGNOSIS — R09.81 NASAL CONGESTION: ICD-10-CM

## 2024-12-23 ENCOUNTER — OFFICE VISIT (OUTPATIENT)
Dept: PHYSICAL THERAPY | Facility: CLINIC | Age: 69
End: 2024-12-23
Payer: COMMERCIAL

## 2024-12-23 DIAGNOSIS — M67.814 BICEPS TENDINOSIS OF LEFT SHOULDER: Primary | ICD-10-CM

## 2024-12-23 PROCEDURE — 97110 THERAPEUTIC EXERCISES: CPT

## 2024-12-23 PROCEDURE — 97112 NEUROMUSCULAR REEDUCATION: CPT

## 2024-12-23 RX ORDER — FLUTICASONE PROPIONATE 50 MCG
2 SPRAY, SUSPENSION (ML) NASAL 2 TIMES DAILY
Qty: 48 G | Refills: 4 | Status: SHIPPED | OUTPATIENT
Start: 2024-12-23

## 2024-12-23 NOTE — PROGRESS NOTES
"Daily Note     Today's date: 2024  Patient name: Rosa Caraballo  : 1955  MRN: [id number]  Referring provider: Ronak Alanis DO  Dx:   Encounter Diagnosis     ICD-10-CM    1. Biceps tendinosis of left shoulder  M67.814                      Subjective: Rosa stated that her shoulder feels better, but not 100%. She would like to continue PT with a few more sessions.   She is semi-compliant with her HEP.       Objective: See treatment diary below    EDU on the need and benefit of performing HEP to continue to work on set goals, improve ROM, posture and strength.       Assessment: Tolerated treatment well. Rosa is able to self correct standing posture, more aware with posterior chain strengthening vs at rest. She requires intermittent cuing with t-band PREs, loss of form with reps. She may benefit from ant chain stretching to improve posture. Continued PT would be beneficial to improve function.          Plan: Continue per plan of care.       Precautions: Asthma, follicular lymphoma, GERD, Celiacs, recent L bicep tear       Manuals     L Shoulder all planes to tabitha                                Neuro Re-Ed         MTP/LTP YTB  3\"x10 ea Grn 2x10/3-5\" Grn 3x10/3-5\" Grn 3x10/3-5\"    Ball Roll on Wall        Phillip ER w/TBand  Grn   2x10/3-5\" Grn   3x10/3-5\" Grn   3x10/3-5\"                                    Ther Ex        UBE Alt for Strength & Posture  90 RPM 10' alt 80 RPM 10' alt 80 RPM 10' alt    Retractions        Tband IR/ER YTB  3\"x10 ea Grn  2x10/3-5\" Grn  2x10/3-5\" Grn  2x10/3-5\"    Tband Add YTB  3\"x10 Grn 2x10/3-5\" Grn 2x10/3-5\" Grn 2x10/3-5\"    Tband Abd        Supine Punch        S/L ER & Abd        Pulleys   Flex/Scap        Wall Slides with Lift Off  Towel 20x/3-5\" Towel 20x/3-5\" Towel 20x/3-5\"    Isometrics  Flex/abd/ER/IR  10x10\"  Flex/abd/ER/IR  10x10\"  Flex/abd/ER/IR  10x10\"     Wall Pushups  2x10/3-5\" cues  2x10/3-5\" cues    Stand Horiz Abd w/TBand                   "              Ther Activity                        Gait Training                        Modalities        HP/CP prn                   Access Code: [email or web address]  URL: [email or web address]  Date: 12/04/2024  Prepared by: Mercy    Exercises  - Shoulder Internal Rotation with Resistance  - 1 x daily - 7 x weekly - 1 sets - 10 reps  - Shoulder External Rotation with Anchored Resistance  - 1 x daily - 7 x weekly - 1 sets - 10 reps  - Shoulder extension with resistance - Neutral  - 1 x daily - 7 x weekly - 1 sets - 10 reps  - Scapular Retraction with Resistance  - 1 x daily - 7 x weekly - 1 sets - 10 reps  - Shoulder Adduction with Anchored Resistance  - 1 x daily - 7 x weekly - 1 sets - 10 reps

## 2024-12-24 DIAGNOSIS — K21.9 GASTROESOPHAGEAL REFLUX DISEASE WITHOUT ESOPHAGITIS: ICD-10-CM

## 2024-12-24 RX ORDER — FAMOTIDINE 20 MG/1
20 TABLET, FILM COATED ORAL 2 TIMES DAILY
Qty: 180 TABLET | Refills: 1 | Status: SHIPPED | OUTPATIENT
Start: 2024-12-24

## 2025-01-02 ENCOUNTER — OFFICE VISIT (OUTPATIENT)
Dept: PHYSICAL THERAPY | Facility: CLINIC | Age: 70
End: 2025-01-02
Payer: COMMERCIAL

## 2025-01-02 DIAGNOSIS — M67.814 BICEPS TENDINOSIS OF LEFT SHOULDER: Primary | ICD-10-CM

## 2025-01-02 PROCEDURE — 97110 THERAPEUTIC EXERCISES: CPT

## 2025-01-02 PROCEDURE — 97112 NEUROMUSCULAR REEDUCATION: CPT

## 2025-01-02 NOTE — PROGRESS NOTES
"Daily Note     Today's date: 2025  Patient name: Rosa Caraballo  : 1955  MRN: [id number]  Referring provider: Ronak Alanis DO  Dx:   Encounter Diagnosis     ICD-10-CM    1. Biceps tendinosis of left shoulder  M67.814           Start Time: 1600  Stop Time: 1650  Total time in clinic (min): 50 minutes    Subjective: \"My arm is feeling great.  I am doing more with it and it's not painful.\"      Objective: See treatment diary below      Assessment: Tolerated treatment well. Continues to gain strength and motion without pain.  Added scapular strengthening exercises with appropriate challenge.  Anticipate discharge to HEP next session.  Will continue to monitor and progress as able.  Patient demonstrated fatigue post treatment and would benefit from continued PT      Plan: Continue per plan of care.  Progress treatment as tolerated.       Precautions: Asthma, follicular lymphoma, GERD, Celiacs, recent L bicep tear       Manuals    L Shoulder all planes to tabitha                                Neuro Re-Ed         MTP/LTP YTB  3\"x10 ea Grn 2x10/3-5\" Grn 3x10/3-5\" Grn 3x10/3-5\" Blue 3x10/3-5\"   Ball Roll on Wall     20x   Phillip ER w/TBand  Grn   2x10/3-5\" Grn   3x10/3-5\" Grn   3x10/3-5\" Grn   3x10/3-5\"        90/90 on wall  Grn 2x10                           Ther Ex        UBE Alt for Strength & Posture  90 RPM 10' alt 80 RPM 10' alt 80 RPM 10' alt 80 RPM 10' alt   Retractions        Tband IR/ER YTB  3\"x10 ea Grn  2x10/3-5\" Grn  2x10/3-5\" Grn  2x10/3-5\" Blue  3x10/3-5\"   Tband Add YTB  3\"x10 Grn 2x10/3-5\" Grn 2x10/3-5\" Grn 2x10/3-5\" Blue  3x10/3-5\"   Tband Abd        Supine Punch        S/L ER & Abd        Pulleys   Flex/Scap        Wall Slides with Lift Off  Towel 20x/3-5\" Towel 20x/3-5\" Towel 20x/3-5\"    Isometrics  Flex/abd/ER/IR  10x10\"  Flex/abd/ER/IR  10x10\"  Flex/abd/ER/IR  10x10\"     Wall Pushups  2x10/3-5\" cues  2x10/3-5\" cues Wall  1x10  Edge of table  1x10   Stand Horiz Abd " w/TBand                                Ther Activity                        Gait Training                        Modalities        HP/CP prn                   Access Code: [email or web address]  URL: [email or web address]  Date: 12/04/2024  Prepared by: Mercy    Exercises  - Shoulder Internal Rotation with Resistance  - 1 x daily - 7 x weekly - 1 sets - 10 reps  - Shoulder External Rotation with Anchored Resistance  - 1 x daily - 7 x weekly - 1 sets - 10 reps  - Shoulder extension with resistance - Neutral  - 1 x daily - 7 x weekly - 1 sets - 10 reps  - Scapular Retraction with Resistance  - 1 x daily - 7 x weekly - 1 sets - 10 reps  - Shoulder Adduction with Anchored Resistance  - 1 x daily - 7 x weekly - 1 sets - 10 reps

## 2025-01-03 ENCOUNTER — OFFICE VISIT (OUTPATIENT)
Dept: INTERNAL MEDICINE CLINIC | Facility: CLINIC | Age: 70
End: 2025-01-03
Payer: COMMERCIAL

## 2025-01-03 VITALS
HEART RATE: 77 BPM | SYSTOLIC BLOOD PRESSURE: 110 MMHG | HEIGHT: 65 IN | WEIGHT: 146.1 LBS | TEMPERATURE: 97.2 F | BODY MASS INDEX: 24.34 KG/M2 | DIASTOLIC BLOOD PRESSURE: 70 MMHG | OXYGEN SATURATION: 97 %

## 2025-01-03 DIAGNOSIS — J45.50 SEVERE PERSISTENT ASTHMA WITHOUT COMPLICATION: ICD-10-CM

## 2025-01-03 DIAGNOSIS — R73.01 IMPAIRED FASTING BLOOD SUGAR: ICD-10-CM

## 2025-01-03 DIAGNOSIS — N95.1 VAGINAL DRYNESS, MENOPAUSAL: ICD-10-CM

## 2025-01-03 DIAGNOSIS — R10.12 LUQ PAIN: Primary | ICD-10-CM

## 2025-01-03 DIAGNOSIS — D72.10 EOSINOPHILIA: ICD-10-CM

## 2025-01-03 PROCEDURE — 99214 OFFICE O/P EST MOD 30 MIN: CPT | Performed by: NURSE PRACTITIONER

## 2025-01-03 RX ORDER — EPINEPHRINE 0.3 MG/.3ML
0.3 INJECTION SUBCUTANEOUS ONCE
Qty: 0.6 ML | Refills: 0 | Status: SHIPPED | OUTPATIENT
Start: 2025-01-03 | End: 2025-01-03

## 2025-01-03 RX ORDER — ESTRADIOL 0.1 MG/G
CREAM VAGINAL
Qty: 42.5 G | Refills: 1 | Status: SHIPPED | OUTPATIENT
Start: 2025-01-03 | End: 2025-03-28

## 2025-01-03 NOTE — PROGRESS NOTES
"Name: Rosa Caraballo      : 1955      MRN: [id number]  Encounter Provider: JOEL Liu  Encounter Date: 1/3/2025   Encounter department: [facility] [place]  :  Assessment & Plan  Severe persistent asthma without complication    Orders:    EPINEPHrine (EPIPEN) 0.3 mg/0.3 mL SOAJ; Inject 0.3 mL (0.3 mg total) into a muscle once for 1 dose    Eosinophilia    Orders:    EPINEPHrine (EPIPEN) 0.3 mg/0.3 mL SOAJ; Inject 0.3 mL (0.3 mg total) into a muscle once for 1 dose    LUQ pain    Orders:    CT abdomen wo contrast; Future    Will order CT of the abdomen due to pain continuing to months  Impaired fasting blood sugar    Orders:    Hemoglobin A1C    Will order A1C due to increased fasting sugars    Will follow back up with Dr. Smyth for a routine visit.      History of Present Illness     Rosa is for an acute visit. She has been having pain on and off since October in the [place]. At times it is electrifying and feels like shocks. She denies any nausea, vomiting, does have celiacs disease and does see GI. She states when the pain happens it is so bad she has to stop what she is doing. She also was concerned about her A1Cs and was concerned this could have been her pancreas. She denies any fever. She offers no other issues.      Review of Systems   Gastrointestinal:  Positive for abdominal pain.   All other systems reviewed and are negative.      Objective   /70 (BP Location: Left arm, Patient Position: Sitting)   Pulse 77   Temp (!) 97.2 °F (36.2 °C) (Temporal)   Ht 5' 4.5\" (1.638 m)   Wt 66.3 kg (146 lb 1.6 oz)   SpO2 97%   BMI 24.69 kg/m²      Physical Exam  Vitals reviewed.   Constitutional:       Appearance: Normal appearance. She is normal weight.   Cardiovascular:      Rate and Rhythm: Normal rate and regular rhythm.      Pulses: Normal pulses.      Heart sounds: Normal heart sounds.   Pulmonary:      Effort: Pulmonary effort is normal.      Breath sounds: Normal " breath sounds.   Abdominal:      General: Abdomen is flat. Bowel sounds are normal.      Palpations: Abdomen is soft.      Tenderness: There is abdominal tenderness.   Skin:     General: Skin is warm and dry.      Capillary Refill: Capillary refill takes less than 2 seconds.   Neurological:      General: No focal deficit present.      Mental Status: She is alert and oriented to person, place, and time. Mental status is at baseline.   Psychiatric:         Mood and Affect: Mood normal.         Behavior: Behavior normal.         Thought Content: Thought content normal.         Judgment: Judgment normal.          Stelara Counseling:  I discussed with the patient the risks of ustekinumab including but not limited to immunosuppression, malignancy, posterior leukoencephalopathy syndrome, and serious infections.  The patient understands that monitoring is required including a PPD at baseline and must alert us or the primary physician if symptoms of infection or other concerning signs are noted.

## 2025-01-03 NOTE — ASSESSMENT & PLAN NOTE
Orders:    EPINEPHrine (EPIPEN) 0.3 mg/0.3 mL SOAJ; Inject 0.3 mL (0.3 mg total) into a muscle once for 1 dose

## 2025-01-03 NOTE — ASSESSMENT & PLAN NOTE
Orders:    CT abdomen wo contrast; Future    Will order CT of the abdomen due to pain continuing to months

## 2025-01-07 ENCOUNTER — HOSPITAL ENCOUNTER (OUTPATIENT)
Dept: INFUSION CENTER | Facility: HOSPITAL | Age: 70
Discharge: HOME | End: 2025-01-07
Attending: INTERNAL MEDICINE
Payer: COMMERCIAL

## 2025-01-07 VITALS
DIASTOLIC BLOOD PRESSURE: 71 MMHG | RESPIRATION RATE: 16 BRPM | OXYGEN SATURATION: 99 % | HEART RATE: 62 BPM | TEMPERATURE: 97.2 F | SYSTOLIC BLOOD PRESSURE: 110 MMHG

## 2025-01-07 DIAGNOSIS — J45.50 SEVERE PERSISTENT ASTHMA WITHOUT COMPLICATION: ICD-10-CM

## 2025-01-07 DIAGNOSIS — D72.119 HYPEREOSINOPHILIC SYNDROME, UNSPECIFIED TYPE: Primary | ICD-10-CM

## 2025-01-07 PROCEDURE — 96372 THER/PROPH/DIAG INJ SC/IM: CPT

## 2025-01-07 RX ORDER — EPINEPHRINE 1 MG/ML
0.3 INJECTION, SOLUTION, CONCENTRATE INTRAVENOUS ONCE
Status: DISCONTINUED | OUTPATIENT
Start: 2025-01-07 | End: 2025-01-11 | Stop reason: HOSPADM

## 2025-01-07 RX ORDER — EPINEPHRINE 1 MG/ML
0.3 INJECTION, SOLUTION, CONCENTRATE INTRAVENOUS ONCE
OUTPATIENT
Start: 2025-03-03 | End: 2025-03-03

## 2025-01-07 RX ADMIN — BENRALIZUMAB 30 MG: 30 INJECTION, SOLUTION SUBCUTANEOUS at 15:01

## 2025-01-07 NOTE — PROGRESS NOTES
Pt here for fasenra injection. Pt has epi pen expires may 2025.   Pt offered no acute complaints today.   Pt tolerated injection to right arm x1  30 minute observation completed.N no adverse reactions noted  Pt aware of next appt 3/3 at 12 declined AVS.

## 2025-01-08 ENCOUNTER — OFFICE VISIT (OUTPATIENT)
Dept: PULMONOLOGY | Facility: CLINIC | Age: 70
End: 2025-01-08
Payer: COMMERCIAL

## 2025-01-08 VITALS
HEART RATE: 72 BPM | DIASTOLIC BLOOD PRESSURE: 68 MMHG | WEIGHT: 146 LBS | BODY MASS INDEX: 24.32 KG/M2 | HEIGHT: 65 IN | RESPIRATION RATE: 18 BRPM | SYSTOLIC BLOOD PRESSURE: 110 MMHG | OXYGEN SATURATION: 99 % | TEMPERATURE: 97.8 F

## 2025-01-08 DIAGNOSIS — J45.50 SEVERE PERSISTENT ASTHMA WITHOUT COMPLICATION: Primary | ICD-10-CM

## 2025-01-08 DIAGNOSIS — Z23 NEED FOR PNEUMOCOCCAL VACCINATION: ICD-10-CM

## 2025-01-08 PROCEDURE — 99213 OFFICE O/P EST LOW 20 MIN: CPT

## 2025-01-08 PROCEDURE — 90677 PCV20 VACCINE IM: CPT

## 2025-01-08 PROCEDURE — 90471 IMMUNIZATION ADMIN: CPT

## 2025-01-08 NOTE — PROGRESS NOTES
PT Discharge    Today's date: 2025  Patient name: Rosa Caraballo  : 1955  MRN: [id number]  Referring provider: Ronak Alanis DO  Dx:   Encounter Diagnosis     ICD-10-CM    1. Biceps tendinosis of left shoulder  M67.814                      Assessment    Assessment details: The patient has been seen in PT for a total of 6 visits since SOC.  She has made improvements and progress towards her goals, met her goals.  She has no complaints of pain and reports that she has returned to her PLOF.  At this time she is happy with her progress in PT and wishes to continue with her HEP, she is no longer requiring skilled PT services.  She demonstrated understanding of HEP for ROM, stretching and strengthening.  Answered all questions to patient's satisfaction.  Instructed patient she could phone clinic with any future questions or concerns.  D/C PT 2* goals met.  D/C PT.       Understanding of Dx/Px/POC: good     Prognosis: good    Goals  STGs:  1.  Initiate and complete HEP with verbal cues. - Met  2.  Decrease L shoulder/arm pain by > 25% in 4 weeks. - Met  3.  Improve L shoulder strength by 1/2-1 grade in 4 weeks. - Met  4.  Improve L shoulder ROM by 5-10 degrees in 4 weeks. - Met  LTGs:  1.  Patient to be I with HEP in 12 weeks. - Met  2.  Improve L shoulder ROM to WNL t/o in 12 weeks to improve function. - Met  3.  Improve L UE strength to > or = to 4 to 4+/5 t/o in 12 weeks to improve function. - Met  4.  Decrease L shoulder/arm pain to < or = to 2-3/10 with activity in 12 weeks to improve function. - Met  5.  Recreational performance in related activities is improved to PLOF in 12 weeks. - Met  6.  Postural control is improved to maximal level of function in 12 weeks. - Met  7.  ADL performance is improved to PLOF in 12 weeks. - Met          Plan    Planned therapy interventions: abdominal trunk stabilization, IASTM, joint mobilization, manual therapy, body mechanics training, neuromuscular re-education,  patient/caregiver education, postural training, self care, strengthening, stretching, therapeutic activities, therapeutic exercise, flexibility, functional ROM exercises and home exercise program    Treatment plan discussed with: patient      Subjective Evaluation    History of Present Illness  Mechanism of injury: The patient states that in April 2024 she was running after her dog outside and she fell and landed on her L shoulder.  Then about two weeks later she slipped on cardboard in the house and landed on her L shoulder again.  She notes that she had pain after these falls, this pain had been getting worse.    She had seen the orthopedic doctor (Dr. Alanis) on 10/1/24.  No updated imaging was completed.  She was referred to OPPT and pain management.      She had seen the pain management doctor (Dr. Prado) on 10/24.  She had an injection in her shoulder.  This shot helped for about four days.  She had then gone to  a saw horse and she got pain back in her L shoulder.  Then on 11/23 she was reaching up onto a shelf in her closet and she felt her L bicep tear.  She had increased pain in her arm.  She did not get any treatment after this incident.    She will be going back to see Dr. Mcarthur next Thursday for her follow up appointment for pain management.     The patient states that she had similar injury about two years ago on her R shoulder, did not need surgery for that, had course of PT at this clinic.      The patient states that she has pain in her L upper arm, still some in her shoulder but not too much.  She notes unable to lift anything with weight or bring her arm across her body.  Pain can vary depending on the activity she is doing.   She denies any N&T into LUE.      UPDATE 1/9/25:  The patient states that she is doing better overall, happy with her progress in PT.  Notes that she has not been having pain and is able to do more with her arm at home.  Has returned to her PLOF without  "difficulty.  She does get clicking in her shoulder with certain movements but not painful.  Has been without pain, at times with an ache but not painful.     She will be seeing Dr. Kellogg on 1/10/25.  Patient Goals  Patient goals for therapy: increased motion, decreased pain, increased strength and return to sport/leisure activities  Patient goal: \"To help improve my strength.\"  Pain  At best pain ratin  At worst pain ratin  Location: L Shoulder/Upper Arm  Relieving factors: ice    Social Support  Lives with: spouse    Employment status: not working  Hand dominance: right          Objective     Static Posture     Head  Forward.    Shoulders  Rounded.    Observations     Additional Observation Details  L upper arm - \"Jg muscle\" noted in upper arm      Palpation   Left   No palpable tenderness to the biceps.     Neurological Testing     Sensation     Shoulder   Left Shoulder   Intact: light touch    Right Shoulder   Intact: Light touch    Active Range of Motion   Left Shoulder   Flexion: 180 degrees   Abduction: 180 degrees   External rotation 90°: 90 degrees   Internal rotation 90°: 60 degrees     Right Shoulder   Flexion: 180 degrees   Abduction: 180 degrees   External rotation 90°: 90 degrees  Internal rotation 90°: 60 degrees     Left Elbow   Flexion: 150 degrees   Extension: 0 degrees   Forearm supination: WFL  Forearm pronation: WFL    Right Elbow   Flexion: 150 degrees   Extension: 0 degrees   Forearm supination: WFL  Forearm pronation: WFL    Strength/Myotome Testing     Left Shoulder     Planes of Motion   Flexion: 4+   Abduction: 4+   External rotation at 0°: 5   Internal rotation at 0°: 5     Right Shoulder     Planes of Motion   Flexion: WFL   Abduction: WFL   External rotation at 0°: WFL   Internal rotation at 0°: WFL     Left Elbow   Flexion: WFL  Extension: WFL    Right Elbow   Flexion: WFL  Extension: WFL                Precautions: Asthma, follicular lymphoma, GERD, Celiacs, recent L " "bicep tear       Manuals 1/9 12/12 12/16 12/23 1/2   L Shoulder all planes to tabitha                                Neuro Re-Ed         MTP/LTP Blue 3-5\"  3x10 ea Grn 2x10/3-5\" Grn 3x10/3-5\" Grn 3x10/3-5\" Blue 3x10/3-5\"   Ball Roll on Wall     20x   Phillip ER w/TBand Blue 3-5\"  3x10 Grn   2x10/3-5\" Grn   3x10/3-5\" Grn   3x10/3-5\" Grn   3x10/3-5\"        90/90 on wall  Grn 2x10                           Ther Ex        UBE Alt for Strength & Posture 80 RPM  10' Alt 90 RPM 10' alt 80 RPM 10' alt 80 RPM 10' alt 80 RPM 10' alt   Retractions        Tband IR/ER Blue 3-5\"  3x10 ea Grn  2x10/3-5\" Grn  2x10/3-5\" Grn  2x10/3-5\" Blue  3x10/3-5\"   Tband Add Blue 3-5\"  3x10 Grn 2x10/3-5\" Grn 2x10/3-5\" Grn 2x10/3-5\" Blue  3x10/3-5\"   Tband Abd        Supine Punch        S/L ER & Abd        Pulleys   Flex/Scap        Wall Slides with Lift Off  Towel 20x/3-5\" Towel 20x/3-5\" Towel 20x/3-5\"    Isometrics  Flex/abd/ER/IR  10x10\"  Flex/abd/ER/IR  10x10\"  Flex/abd/ER/IR  10x10\"     Wall Pushups Wall 10x  Edge of table 10x  2x10/3-5\" cues  2x10/3-5\" cues Wall  1x10  Edge of table  1x10   Stand Horiz Abd w/TBand                                Ther Activity                        Gait Training                        Modalities        HP/CP prn                   Access Code: [email or web address]  URL: [email or web address]  Date: 12/04/2024  Prepared by: Mercy    Exercises  - Shoulder Internal Rotation with Resistance  - 1 x daily - 7 x weekly - 1 sets - 10 reps  - Shoulder External Rotation with Anchored Resistance  - 1 x daily - 7 x weekly - 1 sets - 10 reps  - Shoulder extension with resistance - Neutral  - 1 x daily - 7 x weekly - 1 sets - 10 reps  - Scapular Retraction with Resistance  - 1 x daily - 7 x weekly - 1 sets - 10 reps  - Shoulder Adduction with Anchored Resistance  - 1 x daily - 7 x weekly - 1 sets - 10 reps                "

## 2025-01-08 NOTE — PROGRESS NOTES
Pulmonary Follow Up Note  Rosa Caraballo 69 y.o. female MRN: [id number]  1/8/2025    Assessment/Plan:    Severe persistent asthma without complication  -Currently controlled. Tolerated switch from Breo to Pulmicort, no jitteriness with this. No significant daily pulmonary symptoms. Rarely using PRNs  -Treated end of November with abx and steroids for sinusitis, did not exacerbate her asthma    Plan:  -She will continue Pulmicort 1 puff twice daily, levalbuterol HFA/nebs every 6 hours PRN, and Singulair  -Continue Fasenra injections, last dose yesterday  -Pneumonia vaccine updated today  -Follow-up in 6 months or sooner if needed          Diagnoses and all orders for this visit:    Severe persistent asthma without complication    Need for pneumococcal vaccination  -     Pneumococcal Conjugate Vaccine 20-valent (Pcv20)        Return in about 6 months (around 7/8/2025).    History of Present Illness     Chief Complaint:   Chief Complaint   Patient presents with    Follow-up     3 month follow up        Patient ID: Rosa is a 69 y.o. y.o. female has a past medical history of Asthma, Cancer (HCC), Celiac disease, Chronic sinusitis (04/27/2023), Colon polyp, Follicular lymphoma (HCC), GERD (gastroesophageal reflux disease), Heart palpitations, History of colonic polyps, History of radiation therapy (2010), Hyperlipidemia, Insomnia, Irregular heart beat, Lymphoma, 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HCC), Malignant lymphoma (HCC) (2010), Postmenopausal disorder, Pulmonary nodule, Restless legs syndrome, and TMJ syndrome.      1/8/2025  HPI: Rosa Caraballo is a 69 y.o. female with a history of severe persistent asthma, allergic rhinitis, and GERD who is here today for a follow-up visit.  Last seen in the office 3 months ago.  Was having shakiness/jitteriness with use of Breo and levalbuterol.  She was switched from Breo to ICS only inhaler, Pulmicort.  Maintained on levalbuterol HFA/ nebs as needed.  Restarted Singulair and maintained on Fasenra  injections.    Patient is feeling great today. She is tolerating the Pulmicort inhaler. Uses her PRNs 1-2 times per month on average. Notices an improvement of symptoms with Singulair and is just taking it during her known bad periods such as Winter and Summer months. Denies any daily symptoms of shortness of breath, cough, or wheezing. Had her last Fasenra injection yesterday. She had a bad cold the beginning of December and was treated at urgent care with prednisone and Augmentin, but she states she didn't have much difficulty with her breathing during her illness.       Review of Systems   Constitutional:  Negative for activity change, chills, diaphoresis, fever and unexpected weight change.   HENT:  Negative for congestion, postnasal drip, rhinorrhea, sore throat, trouble swallowing and voice change.    Respiratory:  Negative for cough, chest tightness, shortness of breath and wheezing.    Cardiovascular:  Negative for chest pain, palpitations and leg swelling.   Allergic/Immunologic: Positive for environmental allergies.       Historical Information   Past Medical History:   Diagnosis Date    Asthma     Cancer (HCC)     Follicular lymphoma    Celiac disease     Chronic sinusitis 04/27/2023    Colon polyp     Follicular lymphoma (HCC)     GERD (gastroesophageal reflux disease)     Heart palpitations     History of colonic polyps     resolved 07/12/2016    History of radiation therapy 2010    Hyperlipidemia     Insomnia     Irregular heart beat     Lymphoma, 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HCC)     non hodgekins, remission    Malignant lymphoma (HCC) 2010    rt arm cutaneous follicular stage ia (rt diatal medical biceps area , s/p resected and s/p radistion treatment    resolved 12/17/2014    Postmenopausal disorder     resolved 09/21/2017    Pulmonary nodule     BENIGN, STABLE 2362-9114    Restless legs syndrome     resolved 09/21/2017    TMJ syndrome     resolved 09/21/2017     Past Surgical History:   Procedure Laterality Date     COLONOSCOPY  04/16/2019    FUNCTIONAL ENDOSCOPIC SINUS SURGERY Bilateral 2013    Dr Bagley    HYSTERECTOMY      age 45 complete    LYMPH NODE DISSECTION Right     arm    NASAL SEPTUM SURGERY  2013    with turbinate reduction - Dr. Bagley    OOPHORECTOMY Bilateral     IA ESOPHAGOGASTRODUODENOSCOPY TRANSORAL DIAGNOSTIC N/A 01/18/2016    Procedure: ESOPHAGOGASTRODUODENOSCOPY (EGD);  Surgeon: Ness Shepherd MD;  Location: [facility];  Service: Gastroenterology    IA EXCISION GANGLION WRIST DORSAL/VOLAR RECURRENT Left 12/08/2020    Procedure: WRIST GANGLION CYST EXCISION;  Surgeon: Marie Gaspar MD;  Location: [facility];  Service: Orthopedics    IA EXCISION TUMOR SOFT TISSUE SHOULDER SUBQ 3 CM/> Left 11/27/2023    Procedure: anterior shouler- EXCISION BIOPSY TISSUE LESION/MASS UPPER EXTREMITY;  Surgeon: Ronak Alanis DO;  Location: [facility];  Service: Orthopedics    SYNOVECTOMY N/A 12/08/2020    Procedure: ECU TENOSYNOVECTOMY;  Surgeon: Marie Gaspar MD;  Location: [facility];  Service: Orthopedics    TONSILLECTOMY      TOTAL ABDOMINAL HYSTERECTOMY W/ BILATERAL SALPINGOOPHORECTOMY      age 51    UPPER GASTROINTESTINAL ENDOSCOPY      US GUIDED MSK PROCEDURE  01/16/2020    US GUIDED MSK PROCEDURE  08/07/2020    US GUIDED MSK PROCEDURE  08/02/2021     Family History   Problem Relation Age of Onset    Lymphoma Mother     Throat cancer Father     Heart failure Father     Atrial fibrillation Father     Diabetes Father     Colonic polyp Father     Hypertension Father     Thyroid cancer Sister     No Known Problems Maternal Grandmother     No Known Problems Maternal Grandfather     Breast cancer Paternal Grandmother 69    Cancer Paternal Grandfather     Lung cancer Paternal Grandfather     No Known Problems Maternal Aunt     Breast cancer Paternal Aunt 69    Ovarian cancer Paternal Aunt 70    Skin cancer Paternal Aunt     Throat cancer Paternal Uncle     BRCA1 Positive Cousin     Colon cancer Neg Hx     Cervical  cancer Neg Hx     Uterine cancer Neg Hx        Smoking history: She reports that she quit smoking about 21 years ago. Her smoking use included cigarettes. She started smoking about 41 years ago. She has a 15 pack-year smoking history. She has never used smokeless tobacco.    Occupational History:     Immunization History   Administered Date(s) Administered    COVID-19 PFIZER VACCINE 0.3 ML IM 03/22/2021, 04/14/2021, 12/05/2021, 09/03/2022    COVID-19 Pfizer Vac BIVALENT Thee-sucrose 12 Yr+ IM 10/30/2022    COVID-19 Pfizer vac (Thee-sucrose, gray cap) 12 yr+ IM 05/25/2022    H1N1, All Formulations 10/28/2009    INFLUENZA 10/15/2018, 10/24/2020, 10/11/2021, 10/02/2022, 10/22/2023    Pneumococcal Conjugate Vaccine 20-valent (Pcv20), Polysace 01/08/2025    Tdap 05/23/2016       Meds/Allergies     Current Outpatient Medications:     benralizumab (FASENRA) subcutaneous injection, Inject 1 mL (30 mg total) under the skin every 56 days for 6 doses, Disp: 1 Syringe, Rfl: 6    bimatoprost (LATISSE) 0.03 % ophthalmic solution, Take as directed, Disp: , Rfl:     budesonide (Pulmicort Flexhaler) 90 MCG/ACT inhaler, Inhale 1 puff 2 (two) times a day Rinse mouth after use., Disp: 1 each, Rfl: 3    Cholecalciferol 50 MCG (2000 UT) CAPS, Take by mouth daily, Disp: , Rfl:     cyanocobalamin (VITAMIN B-12) 1,000 mcg tablet, Take 1,000 mcg by mouth daily, Disp: , Rfl:     erythromycin with ethanol (EMGEL) 2 % gel, Apply topically 2 (two) times a day, Disp: 30 g, Rfl: 1    estradiol (ESTRACE VAGINAL) 0.1 mg/g vaginal cream, Insert 1 g intravaginally twice weekly for dryness, Disp: 42.5 g, Rfl: 1    famotidine (PEPCID) 20 mg tablet, take 1 tablet by mouth twice a day, Disp: 180 tablet, Rfl: 1    flecainide (TAMBOCOR) 100 mg tablet, take 1 tablet by mouth twice a day, Disp: 180 tablet, Rfl: 3    fluticasone (FLONASE) 50 mcg/act nasal spray, 2 sprays into each nostril 2 (two) times a day, Disp: 48 g, Rfl: 4    ipratropium (ATROVENT) 0.03 %  nasal spray, 2 sprays into each nostril 3 (three) times a day as needed for rhinitis, Disp: 30 mL, Rfl: 3    levalbuterol (XOPENEX HFA) 45 mcg/act inhaler, Inhale 1-2 puffs every 4 (four) hours as needed for wheezing or shortness of breath, Disp: 45 g, Rfl: 0    lidocaine (LMX) 4 % cream, Apply topically as needed for mild pain, Disp: 15 g, Rfl: 3    Magnesium 500 MG CAPS, Take by mouth, Disp: , Rfl:     metoprolol succinate (TOPROL-XL) 25 mg 24 hr tablet, Take 1 tablet (25 mg total) by mouth every evening, Disp: 90 tablet, Rfl: 5    montelukast (SINGULAIR) 10 mg tablet, Take 1 tablet (10 mg total) by mouth daily at bedtime (Patient taking differently: Take 10 mg by mouth daily at bedtime As needed), Disp: 30 tablet, Rfl: 3    polyethylene glycol (GLYCOLAX) 17 GM/SCOOP powder, Take 17 g by mouth 2 (two) times a day, Disp: 765 g, Rfl: 4    rosuvastatin (CRESTOR) 5 mg tablet, take 1 tablet by mouth once daily, Disp: 90 tablet, Rfl: 3    zolpidem (AMBIEN) 5 mg tablet, Take 1 tablet (5 mg total) by mouth daily at bedtime as needed for sleep, Disp: 30 tablet, Rfl: 2    doxycycline hyclate (VIBRAMYCIN) 100 mg capsule, , Disp: , Rfl:     EPINEPHrine (EPIPEN) 0.3 mg/0.3 mL SOAJ, Inject 0.3 mL (0.3 mg total) into a muscle once for 1 dose, Disp: 0.6 mL, Rfl: 0    levalbuterol (Xopenex) 1.25 mg/3 mL nebulizer solution, Take 3 mL (1.25 mg total) by nebulization every 4 (four) hours as needed for wheezing or shortness of breath for up to 25 days (Patient not taking: Reported on 12/12/2024), Disp: 75 mL, Rfl: 6    linaCLOtide 72 MCG CAPS, Take 72 mcg by mouth daily (Patient not taking: Reported on 1/3/2025), Disp: 4 capsule, Rfl: 0    meloxicam (MOBIC) 15 mg tablet, Take 1 tablet (15 mg total) by mouth daily as needed for moderate pain, Disp: 90 tablet, Rfl: 1    methocarbamol (ROBAXIN) 500 mg tablet, Take 1 tablet (500 mg total) by mouth 3 (three) times a day as needed for muscle spasms (Patient not taking: Reported on  "11/6/2024), Disp: 60 tablet, Rfl: 0    ondansetron (ZOFRAN-ODT) 4 mg disintegrating tablet, Take 1 tablet (4 mg total) by mouth every 6 (six) hours as needed for nausea or vomiting (Patient not taking: Reported on 11/29/2024), Disp: 15 tablet, Rfl: 0    senna-docusate sodium (SENOKOT-S) 8.6-50 mg per tablet, Take 1 tablet by mouth daily for 14 days (Patient not taking: Reported on 9/4/2024), Disp: 14 tablet, Rfl: 0    sucralfate (CARAFATE) 1 g tablet, Take 1 tablet (1 g total) by mouth 2 (two) times a day (Patient not taking: Reported on 1/3/2025), Disp: 60 tablet, Rfl: 5  No current facility-administered medications for this visit.    Facility-Administered Medications Ordered in Other Visits:     EPINEPHrine PF (ADRENALIN) 1 mg/mL injection 0.3 mg, 0.3 mg, Intramuscular, Once, Darien Mckeon MD    Allergies:   Allergies   Allergen Reactions    Gluten Meal - Food Allergy GI Intolerance     Celiac     Morphine And Codeine Itching    Nuts - Food Allergy Hives     Almonds,walnuts, hazelnuts and other related nuts.     Shellfish-Derived Products - Food Allergy Anaphylaxis    Wheat Bran - Food Allergy GI Intolerance    Sulfa Antibiotics Rash         Vitals:  Vitals:    01/08/25 1101   BP: 110/68   BP Location: Right arm   Patient Position: Sitting   Cuff Size: Standard   Pulse: 72   Resp: 18   Temp: 97.8 °F (36.6 °C)   TempSrc: Temporal   SpO2: 99%   Weight: 66.2 kg (146 lb)   Height: 5' 4.5\" (1.638 m)   Oxygen Therapy  SpO2: 99 %  .  Wt Readings from Last 3 Encounters:   01/08/25 66.2 kg (146 lb)   01/03/25 66.3 kg (146 lb 1.6 oz)   12/12/24 66.2 kg (146 lb)     Body mass index is 24.67 kg/m².    Physical Exam  Vitals and nursing note reviewed.   Constitutional:       General: She is not in acute distress.     Appearance: Normal appearance. She is well-developed.   Cardiovascular:      Rate and Rhythm: Normal rate and regular rhythm.      Heart sounds: Normal heart sounds, S1 normal and S2 normal. No murmur " heard.  Pulmonary:      Effort: Pulmonary effort is normal.      Breath sounds: Normal breath sounds. No decreased breath sounds, wheezing, rhonchi or rales.   Musculoskeletal:         General: No swelling.      Right lower leg: No edema.      Left lower leg: No edema.   Neurological:      Mental Status: She is alert.   Psychiatric:         Mood and Affect: Mood and affect normal.         Behavior: Behavior normal. Behavior is cooperative.           Labs: I have personally reviewed pertinent lab results.  Lab Results   Component Value Date    WBC 6.54 09/30/2024    HGB 12.9 09/30/2024    HCT 39.6 09/30/2024    MCV 96 09/30/2024     09/30/2024     Lab Results   Component Value Date    GLUCOSE 112 06/22/2015    CALCIUM 8.6 09/30/2024     06/22/2015    K 3.9 09/30/2024    CO2 24 09/30/2024     09/30/2024    BUN 18 09/30/2024    CREATININE 0.69 09/30/2024     Lab Results   Component Value Date     (H) 09/02/2020     Lab Results   Component Value Date    ALT 18 09/30/2024    AST 20 09/30/2024    ALKPHOS 56 09/30/2024    BILITOT 0.63 06/22/2015

## 2025-01-08 NOTE — ASSESSMENT & PLAN NOTE
-Currently controlled. Tolerated switch from Breo to Pulmicort, no jitteriness with this. No significant daily pulmonary symptoms. Rarely using PRNs  -Treated end of November with abx and steroids for sinusitis, did not exacerbate her asthma    Plan:  -She will continue Pulmicort 1 puff twice daily, levalbuterol HFA/nebs every 6 hours PRN, and Singulair  -Continue Fasenra injections, last dose yesterday  -Pneumonia vaccine updated today  -Follow-up in 6 months or sooner if needed

## 2025-01-09 ENCOUNTER — HOSPITAL ENCOUNTER (OUTPATIENT)
Dept: CT IMAGING | Facility: HOSPITAL | Age: 70
End: 2025-01-09
Payer: COMMERCIAL

## 2025-01-09 ENCOUNTER — APPOINTMENT (OUTPATIENT)
Dept: LAB | Facility: CLINIC | Age: 70
End: 2025-01-09
Payer: COMMERCIAL

## 2025-01-09 ENCOUNTER — EVALUATION (OUTPATIENT)
Dept: PHYSICAL THERAPY | Facility: CLINIC | Age: 70
End: 2025-01-09
Payer: COMMERCIAL

## 2025-01-09 DIAGNOSIS — R10.12 LUQ PAIN: ICD-10-CM

## 2025-01-09 DIAGNOSIS — M67.814 BICEPS TENDINOSIS OF LEFT SHOULDER: Primary | ICD-10-CM

## 2025-01-09 PROCEDURE — 83036 HEMOGLOBIN GLYCOSYLATED A1C: CPT | Performed by: NURSE PRACTITIONER

## 2025-01-09 PROCEDURE — 74150 CT ABDOMEN W/O CONTRAST: CPT

## 2025-01-09 PROCEDURE — 97112 NEUROMUSCULAR REEDUCATION: CPT | Performed by: PHYSICAL THERAPIST

## 2025-01-09 PROCEDURE — 97110 THERAPEUTIC EXERCISES: CPT | Performed by: PHYSICAL THERAPIST

## 2025-01-09 PROCEDURE — 36415 COLL VENOUS BLD VENIPUNCTURE: CPT | Performed by: NURSE PRACTITIONER

## 2025-01-10 ENCOUNTER — OFFICE VISIT (OUTPATIENT)
Dept: OBGYN CLINIC | Facility: CLINIC | Age: 70
End: 2025-01-10
Payer: COMMERCIAL

## 2025-01-10 VITALS — BODY MASS INDEX: 23.99 KG/M2 | HEIGHT: 65 IN | WEIGHT: 144 LBS

## 2025-01-10 DIAGNOSIS — S46.212S BICEPS RUPTURE, DISTAL, LEFT, SEQUELA: ICD-10-CM

## 2025-01-10 DIAGNOSIS — S46.112A RUPTURE OF LEFT LONG HEAD BICEPS TENDON, INITIAL ENCOUNTER: Primary | ICD-10-CM

## 2025-01-10 DIAGNOSIS — M25.512 LEFT SHOULDER PAIN, UNSPECIFIED CHRONICITY: ICD-10-CM

## 2025-01-10 LAB
EST. AVERAGE GLUCOSE BLD GHB EST-MCNC: 123 MG/DL
HBA1C MFR BLD: 5.9 %

## 2025-01-10 PROCEDURE — 99214 OFFICE O/P EST MOD 30 MIN: CPT | Performed by: STUDENT IN AN ORGANIZED HEALTH CARE EDUCATION/TRAINING PROGRAM

## 2025-01-10 NOTE — PROGRESS NOTES
Ortho Sports Medicine Shoulder New Patient Visit     Assesment:   69 y.o. female with ruptured left long head of biceps      Recommended heat and stretching  Requests referral to Dr Mcarthur, but will defer for now.   Repeat MRI before considering injections    Plan:  Reviewed history, physical exam, and imaging with the patient at time of visit. Patient presents with left shoulder pain for 1.5 months after reaching for a shoebox and feeling a sharp pain and pop over the anterior shoulder.  On exam, a mere deformity of the left biceps was noted. Patient's range of motion and strength are excellent. Explained to the patient that she does have a ruptured long head of the biceps.  I discussed the role of the biceps tendon in shoulder and elbow function as well as potential treatment options.  I discussed with the patient that in most cases this can be treated nonoperatively.  I discussed with her should be no significant deficits in strength or range of motion.  I discussed that she will continue to have a persistent deformity that she may have intermittent muscle cramping.  Patient states that her symptoms have improved with physical therapy.  I recommend that she continue with that.  She could potentially have a rotator cuff tear as well.  I discussed that if the symptoms persist we can get an MRI.  Otherwise, she can follow-up on an as-needed basis. The patient demonstrated understanding of the discussion and was in agreement with the plan.  All of the questions were answered.  Patient can reach out to clinic with any questions or concerns at any time.        Follow up:  Return if symptoms worsen or fail to improve.      Chief Complaint   Patient presents with    Left Shoulder - Pain         History of Present Illness:  The patient is a 69 y.o. female seen in clinic for left shoulder pain. The pain started 11/24/2024. The mechanism of injury was reaching for a shoebox at shoulder height. Patient felt immediate pain,  pop over the proximal biceps. The pain is now located anteriorly and is associated with clicking and mere deformity. Patient is rated at 2/10 today. Symptoms are aggravated by flexing the elbow. The patient has tried rest, injection with Dr Bedolla on 11/24/2024 (relief for a couple days), and physical therapy. Symptoms have improved since the injury. She has been attended physical therapy for 6 sessions, and has continued with home exercises without issue. Denies numbness or tingling. Patient has a history of a fall after she tripped over her dog and fell on her left shoulder, lipoma excision with Dr Alanis 11/27/2023.    Occupation: retired    The patient has the following co-morbidities: follicular lymphoma, pulmonary nodule      Shoulder Surgical History:  Anterior shoulder excision biopsy tissue lesion 11/27/2023    Past Medical, Social and Family History:  Past Medical History:   Diagnosis Date    Asthma     Cancer (HCC)     Follicular lymphoma    Celiac disease     Chronic sinusitis 04/27/2023    Colon polyp     Follicular lymphoma (HCC)     GERD (gastroesophageal reflux disease)     Heart palpitations     History of colonic polyps     resolved 07/12/2016    History of radiation therapy 2010    Hyperlipidemia     Insomnia     Irregular heart beat     Lymphoma, 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HCC)     non hodgekins, remission    Malignant lymphoma (HCC) 2010    rt arm cutaneous follicular stage ia (rt diatal medical biceps area , s/p resected and s/p radistion treatment    resolved 12/17/2014    Postmenopausal disorder     resolved 09/21/2017    Pulmonary nodule     BENIGN, STABLE [id number]    Restless legs syndrome     resolved 09/21/2017    TMJ syndrome     resolved 09/21/2017     Past Surgical History:   Procedure Laterality Date    COLONOSCOPY  04/16/2019    FUNCTIONAL ENDOSCOPIC SINUS SURGERY Bilateral 2013    Dr Bagley    HYSTERECTOMY      age 45 complete    LYMPH NODE DISSECTION Right     arm    NASAL SEPTUM SURGERY  2013     with turbinate reduction - Dr. Bagley    OOPHORECTOMY Bilateral     OR ESOPHAGOGASTRODUODENOSCOPY TRANSORAL DIAGNOSTIC N/A 01/18/2016    Procedure: ESOPHAGOGASTRODUODENOSCOPY (EGD);  Surgeon: Ness Shepherd MD;  Location: [facility];  Service: Gastroenterology    OR EXCISION GANGLION WRIST DORSAL/VOLAR RECURRENT Left 12/08/2020    Procedure: WRIST GANGLION CYST EXCISION;  Surgeon: Marie Gaspar MD;  Location: [facility];  Service: Orthopedics    OR EXCISION TUMOR SOFT TISSUE SHOULDER SUBQ 3 CM/> Left 11/27/2023    Procedure: anterior shouler- EXCISION BIOPSY TISSUE LESION/MASS UPPER EXTREMITY;  Surgeon: Ronak Alanis DO;  Location: [facility];  Service: Orthopedics    SYNOVECTOMY N/A 12/08/2020    Procedure: ECU TENOSYNOVECTOMY;  Surgeon: Marie Gaspar MD;  [facility]: [facility];  Service: Orthopedics    TONSILLECTOMY      TOTAL ABDOMINAL HYSTERECTOMY W/ BILATERAL SALPINGOOPHORECTOMY      age 51    UPPER GASTROINTESTINAL ENDOSCOPY      US GUIDED MSK PROCEDURE  01/16/2020    US GUIDED MSK PROCEDURE  08/07/2020    US GUIDED MSK PROCEDURE  08/02/2021     Allergies   Allergen Reactions    Gluten Meal - Food Allergy GI Intolerance     Celiac     Morphine And Codeine Itching    Nuts - Food Allergy Hives     Almonds,walnuts, hazelnuts and other related nuts.     Shellfish-Derived Products - Food Allergy Anaphylaxis    Wheat Bran - Food Allergy GI Intolerance    Sulfa Antibiotics Rash     Current Outpatient Medications on File Prior to Visit   Medication Sig Dispense Refill    benralizumab (FASENRA) subcutaneous injection Inject 1 mL (30 mg total) under the skin every 56 days for 6 doses 1 Syringe 6    bimatoprost (LATISSE) 0.03 % ophthalmic solution Take as directed      budesonide (Pulmicort Flexhaler) 90 MCG/ACT inhaler Inhale 1 puff 2 (two) times a day Rinse mouth after use. 1 each 3    Cholecalciferol 50 MCG (2000 UT) CAPS Take by mouth daily      cyanocobalamin (VITAMIN B-12) 1,000 mcg tablet Take  1,000 mcg by mouth daily      erythromycin with ethanol (EMGEL) 2 % gel Apply topically 2 (two) times a day 30 g 1    estradiol (ESTRACE VAGINAL) 0.1 mg/g vaginal cream Insert 1 g intravaginally twice weekly for dryness 42.5 g 1    famotidine (PEPCID) 20 mg tablet take 1 tablet by mouth twice a day 180 tablet 1    flecainide (TAMBOCOR) 100 mg tablet take 1 tablet by mouth twice a day 180 tablet 3    fluticasone (FLONASE) 50 mcg/act nasal spray 2 sprays into each nostril 2 (two) times a day 48 g 4    ipratropium (ATROVENT) 0.03 % nasal spray 2 sprays into each nostril 3 (three) times a day as needed for rhinitis 30 mL 3    lidocaine (LMX) 4 % cream Apply topically as needed for mild pain 15 g 3    Magnesium 500 MG CAPS Take by mouth      montelukast (SINGULAIR) 10 mg tablet Take 1 tablet (10 mg total) by mouth daily at bedtime 30 tablet 3    polyethylene glycol (GLYCOLAX) 17 GM/SCOOP powder Take 17 g by mouth 2 (two) times a day 765 g 4    rosuvastatin (CRESTOR) 5 mg tablet take 1 tablet by mouth once daily 90 tablet 3    sucralfate (CARAFATE) 1 g tablet Take 1 tablet (1 g total) by mouth 2 (two) times a day 60 tablet 5    zolpidem (AMBIEN) 5 mg tablet Take 1 tablet (5 mg total) by mouth daily at bedtime as needed for sleep 30 tablet 2    doxycycline hyclate (VIBRAMYCIN) 100 mg capsule  (Patient not taking: Reported on 11/6/2024)      EPINEPHrine (EPIPEN) 0.3 mg/0.3 mL SOAJ Inject 0.3 mL (0.3 mg total) into a muscle once for 1 dose 0.6 mL 0    levalbuterol (XOPENEX HFA) 45 mcg/act inhaler Inhale 1-2 puffs every 4 (four) hours as needed for wheezing or shortness of breath (Patient not taking: Reported on 1/10/2025) 45 g 0    levalbuterol (Xopenex) 1.25 mg/3 mL nebulizer solution Take 3 mL (1.25 mg total) by nebulization every 4 (four) hours as needed for wheezing or shortness of breath for up to 25 days (Patient not taking: Reported on 12/12/2024) 75 mL 6    linaCLOtide 72 MCG CAPS Take 72 mcg by mouth daily (Patient  not taking: Reported on 1/3/2025) 4 capsule 0    meloxicam (MOBIC) 15 mg tablet Take 1 tablet (15 mg total) by mouth daily as needed for moderate pain 90 tablet 1    methocarbamol (ROBAXIN) 500 mg tablet Take 1 tablet (500 mg total) by mouth 3 (three) times a day as needed for muscle spasms (Patient not taking: Reported on 2024) 60 tablet 0    metoprolol succinate (TOPROL-XL) 25 mg 24 hr tablet Take 1 tablet (25 mg total) by mouth every evening 90 tablet 5    ondansetron (ZOFRAN-ODT) 4 mg disintegrating tablet Take 1 tablet (4 mg total) by mouth every 6 (six) hours as needed for nausea or vomiting (Patient not taking: Reported on 2024) 15 tablet 0    senna-docusate sodium (SENOKOT-S) 8.6-50 mg per tablet Take 1 tablet by mouth daily for 14 days (Patient not taking: Reported on 2024) 14 tablet 0     Current Facility-Administered Medications on File Prior to Visit   Medication Dose Route Frequency Provider Last Rate Last Admin    EPINEPHrine PF (ADRENALIN) 1 mg/mL injection 0.3 mg  0.3 mg Intramuscular Once Darien Mckeon MD         Social History     Socioeconomic History    Marital status: /Civil Union     Spouse name: Not on file    Number of children: Not on file    Years of education: Not on file    Highest education level: Not on file   Occupational History    Occupation: X-ray technologist   Tobacco Use    Smoking status: Former     Current packs/day: 0.00     Average packs/day: 0.8 packs/day for 20.0 years (15.0 ttl pk-yrs)     Types: Cigarettes     Start date:      Quit date: 2004     Years since quittin.0    Smokeless tobacco: Never    Tobacco comments:     Quit 10/31/04 2130   Vaping Use    Vaping status: Never Used   Substance and Sexual Activity    Alcohol use: Yes     Alcohol/week: 3.0 standard drinks of alcohol     Types: 3 Glasses of wine per week     Comment: soc    Drug use: No    Sexual activity: Yes     Partners: Male   Other Topics Concern    Not on file   Social  "History Narrative    Caffeine use    exercise walking      Social Drivers of Health     Financial Resource Strain: Not on file   Food Insecurity: Not on file   Transportation Needs: Not on file   Physical Activity: Not on file   Stress: Not on file   Social Connections: Not on file   Intimate Partner Violence: Not on file   Housing Stability: Not on file       I have reviewed the past medical, surgical, social and family history, medications and allergies as documented in the EMR.    Review of systems: ROS is negative other than that noted in the HPI.  Constitutional: Negative for fatigue and fever.      Physical Exam:    Height 5' 4.5\" (1.638 m), weight 65.3 kg (144 lb), not currently breastfeeding.    General/Constitutional: NAD, well developed, well nourished  HENT: Normocephalic, atraumatic  CV: Intact distal pulses, regular rate  Resp: No respiratory distress or labored breathing  Neuro: Alert and Oriented x 3  Psych: Normal mood, normal affect, normal judgement, normal behavior  Skin: Warm, dry, no rashes, no erythema      Focused left shoulder exam:  No paracervical tenderness.   No cervical tenderness.   No pain with neck flexion, extension, side-to-side bending, or rotation.   Negative Spurling's bilaterally.    The skin is intact without evidence of erythema or ecchymosis. Symmetric shoulders with no evidence of supraspinatus or infraspinatus muscle atrophy. There is no evidence neurologic medial or lateral scapular winging. Normal scapular positioning.    Palpation demonstrates no tenderness over the SC joint, bilateral clavicle, lateral aspect of the acromion or posterior joint line, AC joint, or bicipital groove.    Shoulder ROM demonstrates 170 degrees of active and 170 degrees of passive forward elevation. External rotation with the arms at the side demonstrates 80 degree of active and 80 degrees of passive motion.  Internal rotation is to T8.        Strength testing demonstrates 5/5 strength in empty " can position, 5/5 with resisted external rotation with the arm at the side.  5/5 strength of the subscapularis and a negative belly press.  Hornblower sign is negative, external rotation lag sign is negative    Provocative testing for the following demonstrate-  Impingement- negative Hawkin's impingement test, negative Neer impingement sign.  Biceps- negative [place], negative Speeds test. Negative Hook test over distal biceps. Positive mere deformity  Stability- negative apprehension sign and a negative relocation test, negative anterior load and shift, negative posterior load and shift testing, negative Marcelina test and a negative Jerk test.  Labrum- negative [place] test, negative sulcus sign      UE NV Exam: +2 Radial pulses bilaterally. Fingers are warm and well-perfused.  Sensation intact to light touch C5-T1 bilaterally, Radial/median/ulnar nerve motor intact        Scribe Attestation      I,:  Mario Yanez PA-C am acting as a scribe while in the presence of the attending physician.:       I,:  Yony Kellogg MD personally performed the services described in this documentation    as scribed in my presence.:

## 2025-01-13 ENCOUNTER — RESULTS FOLLOW-UP (OUTPATIENT)
Dept: INTERNAL MEDICINE CLINIC | Facility: CLINIC | Age: 70
End: 2025-01-13

## 2025-01-21 ENCOUNTER — HOSPITAL ENCOUNTER (OUTPATIENT)
Dept: ULTRASOUND IMAGING | Facility: HOSPITAL | Age: 70
Discharge: HOME | End: 2025-01-21
Payer: COMMERCIAL

## 2025-01-21 ENCOUNTER — RESULTS FOLLOW-UP (OUTPATIENT)
Dept: OBGYN CLINIC | Facility: CLINIC | Age: 70
End: 2025-01-21

## 2025-01-21 ENCOUNTER — HOSPITAL ENCOUNTER (OUTPATIENT)
Dept: MAMMOGRAPHY | Facility: HOSPITAL | Age: 70
Discharge: HOME | End: 2025-01-21
Payer: COMMERCIAL

## 2025-01-21 DIAGNOSIS — N64.4 BREAST PAIN, LEFT: ICD-10-CM

## 2025-01-21 PROCEDURE — 77066 DX MAMMO INCL CAD BI: CPT

## 2025-01-21 PROCEDURE — G0279 TOMOSYNTHESIS, MAMMO: HCPCS

## 2025-01-21 PROCEDURE — 76642 ULTRASOUND BREAST LIMITED: CPT

## 2025-01-23 ENCOUNTER — OFFICE VISIT (OUTPATIENT)
Dept: INTERNAL MEDICINE CLINIC | Facility: CLINIC | Age: 70
End: 2025-01-23
Payer: COMMERCIAL

## 2025-01-23 VITALS
HEART RATE: 59 BPM | HEIGHT: 65 IN | TEMPERATURE: 97.4 F | DIASTOLIC BLOOD PRESSURE: 72 MMHG | SYSTOLIC BLOOD PRESSURE: 108 MMHG | BODY MASS INDEX: 24.56 KG/M2 | WEIGHT: 147.4 LBS | OXYGEN SATURATION: 100 %

## 2025-01-23 DIAGNOSIS — R10.12 LUQ PAIN: ICD-10-CM

## 2025-01-23 DIAGNOSIS — R73.01 IMPAIRED FASTING BLOOD SUGAR: Primary | ICD-10-CM

## 2025-01-23 DIAGNOSIS — K90.0 CELIAC DISEASE: ICD-10-CM

## 2025-01-23 DIAGNOSIS — C82.90 FOLLICULAR LYMPHOMA, UNSPECIFIED FOLLICULAR LYMPHOMA TYPE, UNSPECIFIED BODY REGION (HCC): ICD-10-CM

## 2025-01-23 DIAGNOSIS — E55.9 VITAMIN D DEFICIENCY: ICD-10-CM

## 2025-01-23 DIAGNOSIS — L65.9 HAIR LOSS: ICD-10-CM

## 2025-01-23 DIAGNOSIS — E78.5 DYSLIPIDEMIA: ICD-10-CM

## 2025-01-23 PROCEDURE — 99214 OFFICE O/P EST MOD 30 MIN: CPT | Performed by: FAMILY MEDICINE

## 2025-01-23 NOTE — PROGRESS NOTES
Name: Rosa Caraballo      : 1955      MRN: [id number]  Encounter Provider: Gris Smyth MD  Encounter Date: 2025   Encounter department: [facility]  :  Assessment & Plan  Impaired fasting blood sugar  Reviewed recent laboratory testing with her.  Hemoglobin A1c slightly above goal.  Watch diet more closely.  Watch carbohydrate intake.  Continue to be as active as possible.  Will recheck laboratory testing prior to her upcoming routine visit.  Orders:  •  CBC and differential; Future  •  Comprehensive metabolic panel; Future  •  Hemoglobin A1C; Future  •  Insulin, fasting; Future    Hair loss  Discussed potential causes for hair loss.  Discussed options both topical and oral that may help with hair growth.  Recommend collagen protein which can assist with hair growth.  Discussed other possibilities such as biotin and zinc.  Risks and benefits of these discussed.  Orders:  •  CBC and differential; Future    Dyslipidemia  Watch diet more closely.  Increase fiber in diet.  Try to remain as active as possible.  Orders:  •  CBC and differential; Future  •  Comprehensive metabolic panel; Future  •  Lipid panel; Future  •  TSH, 3rd generation; Future    Vitamin D deficiency  Continue with vitamin D supplementation.  Will continue to follow vitamin D levels with routine laboratory testing and adjust dose if needed.  Orders:  •  CBC and differential; Future  •  Vitamin D 25 hydroxy; Future    LUQ pain  Left upper quadrant pain discussed.  This is intermittent.  Discussed the possibility of this being related to gas since it is always occurring in the upright position.  Discussed also the possibility of this being muscular or nerve related.  Watch for any worsening.  Previous CT scan reviewed.  Orders:  •  CBC and differential; Future    Celiac disease  Continue to avoid gluten.  Did discuss with her that other foods that do not contain gluten still have carbohydrates and can  affect blood sugar.  Orders:  •  CBC and differential; Future    Follicular lymphoma, unspecified follicular lymphoma type, unspecified body region (HCC)  Continue to follow-up with oncology.  Continue with testing as per their recommendations.  Orders:  •  CBC and differential; Future      Orders and recommendations as noted above.  Continue with mammograms yearly.  Continue with bone densities every other year.  Up-to-date on colorectal cancer screening.  Up-to-date on flu shot.  Up-to-date on pneumonia vaccination.  Will have her follow-up in about 3 to 5 months or sooner if needed.     History of Present Illness   She presents for routine follow-up.  Has generally been doing relatively well.  She continues to have occasional issues with sharp pain which is brief in the left upper quadrant area.  These always occur while she is sitting.  Often notices when she is driving.  Did have the CT scan.  Was wondering whether this could be related somewhat to gas since she does take MiraLAX and magnesium at times for assistance with bowel movements.  Is also concerned because of some hair loss.  Has noticed that she is losing a lot more hair over the past 6 months to a year.  She continues to follow-up with oncology regarding the history of the follicular lymphoma.  She was concerned about her blood work since her hemoglobin A1c was elevated.  She does watch her diet pretty closely especially since she avoids gluten with the known celiac disease.  Still with chronic back issues.      Review of Systems   Constitutional:  Negative for activity change, appetite change, chills and fever.   HENT:  Negative for congestion and rhinorrhea.    Eyes:  Negative for visual disturbance.   Respiratory:  Negative for chest tightness and shortness of breath.    Cardiovascular:  Negative for chest pain and palpitations.   Gastrointestinal:  Positive for abdominal pain. Negative for blood in stool, diarrhea, nausea and vomiting.  "  Endocrine: Negative for polydipsia, polyphagia and polyuria.   Genitourinary:  Negative for dysuria, frequency and urgency.   Musculoskeletal:  Positive for arthralgias and back pain. Negative for gait problem.   Skin:  Negative for color change.   Neurological:  Negative for dizziness and headaches.   Hematological:  Does not bruise/bleed easily.   Psychiatric/Behavioral:  Negative for confusion and sleep disturbance. The patient is not nervous/anxious.        Objective   /72 (BP Location: Left arm, Patient Position: Sitting, Cuff Size: Standard)   Pulse 59   Temp (!) 97.4 °F (36.3 °C)   Ht 5' 4.5\" (1.638 m)   Wt 66.9 kg (147 lb 6.4 oz)   SpO2 100%   BMI 24.91 kg/m²      Physical Exam  Vitals and nursing note reviewed.   Constitutional:       General: She is not in acute distress.     Appearance: She is well-developed and well-groomed.   HENT:      Head: Normocephalic and atraumatic.   Eyes:      General:         Right eye: No discharge.         Left eye: No discharge.      Conjunctiva/sclera: Conjunctivae normal.      Pupils: Pupils are equal, round, and reactive to light.   Cardiovascular:      Rate and Rhythm: Normal rate and regular rhythm.      Heart sounds: Normal heart sounds. No murmur heard.     No friction rub. No gallop.   Pulmonary:      Effort: No respiratory distress.      Breath sounds: No wheezing or rales.   Abdominal:      General: Bowel sounds are normal. There is no distension.      Tenderness: There is no abdominal tenderness.   Lymphadenopathy:      Cervical: No cervical adenopathy.   Skin:     General: Skin is warm and dry.   Neurological:      Mental Status: She is alert and oriented to person, place, and time.   Psychiatric:         Mood and Affect: Mood and affect normal.         Speech: Speech normal.         Behavior: Behavior normal. Behavior is cooperative.         Cognition and Memory: Cognition and memory normal.         "

## 2025-01-24 ENCOUNTER — DOCUMENTATION (OUTPATIENT)
Dept: GENETICS | Facility: CLINIC | Age: 70
End: 2025-01-24

## 2025-01-24 PROBLEM — L65.9 HAIR LOSS: Status: ACTIVE | Noted: 2025-01-24

## 2025-01-24 NOTE — ASSESSMENT & PLAN NOTE
Watch diet more closely.  Increase fiber in diet.  Try to remain as active as possible.  Orders:  •  CBC and differential; Future  •  Comprehensive metabolic panel; Future  •  Lipid panel; Future  •  TSH, 3rd generation; Future

## 2025-01-24 NOTE — ASSESSMENT & PLAN NOTE
Continue with vitamin D supplementation.  Will continue to follow vitamin D levels with routine laboratory testing and adjust dose if needed.  Orders:  •  CBC and differential; Future  •  Vitamin D 25 hydroxy; Future

## 2025-01-24 NOTE — ASSESSMENT & PLAN NOTE
Discussed potential causes for hair loss.  Discussed options both topical and oral that may help with hair growth.  Recommend collagen protein which can assist with hair growth.  Discussed other possibilities such as biotin and zinc.  Risks and benefits of these discussed.  Orders:  •  CBC and differential; Future

## 2025-01-24 NOTE — ASSESSMENT & PLAN NOTE
Left upper quadrant pain discussed.  This is intermittent.  Discussed the possibility of this being related to gas since it is always occurring in the upright position.  Discussed also the possibility of this being muscular or nerve related.  Watch for any worsening.  Previous CT scan reviewed.  Orders:  •  CBC and differential; Future

## 2025-01-24 NOTE — ASSESSMENT & PLAN NOTE
Reviewed recent laboratory testing with her.  Hemoglobin A1c slightly above goal.  Watch diet more closely.  Watch carbohydrate intake.  Continue to be as active as possible.  Will recheck laboratory testing prior to her upcoming routine visit.  Orders:  •  CBC and differential; Future  •  Comprehensive metabolic panel; Future  •  Hemoglobin A1C; Future  •  Insulin, fasting; Future

## 2025-01-24 NOTE — ASSESSMENT & PLAN NOTE
Continue to follow-up with oncology.  Continue with testing as per their recommendations.  Orders:  •  CBC and differential; Future

## 2025-01-24 NOTE — ASSESSMENT & PLAN NOTE
Continue to avoid gluten.  Did discuss with her that other foods that do not contain gluten still have carbohydrates and can affect blood sugar.  Orders:  •  CBC and differential; Future

## 2025-01-27 ENCOUNTER — HOSPITAL ENCOUNTER (OUTPATIENT)
Dept: RADIOLOGY | Facility: HOSPITAL | Age: 70
Discharge: HOME | End: 2025-01-27
Attending: STUDENT IN AN ORGANIZED HEALTH CARE EDUCATION/TRAINING PROGRAM | Admitting: STUDENT IN AN ORGANIZED HEALTH CARE EDUCATION/TRAINING PROGRAM
Payer: COMMERCIAL

## 2025-01-27 VITALS
TEMPERATURE: 95.5 F | SYSTOLIC BLOOD PRESSURE: 112 MMHG | RESPIRATION RATE: 18 BRPM | DIASTOLIC BLOOD PRESSURE: 69 MMHG | OXYGEN SATURATION: 99 % | HEART RATE: 74 BPM

## 2025-01-27 DIAGNOSIS — M53.3 PAIN OF BOTH SACROILIAC JOINTS: ICD-10-CM

## 2025-01-27 PROCEDURE — 27096 INJECT SACROILIAC JOINT: CPT | Performed by: STUDENT IN AN ORGANIZED HEALTH CARE EDUCATION/TRAINING PROGRAM

## 2025-01-27 RX ORDER — METHYLPREDNISOLONE ACETATE 40 MG/ML
40 INJECTION, SUSPENSION INTRA-ARTICULAR; INTRALESIONAL; INTRAMUSCULAR; PARENTERAL; SOFT TISSUE ONCE
Status: COMPLETED | OUTPATIENT
Start: 2025-01-27 | End: 2025-01-27

## 2025-01-27 RX ORDER — ROPIVACAINE HYDROCHLORIDE 2 MG/ML
2 INJECTION, SOLUTION EPIDURAL; INFILTRATION; PERINEURAL ONCE
Status: COMPLETED | OUTPATIENT
Start: 2025-01-27 | End: 2025-01-27

## 2025-01-27 RX ORDER — LIDOCAINE HYDROCHLORIDE 10 MG/ML
5 INJECTION, SOLUTION EPIDURAL; INFILTRATION; INTRACAUDAL; PERINEURAL ONCE
Status: COMPLETED | OUTPATIENT
Start: 2025-01-27 | End: 2025-01-27

## 2025-01-27 RX ADMIN — IOHEXOL 1 ML: 300 INJECTION, SOLUTION INTRAVENOUS at 11:24

## 2025-01-27 RX ADMIN — METHYLPREDNISOLONE ACETATE 40 MG: 40 INJECTION, SUSPENSION INTRA-ARTICULAR; INTRALESIONAL; INTRAMUSCULAR; PARENTERAL; SOFT TISSUE at 11:24

## 2025-01-27 RX ADMIN — ROPIVACAINE HYDROCHLORIDE 2 ML: 2 INJECTION, SOLUTION EPIDURAL; INFILTRATION; PERINEURAL at 11:24

## 2025-01-27 RX ADMIN — LIDOCAINE HYDROCHLORIDE 5 ML: 10 INJECTION, SOLUTION EPIDURAL; INFILTRATION; INTRACAUDAL; PERINEURAL at 11:23

## 2025-01-27 NOTE — DISCHARGE INSTR - LAB

## 2025-01-27 NOTE — H&P
History of Present Illness: The patient is a 69 y.o. female who presents with complaints of low back pain.    Past Medical History:   Diagnosis Date    Asthma     Cancer (HCC)     Follicular lymphoma    Celiac disease     Chronic sinusitis 04/27/2023    Colon polyp     Follicular lymphoma (HCC)     GERD (gastroesophageal reflux disease)     Heart palpitations     History of colonic polyps     resolved 07/12/2016    History of radiation therapy 2010    Hyperlipidemia     Insomnia     Irregular heart beat     Lymphoma, 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HCC)     non hodgekins, remission    Malignant lymphoma (HCC) 2010    rt arm cutaneous follicular stage ia (rt diatal medical biceps area , s/p resected and s/p radistion treatment    resolved 12/17/2014    Postmenopausal disorder     resolved 09/21/2017    Pulmonary nodule     BENIGN, STABLE 5111-8131    Restless legs syndrome     resolved 09/21/2017    TMJ syndrome     resolved 09/21/2017       Past Surgical History:   Procedure Laterality Date    COLONOSCOPY  04/16/2019    FUNCTIONAL ENDOSCOPIC SINUS SURGERY Bilateral 2013    Dr Bagley    HYSTERECTOMY      age 45 complete    LYMPH NODE DISSECTION Right     arm    NASAL SEPTUM SURGERY  2013    with turbinate reduction - Dr. Bagley    OOPHORECTOMY Bilateral     MT ESOPHAGOGASTRODUODENOSCOPY TRANSORAL DIAGNOSTIC N/A 01/18/2016    Procedure: ESOPHAGOGASTRODUODENOSCOPY (EGD);  Surgeon: Ness Shepherd MD;  [facility];  Service: Gastroenterology    MT EXCISION GANGLION WRIST DORSAL/VOLAR RECURRENT Left 12/08/2020    Procedure: WRIST GANGLION CYST EXCISION;  Surgeon: Marie Gaspar MD;  [facility];  Service: Orthopedics    MT EXCISION TUMOR SOFT TISSUE SHOULDER SUBQ 3 CM/> Left 11/27/2023    Procedure: anterior shouler- EXCISION BIOPSY TISSUE LESION/MASS UPPER EXTREMITY;  Surgeon: Ronak Alanis DO;  [facility];  Service: Orthopedics    SYNOVECTOMY N/A 12/08/2020    Procedure: ECU TENOSYNOVECTOMY;  Surgeon:  Marie Gaspar MD;  [facility];  Service: Orthopedics    TONSILLECTOMY      TOTAL ABDOMINAL HYSTERECTOMY W/ BILATERAL SALPINGOOPHORECTOMY      age 51    UPPER GASTROINTESTINAL ENDOSCOPY      US GUIDED MSK PROCEDURE  01/16/2020    US GUIDED MSK PROCEDURE  08/07/2020    US GUIDED MSK PROCEDURE  08/02/2021         Current Outpatient Medications:     benralizumab (FASENRA) subcutaneous injection, Inject 1 mL (30 mg total) under the skin every 56 days for 6 doses, Disp: 1 Syringe, Rfl: 6    bimatoprost (LATISSE) 0.03 % ophthalmic solution, Take as directed, Disp: , Rfl:     budesonide (Pulmicort Flexhaler) 90 MCG/ACT inhaler, Inhale 1 puff 2 (two) times a day Rinse mouth after use., Disp: 1 each, Rfl: 3    Cholecalciferol 50 MCG (2000 UT) CAPS, Take by mouth daily, Disp: , Rfl:     cyanocobalamin (VITAMIN B-12) 1,000 mcg tablet, Take 1,000 mcg by mouth daily, Disp: , Rfl:     doxycycline hyclate (VIBRAMYCIN) 100 mg capsule, , Disp: , Rfl:     EPINEPHrine (EPIPEN) 0.3 mg/0.3 mL SOAJ, Inject 0.3 mL (0.3 mg total) into a muscle once for 1 dose, Disp: 0.6 mL, Rfl: 0    erythromycin with ethanol (EMGEL) 2 % gel, Apply topically 2 (two) times a day, Disp: 30 g, Rfl: 1    estradiol (ESTRACE VAGINAL) 0.1 mg/g vaginal cream, Insert 1 g intravaginally twice weekly for dryness, Disp: 42.5 g, Rfl: 1    famotidine (PEPCID) 20 mg tablet, take 1 tablet by mouth twice a day, Disp: 180 tablet, Rfl: 1    flecainide (TAMBOCOR) 100 mg tablet, take 1 tablet by mouth twice a day, Disp: 180 tablet, Rfl: 3    fluticasone (FLONASE) 50 mcg/act nasal spray, 2 sprays into each nostril 2 (two) times a day, Disp: 48 g, Rfl: 4    ipratropium (ATROVENT) 0.03 % nasal spray, 2 sprays into each nostril 3 (three) times a day as needed for rhinitis, Disp: 30 mL, Rfl: 3    levalbuterol (XOPENEX HFA) 45 mcg/act inhaler, Inhale 1-2 puffs every 4 (four) hours as needed for wheezing or shortness of breath, Disp: 45 g, Rfl: 0    levalbuterol  (Xopenex) 1.25 mg/3 mL nebulizer solution, Take 3 mL (1.25 mg total) by nebulization every 4 (four) hours as needed for wheezing or shortness of breath for up to 25 days, Disp: 75 mL, Rfl: 6    lidocaine (LMX) 4 % cream, Apply topically as needed for mild pain, Disp: 15 g, Rfl: 3    linaCLOtide 72 MCG CAPS, Take 72 mcg by mouth daily, Disp: 4 capsule, Rfl: 0    Magnesium 500 MG CAPS, Take by mouth, Disp: , Rfl:     meloxicam (MOBIC) 15 mg tablet, Take 1 tablet (15 mg total) by mouth daily as needed for moderate pain, Disp: 90 tablet, Rfl: 1    methocarbamol (ROBAXIN) 500 mg tablet, Take 1 tablet (500 mg total) by mouth 3 (three) times a day as needed for muscle spasms, Disp: 60 tablet, Rfl: 0    metoprolol succinate (TOPROL-XL) 25 mg 24 hr tablet, Take 1 tablet (25 mg total) by mouth every evening, Disp: 90 tablet, Rfl: 5    montelukast (SINGULAIR) 10 mg tablet, Take 1 tablet (10 mg total) by mouth daily at bedtime, Disp: 30 tablet, Rfl: 3    ondansetron (ZOFRAN-ODT) 4 mg disintegrating tablet, Take 1 tablet (4 mg total) by mouth every 6 (six) hours as needed for nausea or vomiting, Disp: 15 tablet, Rfl: 0    polyethylene glycol (GLYCOLAX) 17 GM/SCOOP powder, Take 17 g by mouth 2 (two) times a day, Disp: 765 g, Rfl: 4    rosuvastatin (CRESTOR) 5 mg tablet, take 1 tablet by mouth once daily, Disp: 90 tablet, Rfl: 3    senna-docusate sodium (SENOKOT-S) 8.6-50 mg per tablet, Take 1 tablet by mouth daily for 14 days, Disp: 14 tablet, Rfl: 0    sucralfate (CARAFATE) 1 g tablet, Take 1 tablet (1 g total) by mouth 2 (two) times a day, Disp: 60 tablet, Rfl: 5    zolpidem (AMBIEN) 5 mg tablet, Take 1 tablet (5 mg total) by mouth daily at bedtime as needed for sleep, Disp: 30 tablet, Rfl: 2    Allergies   Allergen Reactions    Gluten Meal - Food Allergy GI Intolerance     Celiac     Morphine And Codeine Itching    Nuts - Food Allergy Hives     Almonds,walnuts, hazelnuts and other related nuts.     Shellfish-Derived Products -  Food Allergy Anaphylaxis    Wheat Bran - Food Allergy GI Intolerance    Sulfa Antibiotics Rash       Physical Exam:   Vitals:    01/27/25 1103   BP: 107/54   Pulse: 69   Resp: 18   Temp: (!) 95.5 °F (35.3 °C)   SpO2: 100%     General: Awake, Alert, Oriented x 3, Mood and affect appropriate  Respiratory: Respirations even and unlabored  Cardiovascular: Peripheral pulses intact; no edema  Musculoskeletal Exam: uneven gait    ASA Score: 2    Patient/Chart Verification  Patient ID Verified: Verbal  ID Band Applied: No  Consents Confirmed: To be obtained in the Procedural area  H&P( within 30 days) Verified: To be obtained in the Procedural area  Interval H&P(within 24 hr) Complete (required for Outpatients and Surgery Admit only): To be obtained in the Procedural area  Allergies Reviewed: Yes  Anticoag/NSAID held?: NA  Currently on antibiotics?: No  Pregnancy denied?: NA    Assessment:   1. Pain of both sacroiliac joints        Plan: bilateral sacroiliac joint injections

## 2025-01-28 ENCOUNTER — TELEPHONE (OUTPATIENT)
Dept: GENETICS | Facility: CLINIC | Age: 70
End: 2025-01-28

## 2025-01-28 ENCOUNTER — OFFICE VISIT (OUTPATIENT)
Dept: GENETICS | Facility: CLINIC | Age: 70
End: 2025-01-28
Payer: COMMERCIAL

## 2025-01-28 DIAGNOSIS — Z80.8 FHX: THYROID CANCER: ICD-10-CM

## 2025-01-28 DIAGNOSIS — Z84.81 FAMILY HISTORY OF CARRIER OF GENETIC DISEASE: Primary | ICD-10-CM

## 2025-01-28 DIAGNOSIS — Z80.3 FAMILY HISTORY OF BREAST CANCER: ICD-10-CM

## 2025-01-28 DIAGNOSIS — Z80.3 FHX: BREAST CANCER: ICD-10-CM

## 2025-01-28 DIAGNOSIS — Z84.81 FAMILY HISTORY OF BRCA GENE POSITIVE: ICD-10-CM

## 2025-01-28 PROCEDURE — 96041 GENETIC COUNSELING SVC EA 30: CPT

## 2025-01-28 NOTE — PROGRESS NOTES
Pre-Test Genetic Counseling Consult Note    Patient Name: Rosa Caraballo   /Age: 1955/69 y.o.  Referring Provider:  Tatiana Lundberg PA-C    Date of Service: 2025  Genetic Counselor: Ana Bella [place], [facility] [place]pretation Services: None  Location: Telephone consult   Length of Visit:  50 Minutes    Rosa was referred to the [facility] Cancer Risk and Genetic Assessment Program due to her familial BRCA2 mutation. she presents today to discuss the possibility of a hereditary cancer syndrome, options for genetic testing, and implications for her and her family.     Cancer History and Treatment:   Personal History:   Oncology History   Follicular lymphoma (HCC)   2010 Initial Diagnosis    stage IA follicle lymphoma, grade 1, 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, diagnosed in 2010.   She underwent radiation therapy, resulting in complete remission.           Screening Hx:   Breast:  Breast Imaging:   Mammogram and US Left Breast Limited ():   Impression: No mammographic evidence of malignancy in either breast.  No suspicious mammographic or sonographic abnormality is visualized in the left breast or left axilla to account for focal pain.  Clinical management is recommended.  Breast Biopsy: none  Breast Density: Scattered fibroglandular density     Colon:  Colonoscopy (): 3 polyps reported  F/u recommended in 5 years     Pathology:   A. Large Intestine, Sigmoid Colon, polyp, biopsy:   - Tubular adenoma.   - Negative for high grade dysplasia and carcinoma.     B. Rectum, polyp, biopsy:   - Polypoid portion of colonic mucosa with surface hyperplastic change.    Gynecologic:  S/p JENNA secondary to uterine fibroids at 45; BSO at 51    Skin:  Did not assess     Other screening:   EGD ():   Impression:  Small sliding hiatal hernia with Z-line at 37 cm from the incisors  Mild gastritis  Benign-appearing gastric polyps removed with cold snare  Normal esophagus and duodenum     Reproductive  History  Age at menarche: 15  Age at first live birth: Nulliparous  Menopause: Post Menopausal (unknown)  Hormone replacement: Yes, >5 years ago     Medical and Surgical History  Pertinent surgical history:   Past Surgical History:   Procedure Laterality Date    COLONOSCOPY  04/16/2019    FUNCTIONAL ENDOSCOPIC SINUS SURGERY Bilateral 2013    Dr Bagley    HYSTERECTOMY      age 45 complete    LYMPH NODE DISSECTION Right     arm    NASAL SEPTUM SURGERY  2013    with turbinate reduction - Dr. Bagley    OOPHORECTOMY Bilateral     NC ESOPHAGOGASTRODUODENOSCOPY TRANSORAL DIAGNOSTIC N/A 01/18/2016    Procedure: ESOPHAGOGASTRODUODENOSCOPY (EGD);  Surgeon: Ness Shepherd MD;  [facility];  Service: Gastroenterology    NC EXCISION GANGLION WRIST DORSAL/VOLAR RECURRENT Left 12/08/2020    Procedure: WRIST GANGLION CYST EXCISION;  Surgeon: Marie Gaspar MD;  [facility];  Service: Orthopedics    NC EXCISION TUMOR SOFT TISSUE SHOULDER SUBQ 3 CM/> Left 11/27/2023    Procedure: anterior shouler- EXCISION BIOPSY TISSUE LESION/MASS UPPER EXTREMITY;  Surgeon: Ronak Alanis DO;  [facility];  Service: Orthopedics    SYNOVECTOMY N/A 12/08/2020    Procedure: ECU TENOSYNOVECTOMY;  Surgeon: Marie Gaspar MD;  [facility];  Service: Orthopedics    TONSILLECTOMY      TOTAL ABDOMINAL HYSTERECTOMY W/ BILATERAL SALPINGOOPHORECTOMY      age 51    UPPER GASTROINTESTINAL ENDOSCOPY      US GUIDED MSK PROCEDURE  01/16/2020    US GUIDED MSK PROCEDURE  08/07/2020    US GUIDED MSK PROCEDURE  08/02/2021      Pertinent medical history:  Past Medical History:   Diagnosis Date    Asthma     Cancer (HCC)     Follicular lymphoma    Celiac disease     Chronic sinusitis 04/27/2023    Colon polyp     Follicular lymphoma (HCC)     GERD (gastroesophageal reflux disease)     Heart palpitations     History of colonic polyps     resolved 07/12/2016    History of radiation therapy 2010    Hyperlipidemia     Insomnia   "   Irregular heart beat     Lymphoma, 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HCC)     non hodgekins, remission    Malignant lymphoma (HCC) 2010    rt arm cutaneous follicular stage ia (rt diatal medical biceps area , s/p resected and s/p radistion treatment    resolved 12/17/2014    Postmenopausal disorder     resolved 09/21/2017    Pulmonary nodule     BENIGN, STABLE 3596-7646    Restless legs syndrome     resolved 09/21/2017    TMJ syndrome     resolved 09/21/2017         Other History:  Height:   Ht Readings from Last 1 Encounters:   01/23/25 5' 4.5\" (1.638 m)     Weight:   Wt Readings from Last 1 Encounters:   01/23/25 66.9 kg (147 lb 6.4 oz)       Relevant Family History   Patient reports non-Ashkenazi Taoism ancestry.     Sister: thyroid cancer diagnosed at 30, unknown type, s/p thyroidectomy (currently 53 y/o)     Maternal Family History:  Mother: CNS lymphoma; s/p JENNA (d.66)    Paternal Family History:  Father: throat cancer diagnosed at 65, smoker; colon polyps, # unknown (d.89)  Aunt: breast cancer x3, no info; cervical;/uterine cancer diagnosed in 30s, hysterectomy (d.96)   First-cousin (male): lymphoma diagnosed in 50s (currently 68 y/o)  Aunt: breast cancer diagnosed at unknown age (d.90)   Uncle: throat cancer, non-smoker (d.87)  First-cousin (female): BRCA2+; s/p JENNA/BSO, no info   Grandmother: breast cancer w/ bone mets diagnosed at unknown age; throat cancer, non-smoker (d.78)  Grandfather: throat and lung cancer, smoker (d.80)     Please refer to the scanned pedigree in the Media Tab for a complete family history     *All history is reported as provided by the patient; records are not available for review, except where indicated.     Assessment:  Rosa Caraballo does not have a copy of her cousin's genetic test results. We reviewed that the reporting range varies among genetic testing laboratories. It is best practice for individuals to provide a copy of their relative's genetic test report to ensure the familial variant is " included in the analysis. If Rosa secures a copy of her cousin's genetic test report, she will send it the Cancer Risk and Genetics Program.     We explained that the likelihood that Rosa harbors the same BRCA2 pathogenic variant identified in her cousin is 12.5%.      We reviewed the cancer risks and NCCN screening recommendations available if Rosa tests positive for the familial variant.      Genetic testing is indicated for Rosa based on the following criteria:   Meets NCCN V2.2025 General Testing Criteria (Located on page CRIT-1 in the Genetic/Familial High Risk Assessment: Breast, Ovarian and Pancreatic Guideline): blood relative (paternal first-cousin) with a pathogenic variant in a cancer susceptibility gene (BRCA2)  Meets NCCN V2.2025 Testing Criteria for High-Penetrance Breast Cancer Susceptibility Genes: second-degree relative (paternal aunt) with multiple primary breast cancer and >=3 diagnoses of breast cancer on the same side of the family including the patient with breast cancer     The risks, benefits, and limitations of genetic testing were reviewed with the patient. Rosa understands that this test can only test for the hereditary cancer syndromes and cannot provide a cancer diagnosis. We reviewed that cancers such as lung cancer, liver cancer, cervical cancer, and testicular cancer very rarely have a genetic etiology and we cannot test for a genetic predisposition for these cancers at this time.     Rosa has a personal and family history of lymphoma. We reviewed that labs such as [facility] do offer a hereditary lymphoma panel. However, many of the genes included have preliminary evidence to lymphoma and there is limited clinical utility if a pathogenic variant is identified in one of these genes. We agreed that adding these genes to her hereditary cancer panel is not necessary at this time.      We reviewed the genetic discrimination laws, possible test results (positive, negative,  variants of uncertain significance), and their clinical implications.      If positive for a mutation, options for managing cancer risk including increased surveillance, chemoprevention, and in some cases prophylactic surgery were discussed.      Rosa was informed that if a hereditary cancer syndrome was identified, first-degree relatives (parents, siblings, and children) have a chance of also inheriting the condition. Genetic testing would allow for predictive genetic testing in other relatives, who may also be at risk depending on their degree of relation.     Rosa understands that a negative genetic test result does not erase the general population risk for cancer.  We discussed that many factors that influence our risk for cancer such as age, environmental exposures, lifestyle choices, and family history.     Billing:  Most individuals pay <$100 for hereditary cancer genetic testing. If insurance covers the cost of the testing, individuals may still pay out of pocket secondary to co-pays, co-insurance, or deductibles. If the cost of the testing exceeds $100, the lab will reach out to the patient via phone or e-mail. The patient will then have the option to proceed with the testing, cancel the testing, or elect the self-pay option of $250. Rosa verbalized understanding.     Plan: Patient decided to proceed with testing and provided consent.    Summary:     Sample Collection:  An order was placed and the patient was instructed to go to a [facility] lab for a blood collection    Genetic Testing Preformed: CustomNext: Cancer® +RNAinsight® (59 genes): APC, RUPAL, AXIN2, BAP1, BARD1, BMPR1A, BRCA1, BRCA2, BRIP1, CDH1, CDK4, CDKN1B, CDKN2A, CHEK2, CTNNA1, DICER1, EGLN1, EPCAM, FH, FLCN, GREM1, HOXB13, KIF1B, KIT, MAX, MEN1, MET, MITF, MLH1, MSH2, MSH3, MSH6, MUTYH, NF1, NTHL1, PALB2, PDGFRA PMS2, POLD1, POLE, POT1, PTEN, RAD51C, RAD51D, RB1, RET, SDHA, SDHAF2, SDHB, SDHC, SDHD, SMAD4, SMARCA4, STK11, [id number], TP53,  TSC1, TSC2, VHL      Result Call Information:  I confirmed the patient's mobile number on file as the best number to call with results  I confirmed with the patient that we can leave a voicemail on her mobile number    Results take approximately 2-3 weeks to complete once test is started.    Rosa will be notified via [employer] once results are available.    Additional recommendations for surveillance/medical management will be made pending genetic test results.

## 2025-01-28 NOTE — TELEPHONE ENCOUNTER
Swp to reschedule genetic counseling appt due to a provider conflict. Patient agreed to a telephone call at 2:30 pm today.

## 2025-01-29 ENCOUNTER — APPOINTMENT (OUTPATIENT)
Dept: LAB | Facility: CLINIC | Age: 70
End: 2025-01-29
Payer: COMMERCIAL

## 2025-01-29 DIAGNOSIS — Z80.3 FHX: BREAST CANCER: ICD-10-CM

## 2025-01-29 DIAGNOSIS — Z84.81 FAMILY HISTORY OF CARRIER OF GENETIC DISEASE: ICD-10-CM

## 2025-01-29 DIAGNOSIS — Z80.8 FHX: THYROID CANCER: ICD-10-CM

## 2025-01-29 PROCEDURE — 36415 COLL VENOUS BLD VENIPUNCTURE: CPT

## 2025-02-04 DIAGNOSIS — E78.2 MIXED HYPERLIPIDEMIA: ICD-10-CM

## 2025-02-05 RX ORDER — ROSUVASTATIN CALCIUM 5 MG/1
5 TABLET, COATED ORAL DAILY
Qty: 90 TABLET | Refills: 1 | Status: SHIPPED | OUTPATIENT
Start: 2025-02-05

## 2025-02-10 ENCOUNTER — TELEPHONE (OUTPATIENT)
Dept: RADIOLOGY | Facility: MEDICAL CENTER | Age: 70
End: 2025-02-10

## 2025-02-26 ENCOUNTER — APPOINTMENT (OUTPATIENT)
Dept: LAB | Facility: CLINIC | Age: 70
End: 2025-02-26
Payer: COMMERCIAL

## 2025-02-26 DIAGNOSIS — E78.5 DYSLIPIDEMIA: ICD-10-CM

## 2025-02-26 LAB — TSH SERPL DL<=0.05 MIU/L-ACNC: 1.06 UIU/ML (ref 0.45–4.5)

## 2025-02-26 PROCEDURE — 84443 ASSAY THYROID STIM HORMONE: CPT

## 2025-02-26 PROCEDURE — 36415 COLL VENOUS BLD VENIPUNCTURE: CPT

## 2025-02-27 ENCOUNTER — TELEPHONE (OUTPATIENT)
Dept: GENETICS | Facility: CLINIC | Age: 70
End: 2025-02-27

## 2025-02-27 ENCOUNTER — OFFICE VISIT (OUTPATIENT)
Dept: PAIN MEDICINE | Facility: CLINIC | Age: 70
End: 2025-02-27
Payer: COMMERCIAL

## 2025-02-27 VITALS — HEIGHT: 65 IN | WEIGHT: 144 LBS | BODY MASS INDEX: 23.99 KG/M2

## 2025-02-27 DIAGNOSIS — M54.50 CHRONIC BILATERAL LOW BACK PAIN WITHOUT SCIATICA: Primary | ICD-10-CM

## 2025-02-27 DIAGNOSIS — G89.29 CHRONIC BILATERAL LOW BACK PAIN WITHOUT SCIATICA: Primary | ICD-10-CM

## 2025-02-27 DIAGNOSIS — G89.4 CHRONIC PAIN SYNDROME: ICD-10-CM

## 2025-02-27 DIAGNOSIS — M53.3 PAIN OF BOTH SACROILIAC JOINTS: ICD-10-CM

## 2025-02-27 LAB
GENE DIS ANL INTERP-IMP: NORMAL
INTERPRETATION: NORMAL

## 2025-02-27 PROCEDURE — 99214 OFFICE O/P EST MOD 30 MIN: CPT | Performed by: STUDENT IN AN ORGANIZED HEALTH CARE EDUCATION/TRAINING PROGRAM

## 2025-02-27 NOTE — TELEPHONE ENCOUNTER
Post-Test Genetic Counseling Consult Note    Today I left a voicemail for Rosa reviewing the results of her genetic test for hereditary cancer. We met previously on 1/28/25 for pre-test counseling.  I encouraged Rosa to call [phone] to discuss this result further.  A copy of this consult note and genetic test result will be shared with the patient.      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 The significance of the RUPAL variant is currently not known and therefore this test result cannot be used to help determine Rosa cancer risks.      It is possible that the variant was seen in only a handful of individuals, or there may be conflicting or incomplete information in the medical literature about the variant and its association with hereditary cancer.     The laboratory will continue to accumulate information on this variant and will reclassify it as either a positive or negative genetic test result when they are confident that they have adequate information. We will notify Rosa as updated information is obtained. It is important to note that the majority of variants of uncertain significance are reclassified as likely benign or benign as additional information  about the variant becomes available.     Risk Based on Family History:  The significance of this variant is currently not known and therefore this test result cannot be used to help determine Rosa's cancer risks. Rather her personal medical history and family history of cancer are the most important factors used to estimate her risk for developing certain cancers and to direct her medical management.      Rosa's risk for breast cancer may still be increased based on her personal risk factors and family history.  The Tyrer-Cuzick model estimates the lifetime risk (to age 85) for breast cancer is 8.8% compared to approximately 5.2% general population risk. Although this does not predict a significantly increased lifetime risk, we cannot eliminate the possibility of an increased risk for breast cancer given her family history.     Rosa's lifetime risk to develop thyroid cancer may be elevated compared to the general population based on her reported family history. However, the magnitude of this risk is not known.     Risks and Testing for Family Members:  Genetic testing for the RUPAL variant is not recommended for relatives who wish to determine their cancer risks for purposes of determining medical management. The presence or absence of this variant in a relative is not clinically meaningful unless the variant is reclassified in the future.     Despite this result, Rosa's first-degree relatives may have inherited the familial BRCA2 pathogenic variant and could be at increased risk for the cancers based on the family history. We recommend they discuss screening and management recommendations with their healthcare providers.    If Rosa has any affected family members with a cancer diagnosis, especially at a young age, they may still consider genetic testing. Relatives who wish to pursue genetic testing can reach out to the [facility] [phone] to schedule an appointment or visit  [email or web address] to identify a local genetic counselor.      Plan:   There are no additional recommendations based on Rosa's result. she should continue cancer screening and medical management as clinically indicated and as determined appropriate by her healthcare providers.    VUS Result: Rosa was strongly encouraged to contact us regarding any changes in her personal or family history of cancer as these changes could alter our recommendation regarding genetic testing and/or cancer screening. Rosa was also encouraged to follow up with us on an annual basis as variant classifications are subject to change.

## 2025-02-27 NOTE — PROGRESS NOTES
EMERGENCY DEPARTMENT ENCOUNTER  Room Number:  03/03  PCP: Jane Buenrostro APRN  Independent Historians: Patient      HPI:  Chief Complaint: had concerns including Knee Injury.     A complete HPI/ROS/PMH/PSH/SH/FH are unobtainable due to: None    Chronic or social conditions impacting patient care (Social Determinants of Health): None      Context: Brittany Sheppard is a 44 y.o. female with a medical history of hepatitis C, anxiety, asthma, hypertension, chronic pain, GERD, gastric banding, vitamin D deficiency, PCOS, and bipolar disorder who presents to the ED c/o acute left knee pain.  Patient reports she first injured the left knee approximately 1 month ago.  She went to [facility] to [facility] and followed up with orthopedic surgery.  She was placed in a boot and told to weight-bear as tolerated.  Patient reports she was going to work today when she fell down her last 3 steps and landed on the left knee.  Patient reports orthopedic doctor has ordered an MRI but this has not yet been scheduled.  Patient denies head injury or LOC.  She has no other systemic complaints at this time.      Review of prior external notes (non-ED) -and- Review of prior external test results outside of this encounter:  Patient seen in office by orthopedics on 9/18/2024 for strain of gastrocnemius and osteoarthritis of left knee.  Reviewed assessment and plan.  Will place patient in boot with heel lift, have patient follow-up in 2 weeks.,  Will order MRI.  Reviewed labs collected on 2/15/2024.  CMP with creatinine 0.84, hemoglobin A1c 4.90.    Prescription drug monitoring program review: [facility] reviewed by Jamison Dc MD, Ori Maria MD   N/A    PAST MEDICAL HISTORY  Active Ambulatory Problems     Diagnosis Date Noted    Abscess of axilla, left 06/24/2014    Lumbago 03/20/2015    Acute hepatitis C without hepatic coma 06/24/2014    Generalized anxiety disorder 06/24/2014    Asthma 03/16/2015    Atopic contact dermatitis 11/07/2015    Benign  "Assessment:  1. Chronic bilateral low back pain without sciatica    2. Pain of both sacroiliac joints    3. Chronic pain syndrome        Portions of the record may have been created with voice recognition software. Occasional wrong word or \"sound a like\" substitutions may have occurred due to the inherent limitations of voice recognition software. Read the chart carefully and recognize, using context, where substitutions have occurred. Contact me with any questions.       Plan:  69-year-old female here for follow-up visit with regards to her low back pain symptoms.  Since last visit, she underwent bilateral sacroiliac injections on 1/27/2025 and notes 30 to 40% improvement overall.  Notes about 60% improvement with left-sided pain symptoms but reports less benefit with right-sided pain symptoms.  Denies radicular pain, new or progressive weakness, saddle anesthesia, BBI.  Chronic low back pain symptoms likely multifactorial in the setting of sacroiliac joint pain, lumbar facet arthropathy, myofascial pain.      Discussed option of lumbar MBB, RFA for symptom management.  Patient would like to think about this prior to scheduling.    Also recommend course of physical or aqua therapy for core strengthening, optimizing function and ambulation.  Patient also notes she is interested in pursuing chiropractic treatment.    Follow-up in 3 months or as needed.        Complete risks and benefits including bleeding, infection, tissue reaction, nerve injury and allergic reaction were discussed. The approach was demonstrated using models and literature was provided. Verbal and written consent was obtained.    My impressions and treatment recommendations were discussed in detail with the patient who verbalized understanding and had no further questions.  Discharge instructions were provided. I personally saw and examined the patient and I agree with the above discussed plan of care.    Orders Placed This Encounter   Procedures    " Ambulatory referral to Physical Therapy     Standing Status:   Future     Expiration Date:   2/27/2026     Referral Priority:   Routine     Referral Type:   Physical Therapy     Referral Reason:   Specialty Services Required     Requested Specialty:   Physical Therapy     Number of Visits Requested:   1     Expiration Date:   2/27/2026     No orders of the defined types were placed in this encounter.      History of Present Illness:  Rosa Caraballo is a 69 y.o. female who presents for a follow up office visit in regards to Back Pain (Low back).    I have personally reviewed and/or updated the patient's past medical history, past surgical history, family history, social history, current medications, allergies, and vital signs today.     Review of Systems   Constitutional:  Negative for fever.   HENT:  Negative for sinus pain.    Eyes:  Negative for redness.   Respiratory:  Negative for shortness of breath.    Cardiovascular:  Negative for palpitations.   Gastrointestinal:  Negative for abdominal pain and nausea.   Endocrine: Negative for polydipsia.   Genitourinary:  Negative for difficulty urinating.   Musculoskeletal:  Positive for arthralgias. Negative for gait problem and myalgias.        Decreased Range of motion   Skin:  Negative for rash.   Neurological:  Negative for seizures and headaches.   Psychiatric/Behavioral:  Negative for decreased concentration. The patient is not nervous/anxious.    All other systems reviewed and are negative.      Patient Active Problem List   Diagnosis    Celiac disease    PVC's (premature ventricular contractions)    Dyslipidemia    Back pain    Chronic GERD    Heart palpitations    Insomnia    Osteopenia    Sciatica associated with disorder of lumbar spine    Vitamin D deficiency    VERONIKA (stress urinary incontinence, female)    Follicular lymphoma (HCC)    Personal history of colonic polyps    Severe persistent asthma without complication    Non-seasonal allergic rhinitis     hypertension 04/05/2017    Breast abscess of female 03/16/2015    Cervical high risk HPV (human papillomavirus) test positive 12/07/2016    Chondromalacia of right patella 08/13/2021    Chronic pain of right knee 08/13/2021    Gastroesophageal reflux disease 11/07/2019    Hamstring tightness of right lower extremity 08/13/2021    Gastric banding status 11/07/2019    History of smoking 03/16/2015    HSV-2 seropositive 07/27/2022    Intractable chronic migraine without aura and without status migrainosus 07/27/2022    LGSIL (low grade squamous intraepithelial dysplasia) 01/01/2016    Patellofemoral syndrome of both knees 03/17/2015    Pes anserinus bursitis of right knee 08/13/2021    Primary osteoarthritis of both knees 03/17/2015    Pruritic rash 11/07/2015    Sepsis 06/24/2014    Vitamin D deficiency 06/26/2019    Osteoarthritis of multiple joints 07/27/2022    Acute cystitis with hematuria 10/21/2022    Generalized abdominal pain 10/21/2022    KENDRA on CPAP 03/14/2024    Bipolar 1 disorder, depressed, moderate 06/28/2024    Alcohol use disorder, moderate, in early remission 06/28/2024    Obesity, Class III, BMI 40-49.9 (morbid obesity) 09/24/2024    Dietary counseling 09/24/2024    Pre-op evaluation 09/24/2024    Fatty liver 09/24/2024    PCOS (polycystic ovarian syndrome) 09/24/2024    Fibromyalgia 09/24/2024     Resolved Ambulatory Problems     Diagnosis Date Noted    Morbid obesity 08/03/2015    Morbid obesity with body mass index of 50.0-59.9 in adult 06/24/2014    Preop cardiovascular exam 10/10/2014    Acute recurrent frontal sinusitis 09/01/2022     Past Medical History:   Diagnosis Date    Anemia     Anxiety     Arthritis     Back pain     Bipolar 1 disorder     COPD (chronic obstructive pulmonary disease)     Depression     Dyspnea     Elevated liver enzymes     GERD (gastroesophageal reflux disease)     H. pylori infection     Hepatitis C 03/2015    Hirsutism     History of migraine headaches     HPV in  Eosinophilia    Ganglion cyst of wrist, left    Pulmonary nodule    Disorder of tendon of right biceps    Tear of right supraspinatus tendon    Hyperlipidemia    Joint mass    Soft tissue mass    Mass of skin of left shoulder    Neck pain    Bilateral hand numbness    Pain of both sacroiliac joints    Chronic sinusitis    Subacromial bursitis of left shoulder joint    Left supraspinatus tendinitis    Biceps tendinosis of left shoulder    Chronic pain syndrome    LUQ pain    Impaired fasting blood sugar    Rupture of left long head biceps tendon    Hair loss       Past Medical History:   Diagnosis Date    Asthma     Cancer (HCC)     Follicular lymphoma    Celiac disease     Chronic sinusitis 04/27/2023    Colon polyp     Follicular lymphoma (HCC)     GERD (gastroesophageal reflux disease)     Heart palpitations     History of colonic polyps     resolved 07/12/2016    History of radiation therapy 2010    Hyperlipidemia     Insomnia     Irregular heart beat     Lymphoma, 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HCC)     non hodgekins, remission    Malignant lymphoma (HCC) 2010    rt arm cutaneous follicular stage ia (rt diatal medical biceps area , s/p resected and s/p radistion treatment    resolved 12/17/2014    Postmenopausal disorder     resolved 09/21/2017    Pulmonary nodule     BENIGN, STABLE [id number]    Restless legs syndrome     resolved 09/21/2017    TMJ syndrome     resolved 09/21/2017       Past Surgical History:   Procedure Laterality Date    COLONOSCOPY  04/16/2019    FUNCTIONAL ENDOSCOPIC SINUS SURGERY Bilateral 2013    Dr Bagley    HYSTERECTOMY      age 45 complete    LYMPH NODE DISSECTION Right     arm    NASAL SEPTUM SURGERY  2013    with turbinate reduction - Dr. Bagley    OOPHORECTOMY Bilateral     ID ESOPHAGOGASTRODUODENOSCOPY TRANSORAL DIAGNOSTIC N/A 01/18/2016    Procedure: ESOPHAGOGASTRODUODENOSCOPY (EGD);  Surgeon: Ness Shepherd MD;  Location: [facility];  Service: Gastroenterology    ID EXCISION GANGLION WRIST DORSAL/VOLAR  female 2021    HPV in female 2014    HPV in female 10/2015    Hypertension     IBS (irritable bowel syndrome)     Insomnia     LGSIL on Pap smear of cervix 2016    Obesity     PCR positive for herpes simplex virus type 1 (HSV-1) DNA     Sleep apnea     Steatosis of liver          PAST SURGICAL HISTORY  Past Surgical History:   Procedure Laterality Date    APPENDECTOMY  2010    PAT [facility]    BREAST SURGERY      I & D of abcess X3    CYST REMOVAL  2008    D & C HYSTEROSCOPY ENDOMETRIAL ABLATION  2010    menorrhagia; pathology benign    LAPAROSCOPIC GASTRIC BANDING  2017    Dr. Prieto Colon [facility]    LIVER BIOPSY      POLYPECTOMY      TOOTH EXTRACTION  2016    WISDOM TOOTH EXTRACTION           FAMILY HISTORY  Family History   Problem Relation Age of Onset    Hypertension Mother     Obesity Mother     Diabetes Mother     Sleep apnea Mother     Bipolar disorder Father     Diabetes Father     Hypertension Father     Migraines Father     Alcohol abuse Father     Stroke Maternal Grandmother     Breast cancer Maternal Grandmother 72    Diabetes Maternal Grandmother     Hypertension Maternal Grandmother     Stroke Maternal Grandfather     Cancer Maternal Grandfather     Stroke Paternal Grandmother     Breast cancer Paternal Grandmother     Hypertension Paternal Grandmother     Pancreatic cancer Paternal Grandmother     Schizophrenia Nephew     Bipolar disorder Nephew          SOCIAL HISTORY  Social History     Socioeconomic History    Marital status:     Number of children: 0   Tobacco Use    Smoking status: Former     Current packs/day: 0.00     Types: Cigarettes     Quit date:      Years since quittin.7     Passive exposure: Never    Smokeless tobacco: Never   Vaping Use    Vaping status: Never Used   Substance and Sexual Activity    Alcohol use: Yes     Comment: off and on- won't drink for weeks and then will have a week where she drinks several days    Drug use: Not  Currently     Types: Marijuana    Sexual activity: Defer     Partners: Male, Female         ALLERGIES  Cephalexin, Penicillins, Prunus persica, Adhesive tape, Cefaclor, Codeine, Morphine, and Latex      REVIEW OF SYSTEMS  Review of Systems   Constitutional:  Negative for chills and fever.   HENT:  Negative for ear pain and sore throat.    Respiratory:  Negative for cough and shortness of breath.    Cardiovascular:  Negative for chest pain and palpitations.   Gastrointestinal:  Negative for abdominal pain and vomiting.   Genitourinary:  Negative for dysuria and hematuria.   Musculoskeletal:  Positive for arthralgias. Negative for joint swelling.   Skin:  Negative for pallor and rash.   Neurological:  Negative for numbness and headaches.   Psychiatric/Behavioral:  Negative for confusion and hallucinations.      Included in HPI  All systems reviewed and negative except for those discussed in HPI.      PHYSICAL EXAM    I have reviewed the triage vital signs and nursing notes.    ED Triage Vitals [09/27/24 0732]   Temp Heart Rate Resp BP SpO2   98 °F (36.7 °C) 103 18 -- 100 %      Temp src Heart Rate Source Patient Position BP Location FiO2 (%)   Tympanic Monitor -- -- --       Physical Exam  Constitutional:       General: She is not in acute distress.     Appearance: She is well-developed.   HENT:      Head: Normocephalic and atraumatic.   Eyes:      Extraocular Movements: Extraocular movements intact.   Cardiovascular:      Rate and Rhythm: Normal rate and regular rhythm.      Heart sounds: Normal heart sounds.   Pulmonary:      Effort: Pulmonary effort is normal.      Breath sounds: Normal breath sounds.   Abdominal:      General: There is no distension.   Musculoskeletal:      Comments: Left knee is diffusely tender to palpation.  Limited ROM secondary to pain.  2+ left DP pulse   Skin:     General: Skin is warm.   Neurological:      General: No focal deficit present.      Mental Status: She is alert and oriented to  RECURRENT Left 2020    Procedure: WRIST GANGLION CYST EXCISION;  Surgeon: Marie Gaspar MD;  [facility];  Service: Orthopedics    NJ EXCISION TUMOR SOFT TISSUE SHOULDER SUBQ 3 CM/> Left 2023    Procedure: anterior shouler- EXCISION BIOPSY TISSUE LESION/MASS UPPER EXTREMITY;  Surgeon: Ronak Alanis DO;  Location: [facility];  Service: Orthopedics    SYNOVECTOMY N/A 2020    Procedure: ECU TENOSYNOVECTOMY;  Surgeon: Marie Gaspar MD;  [facility]: [facility];  Service: Orthopedics    TONSILLECTOMY      TOTAL ABDOMINAL HYSTERECTOMY W/ BILATERAL SALPINGOOPHORECTOMY      age 51    UPPER GASTROINTESTINAL ENDOSCOPY      US GUIDED MSK PROCEDURE  2020    US GUIDED MSK PROCEDURE  2020    US GUIDED MSK PROCEDURE  2021       Family History   Problem Relation Age of Onset    Lymphoma Mother     Throat cancer Father     Heart failure Father     Atrial fibrillation Father     Diabetes Father     Colonic polyp Father     Hypertension Father     Thyroid cancer Sister     No Known Problems Maternal Grandmother     No Known Problems Maternal Grandfather     Breast cancer Paternal Grandmother 69    Cancer Paternal Grandfather     Lung cancer Paternal Grandfather     No Known Problems Maternal Aunt     Breast cancer Paternal Aunt 69    Ovarian cancer Paternal Aunt 70    Skin cancer Paternal Aunt     Throat cancer Paternal Uncle     BRCA1 Positive Cousin     Colon cancer Neg Hx     Cervical cancer Neg Hx     Uterine cancer Neg Hx        Social History     Occupational History    Occupation: X-ray technologist   Tobacco Use    Smoking status: Former     Current packs/day: 0.00     Average packs/day: 0.8 packs/day for 20.0 years (15.0 ttl pk-yrs)     Types: Cigarettes     Start date:      Quit date: 2004     Years since quittin.1    Smokeless tobacco: Never    Tobacco comments:     Quit 10/31/04 2130   Vaping Use    Vaping status: Never Used   Substance and Sexual  Activity    Alcohol use: Yes     Alcohol/week: 3.0 standard drinks of alcohol     Types: 3 Glasses of wine per week     Comment: soc    Drug use: No    Sexual activity: Yes     Partners: Male       Current Outpatient Medications on File Prior to Visit   Medication Sig    bimatoprost (LATISSE) 0.03 % ophthalmic solution Take as directed    budesonide (Pulmicort Flexhaler) 90 MCG/ACT inhaler Inhale 1 puff 2 (two) times a day Rinse mouth after use.    Cholecalciferol 50 MCG (2000 UT) CAPS Take by mouth daily    cyanocobalamin (VITAMIN B-12) 1,000 mcg tablet Take 1,000 mcg by mouth daily    doxycycline hyclate (VIBRAMYCIN) 100 mg capsule     EPINEPHrine (EPIPEN) 0.3 mg/0.3 mL SOAJ Inject 0.3 mL (0.3 mg total) into a muscle once for 1 dose    erythromycin with ethanol (EMGEL) 2 % gel Apply topically 2 (two) times a day    estradiol (ESTRACE VAGINAL) 0.1 mg/g vaginal cream Insert 1 g intravaginally twice weekly for dryness    famotidine (PEPCID) 20 mg tablet take 1 tablet by mouth twice a day    flecainide (TAMBOCOR) 100 mg tablet take 1 tablet by mouth twice a day    fluticasone (FLONASE) 50 mcg/act nasal spray 2 sprays into each nostril 2 (two) times a day    ipratropium (ATROVENT) 0.03 % nasal spray instill 2 sprays into each nostril three times a day    levalbuterol (XOPENEX HFA) 45 mcg/act inhaler Inhale 1-2 puffs every 4 (four) hours as needed for wheezing or shortness of breath    levalbuterol (Xopenex) 1.25 mg/3 mL nebulizer solution Take 3 mL (1.25 mg total) by nebulization every 4 (four) hours as needed for wheezing or shortness of breath for up to 25 days    lidocaine (LMX) 4 % cream Apply topically as needed for mild pain    linaCLOtide 72 MCG CAPS Take 72 mcg by mouth daily    Magnesium 500 MG CAPS Take by mouth    meloxicam (MOBIC) 15 mg tablet Take 1 tablet (15 mg total) by mouth daily as needed for moderate pain    methocarbamol (ROBAXIN) 500 mg tablet Take 1 tablet (500 mg total) by mouth 3 (three) times  person, place, and time.   Psychiatric:         Mood and Affect: Mood normal.             RADIOLOGY  XR Knee 1 or 2 View Left    Result Date: 9/27/2024  XR KNEE 1 OR 2 VW LEFT-  INDICATIONS: Trauma.  TECHNIQUE: 2 VIEWS OF THE RIGHT KNEE  COMPARISON: None available  FINDINGS:  Mild knee effusion is present. No acute fracture, erosion, or dislocation is identified. Mild degenerative spurring is seen. If there is clinical suspicion for internal derangement of the knee, MRI could be considered for further evaluation.       As described.    This report was finalized on 9/27/2024 8:17 AM by Dr. Linden Yang M.D on Workstation: [id number]         MEDICATIONS GIVEN IN ER  Medications   sodium chloride 0.9 % flush 10 mL (has no administration in time range)   ondansetron (ZOFRAN) injection 4 mg (has no administration in time range)   HYDROmorphone (DILAUDID) injection 0.5 mg (has no administration in time range)   HYDROcodone-acetaminophen (NORCO)  MG per tablet 1 tablet (1 tablet Oral Given 9/27/24 0751)         ORDERS PLACED DURING THIS VISIT:  Orders Placed This Encounter   Procedures    XR Knee 1 or 2 View Left    MRI Knee Left Without Contrast    Comprehensive Metabolic Panel    CBC Auto Differential    Insert Peripheral IV    Initiate ED Observation Status    CBC & Differential         OUTPATIENT MEDICATION MANAGEMENT:  Current Facility-Administered Medications Ordered in Epic   Medication Dose Route Frequency Provider Last Rate Last Admin    HYDROmorphone (DILAUDID) injection 0.5 mg  0.5 mg Intravenous Once Ori Maria MD        ondansetron (ZOFRAN) injection 4 mg  4 mg Intravenous Once Jen Rainey PA-C        sodium chloride 0.9 % flush 10 mL  10 mL Intravenous PRN Jen Rainey PA-C         Current Outpatient Medications Ordered in Epic   Medication Sig Dispense Refill    albuterol sulfate  (90 Base) MCG/ACT inhaler Inhale 2 puffs Every 4 (Four) Hours As Needed for Wheezing or  "a day as needed for muscle spasms    metoprolol succinate (TOPROL-XL) 25 mg 24 hr tablet Take 1 tablet (25 mg total) by mouth every evening    montelukast (SINGULAIR) 10 mg tablet Take 1 tablet (10 mg total) by mouth daily at bedtime    ondansetron (ZOFRAN-ODT) 4 mg disintegrating tablet Take 1 tablet (4 mg total) by mouth every 6 (six) hours as needed for nausea or vomiting    polyethylene glycol (GLYCOLAX) 17 GM/SCOOP powder Take 17 g by mouth 2 (two) times a day    rosuvastatin (CRESTOR) 5 mg tablet take 1 tablet by mouth once daily    senna-docusate sodium (SENOKOT-S) 8.6-50 mg per tablet Take 1 tablet by mouth daily for 14 days    sucralfate (CARAFATE) 1 g tablet Take 1 tablet (1 g total) by mouth 2 (two) times a day    zolpidem (AMBIEN) 5 mg tablet Take 1 tablet (5 mg total) by mouth daily at bedtime as needed for sleep    benralizumab (FASENRA) subcutaneous injection Inject 1 mL (30 mg total) under the skin every 56 days for 6 doses     No current facility-administered medications on file prior to visit.       Allergies   Allergen Reactions    Gluten Meal - Food Allergy GI Intolerance     Celiac     Morphine And Codeine Itching    Nuts - Food Allergy Hives     Almonds,walnuts, hazelnuts and other related nuts.     Shellfish-Derived Products - Food Allergy Anaphylaxis    Wheat Bran - Food Allergy GI Intolerance    Sulfa Antibiotics Rash       Physical Exam:    Ht 5' 4.5\" (1.638 m)   Wt 65.3 kg (144 lb) Comment: PER PT THIS AM  BMI 24.34 kg/m²     Constitutional:normal, well developed, well nourished, alert, in no distress and non-toxic and no overt pain behavior.  Eyes:anicteric  HEENT:grossly intact  Neck:supple, symmetric, trachea midline and no masses   Pulmonary:even and unlabored  Cardiovascular:No edema or pitting edema present  Skin:Normal without rashes or lesions and well hydrated  Psychiatric:Mood and affect appropriate  Neurologic:Cranial Nerves II-XII grossly intact  Musculoskeletal:normal " Shortness of Air. 18 g 0    calcium carbonate (TUMS) 500 MG chewable tablet Chew 1 tablet As Needed for Indigestion or Heartburn.      diclofenac (VOLTAREN) 50 MG EC tablet Take 1 tablet by mouth 3 (Three) Times a Day. 21 tablet 0    gabapentin (Neurontin) 100 MG capsule Take 1 capsule by mouth 3 (Three) Times a Day. 90 capsule 2    HYDROcodone-acetaminophen (NORCO) 5-325 MG per tablet Take 1 tablet by mouth Every 6 (Six) Hours As Needed for Moderate Pain. 12 tablet 0    hydrOXYzine pamoate (Vistaril) 50 MG capsule Take 1-2 capsules by mouth 3 (Three) Times a Day As Needed for Sleep/Anxiety 90 capsule 1    Lumateperone Tosylate 10.5 MG capsule Take 1 capsule by mouth Every Night. 21 capsule 0    Melatonin 10 MG tablet Take  by mouth.      methocarbamol (ROBAXIN) 750 MG tablet Take 1 tablet by mouth 3 (Three) Times a Day. 21 tablet 0    metoprolol succinate XL (TOPROL-XL) 50 MG 24 hr tablet Take 1 tablet by mouth Daily. 30 tablet 0    sertraline (Zoloft) 100 MG tablet Take 2 tablets by mouth Daily. 60 tablet 1    valACYclovir (Valtrex) 500 MG tablet Take 1 tablet by mouth 2 (Two) Times a Day. 90 tablet 3           PROGRESS, DATA ANALYSIS, CONSULTS, AND MEDICAL DECISION MAKING  All labs have been independently interpreted by me.  All radiology studies have been reviewed by me. All EKG's have been independently viewed and interpreted by me.  Discussion below represents my analysis of pertinent findings related to patient's condition, differential diagnosis, treatment plan and final disposition.    Differential diagnosis includes but is not limited to osteoarthritis, ligament injury, meniscus injury, patellar fracture.        ED Course as of 09/27/24 0855   Fri Sep 27, 2024   0816 X-ray of left knee independently interpreted by me as no fracture [MP]   0822 No acute fracture noted on x-ray.  Will place patient in knee immobilizer and have her continue to follow-up with Dr. Iglesias [MP]   0832 I reviewed orthopedics  gait    Imaging  MRI LUMBAR SPINE WITHOUT CONTRAST     INDICATION: R52: Pain, unspecified  M46.1: Sacroiliitis, not elsewhere classified  M54.50: Low back pain, unspecified  G89.29: Other chronic pain.     COMPARISON: MRI lumbar spine 6/3/2011     TECHNIQUE:  Multiplanar, multisequence imaging of the lumbar spine was performed. .        IMAGE QUALITY:  Diagnostic     FINDINGS:     VERTEBRAL BODIES:  There are 5 lumbar type vertebral bodies. There is levoscoliosis with apex at L2-3. Multilevel endplate degenerative signal change pronounced at level L3-4. No suspicious discrete marrow lesion.     SACRUM:  Normal signal within the sacrum. No evidence of insufficiency or stress fracture.     DISTAL CORD AND CONUS:  Normal size and signal within the distal cord and conus.     PARASPINAL SOFT TISSUES:  Paraspinal soft tissues are unremarkable.     LOWER THORACIC DISC SPACES:  Normal disc height and signal.  No disc herniation, canal stenosis or foraminal narrowing.     LUMBAR DISC SPACES:     L1-L2: Disc bulge. Mild to moderate facet arthropathy. Mild canal narrowing. No significant foraminal stenosis.     L2-L3: Disc bulge. Moderate facet arthropathy. Mild canal narrowing. No significant foraminal stenosis.     L3-L4: Mild disc bulge. Moderate facet arthropathy. Minimal canal narrowing. Minimal bilateral foraminal narrowing.     L4-L5: Mild disc bulge. Moderate bilateral facet arthropathy. Mild canal narrowing. Mild bilateral foraminal narrowing.     L5-S1: No significant disc bulge. Moderate facet arthropathy. No canal or foraminal stenosis.     OTHER FINDINGS:  None.     IMPRESSION:     Progression of mild noncompressive lumbar degenerative change compared to remote MRI 6/3/2011 as detailed.      progress note from 9/18/2024.  Patient was seen for left knee and calf pain. [JR]   0842 Place patient in knee immobilizer and attempt to get her up with crutches [MP]   0852 Patient unable to get up after knee immobilizer was placed [MP]   0855 I spoke with John with observation unit.  Discussed patient presentation and ED findings.  He agrees to admit patient to an ED observation bed. [MP]      ED Course User Index  [JR] Ori Maria MD  [MP] Jen Rainey PA-C             AS OF 08:55 EDT VITALS:    BP - 142/97  HR - 100  TEMP - 98 °F (36.7 °C) (Tympanic)  O2 SATS - 98%    COMPLEXITY OF CARE  The patient requires admission.      DIAGNOSIS  Final diagnoses:   Injury of left knee, initial encounter         DISPOSITION  ED Disposition       ED Disposition   Decision to Admit    Condition   --    Comment   --                Please note that portions of this document were completed with a voice recognition program.    Note Disclaimer: At [facility], we believe that sharing information builds trust and better relationships. You are receiving this note because you recently visited [facility]. It is possible you will see health information before a provider has talked with you about it. This kind of information can be easy to misunderstand. To help you fully understand what it means for your health, we urge you to discuss this note with your provider.         Jen Rainey PA-C  09/27/24 0829

## 2025-03-03 ENCOUNTER — HOSPITAL ENCOUNTER (OUTPATIENT)
Dept: INFUSION CENTER | Facility: HOSPITAL | Age: 70
Discharge: HOME | End: 2025-03-03
Attending: INTERNAL MEDICINE
Payer: COMMERCIAL

## 2025-03-03 DIAGNOSIS — J45.50 SEVERE PERSISTENT ASTHMA WITHOUT COMPLICATION: ICD-10-CM

## 2025-03-03 DIAGNOSIS — D72.119 HYPEREOSINOPHILIC SYNDROME, UNSPECIFIED TYPE: Primary | ICD-10-CM

## 2025-03-03 PROCEDURE — 96372 THER/PROPH/DIAG INJ SC/IM: CPT

## 2025-03-03 RX ORDER — EPINEPHRINE 1 MG/ML
0.3 INJECTION, SOLUTION, CONCENTRATE INTRAVENOUS ONCE
Status: DISCONTINUED | OUTPATIENT
Start: 2025-03-03 | End: 2025-03-07 | Stop reason: HOSPADM

## 2025-03-03 RX ORDER — EPINEPHRINE 1 MG/ML
0.3 INJECTION, SOLUTION, CONCENTRATE INTRAVENOUS ONCE
OUTPATIENT
Start: 2025-03-04 | End: 2025-03-04

## 2025-03-03 RX ADMIN — BENRALIZUMAB 30 MG: 30 INJECTION, SOLUTION SUBCUTANEOUS at 11:53

## 2025-03-03 NOTE — PROGRESS NOTES
Fasenra administered as ordered in REI.  30 min post observation completed without incident.    Rosa Caraballo is aware of future appt on 4/28 at 11 am.     AVS declined by Rosa Caraballo.

## 2025-03-03 NOTE — PROGRESS NOTES
Pt to infusion center for fasenra injection.  No recent infections/abx.  Epi pen at chairside with exp date of 6/2025.

## 2025-03-05 ENCOUNTER — OFFICE VISIT (OUTPATIENT)
Dept: OBGYN CLINIC | Facility: CLINIC | Age: 70
End: 2025-03-05
Payer: COMMERCIAL

## 2025-03-05 ENCOUNTER — TELEPHONE (OUTPATIENT)
Dept: GENETICS | Facility: CLINIC | Age: 70
End: 2025-03-05

## 2025-03-05 ENCOUNTER — APPOINTMENT (OUTPATIENT)
Dept: RADIOLOGY | Facility: AMBULARY SURGERY CENTER | Age: 70
End: 2025-03-05
Attending: SURGERY
Payer: COMMERCIAL

## 2025-03-05 VITALS — HEIGHT: 65 IN | WEIGHT: 144 LBS | BODY MASS INDEX: 23.99 KG/M2

## 2025-03-05 DIAGNOSIS — M25.539 PAIN OF ULNAR SIDE OF WRIST: ICD-10-CM

## 2025-03-05 DIAGNOSIS — M67.432 GANGLION CYST OF DORSUM OF LEFT WRIST: Primary | ICD-10-CM

## 2025-03-05 DIAGNOSIS — M25.532 PAIN IN LEFT WRIST: ICD-10-CM

## 2025-03-05 DIAGNOSIS — M19.032 ARTHRITIS OF LEFT WRIST: ICD-10-CM

## 2025-03-05 PROCEDURE — 20612 ASPIRATE/INJ GANGLION CYST: CPT | Performed by: SURGERY

## 2025-03-05 PROCEDURE — 20605 DRAIN/INJ JOINT/BURSA W/O US: CPT | Performed by: SURGERY

## 2025-03-05 PROCEDURE — 73110 X-RAY EXAM OF WRIST: CPT

## 2025-03-05 PROCEDURE — 99213 OFFICE O/P EST LOW 20 MIN: CPT | Performed by: SURGERY

## 2025-03-05 RX ORDER — LIDOCAINE HYDROCHLORIDE 10 MG/ML
1 INJECTION, SOLUTION INFILTRATION; PERINEURAL
Status: COMPLETED | OUTPATIENT
Start: 2025-03-05 | End: 2025-03-05

## 2025-03-05 RX ORDER — BUPIVACAINE HYDROCHLORIDE 2.5 MG/ML
0.5 INJECTION, SOLUTION INFILTRATION; PERINEURAL
Status: COMPLETED | OUTPATIENT
Start: 2025-03-05 | End: 2025-03-05

## 2025-03-05 RX ORDER — TRIAMCINOLONE ACETONIDE 40 MG/ML
20 INJECTION, SUSPENSION INTRA-ARTICULAR; INTRAMUSCULAR
Status: COMPLETED | OUTPATIENT
Start: 2025-03-05 | End: 2025-03-05

## 2025-03-05 RX ADMIN — LIDOCAINE HYDROCHLORIDE 1 ML: 10 INJECTION, SOLUTION INFILTRATION; PERINEURAL at 13:15

## 2025-03-05 RX ADMIN — BUPIVACAINE HYDROCHLORIDE 0.5 ML: 2.5 INJECTION, SOLUTION INFILTRATION; PERINEURAL at 13:15

## 2025-03-05 RX ADMIN — TRIAMCINOLONE ACETONIDE 20 MG: 40 INJECTION, SUSPENSION INTRA-ARTICULAR; INTRAMUSCULAR at 13:15

## 2025-03-05 NOTE — TELEPHONE ENCOUNTER
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  Please see Ana eBlla's consult note from 2/27/25 for additional information.

## 2025-03-05 NOTE — PROGRESS NOTES
Marie Gaspar M.D.  Attending, Orthopaedic Surgery  Hand, Wrist, and Elbow Surgery  [facility]      ORTHOPAEDIC HAND, WRIST, AND ELBOW OFFICE  VISIT       ASSESSMENT/PLAN:        Assessment & Plan  Ganglion cyst of dorsum of left wrist  X-rays were performed in the office and reviewed. It was discussed with the patient that she likely sustained a lunotriquetral ligament tear in the past.   We discussed that the ganglion cyst is reoccurring secondary to her underlying wrist arthritis.   We discussed the possibility of an aspiration in the office today vs surgical excision, with the understanding the mass will likely reoccur secondary to underlying wrist arthritis.   Left wrist dorsal ulnar ganglion cyst aspiration was performed in the office without complication. As the cyst was perforated, she may massage the area over the next few days.   Orders:    Hand/upper extremity injection: L wrist    Arthritis of left wrist  Left wrist ulnocarpal joint CSI was performed in the office without complication. Post injection protocol/expectations were reviewed.   Orders:    Medium joint arthrocentesis: L ulnocarpal    Pain of ulnar side of wrist    Orders:    Medium joint arthrocentesis: L ulnocarpal    Pain in left wrist    Orders:    XR wrist 3+ vw left; Future      The patient verbalized understanding of exam findings and treatment plan. We engaged in the shared decision-making process and treatment options were discussed at length with the patient. Surgical and conservative management discussed today along with risks and benefits.    Diagnoses and all orders for this visit:    Ganglion cyst of dorsum of left wrist  -     Hand/upper extremity injection: L wrist    Arthritis of left wrist  -     Medium joint arthrocentesis: L ulnocarpal    Pain of ulnar side of wrist  -     Medium joint arthrocentesis: L ulnocarpal    Pain in left wrist  -     XR wrist 3+ vw left; Future    Other orders  -      Cancel: Small joint arthrocentesis      Follow Up:  Return if symptoms worsen or fail to improve.    To Do Next Visit:  Re-evaluation of current issue      General Discussions:  Ganglion Cysts: The anatomy and physiology of the ganglion was discussed with the patient today in the office.  Fluid leaking out of the joint surface typically creates a small sac, which can enlarge and cause pain or limitation of motion.  Treatment options include observation, aspiration, or surgical incision were discussed with the patient today.  Observation typically lead to resolution and approximately 10% of patients, aspiration results in resolution of approximately 50% of patients, and surgical excision leads to resolution in approximately 97% of patients.  After discussion with the patient today, the patient voiced understanding of all treatment options.    ____________________________________________________________________________________________________________________________________________      CHIEF COMPLAINT:  Chief Complaint   Patient presents with    Left Wrist - Pain     Cyst       SUBJECTIVE:  Rosa Caraballo is a 69 y.o. year old RHD female who presents to the office for evaluation of her left wrist. She notes a mass to the dorsal ulnar aspect of her left wrist. This has been present for approx. 8 months. She notes that she does not like the look of the cyst and that it is causing her increased pain. She does have constant wrist pain but notes this is livable. She will use topical creams as needed for pain control. She has a history of left wrist ganglion cyst excision and ECU tenosynovectomy in 2020 and feels the cyst is in the same area. She did undergo 2 previous aspirations prior to surgery.      Pain/symptom timing:  Worse during the day when active  Pain/symptom context:  Worse with activites and work  Pain/symptom modifying factors:  Rest makes better, activities make worse  Pain/symptom associated  signs/symptoms: none    Prior treatment   NSAIDsNo   Injections No   Bracing/Orthotics No    Physical Therapy No     I have personally reviewed all the relevant PMH, PSH, SH, FH, Medications and allergies      PAST MEDICAL HISTORY:  Past Medical History:   Diagnosis Date    Asthma     Cancer (HCC)     Follicular lymphoma    Celiac disease     Chronic sinusitis 04/27/2023    Colon polyp     Follicular lymphoma (HCC)     GERD (gastroesophageal reflux disease)     Heart palpitations     History of colonic polyps     resolved 07/12/2016    History of radiation therapy 2010    Hyperlipidemia     Insomnia     Irregular heart beat     Lymphoma, 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HCC)     non hodgekins, remission    Malignant lymphoma (HCC) 2010    rt arm cutaneous follicular stage ia (rt diatal medical biceps area , s/p resected and s/p radistion treatment    resolved 12/17/2014    Postmenopausal disorder     resolved 09/21/2017    Pulmonary nodule     BENIGN, STABLE [id number]    Restless legs syndrome     resolved 09/21/2017    TMJ syndrome     resolved 09/21/2017       PAST SURGICAL HISTORY:  Past Surgical History:   Procedure Laterality Date    COLONOSCOPY  04/16/2019    FUNCTIONAL ENDOSCOPIC SINUS SURGERY Bilateral 2013    Dr Bagley    HYSTERECTOMY      age 45 complete    LYMPH NODE DISSECTION Right     arm    NASAL SEPTUM SURGERY  2013    with turbinate reduction - Dr. Bagley    OOPHORECTOMY Bilateral     NV ESOPHAGOGASTRODUODENOSCOPY TRANSORAL DIAGNOSTIC N/A 01/18/2016    Procedure: ESOPHAGOGASTRODUODENOSCOPY (EGD);  Surgeon: Ness Shepherd MD;  Location: [facility];  Service: Gastroenterology    NV EXCISION GANGLION WRIST DORSAL/VOLAR RECURRENT Left 12/08/2020    Procedure: WRIST GANGLION CYST EXCISION;  Surgeon: Marie Gaspar MD;  Location: [facility];  Service: Orthopedics    NV EXCISION TUMOR SOFT TISSUE SHOULDER SUBQ 3 CM/> Left 11/27/2023    Procedure: anterior shouler- EXCISION BIOPSY TISSUE LESION/MASS UPPER EXTREMITY;  Surgeon:  Ronak Alanis DO;  Location: [facility];  Service: Orthopedics    SYNOVECTOMY N/A 2020    Procedure: ECU TENOSYNOVECTOMY;  Surgeon: Marie Gaspar MD;  Location: [facility];  Service: Orthopedics    TONSILLECTOMY      TOTAL ABDOMINAL HYSTERECTOMY W/ BILATERAL SALPINGOOPHORECTOMY      age 51    UPPER GASTROINTESTINAL ENDOSCOPY      US GUIDED MSK PROCEDURE  2020    US GUIDED MSK PROCEDURE  2020    US GUIDED MSK PROCEDURE  2021       FAMILY HISTORY:  Family History   Problem Relation Age of Onset    Lymphoma Mother     Throat cancer Father     Heart failure Father     Atrial fibrillation Father     Diabetes Father     Colonic polyp Father     Hypertension Father     Thyroid cancer Sister     No Known Problems Maternal Grandmother     No Known Problems Maternal Grandfather     Breast cancer Paternal Grandmother 69    Cancer Paternal Grandfather     Lung cancer Paternal Grandfather     No Known Problems Maternal Aunt     Breast cancer Paternal Aunt 69    Ovarian cancer Paternal Aunt 70    Skin cancer Paternal Aunt     Throat cancer Paternal Uncle     BRCA1 Positive Cousin     Colon cancer Neg Hx     Cervical cancer Neg Hx     Uterine cancer Neg Hx        SOCIAL HISTORY:  Social History     Tobacco Use    Smoking status: Former     Current packs/day: 0.00     Average packs/day: 0.8 packs/day for 20.0 years (15.0 ttl pk-yrs)     Types: Cigarettes     Start date:      Quit date: 2004     Years since quittin.1    Smokeless tobacco: Never    Tobacco comments:     Quit 10/31/04 2130   Vaping Use    Vaping status: Never Used   Substance Use Topics    Alcohol use: Yes     Alcohol/week: 3.0 standard drinks of alcohol     Types: 3 Glasses of wine per week     Comment: soc    Drug use: No       MEDICATIONS:    Current Outpatient Medications:     benralizumab (FASENRA) subcutaneous injection, Inject 1 mL (30 mg total) under the skin every 56 days for 6 doses, Disp: 1 Syringe, Rfl: 6     bimatoprost (LATISSE) 0.03 % ophthalmic solution, Take as directed, Disp: , Rfl:     budesonide (Pulmicort Flexhaler) 90 MCG/ACT inhaler, Inhale 1 puff 2 (two) times a day Rinse mouth after use., Disp: 1 each, Rfl: 3    Cholecalciferol 50 MCG (2000 UT) CAPS, Take by mouth daily, Disp: , Rfl:     cyanocobalamin (VITAMIN B-12) 1,000 mcg tablet, Take 1,000 mcg by mouth daily, Disp: , Rfl:     doxycycline hyclate (VIBRAMYCIN) 100 mg capsule, , Disp: , Rfl:     erythromycin with ethanol (EMGEL) 2 % gel, Apply topically 2 (two) times a day, Disp: 30 g, Rfl: 1    estradiol (ESTRACE VAGINAL) 0.1 mg/g vaginal cream, Insert 1 g intravaginally twice weekly for dryness, Disp: 42.5 g, Rfl: 1    famotidine (PEPCID) 20 mg tablet, take 1 tablet by mouth twice a day, Disp: 180 tablet, Rfl: 1    flecainide (TAMBOCOR) 100 mg tablet, take 1 tablet by mouth twice a day, Disp: 180 tablet, Rfl: 3    fluticasone (FLONASE) 50 mcg/act nasal spray, 2 sprays into each nostril 2 (two) times a day, Disp: 48 g, Rfl: 4    ipratropium (ATROVENT) 0.03 % nasal spray, instill 2 sprays into each nostril three times a day, Disp: 30 mL, Rfl: 3    levalbuterol (XOPENEX HFA) 45 mcg/act inhaler, Inhale 1-2 puffs every 4 (four) hours as needed for wheezing or shortness of breath, Disp: 45 g, Rfl: 0    lidocaine (LMX) 4 % cream, Apply topically as needed for mild pain, Disp: 15 g, Rfl: 3    linaCLOtide 72 MCG CAPS, Take 72 mcg by mouth daily, Disp: 4 capsule, Rfl: 0    Magnesium 500 MG CAPS, Take by mouth, Disp: , Rfl:     methocarbamol (ROBAXIN) 500 mg tablet, Take 1 tablet (500 mg total) by mouth 3 (three) times a day as needed for muscle spasms, Disp: 60 tablet, Rfl: 0    metoprolol succinate (TOPROL-XL) 25 mg 24 hr tablet, Take 1 tablet (25 mg total) by mouth every evening, Disp: 90 tablet, Rfl: 5    montelukast (SINGULAIR) 10 mg tablet, Take 1 tablet (10 mg total) by mouth daily at bedtime, Disp: 30 tablet, Rfl: 3    ondansetron (ZOFRAN-ODT) 4 mg  disintegrating tablet, Take 1 tablet (4 mg total) by mouth every 6 (six) hours as needed for nausea or vomiting, Disp: 15 tablet, Rfl: 0    polyethylene glycol (GLYCOLAX) 17 GM/SCOOP powder, Take 17 g by mouth 2 (two) times a day, Disp: 765 g, Rfl: 4    rosuvastatin (CRESTOR) 5 mg tablet, take 1 tablet by mouth once daily, Disp: 90 tablet, Rfl: 1    sucralfate (CARAFATE) 1 g tablet, Take 1 tablet (1 g total) by mouth 2 (two) times a day, Disp: 60 tablet, Rfl: 5    zolpidem (AMBIEN) 5 mg tablet, Take 1 tablet (5 mg total) by mouth daily at bedtime as needed for sleep, Disp: 30 tablet, Rfl: 2    EPINEPHrine (EPIPEN) 0.3 mg/0.3 mL SOAJ, Inject 0.3 mL (0.3 mg total) into a muscle once for 1 dose, Disp: 0.6 mL, Rfl: 0    levalbuterol (Xopenex) 1.25 mg/3 mL nebulizer solution, Take 3 mL (1.25 mg total) by nebulization every 4 (four) hours as needed for wheezing or shortness of breath for up to 25 days, Disp: 75 mL, Rfl: 6    meloxicam (MOBIC) 15 mg tablet, Take 1 tablet (15 mg total) by mouth daily as needed for moderate pain, Disp: 90 tablet, Rfl: 1    senna-docusate sodium (SENOKOT-S) 8.6-50 mg per tablet, Take 1 tablet by mouth daily for 14 days, Disp: 14 tablet, Rfl: 0  No current facility-administered medications for this visit.    Facility-Administered Medications Ordered in Other Visits:     EPINEPHrine PF (ADRENALIN) 1 mg/mL injection 0.3 mg, 0.3 mg, Intramuscular, Once, Darien Mckeon MD    ALLERGIES:  Allergies   Allergen Reactions    Gluten Meal - Food Allergy GI Intolerance     Celiac     Morphine And Codeine Itching    Nuts - Food Allergy Hives     Almonds,walnuts, hazelnuts and other related nuts.     Shellfish-Derived Products - Food Allergy Anaphylaxis    Wheat Bran - Food Allergy GI Intolerance    Sulfa Antibiotics Rash           REVIEW OF SYSTEMS:  Review of Systems   Constitutional:  Negative for chills, fever and unexpected weight change.   HENT:  Negative for hearing loss, nosebleeds and sore  throat.    Eyes:  Negative for pain, redness and visual disturbance.   Respiratory:  Negative for cough, shortness of breath and wheezing.    Cardiovascular:  Negative for chest pain, palpitations and leg swelling.   Gastrointestinal:  Negative for abdominal pain, nausea and vomiting.   Endocrine: Negative for polydipsia and polyuria.   Genitourinary:  Negative for difficulty urinating and hematuria.   Musculoskeletal:  Negative for arthralgias, joint swelling and myalgias.   Skin:  Negative for rash and wound.   Neurological:  Negative for dizziness, numbness and headaches.   Psychiatric/Behavioral:  Negative for decreased concentration, dysphoric mood and suicidal ideas. The patient is not nervous/anxious.        VITALS:  There were no vitals filed for this visit.    LABS:      _____________________________________________________  PHYSICAL EXAMINATION:  General: well developed and well nourished, alert, oriented times 3, and appears comfortable  Psychiatric: Normal  HEENT: Normocephalic, Atraumatic Trachea Midline, No torticollis  Pulmonary: No audible wheezing or respiratory distress   Abdomen/GI: Non tender, non distended   Cardiovascular: No pitting edema, 2+ radial pulse   Skin: No Erythema, No Lacerations, No Fluctuation, No Ulcerations  Neurovascular: Sensation Intact to the Median, Ulnar, Radial Nerve, Motor Intact to the Median, Ulnar, Radial Nerve, and Pulses Intact  Musculoskeletal: Normal, except as noted in detailed exam and in HPI.      MUSCULOSKELETAL EXAMINATION:    Left wrist:     No erythema, ecchymosis or edema  Dorsal ulnar ganglion cyst   Cyst is tender to palpation   Cyst is well circumscribed   Ulnocarpal joint tenderness   Normal wrist ROM     ___________________________________________________  STUDIES REVIEWED:  I have personally reviewed and interpreted  AP lateral and oblique radiographs of left wrist which demonstrate progression of left wrist arthritis likely secondary to  lunotriquetral tear, chronic, worsening appearance of lunate sclerosis VISI deformity      LABS REVIEWED:    HgA1c:   Lab Results   Component Value Date    HGBA1C 5.9 (H) 01/09/2025     BMP:   Lab Results   Component Value Date    GLUCOSE 112 06/22/2015    CALCIUM 8.6 09/30/2024     06/22/2015    K 3.9 09/30/2024    CO2 24 09/30/2024     09/30/2024    BUN 18 09/30/2024    CREATININE 0.69 09/30/2024               PROCEDURES PERFORMED:  Hand/upper extremity injection: L wrist  Universal Protocol:  procedure performed by consultantConsent: Verbal consent obtained. Written consent not obtained.  Risks and benefits: risks, benefits and alternatives were discussed  Consent given by: patient  Patient understanding: patient states understanding of the procedure being performed  Site marked: the operative site was marked  Patient identity confirmed: verbally with patient  Supporting Documentation  Indications: pain   Procedure Details  Condition:dorsal carpal ganglion Site: L wrist   Needle size: 25 G  Ultrasound guidance: no  Medications administered: 1 mL lidocaine 1 %  Aspirate amount (ml): scant.  Aspirate: clear (viscus)  Patient tolerance: patient tolerated the procedure well with no immediate complications  Dressing:  Sterile dressing applied       Medium joint arthrocentesis: L ulnocarpal  Universal Protocol:  procedure performed by consultantConsent: Verbal consent obtained. Written consent not obtained.  Risks and benefits: risks, benefits and alternatives were discussed  Consent given by: patient  Patient understanding: patient states understanding of the procedure being performed  Site marked: the operative site was marked  Patient identity confirmed: verbally with patient  Supporting Documentation  Indications: pain   Procedure Details  Location: wrist - L ulnocarpal  Needle size: 25 G  Ultrasound guidance: no  Medications administered: 1 mL lidocaine 1 %; 0.5 mL bupivacaine 0.25 %; 20 mg  triamcinolone acetonide 40 mg/mL    Patient tolerance: patient tolerated the procedure well with no immediate complications  Dressing:  Sterile dressing applied           _____________________________________________________      Scribe Attestation      I,:  Sydnee Edward MA am acting as a scribe while in the presence of the attending physician.:       I,:  Marie Gaspar MD personally performed the services described in this documentation    as scribed in my presence.:

## 2025-03-07 ENCOUNTER — RESULTS FOLLOW-UP (OUTPATIENT)
Dept: INTERNAL MEDICINE CLINIC | Facility: CLINIC | Age: 70
End: 2025-03-07

## 2025-03-11 DIAGNOSIS — I49.3 PVC'S (PREMATURE VENTRICULAR CONTRACTIONS): ICD-10-CM

## 2025-03-12 ENCOUNTER — OFFICE VISIT (OUTPATIENT)
Dept: CARDIOLOGY CLINIC | Facility: CLINIC | Age: 70
End: 2025-03-12
Payer: COMMERCIAL

## 2025-03-12 VITALS
HEART RATE: 70 BPM | SYSTOLIC BLOOD PRESSURE: 110 MMHG | BODY MASS INDEX: 24.49 KG/M2 | DIASTOLIC BLOOD PRESSURE: 70 MMHG | HEIGHT: 65 IN | WEIGHT: 147 LBS

## 2025-03-12 DIAGNOSIS — I49.3 PVC'S (PREMATURE VENTRICULAR CONTRACTIONS): Primary | ICD-10-CM

## 2025-03-12 DIAGNOSIS — I49.3 PVC (PREMATURE VENTRICULAR CONTRACTION): ICD-10-CM

## 2025-03-12 DIAGNOSIS — E78.2 MIXED HYPERLIPIDEMIA: ICD-10-CM

## 2025-03-12 PROCEDURE — 99214 OFFICE O/P EST MOD 30 MIN: CPT | Performed by: NURSE PRACTITIONER

## 2025-03-12 RX ORDER — FLECAINIDE ACETATE 100 MG/1
100 TABLET ORAL 2 TIMES DAILY
Qty: 60 TABLET | Refills: 11 | Status: SHIPPED | OUTPATIENT
Start: 2025-03-12

## 2025-03-12 RX ORDER — ROSUVASTATIN CALCIUM 5 MG/1
5 TABLET, COATED ORAL DAILY
Qty: 30 TABLET | Refills: 11 | Status: SHIPPED | OUTPATIENT
Start: 2025-03-12

## 2025-03-12 RX ORDER — METOPROLOL SUCCINATE 25 MG/1
12.5 TABLET, EXTENDED RELEASE ORAL 2 TIMES DAILY
Qty: 35 TABLET | Refills: 11 | Status: SHIPPED | OUTPATIENT
Start: 2025-03-12

## 2025-03-12 NOTE — PROGRESS NOTES
Patient ID: Rosa Caraballo is a 69 y.o. female.        Plan:      Assessment & Plan  Mixed hyperlipidemia  Lipid panel 6/10/2024 reviewed  Continue Crestor 5 mg daily  Due for repeat labs per PCP  PVC's (premature ventricular contractions)  Controlled on flecainide and metoprolol      Follow up Plan/Summary Comments:  PVC's and palpitations are well-controlled on flecainide and metoprolol.  She may use an additional 12.5 mg of metoprolol as needed if she would have any breakthrough symptoms.    No testing recommended.  No medication changes made today.    Follow-up in 1 year.  She will call sooner if needed    HPI: I had the pleasure of seeing Rosa in the office today for a routine visit.    She was last seen 3/6/2024.  She is followed for PVC's which are well-controlled with flecainide and metoprolol.    Since her last visit, Rosa has been doing well.  She is using metoprolol twice daily rather than once daily and paying more attention to her water intake.  With these changes recommended at her last visit, she notes an improvement in her symptoms.      She inquires about using an additional 12.5 mg of metoprolol if she would need to for breakthrough palpitations.  She does sometimes notice if she forgets to take her medication as she wakes in the early morning hours with a sensation of palpitations.    She denies any chest discomfort, exertional dyspnea, dizziness, lightheadedness, syncope.    Review of Systems   10  point ROS  was otherwise non pertinent or negative except as per HPI or as below.   Gait: Normal      Most recent or relevant cardiac/vascular testing:        Holter monitor 10/18/2024  The basic mechanism was sinus with rates ranging from  52 beats per minute to 98 beats per minute.  The average heart rate was 74 beats per minute.  Supraventricular ectopic beats were rare totaling 4 beats and there were no runs.  Ventricular ectopic beats were not present.  There were no significant  "pauses.  Symptoms of arrhythmia were not associated with any change in rate or rhythm.      Objective:     /70   Pulse 70   Ht 5' 4.5\" (1.638 m)   Wt 66.7 kg (147 lb)   BMI 24.84 kg/m²     PHYSICAL EXAM:    General:  Normal appearance, no acute distress  Eyes:  Anicteric.  Oral mucosa:  Moist.  Neck:  No JVD. Carotid upstrokes are brisk without bruits.  No masses.  Chest:  Clear to auscultation   Cardiac:  No palpable PMI.  Normal S1 and S2.  No murmur gallop or rub.  Abdomen:  Soft and nontender. No palpable organomegaly or aortic enlargement.  Extremities:  No peripheral edema.  Musculoskeletal:  Symmetric.   Vascular:  Pedal pulses are intact.  Neuro:  Grossly symmetric.  Psych:  Alert and oriented x3.    Allergies   Allergen Reactions    Gluten Meal - Food Allergy GI Intolerance     Celiac     Morphine And Codeine Itching    Nuts - Food Allergy Hives     Almonds,walnuts, hazelnuts and other related nuts.     Shellfish-Derived Products - Food Allergy Anaphylaxis    Wheat Bran - Food Allergy GI Intolerance    Sulfa Antibiotics Rash       Current Outpatient Medications:     benralizumab (FASENRA) subcutaneous injection, Inject 1 mL (30 mg total) under the skin every 56 days for 6 doses, Disp: 1 Syringe, Rfl: 6    bimatoprost (LATISSE) 0.03 % ophthalmic solution, Take as directed, Disp: , Rfl:     budesonide (Pulmicort Flexhaler) 90 MCG/ACT inhaler, Inhale 1 puff 2 (two) times a day Rinse mouth after use., Disp: 1 each, Rfl: 3    Cholecalciferol 50 MCG (2000 UT) CAPS, Take by mouth daily, Disp: , Rfl:     cyanocobalamin (VITAMIN B-12) 1,000 mcg tablet, Take 1,000 mcg by mouth daily, Disp: , Rfl:     doxycycline hyclate (VIBRAMYCIN) 100 mg capsule, , Disp: , Rfl:     erythromycin with ethanol (EMGEL) 2 % gel, Apply topically 2 (two) times a day, Disp: 30 g, Rfl: 1    estradiol (ESTRACE VAGINAL) 0.1 mg/g vaginal cream, Insert 1 g intravaginally twice weekly for dryness, Disp: 42.5 g, Rfl: 1    famotidine " (PEPCID) 20 mg tablet, take 1 tablet by mouth twice a day, Disp: 180 tablet, Rfl: 1    flecainide (TAMBOCOR) 100 mg tablet, take 1 tablet by mouth twice a day, Disp: 180 tablet, Rfl: 3    fluticasone (FLONASE) 50 mcg/act nasal spray, 2 sprays into each nostril 2 (two) times a day, Disp: 48 g, Rfl: 4    ipratropium (ATROVENT) 0.03 % nasal spray, instill 2 sprays into each nostril three times a day, Disp: 30 mL, Rfl: 3    levalbuterol (XOPENEX HFA) 45 mcg/act inhaler, Inhale 1-2 puffs every 4 (four) hours as needed for wheezing or shortness of breath, Disp: 45 g, Rfl: 0    lidocaine (LMX) 4 % cream, Apply topically as needed for mild pain, Disp: 15 g, Rfl: 3    linaCLOtide 72 MCG CAPS, Take 72 mcg by mouth daily, Disp: 4 capsule, Rfl: 0    Magnesium 500 MG CAPS, Take by mouth, Disp: , Rfl:     methocarbamol (ROBAXIN) 500 mg tablet, Take 1 tablet (500 mg total) by mouth 3 (three) times a day as needed for muscle spasms, Disp: 60 tablet, Rfl: 0    metoprolol succinate (TOPROL-XL) 25 mg 24 hr tablet, Take 1 tablet (25 mg total) by mouth every evening (Patient taking differently: Take 12.5 mg by mouth 2 (two) times a day), Disp: 90 tablet, Rfl: 5    montelukast (SINGULAIR) 10 mg tablet, Take 1 tablet (10 mg total) by mouth daily at bedtime, Disp: 30 tablet, Rfl: 3    ondansetron (ZOFRAN-ODT) 4 mg disintegrating tablet, Take 1 tablet (4 mg total) by mouth every 6 (six) hours as needed for nausea or vomiting, Disp: 15 tablet, Rfl: 0    polyethylene glycol (GLYCOLAX) 17 GM/SCOOP powder, Take 17 g by mouth 2 (two) times a day, Disp: 765 g, Rfl: 4    rosuvastatin (CRESTOR) 5 mg tablet, take 1 tablet by mouth once daily, Disp: 90 tablet, Rfl: 1    sucralfate (CARAFATE) 1 g tablet, Take 1 tablet (1 g total) by mouth 2 (two) times a day, Disp: 60 tablet, Rfl: 5    zolpidem (AMBIEN) 5 mg tablet, Take 1 tablet (5 mg total) by mouth daily at bedtime as needed for sleep, Disp: 30 tablet, Rfl: 2    EPINEPHrine (EPIPEN) 0.3 mg/0.3 mL  SOAJ, Inject 0.3 mL (0.3 mg total) into a muscle once for 1 dose, Disp: 0.6 mL, Rfl: 0    levalbuterol (Xopenex) 1.25 mg/3 mL nebulizer solution, Take 3 mL (1.25 mg total) by nebulization every 4 (four) hours as needed for wheezing or shortness of breath for up to 25 days, Disp: 75 mL, Rfl: 6    meloxicam (MOBIC) 15 mg tablet, Take 1 tablet (15 mg total) by mouth daily as needed for moderate pain, Disp: 90 tablet, Rfl: 1    senna-docusate sodium (SENOKOT-S) 8.6-50 mg per tablet, Take 1 tablet by mouth daily for 14 days, Disp: 14 tablet, Rfl: 0  Past Medical History:   Diagnosis Date    Asthma     Cancer (HCC)     Follicular lymphoma    Celiac disease     Chronic sinusitis 04/27/2023    Colon polyp     Follicular lymphoma (HCC)     GERD (gastroesophageal reflux disease)     Heart palpitations     History of colonic polyps     resolved 07/12/2016    History of radiation therapy 2010    Hyperlipidemia     Insomnia     Irregular heart beat     Lymphoma, 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HCC)     non hodgekins, remission    Malignant lymphoma (HCC) 2010    rt arm cutaneous follicular stage ia (rt diatal medical biceps area , s/p resected and s/p radistion treatment    resolved 12/17/2014    Postmenopausal disorder     resolved 09/21/2017    Pulmonary nodule     BENIGN, STABLE 9158-1463    Restless legs syndrome     resolved 09/21/2017    TMJ syndrome     resolved 09/21/2017     Past Surgical History:   Procedure Laterality Date    COLONOSCOPY  04/16/2019    FUNCTIONAL ENDOSCOPIC SINUS SURGERY Bilateral 2013    Dr Bagley    HYSTERECTOMY      age 45 complete    LYMPH NODE DISSECTION Right     arm    NASAL SEPTUM SURGERY  2013    with turbinate reduction - Dr. Bagley    OOPHORECTOMY Bilateral     MI ESOPHAGOGASTRODUODENOSCOPY TRANSORAL DIAGNOSTIC N/A 01/18/2016    Procedure: ESOPHAGOGASTRODUODENOSCOPY (EGD);  Surgeon: Ness Shepherd MD;  [facility]: [facility];  Service: Gastroenterology    MI EXCISION GANGLION WRIST DORSAL/VOLAR RECURRENT Left  2020    Procedure: WRIST GANGLION CYST EXCISION;  Surgeon: Marie Gaspar MD;  [facility]: [facility];  Service: Orthopedics    MA EXCISION TUMOR SOFT TISSUE SHOULDER SUBQ 3 CM/> Left 2023    Procedure: anterior shouler- EXCISION BIOPSY TISSUE LESION/MASS UPPER EXTREMITY;  Surgeon: Ronak Alanis DO;  Location: [facility];  Service: Orthopedics    SYNOVECTOMY N/A 2020    Procedure: ECU TENOSYNOVECTOMY;  Surgeon: Marie Gaspar MD;  [facility];  Service: Orthopedics    TONSILLECTOMY      TOTAL ABDOMINAL HYSTERECTOMY W/ BILATERAL SALPINGOOPHORECTOMY      age 51    UPPER GASTROINTESTINAL ENDOSCOPY      US GUIDED MSK PROCEDURE  2020    US GUIDED MSK PROCEDURE  2020    US GUIDED MSK PROCEDURE  2021       CMP:   Lab Results   Component Value Date     2015    K 3.9 2024    K 4.0 2015     2024     2015    CO2 24 2024    CO2 25 2015    BUN 18 2024    BUN 16 2015    CREATININE 0.69 2024    CREATININE 0.74 2015    GLUCOSE 112 2015    EGFR 91 2024     Lipid Profile:    Lab Results   Component Value Date    CHOL 200 2015    TRIG 217 (H) 06/10/2024    TRIG 200 2015    HDL 53 06/10/2024    HDL 51 2015         Social History     Tobacco Use   Smoking Status Former    Current packs/day: 0.00    Average packs/day: 0.8 packs/day for 20.0 years (15.0 ttl pk-yrs)    Types: Cigarettes    Start date:     Quit date: 2004    Years since quittin.2   Smokeless Tobacco Never   Tobacco Comments    Quit 10/31/04 2130

## 2025-03-12 NOTE — TELEPHONE ENCOUNTER
Refill must be reviewed and completed by the office or provider. The refill is unable to be approved or denied by the medication management team.      The original prescription was discontinued on 3/12/2025 by JOEL Oneill. Renewing this prescription may not be appropriate.

## 2025-03-13 RX ORDER — METOPROLOL SUCCINATE 25 MG/1
25 TABLET, EXTENDED RELEASE ORAL EVERY EVENING
Qty: 90 TABLET | Refills: 5 | OUTPATIENT
Start: 2025-03-13

## 2025-03-20 DIAGNOSIS — K21.9 GASTROESOPHAGEAL REFLUX DISEASE WITHOUT ESOPHAGITIS: ICD-10-CM

## 2025-03-20 RX ORDER — SUCRALFATE 1 G/1
1 TABLET ORAL 2 TIMES DAILY
Qty: 60 TABLET | Refills: 5 | Status: SHIPPED | OUTPATIENT
Start: 2025-03-20

## 2025-03-24 ENCOUNTER — APPOINTMENT (OUTPATIENT)
Dept: LAB | Facility: CLINIC | Age: 70
End: 2025-03-24
Payer: COMMERCIAL

## 2025-03-24 DIAGNOSIS — C82.90 FOLLICULAR LYMPHOMA, UNSPECIFIED FOLLICULAR LYMPHOMA TYPE, UNSPECIFIED BODY REGION (HCC): ICD-10-CM

## 2025-03-24 DIAGNOSIS — R10.12 LUQ PAIN: ICD-10-CM

## 2025-03-24 DIAGNOSIS — E55.9 VITAMIN D DEFICIENCY: ICD-10-CM

## 2025-03-24 DIAGNOSIS — K90.0 CELIAC DISEASE: ICD-10-CM

## 2025-03-24 DIAGNOSIS — L65.9 HAIR LOSS: ICD-10-CM

## 2025-03-24 DIAGNOSIS — E78.5 DYSLIPIDEMIA: ICD-10-CM

## 2025-03-24 DIAGNOSIS — R73.01 IMPAIRED FASTING BLOOD SUGAR: ICD-10-CM

## 2025-03-24 LAB
25(OH)D3 SERPL-MCNC: 33.3 NG/ML (ref 30–100)
ALBUMIN SERPL BCG-MCNC: 4.5 G/DL (ref 3.5–5)
ALP SERPL-CCNC: 64 U/L (ref 34–104)
ALT SERPL W P-5'-P-CCNC: 26 U/L (ref 7–52)
ANION GAP SERPL CALCULATED.3IONS-SCNC: 9 MMOL/L (ref 4–13)
AST SERPL W P-5'-P-CCNC: 27 U/L (ref 13–39)
BASOPHILS # BLD AUTO: 0.02 THOUSANDS/ÂΜL (ref 0–0.1)
BASOPHILS NFR BLD AUTO: 0 % (ref 0–1)
BILIRUB SERPL-MCNC: 1.01 MG/DL (ref 0.2–1)
BUN SERPL-MCNC: 16 MG/DL (ref 5–25)
CALCIUM SERPL-MCNC: 9.5 MG/DL (ref 8.4–10.2)
CHLORIDE SERPL-SCNC: 102 MMOL/L (ref 96–108)
CHOLEST SERPL-MCNC: 142 MG/DL (ref ?–200)
CO2 SERPL-SCNC: 28 MMOL/L (ref 21–32)
CREAT SERPL-MCNC: 0.78 MG/DL (ref 0.6–1.3)
EOSINOPHIL # BLD AUTO: 0.01 THOUSAND/ÂΜL (ref 0–0.61)
EOSINOPHIL NFR BLD AUTO: 0 % (ref 0–6)
ERYTHROCYTE [DISTWIDTH] IN BLOOD BY AUTOMATED COUNT: 13.2 % (ref 11.6–15.1)
EST. AVERAGE GLUCOSE BLD GHB EST-MCNC: 123 MG/DL
GFR SERPL CREATININE-BSD FRML MDRD: 78 ML/MIN/1.73SQ M
GLUCOSE P FAST SERPL-MCNC: 106 MG/DL (ref 65–99)
HBA1C MFR BLD: 5.9 %
HCT VFR BLD AUTO: 43.2 % (ref 34.8–46.1)
HDLC SERPL-MCNC: 60 MG/DL
HGB BLD-MCNC: 14.3 G/DL (ref 11.5–15.4)
IMM GRANULOCYTES # BLD AUTO: 0.03 THOUSAND/UL (ref 0–0.2)
IMM GRANULOCYTES NFR BLD AUTO: 0 % (ref 0–2)
INSULIN SERPL-ACNC: 4.55 UIU/ML (ref 1.9–23)
LDLC SERPL CALC-MCNC: 55 MG/DL (ref 0–100)
LYMPHOCYTES # BLD AUTO: 1.5 THOUSANDS/ÂΜL (ref 0.6–4.47)
LYMPHOCYTES NFR BLD AUTO: 19 % (ref 14–44)
MCH RBC QN AUTO: 31 PG (ref 26.8–34.3)
MCHC RBC AUTO-ENTMCNC: 33.1 G/DL (ref 31.4–37.4)
MCV RBC AUTO: 94 FL (ref 82–98)
MONOCYTES # BLD AUTO: 0.54 THOUSAND/ÂΜL (ref 0.17–1.22)
MONOCYTES NFR BLD AUTO: 7 % (ref 4–12)
NEUTROPHILS # BLD AUTO: 5.84 THOUSANDS/ÂΜL (ref 1.85–7.62)
NEUTS SEG NFR BLD AUTO: 74 % (ref 43–75)
NONHDLC SERPL-MCNC: 82 MG/DL
NRBC BLD AUTO-RTO: 0 /100 WBCS
PLATELET # BLD AUTO: 203 THOUSANDS/UL (ref 149–390)
PMV BLD AUTO: 11.5 FL (ref 8.9–12.7)
POTASSIUM SERPL-SCNC: 4.3 MMOL/L (ref 3.5–5.3)
PROT SERPL-MCNC: 6.8 G/DL (ref 6.4–8.4)
RBC # BLD AUTO: 4.62 MILLION/UL (ref 3.81–5.12)
SODIUM SERPL-SCNC: 139 MMOL/L (ref 135–147)
TRIGL SERPL-MCNC: 135 MG/DL (ref ?–150)
WBC # BLD AUTO: 7.94 THOUSAND/UL (ref 4.31–10.16)

## 2025-03-24 PROCEDURE — 82306 VITAMIN D 25 HYDROXY: CPT

## 2025-03-24 PROCEDURE — 80061 LIPID PANEL: CPT

## 2025-03-24 PROCEDURE — 83036 HEMOGLOBIN GLYCOSYLATED A1C: CPT

## 2025-03-24 PROCEDURE — 85025 COMPLETE CBC W/AUTO DIFF WBC: CPT

## 2025-03-24 PROCEDURE — 80053 COMPREHEN METABOLIC PANEL: CPT

## 2025-03-24 PROCEDURE — 36415 COLL VENOUS BLD VENIPUNCTURE: CPT

## 2025-03-24 PROCEDURE — 83525 ASSAY OF INSULIN: CPT

## 2025-03-26 ENCOUNTER — OFFICE VISIT (OUTPATIENT)
Dept: OBGYN CLINIC | Facility: CLINIC | Age: 70
End: 2025-03-26
Payer: COMMERCIAL

## 2025-03-26 VITALS
SYSTOLIC BLOOD PRESSURE: 102 MMHG | BODY MASS INDEX: 24.49 KG/M2 | DIASTOLIC BLOOD PRESSURE: 58 MMHG | HEIGHT: 65 IN | WEIGHT: 147 LBS

## 2025-03-26 DIAGNOSIS — Z12.31 ENCOUNTER FOR SCREENING MAMMOGRAM FOR BREAST CANCER: ICD-10-CM

## 2025-03-26 DIAGNOSIS — N95.1 VAGINAL DRYNESS, MENOPAUSAL: Primary | ICD-10-CM

## 2025-03-26 PROCEDURE — 99213 OFFICE O/P EST LOW 20 MIN: CPT | Performed by: PHYSICIAN ASSISTANT

## 2025-03-26 NOTE — PATIENT INSTRUCTIONS
Vagifem or Yuvafem are alternative options we can trial    For vaginal dryness:    Coconut oil (organic, pure, unscented) as needed for moisture or lubrication. ( Do not use if allergic)    KeyE suppository  Revaree hyaluronic acid suppository   Replens moisture restore external comfort gel daily ( use as directed on the box)     Replens long lasting vaginal moisturizer  ( use as directed on the box)       For Vaginal Lubrication:     Coconut oil (Do not use if allergic)           Silicone based lubricant:  Uber Lube  AstroGlide  Replens silky smooth lubricant, premium silicone based lubricant for intercourse. ( use as directed, a small amount will provide an enhanced natural feeling)

## 2025-03-28 NOTE — PROGRESS NOTES
"Name: Rosa Caraballo      : 1955      MRN: [id number]  Encounter Provider: Tatiana Lundberg PA-C  Encounter Date: 3/26/2025   Encounter department: [facility] [place]  :  Assessment & Plan  Vaginal dryness, menopausal  -Will discontinue Estrace cream.  Patient will use coconut oil as this is working well for her.  Reviewed option of Vagifem in the future which she will consider  -List of over-the-counter vaginal moisturizers and lubricants provided       Encounter for screening mammogram for breast cancer    Orders:    Mammo screening bilateral w 3d and cad; Future        History of Present Illness   HPI  Rosa Caraballo is a 69 y.o. female who presents for a follow-up visit today.  At her annual exam in November she had reported vaginal dryness and discomfort with intercourse.  She had been using Replens but did not feel she was getting desired results    At her annual we discussed a course of intravaginal Estrace cream to be used twice weekly for treatment of dryness and discomfort.  She did try this cream.  She states she believes it was helping but she found the cream to be messy and burdensome.  So she ultimately stopped cream entirely.  She has been using coconut oil with good results.  She would prefer not to restart Estrace cream at this time.  Will continue with coconut oil.  We did review alternative option of Vagifem which may be less messy for patient.  She declines at this time.        Review of Systems   Constitutional: Negative.    Respiratory: Negative.     Cardiovascular: Negative.    Gastrointestinal: Negative.    Genitourinary: Negative.    Musculoskeletal: Negative.    Skin: Negative.    Psychiatric/Behavioral: Negative.            Objective   /58 (BP Location: Left arm, Patient Position: Sitting, Cuff Size: Standard)   Ht 5' 4.5\" (1.638 m)   Wt 66.7 kg (147 lb)   BMI 24.84 kg/m²      Physical Exam  Vitals and nursing note reviewed. "   Constitutional:       General: She is not in acute distress.     Appearance: Normal appearance. She is well-developed.   HENT:      Head: Normocephalic and atraumatic.   Eyes:      Conjunctiva/sclera: Conjunctivae normal.   Pulmonary:      Effort: Pulmonary effort is normal. No respiratory distress.   Abdominal:      General: Abdomen is flat. There is no distension.   Genitourinary:     General: Normal vulva.      Comments: Mild atrophic changes noted   Musculoskeletal:      Cervical back: Neck supple.      Right lower leg: No edema.      Left lower leg: No edema.   Skin:     General: Skin is warm and dry.   Neurological:      General: No focal deficit present.      Mental Status: She is alert. Mental status is at baseline.   Psychiatric:         Mood and Affect: Mood normal.         Behavior: Behavior normal.         Thought Content: Thought content normal.         Judgment: Judgment normal.

## 2025-03-31 ENCOUNTER — OFFICE VISIT (OUTPATIENT)
Dept: INTERNAL MEDICINE CLINIC | Facility: CLINIC | Age: 70
End: 2025-03-31
Payer: COMMERCIAL

## 2025-03-31 VITALS
BODY MASS INDEX: 24.66 KG/M2 | TEMPERATURE: 97.9 F | HEART RATE: 69 BPM | SYSTOLIC BLOOD PRESSURE: 108 MMHG | DIASTOLIC BLOOD PRESSURE: 62 MMHG | WEIGHT: 148 LBS | HEIGHT: 65 IN | OXYGEN SATURATION: 97 %

## 2025-03-31 DIAGNOSIS — R73.01 IMPAIRED FASTING BLOOD SUGAR: ICD-10-CM

## 2025-03-31 DIAGNOSIS — G47.00 INSOMNIA, UNSPECIFIED TYPE: ICD-10-CM

## 2025-03-31 DIAGNOSIS — E55.9 VITAMIN D DEFICIENCY: ICD-10-CM

## 2025-03-31 DIAGNOSIS — L65.9 HAIR LOSS: ICD-10-CM

## 2025-03-31 DIAGNOSIS — E78.2 MIXED HYPERLIPIDEMIA: Primary | ICD-10-CM

## 2025-03-31 DIAGNOSIS — J45.50 SEVERE PERSISTENT ASTHMA WITHOUT COMPLICATION: ICD-10-CM

## 2025-03-31 DIAGNOSIS — L30.9 ECZEMA, UNSPECIFIED TYPE: ICD-10-CM

## 2025-03-31 PROCEDURE — 99214 OFFICE O/P EST MOD 30 MIN: CPT | Performed by: FAMILY MEDICINE

## 2025-03-31 RX ORDER — PREDNISONE 10 MG/1
TABLET ORAL
Qty: 30 TABLET | Refills: 0 | Status: SHIPPED | OUTPATIENT
Start: 2025-03-31

## 2025-03-31 RX ORDER — ZOLPIDEM TARTRATE 5 MG/1
5 TABLET ORAL
Qty: 30 TABLET | Refills: 2 | Status: SHIPPED | OUTPATIENT
Start: 2025-03-31

## 2025-04-01 NOTE — ASSESSMENT & PLAN NOTE
Continue with vitamin D supplementation.  Will continue to follow vitamin D level with routine laboratory testing and adjust dosing if needed.  Orders:  •  CBC and differential; Future  •  Vitamin D 25 hydroxy; Future

## 2025-04-01 NOTE — ASSESSMENT & PLAN NOTE
Eczematous rash over her chest and arms discussed.  This did start after her lab draw and in the area of the bandage.  Discussed potential causes.  Prednisone taper discussed.  Has already tried creams.  Watch for any increased palpitations.  Orders:  •  predniSONE 10 mg tablet; Five pills a day for 2 days, 4 pills a day for 2 days, 3 pills a day for 2 days, 2 pills a day for 2 days, 1 pill a day for 2 days  •  CBC and differential; Future

## 2025-04-01 NOTE — ASSESSMENT & PLAN NOTE
Continue with the Crestor.  Reviewed recent laboratory testing with her.  Watch diet.  Try to remain as active as possible.  Orders:  •  CBC and differential; Future  •  Comprehensive metabolic panel; Future  •  Lipid panel; Future  •  TSH, 3rd generation; Future

## 2025-04-01 NOTE — ASSESSMENT & PLAN NOTE
Blood sugar has been mildly elevated.  Will continue to follow this with routine laboratory testing.  Watch carbohydrate intake.  Remain as active as possible.  Orders:  •  CBC and differential; Future  •  Hemoglobin A1C; Future

## 2025-04-01 NOTE — ASSESSMENT & PLAN NOTE
Refill placed for her Ambien.  Continue to take this on an as-needed basis.  Orders:  •  zolpidem (AMBIEN) 5 mg tablet; Take 1 tablet (5 mg total) by mouth daily at bedtime as needed for sleep  •  CBC and differential; Future

## 2025-04-01 NOTE — PROGRESS NOTES
Name: Rosa Caraballo      : 1955      MRN: [id number]  Encounter Provider: Gris Smyth MD  Encounter Date: 3/31/2025   Encounter department: [facility]  :  Assessment & Plan  Insomnia, unspecified type  Refill placed for her Ambien.  Continue to take this on an as-needed basis.  Orders:  •  zolpidem (AMBIEN) 5 mg tablet; Take 1 tablet (5 mg total) by mouth daily at bedtime as needed for sleep  •  CBC and differential; Future    Mixed hyperlipidemia  Continue with the Crestor.  Reviewed recent laboratory testing with her.  Watch diet.  Try to remain as active as possible.  Orders:  •  CBC and differential; Future  •  Comprehensive metabolic panel; Future  •  Lipid panel; Future  •  TSH, 3rd generation; Future    Hair loss  Ongoing issues concerning hair loss discussed.  Discussed collagen supplementation.  Discussed the potential benefits of biotin and zinc.  Orders:  •  CBC and differential; Future  •  TSH, 3rd generation; Future    Vitamin D deficiency  Continue with vitamin D supplementation.  Will continue to follow vitamin D level with routine laboratory testing and adjust dosing if needed.  Orders:  •  CBC and differential; Future  •  Vitamin D 25 hydroxy; Future    Severe persistent asthma without complication  Continue to follow-up with pulmonary.  Continue with the for Kelsi as per their recommendations.  Continue with inhalers as previously.  Use nebulizer if needed.  Orders:  •  CBC and differential; Future    Impaired fasting blood sugar  Blood sugar has been mildly elevated.  Will continue to follow this with routine laboratory testing.  Watch carbohydrate intake.  Remain as active as possible.  Orders:  •  CBC and differential; Future  •  Hemoglobin A1C; Future    Eczema, unspecified type  Eczematous rash over her chest and arms discussed.  This did start after her lab draw and in the area of the bandage.  Discussed potential causes.  Prednisone taper  discussed.  Has already tried creams.  Watch for any increased palpitations.  Orders:  •  predniSONE 10 mg tablet; Five pills a day for 2 days, 4 pills a day for 2 days, 3 pills a day for 2 days, 2 pills a day for 2 days, 1 pill a day for 2 days  •  CBC and differential; Future          Falls Plan of Care: balance, strength, and gait training instructions were provided.       History of Present Illness   She presents for routine follow-up.  Has generally been doing relatively well.  She has had some issues with bruising over the blood draw site on the right arm as well as an itchy rash over the arms bilaterally and onto the chest.  This started after the blood draw.  Has been using creams with minimal alleviation.  She is following up with specialists regarding her thyroid and will be getting an ultrasound through ENT.  Denies any chest pain or palpitations.  Continues to follow-up with cardiology.  Continues with the flecainide.  Continues with low-dose metoprolol.  She has been tolerating the Crestor without difficulty.  Denies any muscle aches or weakness.  Appetite has been generally stable.  Tries to remain as active as possible.  Breathing has been relatively stable and continues to follow-up with pulmonary.      Review of Systems   Constitutional:  Negative for activity change, appetite change, chills and fever.   HENT:  Negative for congestion and rhinorrhea.    Eyes:  Negative for visual disturbance.   Respiratory:  Negative for chest tightness and shortness of breath.    Cardiovascular:  Negative for chest pain and palpitations.   Gastrointestinal:  Negative for abdominal pain, blood in stool, diarrhea, nausea and vomiting.   Endocrine: Negative for polydipsia, polyphagia and polyuria.   Genitourinary:  Negative for dysuria, frequency and urgency.   Musculoskeletal:  Negative for gait problem.   Skin:  Positive for rash. Negative for color change.   Neurological:  Negative for dizziness and headaches.  "  Hematological:  Does not bruise/bleed easily.   Psychiatric/Behavioral:  Positive for sleep disturbance. Negative for confusion. The patient is not nervous/anxious.        Objective   /62 (BP Location: Left arm, Patient Position: Sitting, Cuff Size: Standard)   Pulse 69   Temp 97.9 °F (36.6 °C)   Ht 5' 4.5\" (1.638 m)   Wt 67.1 kg (148 lb)   SpO2 97%   BMI 25.01 kg/m²      Physical Exam  Vitals and nursing note reviewed.   Constitutional:       General: She is not in acute distress.     Appearance: She is well-developed and well-groomed.   HENT:      Head: Normocephalic and atraumatic.   Eyes:      General:         Right eye: No discharge.         Left eye: No discharge.      Conjunctiva/sclera: Conjunctivae normal.      Pupils: Pupils are equal, round, and reactive to light.   Cardiovascular:      Rate and Rhythm: Normal rate and regular rhythm.      Heart sounds: Normal heart sounds. No murmur heard.     No friction rub. No gallop.   Pulmonary:      Effort: No respiratory distress.      Breath sounds: No wheezing or rales.   Abdominal:      General: Bowel sounds are normal. There is no distension.      Tenderness: There is no abdominal tenderness.   Lymphadenopathy:      Cervical: No cervical adenopathy.   Skin:     General: Skin is warm and dry.   Neurological:      Mental Status: She is alert and oriented to person, place, and time.   Psychiatric:         Mood and Affect: Mood and affect normal.         Speech: Speech normal.         Behavior: Behavior normal. Behavior is cooperative.         Cognition and Memory: Cognition and memory normal.         "

## 2025-04-01 NOTE — ASSESSMENT & PLAN NOTE
Continue to follow-up with pulmonary.  Continue with the for Faserna as per their recommendations.  Continue with inhalers as previously.  Use nebulizer if needed.  Orders:  •  CBC and differential; Future

## 2025-04-01 NOTE — ASSESSMENT & PLAN NOTE
Ongoing issues concerning hair loss discussed.  Discussed collagen supplementation.  Discussed the potential benefits of biotin and zinc.  Orders:  •  CBC and differential; Future  •  TSH, 3rd generation; Future

## 2025-04-14 ENCOUNTER — HOSPITAL ENCOUNTER (OUTPATIENT)
Dept: ULTRASOUND IMAGING | Facility: HOSPITAL | Age: 70
Discharge: HOME | End: 2025-04-14
Payer: COMMERCIAL

## 2025-04-14 DIAGNOSIS — E04.1 THYROID NODULE: ICD-10-CM

## 2025-04-14 PROCEDURE — 76536 US EXAM OF HEAD AND NECK: CPT

## 2025-04-28 ENCOUNTER — HOSPITAL ENCOUNTER (OUTPATIENT)
Dept: INFUSION CENTER | Facility: HOSPITAL | Age: 70
Discharge: HOME | End: 2025-04-28
Attending: INTERNAL MEDICINE
Payer: COMMERCIAL

## 2025-04-28 VITALS
RESPIRATION RATE: 16 BRPM | OXYGEN SATURATION: 99 % | DIASTOLIC BLOOD PRESSURE: 63 MMHG | HEART RATE: 69 BPM | SYSTOLIC BLOOD PRESSURE: 105 MMHG | TEMPERATURE: 96.9 F

## 2025-04-28 DIAGNOSIS — D72.119 HYPEREOSINOPHILIC SYNDROME, UNSPECIFIED TYPE: Primary | ICD-10-CM

## 2025-04-28 DIAGNOSIS — J45.50 SEVERE PERSISTENT ASTHMA WITHOUT COMPLICATION: ICD-10-CM

## 2025-04-28 PROCEDURE — 96372 THER/PROPH/DIAG INJ SC/IM: CPT

## 2025-04-28 RX ORDER — EPINEPHRINE 1 MG/ML
0.3 INJECTION, SOLUTION, CONCENTRATE INTRAVENOUS ONCE
Status: DISCONTINUED | OUTPATIENT
Start: 2025-04-28 | End: 2025-05-02 | Stop reason: HOSPADM

## 2025-04-28 RX ORDER — EPINEPHRINE 1 MG/ML
0.3 INJECTION, SOLUTION, CONCENTRATE INTRAVENOUS ONCE
OUTPATIENT
Start: 2025-06-23 | End: 2025-06-23

## 2025-04-28 RX ADMIN — BENRALIZUMAB 30 MG: 30 INJECTION, SOLUTION SUBCUTANEOUS at 11:28

## 2025-04-28 NOTE — PROGRESS NOTES
Pt here for Fasenra injection. Epi pen expires 6/2025   Pt offered no acute complaints today. Pt tolerated injection x1 to right arm. Monitored for 30 minutes post injection pt aware of next appt 6/23 at 11a. Declined AVS

## 2025-04-30 LAB
GENE DIS ANL INTERP-IMP: NORMAL
INTERPRETATION: NORMAL

## 2025-05-05 DIAGNOSIS — J45.50 SEVERE PERSISTENT ASTHMA WITHOUT COMPLICATION: ICD-10-CM

## 2025-05-05 RX ORDER — LEVALBUTEROL TARTRATE 45 UG/1
1-2 AEROSOL, METERED ORAL EVERY 4 HOURS PRN
Qty: 45 G | Refills: 8 | Status: SHIPPED | OUTPATIENT
Start: 2025-05-05

## 2025-05-05 NOTE — TELEPHONE ENCOUNTER
Refill must be reviewed and completed by the office or provider. The refill is unable to be approved or denied by the medication management team.    Albuterol as needed for wheezing and Sob - Please review to see if the refill is appropriate.

## 2025-05-12 DIAGNOSIS — K21.9 GASTROESOPHAGEAL REFLUX DISEASE WITHOUT ESOPHAGITIS: ICD-10-CM

## 2025-05-12 RX ORDER — FAMOTIDINE 20 MG/1
20 TABLET, FILM COATED ORAL 2 TIMES DAILY
Qty: 180 TABLET | Refills: 1 | Status: SHIPPED | OUTPATIENT
Start: 2025-05-12

## 2025-05-14 ENCOUNTER — OFFICE VISIT (OUTPATIENT)
Dept: OBGYN CLINIC | Facility: CLINIC | Age: 70
End: 2025-05-14
Payer: COMMERCIAL

## 2025-05-14 VITALS — HEIGHT: 65 IN | WEIGHT: 148 LBS | BODY MASS INDEX: 24.66 KG/M2

## 2025-05-14 DIAGNOSIS — M67.432 GANGLION CYST OF WRIST, LEFT: Primary | ICD-10-CM

## 2025-05-14 DIAGNOSIS — R22.32 MASS OF LEFT WRIST: ICD-10-CM

## 2025-05-14 DIAGNOSIS — M19.132 POST-TRAUMATIC ARTHRITIS OF LEFT WRIST: ICD-10-CM

## 2025-05-14 PROCEDURE — 99214 OFFICE O/P EST MOD 30 MIN: CPT | Performed by: SURGERY

## 2025-05-14 NOTE — ASSESSMENT & PLAN NOTE
Patient is a candidate for left wrist mass excision under local anesthesia, and she wishes to pursue this option.  Would have to wait 3 months from previous injection.  Risks, benefits and alternative treatments were discussed with the patient. These included but are not limited to: bleeding, infection, damage to nerves, vessels or tendons, allergic reaction to agents, possible increase in pain, tendon or ligament rupture, weakening of bone or soft tissues, and/or elevation in blood sugar. Patient understands and would like to proceed with the proposed procedure.    F/U 2 weeks after surgery.

## 2025-05-14 NOTE — PROGRESS NOTES
Name: Rosa Caraballo      : 1955      MRN: [id number]  Encounter Provider: Josr Mock MD  Encounter Date: 2025   Encounter department: [facility] [place]      Assessment & Plan  Ganglion cyst of wrist, left  Patient is a candidate for left wrist mass excision under local anesthesia, and she wishes to pursue this option.  Would have to wait 3 months from previous injection.  Risks, benefits and alternative treatments were discussed with the patient. These included but are not limited to: bleeding, infection, damage to nerves, vessels or tendons, allergic reaction to agents, possible increase in pain, tendon or ligament rupture, weakening of bone or soft tissues, and/or elevation in blood sugar. Patient understands and would like to proceed with the proposed procedure.    F/U 2 weeks after surgery.       Post-traumatic arthritis of left wrist  Now with resultant synovitis.            History of Present Illness   Patient is a 69 y.o. female here for evaluation of the left wrist.  She is an established patient with Dr. Bush but due to location she presents here for evaluation of the left wrist mass.  She states she had aspiration of left ulnar wrist mass with steroid injection about 2.5 is not looking to have this removed.  She states she had a significant injury including fracture to the left wrist many years ago and she now has known posttraumatic arthritis.  She states currently the mass is chronically draining clear fluid and wants it excised.  No significant pain, fever or chills, or numbness and tingling into the fingers.  Patient is Right Handed.  She has a history of left wrist ganglion cyst excision and [facility] tenosynovectomy in 2020 by Dr. Gaspar.      Review of Systems A 10 point ROS was performed; negative except as noted above.     Objective     Imaging  No new imaging today     Physical Exam    General appearance:  NAD   Cardiac:  Regular rate  Lungs:   Unlabored breathing  Abdomen:  Non-distended    Orthopedic Exam:    Left wrist    Inspection: Previous ulnar-based incisions well-healed.  Arthritic changes noted about the wrist and hand/fingers.  There is a very small mass located ulnar dorsal wrist with chronic clear drainage present.    Palpation:  Mass is fluctuant, non-tender.  + clear fluid expressed.  No pus.  No warmth of the skin.    Range-of-motion:  Normal wrist extension, decreased flexion (chronic), full composite fist.    Strength:  Normal    Sensation:  ILT m/r/u nerve distributions.    Special Tests:  Palpable radial pulse  UE warm and well perfused.        Dionisio Murrell PA-C

## 2025-05-27 DIAGNOSIS — J45.50 SEVERE PERSISTENT ASTHMA WITHOUT COMPLICATION: ICD-10-CM

## 2025-05-27 RX ORDER — BUDESONIDE 90 UG/1
1 AEROSOL, POWDER RESPIRATORY (INHALATION) 2 TIMES DAILY
Qty: 1 EACH | Refills: 3 | Status: SHIPPED | OUTPATIENT
Start: 2025-05-27

## 2025-05-27 NOTE — TELEPHONE ENCOUNTER
Pt needs refill/ pulmicort flexhaler / [facility]    Pt requested [facility] be deleted off chart / which I did .

## 2025-05-30 ENCOUNTER — HOSPITAL ENCOUNTER (OUTPATIENT)
Age: 70
Setting detail: OUTPATIENT SURGERY
Discharge: HOME | End: 2025-05-30
Attending: SURGERY | Admitting: SURGERY
Payer: COMMERCIAL

## 2025-05-30 VITALS
WEIGHT: 150 LBS | TEMPERATURE: 97.7 F | RESPIRATION RATE: 16 BRPM | HEIGHT: 65 IN | HEART RATE: 63 BPM | BODY MASS INDEX: 24.99 KG/M2 | SYSTOLIC BLOOD PRESSURE: 121 MMHG | OXYGEN SATURATION: 98 % | DIASTOLIC BLOOD PRESSURE: 69 MMHG

## 2025-05-30 DIAGNOSIS — R22.32 MASS OF LEFT WRIST: ICD-10-CM

## 2025-05-30 PROCEDURE — 25075 EXC FOREARM LES SC < 3 CM: CPT | Performed by: PHYSICIAN ASSISTANT

## 2025-05-30 PROCEDURE — NC001 PR NO CHARGE: Performed by: SURGERY

## 2025-05-30 PROCEDURE — 88305 TISSUE EXAM BY PATHOLOGIST: CPT | Performed by: STUDENT IN AN ORGANIZED HEALTH CARE EDUCATION/TRAINING PROGRAM

## 2025-05-30 PROCEDURE — 25075 EXC FOREARM LES SC < 3 CM: CPT | Performed by: SURGERY

## 2025-05-30 RX ORDER — MAGNESIUM HYDROXIDE 1200 MG/15ML
LIQUID ORAL AS NEEDED
Status: DISCONTINUED | OUTPATIENT
Start: 2025-05-30 | End: 2025-05-30 | Stop reason: HOSPADM

## 2025-05-30 RX ORDER — SENNOSIDES 8.6 MG
650 CAPSULE ORAL EVERY 8 HOURS PRN
Qty: 30 TABLET | Refills: 0 | Status: SHIPPED | OUTPATIENT
Start: 2025-05-30

## 2025-05-30 RX ORDER — NAPROXEN 500 MG/1
500 TABLET ORAL 2 TIMES DAILY WITH MEALS
Qty: 10 TABLET | Refills: 0 | Status: SHIPPED | OUTPATIENT
Start: 2025-05-30 | End: 2025-06-11 | Stop reason: SDUPTHER

## 2025-05-30 RX ORDER — ACETAMINOPHEN 325 MG/1
650 TABLET ORAL EVERY 6 HOURS PRN
Status: DISCONTINUED | OUTPATIENT
Start: 2025-05-30 | End: 2025-05-30 | Stop reason: HOSPADM

## 2025-05-30 NOTE — DISCHARGE INSTR - AVS FIRST PAGE
Elevate hand above heart as much as possible to help pain and swelling.  May use hand for simple tasks, but no heavy lifting or tight squeezing x 2 weeks.  Keep operative bandage clean and dry. You may remove bandage in 4 days, just place a band-aid over incision.  You are permitted to shower after 4 days with band-aid off.  Perform simple finger motion exercises: opening & closing fingers 10 x every hr.  Follow-up in the office per your scheduled post-op appointment 6/11/2025.

## 2025-05-30 NOTE — OP NOTE
OPERATIVE REPORT  PATIENT NAME: Rosa Caraballo    :  1955  MRN: [id number]  Pt Location: [facility] OR ROOM 06    SURGERY DATE: 2025    Surgeons and Role:     * Josr Mock MD - Primary     * Dionisio Murrell PA-C - Assisting    Preop Diagnosis:  Mass of left wrist [R22.32]    Post-Op Diagnosis Codes:     * Mass of left wrist [R22.32]    Procedure(s):  Left - LEFT WRIST MASS EXCISION    Specimen(s):  ID Type Source Tests Collected by Time Destination   1 : Left wrist mass Tissue Soft Tissue, Other TISSUE EXAM Josr Mock MD 2025 0838        Estimated Blood Loss:   Minimal    Drains:  * No LDAs found *    Anesthesia Type:   Local    Operative Indications:  Mass of left wrist [R22.32]      Operative Findings:  Healthy remaining tissue after excision       Complications:   None    Procedure and Technique:  Left upper extremity was cleansed with alcohol and a field block was performed with Marcaine 0.25% plain lidocaine 1% with epinephrine in a 50-50 mixture.  Left upper extremity was then prepped and draped in a sterile fashion.  Elliptical transverse incision was fashioned around the ulnar-sided mass and sharp dissection was performed through subtenons tissues down to the level of the underlying fascia and was elevated sharply off of the fascia itself.  The mass was approximately 6 mm in diameter.  The specimen was sent for pathologic evaluation.  The wound was copiously irrigated with normal saline.  The wound bed was evaluated and the surrounding tissues did appear healthy with no evidence of sinus tracts or inflammatory changes.  The wound was then approximated with 4-0 chromic in interrupted horizontal mattress fashion.  Xeroform was applied followed by 4 x 4 gauze and an Ace bandage overwrap.  Patient was transferred to phase 2 recovery in stable condition.   I was present for the entire procedure.    Patient Disposition:  PACU     My Assistant was necessary throughout the procedure(s) for retraction  and positioning.    I understand that section 1842 (b)(7)(D) of the Social Security Act generally prohibits Medicare physician fee schedule payment for the services of assistants-at-surgery in teaching hospitals when qualified residents are available to furnish such services. I certify that the services for which payment is claimed were medically necessary, and that no qualified resident was available to perform the services. I further understand that these services are subject to post-payment review by the Medicare carrier.       SIGNATURE: Josr Mock MD  DATE: May 30, 2025  TIME: 8:54 AM

## 2025-05-30 NOTE — INTERVAL H&P NOTE
H&P reviewed. After examining the patient I find no changes in the patients condition since the H&P had been written.    Vitals:    05/30/25 0744   BP: 120/70   Pulse: 70   Resp: 16   Temp: 97.5 °F (36.4 °C)   SpO2: 99%

## 2025-05-30 NOTE — H&P
Hand Surgery H&P    [id number]  Rosa Caraballo    Assessment & Plan  Ganglion cyst of wrist, left  Patient is a candidate for left wrist mass excision under local anesthesia, and she wishes to pursue this option.  Would have to wait 3 months from previous injection.  Risks, benefits and alternative treatments were discussed with the patient. These included but are not limited to: bleeding, infection, damage to nerves, vessels or tendons, allergic reaction to agents, possible increase in pain, tendon or ligament rupture, weakening of bone or soft tissues, and/or elevation in blood sugar. Patient understands and would like to proceed with the proposed procedure.    F/U 2 weeks after surgery.     Post-traumatic arthritis of left wrist  Now with resultant synovitis.      CC:  left wrist mass     History of Present Illness  Patient is a 69 y.o. female here for evaluation of the left wrist.  She is an established patient with Dr. Bush but due to location she presents here for evaluation of the left wrist mass.  She states she had aspiration of left ulnar wrist mass with steroid injection about 2.5 is not looking to have this removed.  She states she had a significant injury including fracture to the left wrist many years ago and she now has known posttraumatic arthritis.  She states currently the mass is chronically draining clear fluid and wants it excised.  No significant pain, fever or chills, or numbness and tingling into the fingers.  Patient is Right Handed.  She has a history of left wrist ganglion cyst excision and [facility] tenosynovectomy in 2020 by Dr. Gaspar.    Past Medical History[1]    Past Surgical History[2]    Allergies   Allergen Reactions    Gluten Meal - Food Allergy GI Intolerance     Celiac     Morphine And Codeine Itching    Nuts - Food Allergy Hives     Almonds,walnuts, hazelnuts and other related nuts.     Shellfish-Derived Products - Food Allergy Anaphylaxis    Wheat Bran - Food Allergy GI  Intolerance    Sulfa Antibiotics Rash      Current Outpatient Medications   Medication Instructions    benralizumab (FASENRA) 30 mg, Subcutaneous, Every 56 days    bimatoprost (LATISSE) 0.03 % ophthalmic solution Take as directed    budesonide (Pulmicort Flexhaler) 90 MCG/ACT inhaler 1 puff, Inhalation, 2 times daily, Rinse mouth after use.    Cholecalciferol 50 MCG (2000 UT) CAPS Daily    EPINEPHrine (EPIPEN) 0.3 mg, Intramuscular, Once    erythromycin with ethanol (EMGEL) 2 % gel Topical, 2 times daily    famotidine (PEPCID) 20 mg, Oral, 2 times daily    flecainide (TAMBOCOR) 100 mg, Oral, 2 times daily    fluticasone (FLONASE) 50 mcg/act nasal spray 2 sprays, Nasal, 2 times daily    ipratropium (ATROVENT) 0.03 % nasal spray instill 2 sprays into each nostril three times a day    levalbuterol (XOPENEX HFA) 45 mcg/act inhaler 1-2 puffs, Inhalation, Every 4 hours PRN    levalbuterol (XOPENEX) 1.25 mg, Nebulization, Every 4 hours PRN    lidocaine (LMX) 4 % cream Topical, As needed    linaCLOtide 72 mcg, Oral, Daily    Magnesium 500 MG CAPS Take by mouth    meloxicam (MOBIC) 15 mg, Oral, Daily PRN    metoprolol succinate (TOPROL-XL) 12.5 mg, Oral, 2 times daily, May take an additional 1/2 tab prn    montelukast (SINGULAIR) 10 mg, Oral, Daily at bedtime    ondansetron (ZOFRAN-ODT) 4 mg, Oral, Every 6 hours PRN    polyethylene glycol (GLYCOLAX) 17 g, Oral, 2 times daily    predniSONE 10 mg tablet Five pills a day for 2 days, 4 pills a day for 2 days, 3 pills a day for 2 days, 2 pills a day for 2 days, 1 pill a day for 2 days    rosuvastatin (CRESTOR) 5 mg, Oral, Daily    sucralfate (CARAFATE) 1 g, Oral, 2 times daily    vitamin B-12 (VITAMIN B-12) 1,000 mcg, Daily    zolpidem (AMBIEN) 5 mg, Oral, Daily at bedtime PRN           Review of Systems A 10 point ROS was performed; negative except as noted above.      Objective[]Expand by Default  Imaging  No new imaging today      Physical Exam     General appearance:  NAD    Cardiac:  Regular rate  Lungs:  Unlabored breathing  Abdomen:  Non-distended     Orthopedic Exam:     Left wrist     Inspection: Previous ulnar-based incisions well-healed.  Arthritic changes noted about the wrist and hand/fingers.  There is a very small mass located ulnar dorsal wrist with chronic clear drainage present.     Palpation:  Mass is fluctuant, non-tender.  + clear fluid expressed.  No pus.  No warmth of the skin.     Range-of-motion:  Normal wrist extension, decreased flexion (chronic), full composite fist.     Strength:  Normal     Sensation:  ILT m/r/u nerve distributions.     Special Tests:  Palpable radial pulse  UE warm and well perfused.       [1]   Past Medical History:  Diagnosis Date    Allergic 2010    celiac dz    Asthma     Cancer (HCC)     Follicular lymphoma    Celiac disease     Chronic sinusitis 04/27/2023    Colon polyp     Follicular lymphoma (HCC)     GERD (gastroesophageal reflux disease)     Heart palpitations     History of colonic polyps     resolved 07/12/2016    History of radiation therapy 2010    Hyperlipidemia     Insomnia     Irregular heart beat     Lymphoma, 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HCC)     non hodgekins, remission    Malignant lymphoma (HCC) 2010    rt arm cutaneous follicular stage ia (rt diatal medical biceps area , s/p resected and s/p radistion treatment    resolved 12/17/2014    Postmenopausal disorder     resolved 09/21/2017    Pulmonary nodule     BENIGN, STABLE 6626-4290    Restless legs syndrome     resolved 09/21/2017    TMJ syndrome     resolved 09/21/2017    Varicella 1967?   [2]   Past Surgical History:  Procedure Laterality Date    COLONOSCOPY  04/16/2019    FUNCTIONAL ENDOSCOPIC SINUS SURGERY Bilateral 2013    Dr Bagley    HYSTERECTOMY      age 45 complete    LYMPH NODE DISSECTION Right     arm    NASAL SEPTUM SURGERY  2013    with turbinate reduction - Dr. Bagley    OOPHORECTOMY Bilateral     GA ESOPHAGOGASTRODUODENOSCOPY TRANSORAL DIAGNOSTIC N/A 01/18/2016    Procedure:  ESOPHAGOGASTRODUODENOSCOPY (EGD);  Surgeon: Ness Shepherd MD;  Location: [facility];  Service: Gastroenterology    NC EXCISION GANGLION WRIST DORSAL/VOLAR RECURRENT Left 12/08/2020    Procedure: WRIST GANGLION CYST EXCISION;  Surgeon: Marie Gaspar MD;  Location: [facility];  Service: Orthopedics    NC EXCISION TUMOR SOFT TISSUE SHOULDER SUBQ 3 CM/> Left 11/27/2023    Procedure: anterior shouler- EXCISION BIOPSY TISSUE LESION/MASS UPPER EXTREMITY;  Surgeon: Ronak Alanis DO;  Location: [facility];  Service: Orthopedics    SYNOVECTOMY N/A 12/08/2020    Procedure: [facility] TENOSYNOVECTOMY;  Surgeon: Marie Gaspar MD;  Location: [facility];  Service: Orthopedics    TONSILLECTOMY      TOTAL ABDOMINAL HYSTERECTOMY W/ BILATERAL SALPINGOOPHORECTOMY      age 51    UPPER GASTROINTESTINAL ENDOSCOPY      US GUIDED MSK PROCEDURE  01/16/2020    US GUIDED MSK PROCEDURE  08/07/2020    US GUIDED MSK PROCEDURE  08/02/2021

## 2025-06-02 PROCEDURE — 88305 TISSUE EXAM BY PATHOLOGIST: CPT | Performed by: STUDENT IN AN ORGANIZED HEALTH CARE EDUCATION/TRAINING PROGRAM

## 2025-06-05 ENCOUNTER — OFFICE VISIT (OUTPATIENT)
Dept: OBGYN CLINIC | Facility: CLINIC | Age: 70
End: 2025-06-05
Payer: COMMERCIAL

## 2025-06-05 VITALS — BODY MASS INDEX: 25.16 KG/M2 | HEIGHT: 65 IN | WEIGHT: 151 LBS

## 2025-06-05 DIAGNOSIS — M24.812 INTERNAL DERANGEMENT OF LEFT SHOULDER: Primary | ICD-10-CM

## 2025-06-05 DIAGNOSIS — S46.112D RUPTURE OF LEFT LONG HEAD BICEPS TENDON, SUBSEQUENT ENCOUNTER: ICD-10-CM

## 2025-06-05 PROCEDURE — 99214 OFFICE O/P EST MOD 30 MIN: CPT | Performed by: STUDENT IN AN ORGANIZED HEALTH CARE EDUCATION/TRAINING PROGRAM

## 2025-06-05 NOTE — PROGRESS NOTES
"Ortho Sports Medicine Shoulder Visit     Assessment & Plan  Rupture of left long head biceps tendon, subsequent encounter    Orders:    MRI shoulder left wo contrast; Future    Internal derangement of left shoulder    Orders:    MRI shoulder left wo contrast; Future    I reviewed history, physical exam, and imaging with the patient at time of visit. Patient presents with left shoulder pain for about 7 months after reaching for a shoebox and feeling a sharp pain and pop over the anterior shoulder. Patient has had daily episodes of painful popping and instances of her shoulder locking in place. On exam, a mere deformity of the left biceps was noted. Patient does have good range of motion but has weakness with empty can. I discussed that given her previous injury of the biceps and weakness on exam, it is reasonable to get an MRI to evaluate for possible rotator cuff tear. I did place a referral for an MRI of the left shoulder. Patient will follow up to review MRI results and to discuss further treatment options. The patient demonstrated understanding of the discussion and was in agreement with the plan.  All of the questions were answered.  Patient can reach out to clinic with any questions or concerns at any time.    Follow up: MRI review  Imaging: none      Chief Complaint   Patient presents with    Left Shoulder - Pain         History of Present Illness:  The patient is a 69 y.o. female seen in clinic for left shoulder pain. Patient states she has had good function. The day after her visit she was working on physical therapy exercises when she overstretched her left arm and heard a pop. She had a popping sensation and it gets \"stuck\". She needs to move the arm a certain way to get it unstuck. Patient states this is also associated with pain. Patient states the episodes of locking in place occur several times per day, everyday. The pain is located anterior. Patient would like to get back to swimming and yoga. "       Prior history 1/10/2025:   The pain started 11/24/2024. The mechanism of injury was reaching for a shoebox at shoulder height. Patient felt immediate pain, pop over the proximal biceps. The pain is now located anteriorly and is associated with clicking and mere deformity. Patient is rated at 2/10 today. Symptoms are aggravated by flexing the elbow. The patient has tried rest, injection with Dr Bedolla on 11/24/2024 (relief for a couple days), and physical therapy. Symptoms have improved since the injury. She has been attended physical therapy for 6 sessions, and has continued with home exercises without issue. Denies numbness or tingling. Patient has a history of a fall after she tripped over her dog and fell on her left shoulder, lipoma excision with Dr Alanis 11/27/2023.    DOI: 11/24/2024  Occupation: retired  Activities: swimming, yoga    The patient has the following co-morbidities: follicular lymphoma, pulmonary nodule      Shoulder Surgical History:  Anterior shoulder excision biopsy tissue lesion 11/27/2023    Past Medical, Social and Family History:  Past Medical History:   Diagnosis Date    Allergic 2010    celiac dz    Asthma     Cancer (HCC)     Follicular lymphoma    Celiac disease     Chronic sinusitis 04/27/2023    Colon polyp     Follicular lymphoma (HCC)     GERD (gastroesophageal reflux disease)     Heart palpitations     History of colonic polyps     resolved 07/12/2016    History of radiation therapy 2010    Hyperlipidemia     Insomnia     Irregular heart beat     Lymphoma, 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HCC)     non hodgekins, remission    Malignant lymphoma (HCC) 2010    rt arm cutaneous follicular stage ia (rt diatal medical biceps area , s/p resected and s/p radistion treatment    resolved 12/17/2014    Postmenopausal disorder     resolved 09/21/2017    Pulmonary nodule     BENIGN, STABLE [id number]    Restless legs syndrome     resolved 09/21/2017    TMJ syndrome     resolved 09/21/2017    Varicella  1967?     Past Surgical History:   Procedure Laterality Date    COLONOSCOPY  04/16/2019    FUNCTIONAL ENDOSCOPIC SINUS SURGERY Bilateral 2013    Dr Bagley    HYSTERECTOMY      age 45 complete    LYMPH NODE DISSECTION Right     arm    NASAL SEPTUM SURGERY  2013    with turbinate reduction - Dr. Bagley    OOPHORECTOMY Bilateral     GA ESOPHAGOGASTRODUODENOSCOPY TRANSORAL DIAGNOSTIC N/A 01/18/2016    Procedure: ESOPHAGOGASTRODUODENOSCOPY (EGD);  Surgeon: Ness Shepherd MD;  Location: [facility];  Service: Gastroenterology    GA EXC TUMOR SOFT TISSUE FOREARM &/WRIST SUBQ <3CM Left 5/30/2025    Procedure: LEFT WRIST MASS EXCISION;  Surgeon: Josr Mock MD;  Location: [facility];  Service: Orthopedics    GA EXCISION GANGLION WRIST DORSAL/VOLAR RECURRENT Left 12/08/2020    Procedure: WRIST GANGLION CYST EXCISION;  Surgeon: Marie Gaspar MD;  [facility]: [facility];  Service: Orthopedics    GA EXCISION TUMOR SOFT TISSUE SHOULDER SUBQ 3 CM/> Left 11/27/2023    Procedure: anterior shouler- EXCISION BIOPSY TISSUE LESION/MASS UPPER EXTREMITY;  Surgeon: Ronak Alanis DO;  Location: [facility];  Service: Orthopedics    SYNOVECTOMY N/A 12/08/2020    Procedure: ECU TENOSYNOVECTOMY;  Surgeon: Marie Gaspar MD;  [facility];  Service: Orthopedics    TONSILLECTOMY      TOTAL ABDOMINAL HYSTERECTOMY W/ BILATERAL SALPINGOOPHORECTOMY      age 51    UPPER GASTROINTESTINAL ENDOSCOPY      US GUIDED MSK PROCEDURE  01/16/2020    US GUIDED MSK PROCEDURE  08/07/2020    US GUIDED MSK PROCEDURE  08/02/2021     Allergies   Allergen Reactions    Gluten Meal - Food Allergy GI Intolerance     Celiac     Morphine And Codeine Itching    Nuts - Food Allergy Hives     Almonds,walnuts, hazelnuts and other related nuts.     Shellfish-Derived Products - Food Allergy Anaphylaxis    Wheat Bran - Food Allergy GI Intolerance    Sulfa Antibiotics Rash     Current Outpatient Medications on File Prior to Visit   Medication Sig Dispense Refill     acetaminophen (TYLENOL) 650 mg CR tablet Take 1 tablet (650 mg total) by mouth every 8 (eight) hours as needed for mild pain 30 tablet 0    benralizumab (FASENRA) subcutaneous injection Inject 1 mL (30 mg total) under the skin every 56 days for 6 doses 1 Syringe 6    bimatoprost (LATISSE) 0.03 % ophthalmic solution Take as directed      budesonide (Pulmicort Flexhaler) 90 MCG/ACT inhaler Inhale 1 puff 2 (two) times a day Rinse mouth after use. 1 each 3    Cholecalciferol 50 MCG (2000 UT) CAPS Take by mouth in the morning.      cyanocobalamin (VITAMIN B-12) 1,000 mcg tablet Take 1,000 mcg by mouth in the morning.      EPINEPHrine (EPIPEN) 0.3 mg/0.3 mL SOAJ Inject 0.3 mL (0.3 mg total) into a muscle once for 1 dose 0.6 mL 0    erythromycin with ethanol (EMGEL) 2 % gel Apply topically 2 (two) times a day 30 g 1    famotidine (PEPCID) 20 mg tablet Take 1 tablet (20 mg total) by mouth 2 (two) times a day 180 tablet 1    flecainide (TAMBOCOR) 100 mg tablet Take 1 tablet (100 mg total) by mouth 2 (two) times a day 60 tablet 11    fluticasone (FLONASE) 50 mcg/act nasal spray 2 sprays into each nostril 2 (two) times a day 48 g 4    ipratropium (ATROVENT) 0.03 % nasal spray instill 2 sprays into each nostril three times a day 30 mL 3    levalbuterol (XOPENEX HFA) 45 mcg/act inhaler Inhale 1-2 puffs every 4 (four) hours as needed for wheezing or shortness of breath 45 g 8    levalbuterol (Xopenex) 1.25 mg/3 mL nebulizer solution Take 3 mL (1.25 mg total) by nebulization every 4 (four) hours as needed for wheezing or shortness of breath for up to 25 days 75 mL 6    lidocaine (LMX) 4 % cream Apply topically as needed for mild pain 15 g 3    linaCLOtide 72 MCG CAPS Take 72 mcg by mouth daily 4 capsule 0    Magnesium 500 MG CAPS Take by mouth      metoprolol succinate (TOPROL-XL) 25 mg 24 hr tablet Take 0.5 tablets (12.5 mg total) by mouth 2 (two) times a day May take an additional 1/2 tab prn 35 tablet 11    montelukast  (SINGULAIR) 10 mg tablet Take 1 tablet (10 mg total) by mouth daily at bedtime 30 tablet 3    naproxen (Naprosyn) 500 mg tablet Take 1 tablet (500 mg total) by mouth 2 (two) times a day with meals for 5 days 10 tablet 0    ondansetron (ZOFRAN-ODT) 4 mg disintegrating tablet Take 1 tablet (4 mg total) by mouth every 6 (six) hours as needed for nausea or vomiting 15 tablet 0    polyethylene glycol (GLYCOLAX) 17 GM/SCOOP powder Take 17 g by mouth 2 (two) times a day 765 g 4    rosuvastatin (CRESTOR) 5 mg tablet Take 1 tablet (5 mg total) by mouth daily 30 tablet 11    sucralfate (CARAFATE) 1 g tablet take 1 tablet by mouth twice a day 60 tablet 5    zolpidem (AMBIEN) 5 mg tablet Take 1 tablet (5 mg total) by mouth daily at bedtime as needed for sleep 30 tablet 2    predniSONE 10 mg tablet Five pills a day for 2 days, 4 pills a day for 2 days, 3 pills a day for 2 days, 2 pills a day for 2 days, 1 pill a day for 2 days (Patient not taking: Reported on 2025) 30 tablet 0     No current facility-administered medications on file prior to visit.     Social History     Socioeconomic History    Marital status: /Civil Union     Spouse name: Not on file    Number of children: Not on file    Years of education: Not on file    Highest education level: Not on file   Occupational History    Occupation: X-ray technologist   Tobacco Use    Smoking status: Former     Current packs/day: 0.00     Average packs/day: 0.8 packs/day for 20.0 years (15.0 ttl pk-yrs)     Types: Cigarettes     Start date:      Quit date:      Years since quittin.4    Smokeless tobacco: Never    Tobacco comments:     Quit 10/31/04 2130   Vaping Use    Vaping status: Never Used   Substance and Sexual Activity    Alcohol use: Yes     Alcohol/week: 3.0 standard drinks of alcohol     Types: 3 Glasses of wine per week     Comment: soc    Drug use: No    Sexual activity: Yes     Partners: Male   Other Topics Concern    Not on file   Social  "History Narrative    Caffeine use    exercise walking      Social Drivers of Health     Financial Resource Strain: Not on file   Food Insecurity: Not on file   Transportation Needs: Not on file   Physical Activity: Not on file   Stress: Not on file   Social Connections: Not on file   Intimate Partner Violence: Not on file   Housing Stability: Not on file       I have reviewed the past medical, surgical, social and family history, medications and allergies as documented in the EMR.    Review of systems: ROS is negative other than that noted in the HPI.  Constitutional: Negative for fatigue and fever.      Physical Exam:    Height 5' 4.5\" (1.638 m), weight 68.5 kg (151 lb), not currently breastfeeding.    General/Constitutional: NAD, well developed, well nourished  HENT: Normocephalic, atraumatic  CV: Intact distal pulses, regular rate  Resp: No respiratory distress or labored breathing  Neuro: Alert and Oriented x 3  Psych: Normal mood, normal affect, normal judgement, normal behavior  Skin: Warm, dry, no rashes, no erythema    Focused left shoulder exam:  No paracervical tenderness.   No cervical tenderness.   No pain with neck flexion, extension, side-to-side bending, or rotation.   Negative Spurling's bilaterally.    Inspection:  - Skin: intact, no erythema or ecchymosis, no open wounds  - Atrophy of supraspinatus or infraspinatus: no  - Scapular winging: no  - Scapular positioning: normal  - Jg deformity    Tenderness to Palpation:  - SC joint: no  - Clavicle: no  - Lateral aspect of acromion: no  - Posterior joint line: no  - AC joint: no  - Bicipital groove: yes    Shoulder ROM:  - Passive forward flexion: 170 degrees  - Active forward flexion: 170 degrees  - Passive external rotation: 80 degrees  - Active external rotation: 80 degrees  - Internal rotation to: T12  - Passive internal rotation with 90 degrees abduction: 60 degrees with pain  - Active internal rotation with 90 degrees abduction: 60 degrees " with pain    Strength testing:  - Empty can: 4+/5  - Resisted external rotation: 5/5  - Resisted internal rotation: 5/5  - Belly press: negative  - Bear hug test: n/a  - Hornblower: n/a  - External rotation lag sign: negative    Impingement:  - Hawkin's impingement test: negative  - Neer impingement sign: negative    Biceps testing:  - Yeagerson: negative  - Speeds: negative    Stability:  - Apprehension sign: negative  - Relocation test: negative  - Anterior load and shift: negative  - Posterior load and shift: negative  - Marcelina test: negative  - [place] test: negative  - Sulcus sign: negative    UE NV Exam:   +2 Radial pulses bilaterally.   Fingers are warm and well-perfused.  SILT C5-T1, SILT median, ulnar, radial nerve distributions  Radial/median/ulnar/PIN/AIN motor intact     Scribe Attestation      I,:  Mario Yanez PA-C am acting as a scribe while in the presence of the attending physician.:       I,:  Yony Kellogg MD personally performed the services described in this documentation    as scribed in my presence.:

## 2025-06-11 ENCOUNTER — OFFICE VISIT (OUTPATIENT)
Dept: OBGYN CLINIC | Facility: CLINIC | Age: 70
End: 2025-06-11

## 2025-06-11 VITALS — HEIGHT: 65 IN | WEIGHT: 151 LBS | BODY MASS INDEX: 25.16 KG/M2

## 2025-06-11 DIAGNOSIS — R22.32 MASS OF LEFT WRIST: Primary | ICD-10-CM

## 2025-06-11 PROCEDURE — 99024 POSTOP FOLLOW-UP VISIT: CPT | Performed by: SURGERY

## 2025-06-11 RX ORDER — NAPROXEN 500 MG/1
500 TABLET ORAL 2 TIMES DAILY WITH MEALS
Qty: 60 TABLET | Refills: 1 | Status: SHIPPED | OUTPATIENT
Start: 2025-06-11 | End: 2025-08-10

## 2025-06-11 NOTE — PROGRESS NOTES
Name: Rosa Caraballo      : 1955      MRN: [id number]  Encounter Provider: Josr Mock MD  Encounter Date: 2025   Encounter department: [facility] [place]      HPI:  Pt is a 68 yo female s/p left dorsal, ulnar wrist mass excision on 25.  Pt states that she is doing well.  Denies any drainage from the area.  Denies fever/chills.  She notes some mild incisional discomfort.        PE:  LUE:  incision healing well, no mass, sutures in place, mild hyperemia, minimal edema, able to make a full fist complex, SILT      Assessment & Plan  Mass of left wrist  Pathology reviewed with patient:  inflammatory nodule.  Pathology suggests possible rheumatoid nodule.  Pt with normal labs for RA.    Scar massage  ROM exercises  Activity as tolerated  Naproxen refilled  F/u PRN.   Orders:    naproxen (Naprosyn) 500 mg tablet; Take 1 tablet (500 mg total) by mouth 2 (two) times a day with meals

## 2025-06-18 ENCOUNTER — HOSPITAL ENCOUNTER (OUTPATIENT)
Dept: MRI IMAGING | Facility: HOSPITAL | Age: 70
Discharge: HOME | End: 2025-06-18
Payer: COMMERCIAL

## 2025-06-18 DIAGNOSIS — M24.812 INTERNAL DERANGEMENT OF LEFT SHOULDER: ICD-10-CM

## 2025-06-18 DIAGNOSIS — S46.112D RUPTURE OF LEFT LONG HEAD BICEPS TENDON, SUBSEQUENT ENCOUNTER: ICD-10-CM

## 2025-06-18 PROCEDURE — 73221 MRI JOINT UPR EXTREM W/O DYE: CPT

## 2025-06-23 ENCOUNTER — HOSPITAL ENCOUNTER (OUTPATIENT)
Dept: INFUSION CENTER | Facility: HOSPITAL | Age: 70
Discharge: HOME | End: 2025-06-23
Attending: INTERNAL MEDICINE
Payer: COMMERCIAL

## 2025-06-23 VITALS
SYSTOLIC BLOOD PRESSURE: 108 MMHG | DIASTOLIC BLOOD PRESSURE: 66 MMHG | HEART RATE: 77 BPM | TEMPERATURE: 96.5 F | RESPIRATION RATE: 16 BRPM

## 2025-06-23 DIAGNOSIS — J45.50 SEVERE PERSISTENT ASTHMA WITHOUT COMPLICATION: ICD-10-CM

## 2025-06-23 DIAGNOSIS — D72.119 HYPEREOSINOPHILIC SYNDROME, UNSPECIFIED TYPE: Primary | ICD-10-CM

## 2025-06-23 PROCEDURE — 96372 THER/PROPH/DIAG INJ SC/IM: CPT

## 2025-06-23 RX ORDER — EPINEPHRINE 1 MG/ML
0.3 INJECTION, SOLUTION, CONCENTRATE INTRAVENOUS ONCE
Status: DISCONTINUED | OUTPATIENT
Start: 2025-06-23 | End: 2025-06-27 | Stop reason: HOSPADM

## 2025-06-23 RX ORDER — EPINEPHRINE 1 MG/ML
0.3 INJECTION, SOLUTION, CONCENTRATE INTRAVENOUS ONCE
OUTPATIENT
Start: 2025-08-18 | End: 2025-08-18

## 2025-06-23 RX ADMIN — BENRALIZUMAB 30 MG: 30 INJECTION, SOLUTION SUBCUTANEOUS at 10:50

## 2025-06-23 NOTE — PROGRESS NOTES
Patient denies active infection or being on antibiotics.  Epi pen expires 6/2025.  Patient tolerated Fasenra injection to right arm.   Patient observed for 30 minutes post injections without issue. Patient stable at discharge.      Rosa Caraballo is aware of future appt on 8/18/25 at 1030.     AVS -  No (Declined by Rosa Caraballo) Patient has Mychart.    Patient ambulated off unit without incident.  All personal belongings taken with patient.

## 2025-06-26 ENCOUNTER — TELEPHONE (OUTPATIENT)
Age: 70
End: 2025-06-26

## 2025-06-26 ENCOUNTER — OFFICE VISIT (OUTPATIENT)
Dept: OBGYN CLINIC | Facility: CLINIC | Age: 70
End: 2025-06-26
Payer: COMMERCIAL

## 2025-06-26 VITALS — WEIGHT: 148 LBS | BODY MASS INDEX: 24.66 KG/M2 | HEIGHT: 65 IN

## 2025-06-26 DIAGNOSIS — S46.112D RUPTURE OF LEFT LONG HEAD BICEPS TENDON, SUBSEQUENT ENCOUNTER: Primary | ICD-10-CM

## 2025-06-26 DIAGNOSIS — G47.00 INSOMNIA, UNSPECIFIED TYPE: ICD-10-CM

## 2025-06-26 PROCEDURE — 99213 OFFICE O/P EST LOW 20 MIN: CPT | Performed by: STUDENT IN AN ORGANIZED HEALTH CARE EDUCATION/TRAINING PROGRAM

## 2025-06-26 NOTE — TELEPHONE ENCOUNTER
Caller: Patient    Doctor: Dr. Blount    Reason for call: Has gi scheduled 7/10 @10:30. Questioned if there was a later appt?    Call back#: [phone]

## 2025-06-26 NOTE — TELEPHONE ENCOUNTER
Patient is scheduled for 6/30 with Dr. Moore for a follow up. She says she is not having issues and does not feel the appointment is needed. Patient wanted to know if she can get her refill on her Ambien if she was to cancel her appointment? Patients next follow up is scheduled for 9/29. Please advise.     Pharmacy confirmed: [facility] - [facility] - [address]

## 2025-06-26 NOTE — TELEPHONE ENCOUNTER
Caller: Patient    Doctor: Dr. Blount    Reason for call: Patient stated she shifted other appts around. No longer needs a call to change her usgi. Will keep the appt as is    Call back#: [phone]

## 2025-06-26 NOTE — PROGRESS NOTES
Ortho Sports Medicine Shoulder Visit     Assessment & Plan  Rupture of left long head biceps tendon, subsequent encounter    Orders:    Ambulatory Referral to Orthopedic Surgery; Future    I reviewed the history, exam, and imaging with the patient including today.  We did review the patient's MRI which shows intact rotator cuff and complete rupture of the long head of the biceps with stump in the bicipital groove.  Patient states she does continue to have pain over the anterior aspect of the shoulder as well as mechanical symptoms including clicking and popping with motion of the shoulder.  She does have good range of motion and strength on exam.  I discussed with the patient that her symptoms could potentially be due to torn labral tissue or biceps stump that could still be in the joint which could be causing the mechanical symptoms.  I did recommend starting with conservative management including a corticosteroid injection into the glenohumeral joint under ultrasound guidance see if that helps with her symptoms.  If she does continue to have pain and mechanical symptoms she can follow-up and we could discuss possible arthroscopy for debridement of the labrum and biceps tendon. The patient demonstrated understanding of the discussion and was in agreement with the plan.  All of the questions were answered.  Patient can reach out to clinic with any questions or concerns at any time.    Follow up: PRN, will reach out for possible surgical arthroscopy if not improved  Imaging: none      Chief Complaint   Patient presents with    Left Shoulder - Pain, Follow-up         History of Present Illness:  The patient is a 69 y.o. female seen in clinic for left shoulder pain. Patient presents to review MRI left shoulder. Patient states her pain has stayed the same. The pain is worse with yoga and certain movements such as reaching for her dog. Patient states the pain is intermittent. Patient has tried physical therapy with some  "relief. She did try a home exercise program which helped with her symptoms but stopped due to increase in pain.     Prior history 6/5/2025:  Patient states she has had good function. The day after her visit she was working on physical therapy exercises when she overstretched her left arm and heard a pop. She had a popping sensation and it gets \"stuck\". She needs to move the arm a certain way to get it unstuck. Patient states this is also associated with pain. Patient states the episodes of locking in place occur several times per day, everyday. The pain is located anterior. Patient would like to get back to swimming and yoga.     Prior history 1/10/2025:   The pain started 11/24/2024. The mechanism of injury was reaching for a shoebox at shoulder height. Patient felt immediate pain, pop over the proximal biceps. The pain is now located anteriorly and is associated with clicking and mere deformity. Patient is rated at 2/10 today. Symptoms are aggravated by flexing the elbow. The patient has tried rest, injection with Dr Bedolla on 11/24/2024 (relief for a couple days), and physical therapy. Symptoms have improved since the injury. She has been attended physical therapy for 6 sessions, and has continued with home exercises without issue. Denies numbness or tingling. Patient has a history of a fall after she tripped over her dog and fell on her left shoulder, lipoma excision with Dr Alanis 11/27/2023.    DOI: 11/24/2024  Occupation: retired  Activities: swimming, yoga    The patient has the following co-morbidities: follicular lymphoma, pulmonary nodule      Shoulder Surgical History:  Anterior shoulder excision biopsy tissue lesion 11/27/2023    Past Medical, Social and Family History:  Past Medical History:   Diagnosis Date    Allergic 2010    celiac dz    Asthma     Cancer (HCC)     Follicular lymphoma    Celiac disease     Chronic sinusitis 04/27/2023    Colon polyp     Follicular lymphoma (HCC)     GERD " (gastroesophageal reflux disease)     Heart palpitations     History of colonic polyps     resolved 07/12/2016    History of radiation therapy 2010    Hyperlipidemia     Insomnia     Irregular heart beat     Lymphoma, 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HCC)     non hodgekins, remission    Malignant lymphoma (HCC) 2010    rt arm cutaneous follicular stage ia (rt diatal medical biceps area , s/p resected and s/p radistion treatment    resolved 12/17/2014    Postmenopausal disorder     resolved 09/21/2017    Pulmonary nodule     BENIGN, STABLE [id number]    Restless legs syndrome     resolved 09/21/2017    TMJ syndrome     resolved 09/21/2017    Varicella 1967?     Past Surgical History:   Procedure Laterality Date    COLONOSCOPY  04/16/2019    FUNCTIONAL ENDOSCOPIC SINUS SURGERY Bilateral 2013    Dr Bagley    HYSTERECTOMY      age 45 complete    LYMPH NODE DISSECTION Right     arm    NASAL SEPTUM SURGERY  2013    with turbinate reduction - Dr. Bagley    OOPHORECTOMY Bilateral     MI ESOPHAGOGASTRODUODENOSCOPY TRANSORAL DIAGNOSTIC N/A 01/18/2016    Procedure: ESOPHAGOGASTRODUODENOSCOPY (EGD);  Surgeon: Ness Shepherd MD;  Location: [facility];  Service: Gastroenterology    MI EXC TUMOR SOFT TISSUE FOREARM &/WRIST SUBQ <3CM Left 5/30/2025    Procedure: LEFT WRIST MASS EXCISION;  Surgeon: Josr Mock MD;  [facility]: [facility];  Service: Orthopedics    MI EXCISION GANGLION WRIST DORSAL/VOLAR RECURRENT Left 12/08/2020    Procedure: WRIST GANGLION CYST EXCISION;  Surgeon: Marie Gaspar MD;  [facility];  Service: Orthopedics    MI EXCISION TUMOR SOFT TISSUE SHOULDER SUBQ 3 CM/> Left 11/27/2023    Procedure: anterior shouler- EXCISION BIOPSY TISSUE LESION/MASS UPPER EXTREMITY;  Surgeon: Ronak Alanis DO;  Location[facility];  Service: Orthopedics    SYNOVECTOMY N/A 12/08/2020    Procedure: ECU TENOSYNOVECTOMY;  Surgeon: Marie Gaspar MD;  [facility];  Service: Orthopedics    TONSILLECTOMY      TOTAL ABDOMINAL  HYSTERECTOMY W/ BILATERAL SALPINGOOPHORECTOMY      age 51    UPPER GASTROINTESTINAL ENDOSCOPY      US GUIDED MSK PROCEDURE  01/16/2020    US GUIDED MSK PROCEDURE  08/07/2020    US GUIDED MSK PROCEDURE  08/02/2021     Allergies   Allergen Reactions    Gluten Meal - Food Allergy GI Intolerance     Celiac     Morphine And Codeine Itching    Nuts - Food Allergy Hives     Almonds,walnuts, hazelnuts and other related nuts.     Shellfish-Derived Products - Food Allergy Anaphylaxis    Wheat Bran - Food Allergy GI Intolerance    Sulfa Antibiotics Rash     Current Outpatient Medications on File Prior to Visit   Medication Sig Dispense Refill    acetaminophen (TYLENOL) 650 mg CR tablet Take 1 tablet (650 mg total) by mouth every 8 (eight) hours as needed for mild pain 30 tablet 0    benralizumab (FASENRA) subcutaneous injection Inject 1 mL (30 mg total) under the skin every 56 days for 6 doses 1 Syringe 6    bimatoprost (LATISSE) 0.03 % ophthalmic solution Take as directed      budesonide (Pulmicort Flexhaler) 90 MCG/ACT inhaler Inhale 1 puff 2 (two) times a day Rinse mouth after use. 1 each 3    Cholecalciferol 50 MCG (2000 UT) CAPS Take by mouth in the morning.      cyanocobalamin (VITAMIN B-12) 1,000 mcg tablet Take 1,000 mcg by mouth in the morning.      erythromycin with ethanol (EMGEL) 2 % gel Apply topically 2 (two) times a day 30 g 1    famotidine (PEPCID) 20 mg tablet Take 1 tablet (20 mg total) by mouth 2 (two) times a day 180 tablet 1    flecainide (TAMBOCOR) 100 mg tablet Take 1 tablet (100 mg total) by mouth 2 (two) times a day 60 tablet 11    fluticasone (FLONASE) 50 mcg/act nasal spray 2 sprays into each nostril 2 (two) times a day 48 g 4    ipratropium (ATROVENT) 0.03 % nasal spray instill 2 sprays into each nostril three times a day 30 mL 3    levalbuterol (XOPENEX HFA) 45 mcg/act inhaler Inhale 1-2 puffs every 4 (four) hours as needed for wheezing or shortness of breath 45 g 8    lidocaine (LMX) 4 % cream  Apply topically as needed for mild pain 15 g 3    linaCLOtide 72 MCG CAPS Take 72 mcg by mouth daily 4 capsule 0    Magnesium 500 MG CAPS Take by mouth      metoprolol succinate (TOPROL-XL) 25 mg 24 hr tablet Take 0.5 tablets (12.5 mg total) by mouth 2 (two) times a day May take an additional 1/2 tab prn 35 tablet 11    montelukast (SINGULAIR) 10 mg tablet Take 1 tablet (10 mg total) by mouth daily at bedtime 30 tablet 3    naproxen (Naprosyn) 500 mg tablet Take 1 tablet (500 mg total) by mouth 2 (two) times a day with meals 60 tablet 1    ondansetron (ZOFRAN-ODT) 4 mg disintegrating tablet Take 1 tablet (4 mg total) by mouth every 6 (six) hours as needed for nausea or vomiting 15 tablet 0    polyethylene glycol (GLYCOLAX) 17 GM/SCOOP powder Take 17 g by mouth 2 (two) times a day 765 g 4    rosuvastatin (CRESTOR) 5 mg tablet Take 1 tablet (5 mg total) by mouth daily 30 tablet 11    sucralfate (CARAFATE) 1 g tablet take 1 tablet by mouth twice a day 60 tablet 5    zolpidem (AMBIEN) 5 mg tablet Take 1 tablet (5 mg total) by mouth daily at bedtime as needed for sleep 30 tablet 2    EPINEPHrine (EPIPEN) 0.3 mg/0.3 mL SOAJ Inject 0.3 mL (0.3 mg total) into a muscle once for 1 dose 0.6 mL 0    levalbuterol (Xopenex) 1.25 mg/3 mL nebulizer solution Take 3 mL (1.25 mg total) by nebulization every 4 (four) hours as needed for wheezing or shortness of breath for up to 25 days 75 mL 6    predniSONE 10 mg tablet Five pills a day for 2 days, 4 pills a day for 2 days, 3 pills a day for 2 days, 2 pills a day for 2 days, 1 pill a day for 2 days (Patient not taking: Reported on 5/14/2025) 30 tablet 0     Current Facility-Administered Medications on File Prior to Visit   Medication Dose Route Frequency Provider Last Rate Last Admin    EPINEPHrine PF (ADRENALIN) 1 mg/mL injection 0.3 mg  0.3 mg Intramuscular Once Darien Mckeon MD         Social History     Socioeconomic History    Marital status: /Civil Union     Spouse  "name: Not on file    Number of children: Not on file    Years of education: Not on file    Highest education level: Not on file   Occupational History    Occupation: X-ray technologist   Tobacco Use    Smoking status: Former     Current packs/day: 0.00     Average packs/day: 0.8 packs/day for 20.0 years (15.0 ttl pk-yrs)     Types: Cigarettes     Start date:      Quit date: 2004     Years since quittin.4    Smokeless tobacco: Never    Tobacco comments:     Quit 10/31/04 2130   Vaping Use    Vaping status: Never Used   Substance and Sexual Activity    Alcohol use: Yes     Alcohol/week: 3.0 standard drinks of alcohol     Types: 3 Glasses of wine per week     Comment: soc    Drug use: No    Sexual activity: Yes     Partners: Male   Other Topics Concern    Not on file   Social History Narrative    Caffeine use    exercise walking      Social Drivers of Health     Financial Resource Strain: Not on file   Food Insecurity: Not on file   Transportation Needs: Not on file   Physical Activity: Not on file   Stress: Not on file   Social Connections: Not on file   Intimate Partner Violence: Not on file   Housing Stability: Not on file       I have reviewed the past medical, surgical, social and family history, medications and allergies as documented in the EMR.    Review of systems: ROS is negative other than that noted in the HPI.  Constitutional: Negative for fatigue and fever.      Physical Exam:    Height 5' 4.5\" (1.638 m), weight 67.1 kg (148 lb), not currently breastfeeding.    General/Constitutional: NAD, well developed, well nourished  HENT: Normocephalic, atraumatic  CV: Intact distal pulses, regular rate  Resp: No respiratory distress or labored breathing  Neuro: Alert and Oriented x 3  Psych: Normal mood, normal affect, normal judgement, normal behavior  Skin: Warm, dry, no rashes, no erythema    Focused left shoulder exam:  No paracervical tenderness.   No cervical tenderness.   No pain with neck flexion, " extension, side-to-side bending, or rotation.   Negative Spurling's bilaterally.    Inspection:  - Skin: intact, no erythema or ecchymosis, no open wounds  - Atrophy of supraspinatus or infraspinatus: no  - Scapular winging: no  - Scapular positioning: normal  - Jg deformity    Tenderness to Palpation:  - SC joint: no  - Clavicle: no  - Lateral aspect of acromion: no  - Posterior joint line: no  - AC joint: no  - Bicipital groove: yes    Shoulder ROM:  - Passive forward flexion: 170 degrees  - Active forward flexion: 170 degrees  - Passive external rotation: 80 degrees  - Active external rotation: 80 degrees  - Internal rotation to: T12  - Passive internal rotation with 90 degrees abduction: 60 degrees with pain  - Active internal rotation with 90 degrees abduction: 60 degrees with pain    Strength testing:  - Empty can: 4+/5  - Resisted external rotation: 5/5  - Resisted internal rotation: 5/5  - Belly press: negative  - Bear hug test: n/a  - Hornblower: n/a  - External rotation lag sign: negative    Impingement:  - Hawkin's impingement test: negative  - Neer impingement sign: negative    Biceps testing:  - Yeagerson: negative  - Speeds: negative    Stability:  - Apprehension sign: negative  - Relocation test: negative  - Anterior load and shift: negative  - Posterior load and shift: negative  - Marcelina test: negative  - Easton test: negative  - Sulcus sign: negative    UE NV Exam:   +2 Radial pulses bilaterally.   Fingers are warm and well-perfused.  SILT C5-T1, SILT median, ulnar, radial nerve distributions  Radial/median/ulnar/PIN/AIN motor intact     Scribe Attestation      I,:  Mario Yanez PA-C am acting as a scribe while in the presence of the attending physician.:       I,:  Yony Kellogg MD personally performed the services described in this documentation    as scribed in my presence.:

## 2025-06-26 NOTE — TELEPHONE ENCOUNTER
Patient is calling to confirm her appointment with us as she had two dates written down. I advised her that her only appointment with pulmonary is 7/1 at 10:30 am with Ivy in our [facility] office.      Thank you.

## 2025-06-27 RX ORDER — ZOLPIDEM TARTRATE 5 MG/1
5 TABLET ORAL
Qty: 30 TABLET | Refills: 2 | Status: SHIPPED | OUTPATIENT
Start: 2025-06-27

## 2025-07-01 ENCOUNTER — OFFICE VISIT (OUTPATIENT)
Dept: PULMONOLOGY | Facility: CLINIC | Age: 70
End: 2025-07-01
Payer: COMMERCIAL

## 2025-07-01 VITALS
RESPIRATION RATE: 18 BRPM | TEMPERATURE: 97.8 F | WEIGHT: 150.4 LBS | HEART RATE: 77 BPM | OXYGEN SATURATION: 99 % | SYSTOLIC BLOOD PRESSURE: 122 MMHG | DIASTOLIC BLOOD PRESSURE: 60 MMHG | BODY MASS INDEX: 25.06 KG/M2 | HEIGHT: 65 IN

## 2025-07-01 DIAGNOSIS — D72.10 EOSINOPHILIA: ICD-10-CM

## 2025-07-01 DIAGNOSIS — J45.50 SEVERE PERSISTENT ASTHMA WITHOUT COMPLICATION: Primary | ICD-10-CM

## 2025-07-01 DIAGNOSIS — J30.89 NON-SEASONAL ALLERGIC RHINITIS, UNSPECIFIED TRIGGER: ICD-10-CM

## 2025-07-01 PROCEDURE — 99214 OFFICE O/P EST MOD 30 MIN: CPT

## 2025-07-01 RX ORDER — MONTELUKAST SODIUM 10 MG/1
10 TABLET ORAL
Qty: 90 TABLET | Refills: 3 | Status: SHIPPED | OUTPATIENT
Start: 2025-07-01

## 2025-07-01 RX ORDER — EPINEPHRINE 0.3 MG/.3ML
0.3 INJECTION SUBCUTANEOUS ONCE
Qty: 0.6 ML | Refills: 0 | Status: SHIPPED | OUTPATIENT
Start: 2025-07-01 | End: 2025-07-01

## 2025-07-01 RX ORDER — LEVALBUTEROL INHALATION SOLUTION 1.25 MG/3ML
1.25 SOLUTION RESPIRATORY (INHALATION) EVERY 6 HOURS PRN
Qty: 360 ML | Refills: 6 | Status: SHIPPED | OUTPATIENT
Start: 2025-07-01

## 2025-07-01 NOTE — PROGRESS NOTES
Follow-up  Visit - Pulmonary Medicine   Name: Rosa Caraballo      : 1955      MRN: [id number]  Encounter Provider: JOEL Archibald  Encounter Date: 2025   Encounter department: [facility]  :  Assessment & Plan  Severe persistent asthma without complication  - Patient had an increase in symptoms last month due to the pollen, however did not require treatment with steroids or antibiotics.  Symptoms back to baseline.  Lungs are clear on exam today  -Continue Pulmicort 1 puff twice daily, levalbuterol HFA/nebs every 6 hours as needed, singular nightly, and Fasenra  -Follow-up in 6 months or sooner if needed    Orders:    levalbuterol (Xopenex) 1.25 mg/3 mL nebulizer solution; Take 3 mL (1.25 mg total) by nebulization every 6 (six) hours as needed for wheezing or shortness of breath    EPINEPHrine (EPIPEN) 0.3 mg/0.3 mL SOAJ; Inject 0.3 mL (0.3 mg total) into a muscle once for 1 dose    Nebulizer Supplies    montelukast (SINGULAIR) 10 mg tablet; Take 1 tablet (10 mg total) by mouth daily at bedtime    Eosinophilia    Orders:    EPINEPHrine (EPIPEN) 0.3 mg/0.3 mL SOAJ; Inject 0.3 mL (0.3 mg total) into a muscle once for 1 dose    Non-seasonal allergic rhinitis, unspecified trigger  - Continue daily OTC antihistamine, Singulair nightly, and Flonase nasal spray         No follow-ups on file.    History of Present Illness   Rosa Caraballo is a 69 y.o. female  with a history of severe persistent asthma, allergic rhinitis, and GERD who is here today for a follow-up visit.  Last seen in the office 6 months ago.  Maintained on Pulmicort, Xopenex as needed, Singulair, and Fasenra.  Patient states last month her symptoms were worse due to the pollen and with [place].  Needed to use her nebulizer treatments more often.  Did not require ED/urgent care or antibiotics/steroids.  Symptoms are now back to baseline.  Has occasional cough.  No wheezing, shortness of  "breath, chest pain, lower extremity swelling.      Review of Systems    Aside from what is mentioned in the HPI, ROS is otherwise negative         Medical History Reviewed by provider this encounter:     .    Objective   /60 (BP Location: Right arm, Patient Position: Sitting, Cuff Size: Standard)   Pulse 77   Temp 97.8 °F (36.6 °C) (Temporal)   Resp 18   Ht 5' 4.5\" (1.638 m)   Wt 68.2 kg (150 lb 6.4 oz)   SpO2 99%   BMI 25.42 kg/m²     Physical Exam  Vitals and nursing note reviewed.   Constitutional:       General: She is not in acute distress.     Appearance: Normal appearance. She is well-developed.     Cardiovascular:      Rate and Rhythm: Normal rate and regular rhythm.      Heart sounds: Normal heart sounds, S1 normal and S2 normal. No murmur heard.  Pulmonary:      Effort: Pulmonary effort is normal.      Breath sounds: Normal breath sounds. No decreased breath sounds, wheezing, rhonchi or rales.     Musculoskeletal:         General: No swelling.      Right lower leg: No edema.      Left lower leg: No edema.     Neurological:      Mental Status: She is alert.     Psychiatric:         Mood and Affect: Mood and affect normal.         Behavior: Behavior normal. Behavior is cooperative.         Diagnostic Data:  Labs: I personally reviewed the most recent laboratory data pertinent to today's visit.      Radiology results:        PFT/spirometry results: Reviewed study from 9/10/2020  No results found for: \"FEV1\", \"FVC\", \"HKE9TUA\", \"TLC\", \"DLCO\"       Oximetry testing:      Other studies:      JOEL Archibald      "

## 2025-07-01 NOTE — ASSESSMENT & PLAN NOTE
- Patient had an increase in symptoms last month due to the pollen, however did not require treatment with steroids or antibiotics.  Symptoms back to baseline.  Lungs are clear on exam today  -Continue Pulmicort 1 puff twice daily, levalbuterol HFA/nebs every 6 hours as needed, singular nightly, and Fasenra  -Follow-up in 6 months or sooner if needed    Orders:    levalbuterol (Xopenex) 1.25 mg/3 mL nebulizer solution; Take 3 mL (1.25 mg total) by nebulization every 6 (six) hours as needed for wheezing or shortness of breath    EPINEPHrine (EPIPEN) 0.3 mg/0.3 mL SOAJ; Inject 0.3 mL (0.3 mg total) into a muscle once for 1 dose    Nebulizer Supplies    montelukast (SINGULAIR) 10 mg tablet; Take 1 tablet (10 mg total) by mouth daily at bedtime

## 2025-07-11 ENCOUNTER — OFFICE VISIT (OUTPATIENT)
Dept: OBGYN CLINIC | Facility: CLINIC | Age: 70
End: 2025-07-11
Payer: COMMERCIAL

## 2025-07-11 VITALS — HEART RATE: 76 BPM | OXYGEN SATURATION: 99 % | HEIGHT: 65 IN | BODY MASS INDEX: 25.42 KG/M2 | TEMPERATURE: 98.3 F

## 2025-07-11 DIAGNOSIS — S46.112D RUPTURE OF LEFT LONG HEAD BICEPS TENDON, SUBSEQUENT ENCOUNTER: ICD-10-CM

## 2025-07-11 DIAGNOSIS — M24.812 INTERNAL DERANGEMENT OF LEFT SHOULDER: Primary | ICD-10-CM

## 2025-07-11 PROCEDURE — 99214 OFFICE O/P EST MOD 30 MIN: CPT | Performed by: FAMILY MEDICINE

## 2025-07-11 PROCEDURE — 20611 DRAIN/INJ JOINT/BURSA W/US: CPT | Performed by: FAMILY MEDICINE

## 2025-07-11 RX ORDER — TRIAMCINOLONE ACETONIDE 40 MG/ML
40 INJECTION, SUSPENSION INTRA-ARTICULAR; INTRAMUSCULAR
Status: COMPLETED | OUTPATIENT
Start: 2025-07-11 | End: 2025-07-11

## 2025-07-11 RX ORDER — BUPIVACAINE HYDROCHLORIDE 5 MG/ML
3.5 INJECTION, SOLUTION PERINEURAL
Status: COMPLETED | OUTPATIENT
Start: 2025-07-11 | End: 2025-07-11

## 2025-07-11 RX ADMIN — TRIAMCINOLONE ACETONIDE 40 MG: 40 INJECTION, SUSPENSION INTRA-ARTICULAR; INTRAMUSCULAR at 11:30

## 2025-07-11 RX ADMIN — BUPIVACAINE HYDROCHLORIDE 3.5 ML: 5 INJECTION, SOLUTION PERINEURAL at 11:30

## 2025-07-11 NOTE — ASSESSMENT & PLAN NOTE
Orders:    Ambulatory Referral to Orthopedic Surgery    Large joint arthrocentesis: L glenohumeral

## 2025-07-11 NOTE — PATIENT INSTRUCTIONS
F/u here as needed  F/u with Dr. Kellogg  US guided L shoulder GH joint steroid injection.  Icing/heat/OTC pain meds as needed.  Home exercises/PT

## 2025-07-24 ENCOUNTER — OFFICE VISIT (OUTPATIENT)
Dept: GASTROENTEROLOGY | Facility: CLINIC | Age: 70
End: 2025-07-24

## 2025-07-24 ENCOUNTER — OFFICE VISIT (OUTPATIENT)
Dept: INTERNAL MEDICINE CLINIC | Facility: CLINIC | Age: 70
End: 2025-07-24
Payer: COMMERCIAL

## 2025-07-24 VITALS
TEMPERATURE: 98 F | WEIGHT: 146.7 LBS | OXYGEN SATURATION: 99 % | BODY MASS INDEX: 24.44 KG/M2 | HEIGHT: 65 IN | HEART RATE: 67 BPM | DIASTOLIC BLOOD PRESSURE: 62 MMHG | SYSTOLIC BLOOD PRESSURE: 120 MMHG

## 2025-07-24 VITALS
DIASTOLIC BLOOD PRESSURE: 70 MMHG | OXYGEN SATURATION: 96 % | BODY MASS INDEX: 24.49 KG/M2 | TEMPERATURE: 97.3 F | WEIGHT: 147 LBS | HEART RATE: 63 BPM | SYSTOLIC BLOOD PRESSURE: 110 MMHG | HEIGHT: 65 IN

## 2025-07-24 DIAGNOSIS — K76.0 HEPATIC STEATOSIS: ICD-10-CM

## 2025-07-24 DIAGNOSIS — K21.9 GASTROESOPHAGEAL REFLUX DISEASE WITHOUT ESOPHAGITIS: ICD-10-CM

## 2025-07-24 DIAGNOSIS — K59.00 CONSTIPATION, UNSPECIFIED CONSTIPATION TYPE: ICD-10-CM

## 2025-07-24 DIAGNOSIS — K90.0 CELIAC DISEASE: Primary | ICD-10-CM

## 2025-07-24 DIAGNOSIS — K14.8 TONGUE DISCOLORATION: Primary | ICD-10-CM

## 2025-07-24 DIAGNOSIS — Z86.0100 HISTORY OF COLONIC POLYPS: ICD-10-CM

## 2025-07-24 DIAGNOSIS — C82.90 FOLLICULAR LYMPHOMA, UNSPECIFIED FOLLICULAR LYMPHOMA TYPE, UNSPECIFIED BODY REGION (HCC): ICD-10-CM

## 2025-07-24 PROBLEM — R22.32 MASS OF SKIN OF LEFT SHOULDER: Status: RESOLVED | Noted: 2023-11-27 | Resolved: 2025-07-24

## 2025-07-24 PROBLEM — R10.12 LUQ PAIN: Status: RESOLVED | Noted: 2025-01-03 | Resolved: 2025-07-24

## 2025-07-24 PROBLEM — M54.2 NECK PAIN: Status: RESOLVED | Noted: 2024-02-29 | Resolved: 2025-07-24

## 2025-07-24 PROCEDURE — 99213 OFFICE O/P EST LOW 20 MIN: CPT | Performed by: NURSE PRACTITIONER

## 2025-07-24 RX ORDER — ONDANSETRON 4 MG/1
4 TABLET, ORALLY DISINTEGRATING ORAL EVERY 6 HOURS PRN
Qty: 15 TABLET | Refills: 0 | Status: SHIPPED | OUTPATIENT
Start: 2025-07-24

## 2025-07-24 RX ORDER — SODIUM CHLORIDE, SODIUM LACTATE, POTASSIUM CHLORIDE, CALCIUM CHLORIDE 600; 310; 30; 20 MG/100ML; MG/100ML; MG/100ML; MG/100ML
125 INJECTION, SOLUTION INTRAVENOUS CONTINUOUS
OUTPATIENT
Start: 2025-07-24

## 2025-07-24 RX ORDER — PANTOPRAZOLE SODIUM 40 MG/1
40 TABLET, DELAYED RELEASE ORAL 2 TIMES DAILY
Qty: 60 TABLET | Refills: 1 | Status: SHIPPED | OUTPATIENT
Start: 2025-07-24

## 2025-07-24 NOTE — ASSESSMENT & PLAN NOTE
Hx of such on colonoscopy last in 02/2024, tortuous redundant colon, small sessile polyps, TA on bx.   Recall in 5 years for surveillance purposes.

## 2025-07-24 NOTE — ASSESSMENT & PLAN NOTE
History of such. Remains gluten free.   Repeat yearly serologies now.   Last EGD in 11/2022 with no evidence of active celiac disease in duodenum.   Repeat EGD given some of her breakthrough upper GI symptoms.   Orders:    Tissue Transglutaminase, IgA; Future    Magnesium; Future    Zinc; Future    Iron Panel (Includes Ferritin, Iron Sat%, Iron, and TIBC); Future    pantoprazole (PROTONIX) 40 mg tablet; Take 1 tablet (40 mg total) by mouth in the morning and 1 tablet (40 mg total) before bedtime.    ondansetron (ZOFRAN-ODT) 4 mg disintegrating tablet; Take 1 tablet (4 mg total) by mouth every 6 (six) hours as needed for nausea or vomiting    Vitamin B12; Future    EGD; Future

## 2025-07-24 NOTE — PROGRESS NOTES
"Name: Rosa Caraballo      : 1955      MRN: [id number]  Encounter Provider: JOEL Liu  Encounter Date: 2025   Encounter department: [facility] [place]  :  Assessment & Plan  Tongue discoloration    No signs of sinusitis on exam. No signs of thrush on exam. Did advise using humdifier in the bedroom and switching to Zyrtec at night from allea. Did offers steroids but would like to hold off. If symptoms persist will call back.              History of Present Illness   Rosa is for an acute visit. She is having feelings of fullness in her sinuses, tinnitus, and thinks she may have thrush. She denies any fever. She is using her allergy medications and inhalers. She is sleeping with her mouth open and is not sure if this is causing her mouth to be dry. She does not have a humidifier. She offers no other issues.      Review of Systems   HENT:  Positive for congestion and postnasal drip.    Allergic/Immunologic: Positive for environmental allergies.   All other systems reviewed and are negative.      Objective   /62 (BP Location: Left arm, Patient Position: Sitting, Cuff Size: Adult)   Pulse 67   Temp 98 °F (36.7 °C) (Temporal)   Ht 5' 4.5\" (1.638 m)   Wt 66.5 kg (146 lb 11.2 oz)   SpO2 99%   BMI 24.79 kg/m²      Physical Exam  Vitals reviewed.   Constitutional:       Appearance: Normal appearance. She is normal weight.   HENT:      Right Ear: Tympanic membrane, ear canal and external ear normal.      Left Ear: Tympanic membrane, ear canal and external ear normal.      Nose: Nose normal.      Mouth/Throat:      Mouth: Mucous membranes are moist.      Pharynx: Oropharynx is clear.     Cardiovascular:      Rate and Rhythm: Normal rate and regular rhythm.      Pulses: Normal pulses.      Heart sounds: Normal heart sounds.   Pulmonary:      Effort: Pulmonary effort is normal.      Breath sounds: Normal breath sounds.     Skin:     General: Skin is warm and dry.      " Capillary Refill: Capillary refill takes less than 2 seconds.     Neurological:      General: No focal deficit present.      Mental Status: She is alert and oriented to person, place, and time. Mental status is at baseline.     Psychiatric:         Mood and Affect: Mood normal.         Behavior: Behavior normal.         Thought Content: Thought content normal.         Judgment: Judgment normal.

## 2025-07-24 NOTE — PROGRESS NOTES
Name: Rosa Caraballo      : 1955      MRN: [id number]  Encounter Provider: Collette Kebede PA-C  Encounter Date: 2025   Encounter department: [facility] [place]  Assessment & Plan  Celiac disease  Follicular lymphoma, unspecified follicular lymphoma type, unspecified body region (HCC)  History of such. Remains gluten free.   Repeat yearly serologies now.   Last EGD in 2022 with no evidence of active celiac disease in duodenum.   Repeat EGD given some of her breakthrough upper GI symptoms.   Orders:    Tissue Transglutaminase, IgA; Future    Magnesium; Future    Zinc; Future    Iron Panel (Includes Ferritin, Iron Sat%, Iron, and TIBC); Future    pantoprazole (PROTONIX) 40 mg tablet; Take 1 tablet (40 mg total) by mouth in the morning and 1 tablet (40 mg total) before bedtime.    ondansetron (ZOFRAN-ODT) 4 mg disintegrating tablet; Take 1 tablet (4 mg total) by mouth every 6 (six) hours as needed for nausea or vomiting    Vitamin B12; Future    EGD; Future    Gastroesophageal reflux disease without esophagitis  Last EGD in 2022 with small hiatal hernia, gastritis, and fundic gland polyps.   She is having breakthrough symptoms on daily H2RA.   Recommend escalation to PPI for a burst of 6-8 weeks.   She can then return to H2RA daily.  EGD to eval for Campbell's esophagus.    Recommend diet and lifestyle modifications for GERD.  This includes avoiding spicy, saucy, greasy/oily foods, citrus, EtOH, NSAIDs, tobacco.  Avoid eating within 2 to 3 hours of bed.  Elevating height of bed 6 inches on blocks may be beneficial.  Weight loss would likely be beneficial.    I discussed informed consent with the patient. The risks/benefits/alternatives of the procedure were discussed with the patient. Risks included, but not limited to, infection, bleeding, perforation, injury to organs in the abdomen, missed lesion and incomplete procedure were discussed. Patient was  agreeable.  Orders:    pantoprazole (PROTONIX) 40 mg tablet; Take 1 tablet (40 mg total) by mouth in the morning and 1 tablet (40 mg total) before bedtime.    ondansetron (ZOFRAN-ODT) 4 mg disintegrating tablet; Take 1 tablet (4 mg total) by mouth every 6 (six) hours as needed for nausea or vomiting    Constipation, unspecified constipation type  History of such.  Recommend daily miralax anywhere from 1/2 capful to 2 capfuls daily to manage symptoms.  Continue high fiber diet and excellent hydration.       History of colonic polyps  Hx of such on colonoscopy last in 02/2024, tortuous redundant colon, small sessile polyps, TA on bx.   Recall in 5 years for surveillance purposes.        Hepatic steatosis  Hx of such, suspect metabolic etiol.   Pt aware of potential sequelae of disease.     Recommendations in regards to fatty liver include:   Strict control of contributing comorbidities (obesity, prediabetes/diabetes, hypertension, and hypertriglyceridemia).  Weight loss of approx 10-15% of patient's current body weight over a period of 6-12 months through low fat diet and cardiovascular exercise as tolerated.  Limiting alcohol consumption, preferably complete abstinence.  Monitor hepatic function every 6 months with routine labs.     Fibrosis-4 (FIB-4) Score is: 1.75    Indication for testing Absence of advanced fibrosis Presence of advanced fibrosis Indeterminate result   NAFLD <2.00 >2.67 2.00-2.67   Hepatitis C <1.45 >3.25 1.45-3.25   Hepatitis B <1.00 >2.65 1.00-2.65     FIB-4 Score Component Values:  Component Value Date   Age: 69 y.o.     AST: 27 U/L 3/24/2025   Platelet: 203 Thousands/uL 3/24/2025   ALT: 26 U/L 3/24/2025          We will follow up in 6 months to reassess symptoms.    History of Present Illness   Rosa Caraballo is a 69 y.o. female who presents for f/u for celiac disease. Pmhx sig for Celiac disease, follicular lymphoma, HLD, asthma, RLS.   HPI  History obtained from: patient    Pt was last  evaluated in 09/2024. She was maintaining gluten free diet. She was taking famotidine 40mg daily at that time.    Since last OV, pt has been dealing with some heartburn symptoms. She is trying to avoid triggers such as red sauce. No nausea, emesis, no dysphagia or odynophagia. No early satiety. She maintains gluten free diet.     Pertaining to bowels, pt is taking miralax intermittently, not always daily as at times has some urgency and loose stools. No BRBPR or melena. No abd pain or rectal pain related to defecation. No abnormal weight loss over past 6 months.    NSAIDs: regularly for MSK pain  Tobacco: none  Etoh: socially white wine      11/2023: Hb 13.5, MCV 94, Plt 190, BUN 12, Cr 0.76, AST 31, ALT 33, ALP 55, albumin 4.2, t bili 0.65   06/2024: Hb 14.0, MCV 95, platelets 198, BUN 18, creatinine 0.7, AST 35, ALT 40, ALP 65, albumin 4.2, T. bili 0.91, TSH 1.451, JAD negative, RF negative, CRP 7.1, ESR 17     Endoscopic history:   EGD: 11/2022: Small sliding hiatal hernia (type I hiatal hernia); Mild erythematous mucosa in the stomach; Ten or more polyps measuring from 3 mm up to 6 mm in the fundus of the stomach and body of the stomach   A. Stomach, biopsy: Gastric antral mucosa with reactive gastropathy. Gastric oxyntic mucosa with mild chronic inactive gastritis and changes suggestive of proton pump inhibitor effect. Negative for intestinal metaplasia and dysplasia. Negative for Helicobacter pylori-type organisms on H&E stain.    B. Stomach polyp, polypectomy: Fundic gland polyp.   C. Esophagus, random, biopsy: Squamous mucosa with mild reactive changes. No evidence of esophagitis.  Colon: 05/2019: All observed locations appeared normal  Colon: 02/2024: 3 subcentimeter polyps; internal hemorrhoids, tortuous colon   A. Large Intestine, Sigmoid Colon, polyp, biopsy: Tubular adenoma. Negative for high grade dysplasia and carcinoma.   B. Rectum, polyp, biopsy: Polypoid portion of colonic mucosa with surface  hyperplastic change.     Review of Systems A complete review of systems is negative other than that noted above in the HPI.    Past Medical History   Past Medical History[1]  Past Surgical History[2]  Family History[3]   reports that she quit smoking about 21 years ago. Her smoking use included cigarettes. She started smoking about 41 years ago. She has a 15 pack-year smoking history. She has never used smokeless tobacco. She reports current alcohol use of about 3.0 standard drinks of alcohol per week. She reports that she does not use drugs.  Current Outpatient Medications   Medication Instructions    acetaminophen (TYLENOL) 650 mg, Oral, Every 8 hours PRN    benralizumab (FASENRA) 30 mg, Subcutaneous, Every 56 days    bimatoprost (LATISSE) 0.03 % ophthalmic solution Take as directed    budesonide (Pulmicort Flexhaler) 90 MCG/ACT inhaler 1 puff, Inhalation, 2 times daily, Rinse mouth after use.    Cholecalciferol 50 MCG (2000 UT) CAPS Daily    EPINEPHrine (EPIPEN) 0.3 mg, Intramuscular, Once    erythromycin with ethanol (EMGEL) 2 % gel Topical, 2 times daily    famotidine (PEPCID) 20 mg, Oral, 2 times daily    flecainide (TAMBOCOR) 100 mg, Oral, 2 times daily    fluticasone (FLONASE) 50 mcg/act nasal spray 2 sprays, Nasal, 2 times daily    ipratropium (ATROVENT) 0.03 % nasal spray instill 2 sprays into each nostril three times a day    levalbuterol (XOPENEX HFA) 45 mcg/act inhaler 1-2 puffs, Inhalation, Every 4 hours PRN    levalbuterol (XOPENEX) 1.25 mg, Nebulization, Every 6 hours PRN    lidocaine (LMX) 4 % cream Topical, As needed    linaCLOtide 72 mcg, Oral, Daily    Magnesium 500 MG CAPS Take by mouth    metoprolol succinate (TOPROL-XL) 12.5 mg, Oral, 2 times daily, May take an additional 1/2 tab prn    montelukast (SINGULAIR) 10 mg, Oral, Daily at bedtime    naproxen (NAPROSYN) 500 mg, Oral, 2 times daily with meals    ondansetron (ZOFRAN-ODT) 4 mg, Oral, Every 6 hours PRN    polyethylene glycol (GLYCOLAX) 17  g, Oral, 2 times daily    predniSONE 10 mg tablet Five pills a day for 2 days, 4 pills a day for 2 days, 3 pills a day for 2 days, 2 pills a day for 2 days, 1 pill a day for 2 days    rosuvastatin (CRESTOR) 5 mg, Oral, Daily    sucralfate (CARAFATE) 1 g, Oral, 2 times daily    vitamin B-12 (VITAMIN B-12) 1,000 mcg, Daily    zolpidem (AMBIEN) 5 mg, Oral, Daily at bedtime PRN   Allergies[4]   Current Medications[5]  Objective   There were no vitals taken for this visit.    Physical Exam  Vitals and nursing note reviewed.   Constitutional:       General: She is not in acute distress.     Appearance: She is well-developed.   HENT:      Head: Normocephalic and atraumatic.     Eyes:      General: No scleral icterus.     Conjunctiva/sclera: Conjunctivae normal.       Cardiovascular:      Rate and Rhythm: Normal rate.   Pulmonary:      Effort: Pulmonary effort is normal. No respiratory distress.   Abdominal:      General: There is no distension.      Palpations: Abdomen is soft.      Tenderness: There is no abdominal tenderness. There is no guarding or rebound.     Skin:     General: Skin is warm and dry.      Coloration: Skin is not jaundiced.     Neurological:      General: No focal deficit present.      Mental Status: She is alert.     Psychiatric:         Mood and Affect: Mood normal.         Behavior: Behavior normal.        Lab Results: I personally reviewed relevant lab results. CBC/BMP: No new results in last 24 hours. , Creatinine Clearance: CrCl cannot be calculated (Patient's most recent lab result is older than the maximum 7 days allowed.)., LFTs: No new results in last 24 hours.     Radiology Results Review: I have reviewed radiology reports from [facility] including: CT abdomen/pelvis and procedure reports.  Results for orders placed during the hospital encounter of 02/23/24    Colonoscopy    Impression  3 subcentimeter polyps were removed  Hemorrhoids        RECOMMENDATION:  Repeat colonoscopy in 5 years  Personal  history of colon polyps              Lisa Weems MD    **Please note:  Dictation voice to text software may have been used in the creation of this record.  Occasional wrong word or “sound alike” substitutions may have occurred due to the inherent limitations of voice recognition software.  Read the chart carefully and recognize, using context, where substitutions have occurred.**       [1]   Past Medical History:  Diagnosis Date    Allergic 2010    celiac dz    Asthma     Cancer (HCC)     Follicular lymphoma    Celiac disease     Chronic sinusitis 04/27/2023    Colon polyp     Follicular lymphoma (HCC)     GERD (gastroesophageal reflux disease)     Heart palpitations     History of colonic polyps     resolved 07/12/2016    History of radiation therapy 2010    Hyperlipidemia     Insomnia     Irregular heart beat     Lymphoma, 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HCC)     non hodgekins, remission    Malignant lymphoma (HCC) 2010    rt arm cutaneous follicular stage ia (rt diatal medical biceps area , s/p resected and s/p radistion treatment    resolved 12/17/2014    Postmenopausal disorder     resolved 09/21/2017    Pulmonary nodule     BENIGN, STABLE 3089-8996    Restless legs syndrome     resolved 09/21/2017    TMJ syndrome     resolved 09/21/2017    Varicella 1967?   [2]   Past Surgical History:  Procedure Laterality Date    COLONOSCOPY  04/16/2019    FUNCTIONAL ENDOSCOPIC SINUS SURGERY Bilateral 2013    Dr Bagley    HYSTERECTOMY      age 45 complete    LYMPH NODE DISSECTION Right     arm    NASAL SEPTUM SURGERY  2013    with turbinate reduction - Dr. Bagley    OOPHORECTOMY Bilateral     DC ESOPHAGOGASTRODUODENOSCOPY TRANSORAL DIAGNOSTIC N/A 01/18/2016    Procedure: ESOPHAGOGASTRODUODENOSCOPY (EGD);  Surgeon: Ness Shepherd MD;  Location: [facility];  Service: Gastroenterology    DC EXC TUMOR SOFT TISSUE FOREARM &/WRIST SUBQ <3CM Left 5/30/2025    Procedure: LEFT WRIST MASS EXCISION;  Surgeon: Josr Mock MD;  Location:  MAIN OR;  Service:  Orthopedics    DE EXCISION GANGLION WRIST DORSAL/VOLAR RECURRENT Left 12/08/2020    Procedure: WRIST GANGLION CYST EXCISION;  Surgeon: Marie Gaspar MD;  Location: [facility];  Service: Orthopedics    DE EXCISION TUMOR SOFT TISSUE SHOULDER SUBQ 3 CM/> Left 11/27/2023    Procedure: anterior shouler- EXCISION BIOPSY TISSUE LESION/MASS UPPER EXTREMITY;  Surgeon: Ronak Alanis DO;  Location: [facility];  Service: Orthopedics    SYNOVECTOMY N/A 12/08/2020    Procedure: ECU TENOSYNOVECTOMY;  Surgeon: Marie Gaspar MD;  [facility]: [facility];  Service: Orthopedics    TONSILLECTOMY      TOTAL ABDOMINAL HYSTERECTOMY W/ BILATERAL SALPINGOOPHORECTOMY      age 51    UPPER GASTROINTESTINAL ENDOSCOPY      US GUIDED MSK PROCEDURE  01/16/2020    US GUIDED MSK PROCEDURE  08/07/2020    US GUIDED MSK PROCEDURE  08/02/2021   [3]   Family History  Problem Relation Name Age of Onset    Lymphoma Mother      Throat cancer Father      Heart failure Father      Atrial fibrillation Father      Diabetes Father      Colonic polyp Father      Hypertension Father      Thyroid cancer Sister magdalena     No Known Problems Maternal Grandmother      No Known Problems Maternal Grandfather      Breast cancer Paternal Grandmother  69    Cancer Paternal Grandfather Grandfather         GM and GF, PAT.Aunts    Lung cancer Paternal Grandfather Grandfather     No Known Problems Maternal Aunt orcillia     Breast cancer Paternal Aunt Deuceie Zucal 69    Ovarian cancer Paternal Aunt Radha Zucal 70    Skin cancer Paternal Aunt david     Throat cancer Paternal Uncle      BRCA1 Positive Cousin      Colon cancer Neg Hx      Cervical cancer Neg Hx      Uterine cancer Neg Hx     [4]   Allergies  Allergen Reactions    Gluten Meal - Food Allergy GI Intolerance     Celiac     Morphine And Codeine Itching    Nuts - Food Allergy Hives     Almonds,walnuts, hazelnuts and other related nuts.     Shellfish-Derived Products - Food Allergy Anaphylaxis    Wheat  Bran - Food Allergy GI Intolerance    Sulfa Antibiotics Rash   [5]   Current Outpatient Medications   Medication Sig Dispense Refill    acetaminophen (TYLENOL) 650 mg CR tablet Take 1 tablet (650 mg total) by mouth every 8 (eight) hours as needed for mild pain 30 tablet 0    benralizumab (FASENRA) subcutaneous injection Inject 1 mL (30 mg total) under the skin every 56 days for 6 doses 1 Syringe 6    bimatoprost (LATISSE) 0.03 % ophthalmic solution Take as directed      budesonide (Pulmicort Flexhaler) 90 MCG/ACT inhaler Inhale 1 puff 2 (two) times a day Rinse mouth after use. 1 each 3    Cholecalciferol 50 MCG (2000 UT) CAPS Take by mouth in the morning.      cyanocobalamin (VITAMIN B-12) 1,000 mcg tablet Take 1,000 mcg by mouth in the morning.      EPINEPHrine (EPIPEN) 0.3 mg/0.3 mL SOAJ Inject 0.3 mL (0.3 mg total) into a muscle once for 1 dose 0.6 mL 0    erythromycin with ethanol (EMGEL) 2 % gel Apply topically 2 (two) times a day 30 g 1    famotidine (PEPCID) 20 mg tablet Take 1 tablet (20 mg total) by mouth 2 (two) times a day 180 tablet 1    flecainide (TAMBOCOR) 100 mg tablet Take 1 tablet (100 mg total) by mouth 2 (two) times a day 60 tablet 11    fluticasone (FLONASE) 50 mcg/act nasal spray 2 sprays into each nostril 2 (two) times a day 48 g 4    ipratropium (ATROVENT) 0.03 % nasal spray instill 2 sprays into each nostril three times a day 30 mL 3    levalbuterol (XOPENEX HFA) 45 mcg/act inhaler Inhale 1-2 puffs every 4 (four) hours as needed for wheezing or shortness of breath 45 g 8    levalbuterol (Xopenex) 1.25 mg/3 mL nebulizer solution Take 3 mL (1.25 mg total) by nebulization every 6 (six) hours as needed for wheezing or shortness of breath 360 mL 6    lidocaine (LMX) 4 % cream Apply topically as needed for mild pain 15 g 3    linaCLOtide 72 MCG CAPS Take 72 mcg by mouth daily 4 capsule 0    Magnesium 500 MG CAPS Take by mouth      metoprolol succinate (TOPROL-XL) 25 mg 24 hr tablet Take 0.5  tablets (12.5 mg total) by mouth 2 (two) times a day May take an additional 1/2 tab prn 35 tablet 11    montelukast (SINGULAIR) 10 mg tablet Take 1 tablet (10 mg total) by mouth daily at bedtime 90 tablet 3    naproxen (Naprosyn) 500 mg tablet Take 1 tablet (500 mg total) by mouth 2 (two) times a day with meals 60 tablet 1    ondansetron (ZOFRAN-ODT) 4 mg disintegrating tablet Take 1 tablet (4 mg total) by mouth every 6 (six) hours as needed for nausea or vomiting 15 tablet 0    polyethylene glycol (GLYCOLAX) 17 GM/SCOOP powder Take 17 g by mouth 2 (two) times a day 765 g 4    predniSONE 10 mg tablet Five pills a day for 2 days, 4 pills a day for 2 days, 3 pills a day for 2 days, 2 pills a day for 2 days, 1 pill a day for 2 days (Patient not taking: Reported on 5/14/2025) 30 tablet 0    rosuvastatin (CRESTOR) 5 mg tablet Take 1 tablet (5 mg total) by mouth daily 30 tablet 11    sucralfate (CARAFATE) 1 g tablet take 1 tablet by mouth twice a day 60 tablet 5    zolpidem (AMBIEN) 5 mg tablet Take 1 tablet (5 mg total) by mouth daily at bedtime as needed for sleep 30 tablet 2     No current facility-administered medications for this visit.

## 2025-07-24 NOTE — PATIENT INSTRUCTIONS
Take pantoprazole 1-2 times daily for a month and then decrease back to famotidine twice daily.     Take B12 1000mcg daily sublingual version.   Take 3000IU Vit D daily (or alternate between 2-4K daily).   Blood work at your convenience however before the scope.   EGD in next few months.

## 2025-07-24 NOTE — ASSESSMENT & PLAN NOTE
No signs of sinusitis on exam. No signs of thrush on exam. Did advise using humdifier in the bedroom and switching to Zyrtec at night from allegra. Did offers steroids but would like to hold off. If symptoms persist will call back.

## 2025-07-24 NOTE — PROGRESS NOTES
"Name: Rosa Caraballo      : 1955      MRN: [id number]  Encounter Provider: JOEL Liu  Encounter Date: 2025   Encounter department: [facility] [place]  :  Assessment & Plan           History of Present Illness {?Quick Links Encounters * My Last Note * Last Note in Specialty * Snapshot * Since Last Visit * History :05664}  HPI  Review of Systems    Objective {?Quick Links Trend Vitals * Enter New Vitals * Results Review * Timeline (Adult) * Labs * Imaging * Cardiology * Procedures * Lung Cancer Screening * Surgical eConsent :83918}  /62 (BP Location: Left arm, Patient Position: Sitting, Cuff Size: Adult)   Pulse 67   Temp 98 °F (36.7 °C) (Temporal)   Ht 5' 4.5\" (1.638 m)   Wt 66.5 kg (146 lb 11.2 oz)   SpO2 99%   BMI 24.79 kg/m²      Physical Exam  {Administrative / Billing Section (Optional):51917}  "

## 2025-08-06 ENCOUNTER — OFFICE VISIT (OUTPATIENT)
Dept: OBGYN CLINIC | Facility: CLINIC | Age: 70
End: 2025-08-06
Payer: COMMERCIAL

## 2025-08-06 VITALS — BODY MASS INDEX: 24.49 KG/M2 | WEIGHT: 147 LBS | HEIGHT: 65 IN

## 2025-08-06 DIAGNOSIS — R22.32 MASS OF LEFT WRIST: ICD-10-CM

## 2025-08-06 DIAGNOSIS — M79.642 LEFT HAND PAIN: ICD-10-CM

## 2025-08-06 DIAGNOSIS — M67.432 GANGLION CYST OF WRIST, LEFT: Primary | ICD-10-CM

## 2025-08-06 PROCEDURE — 99213 OFFICE O/P EST LOW 20 MIN: CPT | Performed by: SURGERY

## 2025-08-07 ENCOUNTER — NURSE TRIAGE (OUTPATIENT)
Age: 70
End: 2025-08-07

## 2025-08-07 ENCOUNTER — OFFICE VISIT (OUTPATIENT)
Dept: URGENT CARE | Facility: CLINIC | Age: 70
End: 2025-08-07
Payer: COMMERCIAL

## 2025-08-07 VITALS
DIASTOLIC BLOOD PRESSURE: 78 MMHG | OXYGEN SATURATION: 100 % | TEMPERATURE: 98.2 F | SYSTOLIC BLOOD PRESSURE: 128 MMHG | HEART RATE: 80 BPM | RESPIRATION RATE: 18 BRPM

## 2025-08-07 DIAGNOSIS — L30.4 INTERTRIGO: Primary | ICD-10-CM

## 2025-08-07 DIAGNOSIS — J01.00 ACUTE MAXILLARY SINUSITIS, RECURRENCE NOT SPECIFIED: ICD-10-CM

## 2025-08-07 PROCEDURE — G0382 LEV 3 HOSP TYPE B ED VISIT: HCPCS

## 2025-08-07 RX ORDER — CLOTRIMAZOLE 1 %
CREAM (GRAM) TOPICAL 2 TIMES DAILY
Qty: 60 G | Refills: 0 | Status: SHIPPED | OUTPATIENT
Start: 2025-08-07

## 2025-08-18 ENCOUNTER — HOSPITAL ENCOUNTER (OUTPATIENT)
Dept: INFUSION CENTER | Facility: HOSPITAL | Age: 70
Discharge: HOME | End: 2025-08-18
Attending: INTERNAL MEDICINE
Payer: COMMERCIAL

## 2025-08-18 VITALS
SYSTOLIC BLOOD PRESSURE: 109 MMHG | TEMPERATURE: 97 F | OXYGEN SATURATION: 97 % | HEART RATE: 74 BPM | DIASTOLIC BLOOD PRESSURE: 68 MMHG | RESPIRATION RATE: 16 BRPM

## 2025-08-18 DIAGNOSIS — D72.119 HYPEREOSINOPHILIC SYNDROME, UNSPECIFIED TYPE: Primary | ICD-10-CM

## 2025-08-18 DIAGNOSIS — J45.50 SEVERE PERSISTENT ASTHMA WITHOUT COMPLICATION: ICD-10-CM

## 2025-08-18 PROCEDURE — 96372 THER/PROPH/DIAG INJ SC/IM: CPT

## 2025-08-18 RX ORDER — EPINEPHRINE 1 MG/ML
0.3 INJECTION, SOLUTION, CONCENTRATE INTRAVENOUS ONCE
OUTPATIENT
Start: 2025-10-13 | End: 2025-10-13

## 2025-08-18 RX ORDER — DOXYCYCLINE 100 MG/1
CAPSULE ORAL
COMMUNITY
Start: 2025-07-31

## 2025-08-18 RX ORDER — EPINEPHRINE 1 MG/ML
0.3 INJECTION, SOLUTION, CONCENTRATE INTRAVENOUS ONCE
Status: DISCONTINUED | OUTPATIENT
Start: 2025-08-18 | End: 2025-08-22 | Stop reason: HOSPADM

## 2025-08-18 RX ADMIN — BENRALIZUMAB 30 MG: 30 INJECTION, SOLUTION SUBCUTANEOUS at 11:13

## (undated) DEVICE — INTENDED FOR TISSUE SEPARATION, AND OTHER PROCEDURES THAT REQUIRE A SHARP SURGICAL BLADE TO PUNCTURE OR CUT.: Brand: BARD-PARKER ® CARBON RIB-BACK BLADES

## (undated) DEVICE — PADDING CAST 4 IN  COTTON STRL

## (undated) DEVICE — 3M™ STERI-DRAPE™ U-DRAPE 1015: Brand: STERI-DRAPE™

## (undated) DEVICE — SUT VICRYL 2-0 CT-1 36 IN J945H

## (undated) DEVICE — GLOVE SRG BIOGEL 6.5

## (undated) DEVICE — SPONGE PVP SCRUB WING STERILE

## (undated) DEVICE — LIGHT GLOVE GREEN

## (undated) DEVICE — RADIOPAQUE LINE, SAFE ENTERAL CONNECTIONS: Brand: KANGAROO

## (undated) DEVICE — SUT MONOCRYL 3-0 SH 27 IN Y416H

## (undated) DEVICE — WET SKIN PREP TRAY: Brand: MEDLINE INDUSTRIES, INC.

## (undated) DEVICE — BETHLEHEM UNIVERSAL  MIONR EXT: Brand: CARDINAL HEALTH

## (undated) DEVICE — GAUZE SPONGES,16 PLY: Brand: CURITY

## (undated) DEVICE — IMPERVIOUS STOCKINETTE: Brand: DEROYAL

## (undated) DEVICE — STERILE BETHLEHEM PLASTIC HAND: Brand: CARDINAL HEALTH

## (undated) DEVICE — SYRINGE 10ML LL

## (undated) DEVICE — PREP PAD BNS: Brand: CONVERTORS

## (undated) DEVICE — LIGHT HANDLE COVER SLEEVE DISP BLUE STELLAR

## (undated) DEVICE — THREE-QUARTER SHEET: Brand: CONVERTORS

## (undated) DEVICE — NEEDLE 25G X 1 1/2

## (undated) DEVICE — GLOVE SRG BIOGEL 7.5

## (undated) DEVICE — GLOVE SRG BIOGEL 8

## (undated) DEVICE — MINI BLADE ROUND TIP SHARP ON ONE SIDE

## (undated) DEVICE — ADHESIVE SKIN HIGH VISCOSITY EXOFIN 1ML

## (undated) DEVICE — GLOVE INDICATOR PI UNDERGLOVE SZ 6.5 BLUE

## (undated) DEVICE — Device

## (undated) DEVICE — GLOVE INDICATOR PI UNDERGLOVE SZ 7.5 BLUE

## (undated) DEVICE — MEDI-VAC YANKAUER SUCTION HANDLE W/STRAIGHT TIP & CONTROL VENT: Brand: CARDINAL HEALTH

## (undated) DEVICE — CYSTO TUBING SINGLE IRRIGATION

## (undated) DEVICE — DISPOSABLE EQUIPMENT COVER: Brand: SMALL TOWEL DRAPE

## (undated) DEVICE — GLOVE INDICATOR PI UNDERGLOVE SZ 7 BLUE

## (undated) DEVICE — GLOVE SRG BIOGEL 7

## (undated) DEVICE — CHLORAPREP HI-LITE 26ML ORANGE

## (undated) DEVICE — PENCIL ELECTROSURG E-Z CLEAN -0035H

## (undated) DEVICE — 3M™ STERI-STRIP™ REINFORCED ADHESIVE SKIN CLOSURES, R1547, 1/2 IN X 4 IN (12 MM X 100 MM), 6 STRIPS/ENVELOPE: Brand: 3M™ STERI-STRIP™

## (undated) DEVICE — ACE WRAP 3 IN STERILE

## (undated) DEVICE — GLOVE INDICATOR PI UNDERGLOVE SZ 8 BLUE

## (undated) DEVICE — CURITY STRETCH BANDAGE: Brand: CURITY

## (undated) DEVICE — SUT ETHILON 4-0 PS-2 18 IN 1667H

## (undated) DEVICE — GLOVE SRG BIOGEL ECLIPSE 7.5

## (undated) DEVICE — CUFF TOURNIQUET 18 X 4 IN QUICK CONNECT DISP 1 BLADDER

## (undated) DEVICE — 3M™ IOBAN™ 2 ANTIMICROBIAL INCISE DRAPE 6648EZ: Brand: IOBAN™ 2

## (undated) DEVICE — DRESSING MEPILEX BORDER 4 X 8 IN

## (undated) DEVICE — OCCLUSIVE GAUZE STRIP,3% BISMUTH TRIBROMOPHENATE IN PETROLATUM BLEND: Brand: XEROFORM